# Patient Record
Sex: FEMALE | Race: WHITE | NOT HISPANIC OR LATINO
[De-identification: names, ages, dates, MRNs, and addresses within clinical notes are randomized per-mention and may not be internally consistent; named-entity substitution may affect disease eponyms.]

---

## 2017-01-27 ENCOUNTER — APPOINTMENT (OUTPATIENT)
Dept: INTERNAL MEDICINE | Facility: CLINIC | Age: 77
End: 2017-01-27

## 2017-01-27 VITALS
OXYGEN SATURATION: 96 % | DIASTOLIC BLOOD PRESSURE: 70 MMHG | WEIGHT: 135 LBS | HEIGHT: 60 IN | TEMPERATURE: 98.3 F | BODY MASS INDEX: 26.5 KG/M2 | HEART RATE: 77 BPM | SYSTOLIC BLOOD PRESSURE: 130 MMHG

## 2017-01-27 DIAGNOSIS — H00.019 HORDEOLUM EXTERNUM UNSPECIFIED EYE, UNSPECIFIED EYELID: ICD-10-CM

## 2017-01-27 DIAGNOSIS — H91.93 UNSPECIFIED HEARING LOSS, BILATERAL: ICD-10-CM

## 2017-03-09 PROBLEM — H91.93 HEARING DIFFICULTY OF BOTH EARS: Status: ACTIVE | Noted: 2017-03-09

## 2017-04-23 ENCOUNTER — LABORATORY RESULT (OUTPATIENT)
Age: 77
End: 2017-04-23

## 2017-04-24 ENCOUNTER — APPOINTMENT (OUTPATIENT)
Dept: INTERNAL MEDICINE | Facility: CLINIC | Age: 77
End: 2017-04-24

## 2017-04-24 VITALS — OXYGEN SATURATION: 97 % | TEMPERATURE: 98.3 F | HEART RATE: 79 BPM

## 2017-04-24 VITALS — RESPIRATION RATE: 12 BRPM | SYSTOLIC BLOOD PRESSURE: 124 MMHG | DIASTOLIC BLOOD PRESSURE: 78 MMHG

## 2017-05-01 LAB
25(OH)D3 SERPL-MCNC: 84.8 NG/ML
ALBUMIN SERPL ELPH-MCNC: 3.6 G/DL
ALP BLD-CCNC: 63 U/L
ALT SERPL-CCNC: 31 U/L
ANION GAP SERPL CALC-SCNC: 15 MMOL/L
AST SERPL-CCNC: 30 U/L
BASOPHILS # BLD AUTO: 0.01 K/UL
BASOPHILS NFR BLD AUTO: 0.1 %
BILIRUB SERPL-MCNC: 0.3 MG/DL
BUN SERPL-MCNC: 17 MG/DL
CALCIUM SERPL-MCNC: 8.8 MG/DL
CHLORIDE SERPL-SCNC: 99 MMOL/L
CHOLEST SERPL-MCNC: 240 MG/DL
CHOLEST/HDLC SERPL: 4.1 RATIO
CO2 SERPL-SCNC: 27 MMOL/L
CREAT SERPL-MCNC: 0.48 MG/DL
EOSINOPHIL # BLD AUTO: 0.04 K/UL
EOSINOPHIL NFR BLD AUTO: 0.5 %
GLUCOSE SERPL-MCNC: 104 MG/DL
HBA1C MFR BLD HPLC: 5.4 %
HCT VFR BLD CALC: 41.8 %
HDLC SERPL-MCNC: 59 MG/DL
HGB BLD-MCNC: 12.8 G/DL
IMM GRANULOCYTES NFR BLD AUTO: 0.3 %
LDLC SERPL CALC-MCNC: 130 MG/DL
LYMPHOCYTES # BLD AUTO: 0.96 K/UL
LYMPHOCYTES NFR BLD AUTO: 13.2 %
MAN DIFF?: NORMAL
MCHC RBC-ENTMCNC: 29.9 PG
MCHC RBC-ENTMCNC: 30.6 GM/DL
MCV RBC AUTO: 97.7 FL
MONOCYTES # BLD AUTO: 0.54 K/UL
MONOCYTES NFR BLD AUTO: 7.4 %
NEUTROPHILS # BLD AUTO: 5.73 K/UL
NEUTROPHILS NFR BLD AUTO: 78.5 %
PLATELET # BLD AUTO: 168 K/UL
POTASSIUM SERPL-SCNC: 3.2 MMOL/L
PROT SERPL-MCNC: 7.4 G/DL
RBC # BLD: 4.28 M/UL
RBC # FLD: 15.7 %
SODIUM SERPL-SCNC: 141 MMOL/L
T3RU NFR SERPL: 0.48 INDEX
T4 SERPL-MCNC: 9.1 UG/DL
TRIGL SERPL-MCNC: 256 MG/DL
TSH SERPL-ACNC: <0.01 UIU/ML
WBC # FLD AUTO: 7.3 K/UL

## 2017-05-18 ENCOUNTER — APPOINTMENT (OUTPATIENT)
Dept: INTERNAL MEDICINE | Facility: CLINIC | Age: 77
End: 2017-05-18

## 2017-05-18 VITALS
DIASTOLIC BLOOD PRESSURE: 60 MMHG | HEART RATE: 82 BPM | TEMPERATURE: 98.4 F | SYSTOLIC BLOOD PRESSURE: 130 MMHG | OXYGEN SATURATION: 94 %

## 2017-05-18 DIAGNOSIS — G70.9 OTHER DISORDERS OF LUNG: ICD-10-CM

## 2017-05-18 DIAGNOSIS — R06.00 DYSPNEA, UNSPECIFIED: ICD-10-CM

## 2017-05-18 DIAGNOSIS — J98.4 OTHER DISORDERS OF LUNG: ICD-10-CM

## 2017-05-22 LAB
T3 SERPL-MCNC: >650 NG/DL
T4 SERPL-MCNC: 18 UG/DL
TSH SERPL-ACNC: 0.01 UIU/ML

## 2017-05-23 ENCOUNTER — APPOINTMENT (OUTPATIENT)
Dept: INTERNAL MEDICINE | Facility: CLINIC | Age: 77
End: 2017-05-23

## 2017-05-23 VITALS
HEART RATE: 76 BPM | OXYGEN SATURATION: 94 % | WEIGHT: 141 LBS | TEMPERATURE: 98.2 F | SYSTOLIC BLOOD PRESSURE: 122 MMHG | BODY MASS INDEX: 27.54 KG/M2 | DIASTOLIC BLOOD PRESSURE: 66 MMHG

## 2017-05-23 DIAGNOSIS — R31.9 HEMATURIA, UNSPECIFIED: ICD-10-CM

## 2017-05-23 DIAGNOSIS — E03.8 OTHER SPECIFIED HYPOTHYROIDISM: ICD-10-CM

## 2017-05-25 ENCOUNTER — MEDICATION RENEWAL (OUTPATIENT)
Age: 77
End: 2017-05-25

## 2017-06-02 ENCOUNTER — RX RENEWAL (OUTPATIENT)
Age: 77
End: 2017-06-02

## 2017-06-05 LAB — BACTERIA UR CULT: ABNORMAL

## 2017-06-06 ENCOUNTER — APPOINTMENT (OUTPATIENT)
Dept: INTERNAL MEDICINE | Facility: CLINIC | Age: 77
End: 2017-06-06

## 2017-09-08 ENCOUNTER — RX RENEWAL (OUTPATIENT)
Age: 77
End: 2017-09-08

## 2017-09-08 ENCOUNTER — APPOINTMENT (OUTPATIENT)
Dept: INTERNAL MEDICINE | Facility: CLINIC | Age: 77
End: 2017-09-08
Payer: MEDICARE

## 2017-09-08 VITALS
TEMPERATURE: 98.7 F | BODY MASS INDEX: 27.48 KG/M2 | SYSTOLIC BLOOD PRESSURE: 127 MMHG | HEART RATE: 80 BPM | OXYGEN SATURATION: 92 % | DIASTOLIC BLOOD PRESSURE: 74 MMHG | WEIGHT: 140 LBS | HEIGHT: 60 IN

## 2017-09-08 DIAGNOSIS — R53.83 OTHER FATIGUE: ICD-10-CM

## 2017-09-08 DIAGNOSIS — N31.9 NEUROMUSCULAR DYSFUNCTION OF BLADDER, UNSPECIFIED: ICD-10-CM

## 2017-09-08 PROCEDURE — 99214 OFFICE O/P EST MOD 30 MIN: CPT | Mod: 25

## 2017-09-08 PROCEDURE — 36415 COLL VENOUS BLD VENIPUNCTURE: CPT

## 2017-09-29 ENCOUNTER — APPOINTMENT (OUTPATIENT)
Dept: INTERNAL MEDICINE | Facility: CLINIC | Age: 77
End: 2017-09-29

## 2017-10-17 ENCOUNTER — APPOINTMENT (OUTPATIENT)
Dept: INTERNAL MEDICINE | Facility: CLINIC | Age: 77
End: 2017-10-17
Payer: MEDICARE

## 2017-10-17 VITALS
TEMPERATURE: 97.8 F | HEIGHT: 60 IN | BODY MASS INDEX: 27.48 KG/M2 | WEIGHT: 140 LBS | HEART RATE: 71 BPM | OXYGEN SATURATION: 94 % | DIASTOLIC BLOOD PRESSURE: 72 MMHG | SYSTOLIC BLOOD PRESSURE: 129 MMHG

## 2017-10-17 PROCEDURE — 99214 OFFICE O/P EST MOD 30 MIN: CPT

## 2017-10-26 LAB
ALBUMIN SERPL ELPH-MCNC: 3.8 G/DL
ALP BLD-CCNC: 108 U/L
ALT SERPL-CCNC: 19 U/L
ANION GAP SERPL CALC-SCNC: 14 MMOL/L
AST SERPL-CCNC: 24 U/L
BACTERIA BLD CULT: NORMAL
BASOPHILS # BLD AUTO: 0.02 K/UL
BASOPHILS NFR BLD AUTO: 0.4 %
BILIRUB SERPL-MCNC: 0.2 MG/DL
BUN SERPL-MCNC: 11 MG/DL
CALCIUM SERPL-MCNC: 10.2 MG/DL
CHLORIDE SERPL-SCNC: 104 MMOL/L
CO2 SERPL-SCNC: 27 MMOL/L
CREAT SERPL-MCNC: 0.5 MG/DL
EOSINOPHIL # BLD AUTO: 0.16 K/UL
EOSINOPHIL NFR BLD AUTO: 3.2 %
GLUCOSE SERPL-MCNC: 92 MG/DL
HCT VFR BLD CALC: 44.7 %
HGB BLD-MCNC: 13.7 G/DL
IMM GRANULOCYTES NFR BLD AUTO: 0.2 %
LYMPHOCYTES # BLD AUTO: 1.3 K/UL
LYMPHOCYTES NFR BLD AUTO: 26.4 %
MAN DIFF?: NORMAL
MCHC RBC-ENTMCNC: 29.7 PG
MCHC RBC-ENTMCNC: 30.6 GM/DL
MCV RBC AUTO: 97 FL
MONOCYTES # BLD AUTO: 0.48 K/UL
MONOCYTES NFR BLD AUTO: 9.7 %
NEUTROPHILS # BLD AUTO: 2.96 K/UL
NEUTROPHILS NFR BLD AUTO: 60.1 %
PLATELET # BLD AUTO: 205 K/UL
POTASSIUM SERPL-SCNC: 4.2 MMOL/L
PROT SERPL-MCNC: 7.5 G/DL
RBC # BLD: 4.61 M/UL
RBC # FLD: 16 %
SODIUM SERPL-SCNC: 145 MMOL/L
T4 FREE SERPL-MCNC: 1.4 NG/DL
TSH SERPL-ACNC: 0.17 UIU/ML
WBC # FLD AUTO: 4.93 K/UL

## 2017-10-30 ENCOUNTER — APPOINTMENT (OUTPATIENT)
Dept: INTERNAL MEDICINE | Facility: CLINIC | Age: 77
End: 2017-10-30
Payer: MEDICARE

## 2017-10-30 VITALS
HEART RATE: 81 BPM | DIASTOLIC BLOOD PRESSURE: 80 MMHG | WEIGHT: 140 LBS | OXYGEN SATURATION: 95 % | TEMPERATURE: 97.3 F | HEIGHT: 60 IN | BODY MASS INDEX: 27.48 KG/M2 | SYSTOLIC BLOOD PRESSURE: 130 MMHG

## 2017-10-30 DIAGNOSIS — R11.10 VOMITING, UNSPECIFIED: ICD-10-CM

## 2017-10-30 PROCEDURE — 99214 OFFICE O/P EST MOD 30 MIN: CPT

## 2017-10-31 ENCOUNTER — APPOINTMENT (OUTPATIENT)
Dept: OPHTHALMOLOGY | Facility: CLINIC | Age: 77
End: 2017-10-31
Payer: MEDICARE

## 2017-10-31 PROCEDURE — 92083 EXTENDED VISUAL FIELD XM: CPT

## 2017-11-27 ENCOUNTER — RX RENEWAL (OUTPATIENT)
Age: 77
End: 2017-11-27

## 2017-11-30 ENCOUNTER — RX RENEWAL (OUTPATIENT)
Age: 77
End: 2017-11-30

## 2017-12-07 ENCOUNTER — APPOINTMENT (OUTPATIENT)
Dept: OPHTHALMOLOGY | Facility: CLINIC | Age: 77
End: 2017-12-07
Payer: MEDICARE

## 2017-12-07 DIAGNOSIS — H02.423 MYOGENIC PTOSIS OF BILATERAL EYELIDS: ICD-10-CM

## 2017-12-07 PROCEDURE — 92083 EXTENDED VISUAL FIELD XM: CPT | Mod: TC

## 2017-12-18 PROBLEM — H02.423: Status: ACTIVE | Noted: 2017-12-18

## 2018-02-12 ENCOUNTER — OUTPATIENT (OUTPATIENT)
Dept: OUTPATIENT SERVICES | Facility: HOSPITAL | Age: 78
LOS: 1 days | End: 2018-02-12
Payer: MEDICARE

## 2018-02-12 VITALS
HEIGHT: 63 IN | WEIGHT: 143.08 LBS | OXYGEN SATURATION: 95 % | TEMPERATURE: 99 F | DIASTOLIC BLOOD PRESSURE: 73 MMHG | HEART RATE: 90 BPM | RESPIRATION RATE: 15 BRPM | SYSTOLIC BLOOD PRESSURE: 130 MMHG

## 2018-02-12 DIAGNOSIS — Z01.818 ENCOUNTER FOR OTHER PREPROCEDURAL EXAMINATION: ICD-10-CM

## 2018-02-12 DIAGNOSIS — H02.403 UNSPECIFIED PTOSIS OF BILATERAL EYELIDS: ICD-10-CM

## 2018-02-12 DIAGNOSIS — I10 ESSENTIAL (PRIMARY) HYPERTENSION: ICD-10-CM

## 2018-02-12 DIAGNOSIS — Z98.890 OTHER SPECIFIED POSTPROCEDURAL STATES: Chronic | ICD-10-CM

## 2018-02-12 DIAGNOSIS — H02.423 MYOGENIC PTOSIS OF BILATERAL EYELIDS: ICD-10-CM

## 2018-02-12 DIAGNOSIS — Z96.7 PRESENCE OF OTHER BONE AND TENDON IMPLANTS: Chronic | ICD-10-CM

## 2018-02-12 DIAGNOSIS — G35 MULTIPLE SCLEROSIS: ICD-10-CM

## 2018-02-12 DIAGNOSIS — Z90.710 ACQUIRED ABSENCE OF BOTH CERVIX AND UTERUS: Chronic | ICD-10-CM

## 2018-02-12 LAB
ANION GAP SERPL CALC-SCNC: 14 MMOL/L — SIGNIFICANT CHANGE UP (ref 5–17)
BUN SERPL-MCNC: 17 MG/DL — SIGNIFICANT CHANGE UP (ref 7–23)
CALCIUM SERPL-MCNC: 9.8 MG/DL — SIGNIFICANT CHANGE UP (ref 8.4–10.5)
CHLORIDE SERPL-SCNC: 98 MMOL/L — SIGNIFICANT CHANGE UP (ref 96–108)
CO2 SERPL-SCNC: 29 MMOL/L — SIGNIFICANT CHANGE UP (ref 22–31)
CREAT SERPL-MCNC: 0.54 MG/DL — SIGNIFICANT CHANGE UP (ref 0.5–1.3)
GLUCOSE SERPL-MCNC: 105 MG/DL — HIGH (ref 70–99)
HCT VFR BLD CALC: 41.6 % — SIGNIFICANT CHANGE UP (ref 34.5–45)
HGB BLD-MCNC: 13.3 G/DL — SIGNIFICANT CHANGE UP (ref 11.5–15.5)
MCHC RBC-ENTMCNC: 29.9 PG — SIGNIFICANT CHANGE UP (ref 27–34)
MCHC RBC-ENTMCNC: 32 GM/DL — SIGNIFICANT CHANGE UP (ref 32–36)
MCV RBC AUTO: 93.5 FL — SIGNIFICANT CHANGE UP (ref 80–100)
PLATELET # BLD AUTO: 185 K/UL — SIGNIFICANT CHANGE UP (ref 150–400)
POTASSIUM SERPL-MCNC: 3.6 MMOL/L — SIGNIFICANT CHANGE UP (ref 3.5–5.3)
POTASSIUM SERPL-SCNC: 3.6 MMOL/L — SIGNIFICANT CHANGE UP (ref 3.5–5.3)
RBC # BLD: 4.45 M/UL — SIGNIFICANT CHANGE UP (ref 3.8–5.2)
RBC # FLD: 15.7 % — HIGH (ref 10.3–14.5)
SODIUM SERPL-SCNC: 141 MMOL/L — SIGNIFICANT CHANGE UP (ref 135–145)
WBC # BLD: 5.64 K/UL — SIGNIFICANT CHANGE UP (ref 3.8–10.5)
WBC # FLD AUTO: 5.64 K/UL — SIGNIFICANT CHANGE UP (ref 3.8–10.5)

## 2018-02-12 PROCEDURE — 93010 ELECTROCARDIOGRAM REPORT: CPT

## 2018-02-12 PROCEDURE — 93005 ELECTROCARDIOGRAM TRACING: CPT

## 2018-02-12 PROCEDURE — G0463: CPT

## 2018-02-12 PROCEDURE — 85027 COMPLETE CBC AUTOMATED: CPT

## 2018-02-12 PROCEDURE — 80048 BASIC METABOLIC PNL TOTAL CA: CPT

## 2018-02-12 NOTE — H&P PST ADULT - PSH
S/P breast biopsy    S/P hysterectomy  > 30 yrs ago  S/P ORIF (open reduction internal fixation) fracture  left femur, right hip

## 2018-02-12 NOTE — H&P PST ADULT - HISTORY OF PRESENT ILLNESS
79 y/o female with pmhx of MS (wheelchair bound), HTN, with c/o drooping upper eyelids affecting her visual field. Presents for bilateral ptosis repair. 77 y/o female with pmhx of MS (wheelchair bound), HTN, with c/o drooping upper eyelids affecting her visual field. Presents for bilateral ptosis repair.     * Unable to weigh or measure pt during PST visit; most recent weight patient and  could remember was 143lbs.

## 2018-02-12 NOTE — H&P PST ADULT - RS GEN PE MLT RESP DETAILS PC
no rales/no wheezes/diminished breath sounds, R/no rhonchi/respirations non-labored/diminished breath sounds, L

## 2018-02-12 NOTE — H&P PST ADULT - NSANTHOSAYNRD_GEN_A_CORE
No. DESTINY screening performed.  STOP BANG Legend: 0-2 = LOW Risk; 3-4 = INTERMEDIATE Risk; 5-8 = HIGH Risk

## 2018-02-12 NOTE — H&P PST ADULT - PMH
Dysphagia  no special diet; no h/o aspiration PNA  Neurogenic bladder    Osteoporosis    Personal History of Hypertension    Personal History of Multiple Sclerosis    Ptosis of both eyelids Dysphagia  no special diet; no h/o aspiration PNA  HTN (hypertension)    MS (multiple sclerosis)    Neurogenic bladder    Osteoporosis    Ptosis of both eyelids

## 2018-02-15 ENCOUNTER — APPOINTMENT (OUTPATIENT)
Dept: INTERNAL MEDICINE | Facility: CLINIC | Age: 78
End: 2018-02-15
Payer: MEDICARE

## 2018-02-15 VITALS
HEIGHT: 63 IN | TEMPERATURE: 97.9 F | OXYGEN SATURATION: 98 % | HEART RATE: 80 BPM | RESPIRATION RATE: 12 BRPM | BODY MASS INDEX: 24.8 KG/M2 | DIASTOLIC BLOOD PRESSURE: 80 MMHG | WEIGHT: 140 LBS | SYSTOLIC BLOOD PRESSURE: 150 MMHG

## 2018-02-15 VITALS — SYSTOLIC BLOOD PRESSURE: 148 MMHG | DIASTOLIC BLOOD PRESSURE: 80 MMHG

## 2018-02-15 DIAGNOSIS — Z01.818 ENCOUNTER FOR OTHER PREPROCEDURAL EXAMINATION: ICD-10-CM

## 2018-02-15 PROCEDURE — 99214 OFFICE O/P EST MOD 30 MIN: CPT

## 2018-02-15 RX ORDER — CEPHALEXIN 500 MG/1
500 CAPSULE ORAL
Qty: 30 | Refills: 0 | Status: DISCONTINUED | COMMUNITY
Start: 2017-06-01

## 2018-02-15 RX ORDER — LIDOCAINE AND PRILOCAINE 25; 25 MG/G; MG/G
2.5-2.5 CREAM TOPICAL
Qty: 60 | Refills: 0 | Status: DISCONTINUED | COMMUNITY
Start: 2017-02-14

## 2018-02-15 RX ORDER — CLONAZEPAM 0.5 MG/1
0.5 TABLET, ORALLY DISINTEGRATING ORAL
Qty: 2 | Refills: 0 | Status: DISCONTINUED | COMMUNITY
Start: 2017-05-08

## 2018-02-15 RX ORDER — MOXIFLOXACIN HYDROCHLORIDE 5 MG/ML
0.5 SOLUTION/ DROPS OPHTHALMIC 3 TIMES DAILY
Qty: 1 | Refills: 0 | Status: DISCONTINUED | COMMUNITY
Start: 2017-01-27 | End: 2018-02-15

## 2018-02-15 RX ORDER — SULFAMETHOXAZOLE AND TRIMETHOPRIM 800; 160 MG/1; MG/1
800-160 TABLET ORAL
Qty: 20 | Refills: 0 | Status: DISCONTINUED | COMMUNITY
Start: 2017-05-31

## 2018-02-28 ENCOUNTER — RX RENEWAL (OUTPATIENT)
Age: 78
End: 2018-02-28

## 2018-03-08 ENCOUNTER — LABORATORY RESULT (OUTPATIENT)
Age: 78
End: 2018-03-08

## 2018-03-08 ENCOUNTER — APPOINTMENT (OUTPATIENT)
Dept: INTERNAL MEDICINE | Facility: CLINIC | Age: 78
End: 2018-03-08
Payer: MEDICARE

## 2018-03-08 VITALS
DIASTOLIC BLOOD PRESSURE: 70 MMHG | SYSTOLIC BLOOD PRESSURE: 124 MMHG | HEART RATE: 96 BPM | HEIGHT: 63 IN | OXYGEN SATURATION: 93 % | BODY MASS INDEX: 23.92 KG/M2 | TEMPERATURE: 98.1 F | WEIGHT: 135 LBS

## 2018-03-08 DIAGNOSIS — R79.89 OTHER SPECIFIED ABNORMAL FINDINGS OF BLOOD CHEMISTRY: ICD-10-CM

## 2018-03-08 DIAGNOSIS — M54.9 DORSALGIA, UNSPECIFIED: ICD-10-CM

## 2018-03-08 PROCEDURE — 93000 ELECTROCARDIOGRAM COMPLETE: CPT

## 2018-03-08 PROCEDURE — 36415 COLL VENOUS BLD VENIPUNCTURE: CPT

## 2018-03-08 PROCEDURE — 99215 OFFICE O/P EST HI 40 MIN: CPT | Mod: 25

## 2018-03-10 ENCOUNTER — APPOINTMENT (OUTPATIENT)
Dept: ULTRASOUND IMAGING | Facility: IMAGING CENTER | Age: 78
End: 2018-03-10

## 2018-03-10 ENCOUNTER — APPOINTMENT (OUTPATIENT)
Dept: MAMMOGRAPHY | Facility: IMAGING CENTER | Age: 78
End: 2018-03-10

## 2018-03-19 LAB
25(OH)D3 SERPL-MCNC: 43.5 NG/ML
ALBUMIN SERPL ELPH-MCNC: 3.9 G/DL
ALP BLD-CCNC: 80 U/L
ALT SERPL-CCNC: 39 U/L
ANION GAP SERPL CALC-SCNC: 12 MMOL/L
AST SERPL-CCNC: 35 U/L
BASOPHILS # BLD AUTO: 0.03 K/UL
BASOPHILS NFR BLD AUTO: 0.5 %
BILIRUB SERPL-MCNC: 0.3 MG/DL
BUN SERPL-MCNC: 16 MG/DL
CALCIUM SERPL-MCNC: 9.8 MG/DL
CHLORIDE SERPL-SCNC: 97 MMOL/L
CHOLEST SERPL-MCNC: 208 MG/DL
CHOLEST/HDLC SERPL: 5.1 RATIO
CO2 SERPL-SCNC: 32 MMOL/L
CREAT SERPL-MCNC: 0.4 MG/DL
EOSINOPHIL # BLD AUTO: 0.15 K/UL
EOSINOPHIL NFR BLD AUTO: 2.4 %
GLUCOSE SERPL-MCNC: 89 MG/DL
HBA1C MFR BLD HPLC: 5.1 %
HCT VFR BLD CALC: 43.8 %
HDLC SERPL-MCNC: 41 MG/DL
HGB BLD-MCNC: 13.5 G/DL
IMM GRANULOCYTES NFR BLD AUTO: 0.3 %
LDLC SERPL CALC-MCNC: 106 MG/DL
LYMPHOCYTES # BLD AUTO: 1.88 K/UL
LYMPHOCYTES NFR BLD AUTO: 29.6 %
MAN DIFF?: NORMAL
MCHC RBC-ENTMCNC: 29.3 PG
MCHC RBC-ENTMCNC: 30.8 GM/DL
MCV RBC AUTO: 95.2 FL
MONOCYTES # BLD AUTO: 0.61 K/UL
MONOCYTES NFR BLD AUTO: 9.6 %
NEUTROPHILS # BLD AUTO: 3.66 K/UL
NEUTROPHILS NFR BLD AUTO: 57.6 %
PLATELET # BLD AUTO: 206 K/UL
POTASSIUM SERPL-SCNC: 3.8 MMOL/L
PROT SERPL-MCNC: 7.1 G/DL
RBC # BLD: 4.6 M/UL
RBC # FLD: 15.3 %
SODIUM SERPL-SCNC: 141 MMOL/L
T3RU NFR SERPL: <0.2 INDEX
T4 SERPL-MCNC: 17.5 UG/DL
TRIGL SERPL-MCNC: 305 MG/DL
TSH SERPL-ACNC: 0.01 UIU/ML
WBC # FLD AUTO: 6.35 K/UL

## 2018-07-03 ENCOUNTER — NON-APPOINTMENT (OUTPATIENT)
Age: 78
End: 2018-07-03

## 2018-07-03 ENCOUNTER — APPOINTMENT (OUTPATIENT)
Dept: CARDIOLOGY | Facility: CLINIC | Age: 78
End: 2018-07-03
Payer: MEDICARE

## 2018-07-03 VITALS
HEIGHT: 63 IN | WEIGHT: 134 LBS | HEART RATE: 92 BPM | SYSTOLIC BLOOD PRESSURE: 112 MMHG | BODY MASS INDEX: 23.74 KG/M2 | DIASTOLIC BLOOD PRESSURE: 60 MMHG | RESPIRATION RATE: 14 BRPM

## 2018-07-03 DIAGNOSIS — R01.1 CARDIAC MURMUR, UNSPECIFIED: ICD-10-CM

## 2018-07-03 PROCEDURE — 99205 OFFICE O/P NEW HI 60 MIN: CPT

## 2018-07-03 PROCEDURE — 93000 ELECTROCARDIOGRAM COMPLETE: CPT

## 2018-08-02 ENCOUNTER — MEDICATION RENEWAL (OUTPATIENT)
Age: 78
End: 2018-08-02

## 2018-08-07 ENCOUNTER — MEDICATION RENEWAL (OUTPATIENT)
Age: 78
End: 2018-08-07

## 2018-08-21 ENCOUNTER — APPOINTMENT (OUTPATIENT)
Dept: CARDIOLOGY | Facility: CLINIC | Age: 78
End: 2018-08-21

## 2018-08-21 PROBLEM — G35 MULTIPLE SCLEROSIS: Chronic | Status: ACTIVE | Noted: 2018-02-12

## 2018-08-21 PROBLEM — N31.9 NEUROMUSCULAR DYSFUNCTION OF BLADDER, UNSPECIFIED: Chronic | Status: ACTIVE | Noted: 2018-02-12

## 2018-08-21 PROBLEM — R13.10 DYSPHAGIA, UNSPECIFIED: Chronic | Status: ACTIVE | Noted: 2018-02-12

## 2018-08-21 PROBLEM — I10 ESSENTIAL (PRIMARY) HYPERTENSION: Chronic | Status: ACTIVE | Noted: 2018-02-12

## 2018-08-21 PROBLEM — H02.403 UNSPECIFIED PTOSIS OF BILATERAL EYELIDS: Chronic | Status: ACTIVE | Noted: 2018-02-12

## 2018-08-21 PROBLEM — M81.0 AGE-RELATED OSTEOPOROSIS WITHOUT CURRENT PATHOLOGICAL FRACTURE: Chronic | Status: ACTIVE | Noted: 2018-02-12

## 2018-08-22 ENCOUNTER — MEDICATION RENEWAL (OUTPATIENT)
Age: 78
End: 2018-08-22

## 2018-08-27 ENCOUNTER — APPOINTMENT (OUTPATIENT)
Dept: INTERNAL MEDICINE | Facility: CLINIC | Age: 78
End: 2018-08-27
Payer: MEDICARE

## 2018-08-27 VITALS
SYSTOLIC BLOOD PRESSURE: 130 MMHG | WEIGHT: 126 LBS | OXYGEN SATURATION: 90 % | DIASTOLIC BLOOD PRESSURE: 86 MMHG | HEIGHT: 63 IN | HEART RATE: 81 BPM | TEMPERATURE: 97.7 F | BODY MASS INDEX: 22.32 KG/M2

## 2018-08-27 DIAGNOSIS — R79.89 OTHER SPECIFIED ABNORMAL FINDINGS OF BLOOD CHEMISTRY: ICD-10-CM

## 2018-08-27 PROCEDURE — 99214 OFFICE O/P EST MOD 30 MIN: CPT | Mod: PD

## 2018-08-27 NOTE — ASSESSMENT
[FreeTextEntry1] : 1. HTN: the BP is controlled on medication regimen. F/u with Dr. Jon.\par \par 2. She has elevated T4 and suppressed TSH. Unclear if she has hyperthyroidism. She follows with endocrine, not actively on medication. Her TSH was suppressed from labs a few weeks ago.\par \par 3. Hx of MS - followed with Dr. Parry of neurology. She is on Baclofen. She uses motorized wheelchair due to gait difficulty. She has a home health aide for assistance with her ADL's. \par \par RTO in 1 month for annual exam and flu vaccine.\par \par

## 2018-08-27 NOTE — HISTORY OF PRESENT ILLNESS
[Formal Caregiver] : formal caregiver [FreeTextEntry1] : Follow-up [de-identified] : Patient comes for follow-up. She came 30 min late to visit. Former pt of Dr. Trevino who recently retired.\par \par She has hx of multiple sclerosis and is wheel-chair bound. She was scheduled for bilateral ptosis repair in 2/18 but was cancelled as the procedure was scheduled too early in morning. It was never rescheduled. \par \par Hx of hyperthyroidism - she sees endocrinologist. Not on medication. She had recent labs from a few weeks ago and brought in for review.\par \par She started seeing cardiology Dr. Jon for intermittent chest pain and left arm pain. Not felt to be cardiac in nature. EKG has been unchanged. She is scheduled for follow-up and echo next week. \par \par \par

## 2018-08-27 NOTE — PHYSICAL EXAM
[No Acute Distress] : no acute distress [Well Nourished] : well nourished [Well Developed] : well developed [Well-Appearing] : well-appearing [Supple] : supple [No Lymphadenopathy] : no lymphadenopathy [No Respiratory Distress] : no respiratory distress  [Clear to Auscultation] : lungs were clear to auscultation bilaterally [Normal Rate] : normal rate  [Regular Rhythm] : with a regular rhythm [Normal S1, S2] : normal S1 and S2 [No Murmur] : no murmur heard [No Edema] : there was no peripheral edema [Soft] : abdomen soft [Non Tender] : non-tender [Non-distended] : non-distended [Normal Bowel Sounds] : normal bowel sounds [No Spinal Tenderness] : no spinal tenderness [No Joint Swelling] : no joint swelling [No Rash] : no rash [No Focal Deficits] : no focal deficits [Normal Affect] : the affect was normal [Alert and Oriented x3] : oriented to person, place, and time [Normal Mood] : the mood was normal [de-identified] : friendly female, NAD [de-identified] : wheelchair-bound, pronounced weakness in lower extremities; upper extremity weakness - L>>R

## 2018-08-27 NOTE — REVIEW OF SYSTEMS
[Recent Change In Weight] : ~T recent weight change [Unsteady Walking] : ataxia [Anxiety] : anxiety [Fever] : no fever [Chills] : no chills [Earache] : no earache [Nasal Discharge] : no nasal discharge [Chest Pain] : no chest pain [Lower Ext Edema] : no lower extremity edema [Shortness Of Breath] : no shortness of breath [Cough] : no cough [Abdominal Pain] : no abdominal pain [Nausea] : no nausea [Diarrhea] : diarrhea [Dysuria] : no dysuria [Incontinence] : no incontinence [Frequency] : no frequency [Skin Rash] : no skin rash [Headache] : no headache [Dizziness] : no dizziness [Depression] : no depression [FreeTextEntry2] : intentional wt loss

## 2018-08-29 ENCOUNTER — INPATIENT (INPATIENT)
Facility: HOSPITAL | Age: 78
LOS: 7 days | Discharge: HOME HEALTH | DRG: 871 | End: 2018-09-06
Attending: HOSPITALIST | Admitting: STUDENT IN AN ORGANIZED HEALTH CARE EDUCATION/TRAINING PROGRAM
Payer: MEDICARE

## 2018-08-29 VITALS
OXYGEN SATURATION: 99 % | HEART RATE: 78 BPM | DIASTOLIC BLOOD PRESSURE: 72 MMHG | RESPIRATION RATE: 20 BRPM | TEMPERATURE: 98 F | SYSTOLIC BLOOD PRESSURE: 116 MMHG

## 2018-08-29 DIAGNOSIS — Z96.7 PRESENCE OF OTHER BONE AND TENDON IMPLANTS: Chronic | ICD-10-CM

## 2018-08-29 DIAGNOSIS — Z98.890 OTHER SPECIFIED POSTPROCEDURAL STATES: Chronic | ICD-10-CM

## 2018-08-29 DIAGNOSIS — Z90.710 ACQUIRED ABSENCE OF BOTH CERVIX AND UTERUS: Chronic | ICD-10-CM

## 2018-08-29 LAB
ALBUMIN SERPL ELPH-MCNC: 4.3 G/DL — SIGNIFICANT CHANGE UP (ref 3.3–5)
ALP SERPL-CCNC: 85 U/L — SIGNIFICANT CHANGE UP (ref 40–120)
ALT FLD-CCNC: 59 U/L — HIGH (ref 10–45)
ANION GAP SERPL CALC-SCNC: 13 MMOL/L — SIGNIFICANT CHANGE UP (ref 5–17)
ANION GAP SERPL CALC-SCNC: 14 MMOL/L — SIGNIFICANT CHANGE UP (ref 5–17)
ANISOCYTOSIS BLD QL: SLIGHT — SIGNIFICANT CHANGE UP
APTT BLD: 25.4 SEC — LOW (ref 27.5–37.4)
AST SERPL-CCNC: 104 U/L — HIGH (ref 10–40)
BASE EXCESS BLDV CALC-SCNC: 0.9 MMOL/L — SIGNIFICANT CHANGE UP (ref -2–2)
BASOPHILS # BLD AUTO: 0.1 K/UL — SIGNIFICANT CHANGE UP (ref 0–0.2)
BILIRUB SERPL-MCNC: 0.7 MG/DL — SIGNIFICANT CHANGE UP (ref 0.2–1.2)
BUN SERPL-MCNC: 31 MG/DL — HIGH (ref 7–23)
BUN SERPL-MCNC: 35 MG/DL — HIGH (ref 7–23)
CA-I SERPL-SCNC: 1.54 MMOL/L — HIGH (ref 1.12–1.3)
CALCIUM SERPL-MCNC: 12.5 MG/DL — HIGH (ref 8.4–10.5)
CALCIUM SERPL-MCNC: 12.7 MG/DL — HIGH (ref 8.4–10.5)
CHLORIDE BLDV-SCNC: 102 MMOL/L — SIGNIFICANT CHANGE UP (ref 96–108)
CHLORIDE SERPL-SCNC: 94 MMOL/L — LOW (ref 96–108)
CHLORIDE SERPL-SCNC: 98 MMOL/L — SIGNIFICANT CHANGE UP (ref 96–108)
CO2 BLDV-SCNC: 28 MMOL/L — SIGNIFICANT CHANGE UP (ref 22–30)
CO2 SERPL-SCNC: 23 MMOL/L — SIGNIFICANT CHANGE UP (ref 22–31)
CO2 SERPL-SCNC: 23 MMOL/L — SIGNIFICANT CHANGE UP (ref 22–31)
CREAT SERPL-MCNC: 0.58 MG/DL — SIGNIFICANT CHANGE UP (ref 0.5–1.3)
CREAT SERPL-MCNC: 0.62 MG/DL — SIGNIFICANT CHANGE UP (ref 0.5–1.3)
EOSINOPHIL # BLD AUTO: 0 K/UL — SIGNIFICANT CHANGE UP (ref 0–0.5)
GAS PNL BLDV: 130 MMOL/L — LOW (ref 136–145)
GAS PNL BLDV: SIGNIFICANT CHANGE UP
GAS PNL BLDV: SIGNIFICANT CHANGE UP
GLUCOSE BLDV-MCNC: 140 MG/DL — HIGH (ref 70–99)
GLUCOSE SERPL-MCNC: 130 MG/DL — HIGH (ref 70–99)
GLUCOSE SERPL-MCNC: 145 MG/DL — HIGH (ref 70–99)
HCO3 BLDV-SCNC: 27 MMOL/L — SIGNIFICANT CHANGE UP (ref 21–29)
HCT VFR BLD CALC: 54.5 % — HIGH (ref 34.5–45)
HCT VFR BLDA CALC: 54 % — HIGH (ref 39–50)
HGB BLD CALC-MCNC: 17.5 G/DL — HIGH (ref 11.5–15.5)
HGB BLD-MCNC: 17.2 G/DL — HIGH (ref 11.5–15.5)
INR BLD: 0.91 RATIO — SIGNIFICANT CHANGE UP (ref 0.88–1.16)
LACTATE BLDV-MCNC: 3.3 MMOL/L — HIGH (ref 0.7–2)
LIDOCAIN IGE QN: 83 U/L — HIGH (ref 7–60)
LYMPHOCYTES # BLD AUTO: 1.1 K/UL — SIGNIFICANT CHANGE UP (ref 1–3.3)
LYMPHOCYTES # BLD AUTO: 4 % — LOW (ref 13–44)
MCHC RBC-ENTMCNC: 28.6 PG — SIGNIFICANT CHANGE UP (ref 27–34)
MCHC RBC-ENTMCNC: 31.5 GM/DL — LOW (ref 32–36)
MCV RBC AUTO: 90.6 FL — SIGNIFICANT CHANGE UP (ref 80–100)
MONOCYTES # BLD AUTO: 1.3 K/UL — HIGH (ref 0–0.9)
MONOCYTES NFR BLD AUTO: 7 % — SIGNIFICANT CHANGE UP (ref 2–14)
NEUTROPHILS # BLD AUTO: 14.4 K/UL — HIGH (ref 1.8–7.4)
NEUTROPHILS NFR BLD AUTO: 80 % — HIGH (ref 43–77)
NEUTS BAND # BLD: 8 % — SIGNIFICANT CHANGE UP (ref 0–8)
PCO2 BLDV: 50 MMHG — SIGNIFICANT CHANGE UP (ref 35–50)
PH BLDV: 7.35 — SIGNIFICANT CHANGE UP (ref 7.35–7.45)
PLAT MORPH BLD: NORMAL — SIGNIFICANT CHANGE UP
PLATELET # BLD AUTO: 250 K/UL — SIGNIFICANT CHANGE UP (ref 150–400)
PO2 BLDV: 54 MMHG — HIGH (ref 25–45)
POTASSIUM BLDV-SCNC: 4.9 MMOL/L — SIGNIFICANT CHANGE UP (ref 3.5–5.3)
POTASSIUM SERPL-MCNC: 5 MMOL/L — SIGNIFICANT CHANGE UP (ref 3.5–5.3)
POTASSIUM SERPL-MCNC: 7.3 MMOL/L — CRITICAL HIGH (ref 3.5–5.3)
POTASSIUM SERPL-SCNC: 5 MMOL/L — SIGNIFICANT CHANGE UP (ref 3.5–5.3)
POTASSIUM SERPL-SCNC: 7.3 MMOL/L — CRITICAL HIGH (ref 3.5–5.3)
PROT SERPL-MCNC: 8.2 G/DL — SIGNIFICANT CHANGE UP (ref 6–8.3)
PROTHROM AB SERPL-ACNC: 9.8 SEC — SIGNIFICANT CHANGE UP (ref 9.8–12.7)
RBC # BLD: 6.01 M/UL — HIGH (ref 3.8–5.2)
RBC # FLD: 13.6 % — SIGNIFICANT CHANGE UP (ref 10.3–14.5)
RBC BLD AUTO: ABNORMAL
SAO2 % BLDV: 84 % — SIGNIFICANT CHANGE UP (ref 67–88)
SODIUM SERPL-SCNC: 130 MMOL/L — LOW (ref 135–145)
SODIUM SERPL-SCNC: 135 MMOL/L — SIGNIFICANT CHANGE UP (ref 135–145)
SPHEROCYTES BLD QL SMEAR: SIGNIFICANT CHANGE UP
VARIANT LYMPHS # BLD: 1 % — SIGNIFICANT CHANGE UP (ref 0–6)
WBC # BLD: 16.9 K/UL — HIGH (ref 3.8–10.5)
WBC # FLD AUTO: 16.9 K/UL — HIGH (ref 3.8–10.5)

## 2018-08-29 PROCEDURE — 74177 CT ABD & PELVIS W/CONTRAST: CPT | Mod: 26

## 2018-08-29 PROCEDURE — 76700 US EXAM ABDOM COMPLETE: CPT | Mod: 26

## 2018-08-29 PROCEDURE — 31500 INSERT EMERGENCY AIRWAY: CPT | Mod: GC

## 2018-08-29 PROCEDURE — 93010 ELECTROCARDIOGRAM REPORT: CPT | Mod: 59

## 2018-08-29 PROCEDURE — 99285 EMERGENCY DEPT VISIT HI MDM: CPT | Mod: 25,GC

## 2018-08-29 PROCEDURE — 71045 X-RAY EXAM CHEST 1 VIEW: CPT | Mod: 26

## 2018-08-29 RX ORDER — SODIUM CHLORIDE 9 MG/ML
1000 INJECTION INTRAMUSCULAR; INTRAVENOUS; SUBCUTANEOUS ONCE
Qty: 0 | Refills: 0 | Status: COMPLETED | OUTPATIENT
Start: 2018-08-29 | End: 2018-08-29

## 2018-08-29 RX ORDER — ONDANSETRON 8 MG/1
4 TABLET, FILM COATED ORAL ONCE
Qty: 0 | Refills: 0 | Status: COMPLETED | OUTPATIENT
Start: 2018-08-29 | End: 2018-08-29

## 2018-08-29 RX ADMIN — ONDANSETRON 4 MILLIGRAM(S): 8 TABLET, FILM COATED ORAL at 20:39

## 2018-08-29 RX ADMIN — SODIUM CHLORIDE 1000 MILLILITER(S): 9 INJECTION INTRAMUSCULAR; INTRAVENOUS; SUBCUTANEOUS at 20:42

## 2018-08-29 RX ADMIN — ONDANSETRON 4 MILLIGRAM(S): 8 TABLET, FILM COATED ORAL at 21:55

## 2018-08-29 RX ADMIN — SODIUM CHLORIDE 1000 MILLILITER(S): 9 INJECTION INTRAMUSCULAR; INTRAVENOUS; SUBCUTANEOUS at 21:55

## 2018-08-29 NOTE — ED PROVIDER NOTE - PHYSICAL EXAMINATION
Gen: tired appearing female, thin, nauseous, AAOx3, with normal speech   Head: NCAT  ENT: Airway patent, dry membranes  Cardiac: Normal rate, normal rhythm, no murmurs/rubs/gallops appreciated  Respiratory: Lungs CTA B/L  Gastrointestinal: +BS, Abdomen soft, TTP RUQ, no rebound,   MSK: No gross abnormalities, FROM of all four extremities, no edema  HEME: Extremities warm, pulses intact and symmetrical in all four extremities, extremities warm.   Skin: No rashes, no lesions  Neuro: No gross neurologic deficits Gen: tired appearing female, thin, nauseous, AAOx3, with normal speech,   Head: NCAT  ENT: Airway patent, dry membranes, b/l PERRLA  Cardiac: Normal rate, normal rhythm, no murmurs/rubs/gallops appreciated  Respiratory: Lungs CTA B/L  Gastrointestinal: +BS, Abdomen soft, TTP localized to RUQ, no rebound, no guarding, no TTP elsewhere  MSK: No gross abnormalities, nonambulatory at baseline 2/2 MS  HEME: Extremities warm, pulses intact and symmetrical in all four extremities, extremities warm.   Skin: No rashes, no lesions  Neuro: AAOx4, moves upper extremities, sensation decreased in b/l LE as baseline 2/2 MS

## 2018-08-29 NOTE — ED PROVIDER NOTE - PROGRESS NOTE DETAILS
Sign out follow-up: Pt unresponsive to sternal rub. Vomit around mouth. 98% RA. Normo to hypertensive. NSVT on tele about 20 beats. Pt moved to CCB and intubated for airway protection. CTa/p reports sterocolitis and stone in cystic duct without acute bernie. Pt covered in dark brown liquid stool. ANGELA Aakash PGY2: intubated for airway protection Aakash PGY2: intubated for airway protection due concern for vomitus and unarousability, patient was verbalizing normally though appeared fatigued prior to u/s / ct scan, answering questions appropriately, per RN after return from scans, was arousable for RVP, however on re-examination once scan readings demonstrating stercoproctocolitis and cholelithiasis returned and patient reassessed, was unarousable to sternal rub, noted large liquid nonbloody brown BM on bed, unknown when occurred per  in room, patient had been sleeping. No reported bowel concerns, last stool yesterday.

## 2018-08-29 NOTE — ED PROVIDER NOTE - INTERPRETATION
EKG reviewed for rate, rhythm, axis, intervals and segments, including QRS morphology, P wave appearance T wave appearance, ID interval, and QT interval.  I find the EKG to be unremarkable in all of these regards except as follows: L axis

## 2018-08-29 NOTE — ED PROVIDER NOTE - NS ED ROS FT
CONSTITUTIONAL: no fevers  HEENT: no dysphagia  CV: no chest pain  RESP: + SOB  GI: + nausea/vomiting  : no dysuria  DERM: no rash  MSK: no back pain  NEURO: + lethargy  ENDO: no diabetes

## 2018-08-29 NOTE — ED PROVIDER NOTE - OBJECTIVE STATEMENT
78F hx MS, HTN, nonambulatory with chronic fragoso, last changed 1 week ago (changed every 2 wks) c/o increased lethargy/ weakness, 'not feeling right' with decreased UO, feeling hot without fevers or chills, new nausea on the way from EMS, noted to be hypoxic to 93% and placed on 3L O2, resolved in ED, but still requiring 2L O2 given sensation of SOB without the O2. Last BM yesterday. No cough or other signs of infection, no chest pain, no palpitations, no dizziness, no new focal weakness. 78F hx MS, HTN, nonambulatory with chronic fragoso, last changed 1 week ago (changed every 2 wks) c/o increased lethargy/ weakness, 'not feeling right' with decreased UO, feeling hot without fevers or chills, new nausea on the way from EMS, noted to be hypoxic to 93% and placed on 3L O2, resolved in ED, but still requiring 2L O2 given sensation of SOB without the O2. Last BM yesterday. No cough or other signs of infection, no chest pain, no palpitations, no dizziness, no new focal weakness from baseline, normally requires assistance sitting up.

## 2018-08-29 NOTE — ED ADULT NURSE NOTE - NSIMPLEMENTINTERV_GEN_ALL_ED
Implemented All Fall Risk Interventions:  [place] to call system. Call bell, personal items and telephone within reach. Instruct patient to call for assistance. Room bathroom lighting operational. Non-slip footwear when patient is off stretcher. Physically safe environment: no spills, clutter or unnecessary equipment. Stretcher in lowest position, wheels locked, appropriate side rails in place. Provide visual cue, wrist band, yellow gown, etc. Monitor gait and stability. Monitor for mental status changes and reorient to person, place, and time. Review medications for side effects contributing to fall risk. Reinforce activity limits and safety measures with patient and family.

## 2018-08-29 NOTE — ED PROVIDER NOTE - CROS ED RESP ALL NEG
Lactation consult follow up completed. Mom states breastfeeding, pumping, and supplementing is going well. Was able to sleep a little more last night but woke to pump. No nipple soreness. Used nipple shield at times, but also able to latch baby last night without it. Plans to exclusively breastfeed, but will return to work and pump. Discussed proper fit of pump flanges and amount of suction to prevent nipple irritation. Observed breastfeeding in special care nursery - baby was very sleepy, but mom encouraged to hold skin-to-skin as much as possible today and pump a minimum of every 3 hours until breastfeeding is better established. Parents will supplement with pumped breastmilk, but so far ok to feed baby ad abdulkadir to encourage breastfeeding while baby is receiving IVF per Dr Mcconnell, pediatrician.  very supportive to mom, baby and breastfeeding. Parents agree to plan.   - - -

## 2018-08-29 NOTE — ED PROVIDER NOTE - MEDICAL DECISION MAKING DETAILS
see attending attestation authored by me, Bill Krueger MD see attending attestation authored by me, Bill Krueger MD    Finn PGY2: pt with MS, with increased weakness without infectious complaints, concern for RUQ pain possibly cholecystitis in setting of new n/v, ordered RUQ sono and CT a/p given persistent emesis, afebrile rectally, VS wnl. initial lactate 3.3 with leukocytosis but suspicion reactive to persistent emesis and dehydration, will admin abx once source  for symptoms more clearly elucidated, pt chief complaint was shortness of breath per EMS though denied on initial interview and re-endorsed later, ekg nonischemic, no chest pain.

## 2018-08-29 NOTE — ED PROVIDER NOTE - ATTENDING CONTRIBUTION TO CARE
79 yo female, hx of MS, here with generalized weakness, SOB.  Reportedly had "very high" T3 as outpatient repeated multiple times.  Rouses to voice on exam, grossly oriented and intact, c/o nausea and subjective SOB, RUQ TTP on exam, lungs clear, afebrile.  Will obtain x-ray, RUQ sono, check labs, repeat thyroid function tests, UA, likely admit for further management.

## 2018-08-29 NOTE — ED ADULT NURSE REASSESSMENT NOTE - NS ED NURSE REASSESS COMMENT FT1
pt returned from CT scan and ultrasound with  at bedside. pt is sleeping. VSS. approximately 900ml of dark urine in leg bag.

## 2018-08-29 NOTE — ED ADULT NURSE NOTE - OBJECTIVE STATEMENT
79 yo presents to the ED from home by EMS with  at bedside. A&Ox4, lethargic at times, c/o weakness. pt has history of MS, nonambulatory at baseline. pt reports "not feeling right" since 1600 today. pt has chronic Kendrick, changed every 2 weeks, last changed within the week as per family.  reports decreased urine output, bag last emptied at 1400, currently has approximately 100ml in bag, bladder scanner is 16-21ml. pt was hypoxic at home according to EMS to 93%, pt arrives with 3L NC, O2sat 96%. pt arrives actively vomiting, reports that she started to feel nauseas en route to ED. pt reports feeling hot, pt afebrile 97.9 via rectal temp. pt denies CP, EKG done at bedside. pt placed on CM. reports RUQ abd pain. last BM yesterday, denies diarrhea. denies sick contact at home.

## 2018-08-29 NOTE — ED PROVIDER NOTE - PMH
Dysphagia  no special diet; no h/o aspiration PNA  HTN (hypertension)    MS (multiple sclerosis)    Neurogenic bladder    Osteoporosis    Ptosis of both eyelids

## 2018-08-30 DIAGNOSIS — E05.90 THYROTOXICOSIS, UNSPECIFIED WITHOUT THYROTOXIC CRISIS OR STORM: ICD-10-CM

## 2018-08-30 DIAGNOSIS — R41.82 ALTERED MENTAL STATUS, UNSPECIFIED: ICD-10-CM

## 2018-08-30 DIAGNOSIS — E27.9 DISORDER OF ADRENAL GLAND, UNSPECIFIED: ICD-10-CM

## 2018-08-30 DIAGNOSIS — M81.0 AGE-RELATED OSTEOPOROSIS WITHOUT CURRENT PATHOLOGICAL FRACTURE: ICD-10-CM

## 2018-08-30 LAB
ALBUMIN SERPL ELPH-MCNC: 2.8 G/DL — LOW (ref 3.3–5)
ALP SERPL-CCNC: 68 U/L — SIGNIFICANT CHANGE UP (ref 40–120)
ALT FLD-CCNC: 76 U/L — HIGH (ref 10–45)
ANION GAP SERPL CALC-SCNC: 12 MMOL/L — SIGNIFICANT CHANGE UP (ref 5–17)
ANION GAP SERPL CALC-SCNC: 14 MMOL/L — SIGNIFICANT CHANGE UP (ref 5–17)
APPEARANCE UR: CLEAR — SIGNIFICANT CHANGE UP
APPEARANCE UR: CLEAR — SIGNIFICANT CHANGE UP
APTT BLD: 30 SEC — SIGNIFICANT CHANGE UP (ref 27.5–37.4)
APTT BLD: 36.3 SEC — SIGNIFICANT CHANGE UP (ref 27.5–37.4)
AST SERPL-CCNC: 71 U/L — HIGH (ref 10–40)
BASE EXCESS BLDV CALC-SCNC: -2.3 MMOL/L — LOW (ref -2–2)
BASOPHILS # BLD AUTO: 0.1 K/UL — SIGNIFICANT CHANGE UP (ref 0–0.2)
BILIRUB SERPL-MCNC: 0.8 MG/DL — SIGNIFICANT CHANGE UP (ref 0.2–1.2)
BILIRUB UR-MCNC: NEGATIVE — SIGNIFICANT CHANGE UP
BILIRUB UR-MCNC: NEGATIVE — SIGNIFICANT CHANGE UP
BUN SERPL-MCNC: 33 MG/DL — HIGH (ref 7–23)
BUN SERPL-MCNC: 36 MG/DL — HIGH (ref 7–23)
C DIFF GDH STL QL: NEGATIVE — SIGNIFICANT CHANGE UP
C DIFF GDH STL QL: SIGNIFICANT CHANGE UP
CA-I SERPL-SCNC: 1.33 MMOL/L — HIGH (ref 1.12–1.3)
CALCIUM SERPL-MCNC: 9.7 MG/DL — SIGNIFICANT CHANGE UP (ref 8.4–10.5)
CALCIUM SERPL-MCNC: 9.9 MG/DL — SIGNIFICANT CHANGE UP (ref 8.4–10.5)
CHLORIDE BLDV-SCNC: 109 MMOL/L — HIGH (ref 96–108)
CHLORIDE SERPL-SCNC: 104 MMOL/L — SIGNIFICANT CHANGE UP (ref 96–108)
CHLORIDE SERPL-SCNC: 104 MMOL/L — SIGNIFICANT CHANGE UP (ref 96–108)
CO2 BLDV-SCNC: 22 MMOL/L — SIGNIFICANT CHANGE UP (ref 22–30)
CO2 SERPL-SCNC: 19 MMOL/L — LOW (ref 22–31)
CO2 SERPL-SCNC: 23 MMOL/L — SIGNIFICANT CHANGE UP (ref 22–31)
COLOR SPEC: YELLOW — SIGNIFICANT CHANGE UP
COLOR SPEC: YELLOW — SIGNIFICANT CHANGE UP
COMMENT - URINE: SIGNIFICANT CHANGE UP
COMMENT - URINE: SIGNIFICANT CHANGE UP
CREAT SERPL-MCNC: 0.49 MG/DL — LOW (ref 0.5–1.3)
CREAT SERPL-MCNC: 0.6 MG/DL — SIGNIFICANT CHANGE UP (ref 0.5–1.3)
CULTURE RESULTS: SIGNIFICANT CHANGE UP
DIFF PNL FLD: ABNORMAL
DIFF PNL FLD: ABNORMAL
EOSINOPHIL # BLD AUTO: 0 K/UL — SIGNIFICANT CHANGE UP (ref 0–0.5)
EPI CELLS # UR: SIGNIFICANT CHANGE UP /HPF
GAS PNL BLDA: SIGNIFICANT CHANGE UP
GAS PNL BLDA: SIGNIFICANT CHANGE UP
GAS PNL BLDV: 134 MMOL/L — LOW (ref 136–145)
GAS PNL BLDV: SIGNIFICANT CHANGE UP
GLUCOSE BLDV-MCNC: 184 MG/DL — HIGH (ref 70–99)
GLUCOSE SERPL-MCNC: 155 MG/DL — HIGH (ref 70–99)
GLUCOSE SERPL-MCNC: 189 MG/DL — HIGH (ref 70–99)
GLUCOSE UR QL: NEGATIVE — SIGNIFICANT CHANGE UP
GLUCOSE UR QL: NEGATIVE — SIGNIFICANT CHANGE UP
HCO3 BLDV-SCNC: 20 MMOL/L — LOW (ref 21–29)
HCT VFR BLD CALC: 43.5 % — SIGNIFICANT CHANGE UP (ref 34.5–45)
HCT VFR BLD CALC: 46.4 % — HIGH (ref 34.5–45)
HCT VFR BLD CALC: 47.3 % — HIGH (ref 34.5–45)
HCT VFR BLDA CALC: 46 % — SIGNIFICANT CHANGE UP (ref 39–50)
HGB BLD CALC-MCNC: 15 G/DL — SIGNIFICANT CHANGE UP (ref 11.5–15.5)
HGB BLD-MCNC: 14.3 G/DL — SIGNIFICANT CHANGE UP (ref 11.5–15.5)
HGB BLD-MCNC: 14.7 G/DL — SIGNIFICANT CHANGE UP (ref 11.5–15.5)
HGB BLD-MCNC: 14.9 G/DL — SIGNIFICANT CHANGE UP (ref 11.5–15.5)
HYALINE CASTS # UR AUTO: ABNORMAL
INR BLD: 0.95 RATIO — SIGNIFICANT CHANGE UP (ref 0.88–1.16)
INR BLD: 1.07 RATIO — SIGNIFICANT CHANGE UP (ref 0.88–1.16)
KETONES UR-MCNC: NEGATIVE — SIGNIFICANT CHANGE UP
KETONES UR-MCNC: NEGATIVE — SIGNIFICANT CHANGE UP
LACTATE BLDV-MCNC: 3.8 MMOL/L — HIGH (ref 0.7–2)
LEUKOCYTE ESTERASE UR-ACNC: ABNORMAL
LEUKOCYTE ESTERASE UR-ACNC: ABNORMAL
LYMPHOCYTES # BLD AUTO: 0.2 K/UL — LOW (ref 1–3.3)
LYMPHOCYTES # BLD AUTO: 1 % — LOW (ref 13–44)
MAGNESIUM SERPL-MCNC: 3.4 MG/DL — HIGH (ref 1.6–2.6)
MCHC RBC-ENTMCNC: 28.8 PG — SIGNIFICANT CHANGE UP (ref 27–34)
MCHC RBC-ENTMCNC: 28.9 PG — SIGNIFICANT CHANGE UP (ref 27–34)
MCHC RBC-ENTMCNC: 29.8 PG — SIGNIFICANT CHANGE UP (ref 27–34)
MCHC RBC-ENTMCNC: 31.5 GM/DL — LOW (ref 32–36)
MCHC RBC-ENTMCNC: 31.8 GM/DL — LOW (ref 32–36)
MCHC RBC-ENTMCNC: 32.8 GM/DL — SIGNIFICANT CHANGE UP (ref 32–36)
MCV RBC AUTO: 90.7 FL — SIGNIFICANT CHANGE UP (ref 80–100)
MCV RBC AUTO: 91 FL — SIGNIFICANT CHANGE UP (ref 80–100)
MCV RBC AUTO: 91.2 FL — SIGNIFICANT CHANGE UP (ref 80–100)
MONOCYTES # BLD AUTO: 0.8 K/UL — SIGNIFICANT CHANGE UP (ref 0–0.9)
MONOCYTES NFR BLD AUTO: 5 % — SIGNIFICANT CHANGE UP (ref 2–14)
NEUTROPHILS # BLD AUTO: 12 K/UL — HIGH (ref 1.8–7.4)
NEUTROPHILS NFR BLD AUTO: 85 % — HIGH (ref 43–77)
NEUTS BAND # BLD: 9 % — HIGH (ref 0–8)
NITRITE UR-MCNC: NEGATIVE — SIGNIFICANT CHANGE UP
NITRITE UR-MCNC: NEGATIVE — SIGNIFICANT CHANGE UP
NT-PROBNP SERPL-SCNC: 18 PG/ML — SIGNIFICANT CHANGE UP (ref 0–300)
PCO2 BLDV: 32 MMHG — LOW (ref 35–50)
PH BLDV: 7.43 — SIGNIFICANT CHANGE UP (ref 7.35–7.45)
PH UR: 6.5 — SIGNIFICANT CHANGE UP (ref 5–8)
PH UR: 6.5 — SIGNIFICANT CHANGE UP (ref 5–8)
PHOSPHATE SERPL-MCNC: 4.6 MG/DL — HIGH (ref 2.5–4.5)
PLAT MORPH BLD: NORMAL — SIGNIFICANT CHANGE UP
PLATELET # BLD AUTO: 177 K/UL — SIGNIFICANT CHANGE UP (ref 150–400)
PLATELET # BLD AUTO: 195 K/UL — SIGNIFICANT CHANGE UP (ref 150–400)
PLATELET # BLD AUTO: 211 K/UL — SIGNIFICANT CHANGE UP (ref 150–400)
PO2 BLDV: 380 MMHG — HIGH (ref 25–45)
POTASSIUM BLDV-SCNC: 3.3 MMOL/L — LOW (ref 3.5–5.3)
POTASSIUM SERPL-MCNC: 3.5 MMOL/L — SIGNIFICANT CHANGE UP (ref 3.5–5.3)
POTASSIUM SERPL-MCNC: 3.9 MMOL/L — SIGNIFICANT CHANGE UP (ref 3.5–5.3)
POTASSIUM SERPL-SCNC: 3.5 MMOL/L — SIGNIFICANT CHANGE UP (ref 3.5–5.3)
POTASSIUM SERPL-SCNC: 3.9 MMOL/L — SIGNIFICANT CHANGE UP (ref 3.5–5.3)
PROT SERPL-MCNC: 5.7 G/DL — LOW (ref 6–8.3)
PROT UR-MCNC: 30 MG/DL
PROT UR-MCNC: SIGNIFICANT CHANGE UP
PROTHROM AB SERPL-ACNC: 10.3 SEC — SIGNIFICANT CHANGE UP (ref 9.8–12.7)
PROTHROM AB SERPL-ACNC: 11.7 SEC — SIGNIFICANT CHANGE UP (ref 9.8–12.7)
RAPID RVP RESULT: SIGNIFICANT CHANGE UP
RBC # BLD: 4.8 M/UL — SIGNIFICANT CHANGE UP (ref 3.8–5.2)
RBC # BLD: 5.09 M/UL — SIGNIFICANT CHANGE UP (ref 3.8–5.2)
RBC # BLD: 5.19 M/UL — SIGNIFICANT CHANGE UP (ref 3.8–5.2)
RBC # FLD: 13.4 % — SIGNIFICANT CHANGE UP (ref 10.3–14.5)
RBC # FLD: 13.5 % — SIGNIFICANT CHANGE UP (ref 10.3–14.5)
RBC # FLD: 13.8 % — SIGNIFICANT CHANGE UP (ref 10.3–14.5)
RBC BLD AUTO: SIGNIFICANT CHANGE UP
RBC CASTS # UR COMP ASSIST: SIGNIFICANT CHANGE UP /HPF (ref 0–2)
RBC CASTS # UR COMP ASSIST: SIGNIFICANT CHANGE UP /HPF (ref 0–2)
SAO2 % BLDV: 100 % — HIGH (ref 67–88)
SODIUM SERPL-SCNC: 135 MMOL/L — SIGNIFICANT CHANGE UP (ref 135–145)
SODIUM SERPL-SCNC: 141 MMOL/L — SIGNIFICANT CHANGE UP (ref 135–145)
SP GR SPEC: 1.01 — SIGNIFICANT CHANGE UP (ref 1.01–1.02)
SP GR SPEC: 1.02 — SIGNIFICANT CHANGE UP (ref 1.01–1.02)
SPECIMEN SOURCE: SIGNIFICANT CHANGE UP
T3 SERPL-MCNC: >650 NG/DL — HIGH (ref 80–200)
T3FREE SERPL-MCNC: 8.05 PG/ML — HIGH (ref 1.8–4.6)
T4 AB SER-ACNC: 24.9 UG/DL — HIGH (ref 4.6–12)
T4 FREE SERPL-MCNC: 5.1 NG/DL — HIGH (ref 0.9–1.8)
THYROGLOB AB FLD IA-ACNC: <20 IU/ML — SIGNIFICANT CHANGE UP (ref 0–40)
THYROGLOB AB SERPL-ACNC: <20 IU/ML — SIGNIFICANT CHANGE UP (ref 0–40)
THYROPEROXIDASE AB SERPL-ACNC: 11.3 IU/ML — SIGNIFICANT CHANGE UP (ref 0–34.9)
TROPONIN T, HIGH SENSITIVITY RESULT: <6 NG/L — SIGNIFICANT CHANGE UP (ref 0–51)
TSH SERPL-MCNC: <0.01 UIU/ML — LOW (ref 0.27–4.2)
UROBILINOGEN FLD QL: NEGATIVE — SIGNIFICANT CHANGE UP
UROBILINOGEN FLD QL: NEGATIVE — SIGNIFICANT CHANGE UP
WBC # BLD: 12.5 K/UL — HIGH (ref 3.8–10.5)
WBC # BLD: 13.2 K/UL — HIGH (ref 3.8–10.5)
WBC # BLD: 13.6 K/UL — HIGH (ref 3.8–10.5)
WBC # FLD AUTO: 12.5 K/UL — HIGH (ref 3.8–10.5)
WBC # FLD AUTO: 13.2 K/UL — HIGH (ref 3.8–10.5)
WBC # FLD AUTO: 13.6 K/UL — HIGH (ref 3.8–10.5)
WBC UR QL: SIGNIFICANT CHANGE UP /HPF (ref 0–5)
WBC UR QL: SIGNIFICANT CHANGE UP /HPF (ref 0–5)

## 2018-08-30 PROCEDURE — 72125 CT NECK SPINE W/O DYE: CPT | Mod: 26

## 2018-08-30 PROCEDURE — 99291 CRITICAL CARE FIRST HOUR: CPT

## 2018-08-30 PROCEDURE — 71045 X-RAY EXAM CHEST 1 VIEW: CPT | Mod: 26

## 2018-08-30 PROCEDURE — 76536 US EXAM OF HEAD AND NECK: CPT | Mod: 26

## 2018-08-30 PROCEDURE — 70450 CT HEAD/BRAIN W/O DYE: CPT | Mod: 26

## 2018-08-30 PROCEDURE — 99222 1ST HOSP IP/OBS MODERATE 55: CPT | Mod: GC

## 2018-08-30 RX ORDER — VANCOMYCIN HCL 1 G
1000 VIAL (EA) INTRAVENOUS ONCE
Qty: 0 | Refills: 0 | Status: DISCONTINUED | OUTPATIENT
Start: 2018-08-30 | End: 2018-08-30

## 2018-08-30 RX ORDER — SODIUM CHLORIDE 9 MG/ML
1000 INJECTION, SOLUTION INTRAVENOUS
Qty: 0 | Refills: 0 | Status: DISCONTINUED | OUTPATIENT
Start: 2018-08-30 | End: 2018-08-30

## 2018-08-30 RX ORDER — SODIUM CHLORIDE 9 MG/ML
1000 INJECTION, SOLUTION INTRAVENOUS ONCE
Qty: 0 | Refills: 0 | Status: COMPLETED | OUTPATIENT
Start: 2018-08-30 | End: 2018-08-30

## 2018-08-30 RX ORDER — POTASSIUM CHLORIDE 20 MEQ
10 PACKET (EA) ORAL ONCE
Qty: 0 | Refills: 0 | Status: COMPLETED | OUTPATIENT
Start: 2018-08-30 | End: 2018-08-30

## 2018-08-30 RX ORDER — PANTOPRAZOLE SODIUM 20 MG/1
40 TABLET, DELAYED RELEASE ORAL DAILY
Qty: 0 | Refills: 0 | Status: DISCONTINUED | OUTPATIENT
Start: 2018-08-30 | End: 2018-09-06

## 2018-08-30 RX ORDER — HYDROCORTISONE 20 MG
50 TABLET ORAL EVERY 6 HOURS
Qty: 0 | Refills: 0 | Status: DISCONTINUED | OUTPATIENT
Start: 2018-08-30 | End: 2018-08-31

## 2018-08-30 RX ORDER — PROPOFOL 10 MG/ML
20 INJECTION, EMULSION INTRAVENOUS
Qty: 1000 | Refills: 0 | Status: DISCONTINUED | OUTPATIENT
Start: 2018-08-30 | End: 2018-08-31

## 2018-08-30 RX ORDER — SODIUM CHLORIDE 9 MG/ML
1000 INJECTION, SOLUTION INTRAVENOUS
Qty: 0 | Refills: 0 | Status: DISCONTINUED | OUTPATIENT
Start: 2018-08-30 | End: 2018-08-31

## 2018-08-30 RX ORDER — PIPERACILLIN AND TAZOBACTAM 4; .5 G/20ML; G/20ML
3.38 INJECTION, POWDER, LYOPHILIZED, FOR SOLUTION INTRAVENOUS ONCE
Qty: 0 | Refills: 0 | Status: COMPLETED | OUTPATIENT
Start: 2018-08-30 | End: 2018-08-30

## 2018-08-30 RX ORDER — PROPRANOLOL HCL 160 MG
0.5 CAPSULE, EXTENDED RELEASE 24HR ORAL EVERY 6 HOURS
Qty: 0 | Refills: 0 | Status: DISCONTINUED | OUTPATIENT
Start: 2018-08-30 | End: 2018-09-01

## 2018-08-30 RX ORDER — POTASSIUM CHLORIDE 20 MEQ
40 PACKET (EA) ORAL
Qty: 0 | Refills: 0 | Status: DISCONTINUED | OUTPATIENT
Start: 2018-08-30 | End: 2018-08-30

## 2018-08-30 RX ORDER — PROPRANOLOL HCL 160 MG
40 CAPSULE, EXTENDED RELEASE 24HR ORAL EVERY 6 HOURS
Qty: 0 | Refills: 0 | Status: DISCONTINUED | OUTPATIENT
Start: 2018-08-30 | End: 2018-08-30

## 2018-08-30 RX ORDER — PROPRANOLOL HCL 160 MG
10 CAPSULE, EXTENDED RELEASE 24HR ORAL DAILY
Qty: 0 | Refills: 0 | Status: DISCONTINUED | OUTPATIENT
Start: 2018-08-30 | End: 2018-08-30

## 2018-08-30 RX ORDER — PIPERACILLIN AND TAZOBACTAM 4; .5 G/20ML; G/20ML
3.38 INJECTION, POWDER, LYOPHILIZED, FOR SOLUTION INTRAVENOUS EVERY 8 HOURS
Qty: 0 | Refills: 0 | Status: DISCONTINUED | OUTPATIENT
Start: 2018-08-30 | End: 2018-09-04

## 2018-08-30 RX ORDER — SODIUM CHLORIDE 9 MG/ML
1000 INJECTION INTRAMUSCULAR; INTRAVENOUS; SUBCUTANEOUS ONCE
Qty: 0 | Refills: 0 | Status: COMPLETED | OUTPATIENT
Start: 2018-08-30 | End: 2018-08-30

## 2018-08-30 RX ORDER — SODIUM CHLORIDE 9 MG/ML
1000 INJECTION INTRAMUSCULAR; INTRAVENOUS; SUBCUTANEOUS ONCE
Qty: 0 | Refills: 0 | Status: DISCONTINUED | OUTPATIENT
Start: 2018-08-30 | End: 2018-08-30

## 2018-08-30 RX ORDER — HYDROCORTISONE 20 MG
100 TABLET ORAL ONCE
Qty: 0 | Refills: 0 | Status: COMPLETED | OUTPATIENT
Start: 2018-08-30 | End: 2018-08-30

## 2018-08-30 RX ORDER — ENOXAPARIN SODIUM 100 MG/ML
40 INJECTION SUBCUTANEOUS DAILY
Qty: 0 | Refills: 0 | Status: DISCONTINUED | OUTPATIENT
Start: 2018-08-30 | End: 2018-09-06

## 2018-08-30 RX ORDER — ROCURONIUM BROMIDE 10 MG/ML
70 VIAL (ML) INTRAVENOUS ONCE
Qty: 0 | Refills: 0 | Status: COMPLETED | OUTPATIENT
Start: 2018-08-30 | End: 2018-08-30

## 2018-08-30 RX ORDER — ETOMIDATE 2 MG/ML
20 INJECTION INTRAVENOUS ONCE
Qty: 0 | Refills: 0 | Status: COMPLETED | OUTPATIENT
Start: 2018-08-30 | End: 2018-08-30

## 2018-08-30 RX ADMIN — PANTOPRAZOLE SODIUM 40 MILLIGRAM(S): 20 TABLET, DELAYED RELEASE ORAL at 14:52

## 2018-08-30 RX ADMIN — SODIUM CHLORIDE 1000 MILLILITER(S): 9 INJECTION, SOLUTION INTRAVENOUS at 16:27

## 2018-08-30 RX ADMIN — Medication 100 MILLIEQUIVALENT(S): at 14:52

## 2018-08-30 RX ADMIN — PIPERACILLIN AND TAZOBACTAM 25 GRAM(S): 4; .5 INJECTION, POWDER, LYOPHILIZED, FOR SOLUTION INTRAVENOUS at 09:20

## 2018-08-30 RX ADMIN — PIPERACILLIN AND TAZOBACTAM 200 GRAM(S): 4; .5 INJECTION, POWDER, LYOPHILIZED, FOR SOLUTION INTRAVENOUS at 02:29

## 2018-08-30 RX ADMIN — SODIUM CHLORIDE 3000 MILLILITER(S): 9 INJECTION, SOLUTION INTRAVENOUS at 13:00

## 2018-08-30 RX ADMIN — SODIUM CHLORIDE 100 MILLILITER(S): 9 INJECTION, SOLUTION INTRAVENOUS at 13:45

## 2018-08-30 RX ADMIN — Medication 100 MILLIGRAM(S): at 17:00

## 2018-08-30 RX ADMIN — SODIUM CHLORIDE 1000 MILLILITER(S): 9 INJECTION INTRAMUSCULAR; INTRAVENOUS; SUBCUTANEOUS at 02:29

## 2018-08-30 RX ADMIN — PROPOFOL 7.08 MICROGRAM(S)/KG/MIN: 10 INJECTION, EMULSION INTRAVENOUS at 04:09

## 2018-08-30 RX ADMIN — PIPERACILLIN AND TAZOBACTAM 25 GRAM(S): 4; .5 INJECTION, POWDER, LYOPHILIZED, FOR SOLUTION INTRAVENOUS at 17:00

## 2018-08-30 RX ADMIN — ENOXAPARIN SODIUM 40 MILLIGRAM(S): 100 INJECTION SUBCUTANEOUS at 11:16

## 2018-08-30 RX ADMIN — Medication 0.5 MILLIGRAM(S): at 18:31

## 2018-08-30 NOTE — ED ADULT NURSE REASSESSMENT NOTE - NS ED NURSE REASSESS COMMENT FT1
Patient received bed assignment. Patient's  aware of assignment. Report given to receiving RN Boo. Patient stable for transport. To be transported on CM, accompanied by RN, ED MD, ED Tech, respiratory and . Safety and comfort maintained while in ED. Patient received bed assignment. Patient's  aware of assignment. Report given to receiving RN Boo. Patient stable for transport. To be transported on CM, accompanied by RN, ED MD, ED Tech, respiratory and . Patient to receive head CT and then up to MICU. Safety and comfort maintained while in ED.

## 2018-08-30 NOTE — H&P ADULT - NSHPPHYSICALEXAM_GEN_ALL_CORE
PHYSICAL EXAM:      Constitutional:   HEENT: dry mucous membranes  Respiratory: intubated, CTAB  Cardiovascular:   Gastrointestinal: soft, ND, BS x4  Genitourinary: fragoso  Extremities:  Neurological: sedated  Skin: dry, no rashes    Lymph Nodes:    Musculoskeletal:    Psychiatric: PHYSICAL EXAM:      Constitutional: dry-appearing, comfortable  HEENT: dry mucous membranes  Respiratory: intubated, CTAB  Cardiovascular: RRR, normal S1, S2  Gastrointestinal: soft, ND, BS x4  Genitourinary: fragoso  Extremities: wwp, no LE edema  Neurological: sedated  Skin: no rashes, L healed decubitus ulcer PHYSICAL EXAM:    Constitutional: dry-appearing, comfortable  HEENT: dry mucous membranes  Respiratory: intubated, CTAB  Cardiovascular: RRR, normal S1, S2  Gastrointestinal: soft, ND, BS x4  Genitourinary: fragoso  Extremities: wwp, no LE edema  Neurological: sedated  Skin: no rashes, L healed decubitus ulcer PHYSICAL EXAM:    Constitutional: dry-appearing, comfortable  HEENT: dry mucous membranes  Respiratory: intubated, CTAB  Cardiovascular: RRR, normal S1, S2  Gastrointestinal: soft, ND, BS x4, asymmetric   Genitourinary: fragoso  Extremities: wwp, no LE edema  Neurological: sedated  Skin: no rashes, L healed decubitus ulcer on L heel PHYSICAL EXAM:    Constitutional: dry-appearing, comfortable  HEENT: dry mucous membranes  Respiratory: intubated, CTAB  Cardiovascular: RRR, normal S1, S2  Gastrointestinal: soft, ND, BS x4, asymmetric   Genitourinary: fragoso  Extremities: wwp, no LE edema  Neurological: sedated, roving eyes, PERRL, flaccid limbs unresponsive to painful stimulus (s/p paralytic ~4 hours ago)  Skin: no rashes, L healed decubitus ulcer on L heel

## 2018-08-30 NOTE — CONSULT NOTE ADULT - ATTENDING COMMENTS
Agree w/ fellows A&P as above. Pt. w/ hyperthyroidism in the setting of using excessive iodine supplements in past few mths, probably exacerbated by recent contrast load. Although currently not very suggestive of thyroid storm (david. given absence of fever, tachycardia), given clinical picture and grossly abnormal TFT's, will tx. MMI 20mg PO q6hl, Hydrocortisone 100mg IV x 1 (then taper down to 50mg IV), and propranolol 40mg po q6h (as BP tolerates). f/o ft4. Agree w/ fellows A&P as above. Pt. w/ hyperthyroidism in the setting of using excessive iodine supplements in past few mths, probably exacerbated by recent contrast load. Although currently not very suggestive of thyroid storm (david. given absence of fever, tachycardia), given clinical picture and grossly abnormal TFT's, will tx. MMI 20mg PO q6hl, Hydrocortisone 100mg IV x 1 (then taper down to 50mg IV), and propranolol 40mg  q6h (as BP and HR tolerates). f/u ft4.

## 2018-08-30 NOTE — H&P ADULT - NSHPOUTPATIENTPROVIDERS_GEN_ALL_CORE
Neurologist Dr. Parry  [phone] Neurologist Dr. Parry  [phone]  PCP: Antonio Calixto  Endocrinologist: ?   Other: [place] Neurologist Dr. Parry  [phone]  PCP: Antonio Calixto  Endocrinologist: ?   IM: Dr. Trevino Neurologist Dr. Parry  [phone]  PCP: Antonio Calixto  Endocrinologist: Dr. Aram Horan  IM: Dr. Trevino

## 2018-08-30 NOTE — ED PROCEDURE NOTE - ATTENDING CONTRIBUTION TO CARE
I was physically present for the E/M service provided. I was physically present for the key portions of the service provided. GARY.

## 2018-08-30 NOTE — ED ADULT NURSE REASSESSMENT NOTE - NS ED NURSE REASSESS COMMENT FT1
Kendrick catheter placed using sterile technique. Second RN present to confirm sterility. Draining to gravity. Secured w/ stat lock. Initial output off approx. 100cc's urine. Sterile specimens collected and sent to lab. Patient tolerated procedure well. Will cont to monitor.

## 2018-08-30 NOTE — CONSULT NOTE ADULT - PROBLEM SELECTOR RECOMMENDATION 9
-Patient found with abnormal TFTs, c/w hyperthyroidism. TSH suppressed, with profoundly elevated total T3 and total T4.  -would also obtain a Free T4 level.  -hyperthyoidism may likely be 2/2 patient's supplementation with iodine supplements, but would also f/u on ab levels- TSH receptor ab, Thyroid stimulating immunoglobulin, and anti-TPO to r/o autoimmune etiology which can still be underlying abnormality exacerbated by exogenous iodine intake.  -Patient not currently in thyroid storm. Compared with outpatient blood work, TFTs improving, with downtrending total T3.   -Patient with normal BP but tachycardic. Would start low dose metoprolol 12.5mg daily and uptitrate as tolerate to goal HR 60-80. Would hold on methimazole for now.  -outpatient f/u on right thyroid nodule. -Patient found with abnormal TFTs, c/w hyperthyroidism. TSH suppressed, with profoundly elevated total T3 and total T4.  -would also obtain a Free T4 level.  -hyperthyroidism may likely be 2/2 patient's supplementation with iodine supplements, but would also f/u on ab levels- TSH receptor ab, Thyroid stimulating immunoglobulin, and anti-TPO to r/o autoimmune etiology which can still be underlying abnormality exacerbated by exogenous iodine intake.  -Patient not currently in thyroid storm but unclear clinical picture and concern about inpending thyroid storm with profoundly abnormal TFTs and with possible IV contrast from CT scan worsening hyperthyroidism. Compared with outpatient blood work, TFTs worsening.  -Would start methimazole 20mg q8hrs.   -Start stress dose steroids with hydrocortisone 100mg X1 followed by hydrocortisone 50 q6hrs.  -Would also start propranolol 40 q 6 to goal HR 80s with blood pressure permitting.  -outpatient f/u on right thyroid nodule. -Patient found with abnormal TFTs, c/w hyperthyroidism. TSH suppressed, with profoundly elevated total T3 and total T4.  -would also obtain a Free T4 level.  -hyperthyroidism may likely be 2/2 patient's supplementation with iodine supplements, but would also f/u on ab levels- TSH receptor ab, Thyroid stimulating immunoglobulin, and anti-TPO to r/o autoimmune etiology which can still be underlying abnormality exacerbated by exogenous iodine intake.  -Patient not currently in thyroid storm but unclear clinical picture and concern about inpending thyroid storm with profoundly abnormal TFTs and with possible IV contrast from CT scan worsening hyperthyroidism. Compared with outpatient blood work, TFTs worsening.  -Would start methimazole 20mg q6hrs at a higher dose with question about patient absorption with stercolitis. No IV form available.  -Start stress dose steroids with hydrocortisone 100mg X1 followed by hydrocortisone 50 q6hrs.  -Would also start propranolol 40 q 6 to goal HR 80s with blood pressure permitting.  -outpatient f/u on right thyroid nodule.

## 2018-08-30 NOTE — CONSULT NOTE ADULT - SUBJECTIVE AND OBJECTIVE BOX
GENERAL SURGERY CONSULT NOTE  --------------------------------------------------------------------------------------------    Patient is a 78y old  Female who presents with a chief complaint of sepsis, metabolic encephalopathy (30 Aug 2018 04:09)    HPI: 79 yo female with multiple sclerosis and chronic Kendrick who reportedly presented to ED reporting a day of abdominal pain; per , who is bedside now, patient was also intermittently confused. In the ED, she had had some non-bloody/non-bilious emesis and subsequently was found to be unresponsive in her room by ED resident and intubated. By that point she had had a CT scan showing stercoral colitis and ultrasound showing cholelithiasis without signs of cholecystitis; of note, patient had a large loose bowel movement at time she was found unresponsive, non bloody.     Unable to acquire ROS at this time.    PAST MEDICAL & SURGICAL HISTORY:  MS (multiple sclerosis)  HTN (hypertension)  Osteoporosis  Dysphagia: no special diet; no h/o aspiration PNA  Neurogenic bladder  Ptosis of both eyelids  S/P ORIF (open reduction internal fixation) fracture: left femur, right hip  S/P breast biopsy  S/P hysterectomy: &gt; 30 yrs ago    FAMILY HISTORY:  No pertinent family history    ALLERGIES: No Known Allergies    HOME MEDICATIONS:     CURRENT MEDICATIONS  MEDICATIONS (STANDING): etomidate Injectable 20 milliGRAM(s) IV Push once  piperacillin/tazobactam IVPB. 3.375 Gram(s) IV Intermittent every 8 hours  propofol Infusion 20 MICROgram(s)/kG/Min IV Continuous <Continuous>  rocuronium Injectable 70 milliGRAM(s) IV Push once    MEDICATIONS (PRN):  --------------------------------------------------------------------------------------------    Vitals:   T(C): 35.6 (18 @ 04:20), Max: 36.6 (18 @ 20:39)  HR: 80 (18 @ 04:51) (67 - 103)  BP: 129/85 (18 @ 04:51) (116/72 - 159/90)  RR: 14 (18 @ 04:51) (14 - 20)  SpO2: 100% (18 @ 04:51) (89% - 100%)  CAPILLARY BLOOD GLUCOSE      POCT Blood Glucose.: 135 mg/dL (30 Aug 2018 02:27)    CAPILLARY BLOOD GLUCOSE      POCT Blood Glucose.: 135 mg/dL (30 Aug 2018 02:27)    Weight (kg): 58.967 ( @ 02:45)    PHYSICAL EXAM:   Intubated, sedated on propofol  A/C 400/14/5/30%  Abdomen soft, non distended  Extremities well perfused  --------------------------------------------------------------------------------------------    LABS  CBC ( @ 03:08)                              14.7                           13.2<H>  )----------------(  177        85.0<H>% Neutrophils, 1.0<L>% Lymphocytes, ANC: 12.0<H>                              46.4<H>  CBC ( @ 20:26)                              17.2<H>                         16.9<H>  )----------------(  250        80.0<H>% Neutrophils, 4.0<L>% Lymphocytes, ANC: 14.4<H>                              54.5<H>    BMP ( @ 03:08)             135     |  104     |  33<H> 		Ca++ --      Ca 9.7                ---------------------------------( 189<H>		Mg 3.9<H>             3.5     |  19<L>   |  0.49<L>			Ph --      BMP ( @ 21:23)             135     |  98      |  35<H> 		Ca++ --      Ca 12.5<H>             ---------------------------------( 145<H>		Mg --                 5.0     |  23      |  0.62  			Ph --        LFTs ( 20:26)      TPro 8.2 / Alb 4.3 / TBili 0.7 / DBili -- / <H> / ALT 59<H> / AlkPhos 85    Coags ( @ 03:08)  aPTT 30.0 / INR 0.95 / PT 10.3  Coags ( 20:26)  aPTT 25.4<L> / INR 0.91 / PT 9.8        VBG ( @ 03:08)     7.43 / 32<L> / 380<H> / 20<L> / -2.3<L> / 100<H>%     Lactate: 3.8<H>  VBG ( 20:26)     7.35 / 50 / 54<H> / 27 / 0.9 / 84%     Lactate: 3.3<H>    --------------------------------------------------------------------------------------------    MICROBIOLOGY  Urinalysis ( @ 03:53):     Color: Yellow / Appearance: Clear / S.024 / pH: 6.5 / Gluc: Negative / Ketones: Negative / Bili: Negative / Urobili: Negative / Protein :Trace / Nitrites: Negative / Leuk.Est: Small<!> / RBC: 3-5 / WBC: 6-10 / Sq Epi:  / Non Sq Epi: OCC / Bacteria    Few Mucus Strands      --------------------------------------------------------------------------------------------    IMAGING  < from: CT Abdomen and Pelvis w/ IV Cont (18 @ 23:22) >  Dilated and fluid-filled large bowel with a large amount of   stool dilating the rectum to 7.4 cm and inflammation of the sigmoid colon   suspicious for stercoral proctocolitis.  Indeterminate bilateral adrenal lesions which can be further   characterized with nonemergent contrast-enhanced abdominal MRI, as   clinically indicated.  < end of copied text >    < from: US Abdomen Complete (18 @ 23:30) >  Numerous gallstones in the gallbladder with a gallstone visualized in the   cystic duct measuring 7 mm. No biliary ductal dilation. Dilated main   pancreatic duct measuring up to 5 mm.  No sonographic signs of cholecystitis, however further evaluation may be   obtained with HIDA scan if clinically warranted.  < end of copied text >    ASSESSMENT: Patient is a 78y old f with MS who presented with abdominal pain and altered mental status, on imaging found to have stercoral colitis and cholelithiasis without signs of cholecystitis, now intubated and admitted to MICU. Recommend trending labs, currently LFT's not suggestive of an ongoing biliary process but, given abdominal pain and altered mental status, could be severe early cholecystitis. If patient becomes stable enough for transport, would consider HIDA scan; if found to be positive, recommend evaluation for IR placed cholecystostomy tube as patient is very high risk for cholecystectomy at this time.  Discussed with Fellow.    Christelle, PGY4

## 2018-08-30 NOTE — H&P ADULT - NSHPLABSRESULTS_GEN_ALL_CORE
Complete Blood Count + Automated Diff (08.30.18 @ 03:08)    WBC Count: 13.2 K/uL    RBC Count: 5.09 M/uL    Hemoglobin: 14.7 g/dL    Hematocrit: 46.4 %    Mean Cell Volume: 91.0 fl    Mean Cell Hemoglobin: 28.9 pg    Mean Cell Hemoglobin Conc: 31.8 gm/dL    Red Cell Distrib Width: 13.5 %    Platelet Count - Automated: 177 K/uL    08-30    135  |  104  |  33<H>  ----------------------------<  189<H>  3.5   |  19<L>  |  0.49<L>    Ca    9.7      30 Aug 2018 03:08  Mg     3.9     08-30    TPro  8.2  /  Alb  4.3  /  TBili  0.7  /  DBili  x   /  AST  104<H>  /  ALT  59<H>  /  AlkPhos  85  08-29 Complete Blood Count + Automated Diff (08.30.18 @ 03:08)    WBC Count: 13.2 K/uL    RBC Count: 5.09 M/uL    Hemoglobin: 14.7 g/dL    Hematocrit: 46.4 %    Mean Cell Volume: 91.0 fl    Mean Cell Hemoglobin: 28.9 pg    Mean Cell Hemoglobin Conc: 31.8 gm/dL    Red Cell Distrib Width: 13.5 %    Platelet Count - Automated: 177 K/uL    08-30    135  |  104  |  33<H>  ----------------------------<  189<H>  3.5   |  19<L>  |  0.49<L>    Ca    9.7      30 Aug 2018 03:08  Mg     3.9     08-30    TPro  8.2  /  Alb  4.3  /  TBili  0.7  /  DBili  x   /  AST  104<H>  /  ALT  59<H>  /  AlkPhos  85  08-29    Thyroid Stimulating Hormone, Serum (08.29.18 @ 22:48)    Thyroid Stimulating Hormone, Serum: <0.01: Test Repeated. uIU/mL  Triiodothyronine, Total (T3 Total) (08.29.18 @ 22:48)    Triiodothyronine, Total (T3 Total): >650: Test Repeated. ng/dL  Blood Gas Venous - Lactate (08.30.18 @ 03:08)    Blood Gas Venous - Lactate: 3.8 mmoL/L  Urinalysis + Microscopic Examination (08.30.18 @ 03:53)    Glucose Qualitative, Urine: Negative    Blood, Urine: Small    Urine Appearance: Clear    Bilirubin: Negative    Color: Yellow    Urobilinogen: Negative    Specific Gravity: 1.024    Protein, Urine: Trace    pH Urine: 6.5    Leukocyte Esterase Concentration: Small    Nitrite: Negative    Ketone - Urine: Negative    Red Blood Cell - Urine: 3-5 /HPF    White Blood Cell - Urine: 6-10 /HPF    Epithelial Cells: OCC /HPF    Comment - Urine: Few Mucus Strands  Rapid Respiratory Viral Panel (08.29.18 @ 23:53)    Rapid RVP Result: NotDetec: The [employer] RVP Rapid uses polymerase chain reaction (PCR) and melt  curve analysis to screen for adenovirus; coronavirus HKU1, NL63, 229E,  OC43; human metapneumovirus (hMPV); human enterovirus/rhinovirus  (Entero/RV); influenza A; influenza A/H1;influenza A/H3; influenza  A/H1-2009; influenza B; parainfluenza viruses 1, 2, 3, 4; respiratory  syncytial virus; Bordetella pertussis; Mycoplasma pneumoniae; and  Chlamydophila pneumoniae.    labs significant for leukocytosis 16k with PMN predominance 14%, Hgb 17 (concentrated? prev 13), Hct 54 (41)  TSH (dec) <0.01, T3 >650. lactate 3.8  U/A neg  RVP neg    < from: CT Abdomen and Pelvis w/ IV Cont (08.29.18 @ 23:22) >    IMPRESSION: Dilated and fluid-filled large bowel with a large amount of   stool dilating the rectum to 7.4 cm and inflammation of the sigmoid colon   suspicious for stercoral proctocolitis.  Indeterminate bilateral adrenal lesions which can be further   characterized with nonemergent contrast-enhanced abdominal MRI, as   clinically indicated.    < end of copied text >    < from: US Abdomen Complete (08.29.18 @ 23:30) >    IMPRESSION:   Numerous gallstones in the gallbladder with a gallstone visualized in the   cystic duct measuring 7 mm. No biliary ductal dilation. Dilated main   pancreatic duct measuring up to 5 mm.  No sonographic signs of cholecystitis, however further evaluation may be   obtained with HIDA scan if clinically warranted.    < end of copied text >    CXR: R basilar subsegmental atelectesis   EKG: wnl

## 2018-08-30 NOTE — H&P ADULT - ATTENDING COMMENTS
critical care time 40 mins  Patient seen and examined.  Agree with resident note as above.  Patient with history as noted including progressive MS who presented to the hospital with one day of malaise, and later developing abdominal pain and vomiting.  Found to have a gallstone in the cystic duct, though no other signs of cholcystitis, and a CT showing large liquid stool burden with an obstructing stool ball and associated stercoral colitis.  Patient subsequently became unresponsive, and was paralyzed and intubated for airway protection.  Now admitted to the MICU.  On arrival to the MICU patient can move head and blink eyes to noxious stimuli, but limbs are flaccid.  Unclear if this is residual paralysis from earlier meds (4 hours ago).  Will observe this AM, follow up CT head, check EEG, speak with outpatient neurologist.  Will treat her sepsis with Zosyn, follow cultures.  Suspect stercoral colitis is the culprit, though may require a HIDA if she appears to have evolving galbladder pathology.  Full plan as above.   is NOK.  FULL CODE.

## 2018-08-30 NOTE — CONSULT NOTE ADULT - ASSESSMENT
78 y F with PMH of secondary progressive MS, HTN, recurrent UTIs, dysphagia, osteoporosis, ptosis of b/l eyelids, hyperthyroidism (not on any meds) l neurogenic bladder with chronic fragoso (nonambulatory) (last changed 1 week ago, changed every 2 wks) p/w nausea and vomiting (x3 in ED), increased lethargy/ weakness, decreased urine output, feeling hot without fevers or chills. Patient admitted with stercolitis and possible cholecystitis. Consult for hyperthyroidism.

## 2018-08-30 NOTE — ED ADULT NURSE REASSESSMENT NOTE - NS ED NURSE REASSESS COMMENT FT1
received patient from JOHANA Ramos on gold side. As per RN Rachel, patient returned from US and had a large BM. RN reports patient was unresponsive to verbal and painful stimuli. Patient with stable VS at this time. Patient brought into critical room B. MD Ann at bedside and patient being prepared for intubation.    0239: 20mg Etomidate administered by MD Ann  0240: 70mg Rocuronium administered by MD Ann  0243: 1st attempt my MD Aakash failed, patient intubated by MD Porter. size 7ETT, 22 at lip.  0251: patient placed on Propofol infusion, running at 10mcg/kg/min    Pending MICU consult. received patient from JOHANA Ramos on gold side. As per RN Rachel, patient returned from US and had a large BM. RN reports patient was unresponsive to verbal and painful stimuli. Patient with stable VS at this time. Patient brought into critical room B. MD Ann at bedside and patient being prepared for intubation.    0239: 20mg Etomidate administered by MD Ann  0240: 70mg Rocuronium administered by MD Ann  0243: 1st attempt my MD Finn failed, patient intubated by MD Porter. size 7ETT, 22 at lip.  0251: patient placed on Propofol infusion, running at 10mcg/kg/min  0255: OG tube placed by MD Finn.    Pending MICU consult.

## 2018-08-30 NOTE — CONSULT NOTE ADULT - ASSESSMENT
a/p - 74 yo F with PMhx of secondary progressive MS, seen by my associate dr. quevedo, had been on disease modifying therapy in past but has refused to be on currently, has become debiliated requring wheelchair,   with chronic pain and neurocogntive  issues as well  She presents with lethargy, elevated wbc, hypothermia, required intubation  concern for either gall bladder pathology or related to stool in her colon, on abx, being evaluated by surgery  Her exam is limited, as she has MS, she may take longer to recover from the effects of sedation  CT head is negative  No need for further neuroimaging  Will follow  d/w RN    Pt critical and unstable, total time 35 minutes

## 2018-08-30 NOTE — CONSULT NOTE ADULT - PROBLEM SELECTOR RECOMMENDATION 2
-On CT scan, patient found with incidental adrenal nodules, measuring 2.6cm X 2Cm on left and 1.3cm X 1cm on right. Can be followed up outpatient for hormonal work-up and MRI.

## 2018-08-30 NOTE — CHART NOTE - NSCHARTNOTEFT_GEN_A_CORE
: Darcie UNDERWOOD, Mallory UNDERWOOD    INDICATION: respiratory failure    PROCEDURE:  [x ] LIMITED ECHO  [x ] LIMITED CHEST  [ ] LIMITED RETROPERITONEAL  [x ] LIMITED ABDOMINAL  [ ] LIMITED DVT  [ ] NEEDLE GUIDANCE VASCULAR  [ ] NEEDLE GUIDANCE THORACENTESIS  [ ] NEEDLE GUIDANCE PARACENTESIS  [ ] NEEDLE GUIDANCE PERICARDIOCENTESIS  [ ] OTHER    FINDINGS: A-line predominant, no alveolar consolidation, no pleural effusion. Grossly normal appearing LV/RV ratio, normal LV systolic function. Trace pericardial effusion. Multiple stones in the gallbladder and a visualized stone in the cystic duct. No pericholecystic fluid. No gallbladder wall thickening. Patient in paralysis so difficult to elicit sonographic connell's sign. Dilated loops of bowel with fluid and stool visualized.      INTERPRETATION:  No evidence of obstructive shock. No evidence for PNA. Gall bladder stones including a stone in GB neck without evidence for acute cholecystitis. Will re-assess if having abdominal RUQ pain when extubated. Ileus with distended loops of bowel consistent with known stercoral colitis. : Darcie UNDERWOOD, Mallory UNDERWOOD    INDICATION: respiratory failure    PROCEDURE:  [x ] LIMITED ECHO  [x ] LIMITED CHEST  [ ] LIMITED RETROPERITONEAL  [x ] LIMITED ABDOMINAL  [ ] LIMITED DVT  [ ] NEEDLE GUIDANCE VASCULAR  [ ] NEEDLE GUIDANCE THORACENTESIS  [ ] NEEDLE GUIDANCE PARACENTESIS  [ ] NEEDLE GUIDANCE PERICARDIOCENTESIS  [ ] OTHER    FINDINGS: A-line predominant, no alveolar consolidation, no pleural effusion. Grossly normal appearing LV/RV ratio, normal LV systolic function. Trace pericardial effusion. Multiple stones in the gallbladder and a visualized stone in the cystic duct. No pericholecystic fluid. No gallbladder wall thickening. Patient in paralysis so difficult to elicit sonographic connell's sign. Dilated loops of bowel with fluid and stool visualized.      INTERPRETATION:  No evidence of obstructive shock. No evidence for PNA. Gall bladder stones including a stone in GB neck without evidence for acute cholecystitis. Will re-assess if having abdominal RUQ pain when extubated. Ileus with distended loops of bowel consistent with known stercoral colitis.    Mallory: I have seen and examined the patient face to face, have reviewed and addended the above US note.

## 2018-08-30 NOTE — ED ADULT NURSE REASSESSMENT NOTE - NS ED NURSE REASSESS COMMENT FT1
upon reassessment, pt lethargic, barely responsive to verbal or painful stimuli, VSS. pt had large BM.  remains at bedside. pt transferred to CC room B, report given to Gabriela FRIAS MD at bedside.

## 2018-08-30 NOTE — H&P ADULT - HISTORY OF PRESENT ILLNESS
78 y F with PMH of MS, HTN, dysphagia, osteoporosis, ptosis of b/l eyelids, distant hx of hyperthyroidism, neurogenic bladder with chronic fragoso (last changed 1 week ago, changed every 2 wks) p/w SOB/difficulty breathing, increased lethargy/ weakness, 'not feeling   right' with decreased urine output, feeling hot without fevers or chills. Nausea and vomiting x3 in ED and RUQ pain. BIBEMS, noted to be hypoxic en route to 93% and     placed on 3L O2, resolved in ED, but still requiring 2L O2 given sensation of SOB without the O2. Pt is nonambulatory.     Last BM yesterday and again in ED. No cough or other signs of infection, no chest pain, no palpitations, no dizziness,     no new focal weakness. Reportedly had "very high" T3 as outpatient repeated multiple times.  In the ED she was     grossly oriented and intact but became unresponsive to sternal rub. Vomit around mouth. 98% RA. Normo to     hypertensive. NSVT on tele about 20 beats. Pt moved to CCB and intubated for airway protection. CTa/p reports     sterocolitis and stone in cystic duct without acute bernie. Pt had fecal incontinence after CT was done. 78 y F with PMH of secondary progressive MS, HTN, recurrent UTIs, dysphagia, osteoporosis, ptosis of b/l eyelids, hyperthyroidism (not on any meds, last TSH 0.01 in March 2018), neurogenic bladder with chronic fragoso (nonambulatory) (last changed 1 week ago, changed every 2 wks) p/w nausea and vomiting (x3 in ED), increased lethargy/ weakness, decreased urine output, feeling hot without fevers or chills. BIBEMS, noted to be hypoxic en route to 93%. Placed on 3L O2. Also c/o RUQ pain in the ED. Placed on 2L O2 given sensation of SOB without the O2. Last BM reported yesterday. Pt denies dizziness, CP, palpitations, SOB, cough or other signs of infection no dizziness. No new focal deficits. She was grossly oriented and intact upon arrival. Zosyn x 1, Zofran 4mg x2, NS bolus 1 L x3. CTa/p reports stercoral proctocolitis and stone in cystic duct without acute bernie. Pt had fecal incontinence after CT was done, became unresponsive to sternal rub. Vomit around mouth. 98% RA. Normo to hypertensive, afebrile. NSVT on tele about 20 beats. Pt intubated for airway protection.

## 2018-08-30 NOTE — CONSULT NOTE ADULT - SUBJECTIVE AND OBJECTIVE BOX
Admitting Diagnosis:  Altered mental status (R41.82): ALTERED MENTAL STATUS, UNSPECIFIED      HPI:  This is a 78y year old Female with the below past medical history who presents with the chief complaint of AMS.  She is known to my associate dr quevedo for  secondary progressive MS.  She also has hx of HTN, recurrent UTIs, dysphagia, osteoporosis, ptosis of b/l eyelids, hyperthyroidism (not on any meds, last TSH 0.01 in 2018), neurogenic bladder with chronic fragoso (nonambulatory, hip fx  She p/w nausea and vomiting, increased lethargy/ weakness, decreased urine output, feeling hot without fevers or chills. BIBEMS, noted to be hypoxic en route to 93%. Placed on 3L O2. Also c/o RUQ pain in the ED. She was placed on abx, seen by ID and surgery.  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)  Personal History of Multiple Sclerosis (V12.49)  Personal History of Hypertension (V12.59)      Past Surgical History:  S/P ORIF (open reduction internal fixation) fracture (Z96.7): left femur, right hip  S/P breast biopsy (Z98.890)  S/P hysterectomy (Z90.710): &gt; 30 yrs ago      Social History:  No toxic habits    Family History:  FAMILY HISTORY:  No pertinent family history      Allergies:  No Known Allergies      ROS:  Constitutional: Patient offers no complaints of fevers or significant weight loss  Ears, Nose, Mouth and Throat: The patient presents with no abnormalities of the head, ears, eyes, nose or throat  Skin: Patient offers no concerns of new rashes or lesions  Respiratory: The patient presents with no abnormalities of the respiratory tract  Cardiovascular: The patient presents with no cardiac abnormalities  Gastrointestinal: The patient presents with no abnormalities of the GI system  Genitourinary: The patient presents with no dysuria, hematuria or frequent urination  Neurological: See HPI  Endocrine: Patient offers no complaints of excessive thirst, urination, or heat/cold intolerance    Advanced care planning reviewed and noted in the chart.    Medications:  enoxaparin Injectable 40 milliGRAM(s) SubCutaneous daily  piperacillin/tazobactam IVPB. 3.375 Gram(s) IV Intermittent every 8 hours  propofol Infusion 20 MICROgram(s)/kG/Min IV Continuous <Continuous>      Labs:  CBC Full  -  ( 30 Aug 2018 03:08 )  WBC Count : 13.2 K/uL  Hemoglobin : 14.7 g/dL  Hematocrit : 46.4 %  Platelet Count - Automated : 177 K/uL  Mean Cell Volume : 91.0 fl  Mean Cell Hemoglobin : 28.9 pg  Mean Cell Hemoglobin Concentration : 31.8 gm/dL  Auto Neutrophil # : 12.0 K/uL  Auto Lymphocyte # : 0.2 K/uL  Auto Monocyte # : 0.8 K/uL  Auto Eosinophil # : 0.0 K/uL  Auto Basophil # : 0.1 K/uL  Auto Neutrophil % : 85.0 %  Auto Lymphocyte % : 1.0 %  Auto Monocyte % : 5.0 %  Auto Eosinophil % : x  Auto Basophil % : x    08-30    135  |  104  |  33<H>  ----------------------------<  189<H>  3.5   |  19<L>  |  0.49<L>    Ca    9.7      30 Aug 2018 03:08  Mg     3.9     08-30    TPro  8.2  /  Alb  4.3  /  TBili  0.7  /  DBili  x   /  AST  104<H>  /  ALT  59<H>  /  AlkPhos  85  -    CAPILLARY BLOOD GLUCOSE      POCT Blood Glucose.: 135 mg/dL (30 Aug 2018 02:27)    LIVER FUNCTIONS - ( 29 Aug 2018 20:26 )  Alb: 4.3 g/dL / Pro: 8.2 g/dL / ALK PHOS: 85 U/L / ALT: 59 U/L / AST: 104 U/L / GGT: x           PT/INR - ( 30 Aug 2018 03:08 )   PT: 10.3 sec;   INR: 0.95 ratio         PTT - ( 30 Aug 2018 03:08 )  PTT:30.0 sec  Urinalysis Basic - ( 30 Aug 2018 03:53 )    Color: Yellow / Appearance: Clear / S.024 / pH: x  Gluc: x / Ketone: Negative  / Bili: Negative / Urobili: Negative   Blood: x / Protein: Trace / Nitrite: Negative   Leuk Esterase: Small / RBC: 3-5 /HPF / WBC 6-10 /HPF   Sq Epi: x / Non Sq Epi: OCC /HPF / Bacteria: x      Female    Vitals:  Vital Signs Last 24 Hrs  T(C): 36.8 (30 Aug 2018 08:00), Max: 36.8 (30 Aug 2018 08:00)  T(F): 98.3 (30 Aug 2018 08:00), Max: 98.3 (30 Aug 2018 08:00)  HR: 84 (30 Aug 2018 09:) (67 - 103)  BP: 118/71 (30 Aug 2018 09:) (107/65 - 199/99)  BP(mean): 90 (30 Aug 2018 09:) (80 - 146)  RR: 22 (30 Aug 2018 09:) (14 - 26)  SpO2: 96% (30 Aug 2018 09:) (89% - 100%)    NEUROLOGICAL EXAM:  Neurologic Exam:Intubated, sedation held a few hours prior to exam    Mental Status: Eyes closed, follows no commands, no attempt at communication. Does not attend to the examiner. Does not arouse to verbal, tactile or noxious stimuli. Intubated, mechanically ventilated  will move head to sternal rub is the most movement can see on exam    Cranial Nerves: Pupils reactive bilaterally, positive corneal reflexes, absent VOR. No blink to threat bilaterally. No gross facial asymmetry. pos gag reflex.     Motor: Bulk was normal. Tone was low. No spontaneous or purposeful movements x 4 extremities.     Reflexes: Absent upper extremities. Absent lower extremities. Toes were mute bilaterally.     Sensory: No response to tactile or noxious stimuli.     Coord: Unable to assess    Gait: Unable to assess  < from: CT Head No Cont (18 @ 05:07) >    EXAM:  CT BRAIN                            PROCEDURE DATE:  2018            INTERPRETATION:  HISTORY: Altered mental status.    TECHNIQUE: CT of the head was performed.    Multiple contiguous axial images were acquired from the skullbase to the   vertex without the administration of intravenous contrast.  Coronal and   sagittal reformations were made.    COMPARISON: CT head 2012.    FINDINGS:  Evaluation limited due to retained intravenous contrast in the   intracranial circulation.    Mild cerebral atrophy. No acute hemorrhage, mass effect, midline shift,   hydrocephalus, or extra-axial fluid collections. Moderate patchy   hypoattenuation within the white matter, likely secondary to chronic   microvascular changes.    The calvarium is intact. Partially visualized endotracheal tube. Moderate   mucosal thickening of the ethmoid air cells and fluid and debris within   the nasopharynx likely from intubation. The mastoid air cells are clear.    IMPRESSION:    No acute hemorrhage, mass effect, or midline shift.    If symptoms persist, correlate with neurologic evaluation to determine if   further evaluation with MRI is warranted, if there are no   contraindications.                BELINDA HEWITT M.D., RADIOLOGY RESIDENT  This document has been electronically signed.  LANG KILLIAN M.D., ATTENDING RADIOLOGIST  This document has been electronically signed. Aug 30 2018  5:41AM                < end of copied text >

## 2018-08-30 NOTE — ED ADULT NURSE REASSESSMENT NOTE - NS ED NURSE REASSESS COMMENT FT1
All repeat lab work and urine drawn and sent to lab. As per , there is a problem with sunrise and results are not crossing over in system. Requested to have lab results printed but was denied. Told to contact help desk. Contacted help desk who states this has been a hospital wide problem and help [employer] tech has put in a top priority ticket to have issue resolved. MD and charge RN aware.

## 2018-08-30 NOTE — CONSULT NOTE ADULT - SUBJECTIVE AND OBJECTIVE BOX
HPI:  78 y F with PMH of secondary progressive MS, HTN, recurrent UTIs, dysphagia, osteoporosis, ptosis of b/l eyelids, hyperthyroidism (not on any meds) l neurogenic bladder with chronic fragoso (nonambulatory) (last changed 1 week ago, changed every 2 wks) p/w nausea and vomiting (x3 in ED), increased lethargy/ weakness, decreased urine output, feeling hot without fevers or chills. BIBEMS, noted to be hypoxic en route to 93%. Placed on 3L O2. Also c/o RUQ pain in the ED. Placed on 2L O2 given sensation of SOB without the O2. Last BM reported yesterday. Pt denies dizziness, CP, palpitations, SOB, cough or other signs of infection no dizziness. No new focal deficits. She was grossly oriented and intact upon arrival. Zosyn x 1, Zofran 4mg x2, NS bolus 1 L x3. CTa/p reports stercoral proctocolitis and stone in cystic duct without acute bernie. Pt had fecal incontinence after CT was done, became unresponsive to sternal rub. Vomit around mouth. 98% RA. Normo to hypertensive, afebrile. NSVT on tele about 20 beats. Pt intubated for airway protection. (30 Aug 2018 04:09)    Endocrine History:  Patient intubated and sedated. Hx as per  and family at bedside. Patient follows with endocrinologist Dr. Dionisio Howard. Was told had thyroid abnormalities a few months ago. No previous thyroid issues. Was never started on any medications for the thyroid. Patient has been on OTC iodine supplements for past few months. Patient has been using iodine drops for the past 6 months 1-2X/month for 4-5 days each when she gets diagnosed with her recurrent UTI. In addition 2-3 months ago was started in iodine supplement ioderal (can contain up to 50mg of iodine in each pill) and was taking daily to help with her MS. Reports had blood work done by [facility] 3 weeks ago and had elevated thyroid function and the ioderal was discontinued 3 weeks ago. For MS has also been on interfeon beta, but that was more than 10 years ago. No hx of amiodarone use. No recent CT scan's with IV contrast, other than the one on this hospital admission. As per outpatient records in chart, blood work Aug 7, TSH <0.05, total T3 651, and total T4 12.8. As per , patient was otherwise acting her usual health past few days prior to admission. Did not complaint of CP, palpitations, anxiety. Was constipated. Reports was actually feeling more cold recently. No neck symptoms. Pt is bedbound/wheelchair for past 8 years.      PAST MEDICAL & SURGICAL HISTORY:  MS (multiple sclerosis)  HTN (hypertension)  Osteoporosis  Dysphagia: no special diet; no h/o aspiration PNA  Neurogenic bladder  Ptosis of both eyelids  S/P ORIF (open reduction internal fixation) fracture: left femur, right hip  S/P breast biopsy  S/P hysterectomy: &gt; 30 yrs ago      FAMILY HISTORY:  hashimotis in daughter      Social History: No smoking, alcohol use    Outpatient Medications:  · 	baclofen 10 mg oral tablet: 1 tab(s) orally 3 times a day, Last Dose Taken:    · 	clonazePAM 0.5 mg oral tablet: 1 tab(s) orally once a day (at bedtime), Last Dose Taken:    · 	enalapril 10 mg oral tablet: 1 tab(s) orally 2 times a day, Last Dose Taken:    · 	potassium chloride 10 mEq oral tablet, extended release: 3 tab(s) orally 2 times a day, Last Dose Taken:    · 	omeprazole 20 mg oral delayed release tablet: 1 tab(s) orally 2 times a day, Last Dose Taken:    · 	oxybutynin 5 mg/24 hours oral tablet, extended release: 1 tab(s) orally once a day at 6pm, Last Dose Taken:    · 	hydroCHLOROthiazide 25 mg oral tablet: 1 tab(s) orally once a day, Last Dose Taken:    · 	VESIcare 5 mg oral tablet: 1 tab(s) orally once a day, Last Dose Taken:    · 	Aleve 220 mg oral tablet: Last Dose Taken:    · 	Tylenol 500 mg oral tablet: Last Dose Taken:    · 	urelle oral tablet:       MEDICATIONS  (STANDING):  enoxaparin Injectable 40 milliGRAM(s) SubCutaneous daily  lactated ringers. 1000 milliLiter(s) (100 mL/Hr) IV Continuous <Continuous>  pantoprazole  Injectable 40 milliGRAM(s) IV Push daily  piperacillin/tazobactam IVPB. 3.375 Gram(s) IV Intermittent every 8 hours  propofol Infusion 20 MICROgram(s)/kG/Min (7.076 mL/Hr) IV Continuous <Continuous>    MEDICATIONS  (PRN):      Allergies    No Known Allergies    Intolerances      Review of Systems:    UNABLE TO OBTAIN with pt intubated and sedated    PHYSICAL EXAM:  VITALS: T(C): 36.8 (08-30-18 @ 12:00)  T(F): 98.3 (08-30-18 @ 12:00), Max: 98.3 (08-30-18 @ 08:00)  HR: 86 (08-30-18 @ 14:00) (67 - 111)  BP: 121/59 (08-30-18 @ 14:00) (68/46 - 199/99)  RR:  (14 - 26)  SpO2:  (89% - 100%)  Wt(kg): --  GENERAL: NAD, intubated, sedated. chronically ill appearing  EYES: No proptosis, no lid lag, anicteric  HEENT:  Atraumatic, Normocephalic, moist mucous membranes  THYROID: Normal size, no palpable nodules  RESPIRATORY: Clear to auscultation bilaterally anteriorly; No rales, rhonchi, wheezing  CARDIOVASCULAR: Regular rate and rhythm; No murmurs; no peripheral edema  GI: Soft, nontender, non distended, normal bowel sounds  SKIN: Dry, intact, No rashes or lesions  PSYCH: Alert and oriented x 0, sedated. Non responsive to verbal or tactile stimuli    POCT Blood Glucose.: 135 mg/dL (08-30-18 @ 02:27)                            14.9   13.6  )-----------( 211      ( 30 Aug 2018 13:51 )             47.3       08-30    141  |  104  |  36<H>  ----------------------------<  155<H>  3.9   |  23  |  0.60    EGFR if : 102  EGFR if non : 88    Ca    9.9      08-30  Mg     3.4     08-30  Phos  4.6     08-30    TPro  5.7<L>  /  Alb  2.8<L>  /  TBili  0.8  /  DBili  x   /  AST  71<H>  /  ALT  76<H>  /  AlkPhos  68  08-30      Thyroid Function Tests:  08-30 @ 04:23 TSH -- FreeT4 -- T3 -- Anti TPO 15.7 Anti Thyroglobulin Ab <20.0 TSI --  08-29 @ 22:48 TSH <0.01 FreeT4 -- T3 >650 Anti TPO -- Anti Thyroglobulin Ab -- TSI --       Thyroid US: 1.6cm right complex nodule may represent colloid cyst.

## 2018-08-30 NOTE — H&P ADULT - ASSESSMENT
78 yF with PMH of MS, hyperthyroidism, neurogenic bladder, dysphagia, osteoporosis, BIBEMS p/w N& V, AMS and intubated for airway protection. Found to have abnormal thyroid function tests, leukocytosis and elevated lactate with CT abd/pelvis showing stercoral colitis and b/l adrenal lesions. Admitted to MICU for further workup and management.     #neuro  -AMS in ED likeley 2/2 syncope vs metabolic encephalopathy  -sedated on propofol. Daily sedation vacation. Consider EEG if pt does not become alert off prop  -Hx of MS: treated with Avonex and Copaxone in the past (high incidence of grave's in pt's who have received interferon tx)  8/7/18- pt declined DMARDs at last neuro outpt visit  -slow/gradual decline in memory as per outpt records  -fall, aspiration, and seizure precautions    #CV: NICOLAS's    #pulmonary  -Intubated for airway protection  -CPAP trial in AM    #GI  -protonix IVP 40 daily for ulcer ppx  -NPO    #renal  Hx of neurogenic bladder, chronic fragoso. Monitor I's and O's   -monitor and replete lytes prn    #ID  -WBC 16k, lactate 3.8  -Ct abd/pelvis concerning for stercoral proctocolitis  -s/p Zosyn x1, continue with tx  -obtain blood cultures x2, stool cx, urine cx    #Endo  b/l adrenal lesions on CT abd/pelvis  Overt hyperthyroidism, TSH and T3 elevated  -f/u antibodies, free T3  -thyroid US to r/o thyroid nodule  Hx of osteoporosis    #DVT ppx    #GOC    Brittany Frausto MD-PGY1  Department of Internal Medicine  Pager [phone] 79 y/o F with PMH of MS, hyperthyroidism, neurogenic bladder s/p chronic fragoso (>10 yrs), wheelchair bound (>10 yrs), dysphagia, osteoporosis, BIBEMS p/w 1 day history of N&V, AMS and intubated for airway protection. Found to have abnormal thyroid function tests, leukocytosis and elevated lactate with CT abd/pelvis showing stercoral colitis and b/l adrenal lesions. Admitted to MICU for further workup and management.     #neuro  -AMS in ED likely 2/2  metabolic encephalopathy vs vasovagal syncope vs less likely thyroid storm  -sedated on propofol. Daily sedation vacation. Consider EEG if pt does not become alert off prop  -Hx of MS: treated with Avonex and Copaxone in the past (high incidence of grave's in pt's who have received interferon tx)  8/7/18- pt declined DMARDs at last neuro outpt visit  -slow/gradual decline in memory as per outpt records and wheelchair bound and neurogenic fragoso   -fall, aspiration, and seizure precautions    #CV: NICOLAS's, was never hypotensive in the ED  -PMH of HTN on home enalapril 10mg BID    #Pulmonary  -Intubated for airway protection due to encephalopathy and AMS  -CPAP trial in AM    #GI  -Protonix IVP 40 daily for ulcer ppx  -NPO    #renal  Hx of neurogenic bladder, chronic fragoso. Monitor I's and O's   -monitor and replete lytes prn    #ID  -WBC 16k, lactate 3.8  -Ct abd/pelvis concerning for stercoral proctocolitis  -s/p Zosyn x1, continue with tx  -obtain blood cultures x2, stool cx, urine cx    #Endo  b/l adrenal lesions on CT abd/pelvis  Overt hyperthyroidism, TSH and T3 elevated  -f/u antibodies, free T3  -thyroid US to r/o thyroid nodule  Hx of osteoporosis    #DVT ppx    #GOC    Brittany Frausto MD-PGY1  Department of Internal Medicine  Pager [phone] 79 y/o F with PMH of MS, hyperthyroidism, neurogenic bladder s/p chronic fragoso (>10 yrs), wheelchair bound (>10 yrs), dysphagia, osteoporosis, BIBEMS p/w 1 day history of N&V, AMS and intubated for airway protection. Found to have abnormal thyroid function tests, leukocytosis and elevated lactate with CT abd/pelvis showing stercoral colitis and b/l adrenal lesions. Admitted to MICU for further workup and management.     #neuro  -AMS in ED likely 2/2  metabolic encephalopathy vs vasovagal syncope vs less likely thyroid storm  -sedated on propofol. Daily sedation vacation. Consider EEG if pt does not become alert off prop  -Hx of MS: treated with Avonex and Copaxone in the past (high incidence of grave's in pt's who have received interferon tx)  8/7/18- pt declined DMARDs at last neuro outpt visit and on home baclofen, clonazepam for muscle spasms   -slow/gradual decline in memory as per outpt records and wheelchair bound and neurogenic fragoso   -fall, aspiration, and seizure precautions    #CV: NICOLAS's, was never hypotensive in the ED  -PMH of HTN on home enalapril 10mg BID    #Pulmonary  -Intubated for airway protection due to encephalopathy and AMS  -CPAP trial in AM    #GI  -Protonix IVP 40 daily for ulcer ppx and on home omeprazole 20mg   -NPO for now and can change when her intubation status changes    #Renal  Hx of neurogenic bladder, chronic fragoso. Monitor I's and O's   -monitor and replete lytes prn and Cr    #ID  -WBC 16k, lactate 3.8, UA and RVP neg  -Ct abd/pelvis concerning for stercoral proctocolitis and RUQ showing cholelithiasis CD 7mm, no ductal dilation, dilated main pancreatic duct measuring up to 5 mm.No sonographic signs of cholecystitis  -s/p Zosyn x1, continue with Zosyn   -obtain blood cultures x2, stool cx, O&P, urine cx  -if spiking fevers or unclear where the source is would consider HIDA, MRCP/ERCP    #Endo  b/l adrenal lesions on CT abd/pelvis  Overt hyperthyroidism, TSH and T3 elevated, unclear if undx Graves vs thyroid storm? clarify w/ outpt providers in the AM  -f/u antibodies, free T3  -thyroid US to r/o thyroid nodule and outpt NM uptake scan and possible endo consult in the AM  Hx of osteoporosis    #DVT ppx  -Lovenox    #GOC  Full code for now, never has been intubated before    Brittany Frausto MD-PGY1  Department of Internal Medicine  Pager [phone] 79 y/o F with PMH of MS, hyperthyroidism, neurogenic bladder s/p chronic fragoso (>10 yrs), wheelchair bound (>10 yrs), dysphagia, osteoporosis, BIBEMS p/w 1 day history of N&V, AMS and intubated for airway protection. Found to have abnormal thyroid function tests, leukocytosis and elevated lactate with CT abd/pelvis showing stercoral colitis and b/l adrenal lesions. Admitted to MICU for further workup and management.     #neuro  -AMS in ED likely 2/2  metabolic encephalopathy vs vasovagal syncope vs less likely thyroid storm  -sedated on propofol. Daily sedation vacation. CT head was negative. Consider EEG if pt does not become alert off prop  -Hx of MS: treated with Avonex and Copaxone in the past (high incidence of grave's in pt's who have received interferon tx)  8/7/18- pt declined DMARDs at last neuro outpt visit and on home baclofen, clonazepam for muscle spasms   -slow/gradual decline in memory as per outpt records and wheelchair bound and neurogenic fragoso   -fall, aspiration, and seizure precautions    #CV: NICOLAS's, was never hypotensive in the ED  -PMH of HTN on home enalapril 10mg BID    #Pulmonary  -Intubated for airway protection due to encephalopathy and AMS  -CPAP trial in AM    #GI  -Protonix IVP 40 daily for ulcer ppx and on home omeprazole 20mg   -NPO for now and can change when her intubation status changes    #Renal  Hx of neurogenic bladder, chronic fragoso. Monitor I's and O's   -monitor and replete lytes prn and Cr    #ID  -WBC 16k, lactate 3.8, UA and RVP neg  -Ct abd/pelvis concerning for stercoral proctocolitis and RUQ showing cholelithiasis CD 7mm, no ductal dilation, dilated main pancreatic duct measuring up to 5 mm.No sonographic signs of cholecystitis  -s/p Zosyn x1, continue with Zosyn   -obtain blood cultures x2, stool cx, O&P, urine cx, C diff  -if spiking fevers or unclear where the source is would consider HIDA, MRCP/ERCP    #Endo  b/l adrenal lesions on CT abd/pelvis  Overt hyperthyroidism, TSH and T3 elevated, unclear if undx Graves vs thyroid storm? clarify w/ outpt providers in the AM  -f/u antibodies, free T3  -thyroid US to r/o thyroid nodule and outpt NM uptake scan and possible endo consult in the AM  Hx of osteoporosis    #DVT ppx  -Lovenox    #GOC  Full code for now, never has been intubated before    Brittany Frausto MD-PGY1  Department of Internal Medicine  Pager [phone] 77 y/o F with PMH of MS, hyperthyroidism, neurogenic bladder s/p chronic fragoso (>10 yrs), wheelchair bound (>10 yrs), dysphagia, osteoporosis, BIBEMS p/w 1 day history of N&V, AMS and intubated for airway protection. Found to have abnormal thyroid function tests, leukocytosis and elevated lactate with CT abd/pelvis showing stercoral colitis and b/l adrenal lesions. Admitted to MICU for further workup and management.     #Neuro  -AMS in ED likely 2/2  metabolic encephalopathy vs vasovagal syncope vs less likely thyroid storm vs progressive MS  -sedated on propofol. Daily sedation vacation. CT head was negative. Consider EEG if pt does not become alert off prop  -Hx of secondary progressive MS: treated with Avonex and Copaxone in the past (high incidence of grave's in pt's who have received interferon tx)  8/7/18- pt declined DMARDs at last neuro outpt visit and on home baclofen, clonazepam for muscle spasms   -consult neurology in the AM   -slow/gradual decline in memory as per outpt records and wheelchair bound and neurogenic fragoso   -fall, aspiration, and seizure precautions    #CV: NICOLAS's, was never hypotensive in the ED  -PMH of HTN on home enalapril 10mg BID    #Pulmonary  -Intubated for airway protection due to encephalopathy and AMS  -CPAP trial in AM    #GI  -Protonix IVP 40 daily for ulcer ppx and on home omeprazole 20mg   -NPO for now and can change when her intubation status changes    #Renal  Hx of neurogenic bladder, chronic fragoso. Monitor I's and O's   -monitor and replete lytes prn and Cr    #ID  -WBC 16k, lactate 3.8, UA and RVP neg  -Ct abd/pelvis concerning for stercoral proctocolitis and RUQ showing cholelithiasis CD 7mm, no ductal dilation, dilated main pancreatic duct measuring up to 5 mm.No sonographic signs of cholecystitis  -s/p Zosyn x1, continue with Zosyn   -obtain blood cultures x2, stool cx, O&P, urine cx, C diff  -if spiking fevers or unclear where the source is would consider HIDA, MRCP/ERCP as per surgery recs, possibly IR cholecystostomy drain     #Endo  b/l adrenal lesions on CT abd/pelvis  Overt hyperthyroidism, TSH and T3 elevated, unclear if undx Graves vs thyroid storm? clarify w/ outpt providers in the AM  -f/u antibodies, free T3  -thyroid US to r/o thyroid nodule and outpt NM uptake scan and possible endo consult in the AM  Hx of osteoporosis    #DVT ppx  -Lovenox    #GOC  Full code for now, never has been intubated before    Brittany Frausto MD-PGY1  Department of Internal Medicine  Pager [phone] 77 y/o F with PMH of MS, hyperthyroidism, neurogenic bladder s/p chronic fragoso (>10 yrs), wheelchair bound (>10 yrs), dysphagia, osteoporosis, BIBEMS p/w 1 day history of N&V, AMS and intubated for airway protection. Found to have abnormal thyroid function tests, leukocytosis and elevated lactate with CT abd/pelvis showing stercoral colitis and b/l adrenal lesions. Admitted to MICU for further workup and management.     #Neuro  -AMS in ED likely 2/2  metabolic encephalopathy vs vasovagal syncope vs less likely thyroid storm vs progressive MS  -sedated on propofol. Daily sedation vacation. CT head was negative. Consider EEG if pt does not become alert off prop  -Hx of secondary progressive MS: treated with Avonex and Copaxone in the past (high incidence of grave's in pt's who have received interferon tx)  8/7/18- pt declined DMARDs at last neuro outpt visit and on home baclofen, clonazepam for muscle spasms   -consult neurology in the AM, her outpt neurologist was planning a repeat MRI, MRI in 2017 showed stable moderate cerebral atrophy, periventricular and subcortical white matter hyperintensity, central pontine hyperintensity   -slow/gradual decline in memory as per outpt records and wheelchair bound and neurogenic fragoso   -fall, aspiration, and seizure precautions    #CV: NICOLAS's, was never hypotensive in the ED  -PMH of HTN on home enalapril 10mg BID    #Pulmonary  -Intubated for airway protection due to encephalopathy and AMS  -CPAP trial in AM    #GI  -Protonix IVP 40 daily for ulcer ppx and on home omeprazole 20mg   -NPO for now and can change when her intubation status changes    #Renal  Hx of neurogenic bladder, chronic fragoso. Monitor I's and O's   -monitor and replete lytes prn and Cr    #ID  -WBC 16k, lactate 3.8, UA and RVP neg  -Ct abd/pelvis concerning for stercoral proctocolitis and RUQ showing cholelithiasis CD 7mm, no ductal dilation, dilated main pancreatic duct measuring up to 5 mm.No sonographic signs of cholecystitis  -s/p Zosyn x1, continue with Zosyn   -obtain blood cultures x2, stool cx, O&P, urine cx, C diff  -if spiking fevers or unclear where the source is would consider HIDA, MRCP/ERCP as per surgery recs, possibly IR cholecystostomy drain     #Endo  b/l adrenal lesions on CT abd/pelvis  Overt hyperthyroidism, TSH and T3 elevated, unclear if undx Graves vs thyroid storm? clarify w/ outpt providers in the AM  -f/u antibodies, free T3  -thyroid US to r/o thyroid nodule and outpt NM uptake scan and possible Endo consult in the AM  Hx of osteoporosis    #DVT ppx  -Lovenox    #GOC  Full code for now, never has been intubated before    Brittany Frausto MD-PGY1  Department of Internal Medicine  Pager [phone]

## 2018-08-31 LAB
ALBUMIN SERPL ELPH-MCNC: 2.6 G/DL — LOW (ref 3.3–5)
ALBUMIN SERPL ELPH-MCNC: 2.8 G/DL — LOW (ref 3.3–5)
ALP SERPL-CCNC: 61 U/L — SIGNIFICANT CHANGE UP (ref 40–120)
ALP SERPL-CCNC: 64 U/L — SIGNIFICANT CHANGE UP (ref 40–120)
ALT FLD-CCNC: 39 U/L — SIGNIFICANT CHANGE UP (ref 10–45)
ALT FLD-CCNC: 60 U/L — HIGH (ref 10–45)
ANION GAP SERPL CALC-SCNC: 11 MMOL/L — SIGNIFICANT CHANGE UP (ref 5–17)
ANION GAP SERPL CALC-SCNC: 12 MMOL/L — SIGNIFICANT CHANGE UP (ref 5–17)
AST SERPL-CCNC: 37 U/L — SIGNIFICANT CHANGE UP (ref 10–40)
AST SERPL-CCNC: 53 U/L — HIGH (ref 10–40)
BASOPHILS # BLD AUTO: 0 K/UL — SIGNIFICANT CHANGE UP (ref 0–0.2)
BILIRUB SERPL-MCNC: 0.8 MG/DL — SIGNIFICANT CHANGE UP (ref 0.2–1.2)
BILIRUB SERPL-MCNC: 0.8 MG/DL — SIGNIFICANT CHANGE UP (ref 0.2–1.2)
BUN SERPL-MCNC: 24 MG/DL — HIGH (ref 7–23)
BUN SERPL-MCNC: 30 MG/DL — HIGH (ref 7–23)
CALCIUM SERPL-MCNC: 9 MG/DL — SIGNIFICANT CHANGE UP (ref 8.4–10.5)
CALCIUM SERPL-MCNC: 9.7 MG/DL — SIGNIFICANT CHANGE UP (ref 8.4–10.5)
CHLORIDE SERPL-SCNC: 103 MMOL/L — SIGNIFICANT CHANGE UP (ref 96–108)
CHLORIDE SERPL-SCNC: 106 MMOL/L — SIGNIFICANT CHANGE UP (ref 96–108)
CO2 SERPL-SCNC: 25 MMOL/L — SIGNIFICANT CHANGE UP (ref 22–31)
CO2 SERPL-SCNC: 25 MMOL/L — SIGNIFICANT CHANGE UP (ref 22–31)
CREAT SERPL-MCNC: 0.36 MG/DL — LOW (ref 0.5–1.3)
CREAT SERPL-MCNC: 0.55 MG/DL — SIGNIFICANT CHANGE UP (ref 0.5–1.3)
CULTURE RESULTS: SIGNIFICANT CHANGE UP
CULTURE RESULTS: SIGNIFICANT CHANGE UP
EOSINOPHIL # BLD AUTO: 0 K/UL — SIGNIFICANT CHANGE UP (ref 0–0.5)
GAS PNL BLDA: SIGNIFICANT CHANGE UP
GAS PNL BLDA: SIGNIFICANT CHANGE UP
GLUCOSE BLDC GLUCOMTR-MCNC: 114 MG/DL — HIGH (ref 70–99)
GLUCOSE BLDC GLUCOMTR-MCNC: 119 MG/DL — HIGH (ref 70–99)
GLUCOSE BLDC GLUCOMTR-MCNC: 141 MG/DL — HIGH (ref 70–99)
GLUCOSE SERPL-MCNC: 121 MG/DL — HIGH (ref 70–99)
GLUCOSE SERPL-MCNC: 150 MG/DL — HIGH (ref 70–99)
HCT VFR BLD CALC: 36.3 % — SIGNIFICANT CHANGE UP (ref 34.5–45)
HGB BLD-MCNC: 11.9 G/DL — SIGNIFICANT CHANGE UP (ref 11.5–15.5)
LYMPHOCYTES # BLD AUTO: 0.6 K/UL — LOW (ref 1–3.3)
LYMPHOCYTES # BLD AUTO: 6 % — LOW (ref 13–44)
MAGNESIUM SERPL-MCNC: 2.3 MG/DL — SIGNIFICANT CHANGE UP (ref 1.6–2.6)
MAGNESIUM SERPL-MCNC: 2.8 MG/DL — HIGH (ref 1.6–2.6)
MCHC RBC-ENTMCNC: 29.9 PG — SIGNIFICANT CHANGE UP (ref 27–34)
MCHC RBC-ENTMCNC: 32.8 GM/DL — SIGNIFICANT CHANGE UP (ref 32–36)
MCV RBC AUTO: 91 FL — SIGNIFICANT CHANGE UP (ref 80–100)
MONOCYTES # BLD AUTO: 0.8 K/UL — SIGNIFICANT CHANGE UP (ref 0–0.9)
MONOCYTES NFR BLD AUTO: 5 % — SIGNIFICANT CHANGE UP (ref 2–14)
NEUTROPHILS # BLD AUTO: 11 K/UL — HIGH (ref 1.8–7.4)
NEUTROPHILS NFR BLD AUTO: 83 % — HIGH (ref 43–77)
PHOSPHATE SERPL-MCNC: 1.5 MG/DL — LOW (ref 2.5–4.5)
PHOSPHATE SERPL-MCNC: 3.7 MG/DL — SIGNIFICANT CHANGE UP (ref 2.5–4.5)
PLATELET # BLD AUTO: 165 K/UL — SIGNIFICANT CHANGE UP (ref 150–400)
POTASSIUM SERPL-MCNC: 3 MMOL/L — LOW (ref 3.5–5.3)
POTASSIUM SERPL-MCNC: 3.9 MMOL/L — SIGNIFICANT CHANGE UP (ref 3.5–5.3)
POTASSIUM SERPL-SCNC: 3 MMOL/L — LOW (ref 3.5–5.3)
POTASSIUM SERPL-SCNC: 3.9 MMOL/L — SIGNIFICANT CHANGE UP (ref 3.5–5.3)
PROT SERPL-MCNC: 5.5 G/DL — LOW (ref 6–8.3)
PROT SERPL-MCNC: 5.6 G/DL — LOW (ref 6–8.3)
RBC # BLD: 3.99 M/UL — SIGNIFICANT CHANGE UP (ref 3.8–5.2)
RBC # FLD: 13 % — SIGNIFICANT CHANGE UP (ref 10.3–14.5)
SODIUM SERPL-SCNC: 139 MMOL/L — SIGNIFICANT CHANGE UP (ref 135–145)
SODIUM SERPL-SCNC: 143 MMOL/L — SIGNIFICANT CHANGE UP (ref 135–145)
SPECIMEN SOURCE: SIGNIFICANT CHANGE UP
SPECIMEN SOURCE: SIGNIFICANT CHANGE UP
WBC # BLD: 9.6 K/UL — SIGNIFICANT CHANGE UP (ref 3.8–10.5)
WBC # FLD AUTO: 9.6 K/UL — SIGNIFICANT CHANGE UP (ref 3.8–10.5)

## 2018-08-31 PROCEDURE — 74018 RADEX ABDOMEN 1 VIEW: CPT | Mod: 26

## 2018-08-31 PROCEDURE — 99232 SBSQ HOSP IP/OBS MODERATE 35: CPT | Mod: GC

## 2018-08-31 RX ORDER — INSULIN LISPRO 100/ML
VIAL (ML) SUBCUTANEOUS EVERY 6 HOURS
Qty: 0 | Refills: 0 | Status: DISCONTINUED | OUTPATIENT
Start: 2018-08-31 | End: 2018-09-02

## 2018-08-31 RX ORDER — SODIUM CHLORIDE 9 MG/ML
1000 INJECTION, SOLUTION INTRAVENOUS
Qty: 0 | Refills: 0 | Status: DISCONTINUED | OUTPATIENT
Start: 2018-08-31 | End: 2018-09-06

## 2018-08-31 RX ORDER — POTASSIUM PHOSPHATE, MONOBASIC POTASSIUM PHOSPHATE, DIBASIC 236; 224 MG/ML; MG/ML
15 INJECTION, SOLUTION INTRAVENOUS ONCE
Qty: 0 | Refills: 0 | Status: DISCONTINUED | OUTPATIENT
Start: 2018-08-31 | End: 2018-08-31

## 2018-08-31 RX ORDER — METOPROLOL TARTRATE 50 MG
5 TABLET ORAL ONCE
Qty: 0 | Refills: 0 | Status: COMPLETED | OUTPATIENT
Start: 2018-08-31 | End: 2018-08-31

## 2018-08-31 RX ORDER — HYDROCORTISONE 20 MG
50 TABLET ORAL DAILY
Qty: 0 | Refills: 0 | Status: COMPLETED | OUTPATIENT
Start: 2018-09-02 | End: 2018-09-03

## 2018-08-31 RX ORDER — HYDROCORTISONE 20 MG
25 TABLET ORAL DAILY
Qty: 0 | Refills: 0 | Status: DISCONTINUED | OUTPATIENT
Start: 2018-09-04 | End: 2018-09-04

## 2018-08-31 RX ORDER — DEXTROSE 50 % IN WATER 50 %
12.5 SYRINGE (ML) INTRAVENOUS ONCE
Qty: 0 | Refills: 0 | Status: DISCONTINUED | OUTPATIENT
Start: 2018-08-31 | End: 2018-09-06

## 2018-08-31 RX ORDER — DEXTROSE 50 % IN WATER 50 %
25 SYRINGE (ML) INTRAVENOUS ONCE
Qty: 0 | Refills: 0 | Status: DISCONTINUED | OUTPATIENT
Start: 2018-08-31 | End: 2018-09-06

## 2018-08-31 RX ORDER — GLUCAGON INJECTION, SOLUTION 0.5 MG/.1ML
1 INJECTION, SOLUTION SUBCUTANEOUS ONCE
Qty: 0 | Refills: 0 | Status: DISCONTINUED | OUTPATIENT
Start: 2018-08-31 | End: 2018-09-06

## 2018-08-31 RX ORDER — DEXTROSE 50 % IN WATER 50 %
15 SYRINGE (ML) INTRAVENOUS ONCE
Qty: 0 | Refills: 0 | Status: DISCONTINUED | OUTPATIENT
Start: 2018-08-31 | End: 2018-09-06

## 2018-08-31 RX ORDER — HYDROCORTISONE 20 MG
50 TABLET ORAL EVERY 8 HOURS
Qty: 0 | Refills: 0 | Status: COMPLETED | OUTPATIENT
Start: 2018-08-31 | End: 2018-09-01

## 2018-08-31 RX ORDER — HYDROCORTISONE 20 MG
50 TABLET ORAL EVERY 12 HOURS
Qty: 0 | Refills: 0 | Status: COMPLETED | OUTPATIENT
Start: 2018-09-01 | End: 2018-09-02

## 2018-08-31 RX ORDER — HYDROCORTISONE 20 MG
TABLET ORAL
Qty: 0 | Refills: 0 | Status: DISCONTINUED | OUTPATIENT
Start: 2018-08-31 | End: 2018-09-04

## 2018-08-31 RX ADMIN — Medication 0.5 MILLIGRAM(S): at 11:14

## 2018-08-31 RX ADMIN — Medication 5 MILLIGRAM(S): at 20:24

## 2018-08-31 RX ADMIN — Medication 50 MILLIGRAM(S): at 05:29

## 2018-08-31 RX ADMIN — Medication 50 MILLIGRAM(S): at 11:07

## 2018-08-31 RX ADMIN — ENOXAPARIN SODIUM 40 MILLIGRAM(S): 100 INJECTION SUBCUTANEOUS at 11:07

## 2018-08-31 RX ADMIN — Medication 0.5 MILLIGRAM(S): at 05:29

## 2018-08-31 RX ADMIN — Medication 50 MILLIGRAM(S): at 18:01

## 2018-08-31 RX ADMIN — Medication 0.5 MILLIGRAM(S): at 00:28

## 2018-08-31 RX ADMIN — Medication 0.5 MILLIGRAM(S): at 23:07

## 2018-08-31 RX ADMIN — PIPERACILLIN AND TAZOBACTAM 25 GRAM(S): 4; .5 INJECTION, POWDER, LYOPHILIZED, FOR SOLUTION INTRAVENOUS at 01:04

## 2018-08-31 RX ADMIN — Medication 0.5 MILLIGRAM(S): at 17:25

## 2018-08-31 RX ADMIN — PIPERACILLIN AND TAZOBACTAM 25 GRAM(S): 4; .5 INJECTION, POWDER, LYOPHILIZED, FOR SOLUTION INTRAVENOUS at 17:25

## 2018-08-31 RX ADMIN — PANTOPRAZOLE SODIUM 40 MILLIGRAM(S): 20 TABLET, DELAYED RELEASE ORAL at 11:07

## 2018-08-31 RX ADMIN — Medication 50 MILLIGRAM(S): at 00:28

## 2018-08-31 RX ADMIN — PIPERACILLIN AND TAZOBACTAM 25 GRAM(S): 4; .5 INJECTION, POWDER, LYOPHILIZED, FOR SOLUTION INTRAVENOUS at 09:43

## 2018-08-31 NOTE — DIETITIAN INITIAL EVALUATION ADULT. - NS AS NUTRI INTERV ENTERAL NUTRITION
If pt remains intubated and EN initiated, recommend Vital 1.2 at 60 cc/hr x 18 hrs provides 1296 kcals, 81 gm protein, 876cc free water  meets 22 Kcal/Kg, 1.4Gm/kg dosing wt 59kg

## 2018-08-31 NOTE — AIRWAY REMOVAL NOTE  ADULT & PEDS - ARTIFICAL AIRWAY REMOVAL COMMENTS
Written order for extubation verified. Patient identified  by full name and date of birth as per identification band. Present at the procedure were ...RN  Arti Pérez  No complications  SPO2 100% RR 26

## 2018-08-31 NOTE — PROGRESS NOTE ADULT - SUBJECTIVE AND OBJECTIVE BOX
Admitting Diagnosis:  Altered mental status (R41.82): ALTERED MENTAL STATUS, UNSPECIFIED      HPI:  This is a 78y year old Female with the below past medical history who presents with the chief complaint of AMS.  She is known to my associate dr quevedo for  secondary progressive MS.  She also has hx of HTN, recurrent UTIs, dysphagia, osteoporosis, ptosis of b/l eyelids, hyperthyroidism (not on any meds, last TSH 0.01 in 2018), neurogenic bladder with chronic fragoso (nonambulatory, hip fx  She p/w nausea and vomiting, increased lethargy/ weakness, decreased urine output, feeling hot without fevers or chills. BIBEMS, noted to be hypoxic en route to 93%. Placed on 3L O2. Also c/o RUQ pain in the ED. She was placed on abx, seen by ID and surgery.  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)  Personal History of Multiple Sclerosis (V12.49)  Personal History of Hypertension (V12.59)      Past Surgical History:  S/P ORIF (open reduction internal fixation) fracture (Z96.7): left femur, right hip  S/P breast biopsy (Z98.890)  S/P hysterectomy (Z90.710): &gt; 30 yrs ago      Social History:  No toxic habits    Family History:  FAMILY HISTORY:  No pertinent family history      Allergies:  No Known Allergies      ROS:  Constitutional: Patient offers no complaints of fevers or significant weight loss  Ears, Nose, Mouth and Throat: The patient presents with no abnormalities of the head, ears, eyes, nose or throat  Skin: Patient offers no concerns of new rashes or lesions  Respiratory: The patient presents with no abnormalities of the respiratory tract  Cardiovascular: The patient presents with no cardiac abnormalities  Gastrointestinal: The patient presents with no abnormalities of the GI system  Genitourinary: The patient presents with no dysuria, hematuria or frequent urination  Neurological: See HPI  Endocrine: Patient offers no complaints of excessive thirst, urination, or heat/cold intolerance    Advanced care planning reviewed and noted in the chart.      Medications:  dextrose 40% Gel 15 Gram(s) Oral once PRN  dextrose 5%. 1000 milliLiter(s) IV Continuous <Continuous>  dextrose 50% Injectable 12.5 Gram(s) IV Push once  dextrose 50% Injectable 25 Gram(s) IV Push once  dextrose 50% Injectable 25 Gram(s) IV Push once  enoxaparin Injectable 40 milliGRAM(s) SubCutaneous daily  glucagon  Injectable 1 milliGRAM(s) IntraMuscular once PRN  hydrocortisone sodium succinate Injectable 50 milliGRAM(s) IV Push every 6 hours  insulin lispro (HumaLOG) corrective regimen sliding scale   SubCutaneous every 6 hours  pantoprazole  Injectable 40 milliGRAM(s) IV Push daily  piperacillin/tazobactam IVPB. 3.375 Gram(s) IV Intermittent every 8 hours  propranolol Injectable 0.5 milliGRAM(s) IV Push every 6 hours      Labs:  CBC Full  -  ( 30 Aug 2018 23:40 )  WBC Count : 12.5 K/uL  Hemoglobin : 14.3 g/dL  Hematocrit : 43.5 %  Platelet Count - Automated : 195 K/uL  Mean Cell Volume : 90.7 fl  Mean Cell Hemoglobin : 29.8 pg  Mean Cell Hemoglobin Concentration : 32.8 gm/dL  Auto Neutrophil # : 11.0 K/uL  Auto Lymphocyte # : 0.6 K/uL  Auto Monocyte # : 0.8 K/uL  Auto Eosinophil # : 0.0 K/uL  Auto Basophil # : 0.0 K/uL  Auto Neutrophil % : 83.0 %  Auto Lymphocyte % : 6.0 %  Auto Monocyte % : 5.0 %  Auto Eosinophil % : x  Auto Basophil % : x    08-30    139  |  103  |  30<H>  ----------------------------<  150<H>  3.9   |  25  |  0.55    Ca    9.7      30 Aug 2018 23:40  Phos  3.7     08-30  Mg     2.8     08-30    TPro  5.6<L>  /  Alb  2.8<L>  /  TBili  0.8  /  DBili  x   /  AST  53<H>  /  ALT  60<H>  /  AlkPhos  64  08-30    CAPILLARY BLOOD GLUCOSE      POCT Blood Glucose.: 141 mg/dL (31 Aug 2018 05:16)    LIVER FUNCTIONS - ( 30 Aug 2018 23:40 )  Alb: 2.8 g/dL / Pro: 5.6 g/dL / ALK PHOS: 64 U/L / ALT: 60 U/L / AST: 53 U/L / GGT: x           PT/INR - ( 30 Aug 2018 13:51 )   PT: 11.7 sec;   INR: 1.07 ratio         PTT - ( 30 Aug 2018 13:51 )  PTT:36.3 sec  Urinalysis Basic - ( 30 Aug 2018 03:53 )    Color: Yellow / Appearance: Clear / S.024 / pH: x  Gluc: x / Ketone: Negative  / Bili: Negative / Urobili: Negative   Blood: x / Protein: Trace / Nitrite: Negative   Leuk Esterase: Small / RBC: 3-5 /HPF / WBC 6-10 /HPF   Sq Epi: x / Non Sq Epi: OCC /HPF / Bacteria: x          Vitals:  Vital Signs Last 24 Hrs  T(C): 37.6 (31 Aug 2018 08:00), Max: 37.8 (31 Aug 2018 00:00)  T(F): 99.6 (31 Aug 2018 08:00), Max: 100.1 (31 Aug 2018 00:00)  HR: 89 (31 Aug 2018 09:00) (84 - 114)  BP: 139/65 (31 Aug 2018 09:00) (68/46 - 177/74)  BP(mean): 93 (31 Aug 2018 09:00) (53 - 114)  RR: 16 (31 Aug 2018 09:00) (14 - 22)  SpO2: 100% (31 Aug 2018 09:00) (91% - 100%)    NEUROLOGICAL EXAM:  Neurologic Exam:Intubated, sedation held a few hours prior to exam    Mental Status: Eyes closed, does open to stimuli, appears to look when called but not consistent. follows no commands, no attempt at communication.       Cranial Nerves: Pupils reactive bilaterally, positive corneal reflexes, absent VOR. No blink to threat bilaterally. No gross facial asymmetry. pos gag reflex.     Motor: Bulk was normal. Tone was low. No spontaneous or purposeful movements x 4 extremities.     Reflexes: Absent upper extremities. Absent lower extremities. Toes were mute bilaterally.     Sensory: No response to tactile or noxious stimuli.     Coord: Unable to assess    Gait: Unable to assess    wearing collar    < from: CT Head No Cont (18 @ 05:07) >    EXAM:  CT BRAIN                            PROCEDURE DATE:  2018            INTERPRETATION:  HISTORY: Altered mental status.    TECHNIQUE: CT of the head was performed.    Multiple contiguous axial images were acquired from the skullbase to the   vertex without the administration of intravenous contrast.  Coronal and   sagittal reformations were made.    COMPARISON: CT head 2012.    FINDINGS:  Evaluation limited due to retained intravenous contrast in the   intracranial circulation.    Mild cerebral atrophy. No acute hemorrhage, mass effect, midline shift,   hydrocephalus, or extra-axial fluid collections. Moderate patchy   hypoattenuation within the white matter, likely secondary to chronic   microvascular changes.    The calvarium is intact. Partially visualized endotracheal tube. Moderate   mucosal thickening of the ethmoid air cells and fluid and debris within   the nasopharynx likely from intubation. The mastoid air cells are clear.    IMPRESSION:    No acute hemorrhage, mass effect, or midline shift.    If symptoms persist, correlate with neurologic evaluation to determine if   further evaluation with MRI is warranted, if there are no   contraindications.                BELINDA HEWITT M.D., RADIOLOGY RESIDENT  This document has been electronically signed.  LANG KILLIAN M.D., ATTENDING RADIOLOGIST  This document has been electronically signed. Aug 30 2018  5:41AM                < end of copied text >

## 2018-08-31 NOTE — PROGRESS NOTE ADULT - SUBJECTIVE AND OBJECTIVE BOX
INTERVAL HPI/OVERNIGHT EVENTS: Overnight: no events, had three episodes of bm, required propanolol as pateint was bucking the vent temporarily. CPAPed well on 10/5  Patient is currently intubated, opens eyes to commands    SUBJECTIVE: Patient seen and examined at bedside.     ROS: Unable to assess as she is intubated     OBJECTIVE:    VITAL SIGNS:  ICU Vital Signs Last 24 Hrs  T(C): 37.6 (31 Aug 2018 08:00), Max: 37.8 (31 Aug 2018 00:00)  T(F): 99.6 (31 Aug 2018 08:00), Max: 100.1 (31 Aug 2018 00:00)  HR: 100 (31 Aug 2018 07:) (84 - 114)  BP: 157/74 (31 Aug 2018 07:) (68/46 - 160/81)  BP(mean): 107 (31 Aug 2018 07:) (53 - 114)  ABP: --  ABP(mean): --  RR: 22 (31 Aug 2018 07:) (14 - 22)  SpO2: 100% (31 Aug 2018 07:) (91% - 100%)    Mode: AC/ CMV (Assist Control/ Continuous Mandatory Ventilation), RR (machine): 14, TV (machine): 400, FiO2: 40, PEEP: 5, ITime: 1, MAP: 8, PIP: 20    0830 @ 07:  -   @ 07:00  --------------------------------------------------------  IN: 3253.5 mL / OUT: 1590 mL / NET: 1663.5 mL    08 @ 07:  -  31 @ 08:29  --------------------------------------------------------  IN: 0 mL / OUT: 40 mL / NET: -40 mL      CAPILLARY BLOOD GLUCOSE      POCT Blood Glucose.: 141 mg/dL (31 Aug 2018 05:16)      PHYSICAL EXAM:  GENERAL: NAD  HEAD: cervical collar in place  EYES: PERRLA, conjunctiva and sclera clear  CHEST/LUNG: Clear to auscultation bilaterally; No wheeze  HEART: Regular rate and rhythm; No murmurs, rubs, or gallops  ABDOMEN: Soft, Nontender, Nondistended; few Bowel sounds present  EXTREMITIES:  2+ Peripheral Pulses, No clubbing, cyanosis, or edema  PSYCH: AAOx0  NEUROLOGY: does not follow commands but opens eyes to verbal stimuli  SKIN: No rashes or lesions    MEDICATIONS:  MEDICATIONS  (STANDING):  dextrose 5%. 1000 milliLiter(s) (50 mL/Hr) IV Continuous <Continuous>  dextrose 50% Injectable 12.5 Gram(s) IV Push once  dextrose 50% Injectable 25 Gram(s) IV Push once  dextrose 50% Injectable 25 Gram(s) IV Push once  enoxaparin Injectable 40 milliGRAM(s) SubCutaneous daily  hydrocortisone sodium succinate Injectable 50 milliGRAM(s) IV Push every 6 hours  insulin lispro (HumaLOG) corrective regimen sliding scale   SubCutaneous every 6 hours  pantoprazole  Injectable 40 milliGRAM(s) IV Push daily  piperacillin/tazobactam IVPB. 3.375 Gram(s) IV Intermittent every 8 hours  propranolol Injectable 0.5 milliGRAM(s) IV Push every 6 hours    MEDICATIONS  (PRN):  dextrose 40% Gel 15 Gram(s) Oral once PRN Blood Glucose LESS THAN 70 milliGRAM(s)/deciliter  glucagon  Injectable 1 milliGRAM(s) IntraMuscular once PRN Glucose LESS THAN 70 milligrams/deciliter      ALLERGIES:  Allergies    No Known Allergies    Intolerances        LABS:                        14.3   12.5  )-----------( 195      ( 30 Aug 2018 23:40 )             43.5         139  |  103  |  30<H>  ----------------------------<  150<H>  3.9   |  25  |  0.55    Ca    9.7      30 Aug 2018 23:40  Phos  3.7     -30  Mg     2.8         TPro  5.6<L>  /  Alb  2.8<L>  /  TBili  0.8  /  DBili  x   /  AST  53<H>  /  ALT  60<H>  /  AlkPhos  64  -30    PT/INR - ( 30 Aug 2018 13:51 )   PT: 11.7 sec;   INR: 1.07 ratio         PTT - ( 30 Aug 2018 13:51 )  PTT:36.3 sec  Urinalysis Basic - ( 30 Aug 2018 03:53 )    Color: Yellow / Appearance: Clear / S.024 / pH: x  Gluc: x / Ketone: Negative  / Bili: Negative / Urobili: Negative   Blood: x / Protein: Trace / Nitrite: Negative   Leuk Esterase: Small / RBC: 3-5 /HPF / WBC 6-10 /HPF   Sq Epi: x / Non Sq Epi: OCC /HPF / Bacteria: x        RADIOLOGY & ADDITIONAL TESTS: Reviewed.

## 2018-08-31 NOTE — PROGRESS NOTE ADULT - PROBLEM SELECTOR PLAN 1
-Patient with abnormal TFTs c/w hyperthyroidism. Free T4 5.1 Likely 2/2 to patient's iodine supplementation and possible underlying Grave's dz. Antibody levels still pending.  -Pt currently on hydrocortisone 50 q6hrs and propranol IV 0.5q6hr. HR improved at goal 80s, BP WNL and clinical status improved. Can attempt to taper hydrocortisone to 50mg q8hrs. Can c/w propranolol  -Patient currently not receiving methimazole 2/2 concern of ileus and stercolitis and absorption issues. When patient able to take PO would start of methimazole 20mg BID.

## 2018-08-31 NOTE — DIETITIAN INITIAL EVALUATION ADULT. - NS FNS REASON FOR WEIGHT CHANG
sofiya reports pt has been trying to lose wt, stated wt 2 days ago was 121 lb, unable to provide wt hx

## 2018-08-31 NOTE — DIETITIAN INITIAL EVALUATION ADULT. - OTHER INFO
Pt seen for: MICU Length Of Stay   Adm dx: encephalopathy,  intubated for airway protection in ED. Also found to have abnormal TFTss, leukocytosis, and elevated lactate with CT abd/pelvis showing stercoral colitis and b/l adrenal lesions. MICU adm for further workup and management of thyroid, sepsis, proctocolitis.    GI issues: no Vomiting, had SMOG enema yesterday, had BM today      Food allergies: NKFA    Vit/supplement PTA:  "many vitamins" aide/son  unable to provide specifics

## 2018-08-31 NOTE — PROGRESS NOTE ADULT - ASSESSMENT
a/p - 72 yo F with PMhx of secondary progressive MS, seen by my associate dr. quevedo, had been on disease modifying therapy in past but has refused to be on currently, has become debiliated requring wheelchair,   with chronic pain and neurocogntive  issues as well  She presents with lethargy, elevated wbc, hypothermia, required intubation  concern for either gall bladder pathology or related to stool in her colon, on abx, being evaluated by surgery  Her exam is limited, as she has MS, she may take longer to recover from the effects of sedation, though somewhat improved from initial consult  CT head is negative  MICU placed collar, cspine ct results pending, if concern, need to call neurosurgery  Will follow  d/w RN and aides at bedside    Pt critical and unstable, total time 35 minutes

## 2018-08-31 NOTE — DIETITIAN INITIAL EVALUATION ADULT. - ENERGY NEEDS
Ht: 63"  Wt: 130  BMI: 23 kg/m2   IBW: 115 (+/-10%)     113% IBW  Edema:  2+ generalized   Skin: left heel stage 1

## 2018-08-31 NOTE — PROGRESS NOTE ADULT - SUBJECTIVE AND OBJECTIVE BOX
INTERVAL HPI/OVERNIGHT EVENTS:    Multiple BMs s/p smog enema, containing intact undigested medication tablets. 3BMs overnight. Hypotension x2 yesterday afternoon s/p 2x 1L LR bolus and 100mL/hr infusion for 10hr with improvement. CT cervical spine performed overnight revealing possible compression vs. degenerative changes. Pt placed in C-collar pending read. Episode of hypotension s/p propranolol, which has since been held. BP remains stable SBP >150 since. Endocrine recommended methimazole, hydrocortisone, and propranolol, but pt could not tolerate PO methimazole.  SUBJECTIVE: Patient seen and examined at bedside.     CONSTITUTIONAL: intubated, obtunded, unable to answer questions    OBJECTIVE:    VITAL SIGNS:  ICU Vital Signs Last 24 Hrs  T(C): 37.8 (31 Aug 2018 00:00), Max: 37.8 (31 Aug 2018 00:00)  T(F): 100.1 (31 Aug 2018 00:00), Max: 100.1 (31 Aug 2018 00:00)  HR: 100 (31 Aug 2018 07:00) (84 - 114)  BP: 157/74 (31 Aug 2018 07:00) (68/46 - 160/81)  BP(mean): 107 (31 Aug 2018 07:00) (53 - 114)  ABP: --  ABP(mean): --  RR: 22 (31 Aug 2018 07:00) (14 - 22)  SpO2: 100% (31 Aug 2018 07:00) (91% - 100%)    Mode: AC/ CMV (Assist Control/ Continuous Mandatory Ventilation), RR (machine): 14, TV (machine): 400, FiO2: 40, PEEP: 5, ITime: 1, MAP: 8, PIP: 20    08-30 @ 07:01  -  - @ 07:00  --------------------------------------------------------  IN: 3253.5 mL / OUT: 1530 mL / NET: 1723.5 mL      CAPILLARY BLOOD GLUCOSE      POCT Blood Glucose.: 141 mg/dL (31 Aug 2018 05:16)      PHYSICAL EXAM:    General: NAD, follows some verbal commands  HEENT: NC/AT; PERRL, clear conjunctiva  Neck: C-collar  Respiratory: intubated, CTA b/l  Cardiovascular: +S1/S2; tachy  Abdomen: soft, NT/ND; NBS, no masses, OG  Extremities: WWP, 2+ peripheral pulses b/l; no LE edema  Skin: normal color and turgor; no rash  Neurological: obtunded, off sedation, pt can follow simple verbal commands, can track with eyes, unable to squeeze, flaccid tone    MEDICATIONS:  MEDICATIONS  (STANDING):  enoxaparin Injectable 40 milliGRAM(s) SubCutaneous daily  hydrocortisone sodium succinate Injectable 50 milliGRAM(s) IV Push every 6 hours  pantoprazole  Injectable 40 milliGRAM(s) IV Push daily  piperacillin/tazobactam IVPB. 3.375 Gram(s) IV Intermittent every 8 hours  propranolol Injectable 0.5 milliGRAM(s) IV Push every 6 hours    MEDICATIONS  (PRN):      ALLERGIES:  Allergies    No Known Allergies    Intolerances        LABS:                        14.3   12.5  )-----------( 195      ( 30 Aug 2018 23:40 )             43.5     08-30    139  |  103  |  30<H>  ----------------------------<  150<H>  3.9   |  25  |  0.55    Ca    9.7      30 Aug 2018 23:40  Phos  3.7     08-30  Mg     2.8     08-30    TPro  5.6<L>  /  Alb  2.8<L>  /  TBili  0.8  /  DBili  x   /  AST  53<H>  /  ALT  60<H>  /  AlkPhos  64  08-30    PT/INR - ( 30 Aug 2018 13:51 )   PT: 11.7 sec;   INR: 1.07 ratio         PTT - ( 30 Aug 2018 13:51 )  PTT:36.3 sec  Urinalysis Basic - ( 30 Aug 2018 03:53 )    Color: Yellow / Appearance: Clear / S.024 / pH: x  Gluc: x / Ketone: Negative  / Bili: Negative / Urobili: Negative   Blood: x / Protein: Trace / Nitrite: Negative   Leuk Esterase: Small / RBC: 3-5 /HPF / WBC 6-10 /HPF   Sq Epi: x / Non Sq Epi: OCC /HPF / Bacteria: x        RADIOLOGY & ADDITIONAL TESTS: Reviewed. INTERVAL HPI/OVERNIGHT EVENTS:    Multiple BMs s/p smog enema, containing intact undigested medication tablets. 3BMs overnight. Hypotension x2 yesterday afternoon s/p 2x 1L LR bolus and 100mL/hr infusion for 10hr with improvement. CT cervical spine performed overnight revealing possible compression vs. degenerative changes. Pt placed in C-collar pending read. Episode of hypotension s/p propranolol, which has since been held. BP remains stable SBP >150 since. Endocrine recommended methimazole, hydrocortisone, and propranolol, but pt could not tolerate PO methimazole.  SUBJECTIVE: Patient seen and examined at bedside.     CONSTITUTIONAL: intubated, obtunded, unable to answer questions    OBJECTIVE:    VITAL SIGNS:  ICU Vital Signs Last 24 Hrs  T(C): 37.8 (31 Aug 2018 00:00), Max: 37.8 (31 Aug 2018 00:00)  T(F): 100.1 (31 Aug 2018 00:00), Max: 100.1 (31 Aug 2018 00:00)  HR: 100 (31 Aug 2018 07:00) (84 - 114)  BP: 157/74 (31 Aug 2018 07:00) (68/46 - 160/81)  BP(mean): 107 (31 Aug 2018 07:00) (53 - 114)  ABP: --  ABP(mean): --  RR: 22 (31 Aug 2018 07:00) (14 - 22)  SpO2: 100% (31 Aug 2018 07:00) (91% - 100%)    Mode: AC/ CMV (Assist Control/ Continuous Mandatory Ventilation), RR (machine): 14, TV (machine): 400, FiO2: 40, PEEP: 5, ITime: 1, MAP: 8, PIP: 20    08-30 @ 07:01  -  - @ 07:00  --------------------------------------------------------  IN: 3253.5 mL / OUT: 1530 mL / NET: 1723.5 mL      CAPILLARY BLOOD GLUCOSE      POCT Blood Glucose.: 141 mg/dL (31 Aug 2018 05:16)      PHYSICAL EXAM:    General: NAD, follows some verbal commands  HEENT: NC/AT; PERRL, clear conjunctiva  Neck: C-collar  Respiratory: intubated, CTA b/l  Cardiovascular: +S1/S2; tachy  Abdomen: soft, NT/ND; NBS, no masses, OG  Extremities: WWP, 2+ peripheral pulses b/l; no LE edema  Skin: normal color and turgor; no rash  Neurological: obtunded, off sedation, pt can follow simple verbal commands, can track with eyes, unable to squeeze, flaccid tone    MEDICATIONS:  MEDICATIONS  (STANDING):  enoxaparin Injectable 40 milliGRAM(s) SubCutaneous daily  hydrocortisone sodium succinate Injectable 50 milliGRAM(s) IV Push every 6 hours  pantoprazole  Injectable 40 milliGRAM(s) IV Push daily  piperacillin/tazobactam IVPB. 3.375 Gram(s) IV Intermittent every 8 hours  propranolol Injectable 0.5 milliGRAM(s) IV Push every 6 hours    MEDICATIONS  (PRN):      ALLERGIES:  Allergies    No Known Allergies    Intolerances        LABS:    Anti-thyroglobulin, anti-TPO: wnl  T3: 8.05  T4 (thyroxine): 5.1                        14.3   12.5  )-----------( 195      ( 30 Aug 2018 23:40 )             43.5     08-30    139  |  103  |  30<H>  ----------------------------<  150<H>  3.9   |  25  |  0.55    Ca    9.7      30 Aug 2018 23:40  Phos  3.7     08-30  Mg     2.8     08-30    TPro  5.6<L>  /  Alb  2.8<L>  /  TBili  0.8  /  DBili  x   /  AST  53<H>  /  ALT  60<H>  /  AlkPhos  64  08-30    PT/INR - ( 30 Aug 2018 13:51 )   PT: 11.7 sec;   INR: 1.07 ratio         PTT - ( 30 Aug 2018 13:51 )  PTT:36.3 sec  Urinalysis Basic - ( 30 Aug 2018 03:53 )    Color: Yellow / Appearance: Clear / S.024 / pH: x  Gluc: x / Ketone: Negative  / Bili: Negative / Urobili: Negative   Blood: x / Protein: Trace / Nitrite: Negative   Leuk Esterase: Small / RBC: 3-5 /HPF / WBC 6-10 /HPF   Sq Epi: x / Non Sq Epi: OCC /HPF / Bacteria: x        RADIOLOGY & ADDITIONAL TESTS: Reviewed.

## 2018-08-31 NOTE — PROGRESS NOTE ADULT - ASSESSMENT
77 y/o F with PMH of MS, hyperthyroidism, neurogenic bladder s/p chronic fragoso (>10 yrs), wheelchair bound (>10 yrs), dysphagia, osteoporosis, BIBEMS p/w 1 day history of N&V, AMS. Pt was intubated for airway protection in ED after being found unresponsive with vomitus in mouth. Also found to have abnormal TFTss, leukocytosis, and elevated lactate with CT abd/pelvis showing stercoral colitis and b/l adrenal lesions. Admitted to MICU for further workup and management.    #Neuro: intubated, AMS  - AMS in ED likely 2/2  metabolic encephalopathy vs vasovagal syncope vs less likely thyroid storm vs progressive MS  - off propofol  - CT head negative  - Hx of secondary progressive MS: treated with Avonex and Copaxone in the past (high incidence of grave's in pt's who have received interferon tx)  - consult neurology in the AM, her outpt neurologist was planning a repeat MRI, MRI in 2017 showed stable moderate cerebral atrophy, periventricular and subcortical white matter hyperintensity, central pontine hyperintensity   - slow/gradual decline in memory as per outpt records and wheelchair bound and neurogenic fragoso   - fall, aspiration, and seizure precautions  - CT cervical spine shows degenerative changes vs. compression--pt placed in cervical collar pending radiology read--continues to have flaccid tone in extremities 2/2 MS vs cord compression  - unlikely to need VEEG because pt is regaining mental status    #CV: hypotensive 2/2 to severe sepsis--tachy, hypotensive, leukocytosis, proctocolitis  - PMH of HTN on home enalapril 10mg BID  - Fluid responsive to LR bolus 2x and 1L infusion  - hold propranolol if BP does not tolerated    #Pulmonary  - Intubated for airway protection due to encephalopathy and AMS  - CPAP trial in AM    #GI: proctocolitis  - Protonix IVP 40 daily for ulcer ppx and on home omeprazole 20mg  - OG tube  - +BMs after smog enema (x3 O/N)--possibly malabsorptive or decreased gastric acid as there were undigested tablets of medication found in stool    #Nutrition  - NPO for now and can change when her intubation status changes    #Renal  - Hx of neurogenic bladder, chronic fragoso  - Monitor I's and O's, BUN/Cr  - monitor and replete lytes prn    #ID: severe sepsis 2/2 proctocolitis vs UTI  - leukocytosis, elevated lactate  - Ct abd/pelvis concerning for stercoral proctocolitis and RUQ showing cholelithiasis. No sonographic signs of cholecystitis or ductal obstruction  - c/w Zosyn   - f/u stool cx, O&P, urine cx, C diff  - if spiking fevers or unclear where the source is would consider HIDA, MRCP/ERCP as per surgery recs, possibly IR cholecystostomy drain    #Endo  - b/l adrenal lesions on CT abd/pelvis  - Overt hyperthyroidism, TSH and T3 elevated, unclear if undx Graves vs thyroid storm? clarify w/ outpt providers in the AM  - anti-TPO wnl, T3, T4 elevated  - thyroid US to shows 1.6 cm nodule in R-lobe concerning for possible colloid cyst--recommend outpt NM uptake scan  - Endo following pt--recommended methimazole, hydrocortisone, propranolol--pt cannot take methimazole PO and BP was not able to tolerate propranolol overnight. will continue propranolol if BP remains stable (now 150s)  - Hx of osteoporosis    #DVT ppx  - Lovenox    #GOC  - Full code 77 y/o F with PMH of MS, hyperthyroidism, neurogenic bladder s/p chronic fragoso (>10 yrs), wheelchair bound (>10 yrs), dysphagia, osteoporosis, BIBEMS p/w 1 day history of N&V, AMS. Pt was intubated for airway protection in ED after being found unresponsive with vomitus in mouth. Also found to have abnormal TFTss, leukocytosis, and elevated lactate with CT abd/pelvis showing stercoral colitis and b/l adrenal lesions. Admitted to MICU for further workup and management of thyroid, sepsis, proctocolitis, and ventilator management.    #Neuro: intubated, AMS  - AMS in ED likely 2/2  metabolic encephalopathy vs vasovagal syncope vs less likely thyroid storm vs progressive MS  - off propofol  - CT head negative  - Hx of secondary progressive MS: treated with Avonex and Copaxone in the past (high incidence of grave's in pt's who have received interferon tx)  - consult neurology in the AM, her outpt neurologist was planning a repeat MRI, MRI in 2017 showed stable moderate cerebral atrophy, periventricular and subcortical white matter hyperintensity, central pontine hyperintensity   - slow/gradual decline in memory as per outpt records and wheelchair bound and neurogenic fragoso   - fall, aspiration, and seizure precautions  - CT cervical spine shows degenerative changes vs. compression--pt placed in cervical collar pending radiology read--continues to have flaccid tone in extremities 2/2 MS vs cord compression  - unlikely to need VEEG because pt is regaining mental status    #CV: hypotensive 2/2 to severe sepsis--tachy, hypotensive, leukocytosis, proctocolitis  - PMH of HTN on home enalapril 10mg BID  - Fluid responsive to LR bolus 2x and 1L infusion  - hold propranolol if BP does not tolerated    #Pulmonary  - Intubated for airway protection due to encephalopathy and AMS  - CPAP trial in AM    #GI: proctocolitis  - Protonix IVP 40 daily for ulcer ppx and on home omeprazole 20mg  - OG tube  - +BMs after smog enema (x3 O/N)--possibly malabsorptive or decreased gastric acid as there were undigested tablets of medication found in stool    #Nutrition  - NPO for now and can change when her intubation status changes    #Renal  - Hx of neurogenic bladder, chronic fragoso  - Monitor I's and O's, BUN/Cr  - monitor and replete lytes prn    #ID: severe sepsis 2/2 proctocolitis vs UTI  - leukocytosis, elevated lactate  - Ct abd/pelvis concerning for stercoral proctocolitis and RUQ showing cholelithiasis. No sonographic signs of cholecystitis or ductal obstruction  - c/w Zosyn   - f/u stool cx, O&P, urine cx, C diff  - if spiking fevers or unclear where the source is would consider HIDA, MRCP/ERCP as per surgery recs, possibly IR cholecystostomy drain    #Endo  - b/l adrenal lesions on CT abd/pelvis  - Overt hyperthyroidism, TSH and T3 elevated, unclear if undx Graves vs thyroid storm? clarify w/ outpt providers in the AM  - anti-TPO wnl, T3, T4 elevated  - thyroid US to shows 1.6 cm nodule in R-lobe concerning for possible colloid cyst--recommend outpt NM uptake scan  - Endo following pt--recommended methimazole, hydrocortisone, propranolol--pt cannot take methimazole PO and BP was not able to tolerate propranolol overnight. will continue propranolol if BP remains stable (now 150s)  - Hx of osteoporosis    #DVT ppx  - Lovenox    #GOC  - Full code

## 2018-08-31 NOTE — CHART NOTE - NSCHARTNOTEFT_GEN_A_CORE
MICU Transfer Note    Transfer from: MICU  Transfer to:  ( x ) Medicine    (  ) Telemetry    (  ) RCU    (  ) Palliative    (  ) Stroke Unit    (  ) _______________  Accepting physican:      Hospital Course:  78 y F with PMH of secondary progressive MS, HTN, recurrent UTIs, dysphagia, osteoporosis, ptosis of b/l eyelids, hyperthyroidism (not on any meds, last TSH 0.01 in March 2018), neurogenic bladder with chronic fragoso (nonambulatory) (last changed 1 week ago, changed every 2 wks) p/w nausea and vomiting (x3 in ED), increased lethargy/ weakness, decreased urine output, feeling hot without fevers or chills. BIBEMS, noted to be hypoxic en route to 93%. Placed on 3L O2. Also c/o RUQ pain in the ED. Placed on 2L O2 given sensation of SOB without the O2. Last BM reported yesterday. Pt denies dizziness, CP, palpitations, SOB, cough or other signs of infection no dizziness. No new focal deficits. She was grossly oriented and intact upon arrival. Zosyn x 1, Zofran 4mg x2, NS bolus 1 L x3. CTa/p reports stercoral proctocolitis and stone in cystic duct without acute bernie. Pt had fecal incontinence after CT was done, became unresponsive to sternal rub. Vomit around mouth. 98% RA. Normo to hypertensive, afebrile. NSVT on tele about 20 beats. Pt intubated for airway protection and transferred to the MICU.     MICU Course:  8/30/18:   Patient admitted to MICU after intubation for AMS and lethargy after fecal incontinence, pending stool studies and currently on Zosyn for treatment of proctocolitis and possible cholecystitis. RUQ bedside US showed multiple gallstones but no signs of acute cholecystitis. Surgery recommends HIDA but not stable at this time. Patient became hypotensive with SBP in the 60s which was treated with 1L LR bolus and 100mL/hr LR gtt x 10hrs. Patient's pressures dropped to SBP in the 70s again and patient responded to 1L LR again. Subsequently, the patient was started on hydrocortisone and propranolol for treatment of possible thyroid storm. Chronic Fragoso exchanged. Home BP medications held.     8/31/18:  CT revealed stercoral proctocolitis due to large stool burden. SMOG enema was given with successful BMs. Repeat abdominal x-ray continued to show significant stool burden. Patient was given another SMOG enema without successful BM. Stool PCR positive for EPEC (negative C. diff). Cervical CT showed no fractures or dislocations. Thyroid antibodies came back WNL. T3/T4 were elevated. Endocrine recommended hydrocortisone which was started and methimazole once patient tolerating PO diet. Patient was then successfully extubated and is not hemodynamically stable and ready for transfer to the medicine floors.       ASSESSMENT & PLAN:   79 yo F with PMH of MS, hyperthyroidism, neurogenic bladder s/p chronic fragoso (>10 yrs), wheelchair bound (>10 yrs), dysphagia, osteoporosis presents with AMS s/p intubation 8/30 and extubation 8/31 found to have sepsis 2/2 stercoral colitis in setting of high stool burden, also found to have labs concerning for hyperthyroidism 2/2 home iodine supplementation.     #Neurology:   Metabolic encephalopathy likely due to sepsis and hyperthyroidism in setting of progressive MS. Improving as patient is responding to verbal stimuli.   As per family, at baseline, patient is wheelchair bound and has limited extremity movement.   - CT Cervical pending   - Will monitor off sedation to assess mental status throughout the day   MS c/b dysphagia, wheelchair bound, neurogenic bladder s/p chronic fragoso. CT Head unremarkable   - As per Neurology team, patient has been currently refusing treatment (Previously treated with Avonex and Copaxone)  - Fall, aspiration, and seizure precautions    #Cardiovascular  Sepsis 2/2 proctocolitis. Responded to IVF. Improved  HTN   - Currently on propanolol 0.5mg IV q6hrs as per Endocrinology recommendations due to concern of hyperthyroidism   - Will resume home enalapril 10mg BID as tolerated     #Pulmonary  Intubated for airway protection due to metabolic encephalopathy   - CPAP trial to be continued     #Gastroenterology  Stercoral colitis in setting of high stool burden   - Zosyn D2  Constipation s/p SMOG enema   - Patient is currently having adequate bowel movements   - Will obtain Abdominal XRay this morning to assess for stool burden   - Protonix IVP 40 daily for ulcer ppx and on home omeprazole 20mg  - NPO with intermittent wall suction via OG tube    #Renal  Hx of neurogenic bladder, chronic fragoso s//p exhange yesterday   - Monitor I's and O's, BUN/Cr  - Monitor and replete lytes prn    #ID:  Sepsis 2/2 proctocolitis. Stool cultures positive for E coli   - Zosyn D2  Cholelithiasis. RUQ bedside US showed multiple gallstones but no signs of acute cholecystitis  - Surgery team evaluated patient on admission, if patient's status changes may consider bilary drain      #Endocrine   Hyperthyroidism, low suspicion for thyroid storm  - Pending TSH receptor ab, Thyroid stimulating immunoglobulin, and anti-TPO to r/o autoimmune etiology which can still be underlying abnormality exacerbated by exogenous iodine intake.  - Endocrine team recommends methimazole 20mg q6hrs at a higher dose with question about patient absorption however if Abdominal Xray shows decreased stool burden, will consider starting   - Will continue propanolol 0.5 mg IV q6hrs  - On stress dose steroids with hydrocortisone 100mg X1 followed by hydrocortisone 50 q6hrs  - Would also start propranolol 40 q 6 to goal HR 80s with blood pressure permitting.  - Outpatient f/u on right thyroid nodule  Bilateral adrenal lesions on CT abd/pelvis   - Plan for outpatient follow up     # Hematology   DVT ppx: Lovenox      For Follow-Up:          Vital Signs Last 24 Hrs  T(C): 36.7 (31 Aug 2018 19:00), Max: 37.8 (31 Aug 2018 00:00)  T(F): 98 (31 Aug 2018 19:00), Max: 100.1 (31 Aug 2018 00:00)  HR: 81 (31 Aug 2018 22:00) (75 - 108)  BP: 163/60 (31 Aug 2018 22:00) (108/55 - 177/74)  BP(mean): 103 (31 Aug 2018 22:00) (79 - 122)  RR: 25 (31 Aug 2018 22:00) (9 - 25)  SpO2: 96% (31 Aug 2018 22:00) (96% - 100%)  I&O's Summary    30 Aug 2018 07:01  -  31 Aug 2018 07:00  --------------------------------------------------------  IN: 3253.5 mL / OUT: 1590 mL / NET: 1663.5 mL    31 Aug 2018 07:01  -  31 Aug 2018 22:27  --------------------------------------------------------  IN: 200 mL / OUT: 535 mL / NET: -335 mL          MEDICATIONS  (STANDING):  dextrose 5%. 1000 milliLiter(s) (50 mL/Hr) IV Continuous <Continuous>  dextrose 50% Injectable 12.5 Gram(s) IV Push once  dextrose 50% Injectable 25 Gram(s) IV Push once  dextrose 50% Injectable 25 Gram(s) IV Push once  enoxaparin Injectable 40 milliGRAM(s) SubCutaneous daily  hydrocortisone sodium succinate Injectable   IV Push   hydrocortisone sodium succinate Injectable 50 milliGRAM(s) IV Push every 8 hours  insulin lispro (HumaLOG) corrective regimen sliding scale   SubCutaneous every 6 hours  pantoprazole  Injectable 40 milliGRAM(s) IV Push daily  piperacillin/tazobactam IVPB. 3.375 Gram(s) IV Intermittent every 8 hours  propranolol Injectable 0.5 milliGRAM(s) IV Push every 6 hours    MEDICATIONS  (PRN):  dextrose 40% Gel 15 Gram(s) Oral once PRN Blood Glucose LESS THAN 70 milliGRAM(s)/deciliter  glucagon  Injectable 1 milliGRAM(s) IntraMuscular once PRN Glucose LESS THAN 70 milligrams/deciliter        LABS                                            14.3                  Neurophils% (auto):   83.0   (08-30 @ 23:40):    12.5 )-----------(195          Lymphocytes% (auto):  6.0                                           43.5                   Eosinphils% (auto):   x        Manual%: Neutrophils x    ; Lymphocytes x    ; Eosinophils x    ; Bands%: 6    ; Blasts x                                    139    |  103    |  30                  Calcium: 9.7   / iCa: x      (08-30 @ 23:40)    ----------------------------<  150       Magnesium: 2.8                              3.9     |  25     |  0.55             Phosphorous: 3.7      TPro  5.6    /  Alb  2.8    /  TBili  0.8    /  DBili  x      /  AST  53     /  ALT  60     /  AlkPhos  64     30 Aug 2018 23:40 MICU Transfer Note    Transfer from: MICU  Transfer to:  ( x ) Medicine    (  ) Telemetry    (  ) RCU    (  ) Palliative    (  ) Stroke Unit    (  ) _______________  Accepting physican: Dr. Ann      [facility]:  78 y F with PMH of secondary progressive MS, HTN, recurrent UTIs, dysphagia, osteoporosis, ptosis of b/l eyelids, hyperthyroidism (not on any meds, last TSH 0.01 in March 2018), neurogenic bladder with chronic fragoso (nonambulatory) (last changed 1 week ago, changed every 2 wks) p/w nausea and vomiting (x3 in ED), increased lethargy/ weakness, decreased urine output, feeling hot without fevers or chills. BIBEMS, noted to be hypoxic en route to 93%. Placed on 3L O2. Also c/o RUQ pain in the ED. Placed on 2L O2 given sensation of SOB without the O2. Last BM reported yesterday. Pt denies dizziness, CP, palpitations, SOB, cough or other signs of infection no dizziness. No new focal deficits. She was grossly oriented and intact upon arrival. Zosyn x 1, Zofran 4mg x2, NS bolus 1 L x3. CTa/p reports stercoral proctocolitis and stone in cystic duct without acute bernie. Pt had fecal incontinence after CT was done, became unresponsive to sternal rub. Vomit around mouth. 98% RA. Normo to hypertensive, afebrile. NSVT on tele about 20 beats. Pt intubated for airway protection and transferred to the MICU.     MICU Course:  8/30/18:   Patient admitted to MICU after intubation for AMS and lethargy after fecal incontinence, pending stool studies and currently on Zosyn for treatment of proctocolitis and possible cholecystitis. RUQ bedside US showed multiple gallstones but no signs of acute cholecystitis. Surgery recommends HIDA but not stable at this time. Patient became hypotensive with SBP in the 60s which was treated with 1L LR bolus and 100mL/hr LR gtt x 10hrs. Patient's pressures dropped to SBP in the 70s again and patient responded to 1L LR again. Subsequently, the patient was started on hydrocortisone and propranolol for treatment of possible thyroid storm. Chronic Fragoso exchanged. Home BP medications held.     8/31/18:  CT revealed stercoral proctocolitis due to large stool burden. SMOG enema was given with successful BMs. Repeat abdominal x-ray continued to show significant stool burden. Patient was given another SMOG enema without successful BM. Stool PCR positive for EPEC (negative C. diff). Cervical CT showed no fractures or dislocations. Thyroid antibodies came back WNL. T3/T4 were elevated. Endocrine recommended hydrocortisone which was started and methimazole once patient tolerating PO diet. Patient was then successfully extubated and is not hemodynamically stable and ready for transfer to the medicine floors.       ASSESSMENT & PLAN:   79 yo F with PMH of MS, hyperthyroidism, neurogenic bladder s/p chronic fragoso (>10 yrs), wheelchair bound (>10 yrs), dysphagia, osteoporosis presents with AMS s/p intubation 8/30 and extubation 8/31 found to have sepsis 2/2 stercoral colitis in setting of high stool burden, also found to have labs concerning for hyperthyroidism 2/2 home iodine supplementation.     #Neurology:   Metabolic encephalopathy likely 2/2 sepsis and hyperthyroidism in setting of progressive MS. -Improving  As per family, at baseline, patient is wheelchair bound and has limited extremity movement. Cervical CT neg for fracture or dislocation. CT head neg for hemorrhage, mass effect, or midline shift.  - Will monitor off sedation to assess mental status throughout the day   MS c/b dysphagia, wheelchair bound, neurogenic bladder s/p chronic fragoso.  - Per Neurology, patient has been refusing treatment (Previously treated with Avonex and Copaxone)  - Fall, aspiration, and seizure precautions    #Cardiovascular  Septic shock 2/2 proctocolitis. Responded to IVF. - Stable  HTN   - Currently on propanolol 0.5mg IV q6hrs as per Endocrinology recommendations due to concern of hyperthyroidism   - Will resume home enalapril 10mg BID as tolerated     #Pulmonary  Intubated 8/30 for airway protection due to metabolic encephalopathy and successfully extubated 8/31.  -Patient is saturating well on RA     #Gastroenterology  Stercoral colitis in setting of high stool burden   - c/w pip/tazo  Constipation s/p SMOG enema x2. AXR still showing stool burden.   - Patient is currently having adequate bowel movements   -c/w Protonix IVP 40 daily for ulcer ppx  - OG tube removed    #Renal  Hx of neurogenic bladder, chronic fragoso s/p exhange yesterday   - Monitor I's and O's, BUN/Cr  - Monitor and replete lytes prn    #ID:  Sepsis 2/2 stercoral proctocolitis. Stool cultures positive for EPEC.  - c/w pip/tazo    Cholelithiasis. RUQ bedside US showed multiple gallstones but no signs of acute cholecystitis  - Surgery team evaluated patient on admission, if patient's status changes may consider bilary drain and HIDA scan.      #Endocrine   Hyperthyroidism 2/2 iodine supplementation, low suspicion for thyroid storm  - TSH receptor ab, Thyroid stimulating ig, and anti-TPO negative.  - Endocrine team recommends methimazole 20mg q6hrs at a higher dose with question about patient absorption however if Abdominal Xray shows decreased stool burden, will consider starting   - on propanolol 0.5 mg IV q6hrs  - c/w hydrocortisone 50 q6hrs  - transition to propranolol 40 q6h to goal HR 80s as bp tolerates  - Outpatient f/u on right thyroid nodule  Bilateral adrenal lesions on CT abd/pelvis w/t   -plan for outpatient follow up     # Hematology   DVT ppx: Lovenox      For Follow-Up:  Stercoral proctocolitis 2/2 high stool burden on pip/tazo. Stool Cx + EPEC.  Hyperthyroidism           Vital Signs Last 24 Hrs  T(C): 36.7 (31 Aug 2018 19:00), Max: 37.8 (31 Aug 2018 00:00)  T(F): 98 (31 Aug 2018 19:00), Max: 100.1 (31 Aug 2018 00:00)  HR: 81 (31 Aug 2018 22:00) (75 - 108)  BP: 163/60 (31 Aug 2018 22:00) (108/55 - 177/74)  BP(mean): 103 (31 Aug 2018 22:00) (79 - 122)  RR: 25 (31 Aug 2018 22:00) (9 - 25)  SpO2: 96% (31 Aug 2018 22:00) (96% - 100%)  I&O's Summary    30 Aug 2018 07:01  -  31 Aug 2018 07:00  --------------------------------------------------------  IN: 3253.5 mL / OUT: 1590 mL / NET: 1663.5 mL    31 Aug 2018 07:01  -  31 Aug 2018 22:27  --------------------------------------------------------  IN: 200 mL / OUT: 535 mL / NET: -335 mL          MEDICATIONS  (STANDING):  dextrose 5%. 1000 milliLiter(s) (50 mL/Hr) IV Continuous <Continuous>  dextrose 50% Injectable 12.5 Gram(s) IV Push once  dextrose 50% Injectable 25 Gram(s) IV Push once  dextrose 50% Injectable 25 Gram(s) IV Push once  enoxaparin Injectable 40 milliGRAM(s) SubCutaneous daily  hydrocortisone sodium succinate Injectable   IV Push   hydrocortisone sodium succinate Injectable 50 milliGRAM(s) IV Push every 8 hours  insulin lispro (HumaLOG) corrective regimen sliding scale   SubCutaneous every 6 hours  pantoprazole  Injectable 40 milliGRAM(s) IV Push daily  piperacillin/tazobactam IVPB. 3.375 Gram(s) IV Intermittent every 8 hours  propranolol Injectable 0.5 milliGRAM(s) IV Push every 6 hours    MEDICATIONS  (PRN):  dextrose 40% Gel 15 Gram(s) Oral once PRN Blood Glucose LESS THAN 70 milliGRAM(s)/deciliter  glucagon  Injectable 1 milliGRAM(s) IntraMuscular once PRN Glucose LESS THAN 70 milligrams/deciliter        LABS                                            14.3                  Neurophils% (auto):   83.0   (08-30 @ 23:40):    12.5 )-----------(195          Lymphocytes% (auto):  6.0                                           43.5                   Eosinphils% (auto):   x        Manual%: Neutrophils x    ; Lymphocytes x    ; Eosinophils x    ; Bands%: 6    ; Blasts x                                    139    |  103    |  30                  Calcium: 9.7   / iCa: x      (08-30 @ 23:40)    ----------------------------<  150       Magnesium: 2.8                              3.9     |  25     |  0.55             Phosphorous: 3.7      TPro  5.6    /  Alb  2.8    /  TBili  0.8    /  DBili  x      /  AST  53     /  ALT  60     /  AlkPhos  64     30 Aug 2018 23:40

## 2018-08-31 NOTE — PROGRESS NOTE ADULT - SUBJECTIVE AND OBJECTIVE BOX
Chief Complaint: Hyperthyroidism    History: patient still intubated. Now more alert and responsive. Had loose bowel movements yesterday, but still constipated as per primary team. Has NGT to suction.    MEDICATIONS  (STANDING):  dextrose 5%. 1000 milliLiter(s) (50 mL/Hr) IV Continuous <Continuous>  dextrose 50% Injectable 12.5 Gram(s) IV Push once  dextrose 50% Injectable 25 Gram(s) IV Push once  dextrose 50% Injectable 25 Gram(s) IV Push once  enoxaparin Injectable 40 milliGRAM(s) SubCutaneous daily  hydrocortisone sodium succinate Injectable 50 milliGRAM(s) IV Push every 6 hours  insulin lispro (HumaLOG) corrective regimen sliding scale   SubCutaneous every 6 hours  pantoprazole  Injectable 40 milliGRAM(s) IV Push daily  piperacillin/tazobactam IVPB. 3.375 Gram(s) IV Intermittent every 8 hours  propranolol Injectable 0.5 milliGRAM(s) IV Push every 6 hours    MEDICATIONS  (PRN):  dextrose 40% Gel 15 Gram(s) Oral once PRN Blood Glucose LESS THAN 70 milliGRAM(s)/deciliter  glucagon  Injectable 1 milliGRAM(s) IntraMuscular once PRN Glucose LESS THAN 70 milligrams/deciliter      Allergies    No Known Allergies    Intolerances      Review of Systems:    UNABLE TO OBTAIN    PHYSICAL EXAM:  VITALS: T(C): 37.3 (08-31-18 @ 12:00)  T(F): 99.1 (08-31-18 @ 12:00), Max: 100.1 (08-31-18 @ 00:00)  HR: 88 (08-31-18 @ 13:00) (85 - 114)  BP: 145/66 (08-31-18 @ 13:00) (108/55 - 177/74)  RR:  (13 - 22)  SpO2:  (96% - 100%)  Wt(kg): --  GENERAL: NAD, intubated, sedated. chronically ill appearing  EYES: No proptosis, no lid lag, anicteric  HEENT:  Atraumatic, Normocephalic, moist mucous membranes  SKIN: Dry, intact, No rashes or lesions  Neuro: Generalized weakness  PSYCH: Alert, responsive to verbal stimuli.     POCT Blood Glucose.: 119 mg/dL (08-31-18 @ 11:05)  POCT Blood Glucose.: 141 mg/dL (08-31-18 @ 05:16)  POCT Blood Glucose.: 135 mg/dL (08-30-18 @ 02:27)      08-30    139  |  103  |  30<H>  ----------------------------<  150<H>  3.9   |  25  |  0.55    EGFR if : 105  EGFR if non : 90    Ca    9.7      08-30  Mg     2.8     08-30  Phos  3.7     08-30    TPro  5.6<L>  /  Alb  2.8<L>  /  TBili  0.8  /  DBili  x   /  AST  53<H>  /  ALT  60<H>  /  AlkPhos  64  08-30          Thyroid Function Tests:  08-30 @ 17:43 TSH -- FreeT4 5.1 T3 -- Anti TPO -- Anti Thyroglobulin Ab -- TSI --  08-30 @ 08:00 TSH -- FreeT4 -- T3 -- Anti TPO 11.3 Anti Thyroglobulin Ab <20.0 TSI --

## 2018-08-31 NOTE — PROGRESS NOTE ADULT - ASSESSMENT
77 yo F with PMH of MS, hyperthyroidism, neurogenic bladder s/p chronic fragoso (>10 yrs), wheelchair bound (>10 yrs), dysphagia, osteoporosis presents with altered mental status, intubated for airway protection found to have sepsis 2/2 stercoral colitis in setting of stool burden, also found to have labs concerning for hyperthyroidism 2/2 iodine supplementation      #Neurology:   Metabolic encephalopathy likely due to sepsis and hyperthyroidism in setting of progressive MS. Improving as patient is responding to verbal stimuli.   As per family, at baseline, patient is wheelchair bound and has limited extremity movement.   - EEG pending   - CT Cervical pending   - Will monitor off sedation to assess mental status throughout the day   MS c/b dysphagia, wheelchair bound, neurogenic bladder s/p chronic fragoso. CT Head unremarkable   - As per Neurology team, patient has been currently refusing treatment (Previously treated with Avonex and Copaxone)  - Fall, aspiration, and seizure precautions    #Cardiovascular  Sepsis 2/2 proctocolitis. Responded to IVF. Improved  HTN   - Currently on propanolol 0.5mg IV q6hrs as per Endocrinology recommendations due to concern of hyperthyroidism   - Will resume home enalapril 10mg BID as tolerated     #Pulmonary  Intubated for airway protection due to metabolic encephalopathy   - CPAP trial to be continued     #Gastroenterology  Stercoral colitis in setting of high stool burden   - Zosyn D2  Constipation s/p SMOG enema   - Patient is currently having adequate bowel movements   - Will obtain Abdominal XRay this morning to assess for stool burden   - Protonix IVP 40 daily for ulcer ppx and on home omeprazole 20mg  - NPO with intermittent wall suction via OG tube    #Renal  Hx of neurogenic bladder, chronic fragoso s//p exhange yesterday   - Monitor I's and O's, BUN/Cr  - Monitor and replete lytes prn    #ID:  Sepsis 2/2 proctocolitis. Stool cultures positive for E coli   - Zosyn D2  Cholelithiasis. RUQ bedside US showed multiple gallstones but no signs of acute cholecystitis  - Surgery team evaluated patient on admission, if patient's status changes may consider bilary drain      #Endocrine   Hyperthyroidism, low suspicion for thyroid storm  - Pending TSH receptor ab, Thyroid stimulating immunoglobulin, and anti-TPO to r/o autoimmune etiology which can still be underlying abnormality exacerbated by exogenous iodine intake.  - Endocrine team recommends methimazole 20mg q6hrs at a higher dose with question about patient absorption however if Abdominal Xray shows decreased stool burden, will consider starting   - Will continue propanolol 0.5 mg IV q6hrs  - On stress dose steroids with hydrocortisone 100mg X1 followed by hydrocortisone 50 q6hrs  - Would also start propranolol 40 q 6 to goal HR 80s with blood pressure permitting.  - Outpatient f/u on right thyroid nodule  Bilateral adrenal lesions on CT abd/pelvis   - Plan for outpatient follow up     # Hematology   DVT ppx: Lovenox 77 yo F with PMH of MS, hyperthyroidism, neurogenic bladder s/p chronic fragoso (>10 yrs), wheelchair bound (>10 yrs), dysphagia, osteoporosis presents with altered mental status, intubated for airway protection found to have sepsis 2/2 stercoral colitis in setting of stool burden, also found to have labs concerning for hyperthyroidism 2/2 iodine supplementation      #Neurology:   Metabolic encephalopathy likely due to sepsis and hyperthyroidism in setting of progressive MS. Improving as patient is responding to verbal stimuli.   As per family, at baseline, patient is wheelchair bound and has limited extremity movement.   - CT Cervical pending   - Will monitor off sedation to assess mental status throughout the day   MS c/b dysphagia, wheelchair bound, neurogenic bladder s/p chronic fragoso. CT Head unremarkable   - As per Neurology team, patient has been currently refusing treatment (Previously treated with Avonex and Copaxone)  - Fall, aspiration, and seizure precautions    #Cardiovascular  Sepsis 2/2 proctocolitis. Responded to IVF. Improved  HTN   - Currently on propanolol 0.5mg IV q6hrs as per Endocrinology recommendations due to concern of hyperthyroidism   - Will resume home enalapril 10mg BID as tolerated     #Pulmonary  Intubated for airway protection due to metabolic encephalopathy   - CPAP trial to be continued     #Gastroenterology  Stercoral colitis in setting of high stool burden   - Zosyn D2  Constipation s/p SMOG enema   - Patient is currently having adequate bowel movements   - Will obtain Abdominal XRay this morning to assess for stool burden   - Protonix IVP 40 daily for ulcer ppx and on home omeprazole 20mg  - NPO with intermittent wall suction via OG tube    #Renal  Hx of neurogenic bladder, chronic fragoso s//p exhange yesterday   - Monitor I's and O's, BUN/Cr  - Monitor and replete lytes prn    #ID:  Sepsis 2/2 proctocolitis. Stool cultures positive for E coli   - Zosyn D2  Cholelithiasis. RUQ bedside US showed multiple gallstones but no signs of acute cholecystitis  - Surgery team evaluated patient on admission, if patient's status changes may consider bilary drain      #Endocrine   Hyperthyroidism, low suspicion for thyroid storm  - Pending TSH receptor ab, Thyroid stimulating immunoglobulin, and anti-TPO to r/o autoimmune etiology which can still be underlying abnormality exacerbated by exogenous iodine intake.  - Endocrine team recommends methimazole 20mg q6hrs at a higher dose with question about patient absorption however if Abdominal Xray shows decreased stool burden, will consider starting   - Will continue propanolol 0.5 mg IV q6hrs  - On stress dose steroids with hydrocortisone 100mg X1 followed by hydrocortisone 50 q6hrs  - Would also start propranolol 40 q 6 to goal HR 80s with blood pressure permitting.  - Outpatient f/u on right thyroid nodule  Bilateral adrenal lesions on CT abd/pelvis   - Plan for outpatient follow up     # Hematology   DVT ppx: Lovenox

## 2018-08-31 NOTE — PROGRESS NOTE ADULT - ATTENDING COMMENTS
Mallory: I have seen and examined the patient face to face, have reviewed and addended the HPI, PE and a/p as necessary.    79 yo F with PMH of MS, hyperthyroidism, neurogenic bladder s/p chronic fragoso (>10 yrs), wheelchair bound (>10 yrs), dysphagia, osteoporosis presents with altered mental status, intubated for airway protection found to have sepsis 2/2 stercoral colitis in setting of stool burden, also found to have labs concerning for hyperthyroidism 2/2 iodine supplementation.  On minimal ventilatory settings at this time, with BP responsive to fluid boluses.  Off propofol overnight for SBT this AM.  More awake this AM, following simple commands with flaccid tone in extremities.  Clear lungs, Abd exam notable for distention, non-tender.  At baseline patient noted to have flaccid tone in extremities; CT neck shows degenerative changes with no dislocations or fractures.  Will wean to extubate as tolerated, noted to have persistent sedation possibly related to MS in the setting of paralytics and sedation.  Hyperthyroid, no signs of thyroid storm, hemodynamically stable at this time on no pressors, will start methimazole, and continue steroids.  If BP remains wnl, will start low dose propanolol as tolerated.  Discussed with family at bedside.  Continues to have bowel movements, xray still notes a large amount of stool burden, will continue bowel regimen, SMOG enema again today.  Continue with antibiotics at this time.      Critical Care Time 45 minutes.

## 2018-09-01 DIAGNOSIS — K52.9 NONINFECTIVE GASTROENTERITIS AND COLITIS, UNSPECIFIED: ICD-10-CM

## 2018-09-01 DIAGNOSIS — Z29.9 ENCOUNTER FOR PROPHYLACTIC MEASURES, UNSPECIFIED: ICD-10-CM

## 2018-09-01 DIAGNOSIS — G35 MULTIPLE SCLEROSIS: ICD-10-CM

## 2018-09-01 DIAGNOSIS — R13.10 DYSPHAGIA, UNSPECIFIED: ICD-10-CM

## 2018-09-01 DIAGNOSIS — I10 ESSENTIAL (PRIMARY) HYPERTENSION: ICD-10-CM

## 2018-09-01 DIAGNOSIS — G93.41 METABOLIC ENCEPHALOPATHY: ICD-10-CM

## 2018-09-01 DIAGNOSIS — E87.6 HYPOKALEMIA: ICD-10-CM

## 2018-09-01 LAB
ANION GAP SERPL CALC-SCNC: 9 MMOL/L — SIGNIFICANT CHANGE UP (ref 5–17)
BASOPHILS # BLD AUTO: 0 K/UL — SIGNIFICANT CHANGE UP (ref 0–0.2)
BASOPHILS NFR BLD AUTO: 0.1 % — SIGNIFICANT CHANGE UP (ref 0–2)
BUN SERPL-MCNC: 10 MG/DL — SIGNIFICANT CHANGE UP (ref 7–23)
CALCIUM SERPL-MCNC: 9 MG/DL — SIGNIFICANT CHANGE UP (ref 8.4–10.5)
CHLORIDE SERPL-SCNC: 101 MMOL/L — SIGNIFICANT CHANGE UP (ref 96–108)
CO2 SERPL-SCNC: 28 MMOL/L — SIGNIFICANT CHANGE UP (ref 22–31)
CREAT SERPL-MCNC: 0.41 MG/DL — LOW (ref 0.5–1.3)
EOSINOPHIL # BLD AUTO: 0 K/UL — SIGNIFICANT CHANGE UP (ref 0–0.5)
EOSINOPHIL NFR BLD AUTO: 0.1 % — SIGNIFICANT CHANGE UP (ref 0–6)
GLUCOSE BLDC GLUCOMTR-MCNC: 106 MG/DL — HIGH (ref 70–99)
GLUCOSE BLDC GLUCOMTR-MCNC: 173 MG/DL — HIGH (ref 70–99)
GLUCOSE BLDC GLUCOMTR-MCNC: 88 MG/DL — SIGNIFICANT CHANGE UP (ref 70–99)
GLUCOSE BLDC GLUCOMTR-MCNC: 91 MG/DL — SIGNIFICANT CHANGE UP (ref 70–99)
GLUCOSE SERPL-MCNC: 131 MG/DL — HIGH (ref 70–99)
LYMPHOCYTES # BLD AUTO: 0.6 K/UL — LOW (ref 1–3.3)
LYMPHOCYTES # BLD AUTO: 6.3 % — LOW (ref 13–44)
MAGNESIUM SERPL-MCNC: 2 MG/DL — SIGNIFICANT CHANGE UP (ref 1.6–2.6)
MONOCYTES # BLD AUTO: 0.7 K/UL — SIGNIFICANT CHANGE UP (ref 0–0.9)
MONOCYTES NFR BLD AUTO: 7 % — SIGNIFICANT CHANGE UP (ref 2–14)
NEUTROPHILS # BLD AUTO: 8.3 K/UL — HIGH (ref 1.8–7.4)
NEUTROPHILS NFR BLD AUTO: 86.5 % — HIGH (ref 43–77)
PHOSPHATE SERPL-MCNC: 1.3 MG/DL — LOW (ref 2.5–4.5)
POTASSIUM SERPL-MCNC: 3 MMOL/L — LOW (ref 3.5–5.3)
POTASSIUM SERPL-SCNC: 3 MMOL/L — LOW (ref 3.5–5.3)
SODIUM SERPL-SCNC: 138 MMOL/L — SIGNIFICANT CHANGE UP (ref 135–145)

## 2018-09-01 PROCEDURE — 99233 SBSQ HOSP IP/OBS HIGH 50: CPT | Mod: GC

## 2018-09-01 PROCEDURE — 74018 RADEX ABDOMEN 1 VIEW: CPT | Mod: 26

## 2018-09-01 RX ORDER — POTASSIUM CHLORIDE 20 MEQ
30 PACKET (EA) ORAL
Qty: 0 | Refills: 0 | Status: DISCONTINUED | OUTPATIENT
Start: 2018-09-01 | End: 2018-09-01

## 2018-09-01 RX ORDER — BACLOFEN 100 %
20 POWDER (GRAM) MISCELLANEOUS
Qty: 0 | Refills: 0 | Status: DISCONTINUED | OUTPATIENT
Start: 2018-09-01 | End: 2018-09-06

## 2018-09-01 RX ORDER — PROPRANOLOL HCL 160 MG
40 CAPSULE, EXTENDED RELEASE 24HR ORAL EVERY 6 HOURS
Qty: 0 | Refills: 0 | Status: DISCONTINUED | OUTPATIENT
Start: 2018-09-01 | End: 2018-09-04

## 2018-09-01 RX ORDER — IBUPROFEN 200 MG
200 TABLET ORAL EVERY 8 HOURS
Qty: 0 | Refills: 0 | Status: DISCONTINUED | OUTPATIENT
Start: 2018-09-01 | End: 2018-09-01

## 2018-09-01 RX ORDER — POTASSIUM CHLORIDE 20 MEQ
10 PACKET (EA) ORAL
Qty: 0 | Refills: 0 | Status: COMPLETED | OUTPATIENT
Start: 2018-09-01 | End: 2018-09-01

## 2018-09-01 RX ORDER — POTASSIUM CHLORIDE 20 MEQ
20 PACKET (EA) ORAL
Qty: 0 | Refills: 0 | Status: COMPLETED | OUTPATIENT
Start: 2018-09-01 | End: 2018-09-02

## 2018-09-01 RX ORDER — CLONAZEPAM 1 MG
0.5 TABLET ORAL AT BEDTIME
Qty: 0 | Refills: 0 | Status: DISCONTINUED | OUTPATIENT
Start: 2018-09-01 | End: 2018-09-06

## 2018-09-01 RX ORDER — BACLOFEN 100 %
1 POWDER (GRAM) MISCELLANEOUS
Qty: 0 | Refills: 0 | COMMUNITY

## 2018-09-01 RX ORDER — LABETALOL HCL 100 MG
5 TABLET ORAL ONCE
Qty: 0 | Refills: 0 | Status: COMPLETED | OUTPATIENT
Start: 2018-09-01 | End: 2018-09-01

## 2018-09-01 RX ORDER — HYDRALAZINE HCL 50 MG
10 TABLET ORAL ONCE
Qty: 0 | Refills: 0 | Status: COMPLETED | OUTPATIENT
Start: 2018-09-01 | End: 2018-09-01

## 2018-09-01 RX ORDER — POTASSIUM PHOSPHATE, MONOBASIC POTASSIUM PHOSPHATE, DIBASIC 236; 224 MG/ML; MG/ML
15 INJECTION, SOLUTION INTRAVENOUS ONCE
Qty: 0 | Refills: 0 | Status: COMPLETED | OUTPATIENT
Start: 2018-09-01 | End: 2018-09-01

## 2018-09-01 RX ORDER — SODIUM CHLORIDE 9 MG/ML
1000 INJECTION INTRAMUSCULAR; INTRAVENOUS; SUBCUTANEOUS
Qty: 0 | Refills: 0 | Status: DISCONTINUED | OUTPATIENT
Start: 2018-09-01 | End: 2018-09-06

## 2018-09-01 RX ORDER — BACLOFEN 100 %
30 POWDER (GRAM) MISCELLANEOUS
Qty: 0 | Refills: 0 | Status: DISCONTINUED | OUTPATIENT
Start: 2018-09-01 | End: 2018-09-06

## 2018-09-01 RX ADMIN — Medication 20 MILLIEQUIVALENT(S): at 22:10

## 2018-09-01 RX ADMIN — POTASSIUM PHOSPHATE, MONOBASIC POTASSIUM PHOSPHATE, DIBASIC 62.5 MILLIMOLE(S): 236; 224 INJECTION, SOLUTION INTRAVENOUS at 00:29

## 2018-09-01 RX ADMIN — Medication 100 MILLIEQUIVALENT(S): at 03:05

## 2018-09-01 RX ADMIN — Medication 100 MILLIEQUIVALENT(S): at 01:40

## 2018-09-01 RX ADMIN — Medication 50 MILLIGRAM(S): at 13:30

## 2018-09-01 RX ADMIN — Medication 100 MILLIEQUIVALENT(S): at 18:08

## 2018-09-01 RX ADMIN — PIPERACILLIN AND TAZOBACTAM 25 GRAM(S): 4; .5 INJECTION, POWDER, LYOPHILIZED, FOR SOLUTION INTRAVENOUS at 09:37

## 2018-09-01 RX ADMIN — Medication 10 MILLIGRAM(S): at 13:28

## 2018-09-01 RX ADMIN — Medication 1: at 18:27

## 2018-09-01 RX ADMIN — POTASSIUM PHOSPHATE, MONOBASIC POTASSIUM PHOSPHATE, DIBASIC 62.5 MILLIMOLE(S): 236; 224 INJECTION, SOLUTION INTRAVENOUS at 22:17

## 2018-09-01 RX ADMIN — POTASSIUM PHOSPHATE, MONOBASIC POTASSIUM PHOSPHATE, DIBASIC 62.5 MILLIMOLE(S): 236; 224 INJECTION, SOLUTION INTRAVENOUS at 09:39

## 2018-09-01 RX ADMIN — Medication 0.5 MILLIGRAM(S): at 22:10

## 2018-09-01 RX ADMIN — Medication 0.5 MILLIGRAM(S): at 18:26

## 2018-09-01 RX ADMIN — Medication 100 MILLIEQUIVALENT(S): at 00:29

## 2018-09-01 RX ADMIN — Medication 100 MILLIEQUIVALENT(S): at 13:25

## 2018-09-01 RX ADMIN — Medication 30 MILLIEQUIVALENT(S): at 18:12

## 2018-09-01 RX ADMIN — Medication 0.5 MILLIGRAM(S): at 05:05

## 2018-09-01 RX ADMIN — Medication 5 MILLIGRAM(S): at 01:06

## 2018-09-01 RX ADMIN — PANTOPRAZOLE SODIUM 40 MILLIGRAM(S): 20 TABLET, DELAYED RELEASE ORAL at 13:27

## 2018-09-01 RX ADMIN — Medication 30 MILLIGRAM(S): at 18:06

## 2018-09-01 RX ADMIN — Medication 20 MILLIGRAM(S): at 13:26

## 2018-09-01 RX ADMIN — ENOXAPARIN SODIUM 40 MILLIGRAM(S): 100 INJECTION SUBCUTANEOUS at 13:28

## 2018-09-01 RX ADMIN — Medication 0.5 MILLIGRAM(S): at 13:29

## 2018-09-01 RX ADMIN — Medication 50 MILLIGRAM(S): at 02:34

## 2018-09-01 RX ADMIN — Medication 1 DROP(S): at 01:29

## 2018-09-01 RX ADMIN — PIPERACILLIN AND TAZOBACTAM 25 GRAM(S): 4; .5 INJECTION, POWDER, LYOPHILIZED, FOR SOLUTION INTRAVENOUS at 18:00

## 2018-09-01 RX ADMIN — PIPERACILLIN AND TAZOBACTAM 25 GRAM(S): 4; .5 INJECTION, POWDER, LYOPHILIZED, FOR SOLUTION INTRAVENOUS at 01:29

## 2018-09-01 RX ADMIN — Medication 10 MILLIGRAM(S): at 00:41

## 2018-09-01 NOTE — PROGRESS NOTE ADULT - PROBLEM SELECTOR PLAN 4
MS c/b dysphagia, wheelchair bound, neurogenic bladder s/p chronic fragoso.  - Per Neurology, patient has been refusing treatment (Previously treated with Avonex and Copaxone)  - Fall, aspiration, and seizure precautions

## 2018-09-01 NOTE — PROGRESS NOTE ADULT - ATTENDING COMMENTS
Pt feels well. Seen with  at bedside.     Abdomen soft on exam.     Start clear liquids, advance diet as tolerated.

## 2018-09-01 NOTE — PROGRESS NOTE ADULT - PROBLEM SELECTOR PLAN 2
Metabolic encephalopathy likely 2/2 sepsis and hyperthyroidism in setting of progressive MS.   -Improving  As per family, at baseline, patient is wheelchair bound and has limited extremity movement. Cervical CT neg for fracture or dislocation. CT head neg for hemorrhage, mass effect, or midline shift.

## 2018-09-01 NOTE — PROGRESS NOTE ADULT - ASSESSMENT
79 yo F with PMH of MS, hyperthyroidism, neurogenic bladder s/p chronic fragoso (>10 yrs), wheelchair bound (>10 yrs), dysphagia, osteoporosis presents with AMS s/p intubation 8/30 and extubation 8/31 found to have sepsis 2/2 stercoral colitis in setting of high stool burden, also found to have labs concerning for hyperthyroidism 2/2 home iodine supplementation.

## 2018-09-01 NOTE — PROGRESS NOTE ADULT - PROBLEM SELECTOR PLAN 5
-OG tube during MICU stay  -Requires formal Speech and swallow evaluation to eval for PO intake  -Had FEEST done in June 2018, according to family pt can eat regular food just in smaller portions

## 2018-09-01 NOTE — PROGRESS NOTE ADULT - PROBLEM SELECTOR PLAN 7
- Currently on propanolol 0.5mg IV q6hrs as per Endocrinology recommendations due to concern of hyperthyroidism, with plan to transition PO propanolol   - Will resume home enalapril 10mg BID as tolerated   -Blood pressure stable this AM

## 2018-09-01 NOTE — PROGRESS NOTE ADULT - PROBLEM SELECTOR PLAN 3
Hyperthyroidism 2/2 iodine supplementation, low suspicion for thyroid storm  - TSH receptor ab, Thyroid stimulating ig, and anti-TPO negative.  - Endocrine team recommends methimazole 20mg q6hrs at a higher dose with question about patient absorption however if Abdominal Xray shows decreased stool burden and pt able to tolerate PO, will consider starting   - on propanolol 0.5 mg IV q6hrs, transition to propranolol 40 q6h to goal HR 80s as bp tolerates  - c/w hydrocortisone 50 q6hrs  - Outpatient f/u on right thyroid nodule

## 2018-09-01 NOTE — PROGRESS NOTE ADULT - PROBLEM SELECTOR PLAN 8
DVT prophylaxis: Lovenox  Diet: NPO for now pending speech and swallow, can start D5 if hypoglycemic

## 2018-09-01 NOTE — CHART NOTE - NSCHARTNOTEFT_GEN_A_CORE
77 yo F with PMH of MS, hyperthyroidism, neurogenic bladder s/p chronic fragoso (>10 yrs), wheelchair bound (>10 yrs), dysphagia, osteoporosis presents with AMS s/p intubation 8/30 and extubation 8/31 found to have sepsis 2/2 stercoral colitis in setting of high stool burden, also found to have labs concerning for hyperthyroidism 2/2 home iodine supplementation    #Neurology:   Metabolic encephalopathy likely 2/2 sepsis and hyperthyroidism in setting of progressive MS. -Improving  As per family, at baseline, patient is wheelchair bound and has limited extremity movement. Cervical CT neg for fracture or dislocation. CT head neg for hemorrhage, mass effect, or midline shift.  MS c/b dysphagia, wheelchair bound, neurogenic bladder s/p chronic fragoso.  - Per Neurology, patient has been refusing treatment (Previously treated with Avonex and Copaxone)  - Fall, aspiration, and seizure precautions    #Cardiovascular  Septic shock 2/2 proctocolitis  HTN   - Currently on propanolol 0.5mg IV q6hrs as per Endocrinology recommendations due to concern of hyperthyroidism   - Will resume home enalapril 10mg BID as tolerated     #Pulmonary  Intubated 8/30 for airway protection due to metabolic encephalopathy and successfully extubated 8/31.  -Patient is saturating well on RA     #Gastroenterology  Stercoral colitis in setting of high stool burden   - c/w pip/tazo  Constipation s/p SMOG enema x2. AXR still showing stool burden.   - Patient is currently having adequate bowel movements   -c/w Protonix IVP 40 daily for ulcer ppx  - OG tube removed  Dysphagia   - Requires formal Speech and swallow evaluation   - Had FEEST done in June 2018    #Renal  Hx of neurogenic bladder, chronic fragoso s/p exhange yesterday   - Monitor I's and O's, BUN/Cr  - Monitor and replete lytes prn    #ID:  Sepsis 2/2 stercoral proctocolitis. Stool cultures positive for EPEC.  - c/w pip/tazo    Cholelithiasis. RUQ bedside US showed multiple gallstones but no signs of acute cholecystitis  - Surgery team evaluated patient on admission, if patient's status changes may consider bilary drain and HIDA scan.      #Endocrine   Hyperthyroidism 2/2 iodine supplementation, low suspicion for thyroid storm  - TSH receptor ab, Thyroid stimulating ig, and anti-TPO negative.  - Endocrine team recommends methimazole 20mg q6hrs at a higher dose with question about patient absorption however if Abdominal Xray shows decreased stool burden, will consider starting   - on propanolol 0.5 mg IV q6hrs  - c/w hydrocortisone 50 q6hrs  - transition to propranolol 40 q6h to goal HR 80s as bp tolerates  - Outpatient f/u on right thyroid nodule  Bilateral adrenal lesions on CT abd/pelvis w/t   -plan for outpatient follow up     # Hematology   DVT ppx: Lovenox

## 2018-09-01 NOTE — PROGRESS NOTE ADULT - PROBLEM SELECTOR PLAN 1
Initially came in septic requiring intubation and MICU stay  Stercoral colitis in setting of high stool burden, confirmed on CT  -Currently on Zosyn   -Constipation s/p SMOG enema x2. Abdominal xray will be repeated to check stool burden  - Patient is currently having adequate bowel movements, according to pt constipation waxes and wanes  -c/w Protonix IVP 40 daily for ulcer ppx  -started today on biscodyl suppository daily, monitor stool output

## 2018-09-01 NOTE — PROGRESS NOTE ADULT - SUBJECTIVE AND OBJECTIVE BOX
Wesley Rivera, PGY1  Pager: [phone]      Patient is a 78y old  Female who presents with a chief complaint of SOB R/o MI (30 Aug 2018 06:10)    MEDICINE ACCEPT NOTE    SUBJECTIVE / OVERNIGHT EVENTS: Pt transferred from the MICU overnight. Pt this morning with no complaints, denies headache, CP, SOB, abdominal pain, N/V, fevers or chills. States that she feels well. Pt's family at bedside this AM, updated on the plan.     MEDICATIONS  (STANDING):  bisacodyl Suppository 10 milliGRAM(s) Rectal daily  dextrose 5%. 1000 milliLiter(s) (50 mL/Hr) IV Continuous <Continuous>  dextrose 50% Injectable 12.5 Gram(s) IV Push once  dextrose 50% Injectable 25 Gram(s) IV Push once  dextrose 50% Injectable 25 Gram(s) IV Push once  enoxaparin Injectable 40 milliGRAM(s) SubCutaneous daily  hydrocortisone sodium succinate Injectable   IV Push   hydrocortisone sodium succinate Injectable 50 milliGRAM(s) IV Push every 8 hours  hydrocortisone sodium succinate Injectable 50 milliGRAM(s) IV Push every 12 hours  insulin lispro (HumaLOG) corrective regimen sliding scale   SubCutaneous every 6 hours  pantoprazole  Injectable 40 milliGRAM(s) IV Push daily  piperacillin/tazobactam IVPB. 3.375 Gram(s) IV Intermittent every 8 hours  potassium chloride  10 mEq/100 mL IVPB 10 milliEquivalent(s) IV Intermittent every 1 hour  propranolol Injectable 0.5 milliGRAM(s) IV Push every 6 hours  sodium chloride 0.9%. 1000 milliLiter(s) (50 mL/Hr) IV Continuous <Continuous>    MEDICATIONS  (PRN):  artificial tears (preservative free) Ophthalmic Solution 1 Drop(s) Both EYES every 4 hours PRN Dry Eyes  dextrose 40% Gel 15 Gram(s) Oral once PRN Blood Glucose LESS THAN 70 milliGRAM(s)/deciliter  glucagon  Injectable 1 milliGRAM(s) IntraMuscular once PRN Glucose LESS THAN 70 milligrams/deciliter      Vital Signs Last 24 Hrs  T(C): 37.1 (01 Sep 2018 07:10), Max: 37.3 (31 Aug 2018 12:00)  T(F): 98.8 (01 Sep 2018 07:10), Max: 99.1 (31 Aug 2018 12:00)  HR: 79 (01 Sep 2018 07:10) (75 - 102)  BP: 140/81 (01 Sep 2018 07:10) (140/81 - 182/84)  BP(mean): 103 (01 Sep 2018 05:00) (92 - 126)  RR: 18 (01 Sep 2018 07:10) (9 - 28)  SpO2: 94% (01 Sep 2018 07:10) (93% - 100%)  CAPILLARY BLOOD GLUCOSE      POCT Blood Glucose.: 91 mg/dL (01 Sep 2018 08:13)  POCT Blood Glucose.: 106 mg/dL (01 Sep 2018 05:06)  POCT Blood Glucose.: 114 mg/dL (31 Aug 2018 17:32)  POCT Blood Glucose.: 119 mg/dL (31 Aug 2018 11:05)    I&O's Summary    31 Aug 2018 07:01  -  01 Sep 2018 07:00  --------------------------------------------------------  IN: 675 mL / OUT: 805 mL / NET: -130 mL        PHYSICAL EXAM:  GENERAL: NAD, well-developed  EYES: EOMI, PERRLA, conjunctiva and sclera clear  NECK:  No JVD  CHEST/LUNG: CTABL ; No wheeze  HEART: RRR; No murmurs  ABDOMEN: Soft, Nontender, Nondistended; Bowel sounds present  : No Kendrick  EXTREMITIES:  2+ Peripheral Pulses, No edema  PSYCH: AAOx3  NEUROLOGY: non-focal  SKIN: No rashes or lesions. No sacral ulcer    LABS:                        11.9   9.6   )-----------( 165      ( 31 Aug 2018 23:23 )             36.3     08-31    143  |  106  |  24<H>  ----------------------------<  121<H>  3.0<L>   |  25  |  0.36<L>    Ca    9.0      31 Aug 2018 23:23  Phos  1.5     08-31  Mg     2.3     08-31    TPro  5.5<L>  /  Alb  2.6<L>  /  TBili  0.8  /  DBili  x   /  AST  37  /  ALT  39  /  AlkPhos  61  08-31    PT/INR - ( 30 Aug 2018 13:51 )   PT: 11.7 sec;   INR: 1.07 ratio         PTT - ( 30 Aug 2018 13:51 )  PTT:36.3 sec          Microbiology;  Stool culture EPEC  BC 8/30 negative  UC 8/30 negative        RADIOLOGY & ADDITIONAL TESTS:    Imaging Personally Reviewed:          Consultant(s) Notes Reviewed:  Yes     Care Discussed with Consultants/Other Providers: Yes Wesley Rivera, PGY1  Pager: [phone]      Patient is a 78y old  Female who presents with a chief complaint of SOB R/o MI (30 Aug 2018 06:10)    MEDICINE ACCEPT NOTE    SUBJECTIVE / OVERNIGHT EVENTS: Pt transferred from the MICU overnight. Pt this morning with no complaints, denies headache, CP, SOB, abdominal pain, N/V, fevers or chills. States that she feels well. Pt's family at bedside this AM, updated on the plan.     MEDICATIONS  (STANDING):  bisacodyl Suppository 10 milliGRAM(s) Rectal daily  dextrose 5%. 1000 milliLiter(s) (50 mL/Hr) IV Continuous <Continuous>  dextrose 50% Injectable 12.5 Gram(s) IV Push once  dextrose 50% Injectable 25 Gram(s) IV Push once  dextrose 50% Injectable 25 Gram(s) IV Push once  enoxaparin Injectable 40 milliGRAM(s) SubCutaneous daily  hydrocortisone sodium succinate Injectable   IV Push   hydrocortisone sodium succinate Injectable 50 milliGRAM(s) IV Push every 8 hours  hydrocortisone sodium succinate Injectable 50 milliGRAM(s) IV Push every 12 hours  insulin lispro (HumaLOG) corrective regimen sliding scale   SubCutaneous every 6 hours  pantoprazole  Injectable 40 milliGRAM(s) IV Push daily  piperacillin/tazobactam IVPB. 3.375 Gram(s) IV Intermittent every 8 hours  potassium chloride  10 mEq/100 mL IVPB 10 milliEquivalent(s) IV Intermittent every 1 hour  propranolol Injectable 0.5 milliGRAM(s) IV Push every 6 hours  sodium chloride 0.9%. 1000 milliLiter(s) (50 mL/Hr) IV Continuous <Continuous>    MEDICATIONS  (PRN):  artificial tears (preservative free) Ophthalmic Solution 1 Drop(s) Both EYES every 4 hours PRN Dry Eyes  dextrose 40% Gel 15 Gram(s) Oral once PRN Blood Glucose LESS THAN 70 milliGRAM(s)/deciliter  glucagon  Injectable 1 milliGRAM(s) IntraMuscular once PRN Glucose LESS THAN 70 milligrams/deciliter      Vital Signs Last 24 Hrs  T(C): 37.1 (01 Sep 2018 07:10), Max: 37.3 (31 Aug 2018 12:00)  T(F): 98.8 (01 Sep 2018 07:10), Max: 99.1 (31 Aug 2018 12:00)  HR: 79 (01 Sep 2018 07:10) (75 - 102)  BP: 140/81 (01 Sep 2018 07:10) (140/81 - 182/84)  BP(mean): 103 (01 Sep 2018 05:00) (92 - 126)  RR: 18 (01 Sep 2018 07:10) (9 - 28)  SpO2: 94% (01 Sep 2018 07:10) (93% - 100%)  CAPILLARY BLOOD GLUCOSE      POCT Blood Glucose.: 91 mg/dL (01 Sep 2018 08:13)  POCT Blood Glucose.: 106 mg/dL (01 Sep 2018 05:06)  POCT Blood Glucose.: 114 mg/dL (31 Aug 2018 17:32)  POCT Blood Glucose.: 119 mg/dL (31 Aug 2018 11:05)    I&O's Summary    31 Aug 2018 07:01  -  01 Sep 2018 07:00  --------------------------------------------------------  IN: 675 mL / OUT: 805 mL / NET: -130 mL        PHYSICAL EXAM:  GENERAL: NAD, well-developed  EYES: EOMI, PERRLA, conjunctiva and sclera clear  NECK:  No JVD  CHEST/LUNG: CTABL ; No wheeze  HEART: RRR; No murmurs  ABDOMEN: Soft, Nontender, Nondistended; Bowel sounds present  : No Kendrick  EXTREMITIES:  Lower extremities with no strength per baseline, upper extremities 4/5 strength bilaterally. 2+ Peripheral Pulses, No edema  PSYCH: AAOx3  NEUROLOGY: non-focal  SKIN: No rashes or lesions. No sacral ulcer    LABS:                        11.9   9.6   )-----------( 165      ( 31 Aug 2018 23:23 )             36.3     08-31    143  |  106  |  24<H>  ----------------------------<  121<H>  3.0<L>   |  25  |  0.36<L>    Ca    9.0      31 Aug 2018 23:23  Phos  1.5     08-31  Mg     2.3     08-31    TPro  5.5<L>  /  Alb  2.6<L>  /  TBili  0.8  /  DBili  x   /  AST  37  /  ALT  39  /  AlkPhos  61  08-31    PT/INR - ( 30 Aug 2018 13:51 )   PT: 11.7 sec;   INR: 1.07 ratio         PTT - ( 30 Aug 2018 13:51 )  PTT:36.3 sec          Microbiology;  Stool culture EPEC  BC 8/30 negative  UC 8/30 negative        RADIOLOGY & ADDITIONAL TESTS:    Imaging Personally Reviewed:          Consultant(s) Notes Reviewed:  Yes     Care Discussed with Consultants/Other Providers: Yes

## 2018-09-02 LAB
ALBUMIN SERPL ELPH-MCNC: 3.1 G/DL — LOW (ref 3.3–5)
ALP SERPL-CCNC: 57 U/L — SIGNIFICANT CHANGE UP (ref 40–120)
ALT FLD-CCNC: 30 U/L — SIGNIFICANT CHANGE UP (ref 10–45)
ANION GAP SERPL CALC-SCNC: 9 MMOL/L — SIGNIFICANT CHANGE UP (ref 5–17)
AST SERPL-CCNC: 26 U/L — SIGNIFICANT CHANGE UP (ref 10–40)
BASOPHILS # BLD AUTO: 0.01 K/UL — SIGNIFICANT CHANGE UP (ref 0–0.2)
BASOPHILS NFR BLD AUTO: 0.1 % — SIGNIFICANT CHANGE UP (ref 0–2)
BILIRUB SERPL-MCNC: 0.8 MG/DL — SIGNIFICANT CHANGE UP (ref 0.2–1.2)
BUN SERPL-MCNC: 9 MG/DL — SIGNIFICANT CHANGE UP (ref 7–23)
CALCIUM SERPL-MCNC: 8.8 MG/DL — SIGNIFICANT CHANGE UP (ref 8.4–10.5)
CHLORIDE SERPL-SCNC: 105 MMOL/L — SIGNIFICANT CHANGE UP (ref 96–108)
CO2 SERPL-SCNC: 27 MMOL/L — SIGNIFICANT CHANGE UP (ref 22–31)
CREAT SERPL-MCNC: 0.36 MG/DL — LOW (ref 0.5–1.3)
CULTURE RESULTS: SIGNIFICANT CHANGE UP
EOSINOPHIL # BLD AUTO: 0 K/UL — SIGNIFICANT CHANGE UP (ref 0–0.5)
EOSINOPHIL NFR BLD AUTO: 0 % — SIGNIFICANT CHANGE UP (ref 0–6)
GLUCOSE BLDC GLUCOMTR-MCNC: 103 MG/DL — HIGH (ref 70–99)
GLUCOSE BLDC GLUCOMTR-MCNC: 112 MG/DL — HIGH (ref 70–99)
GLUCOSE BLDC GLUCOMTR-MCNC: 114 MG/DL — HIGH (ref 70–99)
GLUCOSE BLDC GLUCOMTR-MCNC: 95 MG/DL — SIGNIFICANT CHANGE UP (ref 70–99)
GLUCOSE BLDC GLUCOMTR-MCNC: 98 MG/DL — SIGNIFICANT CHANGE UP (ref 70–99)
GLUCOSE BLDC GLUCOMTR-MCNC: 99 MG/DL — SIGNIFICANT CHANGE UP (ref 70–99)
GLUCOSE SERPL-MCNC: 124 MG/DL — HIGH (ref 70–99)
HCT VFR BLD CALC: 35.4 % — SIGNIFICANT CHANGE UP (ref 34.5–45)
HGB BLD-MCNC: 11.8 G/DL — SIGNIFICANT CHANGE UP (ref 11.5–15.5)
IMM GRANULOCYTES NFR BLD AUTO: 0.3 % — SIGNIFICANT CHANGE UP (ref 0–1.5)
LYMPHOCYTES # BLD AUTO: 0.51 K/UL — LOW (ref 1–3.3)
LYMPHOCYTES # BLD AUTO: 6.9 % — LOW (ref 13–44)
MAGNESIUM SERPL-MCNC: 1.9 MG/DL — SIGNIFICANT CHANGE UP (ref 1.6–2.6)
MCHC RBC-ENTMCNC: 29.8 PG — SIGNIFICANT CHANGE UP (ref 27–34)
MCHC RBC-ENTMCNC: 33.3 GM/DL — SIGNIFICANT CHANGE UP (ref 32–36)
MCV RBC AUTO: 89.4 FL — SIGNIFICANT CHANGE UP (ref 80–100)
MONOCYTES # BLD AUTO: 0.5 K/UL — SIGNIFICANT CHANGE UP (ref 0–0.9)
MONOCYTES NFR BLD AUTO: 6.8 % — SIGNIFICANT CHANGE UP (ref 2–14)
NEUTROPHILS # BLD AUTO: 6.35 K/UL — SIGNIFICANT CHANGE UP (ref 1.8–7.4)
NEUTROPHILS NFR BLD AUTO: 85.9 % — HIGH (ref 43–77)
PHOSPHATE SERPL-MCNC: 1.8 MG/DL — LOW (ref 2.5–4.5)
PLATELET # BLD AUTO: 151 K/UL — SIGNIFICANT CHANGE UP (ref 150–400)
POTASSIUM SERPL-MCNC: 3.6 MMOL/L — SIGNIFICANT CHANGE UP (ref 3.5–5.3)
POTASSIUM SERPL-SCNC: 3.6 MMOL/L — SIGNIFICANT CHANGE UP (ref 3.5–5.3)
PROT SERPL-MCNC: 5.9 G/DL — LOW (ref 6–8.3)
RBC # BLD: 3.96 M/UL — SIGNIFICANT CHANGE UP (ref 3.8–5.2)
RBC # FLD: 15.3 % — HIGH (ref 10.3–14.5)
SODIUM SERPL-SCNC: 141 MMOL/L — SIGNIFICANT CHANGE UP (ref 135–145)
SPECIMEN SOURCE: SIGNIFICANT CHANGE UP
TSH RECEP AB FLD-ACNC: 39.61 IU/L — HIGH (ref 0–1.75)
WBC # BLD: 7.39 K/UL — SIGNIFICANT CHANGE UP (ref 3.8–10.5)
WBC # FLD AUTO: 7.39 K/UL — SIGNIFICANT CHANGE UP (ref 3.8–10.5)

## 2018-09-02 PROCEDURE — 99232 SBSQ HOSP IP/OBS MODERATE 35: CPT

## 2018-09-02 PROCEDURE — 99233 SBSQ HOSP IP/OBS HIGH 50: CPT | Mod: GC

## 2018-09-02 RX ORDER — SODIUM,POTASSIUM PHOSPHATES 278-250MG
1 POWDER IN PACKET (EA) ORAL THREE TIMES A DAY
Qty: 0 | Refills: 0 | Status: DISCONTINUED | OUTPATIENT
Start: 2018-09-02 | End: 2018-09-06

## 2018-09-02 RX ORDER — POTASSIUM PHOSPHATE, MONOBASIC POTASSIUM PHOSPHATE, DIBASIC 236; 224 MG/ML; MG/ML
30 INJECTION, SOLUTION INTRAVENOUS ONCE
Qty: 0 | Refills: 0 | Status: DISCONTINUED | OUTPATIENT
Start: 2018-09-02 | End: 2018-09-02

## 2018-09-02 RX ORDER — INSULIN LISPRO 100/ML
VIAL (ML) SUBCUTANEOUS
Qty: 0 | Refills: 0 | Status: DISCONTINUED | OUTPATIENT
Start: 2018-09-02 | End: 2018-09-06

## 2018-09-02 RX ORDER — INSULIN LISPRO 100/ML
VIAL (ML) SUBCUTANEOUS AT BEDTIME
Qty: 0 | Refills: 0 | Status: DISCONTINUED | OUTPATIENT
Start: 2018-09-02 | End: 2018-09-06

## 2018-09-02 RX ADMIN — Medication 10 MILLIGRAM(S): at 12:58

## 2018-09-02 RX ADMIN — PIPERACILLIN AND TAZOBACTAM 25 GRAM(S): 4; .5 INJECTION, POWDER, LYOPHILIZED, FOR SOLUTION INTRAVENOUS at 02:01

## 2018-09-02 RX ADMIN — Medication 20 MILLIGRAM(S): at 09:36

## 2018-09-02 RX ADMIN — Medication 20 MILLIEQUIVALENT(S): at 02:02

## 2018-09-02 RX ADMIN — Medication 1 PACKET(S): at 22:19

## 2018-09-02 RX ADMIN — Medication 30 MILLIGRAM(S): at 22:19

## 2018-09-02 RX ADMIN — Medication 50 MILLIGRAM(S): at 01:58

## 2018-09-02 RX ADMIN — Medication 20 MILLIEQUIVALENT(S): at 03:57

## 2018-09-02 RX ADMIN — Medication 40 MILLIGRAM(S): at 15:49

## 2018-09-02 RX ADMIN — Medication 40 MILLIGRAM(S): at 00:46

## 2018-09-02 RX ADMIN — Medication 20 MILLIEQUIVALENT(S): at 00:20

## 2018-09-02 RX ADMIN — Medication 40 MILLIGRAM(S): at 23:02

## 2018-09-02 RX ADMIN — Medication 1 PACKET(S): at 06:38

## 2018-09-02 RX ADMIN — Medication 0.5 MILLIGRAM(S): at 22:19

## 2018-09-02 RX ADMIN — Medication 40 MILLIGRAM(S): at 06:38

## 2018-09-02 RX ADMIN — Medication 40 MILLIGRAM(S): at 17:36

## 2018-09-02 RX ADMIN — Medication 1 PACKET(S): at 15:50

## 2018-09-02 RX ADMIN — PANTOPRAZOLE SODIUM 40 MILLIGRAM(S): 20 TABLET, DELAYED RELEASE ORAL at 12:55

## 2018-09-02 RX ADMIN — PIPERACILLIN AND TAZOBACTAM 25 GRAM(S): 4; .5 INJECTION, POWDER, LYOPHILIZED, FOR SOLUTION INTRAVENOUS at 17:38

## 2018-09-02 RX ADMIN — PIPERACILLIN AND TAZOBACTAM 25 GRAM(S): 4; .5 INJECTION, POWDER, LYOPHILIZED, FOR SOLUTION INTRAVENOUS at 09:24

## 2018-09-02 RX ADMIN — Medication 30 MILLIGRAM(S): at 17:34

## 2018-09-02 RX ADMIN — Medication 20 MILLIGRAM(S): at 12:56

## 2018-09-02 RX ADMIN — ENOXAPARIN SODIUM 40 MILLIGRAM(S): 100 INJECTION SUBCUTANEOUS at 12:58

## 2018-09-02 NOTE — PROGRESS NOTE ADULT - SUBJECTIVE AND OBJECTIVE BOX
SURGERY DAILY PROGRESS NOTE:       Overnight Events: no acute events overnight, pain is improved, tolerating diet. no complaints.       Physical Exam  Vital Signs Last 24 Hrs  T(C): 37 (02 Sep 2018 07:16), Max: 37.1 (02 Sep 2018 04:12)  T(F): 98.6 (02 Sep 2018 07:16), Max: 98.8 (02 Sep 2018 04:12)  HR: 68 (02 Sep 2018 07:16) (66 - 84)  BP: 152/83 (02 Sep 2018 07:16) (152/75 - 170/79)  BP(mean): --  RR: 18 (02 Sep 2018 07:16) (18 - 20)  SpO2: 95% (02 Sep 2018 07:16) (94% - 96%)    Gen: NAD  HEENT: normocephalic, atraumatic, no scleral icterus  CV: S1, S2, RRR  Pulm: CTA B/L  Abd: Soft, ND, NTP, no rebound, no guarding, no palpable organomegaly/masses  Ext: warm, no edema, palp dp/pt    I&O's Detail    01 Sep 2018 07:01  -  02 Sep 2018 07:00  --------------------------------------------------------  IN:    IV PiggyBack: 100 mL    Oral Fluid: 600 mL  Total IN: 700 mL    OUT:    Indwelling Catheter - Urethral: 2750 mL  Total OUT: 2750 mL    Total NET: -2050 mL      02 Sep 2018 07:01  -  02 Sep 2018 14:11  --------------------------------------------------------  IN:    Oral Fluid: 460 mL  Total IN: 460 mL    OUT:  Total OUT: 0 mL    Total NET: 460 mL          Labs:                        11.8   7.39  )-----------( 151      ( 02 Sep 2018 06:54 )             35.4     09-02    141  |  105  |  9   ----------------------------<  124<H>  3.6   |  27  |  0.36<L>    Ca    8.8      02 Sep 2018 05:52  Phos  1.8     09-02  Mg     1.9     09-02    TPro  5.9<L>  /  Alb  3.1<L>  /  TBili  0.8  /  DBili  x   /  AST  26  /  ALT  30  /  AlkPhos  57  09-02        ABG - ( 31 Aug 2018 23:29 )  pH, Arterial: 7.50  pH, Blood: x     /  pCO2: 40    /  pO2: 123   / HCO3: 31    / Base Excess: 7.3   /  SaO2: 99              Assesment/Plan  78y year old Female with abdominal pain, cholelithiasis, no sign of cholecystitis on US  - medical care per primary team  - recommended HIDA scan once patient can tolerate it.   - please call with questions.

## 2018-09-02 NOTE — PROGRESS NOTE ADULT - PROBLEM SELECTOR PLAN 7
- DASH/TLC clear liquid diet.  - Pending S&S.  - DVT PPX: enoxaparin.    Dispo: pending S&S, improvement in mental status. - DASH/TLC soft diet.  - DVT PPX: enoxaparin.    Dispo: pending S&S, improvement in mental status.

## 2018-09-02 NOTE — PROGRESS NOTE ADULT - PROBLEM SELECTOR PLAN 4
- Secondary progressive MS c/b dysphagia, wheelchair bound, neurogenic bladder s/p chronic fragoso.  - Per Neurology, patient has been refusing treatment (Previously treated with Avonex and Copaxone).  - Fall, aspiration, and seizure precautions

## 2018-09-02 NOTE — PROGRESS NOTE ADULT - ATTENDING COMMENTS
seen and examined 09-02-18 @ 1655    tolerating regular diet  +flatus / BM    afeb  AVSS  abd soft / NT / ND  no Álvarez's sign    stercoral colitis has resolved  no evidence of acute cholecystitis  -reconsult surgery PRN

## 2018-09-02 NOTE — PROGRESS NOTE ADULT - PROBLEM SELECTOR PLAN 5
-Requires formal Speech and swallow evaluation to eval for PO intake  -Had FEEST done in June 2018, according to family pt can eat regular food just in smaller portions - Pt able to swallow pills and thin liquids on beside S&S.  - Advanced diet.  -Had FEEST done in June 2018, according to family pt can eat regular food just in smaller portions

## 2018-09-02 NOTE — PROGRESS NOTE ADULT - PROBLEM SELECTOR PLAN 1
- Stercoral colitis in setting of high stool burden, confirmed on CT  - On zosyn.  - Continue aggressive bowel regimen.  - Stool count.

## 2018-09-02 NOTE — PROGRESS NOTE ADULT - PROBLEM SELECTOR PLAN 2
Metabolic encephalopathy likely 2/2 sepsis and hyperthyroidism in setting of stercoral colitis.  - Improving mental status.  As per family, at baseline, patient is wheelchair bound and has limited extremity movement. Cervical CT neg for fracture or dislocation. CT head neg for hemorrhage, mass effect, or midline shift. - Metabolic encephalopathy likely 2/2 sepsis and hyperthyroidism in setting of stercoral colitis.  - Improving mental status.  As per family, at baseline, patient is wheelchair bound and has limited extremity movement. Cervical CT neg for fracture or dislocation. CT head neg for hemorrhage, mass effect, or midline shift.

## 2018-09-02 NOTE — PROGRESS NOTE ADULT - PROBLEM SELECTOR PLAN 3
- Hyperthyroidism 2/2 iodine supplementation, low suspicion for thyroid storm  - TSH receptor ab, Thyroid stimulating ig, and anti-TPO negative.  - Endocrine recommends methimazole 20mg q6hrs at a higher dose with question about patient absorption however if Abdominal Xray shows decreased stool burden and pt able to tolerate PO, will consider starting.  - On propanolol 0.5 mg IV q6hrs, transition to propranolol PO.  - C/w hydrocortisone.  - Outpatient f/u on right thyroid nodule. - Hyperthyroidism 2/2 iodine supplementation, low suspicion for thyroid storm  - TSH receptor ab, Thyroid stimulating ig, and anti-TPO negative.  - Endocrine recommends methimazole 20mg q6hrs at a higher dose with question about patient absorption however if Abdominal Xray shows decreased stool burden and pt able to tolerate PO, will consider starting.  - On propanolol 0.5 mg IV q6hrs, transition to propranolol PO.  - C/w hydrocortisone.  - Outpatient f/u on right thyroid nodule.  - Will start methimazole. - Hyperthyroidism 2/2 iodine supplementation, low suspicion for thyroid storm  - TSH receptor ab, Thyroid stimulating ig, and anti-TPO negative.  - On propranolol PO.  - C/w hydrocortisone.  - Outpatient f/u on right thyroid nodule.  - Will start methimazole today given able to swallow pills.

## 2018-09-02 NOTE — PROGRESS NOTE ADULT - SUBJECTIVE AND OBJECTIVE BOX
Gini Mcintosh MD  PGY-3 | Internal Medicine  [phone] / [phone]    Patient is a 78y old  Female who presents with a chief complaint of SOB R/o MI (30 Aug 2018 06:10)      SUBJECTIVE / OVERNIGHT EVENTS: Pt refused baclofen.    MEDICATIONS  (STANDING):  baclofen 20 milliGRAM(s) Oral <User Schedule>  baclofen 30 milliGRAM(s) Oral <User Schedule>  bisacodyl Suppository 10 milliGRAM(s) Rectal daily  clonazePAM Tablet 0.5 milliGRAM(s) Oral at bedtime  dextrose 5%. 1000 milliLiter(s) (50 mL/Hr) IV Continuous <Continuous>  dextrose 50% Injectable 12.5 Gram(s) IV Push once  dextrose 50% Injectable 25 Gram(s) IV Push once  dextrose 50% Injectable 25 Gram(s) IV Push once  enoxaparin Injectable 40 milliGRAM(s) SubCutaneous daily  hydrocortisone sodium succinate Injectable   IV Push   hydrocortisone sodium succinate Injectable 50 milliGRAM(s) IV Push every 12 hours  hydrocortisone sodium succinate Injectable 50 milliGRAM(s) IV Push daily  insulin lispro (HumaLOG) corrective regimen sliding scale   SubCutaneous every 6 hours  pantoprazole  Injectable 40 milliGRAM(s) IV Push daily  piperacillin/tazobactam IVPB. 3.375 Gram(s) IV Intermittent every 8 hours  potassium phosphate / sodium phosphate powder 1 Packet(s) Oral three times a day  propranolol 40 milliGRAM(s) Oral every 6 hours  sodium chloride 0.9%. 1000 milliLiter(s) (50 mL/Hr) IV Continuous <Continuous>    MEDICATIONS  (PRN):  artificial tears (preservative free) Ophthalmic Solution 1 Drop(s) Both EYES every 4 hours PRN Dry Eyes  dextrose 40% Gel 15 Gram(s) Oral once PRN Blood Glucose LESS THAN 70 milliGRAM(s)/deciliter  glucagon  Injectable 1 milliGRAM(s) IntraMuscular once PRN Glucose LESS THAN 70 milligrams/deciliter      T(C): 37 (09-02-18 @ 07:16), Max: 37.1 (09-02-18 @ 04:12)  HR: 68 (09-02-18 @ 07:16) (66 - 84)  BP: 152/83 (09-02-18 @ 07:16) (152/75 - 170/79)  RR: 18 (09-02-18 @ 07:16) (18 - 20)  SpO2: 95% (09-02-18 @ 07:16) (94% - 96%)    CAPILLARY BLOOD GLUCOSE      POCT Blood Glucose.: 103 mg/dL (02 Sep 2018 08:23)  POCT Blood Glucose.: 114 mg/dL (02 Sep 2018 05:58)  POCT Blood Glucose.: 112 mg/dL (02 Sep 2018 00:08)  POCT Blood Glucose.: 173 mg/dL (01 Sep 2018 18:23)  POCT Blood Glucose.: 88 mg/dL (01 Sep 2018 12:26)    I&O's Summary    01 Sep 2018 07:01  -  02 Sep 2018 07:00  --------------------------------------------------------  IN: 700 mL / OUT: 2750 mL / NET: -2050 mL        PHYSICAL EXAM:  GENERAL:  HEAD:  EYES:  NECK:  CHEST/LUNG:  HEART:  ABDOMEN:  EXTREMITIES:  PSYCH:  NEUROLOGY:  SKIN:    LABS:                        11.8   7.39  )-----------( 151      ( 02 Sep 2018 06:54 )             35.4     09-02    141  |  105  |  9   ----------------------------<  124<H>  3.6   |  27  |  0.36<L>    Ca    8.8      02 Sep 2018 05:52  Phos  1.8     09-02  Mg     1.9     09-02    TPro  5.9<L>  /  Alb  3.1<L>  /  TBili  0.8  /  DBili  x   /  AST  26  /  ALT  30  /  AlkPhos  57  09-02              RADIOLOGY & ADDITIONAL TESTS:    Imaging Personally Reviewed: no new imaging    Consultant(s) Notes Reviewed: MICU transfer note    Care Discussed with Consultants/Other Providers: Gini Mcintosh MD  PGY-3 | Internal Medicine  [phone] / [phone]    Patient is a 78y old  Female who presents with a chief complaint of SOB R/o MI (30 Aug 2018 06:10)      SUBJECTIVE / OVERNIGHT EVENTS: Pt refused baclofen due to dosing issues from home.  Pt reports feeling improved but still sleeping a lot.  She denies chest pain, SOB, N/V/D/C.  Pt able to swallow pills with thin liquids given to her by RN.    MEDICATIONS  (STANDING):  baclofen 20 milliGRAM(s) Oral <User Schedule>  baclofen 30 milliGRAM(s) Oral <User Schedule>  bisacodyl Suppository 10 milliGRAM(s) Rectal daily  clonazePAM Tablet 0.5 milliGRAM(s) Oral at bedtime  dextrose 5%. 1000 milliLiter(s) (50 mL/Hr) IV Continuous <Continuous>  dextrose 50% Injectable 12.5 Gram(s) IV Push once  dextrose 50% Injectable 25 Gram(s) IV Push once  dextrose 50% Injectable 25 Gram(s) IV Push once  enoxaparin Injectable 40 milliGRAM(s) SubCutaneous daily  hydrocortisone sodium succinate Injectable   IV Push   hydrocortisone sodium succinate Injectable 50 milliGRAM(s) IV Push every 12 hours  hydrocortisone sodium succinate Injectable 50 milliGRAM(s) IV Push daily  insulin lispro (HumaLOG) corrective regimen sliding scale   SubCutaneous every 6 hours  pantoprazole  Injectable 40 milliGRAM(s) IV Push daily  piperacillin/tazobactam IVPB. 3.375 Gram(s) IV Intermittent every 8 hours  potassium phosphate / sodium phosphate powder 1 Packet(s) Oral three times a day  propranolol 40 milliGRAM(s) Oral every 6 hours  sodium chloride 0.9%. 1000 milliLiter(s) (50 mL/Hr) IV Continuous <Continuous>    MEDICATIONS  (PRN):  artificial tears (preservative free) Ophthalmic Solution 1 Drop(s) Both EYES every 4 hours PRN Dry Eyes  dextrose 40% Gel 15 Gram(s) Oral once PRN Blood Glucose LESS THAN 70 milliGRAM(s)/deciliter  glucagon  Injectable 1 milliGRAM(s) IntraMuscular once PRN Glucose LESS THAN 70 milligrams/deciliter      T(C): 37 (09-02-18 @ 07:16), Max: 37.1 (09-02-18 @ 04:12)  HR: 68 (09-02-18 @ 07:16) (66 - 84)  BP: 152/83 (09-02-18 @ 07:16) (152/75 - 170/79)  RR: 18 (09-02-18 @ 07:16) (18 - 20)  SpO2: 95% (09-02-18 @ 07:16) (94% - 96%)    CAPILLARY BLOOD GLUCOSE      POCT Blood Glucose.: 103 mg/dL (02 Sep 2018 08:23)  POCT Blood Glucose.: 114 mg/dL (02 Sep 2018 05:58)  POCT Blood Glucose.: 112 mg/dL (02 Sep 2018 00:08)  POCT Blood Glucose.: 173 mg/dL (01 Sep 2018 18:23)  POCT Blood Glucose.: 88 mg/dL (01 Sep 2018 12:26)    I&O's Summary    01 Sep 2018 07:01  -  02 Sep 2018 07:00  --------------------------------------------------------  IN: 700 mL / OUT: 2750 mL / NET: -2050 mL        PHYSICAL EXAM:  T(C): 37 (09-02-18 @ 07:16), Max: 37.1 (09-02-18 @ 04:12)  HR: 68 (09-02-18 @ 07:16) (66 - 84)  BP: 152/83 (09-02-18 @ 07:16) (152/75 - 170/79)  RR: 18 (09-02-18 @ 07:16) (18 - 20)  SpO2: 95% (09-02-18 @ 07:16) (94% - 96%)    Gen: awake, being fed by RN  HENT: neck soft / supple; MMM  Lymph: no LAD noted in neck  Eye: sclerae anicteric  CV: normal rate, regular rhythm  Pulm: CTAB, no w/r/r auscultated  Abd: +BS, soft, NT, ND  Skin: warm, dry  Ext: no LE edema  Neuro: answering questions appropriately, following commands appropriately, recent and remote memory intact  Psych: normal mood / affect    LABS:                        11.8   7.39  )-----------( 151      ( 02 Sep 2018 06:54 )             35.4     09-02    141  |  105  |  9   ----------------------------<  124<H>  3.6   |  27  |  0.36<L>    Ca    8.8      02 Sep 2018 05:52  Phos  1.8     09-02  Mg     1.9     09-02    TPro  5.9<L>  /  Alb  3.1<L>  /  TBili  0.8  /  DBili  x   /  AST  26  /  ALT  30  /  AlkPhos  57  09-02              RADIOLOGY & ADDITIONAL TESTS:    Imaging Personally Reviewed: no new imaging    Consultant(s) Notes Reviewed: MICU transfer note    Care Discussed with Consultants/Other Providers:

## 2018-09-02 NOTE — PROGRESS NOTE ADULT - ASSESSMENT
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  Pt required MICU admission for unresponsiveness req intubation now extubated.  Also with hyperthyroidism 2/2 home iodine supplementation.

## 2018-09-03 DIAGNOSIS — R19.7 DIARRHEA, UNSPECIFIED: ICD-10-CM

## 2018-09-03 LAB
ALBUMIN SERPL ELPH-MCNC: 2.8 G/DL — LOW (ref 3.3–5)
ALP SERPL-CCNC: 60 U/L — SIGNIFICANT CHANGE UP (ref 40–120)
ALT FLD-CCNC: 26 U/L — SIGNIFICANT CHANGE UP (ref 10–45)
ANION GAP SERPL CALC-SCNC: 11 MMOL/L — SIGNIFICANT CHANGE UP (ref 5–17)
AST SERPL-CCNC: 22 U/L — SIGNIFICANT CHANGE UP (ref 10–40)
BASOPHILS # BLD AUTO: 0.02 K/UL — SIGNIFICANT CHANGE UP (ref 0–0.2)
BASOPHILS NFR BLD AUTO: 0.3 % — SIGNIFICANT CHANGE UP (ref 0–2)
BILIRUB SERPL-MCNC: 0.7 MG/DL — SIGNIFICANT CHANGE UP (ref 0.2–1.2)
BUN SERPL-MCNC: 9 MG/DL — SIGNIFICANT CHANGE UP (ref 7–23)
CALCIUM SERPL-MCNC: 8.5 MG/DL — SIGNIFICANT CHANGE UP (ref 8.4–10.5)
CHLORIDE SERPL-SCNC: 103 MMOL/L — SIGNIFICANT CHANGE UP (ref 96–108)
CO2 SERPL-SCNC: 26 MMOL/L — SIGNIFICANT CHANGE UP (ref 22–31)
CREAT SERPL-MCNC: 0.39 MG/DL — LOW (ref 0.5–1.3)
CULTURE RESULTS: SIGNIFICANT CHANGE UP
EOSINOPHIL # BLD AUTO: 0.03 K/UL — SIGNIFICANT CHANGE UP (ref 0–0.5)
EOSINOPHIL NFR BLD AUTO: 0.4 % — SIGNIFICANT CHANGE UP (ref 0–6)
GLUCOSE BLDC GLUCOMTR-MCNC: 101 MG/DL — HIGH (ref 70–99)
GLUCOSE BLDC GLUCOMTR-MCNC: 111 MG/DL — HIGH (ref 70–99)
GLUCOSE BLDC GLUCOMTR-MCNC: 137 MG/DL — HIGH (ref 70–99)
GLUCOSE BLDC GLUCOMTR-MCNC: 139 MG/DL — HIGH (ref 70–99)
GLUCOSE SERPL-MCNC: 93 MG/DL — SIGNIFICANT CHANGE UP (ref 70–99)
HCT VFR BLD CALC: 34.7 % — SIGNIFICANT CHANGE UP (ref 34.5–45)
HGB BLD-MCNC: 11.5 G/DL — SIGNIFICANT CHANGE UP (ref 11.5–15.5)
IMM GRANULOCYTES NFR BLD AUTO: 0.4 % — SIGNIFICANT CHANGE UP (ref 0–1.5)
LYMPHOCYTES # BLD AUTO: 1.54 K/UL — SIGNIFICANT CHANGE UP (ref 1–3.3)
LYMPHOCYTES # BLD AUTO: 21.7 % — SIGNIFICANT CHANGE UP (ref 13–44)
MAGNESIUM SERPL-MCNC: 1.9 MG/DL — SIGNIFICANT CHANGE UP (ref 1.6–2.6)
MCHC RBC-ENTMCNC: 29.9 PG — SIGNIFICANT CHANGE UP (ref 27–34)
MCHC RBC-ENTMCNC: 33.1 GM/DL — SIGNIFICANT CHANGE UP (ref 32–36)
MCV RBC AUTO: 90.1 FL — SIGNIFICANT CHANGE UP (ref 80–100)
MONOCYTES # BLD AUTO: 0.71 K/UL — SIGNIFICANT CHANGE UP (ref 0–0.9)
MONOCYTES NFR BLD AUTO: 10 % — SIGNIFICANT CHANGE UP (ref 2–14)
NEUTROPHILS # BLD AUTO: 4.77 K/UL — SIGNIFICANT CHANGE UP (ref 1.8–7.4)
NEUTROPHILS NFR BLD AUTO: 67.2 % — SIGNIFICANT CHANGE UP (ref 43–77)
PHOSPHATE SERPL-MCNC: 2.2 MG/DL — LOW (ref 2.5–4.5)
PLATELET # BLD AUTO: 151 K/UL — SIGNIFICANT CHANGE UP (ref 150–400)
POTASSIUM SERPL-MCNC: 3 MMOL/L — LOW (ref 3.5–5.3)
POTASSIUM SERPL-SCNC: 3 MMOL/L — LOW (ref 3.5–5.3)
PROT SERPL-MCNC: 5.6 G/DL — LOW (ref 6–8.3)
RBC # BLD: 3.85 M/UL — SIGNIFICANT CHANGE UP (ref 3.8–5.2)
RBC # FLD: 15.3 % — HIGH (ref 10.3–14.5)
SODIUM SERPL-SCNC: 140 MMOL/L — SIGNIFICANT CHANGE UP (ref 135–145)
SPECIMEN SOURCE: SIGNIFICANT CHANGE UP
WBC # BLD: 7.1 K/UL — SIGNIFICANT CHANGE UP (ref 3.8–10.5)
WBC # FLD AUTO: 7.1 K/UL — SIGNIFICANT CHANGE UP (ref 3.8–10.5)

## 2018-09-03 PROCEDURE — 99232 SBSQ HOSP IP/OBS MODERATE 35: CPT | Mod: GC

## 2018-09-03 RX ORDER — POTASSIUM PHOSPHATE, MONOBASIC POTASSIUM PHOSPHATE, DIBASIC 236; 224 MG/ML; MG/ML
15 INJECTION, SOLUTION INTRAVENOUS ONCE
Qty: 0 | Refills: 0 | Status: COMPLETED | OUTPATIENT
Start: 2018-09-03 | End: 2018-09-03

## 2018-09-03 RX ORDER — POTASSIUM CHLORIDE 20 MEQ
40 PACKET (EA) ORAL EVERY 4 HOURS
Qty: 0 | Refills: 0 | Status: COMPLETED | OUTPATIENT
Start: 2018-09-03 | End: 2018-09-03

## 2018-09-03 RX ADMIN — PIPERACILLIN AND TAZOBACTAM 25 GRAM(S): 4; .5 INJECTION, POWDER, LYOPHILIZED, FOR SOLUTION INTRAVENOUS at 17:57

## 2018-09-03 RX ADMIN — Medication 20 MILLIGRAM(S): at 12:59

## 2018-09-03 RX ADMIN — POTASSIUM PHOSPHATE, MONOBASIC POTASSIUM PHOSPHATE, DIBASIC 62.5 MILLIMOLE(S): 236; 224 INJECTION, SOLUTION INTRAVENOUS at 22:28

## 2018-09-03 RX ADMIN — Medication 1 PACKET(S): at 13:00

## 2018-09-03 RX ADMIN — Medication 40 MILLIGRAM(S): at 17:56

## 2018-09-03 RX ADMIN — Medication 20 MILLIGRAM(S): at 09:02

## 2018-09-03 RX ADMIN — PIPERACILLIN AND TAZOBACTAM 25 GRAM(S): 4; .5 INJECTION, POWDER, LYOPHILIZED, FOR SOLUTION INTRAVENOUS at 02:17

## 2018-09-03 RX ADMIN — PANTOPRAZOLE SODIUM 40 MILLIGRAM(S): 20 TABLET, DELAYED RELEASE ORAL at 12:57

## 2018-09-03 RX ADMIN — Medication 40 MILLIGRAM(S): at 06:25

## 2018-09-03 RX ADMIN — ENOXAPARIN SODIUM 40 MILLIGRAM(S): 100 INJECTION SUBCUTANEOUS at 12:57

## 2018-09-03 RX ADMIN — Medication 1 PACKET(S): at 21:47

## 2018-09-03 RX ADMIN — PIPERACILLIN AND TAZOBACTAM 25 GRAM(S): 4; .5 INJECTION, POWDER, LYOPHILIZED, FOR SOLUTION INTRAVENOUS at 12:56

## 2018-09-03 RX ADMIN — Medication 40 MILLIEQUIVALENT(S): at 18:02

## 2018-09-03 RX ADMIN — POTASSIUM PHOSPHATE, MONOBASIC POTASSIUM PHOSPHATE, DIBASIC 62.5 MILLIMOLE(S): 236; 224 INJECTION, SOLUTION INTRAVENOUS at 12:58

## 2018-09-03 RX ADMIN — Medication 40 MILLIEQUIVALENT(S): at 13:00

## 2018-09-03 RX ADMIN — Medication 10 MILLIGRAM(S): at 12:57

## 2018-09-03 RX ADMIN — Medication 30 MILLIGRAM(S): at 22:28

## 2018-09-03 RX ADMIN — Medication 0.5 MILLIGRAM(S): at 21:47

## 2018-09-03 RX ADMIN — Medication 1 PACKET(S): at 06:25

## 2018-09-03 RX ADMIN — Medication 50 MILLIGRAM(S): at 06:23

## 2018-09-03 RX ADMIN — Medication 30 MILLIGRAM(S): at 17:56

## 2018-09-03 RX ADMIN — Medication 40 MILLIGRAM(S): at 23:50

## 2018-09-03 RX ADMIN — Medication 40 MILLIGRAM(S): at 12:57

## 2018-09-03 NOTE — PROGRESS NOTE ADULT - PROBLEM SELECTOR PLAN 1
Stercoral colitis in setting of high stool burden, confirmed on CT  - On zosyn, planning to switch to oral antibiotics   - Continue aggressive bowel regimen  - Stool count

## 2018-09-03 NOTE — PROGRESS NOTE ADULT - PROBLEM SELECTOR PLAN 5
Secondary progressive MS c/b dysphagia, wheelchair bound, neurogenic bladder s/p chronic fragoso.  - Per Neurology, patient has been refusing treatment (Previously treated with Avonex and Copaxone).  - Fall, aspiration, and seizure precautions

## 2018-09-03 NOTE — PROGRESS NOTE ADULT - ATTENDING COMMENTS
Symptomatically improving, tolerating diet. Change abx to po Cipro and Flagyl tomorrow to complete 7 day course.     D/c planning.

## 2018-09-03 NOTE — PROGRESS NOTE ADULT - ASSESSMENT
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  Pt required MICU admission for unresponsiveness req intubation now extubated.  Also with hyperthyroidism 2/2 home iodine supplementation.

## 2018-09-03 NOTE — PROGRESS NOTE ADULT - SUBJECTIVE AND OBJECTIVE BOX
Patient is a 78y old  Female who presents with a chief complaint of SOB R/o MI (30 Aug 2018 06:10)    SUBJECTIVE / OVERNIGHT EVENTS: Pt with loose stool yesterday and mild lower abdominal pain. No melena, hematochezia, n/v. No fever or chills.      MEDICATIONS  (STANDING):  baclofen 20 milliGRAM(s) Oral <User Schedule>  baclofen 30 milliGRAM(s) Oral <User Schedule>  bisacodyl Suppository 10 milliGRAM(s) Rectal daily  clonazePAM Tablet 0.5 milliGRAM(s) Oral at bedtime  dextrose 5%. 1000 milliLiter(s) (50 mL/Hr) IV Continuous <Continuous>  dextrose 50% Injectable 12.5 Gram(s) IV Push once  dextrose 50% Injectable 25 Gram(s) IV Push once  dextrose 50% Injectable 25 Gram(s) IV Push once  enoxaparin Injectable 40 milliGRAM(s) SubCutaneous daily  hydrocortisone sodium succinate Injectable   IV Push   insulin lispro (HumaLOG) corrective regimen sliding scale   SubCutaneous three times a day before meals  insulin lispro (HumaLOG) corrective regimen sliding scale   SubCutaneous at bedtime  methimazole 20 milliGRAM(s) Oral <User Schedule>  pantoprazole  Injectable 40 milliGRAM(s) IV Push daily  piperacillin/tazobactam IVPB. 3.375 Gram(s) IV Intermittent every 8 hours  potassium chloride    Tablet ER 40 milliEquivalent(s) Oral every 4 hours  potassium phosphate / sodium phosphate powder 1 Packet(s) Oral three times a day  potassium phosphate IVPB 15 milliMole(s) IV Intermittent once  propranolol 40 milliGRAM(s) Oral every 6 hours  sodium chloride 0.9%. 1000 milliLiter(s) (50 mL/Hr) IV Continuous <Continuous>    MEDICATIONS  (PRN):  artificial tears (preservative free) Ophthalmic Solution 1 Drop(s) Both EYES every 4 hours PRN Dry Eyes  dextrose 40% Gel 15 Gram(s) Oral once PRN Blood Glucose LESS THAN 70 milliGRAM(s)/deciliter  glucagon  Injectable 1 milliGRAM(s) IntraMuscular once PRN Glucose LESS THAN 70 milligrams/deciliter      Vital Signs Last 24 Hrs  T(C): 37.3 (03 Sep 2018 10:41), Max: 37.3 (03 Sep 2018 10:41)  T(F): 99.2 (03 Sep 2018 10:41), Max: 99.2 (03 Sep 2018 10:41)  HR: 74 (03 Sep 2018 10:41) (69 - 74)  BP: 165/73 (03 Sep 2018 10:41) (135/78 - 165/73)  BP(mean): --  RR: 16 (03 Sep 2018 10:41) (16 - 18)  SpO2: 92% (03 Sep 2018 10:41) (92% - 95%)  CAPILLARY BLOOD GLUCOSE      POCT Blood Glucose.: 101 mg/dL (03 Sep 2018 08:19)  POCT Blood Glucose.: 99 mg/dL (02 Sep 2018 21:20)  POCT Blood Glucose.: 95 mg/dL (02 Sep 2018 16:52)  POCT Blood Glucose.: 98 mg/dL (02 Sep 2018 11:57)    I&O's Summary    02 Sep 2018 07:01  -  03 Sep 2018 07:00  --------------------------------------------------------  IN: 1020 mL / OUT: 2250 mL / NET: -1230 mL        PHYSICAL EXAM:  General: NAD, appears comfortable  Skin: Warm and dry  Neuro: AAOx3, CN III-XII intact, strenth 3/5 b/l UE and 0/5 b/l LE as per baseline  HEENT: PERRL, EOMI, no oral lesions  Neck: Full range of motion, no JVD  Lungs: Clear to ascultation bilaterally no wheezes, rales, or rhonchi   Heart: Regular rate and rhythm, no murmurs  Abdomen: Mild lower abdominal tenderness, normoactive bowl sounds, soft, nondistended  Extremities: No lower extremity tenderness, erythema, or edema   Vascular: 2+ radial and pedal pulses  Lymph: no cervical lymphadenopathy  Psych: normal mood and affect    LABS:                        11.5   7.10  )-----------( 151      ( 03 Sep 2018 09:38 )             34.7     09-03    140  |  103  |  9   ----------------------------<  93  3.0<L>   |  26  |  0.39<L>    Ca    8.5      03 Sep 2018 07:06  Phos  2.2     09-03  Mg     1.9     09-03    TPro  5.6<L>  /  Alb  2.8<L>  /  TBili  0.7  /  DBili  x   /  AST  22  /  ALT  26  /  AlkPhos  60  09-03              RADIOLOGY & ADDITIONAL TESTS:    Imaging Personally Reviewed:    Consultant(s) Notes Reviewed:      Care Discussed with Consultants/Other Providers: Patient is a 78y old  Female who presents with a chief complaint of SOB R/o MI (30 Aug 2018 06:10)    SUBJECTIVE / OVERNIGHT EVENTS: Pt with loose stool yesterday and mild lower abdominal pain. No melena, hematochezia, n/v. No fever or chills.      MEDICATIONS  (STANDING):  baclofen 20 milliGRAM(s) Oral <User Schedule>  baclofen 30 milliGRAM(s) Oral <User Schedule>  bisacodyl Suppository 10 milliGRAM(s) Rectal daily  clonazePAM Tablet 0.5 milliGRAM(s) Oral at bedtime  dextrose 5%. 1000 milliLiter(s) (50 mL/Hr) IV Continuous <Continuous>  dextrose 50% Injectable 12.5 Gram(s) IV Push once  dextrose 50% Injectable 25 Gram(s) IV Push once  dextrose 50% Injectable 25 Gram(s) IV Push once  enoxaparin Injectable 40 milliGRAM(s) SubCutaneous daily  hydrocortisone sodium succinate Injectable   IV Push   insulin lispro (HumaLOG) corrective regimen sliding scale   SubCutaneous three times a day before meals  insulin lispro (HumaLOG) corrective regimen sliding scale   SubCutaneous at bedtime  methimazole 20 milliGRAM(s) Oral <User Schedule>  pantoprazole  Injectable 40 milliGRAM(s) IV Push daily  piperacillin/tazobactam IVPB. 3.375 Gram(s) IV Intermittent every 8 hours  potassium chloride    Tablet ER 40 milliEquivalent(s) Oral every 4 hours  potassium phosphate / sodium phosphate powder 1 Packet(s) Oral three times a day  potassium phosphate IVPB 15 milliMole(s) IV Intermittent once  propranolol 40 milliGRAM(s) Oral every 6 hours  sodium chloride 0.9%. 1000 milliLiter(s) (50 mL/Hr) IV Continuous <Continuous>    MEDICATIONS  (PRN):  artificial tears (preservative free) Ophthalmic Solution 1 Drop(s) Both EYES every 4 hours PRN Dry Eyes  dextrose 40% Gel 15 Gram(s) Oral once PRN Blood Glucose LESS THAN 70 milliGRAM(s)/deciliter  glucagon  Injectable 1 milliGRAM(s) IntraMuscular once PRN Glucose LESS THAN 70 milligrams/deciliter      Vital Signs Last 24 Hrs  T(C): 37.3 (03 Sep 2018 10:41), Max: 37.3 (03 Sep 2018 10:41)  T(F): 99.2 (03 Sep 2018 10:41), Max: 99.2 (03 Sep 2018 10:41)  HR: 74 (03 Sep 2018 10:41) (69 - 74)  BP: 165/73 (03 Sep 2018 10:41) (135/78 - 165/73)  BP(mean): --  RR: 16 (03 Sep 2018 10:41) (16 - 18)  SpO2: 92% (03 Sep 2018 10:41) (92% - 95%)  CAPILLARY BLOOD GLUCOSE      POCT Blood Glucose.: 101 mg/dL (03 Sep 2018 08:19)  POCT Blood Glucose.: 99 mg/dL (02 Sep 2018 21:20)  POCT Blood Glucose.: 95 mg/dL (02 Sep 2018 16:52)  POCT Blood Glucose.: 98 mg/dL (02 Sep 2018 11:57)    I&O's Summary    02 Sep 2018 07:01  -  03 Sep 2018 07:00  --------------------------------------------------------  IN: 1020 mL / OUT: 2250 mL / NET: -1230 mL        PHYSICAL EXAM:  General: NAD, appears comfortable  Skin: Warm and dry  Neuro: AAOx3, CN III-XII intact, strenth 3/5 b/l UE and 0/5 b/l LE as per baseline  HEENT: PERRL, EOMI, no oral lesions  Neck: Full range of motion, no JVD  Lungs: Clear to ascultation bilaterally no wheezes, rales, or rhonchi   Heart: Regular rate and rhythm, no murmurs  Abdomen: Mild lower abdominal tenderness, normoactive bowl sounds, soft, nondistended  Extremities: No lower extremity tenderness, erythema, or edema   Vascular: 2+ radial and pedal pulses  Lymph: no cervical lymphadenopathy  Psych: normal mood and affect    LABS:                        11.5   7.10  )-----------( 151      ( 03 Sep 2018 09:38 )             34.7     09-03    140  |  103  |  9   ----------------------------<  93  3.0<L>   |  26  |  0.39<L>    Ca    8.5      03 Sep 2018 07:06  Phos  2.2     09-03  Mg     1.9     09-03    TPro  5.6<L>  /  Alb  2.8<L>  /  TBili  0.7  /  DBili  x   /  AST  22  /  ALT  26  /  AlkPhos  60  09-03

## 2018-09-03 NOTE — PROGRESS NOTE ADULT - PROBLEM SELECTOR PLAN 4
Hyperthyroidism 2/2 iodine supplementation, low suspicion for thyroid storm  - TSH receptor ab, Thyroid stimulating ig, and anti-TPO negative.  - On propranolol PO.  - C/w hydrocortisone.  - Outpatient f/u on right thyroid nodule.  - Continue methimazole

## 2018-09-03 NOTE — PROGRESS NOTE ADULT - PROBLEM SELECTOR PLAN 3
Resolved. Metabolic encephalopathy was likely 2/2 sepsis and hyperthyroidism in setting of stercoral colitis.  - Improving mental status.  - CT head neg for hemorrhage, mass effect, or midline shift.

## 2018-09-04 ENCOUNTER — TRANSCRIPTION ENCOUNTER (OUTPATIENT)
Age: 78
End: 2018-09-04

## 2018-09-04 LAB
ANION GAP SERPL CALC-SCNC: 10 MMOL/L — SIGNIFICANT CHANGE UP (ref 5–17)
BUN SERPL-MCNC: 7 MG/DL — SIGNIFICANT CHANGE UP (ref 7–23)
CALCIUM SERPL-MCNC: 8.5 MG/DL — SIGNIFICANT CHANGE UP (ref 8.4–10.5)
CHLORIDE SERPL-SCNC: 103 MMOL/L — SIGNIFICANT CHANGE UP (ref 96–108)
CO2 SERPL-SCNC: 30 MMOL/L — SIGNIFICANT CHANGE UP (ref 22–31)
CREAT SERPL-MCNC: 0.47 MG/DL — LOW (ref 0.5–1.3)
CULTURE RESULTS: SIGNIFICANT CHANGE UP
CULTURE RESULTS: SIGNIFICANT CHANGE UP
GLUCOSE BLDC GLUCOMTR-MCNC: 144 MG/DL — HIGH (ref 70–99)
GLUCOSE BLDC GLUCOMTR-MCNC: 77 MG/DL — SIGNIFICANT CHANGE UP (ref 70–99)
GLUCOSE BLDC GLUCOMTR-MCNC: 81 MG/DL — SIGNIFICANT CHANGE UP (ref 70–99)
GLUCOSE BLDC GLUCOMTR-MCNC: 97 MG/DL — SIGNIFICANT CHANGE UP (ref 70–99)
GLUCOSE SERPL-MCNC: 86 MG/DL — SIGNIFICANT CHANGE UP (ref 70–99)
MAGNESIUM SERPL-MCNC: 1.9 MG/DL — SIGNIFICANT CHANGE UP (ref 1.6–2.6)
PHOSPHATE SERPL-MCNC: 2.2 MG/DL — LOW (ref 2.5–4.5)
POTASSIUM SERPL-MCNC: 3 MMOL/L — LOW (ref 3.5–5.3)
POTASSIUM SERPL-SCNC: 3 MMOL/L — LOW (ref 3.5–5.3)
SODIUM SERPL-SCNC: 143 MMOL/L — SIGNIFICANT CHANGE UP (ref 135–145)
SPECIMEN SOURCE: SIGNIFICANT CHANGE UP
SPECIMEN SOURCE: SIGNIFICANT CHANGE UP

## 2018-09-04 PROCEDURE — 99233 SBSQ HOSP IP/OBS HIGH 50: CPT

## 2018-09-04 PROCEDURE — 99232 SBSQ HOSP IP/OBS MODERATE 35: CPT | Mod: GC

## 2018-09-04 RX ORDER — POTASSIUM CHLORIDE 20 MEQ
40 PACKET (EA) ORAL EVERY 4 HOURS
Qty: 0 | Refills: 0 | Status: COMPLETED | OUTPATIENT
Start: 2018-09-04 | End: 2018-09-04

## 2018-09-04 RX ORDER — POLYETHYLENE GLYCOL 3350 17 G/17G
17 POWDER, FOR SOLUTION ORAL DAILY
Qty: 0 | Refills: 0 | Status: DISCONTINUED | OUTPATIENT
Start: 2018-09-04 | End: 2018-09-06

## 2018-09-04 RX ORDER — METRONIDAZOLE 500 MG
500 TABLET ORAL EVERY 8 HOURS
Qty: 0 | Refills: 0 | Status: DISCONTINUED | OUTPATIENT
Start: 2018-09-04 | End: 2018-09-06

## 2018-09-04 RX ORDER — CIPROFLOXACIN LACTATE 400MG/40ML
500 VIAL (ML) INTRAVENOUS EVERY 12 HOURS
Qty: 0 | Refills: 0 | Status: DISCONTINUED | OUTPATIENT
Start: 2018-09-04 | End: 2018-09-06

## 2018-09-04 RX ORDER — POTASSIUM CHLORIDE 20 MEQ
20 PACKET (EA) ORAL
Qty: 0 | Refills: 0 | Status: DISCONTINUED | OUTPATIENT
Start: 2018-09-04 | End: 2018-09-04

## 2018-09-04 RX ORDER — PROPRANOLOL HCL 160 MG
40 CAPSULE, EXTENDED RELEASE 24HR ORAL EVERY 8 HOURS
Qty: 0 | Refills: 0 | Status: DISCONTINUED | OUTPATIENT
Start: 2018-09-04 | End: 2018-09-06

## 2018-09-04 RX ORDER — SENNA PLUS 8.6 MG/1
2 TABLET ORAL AT BEDTIME
Qty: 0 | Refills: 0 | Status: DISCONTINUED | OUTPATIENT
Start: 2018-09-04 | End: 2018-09-06

## 2018-09-04 RX ORDER — POTASSIUM CHLORIDE 20 MEQ
10 PACKET (EA) ORAL
Qty: 0 | Refills: 0 | Status: DISCONTINUED | OUTPATIENT
Start: 2018-09-04 | End: 2018-09-04

## 2018-09-04 RX ADMIN — Medication 500 MILLIGRAM(S): at 21:06

## 2018-09-04 RX ADMIN — Medication 0.5 MILLIGRAM(S): at 21:06

## 2018-09-04 RX ADMIN — Medication 20 MILLIEQUIVALENT(S): at 09:24

## 2018-09-04 RX ADMIN — Medication 40 MILLIGRAM(S): at 13:17

## 2018-09-04 RX ADMIN — ENOXAPARIN SODIUM 40 MILLIGRAM(S): 100 INJECTION SUBCUTANEOUS at 13:06

## 2018-09-04 RX ADMIN — PANTOPRAZOLE SODIUM 40 MILLIGRAM(S): 20 TABLET, DELAYED RELEASE ORAL at 13:10

## 2018-09-04 RX ADMIN — Medication 500 MILLIGRAM(S): at 20:58

## 2018-09-04 RX ADMIN — Medication 40 MILLIEQUIVALENT(S): at 12:31

## 2018-09-04 RX ADMIN — Medication 40 MILLIEQUIVALENT(S): at 21:06

## 2018-09-04 RX ADMIN — Medication 40 MILLIGRAM(S): at 21:06

## 2018-09-04 RX ADMIN — Medication 40 MILLIGRAM(S): at 06:26

## 2018-09-04 RX ADMIN — Medication 40 MILLIGRAM(S): at 18:25

## 2018-09-04 RX ADMIN — Medication 100 MILLIEQUIVALENT(S): at 09:25

## 2018-09-04 RX ADMIN — Medication 30 MILLIGRAM(S): at 21:06

## 2018-09-04 RX ADMIN — Medication 1 PACKET(S): at 13:35

## 2018-09-04 RX ADMIN — Medication 20 MILLIGRAM(S): at 09:53

## 2018-09-04 RX ADMIN — Medication 20 MILLIGRAM(S): at 12:29

## 2018-09-04 RX ADMIN — PIPERACILLIN AND TAZOBACTAM 25 GRAM(S): 4; .5 INJECTION, POWDER, LYOPHILIZED, FOR SOLUTION INTRAVENOUS at 12:23

## 2018-09-04 RX ADMIN — Medication 40 MILLIEQUIVALENT(S): at 18:24

## 2018-09-04 NOTE — DISCHARGE NOTE ADULT - HOSPITAL COURSE
78 y F with PMH of secondary progressive MS, HTN, recurrent UTIs, dysphagia, osteoporosis, ptosis of b/l eyelids, hyperthyroidism (not on any meds, last TSH 0.01 in March 2018), neurogenic bladder with chronic fragoso (nonambulatory) (last changed 1 week ago, changed every 2 wks) p/w nausea and vomiting (x3 in ED), increased lethargy/ weakness, decreased urine output, feeling hot without fevers or chills. BIBEMS, noted to be hypoxic en route to 93%. Placed on 3L O2. Also c/o RUQ pain in the ED. Placed on 2L O2 given sensation of SOB without the O2. Last BM reported yesterday. Pt denies dizziness, CP, palpitations, SOB, cough or other signs of infection no dizziness. No new focal deficits. She was grossly oriented and intact upon arrival. Zosyn x 1, Zofran 4mg x2, NS bolus 1 L x3. CTa/p reports stercoral proctocolitis and stone in cystic duct without acute bernie. Pt had fecal incontinence after CT was done, became unresponsive to sternal rub. Vomit around mouth. 98% RA. Normo to hypertensive, afebrile. NSVT on tele about 20 beats. Pt intubated for airway protection.     Patient admitted to MICU after intubation for AMS and lethargy after fecal incontinence, pending stool studies and currently on Zosyn for treatment of proctocolitis and possible cholecystitis. RUQ bedside US showed multiple gallstones but no signs of acute cholecystitis. Surgery recommends HIDA but not stable at this time. Patient became hypotensive with SBP in the 60s which was treated with 1L LR bolus and 100mL/hr LR gtt x 10hrs. Patient's pressures dropped to SBP in the 70s again and patient responded to 1L LR again. Subsequently, the patient was started on hydrocortisone and propranolol for treatment of possible thyroid storm. Chronic Fragoso exchanged. Home BP medications held.     CT revealed stercoral proctocolitis due to large stool burden. SMOG enema was given with successful BMs. Repeat abdominal x-ray continued to show significant stool burden. Patient was given another SMOG enema without successful BM. Stool PCR positive for EPEC (negative C. diff). Cervical CT showed no fractures or dislocations. Thyroid antibodies came back WNL. T3/T4 were elevated. Endocrine recommended hydrocortisone which was started and methimazole once patient tolerating PO diet. Patient was then successfully extubated and is not hemodynamically stable and ready for transfer to the medicine floors.     On medicine floors pt was transitioned from IV zosyn to oral Cipro/Flagyl for transition to discharge. Pt remained hemodynamically stable, mentation improved. Pt had several episodes of diarrhea that resolved over time. Pt was given bedside swallow test, determined that pt could tolerate PO medications, restarted on home medications and methimazole for hyperthyroidism. 78 y F with PMH of secondary progressive MS, HTN, recurrent UTIs, dysphagia, osteoporosis, ptosis of b/l eyelids, hyperthyroidism (not on any meds, last TSH 0.01 in March 2018), neurogenic bladder with chronic fragoso (nonambulatory) (last changed 1 week ago, changed every 2 wks) p/w nausea and vomiting (x3 in ED), increased lethargy/ weakness, decreased urine output, feeling hot without fevers or chills. BIBEMS, noted to be hypoxic en route to 93%. Placed on 3L O2. Also c/o RUQ pain in the ED. Placed on 2L O2 given sensation of SOB without the O2. Last BM reported yesterday. Pt denies dizziness, CP, palpitations, SOB, cough or other signs of infection no dizziness. No new focal deficits. She was grossly oriented and intact upon arrival. Zosyn x 1, Zofran 4mg x2, NS bolus 1 L x3. CTa/p reports stercoral proctocolitis and stone in cystic duct without acute bernie. Pt had fecal incontinence after CT was done, became unresponsive to sternal rub. Vomit around mouth. 98% RA. Normo to hypertensive, afebrile. NSVT on tele about 20 beats. Pt intubated for airway protection.     Patient admitted to MICU after intubation for AMS and lethargy after fecal incontinence, pending stool studies and currently on Zosyn for treatment of proctocolitis and possible cholecystitis. RUQ bedside US showed multiple gallstones but no signs of acute cholecystitis. Surgery recommends HIDA but not stable at this time. Patient became hypotensive with SBP in the 60s which was treated with 1L LR bolus and 100mL/hr LR gtt x 10hrs. Patient's pressures dropped to SBP in the 70s again and patient responded to 1L LR again. Subsequently, the patient was started on hydrocortisone and propranolol for treatment of possible thyroid storm. Chronic Fragoso exchanged. Home BP medications held.     CT revealed stercoral proctocolitis due to large stool burden. SMOG enema was given with successful BMs. Repeat abdominal x-ray continued to show significant stool burden. Patient was given another SMOG enema without successful BM. Stool PCR positive for EPEC (negative C. diff). Cervical CT showed no fractures or dislocations. Thyroid antibodies came back WNL. T3/T4 were elevated. Endocrine recommended hydrocortisone which was started and methimazole once patient tolerating PO diet. Patient was then successfully extubated and is not hemodynamically stable and ready for transfer to the medicine floors.     On medicine floors pt was transitioned from IV zosyn to oral Cipro/Flagyl for transition to discharge. Pt remained hemodynamically stable, mentation improved. Pt had several episodes of diarrhea that resolved over time. Pt was given bedside swallow test, determined that pt could tolerate PO medications, restarted on home medications and propanolol and methimazole for hyperthyroidism. Several days prior to discharge pt developed watery diarrhea, stool cultures revealed EPEC but negative for C.Diff. Pt completed 7 day course of antibiotics with resolution of the diarrhea. Pt is now stable for discharge to home with home health services.

## 2018-09-04 NOTE — DISCHARGE NOTE ADULT - OTHER SIGNIFICANT FINDINGS
< from: CT Abdomen and Pelvis w/ IV Cont (08.29.18 @ 23:22) >  IMPRESSION: Dilated and fluid-filled large bowel with a large amount of   stool dilating the rectum to 7.4 cm and inflammation of the sigmoid colon   suspicious for stercoral proctocolitis.    Indeterminate bilateral adrenal lesions which can be further   characterized with nonemergent contrast-enhanced abdominal MRI, as   clinically indicated.    < end of copied text >

## 2018-09-04 NOTE — PROGRESS NOTE ADULT - ASSESSMENT
a/p - 74 yo F with pmhx of secondary progressive MS, seen by my associate dr. quevedo, had been on disease modifying therapy in past but has refused to be on currently, has become debiliated requring wheelchair,   with chronic pain and neurocogntive  issues as well  Presenetd with lethargy, elevated wbc, hypothermia, required intubation  Found to have stercoral colitis, sepsis, hyperthyroidism  CT head is negative    Improved on antibiotics  Extubated  Exam ~ baseline deficits from MS    Plan  1. Continue Abx per Medicine  2. Endocrine for thyroid disorder  3. Supportive care  4. Continue baclofen at home dose 20/20/30/30  5. PT/DVT prophylaxis  Will follow  Plan reviewed with pt and  at bedside.

## 2018-09-04 NOTE — DISCHARGE NOTE ADULT - CARE PLAN
Principal Discharge DX:	Proctocolitis  Goal:	Resolved  Assessment and plan of treatment:	You presented to the hospital with altered mental status, CT scan showed that you had inflammation in your bowel, especially sigmoid colon, likely secondary to your high stool burden. We started you on broad spectrum IV antibiotics, but eventually transitioned you over to oral antibiotics that you took for a total of 7 days. You did not have fevers and your blood work was within normal range. Please follow up with your primary care doctor regarding this issue and your gastroenterologist (Dr. Batista)  Secondary Diagnosis:	Metabolic encephalopathy  Goal:	Resolved  Assessment and plan of treatment:	You presented with altered mental status likely secondary to the infection in your large intestine. After starting you on antibiotics your mental status improved and remained at your baseline for the rest of your admission.  Secondary Diagnosis:	Hyperthyroidism  Goal:	Managing  Assessment and plan of treatment:	You were found to have hyperthyroidism while in the hospital, with a high thyroid level. We consulted the endocrinologists who started you on Propanolol, a steroid taper, and methimazole.  Secondary Diagnosis:	MS (multiple sclerosis)  Goal:	Managing  Assessment and plan of treatment:	You have a history of chronic multiple sclerosis needing baseline wheelchair use and nursing care.  Secondary Diagnosis:	Dysphagia  Goal:	Managing  Assessment and plan of treatment:	You had some difficulty swallowing solid food and pills after your stay in the ICU requiring a breathing tube. You were found to be able to take oral pills and a soft diet. Please continue with a soft diet and move to regular food in very small bites as tolerated. Be sure to eat upright and eat slowly. Principal Discharge DX:	Proctocolitis  Goal:	Resolved  Assessment and plan of treatment:	You presented to the hospital with altered mental status, CT scan showed that you had inflammation in your bowel, especially sigmoid colon, likely secondary to your high stool burden. We started you on broad spectrum IV antibiotics, but eventually transitioned you over to oral antibiotics that you took for a total of 7 days. You did not have fevers and your blood work was within normal range. Please follow up with your primary care doctor regarding this issue and your gastroenterologist (Dr. Batista). If you have constipation you can also take senna, or polyethylene glycol as needed, but do not take it on a regular basis.  Secondary Diagnosis:	Altered mental status  Goal:	Resolved  Assessment and plan of treatment:	You presented with altered mental status likely secondary to the infection in your large intestine. After starting you on antibiotics your mental status improved and remained at your baseline for the rest of your admission.  Secondary Diagnosis:	Hyperthyroidism  Goal:	Managing  Assessment and plan of treatment:	You were found to have hyperthyroidism while in the hospital, with a high thyroid level. We consulted the endocrinologists who started you on Propanolol, a steroid taper, and methimazole. You are being sent home on Propanolol and methimazole, please continue taking these medications as prescribed until you can follow up with an endocrinologist.  Secondary Diagnosis:	MS (multiple sclerosis)  Goal:	Managing  Assessment and plan of treatment:	You have a history of chronic multiple sclerosis needing baseline wheelchair use and nursing care.  Secondary Diagnosis:	Dysphagia  Goal:	Managing  Assessment and plan of treatment:	You had some difficulty swallowing solid food and pills after your stay in the ICU requiring a breathing tube. You were found to be able to take oral pills and a soft diet. Please continue with a soft diet and move to regular food in very small bites as tolerated. Be sure to eat upright and eat slowly.  Secondary Diagnosis:	Adrenal nodule  Goal:	Chronic  Assessment and plan of treatment:	When we did the CT of your abdomen that found inflammation in the colon, we also found bilateral adrenal masses. It is unclear what these masses are, but they will need outpatient follow-up. Please follow up with the endocrinologist for this issue. Principal Discharge DX:	Proctocolitis  Goal:	Resolved  Assessment and plan of treatment:	You presented to the hospital with altered mental status, CT scan showed that you had inflammation in your bowel, especially sigmoid colon, likely secondary to your high stool burden. We started you on broad spectrum IV antibiotics, but eventually transitioned you over to oral antibiotics that you took for a total of 7 days. You did not have fevers and your blood work was within normal range. Please follow up with your primary care doctor regarding this issue and your gastroenterologist (Dr. Batista). If you have constipation you can also take senna, or polyethylene glycol as needed, but do not take it on a regular basis.  Secondary Diagnosis:	Altered mental status  Goal:	Resolved  Assessment and plan of treatment:	You presented with altered mental status likely secondary to the infection in your large intestine. After starting you on antibiotics your mental status improved and remained at your baseline for the rest of your admission.  Secondary Diagnosis:	Hyperthyroidism  Goal:	Managing  Assessment and plan of treatment:	You were found to have hyperthyroidism while in the hospital, with a high thyroid level. We consulted the endocrinologists who started you on Propanolol, a steroid taper, and methimazole. You are being sent home on Atenolol and methimazole, please continue taking these medications as prescribed until you can follow up with an endocrinologist. Please stop taking your normal BP medications until you can follow up with your primary care doctor.  Secondary Diagnosis:	MS (multiple sclerosis)  Goal:	Managing  Assessment and plan of treatment:	You have a history of chronic multiple sclerosis needing baseline wheelchair use and nursing care.  Secondary Diagnosis:	Dysphagia  Goal:	Managing  Assessment and plan of treatment:	You had some difficulty swallowing solid food and pills after your stay in the ICU requiring a breathing tube. You were found to be able to take oral pills and a soft diet. Please continue with a soft diet and move to regular food in very small bites as tolerated. Be sure to eat upright and eat slowly.  Secondary Diagnosis:	Adrenal nodule  Goal:	Chronic  Assessment and plan of treatment:	When we did the CT of your abdomen that found inflammation in the colon, we also found bilateral adrenal masses. It is unclear what these masses are, but they will need outpatient follow-up. Please follow up with the endocrinologist for this issue.

## 2018-09-04 NOTE — DISCHARGE NOTE ADULT - HOME CARE AGENCY
[facility]. [phone], RN, PT, OT and Speech therapist will arrange visit after discharge from the hospital

## 2018-09-04 NOTE — DISCHARGE NOTE ADULT - PROVIDER TOKENS
TOKEN:'[id number]' TOKEN:'[id number]:'[id number]' TOKEN:'[id number]:'[id number]:'[id number]'

## 2018-09-04 NOTE — DISCHARGE NOTE ADULT - MEDICATION SUMMARY - MEDICATIONS TO TAKE
I will START or STAY ON the medications listed below when I get home from the hospital:    Aleve 220 mg oral tablet  -- Indication: For Prophylactic measure    Tylenol 500 mg oral tablet  -- Indication: For Prophylactic measure    clonazePAM 0.5 mg oral tablet  -- 1 tab(s) by mouth once a day (at bedtime)  -- Indication: For Prophylactic measure    methIMAzole 10 mg oral tablet  -- 1 tab(s) by mouth once a day   -- Do not take this drug if you are pregnant.  It is very important that you take or use this exactly as directed.  Do not skip doses or discontinue unless directed by your doctor.    -- Indication: For Hyperthyroidism    atenolol 50 mg oral tablet  -- 1 tab(s) by mouth once a day   -- Do not take this drug if you are pregnant.  It is very important that you take or use this exactly as directed.  Do not skip doses or discontinue unless directed by your doctor.  May cause drowsiness.  Alcohol may intensify this effect.  Use care when operating dangerous machinery.  Some non-prescription drugs may aggravate your condition.  Read all labels carefully.  If a warning appears, check with your doctor before taking.    -- Indication: For Hyperthyroidism    senna oral tablet  -- 2 tab(s) by mouth once a day (at bedtime), As Needed   -- Indication: For Prophylactic measure    polyethylene glycol 3350 oral powder for reconstitution  -- 17 gram(s) by mouth once a day, As Needed FOR CONSTIPATION   -- Dilute this medication with liquid before administration.  It is very important that you take or use this exactly as directed.  Do not skip doses or discontinue unless directed by your doctor.    -- Indication: For Prophylactic measure    potassium chloride 10 mEq oral tablet, extended release  -- 3 tab(s) by mouth 2 times a day  -- Indication: For Prophylactic measure    baclofen 10 mg oral tablet  -- orally 10 times a day, 2 tabs 8AM, 12PM, 3 tabs 6PM, 10PM  -- Indication: For MS (multiple sclerosis)    omeprazole 20 mg oral delayed release tablet  -- 1 tab(s) by mouth 2 times a day  -- Indication: For Prophylactic measure    oxybutynin 5 mg/24 hours oral tablet, extended release  -- 1 tab(s) by mouth once a day at 6pm  -- Indication: For MS (multiple sclerosis)    VESIcare 5 mg oral tablet  -- 1 tab(s) by mouth once a day  -- Indication: For Prophylactic measure    urelle oral tablet  -- Indication: For Prophylactic measure

## 2018-09-04 NOTE — DISCHARGE NOTE ADULT - ADDITIONAL INSTRUCTIONS
Please follow up with your primary care doctor in 1-2 weeks  Please follow up with your GI doctor (Dr. Fran Batista) in 1-2 weeks  Please follow up with our endocrinologists in 2-4 weeks.  Endocrinology at [address], [address]  [place], [place], [address] [phone] Please follow up with your primary care doctor in 1-2 weeks  Please follow up with your GI doctor (Dr. Fran Batista) in 1-2 weeks  Please follow up with our endocrinologists (Dr. Motta or Dr. Cerda)  in 2-4 weeks.  Endocrinology at [address], [address]  [place], [address] [phone]

## 2018-09-04 NOTE — PROGRESS NOTE ADULT - ATTENDING COMMENTS
Seen, examined with house staff  - resting in bed, attendant is at bedside. patient feels comfortable and had BM several times    Afebrile, no abdominal pian. Had colonoscopy years ago  - Reviewed labs- stool c/s grew EPEC, and CT abdomen showed stercoral colitis., has been on IV zosyn    normal WBC, not septic now. will complete PO cipro/Flagyl 2 more days (7 days) treatment), outpatient GI f/u with Dr. Fran Batista  - PT evaluated. d/c tomorrow with home care. She agrees,  aware

## 2018-09-04 NOTE — PROGRESS NOTE ADULT - PROBLEM SELECTOR PLAN 1
Stercoral colitis in setting of high stool burden, confirmed on CT  - On zosyn, planning to switch to oral antibiotics, cipro/flagyl for 7 day course  - Continue aggressive bowel regimen  - Stool count

## 2018-09-04 NOTE — PROGRESS NOTE ADULT - PROBLEM SELECTOR PLAN 2
Possibly in setting of antibiotics. Discussed with RN, will watch closely. No episodes of diarrhea overnight.

## 2018-09-04 NOTE — DISCHARGE NOTE ADULT - MEDICATION SUMMARY - MEDICATIONS TO STOP TAKING
I will STOP taking the medications listed below when I get home from the hospital:    enalapril 10 mg oral tablet  -- 1 tab(s) by mouth 2 times a day    hydroCHLOROthiazide 25 mg oral tablet  -- 1 tab(s) by mouth once a day

## 2018-09-04 NOTE — PROGRESS NOTE ADULT - PROBLEM SELECTOR PLAN 1
while inpatient -   c/w steroid taper currently on 25 IV hydrocortisone daily  c/w methimazole 20 mg BID, c/w propranolol PO 40 mg IV q 6 h   HR improved at goal 80s, BP WNL and clinical status improved.   o/p plan - pt will c/w PO hydrocortisone, methimazole and propranolol ,dose TBD  TENTATIVE PLAN FINAL PLAN TO BE DISCUSSED WITH ATTENDING while inpatient -   c/w steroid taper currently on 25 IV hydrocortisone daily  c/w methimazole 20 mg OD, c/w propranolol PO 40 mg PO q 8 h   HR improved at goal 80s, BP WNL and clinical status improved.   o/p plan - pt will c/w PO hydrocortisone, methimazole and propranolol ,dose TBD  TENTATIVE PLAN FINAL PLAN TO BE DISCUSSED WITH ATTENDING while inpatient -   steroids can be stopped at this point, pt did not receive any dose yesterday and stable  decrease methimazole dose to 20 mg OD, reduce propranolol dose to PO 40 mg PO q 6 h   HR improved at goal 80s, BP WNL and clinical status improved.   o/p plan - pt will c/w same dose methimazole and propranolol  primary team paged for communication while inpatient -   steroids can be stopped at this point, pt did not receive any dose yesterday and stable  decrease methimazole dose to 20 mg OD, reduce propranolol dose to PO 40 mg PO q 6 h   HR improved at goal 80s, BP WNL and clinical status improved.   o/p plan - pt will c/w same dose methimazole and propranolol  primary team was made aware while inpatient -   steroids can be stopped at this point, pt did not receive any dose yesterday and stable  decrease methimazole dose to 20 mg OD, reduce propranolol dose to PO 40 mg PO TID  HR improved at goal 80s, BP WNL and clinical status improved.   o/p plan - pt will c/w same dose methimazole and propranolol  primary team was made aware

## 2018-09-04 NOTE — DISCHARGE NOTE ADULT - PLAN OF CARE
Colonoscopy Preparation Instructions    YOU ARE SCHEDULED FOR A COLONOSCOPY WITH: Estefanía Martin MD       PREP MEDICATIONS WERE SENT TO: [facility]    Date: 10/30/17 Monday  Location:   [address] [place] [address]    Phone Number: [phone] (after 5 pm for Emergency Calls Only)    *[facility] Ad[facility] will be contacting you the day before your procedure to confirm your health history and arrival time.     It is your responsibility to check with your insuranace company regarding coverage.    BOWEL PREPARATION:  To complete a successful colonoscopy, the bowel must be clean so that the physician can clearly view the colon.  It is very important that you read all the instructions well in advance of the procedure.  Without proper preparation, the procedure will not be successful and the test may need to be repeated.    ITEMS NEEDED FOR YOUR BOWEL PREP:   · 2 Dulcolax(bisacodyl) laxative tablets-5 mg each.   · 1 Gallon Jug Colyte or Nulytely that was prescribed.    · 1 Gallon (128 oz) of Gatorade (not red, pink, or purple).  · May also use Crystal Light, lemonade, iced tea or flavored water.  Must be sugar free if your are a Diabetic.    5 DAYS BEFORE YOUR PROCEDURE: 10/25/17 Wednesday   · Stop the following medications 5 days before your procedure as they may cause blood thinning.  · Meloxicam/Mobic  · Aspirin, Bufferin, Anacin, Ecotrin, Excedrin, Advil, Motrin/ibuprofen, Nuprin, Indocin, Naproxen, Aleve, Pepto-Bismol, Gaye Hamilton, Glucosamine, Diclofenac(Voltaren), Iron Supplements and herbal supplements.   · You may safely use Tylenol or Acetaminophen as directed on the bottle.   · Do not eat popcorn, seeds, nuts, whole kernel corn or products with [place](a fat substitute found in [employer] and [employer] Fat Free products) for 5 days prior.        3 DAYS BEFORE YOUR PROCEDURE:  10/27/17 Friday  · Do not consume  alcoholic beverages. This can also put you at risk for bleeding.                                                 DAY BEFORE YOUR COLONOSCOPY: 10/29/17 Louis   · CLEAR LIQUID DIET (Drink Clear liquids only all day today).   · You may have any of the following :  · Water, Coffee or Tea without Creamer, Gatorade or other Sports Drinks, popsicles, carbonated water or soft drinks, Tremayne-Aid, plain Jello without fruit or toppings, hard candy, Vegetable, Beef or Chicken Broth.  You may also have fruit juices that are pulp free, such as:  Apple, orange or white Grape.  .    · You may not have any solid foods, dairy products, or anything that is Red, Pink or Purple in color  · Do not take Lomotil, Imodium, or fiber supplement today.     At 1  pm on prep day  · Take 2 Dulcolax tablets by mouth with 8 ounces of Clear liquid.  · Mix Colyte/Nulytely and Gatorade (or other clear liquid) in the gallon jug. Shake or stir until the powder is dissolved. You may refrigerate to chill if you wish. Remember to mix with Sugar free Gatorade or Crystal Light Ice tea mix or Sugar free Lemonade if you are Diabetic.    At 3 pm on prep day    Start drinking the liquid prep. Drink an 8-10 ounce glass every 15-20 minutes until the half the gallon of prep is completed.   · If you develop nausea, cramping or fullness take a break from the prep. Resume prep as soon as possible. You may resume drinking the second half of the solution early tomorrow morning.     ·  Please wake up 6 hours before you arrive to resume remaining prep in the morning. (The Pre-admission nurses will call you the day before with your arrival time, so you will know when to drink the second half of the solution again, 6 HOURS PRIOR TO ARRIVAL TIME.  · If you do not have any bowel movement , or if you have severe pain or vomiting, call [phone] to page the GI doctor on call.         DAY OF YOUR COLONOSCOPY: 10/30/17 Monday  · YOU MAY CONTINUE CLEAR LIQUIDS UP UNTIL 4 HOURS  BEFORE YOU ARRIVE. Any remaining prep will need to be completed by this time.   · Take blood pressure, seizure and Parkinson's medications with a few sips of water 2 hours before you arrive.    · Please bring panty liners and do not use tampons during your procedure if you have your period.    AFTER YOUR COLONOSCOPY:  You will receive after-procedure information and instructions in recovery.  You will need to rest for the remainder of the day.   Do not drive, work or operate machinery for 24 hours.   · Do not drink alcohol for 24 hours.   ·   IMPORTANT INFORMATION TO KNOW FOR YOUR PROCEDURE:   · Check with your insurance company to verify benefit coverage for this procedure. It is the patient's responsibility to verify insurance benefits. If you have had previous polyps removed or are having symptoms, this procedure will be considered \"Diagnostic\" not a \"Screening.\"  Contact our office if you take blood thinners (Coumadin, Warfarin, Plavix, etc), if you have artificial joints, or if you are diabetic. Special arrangements will need to be made for your procedure.   · IF YOU USE A C-PAP OR BI-PAP MACHINE, PLEASE BRING IT WITH YOU ON THE DAY OF PROCEDURE.  · Bring a Photo ID with you the day of your procedure.     A responsible adult over the age of 18 must accompany you from the hospital after your exam or YOUR APPOINTMENT WILL BE CANCELLED. Your  will need to remain in the building during your procedure.      CANCELLATION OR RESCHEDULING:  If you must reschedule or cancel your appointment, please notify your physician's office at least 48 hours in advance      What is colonoscopy?   Colonoscopy is a procedure that allows your doctor to clearly see the lining of your colon (large bowel).    What happens during the colonoscopy?   You will be given IV medications to help you relax and stay comfortable during the exam. You will lie on your side or on your back while the flexible tube is moved slowly through the  Resolved You presented to the hospital with altered mental status, CT scan showed that you had inflammation in your bowel, especially sigmoid colon, likely secondary to your high stool burden. We started you on broad spectrum IV antibiotics, but eventually transitioned you over to oral antibiotics that you took for a total of 7 days. You did not have fevers and your blood work was within normal range. Please follow up with your primary care doctor regarding this issue and your gastroenterologist (Dr. Batista) large bowel.. You may feel some cramping or gas but the medicine should keep you comfortable.     Are there complications of colonoscopy?   Complications after a colonoscopy are rare, but can occur.   Risks and possible complications include:   Perforation   Bleeding   Infection   Missed polyps/diagnostic error   Adverse reaction to medication     IF YOU HAVE ANY QUESTIONS OR CONCERNS REGARDING THESE INSTRUCTIONS OR YOUR PROCEDURE PLEASE CONTACT OUR OFFICE -[phone].    Instructions sent via US mail 10/16/17.   You presented with altered mental status likely secondary to the infection in your large intestine. After starting you on antibiotics your mental status improved and remained at your baseline for the rest of your admission. Managing You were found to have hyperthyroidism while in the hospital, with a high thyroid level. We consulted the endocrinologists who started you on Propanolol, a steroid taper, and methimazole. You have a history of chronic multiple sclerosis needing baseline wheelchair use and nursing care. You had some difficulty swallowing solid food and pills after your stay in the ICU requiring a breathing tube. You were found to be able to take oral pills and a soft diet. Please continue with a soft diet and move to regular food in very small bites as tolerated. Be sure to eat upright and eat slowly. You presented to the hospital with altered mental status, CT scan showed that you had inflammation in your bowel, especially sigmoid colon, likely secondary to your high stool burden. We started you on broad spectrum IV antibiotics, but eventually transitioned you over to oral antibiotics that you took for a total of 7 days. You did not have fevers and your blood work was within normal range. Please follow up with your primary care doctor regarding this issue and your gastroenterologist (Dr. Batista). If you have constipation you can also take senna, or polyethylene glycol as needed, but do not take it on a regular basis. You were found to have hyperthyroidism while in the hospital, with a high thyroid level. We consulted the endocrinologists who started you on Propanolol, a steroid taper, and methimazole. You are being sent home on Propanolol and methimazole, please continue taking these medications as prescribed until you can follow up with an endocrinologist. Chronic When we did the CT of your abdomen that found inflammation in the colon, we also found bilateral adrenal masses. It is unclear what these masses are, but they will need outpatient follow-up. Please follow up with the endocrinologist for this issue. You were found to have hyperthyroidism while in the hospital, with a high thyroid level. We consulted the endocrinologists who started you on Propanolol, a steroid taper, and methimazole. You are being sent home on Atenolol and methimazole, please continue taking these medications as prescribed until you can follow up with an endocrinologist. Please stop taking your normal BP medications until you can follow up with your primary care doctor.

## 2018-09-04 NOTE — DISCHARGE NOTE ADULT - PATIENT PORTAL LINK FT
You can access the [facility] Patient Portal, offered by [facility], by registering with the following website: [email or web address]

## 2018-09-04 NOTE — PHYSICAL THERAPY INITIAL EVALUATION ADULT - PERTINENT HX OF CURRENT PROBLEM, REHAB EVAL
78F h/o secondary progressive MS (not on meds, pt refuses), hyperthyroidism, neurogenic bladder s/p chronic fragoso, wheelchair bound (>10 yrs), dysphagia, osteoporosis adm 8/30 with AMS found to be in sepsis 2/2 stercoral colitis. Pt required MICU admission for unresponsiveness req intubation now extubated. Also with hyperthyroidism 2/2 home iodine supplementation.

## 2018-09-04 NOTE — PROGRESS NOTE ADULT - SUBJECTIVE AND OBJECTIVE BOX
Wesley Rivera, PGY1  Pager: [phone]      Patient is a 78y old  Female who presents with a chief complaint of SOB R/o MI (30 Aug 2018 06:10)      SUBJECTIVE / OVERNIGHT EVENTS: No acute events overnight. Pt and RN endorse no further episodes of diarrhea overnight. Pt this morning sleeping comfortably with no complaints.     MEDICATIONS  (STANDING):  baclofen 20 milliGRAM(s) Oral <User Schedule>  baclofen 30 milliGRAM(s) Oral <User Schedule>  bisacodyl Suppository 10 milliGRAM(s) Rectal daily  clonazePAM Tablet 0.5 milliGRAM(s) Oral at bedtime  dextrose 5%. 1000 milliLiter(s) (50 mL/Hr) IV Continuous <Continuous>  dextrose 50% Injectable 12.5 Gram(s) IV Push once  dextrose 50% Injectable 25 Gram(s) IV Push once  dextrose 50% Injectable 25 Gram(s) IV Push once  enoxaparin Injectable 40 milliGRAM(s) SubCutaneous daily  hydrocortisone sodium succinate Injectable   IV Push   hydrocortisone sodium succinate Injectable 25 milliGRAM(s) IV Push daily  insulin lispro (HumaLOG) corrective regimen sliding scale   SubCutaneous three times a day before meals  insulin lispro (HumaLOG) corrective regimen sliding scale   SubCutaneous at bedtime  methimazole 20 milliGRAM(s) Oral <User Schedule>  pantoprazole  Injectable 40 milliGRAM(s) IV Push daily  piperacillin/tazobactam IVPB. 3.375 Gram(s) IV Intermittent every 8 hours  potassium chloride    Tablet ER 40 milliEquivalent(s) Oral every 4 hours  potassium phosphate / sodium phosphate powder 1 Packet(s) Oral three times a day  propranolol 40 milliGRAM(s) Oral every 6 hours  sodium chloride 0.9%. 1000 milliLiter(s) (50 mL/Hr) IV Continuous <Continuous>    MEDICATIONS  (PRN):  artificial tears (preservative free) Ophthalmic Solution 1 Drop(s) Both EYES every 4 hours PRN Dry Eyes  dextrose 40% Gel 15 Gram(s) Oral once PRN Blood Glucose LESS THAN 70 milliGRAM(s)/deciliter  glucagon  Injectable 1 milliGRAM(s) IntraMuscular once PRN Glucose LESS THAN 70 milligrams/deciliter      Vital Signs Last 24 Hrs  T(C): 37.4 (04 Sep 2018 08:30), Max: 37.4 (04 Sep 2018 08:30)  T(F): 99.4 (04 Sep 2018 08:30), Max: 99.4 (04 Sep 2018 08:30)  HR: 65 (04 Sep 2018 08:30) (65 - 78)  BP: 149/82 (04 Sep 2018 08:30) (134/79 - 155/90)  BP(mean): --  RR: 18 (04 Sep 2018 08:30) (18 - 18)  SpO2: 95% (04 Sep 2018 08:30) (94% - 96%)  CAPILLARY BLOOD GLUCOSE      POCT Blood Glucose.: 81 mg/dL (04 Sep 2018 08:08)  POCT Blood Glucose.: 139 mg/dL (03 Sep 2018 21:21)  POCT Blood Glucose.: 111 mg/dL (03 Sep 2018 17:23)  POCT Blood Glucose.: 137 mg/dL (03 Sep 2018 12:07)    I&O's Summary    03 Sep 2018 07:01  -  04 Sep 2018 07:00  --------------------------------------------------------  IN: 490 mL / OUT: 2300 mL / NET: -1810 mL        PHYSICAL EXAM:  GENERAL: NAD, well-developed  EYES: EOMI, PERRLA, conjunctiva and sclera clear  NECK:  No JVD  CHEST/LUNG: CTABL ; No wheeze  HEART: RRR; No murmurs  ABDOMEN: Soft, Nontender, Nondistended; Bowel sounds present  : Chronic fragoso  EXTREMITIES:  2+ Peripheral Pulses, No edema  PSYCH: AAOx3  NEUROLOGY: non-focal, strength LE 0/5 B/L per baseline, 3/5 upper extremity bilaterally   SKIN: No rashes or lesions. No sacral ulcer    LABS:                        11.5   7.10  )-----------( 151      ( 03 Sep 2018 09:38 )             34.7     09-04    143  |  103  |  7   ----------------------------<  86  3.0<L>   |  30  |  0.47<L>    Ca    8.5      04 Sep 2018 06:58  Phos  2.2     09-04  Mg     1.9     09-04    TPro  5.6<L>  /  Alb  2.8<L>  /  TBili  0.7  /  DBili  x   /  AST  22  /  ALT  26  /  AlkPhos  60  09-03              Microbiology;  BC 8/30 negative      RADIOLOGY & ADDITIONAL TESTS:    Imaging Personally Reviewed:          Consultant(s) Notes Reviewed:  Yes     Care Discussed with Consultants/Other Providers: Yes

## 2018-09-04 NOTE — DISCHARGE NOTE ADULT - CARE PROVIDERS DIRECT ADDRESSES
[email or web address],[email or web address],[email or web address],[email or web address]

## 2018-09-04 NOTE — PROGRESS NOTE ADULT - SUBJECTIVE AND OBJECTIVE BOX
Chief Complaint/Follow-up on:     Subjective:    MEDICATIONS  (STANDING):  baclofen 20 milliGRAM(s) Oral <User Schedule>  baclofen 30 milliGRAM(s) Oral <User Schedule>  bisacodyl Suppository 10 milliGRAM(s) Rectal daily  ciprofloxacin     Tablet 500 milliGRAM(s) Oral every 12 hours  clonazePAM Tablet 0.5 milliGRAM(s) Oral at bedtime  dextrose 5%. 1000 milliLiter(s) (50 mL/Hr) IV Continuous <Continuous>  dextrose 50% Injectable 12.5 Gram(s) IV Push once  dextrose 50% Injectable 25 Gram(s) IV Push once  dextrose 50% Injectable 25 Gram(s) IV Push once  enoxaparin Injectable 40 milliGRAM(s) SubCutaneous daily  hydrocortisone sodium succinate Injectable   IV Push   hydrocortisone sodium succinate Injectable 25 milliGRAM(s) IV Push daily  insulin lispro (HumaLOG) corrective regimen sliding scale   SubCutaneous three times a day before meals  insulin lispro (HumaLOG) corrective regimen sliding scale   SubCutaneous at bedtime  methimazole 20 milliGRAM(s) Oral <User Schedule>  metroNIDAZOLE    Tablet 500 milliGRAM(s) Oral every 8 hours  pantoprazole  Injectable 40 milliGRAM(s) IV Push daily  potassium chloride    Tablet ER 40 milliEquivalent(s) Oral every 4 hours  potassium phosphate / sodium phosphate powder 1 Packet(s) Oral three times a day  propranolol 40 milliGRAM(s) Oral every 6 hours  sodium chloride 0.9%. 1000 milliLiter(s) (50 mL/Hr) IV Continuous <Continuous>    MEDICATIONS  (PRN):  artificial tears (preservative free) Ophthalmic Solution 1 Drop(s) Both EYES every 4 hours PRN Dry Eyes  dextrose 40% Gel 15 Gram(s) Oral once PRN Blood Glucose LESS THAN 70 milliGRAM(s)/deciliter  glucagon  Injectable 1 milliGRAM(s) IntraMuscular once PRN Glucose LESS THAN 70 milligrams/deciliter  polyethylene glycol 3350 17 Gram(s) Oral daily PRN Constipation  senna 2 Tablet(s) Oral at bedtime PRN Constipation      PHYSICAL EXAM:  VITALS: T(C): 37.4 (09-04-18 @ 08:30)  T(F): 99.4 (09-04-18 @ 08:30), Max: 99.4 (09-04-18 @ 08:30)  HR: 79 (09-04-18 @ 13:18) (65 - 79)  BP: 149/73 (09-04-18 @ 13:18) (134/79 - 155/90)  RR:  (18 - 18)  SpO2:  (94% - 96%)  Wt(kg): --  GENERAL: NAD, well-groomed, well-developed  EYES: No proptosis, no injection  HEENT:  Atraumatic, Normocephalic, moist mucous membranes  THYROID: Normal size, no palpable nodules  RESPIRATORY: Clear to auscultation bilaterally; No rales, rhonchi, wheezing, or rubs  CARDIOVASCULAR: Regular rate and rhythm; No murmurs; no peripheral edema  GI: Soft, nontender, non distended, normal bowel sounds  CUSHING'S SIGNS: no striae    POCT Blood Glucose.: 144 mg/dL (09-04-18 @ 12:11)  POCT Blood Glucose.: 81 mg/dL (09-04-18 @ 08:08)  POCT Blood Glucose.: 139 mg/dL (09-03-18 @ 21:21)  POCT Blood Glucose.: 111 mg/dL (09-03-18 @ 17:23)  POCT Blood Glucose.: 137 mg/dL (09-03-18 @ 12:07)  POCT Blood Glucose.: 101 mg/dL (09-03-18 @ 08:19)  POCT Blood Glucose.: 99 mg/dL (09-02-18 @ 21:20)  POCT Blood Glucose.: 95 mg/dL (09-02-18 @ 16:52)  POCT Blood Glucose.: 98 mg/dL (09-02-18 @ 11:57)  POCT Blood Glucose.: 103 mg/dL (09-02-18 @ 08:23)  POCT Blood Glucose.: 114 mg/dL (09-02-18 @ 05:58)  POCT Blood Glucose.: 112 mg/dL (09-02-18 @ 00:08)  POCT Blood Glucose.: 173 mg/dL (09-01-18 @ 18:23)    09-04    143  |  103  |  7   ----------------------------<  86  3.0<L>   |  30  |  0.47<L>    EGFR if : 110  EGFR if non : 95    Ca    8.5      09-04  Mg     1.9     09-04  Phos  2.2     09-04    TPro  5.6<L>  /  Alb  2.8<L>  /  TBili  0.7  /  DBili  x   /  AST  22  /  ALT  26  /  AlkPhos  60  09-03          Thyroid Function Tests:  08-30 @ 17:43 TSH -- FreeT4 5.1 T3 -- Anti TPO -- Anti Thyroglobulin Ab -- TSI --  08-30 @ 08:00 TSH -- FreeT4 -- T3 -- Anti TPO 11.3 Anti Thyroglobulin Ab <20.0 TSI -- Chief Complaint: Hyperthyroidism    Interval Hx-  78F h/o secondary progressive MS (not on meds, pt refuses), hyperthyroidism, neurogenic bladder s/p chronic fragoso (>10 yrs), wheelchair bound (>10 yrs), dysphagia, osteoporosis adm 8/30 with AMS found to be in sepsis 2/2 stercoral colitis.  Pt required MICU admission for unresponsiveness req intubation now extubated.  Also with hyperthyroidism 2/2 home iodine supplementation.    History: Pt was seen and examined at bedside, pt denies any acute sx today. Pt denies any hyperthyroid sx like tremors, palpitations.       MEDICATIONS  (STANDING):  baclofen 20 milliGRAM(s) Oral <User Schedule>  baclofen 30 milliGRAM(s) Oral <User Schedule>  bisacodyl Suppository 10 milliGRAM(s) Rectal daily  ciprofloxacin     Tablet 500 milliGRAM(s) Oral every 12 hours  clonazePAM Tablet 0.5 milliGRAM(s) Oral at bedtime  dextrose 5%. 1000 milliLiter(s) (50 mL/Hr) IV Continuous <Continuous>  dextrose 50% Injectable 12.5 Gram(s) IV Push once  dextrose 50% Injectable 25 Gram(s) IV Push once  dextrose 50% Injectable 25 Gram(s) IV Push once  enoxaparin Injectable 40 milliGRAM(s) SubCutaneous daily  hydrocortisone sodium succinate Injectable   IV Push   hydrocortisone sodium succinate Injectable 25 milliGRAM(s) IV Push daily  insulin lispro (HumaLOG) corrective regimen sliding scale   SubCutaneous three times a day before meals  insulin lispro (HumaLOG) corrective regimen sliding scale   SubCutaneous at bedtime  methimazole 20 milliGRAM(s) Oral <User Schedule>  metroNIDAZOLE    Tablet 500 milliGRAM(s) Oral every 8 hours  pantoprazole  Injectable 40 milliGRAM(s) IV Push daily  potassium chloride    Tablet ER 40 milliEquivalent(s) Oral every 4 hours  potassium phosphate / sodium phosphate powder 1 Packet(s) Oral three times a day  propranolol 40 milliGRAM(s) Oral every 6 hours  sodium chloride 0.9%. 1000 milliLiter(s) (50 mL/Hr) IV Continuous <Continuous>    MEDICATIONS  (PRN):  artificial tears (preservative free) Ophthalmic Solution 1 Drop(s) Both EYES every 4 hours PRN Dry Eyes  dextrose 40% Gel 15 Gram(s) Oral once PRN Blood Glucose LESS THAN 70 milliGRAM(s)/deciliter  glucagon  Injectable 1 milliGRAM(s) IntraMuscular once PRN Glucose LESS THAN 70 milligrams/deciliter  polyethylene glycol 3350 17 Gram(s) Oral daily PRN Constipation  senna 2 Tablet(s) Oral at bedtime PRN Constipation      PHYSICAL EXAM:  VITALS: T(C): 37.4 (09-04-18 @ 08:30)  T(F): 99.4 (09-04-18 @ 08:30), Max: 99.4 (09-04-18 @ 08:30)  HR: 79 (09-04-18 @ 13:18) (65 - 79)  BP: 149/73 (09-04-18 @ 13:18) (134/79 - 155/90)  RR:  (18 - 18)  SpO2:  (94% - 96%)  Wt(kg): --  GENERAL: NAD, well-groomed, well-developed  EYES: No proptosis, no injection  HEENT:  Atraumatic, Normocephalic, moist mucous membranes  THYROID: Normal size, no palpable nodules  RESPIRATORY: Clear to auscultation bilaterally; No rales, rhonchi, wheezing, or rubs  CARDIOVASCULAR: Regular rate and rhythm; No murmurs; no peripheral edema  GI: Soft, nontender, non distended, normal bowel sounds  CUSHING'S SIGNS: no striae    POCT Blood Glucose.: 144 mg/dL (09-04-18 @ 12:11)  POCT Blood Glucose.: 81 mg/dL (09-04-18 @ 08:08)  POCT Blood Glucose.: 139 mg/dL (09-03-18 @ 21:21)  POCT Blood Glucose.: 111 mg/dL (09-03-18 @ 17:23)  POCT Blood Glucose.: 137 mg/dL (09-03-18 @ 12:07)  POCT Blood Glucose.: 101 mg/dL (09-03-18 @ 08:19)  POCT Blood Glucose.: 99 mg/dL (09-02-18 @ 21:20)  POCT Blood Glucose.: 95 mg/dL (09-02-18 @ 16:52)  POCT Blood Glucose.: 98 mg/dL (09-02-18 @ 11:57)  POCT Blood Glucose.: 103 mg/dL (09-02-18 @ 08:23)  POCT Blood Glucose.: 114 mg/dL (09-02-18 @ 05:58)  POCT Blood Glucose.: 112 mg/dL (09-02-18 @ 00:08)  POCT Blood Glucose.: 173 mg/dL (09-01-18 @ 18:23)    09-04    143  |  103  |  7   ----------------------------<  86  3.0<L>   |  30  |  0.47<L>    EGFR if : 110  EGFR if non : 95    Ca    8.5      09-04  Mg     1.9     09-04  Phos  2.2     09-04    TPro  5.6<L>  /  Alb  2.8<L>  /  TBili  0.7  /  DBili  x   /  AST  22  /  ALT  26  /  AlkPhos  60  09-03          Thyroid Function Tests:  08-30 @ 17:43 TSH -- FreeT4 5.1 T3 -- Anti TPO -- Anti Thyroglobulin Ab -- TSI --  08-30 @ 08:00 TSH -- FreeT4 -- T3 -- Anti TPO 11.3 Anti Thyroglobulin Ab <20.0 TSI --

## 2018-09-04 NOTE — DISCHARGE NOTE ADULT - SECONDARY DIAGNOSIS.
Metabolic encephalopathy Hyperthyroidism MS (multiple sclerosis) Dysphagia Altered mental status Adrenal nodule

## 2018-09-04 NOTE — PROGRESS NOTE ADULT - PROBLEM SELECTOR PLAN 4
Hyperthyroidism 2/2 iodine supplementation, low suspicion for thyroid storm  - TSH receptor ab, Thyroid stimulating ig, and anti-TPO negative.  - On propranolol PO.  - C/w hydrocortisone, tapering  - Outpatient f/u on right thyroid nodule.  - Continue methimazole

## 2018-09-04 NOTE — PROGRESS NOTE ADULT - ASSESSMENT
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  Pt required MICU admission for unresponsiveness req intubation now extubated.  Also with hyperthyroidism 2/2 home iodine supplementation.  upon admission - Patient with abnormal TFTs c/w hyperthyroidism. Free T4 5.1 Likely 2/2 to patient's iodine supplementation and possible underlying Grave's dz. Antibody levels still pending.

## 2018-09-04 NOTE — PROGRESS NOTE ADULT - ATTENDING COMMENTS
79 y/o female with hyperthyroidism due to Graves disease and iatrogenic thyrotoxicosis with multiple herbal supplement and vitamin intake. Patient seen with spouse at bedside. Patient is not in impending storm at this time and appears clinically stable. Can discontinue Hydrocortisone and change Methimazole to 20 mg QD and propranolol to 40 mg TID. Monitor for signs of adrenal insufficiency, although unlikely as patient has been doing well in the last 1-2 days while off steroids as she has been refusing them. Strongly urged patient to avoid herbal supplements and vitamins in the outpatient setting with goal to follow a well balanced meal and perhaps take MVI daily. Medication risk and benefit of Methimazole discussed with the patient. F/u TFTs in 6 weeks and strong urged patient to take Methimazole as prescribed. Discharge home on same regimen. Our service will sign off, please call us with any questions. 79 y/o female with hyperthyroidism due to Graves disease and iatrogenic thyrotoxicosis with multiple herbal supplement and vitamin intake. Patient seen with spouse at bedside. Patient is not in impending storm at this time and appears clinically stable. Can discontinue Hydrocortisone and change Methimazole to 20 mg QD and propranolol to 40 mg TID. Monitor for signs of adrenal insufficiency, although unlikely as patient has been doing well in the last 1-2 days while off steroids as she has been refusing them. Strongly urged patient to avoid herbal supplements and vitamins in the outpatient setting with goal to follow a well balanced meal and perhaps take MVI daily. Medication risk and benefit of Methimazole discussed with the patient. F/u TFTs in 6 weeks and strong urged patient to take Methimazole as prescribed. Discharge home on same regimen. 77 y/o female with hyperthyroidism due to Graves disease and iatrogenic thyrotoxicosis with multiple herbal supplement and vitamin intake. Patient seen with spouse at bedside. Patient is not in impending storm at this time and appears clinically stable. Can discontinue Hydrocortisone and change Methimazole to 20 mg QD and propranolol to 40 mg TID. Monitor for signs of adrenal insufficiency, although unlikely as patient has been doing well in the last 1-2 days while off steroids as she has been refusing them. Strongly urged patient to avoid herbal supplements and vitamins in the outpatient setting with goal to follow a well balanced meal and perhaps take MVI daily. Medication risk and benefit of Methimazole discussed with the patient. F/u TFTs in 6 weeks and strong urged patient to take Methimazole as prescribed. Discharge home on same regimen. Adrenal nodules noted on imaging, f/u outpatient for additional w/u. No w/u recommended at this time given acute illness.

## 2018-09-04 NOTE — PROGRESS NOTE ADULT - SUBJECTIVE AND OBJECTIVE BOX
Admitting Diagnosis:  Altered mental status (R41.82): ALTERED MENTAL STATUS, UNSPECIFIED    Neurology Follow up Note  Subjective  Extubated, downgraded from MICU, feeling better but still overall quite fatigued.   at bedside    Medications:  artificial tears (preservative free) Ophthalmic Solution 1 Drop(s) Both EYES every 4 hours PRN  baclofen 20 milliGRAM(s) Oral <User Schedule>  baclofen 30 milliGRAM(s) Oral <User Schedule>  bisacodyl Suppository 10 milliGRAM(s) Rectal daily  clonazePAM Tablet 0.5 milliGRAM(s) Oral at bedtime  dextrose 40% Gel 15 Gram(s) Oral once PRN  dextrose 5%. 1000 milliLiter(s) IV Continuous <Continuous>  dextrose 50% Injectable 12.5 Gram(s) IV Push once  dextrose 50% Injectable 25 Gram(s) IV Push once  dextrose 50% Injectable 25 Gram(s) IV Push once  enoxaparin Injectable 40 milliGRAM(s) SubCutaneous daily  glucagon  Injectable 1 milliGRAM(s) IntraMuscular once PRN  hydrocortisone sodium succinate Injectable   IV Push   hydrocortisone sodium succinate Injectable 25 milliGRAM(s) IV Push daily  insulin lispro (HumaLOG) corrective regimen sliding scale   SubCutaneous three times a day before meals  insulin lispro (HumaLOG) corrective regimen sliding scale   SubCutaneous at bedtime  methimazole 20 milliGRAM(s) Oral <User Schedule>  pantoprazole  Injectable 40 milliGRAM(s) IV Push daily  piperacillin/tazobactam IVPB. 3.375 Gram(s) IV Intermittent every 8 hours  polyethylene glycol 3350 17 Gram(s) Oral daily PRN  potassium chloride    Tablet ER 40 milliEquivalent(s) Oral every 4 hours  potassium phosphate / sodium phosphate powder 1 Packet(s) Oral three times a day  propranolol 40 milliGRAM(s) Oral every 6 hours  senna 2 Tablet(s) Oral at bedtime PRN  sodium chloride 0.9%. 1000 milliLiter(s) IV Continuous <Continuous>    Labs:  CBC Full  -  ( 03 Sep 2018 09:38 )  WBC Count : 7.10 K/uL  Hemoglobin : 11.5 g/dL  Hematocrit : 34.7 %  Platelet Count - Automated : 151 K/uL  Mean Cell Volume : 90.1 fl  Mean Cell Hemoglobin : 29.9 pg  Mean Cell Hemoglobin Concentration : 33.1 gm/dL  Auto Neutrophil # : 4.77 K/uL  Auto Lymphocyte # : 1.54 K/uL  Auto Monocyte # : 0.71 K/uL  Auto Eosinophil # : 0.03 K/uL  Auto Basophil # : 0.02 K/uL  Auto Neutrophil % : 67.2 %  Auto Lymphocyte % : 21.7 %  Auto Monocyte % : 10.0 %  Auto Eosinophil % : 0.4 %  Auto Basophil % : 0.3 %    09-04    143  |  103  |  7   ----------------------------<  86  3.0<L>   |  30  |  0.47<L>    Ca    8.5      04 Sep 2018 06:58  Phos  2.2     09-04  Mg     1.9     09-04    TPro  5.6<L>  /  Alb  2.8<L>  /  TBili  0.7  /  DBili  x   /  AST  22  /  ALT  26  /  AlkPhos  60  09-03    POCT Blood Glucose.: 144 mg/dL (04 Sep 2018 12:11)  POCT Blood Glucose.: 81 mg/dL (04 Sep 2018 08:08)  POCT Blood Glucose.: 139 mg/dL (03 Sep 2018 21:21)  POCT Blood Glucose.: 111 mg/dL (03 Sep 2018 17:23)    LIVER FUNCTIONS - ( 03 Sep 2018 07:06 )  Alb: 2.8 g/dL / Pro: 5.6 g/dL / ALK PHOS: 60 U/L / ALT: 26 U/L / AST: 22 U/L / GGT: x           Vitals:  Vital Signs Last 24 Hrs  T(C): 37.4 (04 Sep 2018 08:30), Max: 37.4 (04 Sep 2018 08:30)  T(F): 99.4 (04 Sep 2018 08:30), Max: 99.4 (04 Sep 2018 08:30)  HR: 79 (04 Sep 2018 13:18) (65 - 79)  BP: 149/73 (04 Sep 2018 13:18) (134/79 - 155/90)  BP(mean): --  RR: 18 (04 Sep 2018 08:30) (18 - 18)  SpO2: 95% (04 Sep 2018 08:30) (94% - 96%)    NEUROLOGICAL EXAM:  Neurological    Mental Status: Awake, alert, fully oriented, speech soft, hypophonic but clear and fluent, follows commands well.     Cranial Nerves: Pupils reactive bilaterally, EOMI, face symmetric, palate elev, tongue midline.    Motor: Bulk was normal. UE 4-/5, LE plegic bilaterally.     Reflexes: Absent upper extremities. Absent lower extremities. Toes were mute bilaterally.     Sensory: No response to tactile or noxious stimuli.     Coord: Unable to assess    Gait: Unable to assess    < from: CT Head No Cont (08.30.18 @ 05:07) >    EXAM:  CT BRAIN                          PROCEDURE DATE:  08/30/2018      INTERPRETATION:  HISTORY: Altered mental status.    TECHNIQUE: CT of the head was performed.    Multiple contiguous axial images were acquired from the skullbase to the   vertex without the administration of intravenous contrast.  Coronal and   sagittal reformations were made.    COMPARISON: CT head 9/7/2012.    FINDINGS:  Evaluation limited due to retained intravenous contrast in the   intracranial circulation.    Mild cerebral atrophy. No acute hemorrhage, mass effect, midline shift,   hydrocephalus, or extra-axial fluid collections. Moderate patchy   hypoattenuation within the white matter, likely secondary to chronic   microvascular changes.    The calvarium is intact. Partially visualized endotracheal tube. Moderate   mucosal thickening of the ethmoid air cells and fluid and debris within   the nasopharynx likely from intubation. The mastoid air cells are clear.    IMPRESSION:    No acute hemorrhage, mass effect, or midline shift.    If symptoms persist, correlate with neurologic evaluation to determine if   further evaluation with MRI is warranted, if there are no   contraindications.    BELINDA HEWITT M.D., RADIOLOGY RESIDENT  This document has been electronically signed.  LANG KILLIAN M.D., ATTENDING RADIOLOGIST  This document has been electronically signed. Aug 30 2018  5:41AM      < from: CT Cervical Spine No Cont (08.30.18 @ 21:40) >  EXAM:  CT CERVICAL SPINE                          PROCEDURE DATE:  08/30/2018      INTERPRETATION:    CLINICAL INDICATION: Colitis and hyperthyroidism evaluate for C-spine   fracture    Serial thin sections on a multi slice scanner were obtained through the   cervical spine from the C1 to the T2-3 level in a stacked axial fashion   reformatted at 1.25 mm sections with sagittal and coronal computer   generated reconstructed views.    The examination is limited by motion.    There is discspace narrowing at C4-5. There is fusion at C5-6 and C6-7.   Degenerative anterolisthesis is identified at C3-4. No acute fractures or   dislocations are identified. The facet joints are normally aligned.    Uncovertebral joint and facet degenerativechanges are noted.    There is no prevertebral soft tissue swelling.    IMPRESSION: Degenerative changes. No acute fractures or dislocations.    ZAHIRA LEVI M.D., ATTENDING RADIOLOGIST  This document has been electronically signed. Aug 31 2018 10:41AM

## 2018-09-04 NOTE — PROGRESS NOTE ADULT - PROBLEM SELECTOR PLAN 2
On CT scan, patient found with incidental adrenal nodules, measuring 2.6cm X 2Cm on left and 1.3cm X 1cm on right. Can be followed up outpatient for hormonal work-up and MRI.   O/p plan - pt can follow up at our o/p location [address] location [phone]

## 2018-09-04 NOTE — DISCHARGE NOTE ADULT - CARE PROVIDER_API CALL
Fran Batista), Gastroenterology; Internal Medicine  [address]  [address]  [place] [address]  Phone: [phone]  Fax: [phone] Fran Batista (MD), Gastroenterology; Internal Medicine  [address]  [address]  [place] [address]  Phone: [phone]  Fax: [phone]    Yael Motta (DO), Internal Medicine  [address] [place] [address]  [address]  [place] [address]  Phone: [phone]  Fax: [phone] Fran Batista (MD), Gastroenterology; Internal Medicine  [address] [facility]  [address]  [place] [address]  Phone: [phone]  Fax: [phone]    Yael Motta (DO), Internal Medicine  [address] [facility]  [address]  [place] [address]  Phone: [phone]  Fax: [phone]    Gini Cerda (DO), Internal Medicine  [address] [facility]  [address]  [place] [address]  Phone: [phone]  Fax: [phone]

## 2018-09-05 LAB
ANION GAP SERPL CALC-SCNC: 11 MMOL/L — SIGNIFICANT CHANGE UP (ref 5–17)
BUN SERPL-MCNC: 8 MG/DL — SIGNIFICANT CHANGE UP (ref 7–23)
CALCIUM SERPL-MCNC: 9 MG/DL — SIGNIFICANT CHANGE UP (ref 8.4–10.5)
CHLORIDE SERPL-SCNC: 105 MMOL/L — SIGNIFICANT CHANGE UP (ref 96–108)
CO2 SERPL-SCNC: 23 MMOL/L — SIGNIFICANT CHANGE UP (ref 22–31)
CREAT SERPL-MCNC: 0.39 MG/DL — LOW (ref 0.5–1.3)
CULTURE RESULTS: SIGNIFICANT CHANGE UP
GLUCOSE BLDC GLUCOMTR-MCNC: 108 MG/DL — HIGH (ref 70–99)
GLUCOSE BLDC GLUCOMTR-MCNC: 128 MG/DL — HIGH (ref 70–99)
GLUCOSE BLDC GLUCOMTR-MCNC: 137 MG/DL — HIGH (ref 70–99)
GLUCOSE BLDC GLUCOMTR-MCNC: 88 MG/DL — SIGNIFICANT CHANGE UP (ref 70–99)
GLUCOSE SERPL-MCNC: 90 MG/DL — SIGNIFICANT CHANGE UP (ref 70–99)
POTASSIUM SERPL-MCNC: 4.3 MMOL/L — SIGNIFICANT CHANGE UP (ref 3.5–5.3)
POTASSIUM SERPL-SCNC: 4.3 MMOL/L — SIGNIFICANT CHANGE UP (ref 3.5–5.3)
SODIUM SERPL-SCNC: 139 MMOL/L — SIGNIFICANT CHANGE UP (ref 135–145)
SPECIMEN SOURCE: SIGNIFICANT CHANGE UP
T3 SERPL-MCNC: 108 NG/DL — SIGNIFICANT CHANGE UP (ref 80–200)
T4 FREE SERPL-MCNC: 1.4 NG/DL — SIGNIFICANT CHANGE UP (ref 0.9–1.8)
TSH SERPL-MCNC: 0.89 UIU/ML — SIGNIFICANT CHANGE UP (ref 0.27–4.2)

## 2018-09-05 PROCEDURE — 99233 SBSQ HOSP IP/OBS HIGH 50: CPT

## 2018-09-05 RX ORDER — ACETAMINOPHEN 500 MG
650 TABLET ORAL ONCE
Qty: 0 | Refills: 0 | Status: COMPLETED | OUTPATIENT
Start: 2018-09-05 | End: 2018-09-05

## 2018-09-05 RX ADMIN — Medication 650 MILLIGRAM(S): at 17:17

## 2018-09-05 RX ADMIN — Medication 0.5 MILLIGRAM(S): at 21:09

## 2018-09-05 RX ADMIN — Medication 1 PACKET(S): at 21:10

## 2018-09-05 RX ADMIN — Medication 20 MILLIGRAM(S): at 11:49

## 2018-09-05 RX ADMIN — Medication 650 MILLIGRAM(S): at 16:17

## 2018-09-05 RX ADMIN — Medication 40 MILLIGRAM(S): at 05:52

## 2018-09-05 RX ADMIN — Medication 500 MILLIGRAM(S): at 14:44

## 2018-09-05 RX ADMIN — Medication 500 MILLIGRAM(S): at 17:41

## 2018-09-05 RX ADMIN — Medication 40 MILLIGRAM(S): at 21:09

## 2018-09-05 RX ADMIN — ENOXAPARIN SODIUM 40 MILLIGRAM(S): 100 INJECTION SUBCUTANEOUS at 12:01

## 2018-09-05 RX ADMIN — Medication 500 MILLIGRAM(S): at 21:10

## 2018-09-05 RX ADMIN — PANTOPRAZOLE SODIUM 40 MILLIGRAM(S): 20 TABLET, DELAYED RELEASE ORAL at 11:49

## 2018-09-05 RX ADMIN — Medication 40 MILLIGRAM(S): at 14:44

## 2018-09-05 RX ADMIN — Medication 30 MILLIGRAM(S): at 17:40

## 2018-09-05 RX ADMIN — Medication 30 MILLIGRAM(S): at 21:10

## 2018-09-05 NOTE — PROGRESS NOTE ADULT - ATTENDING COMMENTS
Seen, examined with house staff  - Resting in bed, feels ok but has been having diarrhea since yesterday (loose stool 3 times this am)    Denies, fever, chest pain, abdominal pian  - Reviewed labs, imaging    Stool c. diff and c/s sent  - Neurology and Endocrine f/u plans noted  - d/c planning with home care in progress if diarrhea improves    ** spoke to  and care giver at bedside

## 2018-09-05 NOTE — PROGRESS NOTE ADULT - PROBLEM SELECTOR PLAN 5
Secondary progressive MS c/b dysphagia, wheelchair bound, neurogenic bladder s/p chronic fragoso.  - Per Neurology, patient has been refusing treatment (Previously treated with Avonex and Copaxone).  - Fall, aspiration, and seizure precautions Secondary progressive MS c/b dysphagia, wheelchair bound, neurogenic bladder with chronic fragoso.  - Per Neurology, patient has been refusing treatment (Previously treated with Avonex and Copaxone).  - Fall, aspiration, and seizure precautions

## 2018-09-05 NOTE — PROGRESS NOTE ADULT - ASSESSMENT
a/p - 72 yo F with pmhx of secondary progressive MS, seen by my associate dr. quevedo, had been on disease modifying therapy in past but has refused to be on currently, has become debiliated requring wheelchair,   with chronic pain and neurocogntive  issues as well  Presenetd with lethargy, elevated wbc, hypothermia, required intubation  Found to have stercoral colitis, sepsis, hyperthyroidism  CT head is negative    Improved on antibiotics  Extubated  Exam ~ baseline deficits from MS    Plan  1. Continue Abx per Medicine  2. Endocrine for thyroid disorder  3. Supportive care  4. Continue baclofen at home dose 20/20/30/30  5. PT/DVT prophylaxis  no inpt neurology workup needed  Plan reviewed with pt and  at bedside.

## 2018-09-05 NOTE — PROGRESS NOTE ADULT - SUBJECTIVE AND OBJECTIVE BOX
Wesley Rivera, PGY1  Pager: [phone]      Patient is a 78y old  Female who presents with a chief complaint of sepsis, metabolic encephalopathy (05 Sep 2018 07:53)      SUBJECTIVE / OVERNIGHT EVENTS: Pt with 4-5 episodes of watery, non-bloody diarrhea overnight.     MEDICATIONS  (STANDING):  baclofen 20 milliGRAM(s) Oral <User Schedule>  baclofen 30 milliGRAM(s) Oral <User Schedule>  ciprofloxacin     Tablet 500 milliGRAM(s) Oral every 12 hours  clonazePAM Tablet 0.5 milliGRAM(s) Oral at bedtime  dextrose 5%. 1000 milliLiter(s) (50 mL/Hr) IV Continuous <Continuous>  dextrose 50% Injectable 12.5 Gram(s) IV Push once  dextrose 50% Injectable 25 Gram(s) IV Push once  dextrose 50% Injectable 25 Gram(s) IV Push once  enoxaparin Injectable 40 milliGRAM(s) SubCutaneous daily  insulin lispro (HumaLOG) corrective regimen sliding scale   SubCutaneous three times a day before meals  insulin lispro (HumaLOG) corrective regimen sliding scale   SubCutaneous at bedtime  methimazole 20 milliGRAM(s) Oral daily  metroNIDAZOLE    Tablet 500 milliGRAM(s) Oral every 8 hours  pantoprazole  Injectable 40 milliGRAM(s) IV Push daily  potassium phosphate / sodium phosphate powder 1 Packet(s) Oral three times a day  propranolol 40 milliGRAM(s) Oral every 8 hours  sodium chloride 0.9%. 1000 milliLiter(s) (50 mL/Hr) IV Continuous <Continuous>    MEDICATIONS  (PRN):  artificial tears (preservative free) Ophthalmic Solution 1 Drop(s) Both EYES every 4 hours PRN Dry Eyes  dextrose 40% Gel 15 Gram(s) Oral once PRN Blood Glucose LESS THAN 70 milliGRAM(s)/deciliter  glucagon  Injectable 1 milliGRAM(s) IntraMuscular once PRN Glucose LESS THAN 70 milligrams/deciliter  polyethylene glycol 3350 17 Gram(s) Oral daily PRN Constipation  senna 2 Tablet(s) Oral at bedtime PRN Constipation      Vital Signs Last 24 Hrs  T(C): 37.2 (05 Sep 2018 09:30), Max: 37.6 (05 Sep 2018 02:40)  T(F): 98.9 (05 Sep 2018 09:30), Max: 99.6 (05 Sep 2018 02:40)  HR: 73 (05 Sep 2018 09:30) (69 - 79)  BP: 146/83 (05 Sep 2018 09:30) (146/80 - 164/93)  BP(mean): --  RR: 18 (05 Sep 2018 09:30) (16 - 18)  SpO2: 98% (05 Sep 2018 09:30) (95% - 98%)  CAPILLARY BLOOD GLUCOSE      POCT Blood Glucose.: 88 mg/dL (05 Sep 2018 08:24)  POCT Blood Glucose.: 97 mg/dL (04 Sep 2018 21:43)  POCT Blood Glucose.: 77 mg/dL (04 Sep 2018 17:22)  POCT Blood Glucose.: 144 mg/dL (04 Sep 2018 12:11)    I&O's Summary    04 Sep 2018 07:01  -  05 Sep 2018 07:00  --------------------------------------------------------  IN: 470 mL / OUT: 2450 mL / NET: -1980 mL    05 Sep 2018 07:01  -  05 Sep 2018 12:10  --------------------------------------------------------  IN: 220 mL / OUT: 0 mL / NET: 220 mL        PHYSICAL EXAM:  GENERAL: NAD, well-developed  EYES: EOMI, PERRLA, conjunctiva and sclera clear  NECK:  No JVD  CHEST/LUNG: CTABL ; No wheeze  HEART: RRR; No murmurs  ABDOMEN: Soft, Nontender, Nondistended; Bowel sounds present  : No Kendrick  EXTREMITIES:  2+ Peripheral Pulses, No edema  PSYCH: AAOx3  NEUROLOGY: non-focal  SKIN: No rashes or lesions. No sacral ulcer    LABS:    09-05    139  |  105  |  8   ----------------------------<  90  4.3   |  23  |  0.39<L>    Ca    9.0      05 Sep 2018 07:08  Phos  2.2     09-04  Mg     1.9     09-04                Microbiology;        RADIOLOGY & ADDITIONAL TESTS:    Imaging Personally Reviewed:          Consultant(s) Notes Reviewed:      Care Discussed with Consultants/Other Providers: Wesley Rivera, PGY1  Pager: [phone]      Patient is a 78y old  Female who presents with a chief complaint of sepsis, metabolic encephalopathy (05 Sep 2018 07:53)      SUBJECTIVE / OVERNIGHT EVENTS: Pt with 4-5 episodes of watery, non-bloody diarrhea overnight that is more watery than her baseline diarrhea. According to pt's aid pt has not never had diarrhea like this in the past. Pt endorses no abdominal pain, but does complain of some throat hoarseness. Pt rejected morning dose of Cipro/flagyl due to her throat pain. Pt would like to go home today, but pt understood that she might not be able to in the setting of constant diarrhea.     MEDICATIONS  (STANDING):  baclofen 20 milliGRAM(s) Oral <User Schedule>  baclofen 30 milliGRAM(s) Oral <User Schedule>  ciprofloxacin     Tablet 500 milliGRAM(s) Oral every 12 hours  clonazePAM Tablet 0.5 milliGRAM(s) Oral at bedtime  dextrose 5%. 1000 milliLiter(s) (50 mL/Hr) IV Continuous <Continuous>  dextrose 50% Injectable 12.5 Gram(s) IV Push once  dextrose 50% Injectable 25 Gram(s) IV Push once  dextrose 50% Injectable 25 Gram(s) IV Push once  enoxaparin Injectable 40 milliGRAM(s) SubCutaneous daily  insulin lispro (HumaLOG) corrective regimen sliding scale   SubCutaneous three times a day before meals  insulin lispro (HumaLOG) corrective regimen sliding scale   SubCutaneous at bedtime  methimazole 20 milliGRAM(s) Oral daily  metroNIDAZOLE    Tablet 500 milliGRAM(s) Oral every 8 hours  pantoprazole  Injectable 40 milliGRAM(s) IV Push daily  potassium phosphate / sodium phosphate powder 1 Packet(s) Oral three times a day  propranolol 40 milliGRAM(s) Oral every 8 hours  sodium chloride 0.9%. 1000 milliLiter(s) (50 mL/Hr) IV Continuous <Continuous>    MEDICATIONS  (PRN):  artificial tears (preservative free) Ophthalmic Solution 1 Drop(s) Both EYES every 4 hours PRN Dry Eyes  dextrose 40% Gel 15 Gram(s) Oral once PRN Blood Glucose LESS THAN 70 milliGRAM(s)/deciliter  glucagon  Injectable 1 milliGRAM(s) IntraMuscular once PRN Glucose LESS THAN 70 milligrams/deciliter  polyethylene glycol 3350 17 Gram(s) Oral daily PRN Constipation  senna 2 Tablet(s) Oral at bedtime PRN Constipation      Vital Signs Last 24 Hrs  T(C): 37.2 (05 Sep 2018 09:30), Max: 37.6 (05 Sep 2018 02:40)  T(F): 98.9 (05 Sep 2018 09:30), Max: 99.6 (05 Sep 2018 02:40)  HR: 73 (05 Sep 2018 09:30) (69 - 79)  BP: 146/83 (05 Sep 2018 09:30) (146/80 - 164/93)  BP(mean): --  RR: 18 (05 Sep 2018 09:30) (16 - 18)  SpO2: 98% (05 Sep 2018 09:30) (95% - 98%)  CAPILLARY BLOOD GLUCOSE      POCT Blood Glucose.: 88 mg/dL (05 Sep 2018 08:24)  POCT Blood Glucose.: 97 mg/dL (04 Sep 2018 21:43)  POCT Blood Glucose.: 77 mg/dL (04 Sep 2018 17:22)  POCT Blood Glucose.: 144 mg/dL (04 Sep 2018 12:11)    I&O's Summary    04 Sep 2018 07:01  -  05 Sep 2018 07:00  --------------------------------------------------------  IN: 470 mL / OUT: 2450 mL / NET: -1980 mL    05 Sep 2018 07:01  -  05 Sep 2018 12:10  --------------------------------------------------------  IN: 220 mL / OUT: 0 mL / NET: 220 mL        PHYSICAL EXAM:  GENERAL: NAD, well-developed, speaking in a low whisper   EYES: EOMI, PERRLA, conjunctiva and sclera clear  NECK:  No JVD  CHEST/LUNG: CTABL ; No wheeze  HEART: RRR; No murmurs  ABDOMEN: Soft, Nontender, Nondistended; Bowel sounds present  : Chronic fragoso  EXTREMITIES:  Strenght 0/5 LE bilaterally per baseline, 3/5 upper extremity bilaterally. 2+ Peripheral Pulses  PSYCH: AAOx3  NEUROLOGY: non-focal  SKIN: No rashes or lesions. No sacral ulcer    LABS:    09-05    139  |  105  |  8   ----------------------------<  90  4.3   |  23  |  0.39<L>    Ca    9.0      05 Sep 2018 07:08  Phos  2.2     09-04  Mg     1.9     09-04                Microbiology;        RADIOLOGY & ADDITIONAL TESTS:    Imaging Personally Reviewed:          Consultant(s) Notes Reviewed: Yes    Care Discussed with Consultants/Other Providers: Yes

## 2018-09-05 NOTE — PROGRESS NOTE ADULT - PROBLEM SELECTOR PLAN 4
Hyperthyroidism 2/2 iodine supplementation, low suspicion for thyroid storm  - TSH receptor ab, Thyroid stimulating ig, and anti-TPO negative.  - On propranolol PO.  - C/w hydrocortisone, tapering  - Outpatient f/u on right thyroid nodule.  - Continue methimazole Hyperthyroidism 2/2 iodine supplementation, low suspicion for thyroid storm  - TSH receptor ab, Thyroid stimulating ig, and anti-TPO negative.  - On propranolol PO.  - s/p hydrocortisone  - Outpatient f/u on right thyroid nodule.  - Continue methimazole

## 2018-09-05 NOTE — PROGRESS NOTE ADULT - PROBLEM SELECTOR PLAN 1
Stercoral colitis in setting of high stool burden, confirmed on CT  - On zosyn, planning to switch to oral antibiotics, cipro/flagyl for 7 day course  - Continue aggressive bowel regimen  - Stool count Stercoral colitis in setting of high stool burden, confirmed on CT  - On zosyn, planning to switch to oral antibiotics, cipro/flagyl for 7 day course, Last dose 9/5/18  -stop all bowel regiments in the setting of worsening diarrhea  - Stool count

## 2018-09-05 NOTE — PROGRESS NOTE ADULT - SUBJECTIVE AND OBJECTIVE BOX
Admitting Diagnosis:  Altered mental status (R41.82): ALTERED MENTAL STATUS, UNSPECIFIED    Neurology Follow up Note  Subjective  Extubated, downgraded from MICU, feeling better but still overall quite fatigued.  c/o sore throat    Medications:  artificial tears (preservative free) Ophthalmic Solution 1 Drop(s) Both EYES every 4 hours PRN  baclofen 20 milliGRAM(s) Oral <User Schedule>  baclofen 30 milliGRAM(s) Oral <User Schedule>  bisacodyl Suppository 10 milliGRAM(s) Rectal daily  ciprofloxacin     Tablet 500 milliGRAM(s) Oral every 12 hours  clonazePAM Tablet 0.5 milliGRAM(s) Oral at bedtime  dextrose 40% Gel 15 Gram(s) Oral once PRN  dextrose 5%. 1000 milliLiter(s) IV Continuous <Continuous>  dextrose 50% Injectable 12.5 Gram(s) IV Push once  dextrose 50% Injectable 25 Gram(s) IV Push once  dextrose 50% Injectable 25 Gram(s) IV Push once  enoxaparin Injectable 40 milliGRAM(s) SubCutaneous daily  glucagon  Injectable 1 milliGRAM(s) IntraMuscular once PRN  insulin lispro (HumaLOG) corrective regimen sliding scale   SubCutaneous three times a day before meals  insulin lispro (HumaLOG) corrective regimen sliding scale   SubCutaneous at bedtime  methimazole 20 milliGRAM(s) Oral daily  metroNIDAZOLE    Tablet 500 milliGRAM(s) Oral every 8 hours  pantoprazole  Injectable 40 milliGRAM(s) IV Push daily  polyethylene glycol 3350 17 Gram(s) Oral daily PRN  potassium phosphate / sodium phosphate powder 1 Packet(s) Oral three times a day  propranolol 40 milliGRAM(s) Oral every 8 hours  senna 2 Tablet(s) Oral at bedtime PRN  sodium chloride 0.9%. 1000 milliLiter(s) IV Continuous <Continuous>      Labs:  CBC Full  -  ( 03 Sep 2018 09:38 )  WBC Count : 7.10 K/uL  Hemoglobin : 11.5 g/dL  Hematocrit : 34.7 %  Platelet Count - Automated : 151 K/uL  Mean Cell Volume : 90.1 fl  Mean Cell Hemoglobin : 29.9 pg  Mean Cell Hemoglobin Concentration : 33.1 gm/dL  Auto Neutrophil # : 4.77 K/uL  Auto Lymphocyte # : 1.54 K/uL  Auto Monocyte # : 0.71 K/uL  Auto Eosinophil # : 0.03 K/uL  Auto Basophil # : 0.02 K/uL  Auto Neutrophil % : 67.2 %  Auto Lymphocyte % : 21.7 %  Auto Monocyte % : 10.0 %  Auto Eosinophil % : 0.4 %  Auto Basophil % : 0.3 %    09-05    139  |  105  |  8   ----------------------------<  90  4.3   |  23  |  0.39<L>    Ca    9.0      05 Sep 2018 07:08  Phos  2.2     09-04  Mg     1.9     09-04      CAPILLARY BLOOD GLUCOSE      POCT Blood Glucose.: 97 mg/dL (04 Sep 2018 21:43)  POCT Blood Glucose.: 77 mg/dL (04 Sep 2018 17:22)  POCT Blood Glucose.: 144 mg/dL (04 Sep 2018 12:11)  POCT Blood Glucose.: 81 mg/dL (04 Sep 2018 08:08)              Vitals:  Vital Signs Last 24 Hrs  T(C): 37.2 (05 Sep 2018 05:26), Max: 37.6 (05 Sep 2018 02:40)  T(F): 99 (05 Sep 2018 05:26), Max: 99.6 (05 Sep 2018 02:40)  HR: 70 (05 Sep 2018 05:26) (65 - 79)  BP: 146/80 (05 Sep 2018 05:26) (146/80 - 164/93)  BP(mean): --  RR: 18 (05 Sep 2018 05:26) (16 - 18)  SpO2: 96% (05 Sep 2018 05:26) (95% - 96%)  NEUROLOGICAL EXAM:  Neurological    Mental Status: Awake, alert, fully oriented, speech soft, hypophonic but clear and fluent, follows commands well.     Cranial Nerves: Pupils reactive bilaterally, EOMI, face symmetric, palate elev, tongue midline.    Motor: Bulk was normal. UE 4-/5 left stronger, LE plegic bilaterally.     Reflexes: Absent upper extremities. Absent lower extremities. Toes were mute bilaterally.     Sensory: No response to tactile or noxious stimuli.     Coord: Unable to assess    Gait: Unable to assess    < from: CT Head No Cont (08.30.18 @ 05:07) >    EXAM:  CT BRAIN                          PROCEDURE DATE:  08/30/2018      INTERPRETATION:  HISTORY: Altered mental status.    TECHNIQUE: CT of the head was performed.    Multiple contiguous axial images were acquired from the skullbase to the   vertex without the administration of intravenous contrast.  Coronal and   sagittal reformations were made.    COMPARISON: CT head 9/7/2012.    FINDINGS:  Evaluation limited due to retained intravenous contrast in the   intracranial circulation.    Mild cerebral atrophy. No acute hemorrhage, mass effect, midline shift,   hydrocephalus, or extra-axial fluid collections. Moderate patchy   hypoattenuation within the white matter, likely secondary to chronic   microvascular changes.    The calvarium is intact. Partially visualized endotracheal tube. Moderate   mucosal thickening of the ethmoid air cells and fluid and debris within   the nasopharynx likely from intubation. The mastoid air cells are clear.    IMPRESSION:    No acute hemorrhage, mass effect, or midline shift.    If symptoms persist, correlate with neurologic evaluation to determine if   further evaluation with MRI is warranted, if there are no   contraindications.    BELINDA HEWITT M.D., RADIOLOGY RESIDENT  This document has been electronically signed.  LANG KILLIAN M.D., ATTENDING RADIOLOGIST  This document has been electronically signed. Aug 30 2018  5:41AM      < from: CT Cervical Spine No Cont (08.30.18 @ 21:40) >  EXAM:  CT CERVICAL SPINE                          PROCEDURE DATE:  08/30/2018      INTERPRETATION:    CLINICAL INDICATION: Colitis and hyperthyroidism evaluate for C-spine   fracture    Serial thin sections on a multi slice scanner were obtained through the   cervical spine from the C1 to the T2-3 level in a stacked axial fashion   reformatted at 1.25 mm sections with sagittal and coronal computer   generated reconstructed views.    The examination is limited by motion.    There is discspace narrowing at C4-5. There is fusion at C5-6 and C6-7.   Degenerative anterolisthesis is identified at C3-4. No acute fractures or   dislocations are identified. The facet joints are normally aligned.    Uncovertebral joint and facet degenerativechanges are noted.    There is no prevertebral soft tissue swelling.    IMPRESSION: Degenerative changes. No acute fractures or dislocations.    ZAHIRA LEVI M.D., ATTENDING RADIOLOGIST  This document has been electronically signed. Aug 31 2018 10:41AM

## 2018-09-05 NOTE — PROGRESS NOTE ADULT - PROBLEM SELECTOR PLAN 2
Possibly in setting of antibiotics. Discussed with RN, will watch closely. No episodes of diarrhea overnight. Possibly in setting of antibiotics. Discussed with RN, will watch closely. 4-5 episodes overnight, watery.  -will send for C.diff, ova + parasites, and GI PCR

## 2018-09-05 NOTE — PROGRESS NOTE ADULT - PROBLEM SELECTOR PLAN 8
DVT PPX: enoxaparin.    Dispo: pending S&S, improvement in mental status, endocrine clearance DVT PPX: enoxaparin.    Dispo: home w/ home care. resolution of diarrhea

## 2018-09-06 VITALS
HEART RATE: 81 BPM | RESPIRATION RATE: 18 BRPM | SYSTOLIC BLOOD PRESSURE: 109 MMHG | TEMPERATURE: 99 F | DIASTOLIC BLOOD PRESSURE: 63 MMHG | OXYGEN SATURATION: 94 %

## 2018-09-06 LAB
ALBUMIN SERPL ELPH-MCNC: 2.8 G/DL — LOW (ref 3.3–5)
ALP SERPL-CCNC: 71 U/L — SIGNIFICANT CHANGE UP (ref 40–120)
ALT FLD-CCNC: 27 U/L — SIGNIFICANT CHANGE UP (ref 10–45)
ANION GAP SERPL CALC-SCNC: 10 MMOL/L — SIGNIFICANT CHANGE UP (ref 5–17)
AST SERPL-CCNC: 21 U/L — SIGNIFICANT CHANGE UP (ref 10–40)
BASOPHILS # BLD AUTO: 0.01 K/UL — SIGNIFICANT CHANGE UP (ref 0–0.2)
BASOPHILS NFR BLD AUTO: 0.2 % — SIGNIFICANT CHANGE UP (ref 0–2)
BILIRUB SERPL-MCNC: 0.4 MG/DL — SIGNIFICANT CHANGE UP (ref 0.2–1.2)
BUN SERPL-MCNC: 12 MG/DL — SIGNIFICANT CHANGE UP (ref 7–23)
C DIFF GDH STL QL: NEGATIVE — SIGNIFICANT CHANGE UP
C DIFF GDH STL QL: SIGNIFICANT CHANGE UP
CALCIUM SERPL-MCNC: 9.1 MG/DL — SIGNIFICANT CHANGE UP (ref 8.4–10.5)
CHLORIDE SERPL-SCNC: 102 MMOL/L — SIGNIFICANT CHANGE UP (ref 96–108)
CO2 SERPL-SCNC: 25 MMOL/L — SIGNIFICANT CHANGE UP (ref 22–31)
CREAT SERPL-MCNC: 0.36 MG/DL — LOW (ref 0.5–1.3)
EOSINOPHIL # BLD AUTO: 0.09 K/UL — SIGNIFICANT CHANGE UP (ref 0–0.5)
EOSINOPHIL NFR BLD AUTO: 1.4 % — SIGNIFICANT CHANGE UP (ref 0–6)
GLUCOSE BLDC GLUCOMTR-MCNC: 123 MG/DL — HIGH (ref 70–99)
GLUCOSE BLDC GLUCOMTR-MCNC: 85 MG/DL — SIGNIFICANT CHANGE UP (ref 70–99)
GLUCOSE SERPL-MCNC: 95 MG/DL — SIGNIFICANT CHANGE UP (ref 70–99)
HCT VFR BLD CALC: 37.2 % — SIGNIFICANT CHANGE UP (ref 34.5–45)
HGB BLD-MCNC: 12.6 G/DL — SIGNIFICANT CHANGE UP (ref 11.5–15.5)
IMM GRANULOCYTES NFR BLD AUTO: 0.6 % — SIGNIFICANT CHANGE UP (ref 0–1.5)
LYMPHOCYTES # BLD AUTO: 1.23 K/UL — SIGNIFICANT CHANGE UP (ref 1–3.3)
LYMPHOCYTES # BLD AUTO: 18.9 % — SIGNIFICANT CHANGE UP (ref 13–44)
MAGNESIUM SERPL-MCNC: 1.7 MG/DL — SIGNIFICANT CHANGE UP (ref 1.6–2.6)
MCHC RBC-ENTMCNC: 30.3 PG — SIGNIFICANT CHANGE UP (ref 27–34)
MCHC RBC-ENTMCNC: 33.9 GM/DL — SIGNIFICANT CHANGE UP (ref 32–36)
MCV RBC AUTO: 89.4 FL — SIGNIFICANT CHANGE UP (ref 80–100)
MONOCYTES # BLD AUTO: 0.8 K/UL — SIGNIFICANT CHANGE UP (ref 0–0.9)
MONOCYTES NFR BLD AUTO: 12.3 % — SIGNIFICANT CHANGE UP (ref 2–14)
NEUTROPHILS # BLD AUTO: 4.35 K/UL — SIGNIFICANT CHANGE UP (ref 1.8–7.4)
NEUTROPHILS NFR BLD AUTO: 66.6 % — SIGNIFICANT CHANGE UP (ref 43–77)
PHOSPHATE SERPL-MCNC: 2.7 MG/DL — SIGNIFICANT CHANGE UP (ref 2.5–4.5)
PLATELET # BLD AUTO: 239 K/UL — SIGNIFICANT CHANGE UP (ref 150–400)
POTASSIUM SERPL-MCNC: 3.2 MMOL/L — LOW (ref 3.5–5.3)
POTASSIUM SERPL-SCNC: 3.2 MMOL/L — LOW (ref 3.5–5.3)
PROT SERPL-MCNC: 6.1 G/DL — SIGNIFICANT CHANGE UP (ref 6–8.3)
RBC # BLD: 4.16 M/UL — SIGNIFICANT CHANGE UP (ref 3.8–5.2)
RBC # FLD: 15.1 % — HIGH (ref 10.3–14.5)
SODIUM SERPL-SCNC: 137 MMOL/L — SIGNIFICANT CHANGE UP (ref 135–145)
WBC # BLD: 6.52 K/UL — SIGNIFICANT CHANGE UP (ref 3.8–10.5)
WBC # FLD AUTO: 6.52 K/UL — SIGNIFICANT CHANGE UP (ref 3.8–10.5)

## 2018-09-06 PROCEDURE — 99239 HOSP IP/OBS DSCHRG MGMT >30: CPT

## 2018-09-06 PROCEDURE — 87581 M.PNEUMON DNA AMP PROBE: CPT

## 2018-09-06 PROCEDURE — 94002 VENT MGMT INPAT INIT DAY: CPT

## 2018-09-06 PROCEDURE — 99285 EMERGENCY DEPT VISIT HI MDM: CPT | Mod: 25

## 2018-09-06 PROCEDURE — 83520 IMMUNOASSAY QUANT NOS NONAB: CPT

## 2018-09-06 PROCEDURE — 84295 ASSAY OF SERUM SODIUM: CPT

## 2018-09-06 PROCEDURE — 87177 OVA AND PARASITES SMEARS: CPT

## 2018-09-06 PROCEDURE — 83880 ASSAY OF NATRIURETIC PEPTIDE: CPT

## 2018-09-06 PROCEDURE — 82962 GLUCOSE BLOOD TEST: CPT

## 2018-09-06 PROCEDURE — 96374 THER/PROPH/DIAG INJ IV PUSH: CPT | Mod: XU

## 2018-09-06 PROCEDURE — 84439 ASSAY OF FREE THYROXINE: CPT

## 2018-09-06 PROCEDURE — 84132 ASSAY OF SERUM POTASSIUM: CPT

## 2018-09-06 PROCEDURE — 96376 TX/PRO/DX INJ SAME DRUG ADON: CPT | Mod: XU

## 2018-09-06 PROCEDURE — 83605 ASSAY OF LACTIC ACID: CPT

## 2018-09-06 PROCEDURE — 87045 FECES CULTURE AEROBIC BACT: CPT

## 2018-09-06 PROCEDURE — 96375 TX/PRO/DX INJ NEW DRUG ADDON: CPT | Mod: XU

## 2018-09-06 PROCEDURE — 72125 CT NECK SPINE W/O DYE: CPT

## 2018-09-06 PROCEDURE — 51702 INSERT TEMP BLADDER CATH: CPT | Mod: XU

## 2018-09-06 PROCEDURE — 86376 MICROSOMAL ANTIBODY EACH: CPT

## 2018-09-06 PROCEDURE — 80053 COMPREHEN METABOLIC PANEL: CPT

## 2018-09-06 PROCEDURE — 80048 BASIC METABOLIC PNL TOTAL CA: CPT

## 2018-09-06 PROCEDURE — 84480 ASSAY TRIIODOTHYRONINE (T3): CPT

## 2018-09-06 PROCEDURE — 81001 URINALYSIS AUTO W/SCOPE: CPT

## 2018-09-06 PROCEDURE — 84436 ASSAY OF TOTAL THYROXINE: CPT

## 2018-09-06 PROCEDURE — 85014 HEMATOCRIT: CPT

## 2018-09-06 PROCEDURE — 85730 THROMBOPLASTIN TIME PARTIAL: CPT

## 2018-09-06 PROCEDURE — 87086 URINE CULTURE/COLONY COUNT: CPT

## 2018-09-06 PROCEDURE — 87486 CHLMYD PNEUM DNA AMP PROBE: CPT

## 2018-09-06 PROCEDURE — 87798 DETECT AGENT NOS DNA AMP: CPT

## 2018-09-06 PROCEDURE — 82803 BLOOD GASES ANY COMBINATION: CPT

## 2018-09-06 PROCEDURE — 70450 CT HEAD/BRAIN W/O DYE: CPT

## 2018-09-06 PROCEDURE — 71045 X-RAY EXAM CHEST 1 VIEW: CPT

## 2018-09-06 PROCEDURE — 94003 VENT MGMT INPAT SUBQ DAY: CPT

## 2018-09-06 PROCEDURE — 82947 ASSAY GLUCOSE BLOOD QUANT: CPT

## 2018-09-06 PROCEDURE — 76536 US EXAM OF HEAD AND NECK: CPT

## 2018-09-06 PROCEDURE — 87040 BLOOD CULTURE FOR BACTERIA: CPT

## 2018-09-06 PROCEDURE — 31500 INSERT EMERGENCY AIRWAY: CPT

## 2018-09-06 PROCEDURE — 74018 RADEX ABDOMEN 1 VIEW: CPT

## 2018-09-06 PROCEDURE — 87633 RESP VIRUS 12-25 TARGETS: CPT

## 2018-09-06 PROCEDURE — 83690 ASSAY OF LIPASE: CPT

## 2018-09-06 PROCEDURE — 82435 ASSAY OF BLOOD CHLORIDE: CPT

## 2018-09-06 PROCEDURE — 87507 IADNA-DNA/RNA PROBE TQ 12-25: CPT

## 2018-09-06 PROCEDURE — 87449 NOS EACH ORGANISM AG IA: CPT

## 2018-09-06 PROCEDURE — 85610 PROTHROMBIN TIME: CPT

## 2018-09-06 PROCEDURE — 85027 COMPLETE CBC AUTOMATED: CPT

## 2018-09-06 PROCEDURE — 83735 ASSAY OF MAGNESIUM: CPT

## 2018-09-06 PROCEDURE — 82330 ASSAY OF CALCIUM: CPT

## 2018-09-06 PROCEDURE — 76700 US EXAM ABDOM COMPLETE: CPT

## 2018-09-06 PROCEDURE — 86800 THYROGLOBULIN ANTIBODY: CPT

## 2018-09-06 PROCEDURE — 84100 ASSAY OF PHOSPHORUS: CPT

## 2018-09-06 PROCEDURE — 84445 ASSAY OF TSI GLOBULIN: CPT

## 2018-09-06 PROCEDURE — 84443 ASSAY THYROID STIM HORMONE: CPT

## 2018-09-06 PROCEDURE — 84481 FREE ASSAY (FT-3): CPT

## 2018-09-06 PROCEDURE — 87324 CLOSTRIDIUM AG IA: CPT

## 2018-09-06 PROCEDURE — 99232 SBSQ HOSP IP/OBS MODERATE 35: CPT

## 2018-09-06 PROCEDURE — 93005 ELECTROCARDIOGRAM TRACING: CPT | Mod: XU

## 2018-09-06 PROCEDURE — 84484 ASSAY OF TROPONIN QUANT: CPT

## 2018-09-06 PROCEDURE — 74177 CT ABD & PELVIS W/CONTRAST: CPT

## 2018-09-06 PROCEDURE — 87046 STOOL CULTR AEROBIC BACT EA: CPT

## 2018-09-06 RX ORDER — METHIMAZOLE 10 MG/1
1 TABLET ORAL
Qty: 30 | Refills: 0
Start: 2018-09-06 | End: 2018-10-05

## 2018-09-06 RX ORDER — POTASSIUM CHLORIDE 20 MEQ
40 PACKET (EA) ORAL EVERY 4 HOURS
Qty: 0 | Refills: 0 | Status: DISCONTINUED | OUTPATIENT
Start: 2018-09-06 | End: 2018-09-06

## 2018-09-06 RX ORDER — POLYETHYLENE GLYCOL 3350 17 G/17G
17 POWDER, FOR SOLUTION ORAL
Qty: 510 | Refills: 0
Start: 2018-09-06 | End: 2018-10-05

## 2018-09-06 RX ORDER — ATENOLOL 25 MG/1
1 TABLET ORAL
Qty: 30 | Refills: 0
Start: 2018-09-06 | End: 2018-10-05

## 2018-09-06 RX ORDER — SENNA PLUS 8.6 MG/1
2 TABLET ORAL
Qty: 60 | Refills: 0
Start: 2018-09-06 | End: 2018-10-05

## 2018-09-06 RX ADMIN — Medication 1 PACKET(S): at 13:23

## 2018-09-06 RX ADMIN — Medication 40 MILLIEQUIVALENT(S): at 13:22

## 2018-09-06 RX ADMIN — Medication 40 MILLIGRAM(S): at 05:49

## 2018-09-06 RX ADMIN — PANTOPRAZOLE SODIUM 40 MILLIGRAM(S): 20 TABLET, DELAYED RELEASE ORAL at 11:57

## 2018-09-06 RX ADMIN — Medication 1 PACKET(S): at 05:50

## 2018-09-06 RX ADMIN — Medication 40 MILLIGRAM(S): at 13:22

## 2018-09-06 RX ADMIN — Medication 20 MILLIGRAM(S): at 09:04

## 2018-09-06 RX ADMIN — Medication 500 MILLIGRAM(S): at 05:49

## 2018-09-06 RX ADMIN — Medication 500 MILLIGRAM(S): at 13:22

## 2018-09-06 RX ADMIN — Medication 20 MILLIGRAM(S): at 11:57

## 2018-09-06 RX ADMIN — ENOXAPARIN SODIUM 40 MILLIGRAM(S): 100 INJECTION SUBCUTANEOUS at 11:56

## 2018-09-06 RX ADMIN — Medication 40 MILLIEQUIVALENT(S): at 09:53

## 2018-09-06 RX ADMIN — Medication 500 MILLIGRAM(S): at 05:51

## 2018-09-06 NOTE — PROGRESS NOTE ADULT - SUBJECTIVE AND OBJECTIVE BOX
Admitting Diagnosis:  Altered mental status (R41.82): ALTERED MENTAL STATUS, UNSPECIFIED    Neurology Follow up Note  Subjective  c/o sore throat  says diarrhea is less    Medications:  artificial tears (preservative free) Ophthalmic Solution 1 Drop(s) Both EYES every 4 hours PRN  baclofen 20 milliGRAM(s) Oral <User Schedule>  baclofen 30 milliGRAM(s) Oral <User Schedule>  ciprofloxacin     Tablet 500 milliGRAM(s) Oral every 12 hours  clonazePAM Tablet 0.5 milliGRAM(s) Oral at bedtime  dextrose 40% Gel 15 Gram(s) Oral once PRN  dextrose 5%. 1000 milliLiter(s) IV Continuous <Continuous>  dextrose 50% Injectable 12.5 Gram(s) IV Push once  dextrose 50% Injectable 25 Gram(s) IV Push once  dextrose 50% Injectable 25 Gram(s) IV Push once  enoxaparin Injectable 40 milliGRAM(s) SubCutaneous daily  glucagon  Injectable 1 milliGRAM(s) IntraMuscular once PRN  insulin lispro (HumaLOG) corrective regimen sliding scale   SubCutaneous three times a day before meals  insulin lispro (HumaLOG) corrective regimen sliding scale   SubCutaneous at bedtime  methimazole 20 milliGRAM(s) Oral daily  metroNIDAZOLE    Tablet 500 milliGRAM(s) Oral every 8 hours  pantoprazole  Injectable 40 milliGRAM(s) IV Push daily  polyethylene glycol 3350 17 Gram(s) Oral daily PRN  potassium phosphate / sodium phosphate powder 1 Packet(s) Oral three times a day  propranolol 40 milliGRAM(s) Oral every 8 hours  senna 2 Tablet(s) Oral at bedtime PRN  sodium chloride 0.9%. 1000 milliLiter(s) IV Continuous <Continuous>      Labs:    09-05    139  |  105  |  8   ----------------------------<  90  4.3   |  23  |  0.39<L>    Ca    9.0      05 Sep 2018 07:08      CAPILLARY BLOOD GLUCOSE      POCT Blood Glucose.: 85 mg/dL (06 Sep 2018 08:09)  POCT Blood Glucose.: 128 mg/dL (05 Sep 2018 21:37)  POCT Blood Glucose.: 108 mg/dL (05 Sep 2018 17:32)  POCT Blood Glucose.: 137 mg/dL (05 Sep 2018 12:24)              Vitals:  Vital Signs Last 24 Hrs  T(C): 37.2 (06 Sep 2018 08:25), Max: 37.2 (05 Sep 2018 09:30)  T(F): 98.9 (06 Sep 2018 08:25), Max: 98.9 (05 Sep 2018 09:30)  HR: 84 (06 Sep 2018 08:25) (73 - 84)  BP: 129/75 (06 Sep 2018 08:25) (115/73 - 146/83)  BP(mean): --  RR: 18 (06 Sep 2018 08:25) (18 - 20)  SpO2: 94% (06 Sep 2018 08:25) (94% - 98%)      NEUROLOGICAL EXAM:  Neurological    Mental Status: Awake, alert, fully oriented, speech soft, hypophonic but clear and fluent, follows commands well.     Cranial Nerves: Pupils reactive bilaterally, EOMI, face symmetric, palate elev, tongue midline.    Motor: Bulk was normal. UE 4-/5 left stronger, LE plegic bilaterally.     Reflexes: Absent upper extremities. Absent lower extremities. Toes were mute bilaterally.     Sensory: No response to tactile or noxious stimuli.     Coord: no gross dysmetria in the arms    Gait: Unable to assess    < from: CT Head No Cont (08.30.18 @ 05:07) >    EXAM:  CT BRAIN                          PROCEDURE DATE:  08/30/2018      INTERPRETATION:  HISTORY: Altered mental status.    TECHNIQUE: CT of the head was performed.    Multiple contiguous axial images were acquired from the skullbase to the   vertex without the administration of intravenous contrast.  Coronal and   sagittal reformations were made.    COMPARISON: CT head 9/7/2012.    FINDINGS:  Evaluation limited due to retained intravenous contrast in the   intracranial circulation.    Mild cerebral atrophy. No acute hemorrhage, mass effect, midline shift,   hydrocephalus, or extra-axial fluid collections. Moderate patchy   hypoattenuation within the white matter, likely secondary to chronic   microvascular changes.    The calvarium is intact. Partially visualized endotracheal tube. Moderate   mucosal thickening of the ethmoid air cells and fluid and debris within   the nasopharynx likely from intubation. The mastoid air cells are clear.    IMPRESSION:    No acute hemorrhage, mass effect, or midline shift.    If symptoms persist, correlate with neurologic evaluation to determine if   further evaluation with MRI is warranted, if there are no   contraindications.    BELINDA HEWITT M.D., RADIOLOGY RESIDENT  This document has been electronically signed.  LANG KILLIAN M.D., ATTENDING RADIOLOGIST  This document has been electronically signed. Aug 30 2018  5:41AM      < from: CT Cervical Spine No Cont (08.30.18 @ 21:40) >  EXAM:  CT CERVICAL SPINE                          PROCEDURE DATE:  08/30/2018      INTERPRETATION:    CLINICAL INDICATION: Colitis and hyperthyroidism evaluate for C-spine   fracture    Serial thin sections on a multi slice scanner were obtained through the   cervical spine from the C1 to the T2-3 level in a stacked axial fashion   reformatted at 1.25 mm sections with sagittal and coronal computer   generated reconstructed views.    The examination is limited by motion.    There is discspace narrowing at C4-5. There is fusion at C5-6 and C6-7.   Degenerative anterolisthesis is identified at C3-4. No acute fractures or   dislocations are identified. The facet joints are normally aligned.    Uncovertebral joint and facet degenerativechanges are noted.    There is no prevertebral soft tissue swelling.    IMPRESSION: Degenerative changes. No acute fractures or dislocations.    ZAHIRA LEVI M.D., ATTENDING RADIOLOGIST  This document has been electronically signed. Aug 31 2018 10:41AM

## 2018-09-06 NOTE — PROGRESS NOTE ADULT - PROBLEM SELECTOR PROBLEM 3
Hyperthyroidism
Metabolic encephalopathy
Hyperthyroidism

## 2018-09-06 NOTE — PROGRESS NOTE ADULT - PROBLEM SELECTOR PLAN 1
Stercoral colitis in setting of high stool burden, confirmed on CT  - Last day of 7 day course of total abx today   -stop all bowel regiments in the setting of worsening diarrhea  - Stool count  -repeat stool culture still shows EPEC, although negative for C.diff

## 2018-09-06 NOTE — PROGRESS NOTE ADULT - PROBLEM SELECTOR PLAN 1
Given rapid drop of free T4 level over the last 5 days, I would recommend to decrease the does of methimazole upon discharge to prevent medication induced hypothyroidism  Still pending Graves' disease antibody (TSHRab and TSI, please sent if not already done)  For beta-blocker, patient is currently on TID dosing of propanolol, can consider switching to atenolol 50mg daily for easier medication regimen.    Should have outpatient thyroid function tests done in one month.

## 2018-09-06 NOTE — PROGRESS NOTE ADULT - SUBJECTIVE AND OBJECTIVE BOX
Chief Complaint: Hyperthyroidism     History: Patient is feeling tired, she's pending discharge today.      MEDICATIONS  (STANDING):  baclofen 20 milliGRAM(s) Oral <User Schedule>  baclofen 30 milliGRAM(s) Oral <User Schedule>  ciprofloxacin     Tablet 500 milliGRAM(s) Oral every 12 hours  clonazePAM Tablet 0.5 milliGRAM(s) Oral at bedtime  enoxaparin Injectable 40 milliGRAM(s) SubCutaneous daily  insulin lispro (HumaLOG) corrective regimen sliding scale   SubCutaneous three times a day before meals  insulin lispro (HumaLOG) corrective regimen sliding scale   SubCutaneous at bedtime  methimazole 20 milliGRAM(s) Oral daily  metroNIDAZOLE    Tablet 500 milliGRAM(s) Oral every 8 hours  pantoprazole  Injectable 40 milliGRAM(s) IV Push daily  potassium chloride    Tablet ER 40 milliEquivalent(s) Oral every 4 hours  potassium phosphate / sodium phosphate powder 1 Packet(s) Oral three times a day  propranolol 40 milliGRAM(s) Oral every 8 hours  sodium chloride 0.9%. 1000 milliLiter(s) (50 mL/Hr) IV Continuous <Continuous>    MEDICATIONS  (PRN):  artificial tears (preservative free) Ophthalmic Solution 1 Drop(s) Both EYES every 4 hours PRN Dry Eyes  dextrose 40% Gel 15 Gram(s) Oral once PRN Blood Glucose LESS THAN 70 milliGRAM(s)/deciliter  glucagon  Injectable 1 milliGRAM(s) IntraMuscular once PRN Glucose LESS THAN 70 milligrams/deciliter  polyethylene glycol 3350 17 Gram(s) Oral daily PRN Constipation  senna 2 Tablet(s) Oral at bedtime PRN Constipation      Allergies    No Known Allergies    Review of Systems:  Constitutional: No fever  Eyes: No blurry vision  Neuro: No tremors  HEENT: No pain  Cardiovascular: No chest pain, palpitations  Respiratory: No SOB, no cough  GI: No nausea, vomiting, abdominal pain  : No dysuria  Skin: no rash  Psych: no depression  Endocrine: no polyuria, polydipsia  Hem/lymph: no swelling  Osteoporosis: no fractures    ALL OTHER SYSTEMS REVIEWED AND NEGATIVE    UNABLE TO OBTAIN    PHYSICAL EXAM:  VITALS: T(C): 37.2 (09-06-18 @ 13:22)  T(F): 98.9 (09-06-18 @ 13:22), Max: 98.9 (09-06-18 @ 08:25)  HR: 81 (09-06-18 @ 13:22) (73 - 84)  BP: 109/63 (09-06-18 @ 13:22) (109/63 - 130/78)  RR:  (18 - 20)  SpO2:  (94% - 96%)  Wt(kg): --  GENERAL: NAD, well-groomed, well-developed  EYES: No proptosis, no injection  HEENT:  Atraumatic, Normocephalic, moist mucous membranes  THYROID: Normal size, no palpable nodules  RESPIRATORY: Clear to auscultation bilaterally; No rales, rhonchi, wheezing, or rubs  CARDIOVASCULAR: Regular rate and rhythm; No murmurs; no peripheral edema  GI: Soft, nontender, non distended, normal bowel sounds  CUSHING'S SIGNS: no striae    POCT Blood Glucose.: 123 mg/dL (09-06-18 @ 12:10)  POCT Blood Glucose.: 85 mg/dL (09-06-18 @ 08:09)  POCT Blood Glucose.: 128 mg/dL (09-05-18 @ 21:37)  POCT Blood Glucose.: 108 mg/dL (09-05-18 @ 17:32)  POCT Blood Glucose.: 137 mg/dL (09-05-18 @ 12:24)  POCT Blood Glucose.: 88 mg/dL (09-05-18 @ 08:24)  POCT Blood Glucose.: 97 mg/dL (09-04-18 @ 21:43)  POCT Blood Glucose.: 77 mg/dL (09-04-18 @ 17:22)  POCT Blood Glucose.: 144 mg/dL (09-04-18 @ 12:11)  POCT Blood Glucose.: 81 mg/dL (09-04-18 @ 08:08)  POCT Blood Glucose.: 139 mg/dL (09-03-18 @ 21:21)  POCT Blood Glucose.: 111 mg/dL (09-03-18 @ 17:23)      09-06    137  |  102  |  12  ----------------------------<  95  3.2<L>   |  25  |  0.36<L>    EGFR if : 121  EGFR if non : 104    Ca    9.1      09-06  Mg     1.7     09-06  Phos  2.7     09-06    TPro  6.1  /  Alb  2.8<L>  /  TBili  0.4  /  DBili  x   /  AST  21  /  ALT  27  /  AlkPhos  71  09-06          Thyroid Function Tests:  09-05 @ 16:14 TSH 0.89 FreeT4 1.4 T3 108 Anti TPO -- Anti Thyroglobulin Ab -- TSI --  08-30 @ 17:43 TSH -- FreeT4 5.1 T3 -- Anti TPO -- Anti Thyroglobulin Ab -- TSI --

## 2018-09-06 NOTE — PROGRESS NOTE ADULT - PROBLEM SELECTOR PROBLEM 1
Hyperthyroidism
Hyperthyroidism
Proctocolitis
Hyperthyroidism
Proctocolitis
Proctocolitis

## 2018-09-06 NOTE — PROGRESS NOTE ADULT - PROBLEM SELECTOR PLAN 2
Possibly in setting of antibiotics. Discussed with RN, will watch closely. 4-5 episodes overnight, watery.  -C.diff negative, stool culture showing EPEC, ova + parasites pending

## 2018-09-06 NOTE — PROGRESS NOTE ADULT - PROBLEM SELECTOR PROBLEM 4
MS (multiple sclerosis)
Hyperthyroidism
Hyperthyroidism
MS (multiple sclerosis)
Hyperthyroidism
Hyperthyroidism

## 2018-09-06 NOTE — PROGRESS NOTE ADULT - PROBLEM SELECTOR PLAN 6
Pt able to swallow pills and thin liquids on beside S&S.  - Advanced diet.  - Had FEEST done in June 2018, according to family pt can eat regular food just in smaller portions
-Potassium of 3 this AM, will replete with potassium chloride and potassium phosphate IV  -recheck BMP after infusions  -monitor daily BMP, replete as necessary
- PO propranolol.
Pt able to swallow pills and thin liquids on beside S&S.  - Advanced diet.  - Had FEEST done in June 2018, according to family pt can eat regular food just in smaller portions

## 2018-09-06 NOTE — PROGRESS NOTE ADULT - PROBLEM SELECTOR PROBLEM 2
Adrenal nodule
Adrenal nodule
Diarrhea
Diarrhea
Metabolic encephalopathy
Diarrhea
Adrenal nodule
Diarrhea
Metabolic encephalopathy

## 2018-09-06 NOTE — PROGRESS NOTE ADULT - PROBLEM SELECTOR PLAN 5
Secondary progressive MS c/b dysphagia, wheelchair bound, neurogenic bladder with chronic fragoso.  - Per Neurology, patient has been refusing treatment (Previously treated with Avonex and Copaxone).  - Fall, aspiration, and seizure precautions

## 2018-09-06 NOTE — PROGRESS NOTE ADULT - PROBLEM SELECTOR PLAN 2
On CT scan, patient found with incidental adrenal nodules, measuring 2.6cm X 2Cm on left and 1.3cm X 1cm on right. Can be followed up outpatient for hormonal work-up and MRI.   O/p plan - pt can follow up at our o/p location [id number] location [phone]

## 2018-09-06 NOTE — PROGRESS NOTE ADULT - PROBLEM SELECTOR PLAN 4
Hyperthyroidism 2/2 iodine supplementation, low suspicion for thyroid storm  - TSH receptor ab, Thyroid stimulating ig, and anti-TPO negative.  - On propranolol PO.  - s/p hydrocortisone  - Outpatient f/u on right thyroid nodule.  - Continue methimazole  -This morning thyroid studies all within normal limits

## 2018-09-06 NOTE — PROGRESS NOTE ADULT - PROBLEM SELECTOR PROBLEM 7
HTN (hypertension)
Prophylactic measure
HTN (hypertension)

## 2018-09-06 NOTE — PROGRESS NOTE ADULT - PROBLEM SELECTOR PROBLEM 5
MS (multiple sclerosis)
Dysphagia
Dysphagia
MS (multiple sclerosis)

## 2018-09-06 NOTE — PROGRESS NOTE ADULT - SUBJECTIVE AND OBJECTIVE BOX
Wesley Rivera, PGY1  Pager: [phone]      Patient is a 78y old  Female who presents with a chief complaint of sepsis, metabolic encephalopathy (06 Sep 2018 08:29)      SUBJECTIVE / OVERNIGHT EVENTS: One bowel movement overnight, but no episodes of diarrhea per wife or pt. C diff in stool negative, Stool culture showing EPEC. Pt this morning complaining of a sore throat causing her to lose her voice, but denies any headache, CP, SOB, or abdominal pain.     MEDICATIONS  (STANDING):  baclofen 20 milliGRAM(s) Oral <User Schedule>  baclofen 30 milliGRAM(s) Oral <User Schedule>  ciprofloxacin     Tablet 500 milliGRAM(s) Oral every 12 hours  clonazePAM Tablet 0.5 milliGRAM(s) Oral at bedtime  dextrose 5%. 1000 milliLiter(s) (50 mL/Hr) IV Continuous <Continuous>  dextrose 50% Injectable 12.5 Gram(s) IV Push once  dextrose 50% Injectable 25 Gram(s) IV Push once  dextrose 50% Injectable 25 Gram(s) IV Push once  enoxaparin Injectable 40 milliGRAM(s) SubCutaneous daily  insulin lispro (HumaLOG) corrective regimen sliding scale   SubCutaneous three times a day before meals  insulin lispro (HumaLOG) corrective regimen sliding scale   SubCutaneous at bedtime  methimazole 20 milliGRAM(s) Oral daily  metroNIDAZOLE    Tablet 500 milliGRAM(s) Oral every 8 hours  pantoprazole  Injectable 40 milliGRAM(s) IV Push daily  potassium phosphate / sodium phosphate powder 1 Packet(s) Oral three times a day  propranolol 40 milliGRAM(s) Oral every 8 hours  sodium chloride 0.9%. 1000 milliLiter(s) (50 mL/Hr) IV Continuous <Continuous>    MEDICATIONS  (PRN):  artificial tears (preservative free) Ophthalmic Solution 1 Drop(s) Both EYES every 4 hours PRN Dry Eyes  dextrose 40% Gel 15 Gram(s) Oral once PRN Blood Glucose LESS THAN 70 milliGRAM(s)/deciliter  glucagon  Injectable 1 milliGRAM(s) IntraMuscular once PRN Glucose LESS THAN 70 milligrams/deciliter  polyethylene glycol 3350 17 Gram(s) Oral daily PRN Constipation  senna 2 Tablet(s) Oral at bedtime PRN Constipation      Vital Signs Last 24 Hrs  T(C): 37.2 (06 Sep 2018 08:25), Max: 37.2 (05 Sep 2018 09:30)  T(F): 98.9 (06 Sep 2018 08:25), Max: 98.9 (05 Sep 2018 09:30)  HR: 84 (06 Sep 2018 08:25) (73 - 84)  BP: 129/75 (06 Sep 2018 08:25) (115/73 - 146/83)  BP(mean): --  RR: 18 (06 Sep 2018 08:25) (18 - 20)  SpO2: 94% (06 Sep 2018 08:25) (94% - 98%)  CAPILLARY BLOOD GLUCOSE      POCT Blood Glucose.: 85 mg/dL (06 Sep 2018 08:09)  POCT Blood Glucose.: 128 mg/dL (05 Sep 2018 21:37)  POCT Blood Glucose.: 108 mg/dL (05 Sep 2018 17:32)  POCT Blood Glucose.: 137 mg/dL (05 Sep 2018 12:24)    I&O's Summary    05 Sep 2018 07:01  -  06 Sep 2018 07:00  --------------------------------------------------------  IN: 680 mL / OUT: 900 mL / NET: -220 mL        PHYSICAL EXAM:  GENERAL: NAD, well-developed, lethargic appearing  EYES: EOMI, PERRLA, conjunctiva and sclera clear  NECK:  No JVD  CHEST/LUNG: CTABL ; No wheeze  HEART: RRR; No murmurs  ABDOMEN: Diffusely tender to light palpation. Soft, Nondistended; Bowel sounds present  : Chronic fragoso   EXTREMITIES:  0/5 LE strength 3/5 upper. 2+ Peripheral Pulses, No edema  PSYCH: AAOx3  NEUROLOGY: non-focal  SKIN: No rashes or lesions. No sacral ulcer    LABS:    09-05    139  |  105  |  8   ----------------------------<  90  4.3   |  23  |  0.39<L>    Ca    9.0      05 Sep 2018 07:08                Microbiology;    GI PCR Panel, Stool (collected 09-05-18 @ 16:36)  Source: .Stool Feces  Final Report (09-05-18 @ 18:33):    Enteropathogenic E. coli (EPEC)    DETECTED by PCR    *******Please Note:*******    GI panel PCR evaluates for:    Campylobacter, Plesiomonas shigelloides, Salmonella,    Vibrio, Yersinia enterocolitica, Enteroaggregative    Escherichia coli (EAEC), Enteropathogenic E.coli (EPEC),    Enterotoxigenic E. coli (ETEC) lt/st, Shiga-like    toxin-producing E. coli (STEC) stx1/stx2,    Shigella/ Enteroinvasive E. coli (EIEC), Cryptosporidium,    Cyclospora cayetanensis, Entamoeba histolytica,    Giardia lamblia, AdenovirusF 40/41, Astrovirus,    Norovirus GI/GII, Rotavirus A, Sapovirus    Cdiff: negative       RADIOLOGY & ADDITIONAL TESTS:    Imaging Personally Reviewed:          Consultant(s) Notes Reviewed: Yes     Care Discussed with Consultants/Other Providers: Yes

## 2018-09-06 NOTE — PROGRESS NOTE ADULT - ATTENDING COMMENTS
seen, examined with house staff  - feels better, no more diarrhea, wants to go home, attendant at bedside  - reviewed labs, stool grew- E. coli  - Endocrine f/u plan noted  - d/c on current meds. Outpatient f/u with endocrinology, PCP, neurology    ** discharge time- 43 min

## 2018-09-06 NOTE — PROGRESS NOTE ADULT - ASSESSMENT
a/p - 74 yo F with pmhx of secondary progressive MS, seen by my associate dr. quevedo, had been on disease modifying therapy in past but has refused to be on currently, has become debiliated requring wheelchair,   with chronic pain and neurocogntive  issues as well  Presenetd with lethargy, elevated wbc, hypothermia, required intubation  Found to have stercoral colitis, sepsis, hyperthyroidism  CT head is negative    Improved on antibiotics  Extubated  Exam ~ baseline deficits from MS    Plan  1. Continue Abx per Medicine  2. Endocrine for thyroid disorder  3. Supportive care  4. Continue baclofen at home dose 20/20/30/30  5. PT/DVT prophylaxis  no inpt neurology workup needed  no neuro c/i to discharge  Plan reviewed with pt and  at bedside.

## 2018-09-07 ENCOUNTER — TRANSCRIPTION ENCOUNTER (OUTPATIENT)
Age: 78
End: 2018-09-07

## 2018-09-07 ENCOUNTER — APPOINTMENT (OUTPATIENT)
Dept: CARDIOLOGY | Facility: CLINIC | Age: 78
End: 2018-09-07

## 2018-09-08 LAB — TSI ACT/NOR SER: <0.1 IU/L — SIGNIFICANT CHANGE UP (ref 0–0.55)

## 2018-09-14 ENCOUNTER — RX RENEWAL (OUTPATIENT)
Age: 78
End: 2018-09-14

## 2018-09-14 ENCOUNTER — APPOINTMENT (OUTPATIENT)
Dept: INTERNAL MEDICINE | Facility: CLINIC | Age: 78
End: 2018-09-14
Payer: MEDICARE

## 2018-09-14 VITALS
DIASTOLIC BLOOD PRESSURE: 72 MMHG | HEIGHT: 63 IN | TEMPERATURE: 97.8 F | OXYGEN SATURATION: 96 % | WEIGHT: 123 LBS | SYSTOLIC BLOOD PRESSURE: 110 MMHG | HEART RATE: 72 BPM | BODY MASS INDEX: 21.79 KG/M2

## 2018-09-14 VITALS — DIASTOLIC BLOOD PRESSURE: 70 MMHG | SYSTOLIC BLOOD PRESSURE: 126 MMHG

## 2018-09-14 DIAGNOSIS — K52.9 NONINFECTIVE GASTROENTERITIS AND COLITIS, UNSPECIFIED: ICD-10-CM

## 2018-09-14 PROCEDURE — 99496 TRANSJ CARE MGMT HIGH F2F 7D: CPT | Mod: 25

## 2018-09-14 PROCEDURE — 36415 COLL VENOUS BLD VENIPUNCTURE: CPT

## 2018-09-14 RX ORDER — LIDOCAINE HYDROCHLORIDE 20 MG/ML
2 JELLY TOPICAL
Qty: 30 | Refills: 0 | Status: DISCONTINUED | COMMUNITY
Start: 2017-01-17 | End: 2018-09-14

## 2018-09-14 NOTE — HISTORY OF PRESENT ILLNESS
[Post-hospitalization from ___ Hospital] : Post-hospitalization from [unfilled] Hospital [Admitted on: ___] : The patient was admitted on [unfilled] [Discharged on ___] : discharged on [unfilled] [Discharge Summary] : discharge summary [Pertinent Labs] : pertinent labs [Radiology Findings] : radiology findings [Discharge Med List] : discharge medication list [Med Reconciliation] : medication reconciliation has been completed [Patient Contacted By: ____] : and contacted by [unfilled] [FreeTextEntry2] : Patient admitted to [facility] 8/31-9/6 after presenting with N/V and lethargy. She became unresponsive after having CT scan, was intubated and admitted to the MICU. She had hypotensive episodes which responded to IV fluids. CT showed proctocolitis and was treated with 7 days of antibiotics. Stool culture revealed E.coli. She stayed in MICU for 3 days and became stable for transfer to floor. She also was seen by endocrine due to suppressed TSH. Was started on hydrocortisone, methimazole, and atenolol for hyperthyroidism suspected to be secondary to iodine supplementation at home. It was unlikely she had thyroid storm. The enalapril and HCTZ was discontinued. She was stable for discharge home. \par \par Patient reports today feeling better but remains fatigued. She mentions having intermittent hematochezia since discharge. She saw Dr. Batista (GI) on 9/11 and was not concerned of the blood loss. Her diarrhea has resolved. Her hematochezia stopped today. She had labs done with Dr. Batista that were reportedly normal including her thyroid function tests.

## 2018-09-14 NOTE — PHYSICAL EXAM
[No Acute Distress] : no acute distress [Well Nourished] : well nourished [Well-Appearing] : well-appearing [Supple] : supple [No Lymphadenopathy] : no lymphadenopathy [No Respiratory Distress] : no respiratory distress  [Clear to Auscultation] : lungs were clear to auscultation bilaterally [Normal Rate] : normal rate  [Normal S1, S2] : normal S1 and S2 [No Murmur] : no murmur heard [No Edema] : there was no peripheral edema [Soft] : abdomen soft [Non Tender] : non-tender [Non-distended] : non-distended [Normal Bowel Sounds] : normal bowel sounds [No Joint Swelling] : no joint swelling [No Rash] : no rash [Normal Gait] : normal gait [Normal Affect] : the affect was normal [Alert and Oriented x3] : oriented to person, place, and time [Normal Mood] : the mood was normal [de-identified] : very friendly, awake and alert, appears fatigued  [de-identified] : indwelling fragoso catheter present  [de-identified] : wheelchair-bound; profound lower extremity weakness; UE weakness L>>R [High Complexity requires an extensive number of possible diagnoses and/or the management options, extensive complexity of the medical data (tests, etc.) to be reviewed, and a high risk of significant complications, morbidity, and/or mortality as well as c] : High Complexity

## 2018-09-14 NOTE — ASSESSMENT
[FreeTextEntry1] : 1. Patient hospitalized with sepsis secondary to proctocolitis. Completed 1 week of antibiotics in hospital. Her diarrhea has resolved. She saw Dr. Batista (GI) on 9/11 - no changes made. She had post-discharge hematochezia but now resolved. Check CBC and CMP.\par \par 2. Patient started on methimazole and atenolol in hospital for hyperthyroidism, doubtful had thyroid storm during admission. Recheck TSH and FT4. She plans to switch to [facility] endocrinologist - recommend Dr. Gini Cerda.\par \par 3. She is followed by Dr. Parry for her MS. She uses motorized wheelchair due to lower extremity weakness. She has neurogenic bladder and has indwelling fragoso cathether - sees Dr. Hope. \par \par 4. Her BP meds - enalapril and HCTZ were discontinued in hospital. Her BP today is controlled. She is now on atenolol which should provide some BP support. Advised to f/u with Dr. Jon. \par \par RTO in 1 month for annual physical.

## 2018-09-18 ENCOUNTER — INBOUND DOCUMENT (OUTPATIENT)
Age: 78
End: 2018-09-18

## 2018-09-20 LAB
ALBUMIN SERPL ELPH-MCNC: 3.3 G/DL
ALP BLD-CCNC: 96 U/L
ALT SERPL-CCNC: 16 U/L
ANION GAP SERPL CALC-SCNC: 13 MMOL/L
AST SERPL-CCNC: 22 U/L
BASOPHILS # BLD AUTO: 0.02 K/UL
BASOPHILS NFR BLD AUTO: 0.4 %
BILIRUB SERPL-MCNC: 0.2 MG/DL
BUN SERPL-MCNC: 13 MG/DL
CALCIUM SERPL-MCNC: 9.4 MG/DL
CHLORIDE SERPL-SCNC: 102 MMOL/L
CO2 SERPL-SCNC: 26 MMOL/L
CREAT SERPL-MCNC: 0.4 MG/DL
EOSINOPHIL # BLD AUTO: 0.12 K/UL
EOSINOPHIL NFR BLD AUTO: 2.2 %
GLUCOSE SERPL-MCNC: 87 MG/DL
HCT VFR BLD CALC: 37.2 %
HGB BLD-MCNC: 11.6 G/DL
IMM GRANULOCYTES NFR BLD AUTO: 0.4 %
LYMPHOCYTES # BLD AUTO: 1.24 K/UL
LYMPHOCYTES NFR BLD AUTO: 22.4 %
MAN DIFF?: NORMAL
MCHC RBC-ENTMCNC: 29.6 PG
MCHC RBC-ENTMCNC: 31.2 GM/DL
MCV RBC AUTO: 94.9 FL
MONOCYTES # BLD AUTO: 0.7 K/UL
MONOCYTES NFR BLD AUTO: 12.7 %
NEUTROPHILS # BLD AUTO: 3.43 K/UL
NEUTROPHILS NFR BLD AUTO: 61.9 %
PLATELET # BLD AUTO: 265 K/UL
POTASSIUM SERPL-SCNC: 4.4 MMOL/L
PROT SERPL-MCNC: 6.5 G/DL
RBC # BLD: 3.92 M/UL
RBC # FLD: 16.1 %
SODIUM SERPL-SCNC: 141 MMOL/L
T4 FREE SERPL-MCNC: 1.2 NG/DL
TSH SERPL-ACNC: 0.8 UIU/ML
WBC # FLD AUTO: 5.53 K/UL

## 2018-09-25 NOTE — ED ADULT NURSE NOTE - EXTENSIONS OF SELF_ADULT
Acute Pain Service Progress Note    Jesu Reynolds is a 53 y.o., male, [id number].    Surgery:  Right nephrectomy     Post Op Day #: 1    Catheter type: perineural  CHARMAINE  x2     Infusion type: Ropivacaine 0.2%  10 basal x 2    Problem List:    Active Hospital Problems    Diagnosis  POA    *Right renal mass [N28.89]  Yes    Fatty liver [K76.0]  Yes    Tattoos [L81.8]  Yes    Snoring [R06.83]  Yes    Serum total bilirubin elevated [R17]  Yes    Renal impairment [N28.9]  Yes    Hypercalcemia [E83.52]  Yes    Gout [M10.9]  Yes    Class 2 obesity with body mass index (BMI) of 35.0 to 35.9 in adult [E66.9, Z68.35]  Not Applicable    Arthritis [M19.90]  Yes    Essential hypertension [I10]  Yes      Resolved Hospital Problems   No resolved problems to display.       Subjective:     General appearance of alert, oriented, no complaints   Pain with rest: 1    Numbers   Pain with movement: 1    Numbers   Side Effects    1. Pruritis No    2. Nausea No    3. Motor Blockade No, 0=Ability to raise lower extremities off bed    4. Sedation No, 1=awake and alert    Objective:        Catheter site clean, dry, intact         Vitals   Vitals:    09/25/18 0511   BP:    Pulse: 71   Resp: 20   Temp:         Labs    Admission on 09/24/2018   Component Date Value Ref Range Status    UNIT NUMBER 09/24/2018 [id number]   Preliminary    PRODUCT CODE 09/24/2018 [id number]   Preliminary    DISPENSE STATUS 09/24/2018 CROSSMATCHED   Preliminary    CODING SYSTEM 09/24/2018 [id number]   Preliminary    Unit Blood Type Code 09/24/2018 5100   Preliminary    Unit Blood Type 09/24/2018 O POS   Preliminary    Unit Expiration 09/24/2018 [id number]   Preliminary    UNIT NUMBER 09/24/2018 [id number]   Preliminary    PRODUCT CODE 09/24/2018 [id number]   Preliminary    DISPENSE STATUS 09/24/2018 CROSSMATCHED   Preliminary    CODING SYSTEM 09/24/2018 [id number]   Preliminary    Unit Blood Type Code 09/24/2018 5100   Preliminary    Unit Blood  Type 09/24/2018 O POS   Preliminary    Unit Expiration 09/24/2018 [id number]   Preliminary    POC PH 09/24/2018 7.381  7.35 - 7.45 Final    POC PCO2 09/24/2018 37.4  35 - 45 mmHg Final    POC PO2 09/24/2018 113* 80 - 100 mmHg Final    POC HCO3 09/24/2018 22.2* 24 - 28 mmol/L Final    POC BE 09/24/2018 -3  -2 to 2 mmol/L Final    POC SATURATED O2 09/24/2018 98  95 - 100 % Final    POC Glucose 09/24/2018 131* 70 - 110 mg/dL Final    POC Sodium 09/24/2018 142  136 - 145 mmol/L Final    POC Potassium 09/24/2018 3.0* 3.5 - 5.1 mmol/L Final    POC TCO2 09/24/2018 23  23 - 27 mmol/L Final    POC Ionized Calcium 09/24/2018 1.28  1.06 - 1.42 mmol/L Final    POC Hematocrit 09/24/2018 35* 36 - 54 %PCV Final    Sample 09/24/2018 ARTERIAL   Final    Sodium 09/25/2018 140  136 - 145 mmol/L Final    Potassium 09/25/2018 3.4* 3.5 - 5.1 mmol/L Final    Chloride 09/25/2018 106  95 - 110 mmol/L Final    CO2 09/25/2018 26  23 - 29 mmol/L Final    Glucose 09/25/2018 104  70 - 110 mg/dL Final    BUN, Bld 09/25/2018 16  6 - 20 mg/dL Final    Creatinine 09/25/2018 2.0* 0.5 - 1.4 mg/dL Final    Calcium 09/25/2018 10.9* 8.7 - 10.5 mg/dL Final    Anion Gap 09/25/2018 8  8 - 16 mmol/L Final    eGFR if  09/25/2018 43.1* >60 mL/min/1.73 m^2 Final    eGFR if non African American 09/25/2018 37.3* >60 mL/min/1.73 m^2 Final    WBC 09/25/2018 13.08* 3.90 - 12.70 K/uL Final    RBC 09/25/2018 4.70  4.60 - 6.20 M/uL Final    Hemoglobin 09/25/2018 13.9* 14.0 - 18.0 g/dL Final    Hematocrit 09/25/2018 40.1  40.0 - 54.0 % Final    MCV 09/25/2018 85  82 - 98 fL Final    MCH 09/25/2018 29.6  27.0 - 31.0 pg Final    MCHC 09/25/2018 34.7  32.0 - 36.0 g/dL Final    RDW 09/25/2018 13.6  11.5 - 14.5 % Final    Platelets 09/25/2018 209  150 - 350 K/uL Final    MPV 09/25/2018 9.9  9.2 - 12.9 fL Final    Immature Granulocytes 09/25/2018 0.6* 0.0 - 0.5 % Final    Gran # (ANC) 09/25/2018 10.2* 1.8 - 7.7 K/uL Final     Immature Grans (Abs) 09/25/2018 0.08* 0.00 - 0.04 K/uL Final    Lymph # 09/25/2018 1.3  1.0 - 4.8 K/uL Final    Mono # 09/25/2018 1.5* 0.3 - 1.0 K/uL Final    Eos # 09/25/2018 0.0  0.0 - 0.5 K/uL Final    Baso # 09/25/2018 0.01  0.00 - 0.20 K/uL Final    nRBC 09/25/2018 0  0 /100 WBC Final    Gran% 09/25/2018 78.1* 38.0 - 73.0 % Final    Lymph% 09/25/2018 9.7* 18.0 - 48.0 % Final    Mono% 09/25/2018 11.5  4.0 - 15.0 % Final    Eosinophil% 09/25/2018 0.0  0.0 - 8.0 % Final    Basophil% 09/25/2018 0.1  0.0 - 1.9 % Final    Differential Method 09/25/2018 Automated   Final        Meds   Current Facility-Administered Medications   Medication Dose Route Frequency Provider Last Rate Last Dose    0.9%  NaCl infusion (for blood administration)   Intravenous Q24H PRN Keven Vivar MD        acetaminophen (10 mg/mL) injection 1,000 mg  1,000 mg Intravenous Q8H Megan Carrion  mL/hr at 09/25/18 0636 1,000 mg at 09/25/18 0636    acetaminophen tablet 1,000 mg  1,000 mg Oral Q6H Shaunna Swan MD   1,000 mg at 09/24/18 2335    allopurinol tablet 100 mg  100 mg Oral QAM Megan Carrion MD   100 mg at 09/25/18 0635    heparin (porcine) injection 5,000 Units  5,000 Units Subcutaneous Q8H Megan Carrion MD   5,000 Units at 09/25/18 0635    methocarbamol tablet 500 mg  500 mg Oral QID Shaunna Swan MD   500 mg at 09/24/18 2125    ondansetron injection 4 mg  4 mg Intravenous Q6H PRN Juan Jarvis MD        oxyCODONE immediate release tablet 10 mg  10 mg Oral Q4H PRN Megan Carrion MD   10 mg at 09/25/18 0643    oxyCODONE immediate release tablet 5 mg  5 mg Oral Q4H PRN Megan Carrion MD        pantoprazole EC tablet 40 mg  40 mg Oral Daily Megan Carrion MD   40 mg at 09/24/18 1509    polyethylene glycol packet 17 g  17 g Oral Daily Megan Carrion MD        pregabalin capsule 75 mg  75 mg Oral BID Shaunna Swan MD   75 mg at 09/24/18 9227     promethazine (PHENERGAN) 6.25 mg in dextrose 5 % 50 mL IVPB  6.25 mg Intravenous Q6H PRN Juan Neel Jarvis MD        ropivacaine (PF) 2 mg/ml (0.2%) infusion  10 mL/hr Perineural Continuous Tomeka Aguilar MD 10 mL/hr at 09/24/18 2334 10 mL/hr at 09/24/18 2334    ropivacaine (PF) 2 mg/ml (0.2%) infusion  10 mL/hr Perineural Continuous Tomeka Aguilar MD 10 mL/hr at 09/24/18 2334 10 mL/hr at 09/24/18 2334    sodium chloride 0.9% flush 3 mL  3 mL Intravenous PRN Megan Carrion MD            Anticoagulant dose heparin 5,000U q 8h.     Assessment:     Pain control adequate    Plan:     Spoke with primary team. Planning to d/c patient today. Paused CHARMAINE catheters to assess pain control on PO medications. Multimodals ordered to minimize opioid use.     Evaluator Shaunna Swan      I have seen the patient, reviewed the Resident's assessment and plan. I have personally interviewed and examined the patient at bedside and: agree with the findings.     Exceptional pain control with minimal opioids.  Will d/c nerve catheters prior to dischandrew Gallegos MD  Staff Anesthesiologist  [facility] and Acute Pain Service         None

## 2018-10-01 ENCOUNTER — RX RENEWAL (OUTPATIENT)
Age: 78
End: 2018-10-01

## 2018-10-04 ENCOUNTER — APPOINTMENT (OUTPATIENT)
Dept: ENDOCRINOLOGY | Facility: CLINIC | Age: 78
End: 2018-10-04
Payer: MEDICARE

## 2018-10-04 VITALS — WEIGHT: 124 LBS | BODY MASS INDEX: 21.97 KG/M2 | HEART RATE: 76 BPM | OXYGEN SATURATION: 92 % | HEIGHT: 63 IN

## 2018-10-04 PROCEDURE — 99215 OFFICE O/P EST HI 40 MIN: CPT

## 2018-10-11 LAB
DHEA-S SERPL-MCNC: 17.2 UG/DL
METANEPHRINE, PL: 36 PG/ML
NORMETANEPHRINE, PL: 102 PG/ML

## 2018-10-16 ENCOUNTER — APPOINTMENT (OUTPATIENT)
Dept: INTERNAL MEDICINE | Facility: CLINIC | Age: 78
End: 2018-10-16
Payer: MEDICARE

## 2018-10-16 ENCOUNTER — LABORATORY RESULT (OUTPATIENT)
Age: 78
End: 2018-10-16

## 2018-10-16 VITALS
SYSTOLIC BLOOD PRESSURE: 130 MMHG | WEIGHT: 127 LBS | HEART RATE: 66 BPM | TEMPERATURE: 97.6 F | BODY MASS INDEX: 22.5 KG/M2 | OXYGEN SATURATION: 96 % | DIASTOLIC BLOOD PRESSURE: 90 MMHG | HEIGHT: 63 IN

## 2018-10-16 VITALS — SYSTOLIC BLOOD PRESSURE: 120 MMHG | DIASTOLIC BLOOD PRESSURE: 80 MMHG

## 2018-10-16 DIAGNOSIS — Z12.31 ENCOUNTER FOR SCREENING MAMMOGRAM FOR MALIGNANT NEOPLASM OF BREAST: ICD-10-CM

## 2018-10-16 DIAGNOSIS — F32.9 MAJOR DEPRESSIVE DISORDER, SINGLE EPISODE, UNSPECIFIED: ICD-10-CM

## 2018-10-16 DIAGNOSIS — Z00.00 ENCOUNTER FOR GENERAL ADULT MEDICAL EXAMINATION W/OUT ABNORMAL FINDINGS: ICD-10-CM

## 2018-10-16 PROCEDURE — 36415 COLL VENOUS BLD VENIPUNCTURE: CPT

## 2018-10-16 PROCEDURE — 99213 OFFICE O/P EST LOW 20 MIN: CPT | Mod: 25

## 2018-10-16 PROCEDURE — G0439: CPT

## 2018-10-16 RX ORDER — OMEPRAZOLE 20 MG/1
20 CAPSULE, DELAYED RELEASE ORAL TWICE DAILY
Qty: 60 | Refills: 1 | Status: ACTIVE | COMMUNITY

## 2018-10-16 NOTE — PHYSICAL EXAM
[No Acute Distress] : no acute distress [Well Nourished] : well nourished [Well-Appearing] : well-appearing [Supple] : supple [No Lymphadenopathy] : no lymphadenopathy [No Respiratory Distress] : no respiratory distress  [Clear to Auscultation] : lungs were clear to auscultation bilaterally [Normal Rate] : normal rate  [Normal S1, S2] : normal S1 and S2 [No Murmur] : no murmur heard [No Edema] : there was no peripheral edema [Soft] : abdomen soft [Non Tender] : non-tender [Non-distended] : non-distended [Normal Bowel Sounds] : normal bowel sounds [No Joint Swelling] : no joint swelling [No Rash] : no rash [Normal Gait] : normal gait [Normal Affect] : the affect was normal [Alert and Oriented x3] : oriented to person, place, and time [Normal Mood] : the mood was normal [Normal Voice/Communication] : normal voice/communication [Normal Sclera/Conjunctiva] : normal sclera/conjunctiva [PERRL] : pupils equal round and reactive to light [EOMI] : extraocular movements intact [Normal Outer Ear/Nose] : the outer ears and nose were normal in appearance [Normal Oropharynx] : the oropharynx was normal [Normal TMs] : both tympanic membranes were normal [No JVD] : no jugular venous distention [Pedal Pulses Present] : the pedal pulses are present [Normal Posterior Cervical Nodes] : no posterior cervical lymphadenopathy [Normal Anterior Cervical Nodes] : no anterior cervical lymphadenopathy [No Spinal Tenderness] : no spinal tenderness [de-identified] : very friendly, awake and alert, in electric wheelchair  [de-identified] : deferred to GYN  [de-identified] : indwelling fragoso catheter present  [de-identified] : wheelchair-bound; profound lower extremity weakness; UE weakness L>>R

## 2018-10-16 NOTE — ASSESSMENT
[FreeTextEntry1] : 1. HCM: check routine labs as listed below. Immunizations discussed - received flu shot 2 weeks ago, last TDaP unknown, pt to think about it. Discussed Shingrix - patient to discuss with her neurologist. Colonoscopy 2012. Mammogram 2016 - rx given as she is overdue. Rx given for bone density. Unlikely will need repeat PAP smear.\par \par 2. Hypertension: BP controlled, on Atenolol. Enalapril and HCTZ stopped last month.\par \par 3. She is followed by Dr. Parry for MS. She uses motorized wheelchair due to lower extremity weakness. She has neurogenic bladder and has indwelling fragoso cathether - has followed with Dr. Hope. On Baclofen and Vesicare.\par \par 4. Hyperthyroidism: saw Dr. Landaverde earlier this month. On Methimazole and Atenolol. Repeat TFT's.\par \par 5. Hypertriglyceridemia: repeat fasting lipid panel. Not on medications.\par \par 6. Hx of depression w/ anxiety: follows with psychiatrist, on Klonopin at bedtime.\par \par 7. GERD: on Omeprazole 20 mg BID; follows with Dr. Batista. Atypical chest pain suspected from GERD. Last colonoscopy in 2012. \par \par 8. Osteoporosis: on Reclast. She is due for repeat DEXA - Rx given. Follows with rheumatologist. \par \par RTO in 3 months for routine follow-up visit.

## 2018-10-16 NOTE — HEALTH RISK ASSESSMENT
[Good] : ~his/her~  mood as  good [No falls in past year] : Patient reported no falls in the past year [0] : 2) Feeling down, depressed, or hopeless: Not at all (0) [Patient reported mammogram was normal] : Patient reported mammogram was normal [Patient reported bone density results were abnormal] : Patient reported bone density results were abnormal [Patient reported colonoscopy was normal] : Patient reported colonoscopy was normal [] : No [With Family] : lives with family [Retired] : retired [] :  [Reports changes in hearing] : Reports no changes in hearing [Reports changes in vision] : Reports no changes in vision [Reports changes in dental health] : Reports no changes in dental health [Smoke Detector] : smoke detector [Carbon Monoxide Detector] : carbon monoxide detector [Seat Belt] :  uses seat belt [Sunscreen] : uses sunscreen [MammogramDate] : 03/16 [BoneDensityDate] : 05/15 [BoneDensityComments] : osteoporosis  [ColonoscopyDate] : 08/12 [Discussed at today's visit] : Advance Directives Discussed at today's visit [Designated Healthcare Proxy] : Designated healthcare proxy [Relationship: ___] : Relationship: [unfilled]

## 2018-10-16 NOTE — REVIEW OF SYSTEMS
[Negative] : Heme/Lymph [Unsteady Walking] : ataxia [Depression] : depression [Chest Pain] : no chest pain [Palpitations] : no palpitations [Lower Ext Edema] : no lower extremity edema [Orthopnea] : no orthopnea [Headache] : no headache [Anxiety] : no anxiety [FreeTextEntry5] : intermittent chest pain usually after eating beef

## 2018-10-16 NOTE — HISTORY OF PRESENT ILLNESS
[Spouse] : spouse [FreeTextEntry1] : CPE [de-identified] : Patient comes for an annual exam. \par \par She is doing well, no new complaints.

## 2018-10-18 DIAGNOSIS — R26.81 UNSTEADINESS ON FEET: ICD-10-CM

## 2018-10-29 LAB
25(OH)D3 SERPL-MCNC: 31.6 NG/ML
ALBUMIN SERPL ELPH-MCNC: 3.5 G/DL
ALP BLD-CCNC: 178 U/L
ALT SERPL-CCNC: 46 U/L
ANION GAP SERPL CALC-SCNC: 12 MMOL/L
APPEARANCE: ABNORMAL
AST SERPL-CCNC: 43 U/L
BACTERIA: ABNORMAL
BASOPHILS # BLD AUTO: 0.02 K/UL
BASOPHILS NFR BLD AUTO: 0.3 %
BILIRUB SERPL-MCNC: 0.3 MG/DL
BILIRUBIN URINE: NEGATIVE
BLOOD URINE: NEGATIVE
BUN SERPL-MCNC: 11 MG/DL
CALCIUM SERPL-MCNC: 9.5 MG/DL
CHLORIDE SERPL-SCNC: 106 MMOL/L
CHOLEST SERPL-MCNC: 218 MG/DL
CHOLEST/HDLC SERPL: 5.9 RATIO
CO2 SERPL-SCNC: 25 MMOL/L
COLOR: YELLOW
CREAT SERPL-MCNC: 0.37 MG/DL
EOSINOPHIL # BLD AUTO: 0.27 K/UL
EOSINOPHIL NFR BLD AUTO: 4.2 %
GLUCOSE QUALITATIVE U: NEGATIVE MG/DL
GLUCOSE SERPL-MCNC: 81 MG/DL
HBA1C MFR BLD HPLC: 5 %
HCT VFR BLD CALC: 41 %
HDLC SERPL-MCNC: 37 MG/DL
HGB BLD-MCNC: 11.9 G/DL
HYALINE CASTS: 0 /LPF
IMM GRANULOCYTES NFR BLD AUTO: 0.8 %
KETONES URINE: NEGATIVE
LDLC SERPL CALC-MCNC: 112 MG/DL
LEUKOCYTE ESTERASE URINE: ABNORMAL
LYMPHOCYTES # BLD AUTO: 3.01 K/UL
LYMPHOCYTES NFR BLD AUTO: 46.5 %
MAN DIFF?: NORMAL
MCHC RBC-ENTMCNC: 27.7 PG
MCHC RBC-ENTMCNC: 29 GM/DL
MCV RBC AUTO: 95.3 FL
MICROSCOPIC-UA: NORMAL
MONOCYTES # BLD AUTO: 0.58 K/UL
MONOCYTES NFR BLD AUTO: 9 %
NEUTROPHILS # BLD AUTO: 2.54 K/UL
NEUTROPHILS NFR BLD AUTO: 39.2 %
NITRITE URINE: POSITIVE
PH URINE: 6.5
PLATELET # BLD AUTO: 209 K/UL
POTASSIUM SERPL-SCNC: 4.2 MMOL/L
PROT SERPL-MCNC: 6.8 G/DL
PROTEIN URINE: ABNORMAL MG/DL
RBC # BLD: 4.3 M/UL
RBC # FLD: 18 %
RED BLOOD CELLS URINE: 0 /HPF
SODIUM SERPL-SCNC: 143 MMOL/L
SPECIFIC GRAVITY URINE: 1.02
SQUAMOUS EPITHELIAL CELLS: 1 /HPF
T4 FREE SERPL-MCNC: 1 NG/DL
TRIGL SERPL-MCNC: 347 MG/DL
TSH SERPL-ACNC: 2.11 UIU/ML
UROBILINOGEN URINE: NEGATIVE MG/DL
WBC # FLD AUTO: 6.47 K/UL
WHITE BLOOD CELLS URINE: 15 /HPF

## 2018-11-01 ENCOUNTER — APPOINTMENT (OUTPATIENT)
Dept: CARDIOLOGY | Facility: CLINIC | Age: 78
End: 2018-11-01
Payer: MEDICARE

## 2018-11-01 VITALS
WEIGHT: 127 LBS | DIASTOLIC BLOOD PRESSURE: 80 MMHG | BODY MASS INDEX: 22.5 KG/M2 | SYSTOLIC BLOOD PRESSURE: 122 MMHG | RESPIRATION RATE: 14 BRPM | HEART RATE: 72 BPM | HEIGHT: 63 IN

## 2018-11-01 DIAGNOSIS — K80.20 CALCULUS OF GALLBLADDER W/OUT CHOLECYSTITIS W/OUT OBSTRUCTION: ICD-10-CM

## 2018-11-01 DIAGNOSIS — K21.9 GASTRO-ESOPHAGEAL REFLUX DISEASE W/OUT ESOPHAGITIS: ICD-10-CM

## 2018-11-01 PROCEDURE — 99214 OFFICE O/P EST MOD 30 MIN: CPT

## 2018-11-01 PROCEDURE — 93306 TTE W/DOPPLER COMPLETE: CPT

## 2018-11-12 ENCOUNTER — APPOINTMENT (OUTPATIENT)
Dept: ENDOCRINOLOGY | Facility: CLINIC | Age: 78
End: 2018-11-12

## 2018-12-04 ENCOUNTER — RX RENEWAL (OUTPATIENT)
Age: 78
End: 2018-12-04

## 2018-12-04 RX ORDER — POTASSIUM CHLORIDE 750 MG/1
10 TABLET, FILM COATED, EXTENDED RELEASE ORAL
Qty: 540 | Refills: 3 | Status: ACTIVE | COMMUNITY
Start: 2016-05-09 | End: 1900-01-01

## 2018-12-31 ENCOUNTER — APPOINTMENT (OUTPATIENT)
Dept: INTERNAL MEDICINE | Facility: CLINIC | Age: 78
End: 2018-12-31
Payer: MEDICARE

## 2018-12-31 VITALS
TEMPERATURE: 97.4 F | WEIGHT: 140 LBS | SYSTOLIC BLOOD PRESSURE: 120 MMHG | HEIGHT: 63 IN | OXYGEN SATURATION: 94 % | BODY MASS INDEX: 24.8 KG/M2 | DIASTOLIC BLOOD PRESSURE: 70 MMHG | HEART RATE: 71 BPM

## 2018-12-31 DIAGNOSIS — S90.32XA CONTUSION OF LEFT FOOT, INITIAL ENCOUNTER: ICD-10-CM

## 2018-12-31 PROCEDURE — 99214 OFFICE O/P EST MOD 30 MIN: CPT

## 2018-12-31 RX ORDER — POLYETHYLENE GLYCOL 1450
POWDER (GRAM) MISCELLANEOUS
Refills: 0 | Status: ACTIVE | COMMUNITY

## 2019-01-07 ENCOUNTER — APPOINTMENT (OUTPATIENT)
Dept: WOUND CARE | Facility: CLINIC | Age: 79
End: 2019-01-07
Payer: MEDICARE

## 2019-01-07 VITALS
TEMPERATURE: 97.5 F | SYSTOLIC BLOOD PRESSURE: 128 MMHG | HEIGHT: 63 IN | DIASTOLIC BLOOD PRESSURE: 78 MMHG | HEART RATE: 60 BPM | BODY MASS INDEX: 24.8 KG/M2 | WEIGHT: 140 LBS

## 2019-01-07 DIAGNOSIS — Z87.39 PERSONAL HISTORY OF OTHER DISEASES OF THE MUSCULOSKELETAL SYSTEM AND CONNECTIVE TISSUE: ICD-10-CM

## 2019-01-07 DIAGNOSIS — Z86.39 PERSONAL HISTORY OF OTHER ENDOCRINE, NUTRITIONAL AND METABOLIC DISEASE: ICD-10-CM

## 2019-01-07 DIAGNOSIS — Z86.79 PERSONAL HISTORY OF OTHER DISEASES OF THE CIRCULATORY SYSTEM: ICD-10-CM

## 2019-01-07 PROCEDURE — 99204 OFFICE O/P NEW MOD 45 MIN: CPT | Mod: 25

## 2019-01-07 PROCEDURE — 93922 UPR/L XTREMITY ART 2 LEVELS: CPT

## 2019-01-11 ENCOUNTER — APPOINTMENT (OUTPATIENT)
Dept: RADIOLOGY | Facility: CLINIC | Age: 79
End: 2019-01-11

## 2019-01-15 ENCOUNTER — APPOINTMENT (OUTPATIENT)
Dept: INTERNAL MEDICINE | Facility: CLINIC | Age: 79
End: 2019-01-15
Payer: MEDICARE

## 2019-01-15 VITALS
HEIGHT: 63 IN | DIASTOLIC BLOOD PRESSURE: 60 MMHG | HEART RATE: 56 BPM | WEIGHT: 140 LBS | OXYGEN SATURATION: 92 % | TEMPERATURE: 97.5 F | SYSTOLIC BLOOD PRESSURE: 110 MMHG | BODY MASS INDEX: 24.8 KG/M2

## 2019-01-15 PROCEDURE — 99214 OFFICE O/P EST MOD 30 MIN: CPT

## 2019-01-15 NOTE — PHYSICAL EXAM
[No Acute Distress] : no acute distress [Well Nourished] : well nourished [Well-Appearing] : well-appearing [Supple] : supple [No Lymphadenopathy] : no lymphadenopathy [No Respiratory Distress] : no respiratory distress  [Clear to Auscultation] : lungs were clear to auscultation bilaterally [Normal Rate] : normal rate  [Normal S1, S2] : normal S1 and S2 [No Murmur] : no murmur heard [No Edema] : there was no peripheral edema [Soft] : abdomen soft [Non Tender] : non-tender [Non-distended] : non-distended [Normal Bowel Sounds] : normal bowel sounds [No Joint Swelling] : no joint swelling [No Rash] : no rash [Normal Gait] : normal gait [Normal Affect] : the affect was normal [Alert and Oriented x3] : oriented to person, place, and time [Normal Mood] : the mood was normal [de-identified] : very friendly [de-identified] : indwelling fragoso catheter present  [de-identified] : wearing left heel boot - heel wrapped in dressing  [de-identified] : wheelchair-bound; profound lower extremity weakness; UE weakness L>>R

## 2019-01-15 NOTE — REVIEW OF SYSTEMS
[Fever] : no fever [Chills] : no chills [Earache] : no earache [Sore Throat] : no sore throat [Chest Pain] : no chest pain [Lower Ext Edema] : no lower extremity edema [Shortness Of Breath] : no shortness of breath [Abdominal Pain] : no abdominal pain [Nausea] : no nausea [Diarrhea] : diarrhea [Dysuria] : no dysuria [Frequency] : no frequency [Joint Stiffness] : no joint stiffness [Back Pain] : no back pain [Skin Rash] : no skin rash [Headache] : no headache [Anxiety] : no anxiety [Depression] : no depression

## 2019-01-15 NOTE — ASSESSMENT
[FreeTextEntry1] : 1. The BP is controlled on Atenolol. Low salt diet.\par \par 2. Follow-up with Dr. Landaverde next month for hyperthyroidism. Continue Methimazole. \par \par 3. She has elevated TG level - try to cut down on sweets. Recheck fasting labs in 3 months.\par \par 4. Continue follow-up with Dr. Parry for MS. Continue Baclofen and Vesicare. \par \par 5. Wound care follow-up for left heel ulcer. She is seeing vascular surgery next week for further testing.\par \par RTO in 3 months.

## 2019-01-15 NOTE — HISTORY OF PRESENT ILLNESS
[Spouse] : spouse [FreeTextEntry1] : Follow-up [de-identified] : Patient comes for 3 month follow-up visit.\par \par She is being followed by wound care for a left heel ulcer, plans to see vascular surgery on 1/25 for further testing. Visiting nurse comes to home every other day to change the dressing with Medihoney. \par \par No other complaints today.

## 2019-01-21 NOTE — PATIENT PROFILE ADULT. - SPIRITUAL CULTURAL, CURRENT SITUATION, PROFILE
[employer] called asking for a prior auth for this patient  DOS:  1/28/19  Fax: [phone]  NPI :  [id number]  Thank you 
Current Status: Approved   Validity Period: 1/21/2019-2/20/2019   Authorization: [id number] ([facility])
none

## 2019-01-25 ENCOUNTER — APPOINTMENT (OUTPATIENT)
Dept: VASCULAR SURGERY | Facility: CLINIC | Age: 79
End: 2019-01-25
Payer: MEDICARE

## 2019-01-25 ENCOUNTER — APPOINTMENT (OUTPATIENT)
Dept: WOUND CARE | Facility: CLINIC | Age: 79
End: 2019-01-25
Payer: MEDICARE

## 2019-01-25 PROCEDURE — 93923 UPR/LXTR ART STDY 3+ LVLS: CPT

## 2019-01-25 PROCEDURE — 99213 OFFICE O/P EST LOW 20 MIN: CPT

## 2019-01-25 PROCEDURE — 93971 EXTREMITY STUDY: CPT

## 2019-01-26 NOTE — REVIEW OF SYSTEMS
[As Noted in HPI] : as noted in HPI [Skin Wound] : skin wound [Negative] : Heme/Lymph [FreeTextEntry3] : wears glasses [FreeTextEntry9] : spasms due to MS

## 2019-01-26 NOTE — PHYSICAL EXAM
[Normal Breath Sounds] : Normal breath sounds [Normal Heart Sounds] : normal heart sounds [0] : left 0 [Ankle Swelling On The Left] : of the left ankle [Alert] : alert [Oriented to Person] : oriented to person [Oriented to Place] : oriented to place [Oriented to Time] : oriented to time [Calm] : calm [JVD] : no jugular venous distention  [Varicose Veins Of Lower Extremities] : not present [] : not present [de-identified] : well nourished, well groomed [de-identified] : soft, positive bowel sounds [de-identified] : indwelling catheter changed 2x/month [de-identified] : not ambulatory, wheelchair bound due to multiple sclerosis [de-identified] : wound on left heel [FreeTextEntry1] : heel  [FreeTextEntry2] : 2.3 [FreeTextEntry3] : 1.6 cm  [FreeTextEntry4] : .2 cm [de-identified] : iodosorb

## 2019-01-26 NOTE — HISTORY OF PRESENT ILLNESS
[FreeTextEntry1] : Ms. HOLLY FRY   presents to the office with a wound for 3.5 months duration.  The wound is located on  the left heel .  The patient has been dressing the wound with Medihoney daily, as prescribed by primary.  The patient denies fevers or chills.  The patient has localized pain to the wound upon dressing changes.  The patient has no other complaints or associated symptoms. \par \par August 2018, hospitalized at [facility] for sepsis, ICU, hospitalized for 7 days, shortly after when home, noticed redness and pain on left heel, then skin broke down. Prior wound on left heel several years ago, followed by podiatry, eventually healed.

## 2019-01-26 NOTE — ASSESSMENT
[FreeTextEntry1] : 78 yr/ old with multiple sclerosis, wheelchair bound, presents with left heel wound, since october 2018, was treating with medihoney daily and offloading.  Wound demonstrates no improvement.  \par \par Previously, the patient underwent evaluation for peripheral arterial disease using a [employer] Click[employer] diagnostic machine.  Ankle brachial index was obtained using blood pressure cuffs of the ankle and brachial segments of both legs.  A distal pulse volume recording was noted digitally on the device.  The procedure was supervised and interpreted by the physician.  MARILY of the right lower extremity  < 0.5  MARILY of the left lower extremity < 0.5.  Waveform noted to be monophasic  Patient tolerated procedure well in the office.  Results discussed with the patient.  A multi-level MARILY/PVR was subsequently obtained demonstrating significant severe superficial insufficiency of GSV and SSV of the LLE.   No evidence of DVT/SVT.  \par MARILY/PVR 0.7 with monophasic waveforms\par Vascular consult recommended\par  offloading area and continue with iodosorb dressing, cavilon right heel and keep offloaded also.\par Visiting nurse ordered, supplies ordered from [employer]\par \par \par \par

## 2019-02-04 ENCOUNTER — APPOINTMENT (OUTPATIENT)
Dept: INTERNAL MEDICINE | Facility: CLINIC | Age: 79
End: 2019-02-04

## 2019-02-04 NOTE — ASSESSMENT
[FreeTextEntry1] : Left heel ulcer, without signs of acute infection.  Patient to avoid pressure of the heel.  Bacitracin, telfa, and abdominal pad applied to the foot.  Script for Medi-honey given.  Referral to Wound Care office.\par \par Script given for x-ray of the left foot.  Patient with some bruising over the metatarsals, but has good range of motion and minimal to no tenderness on palpation.

## 2019-02-04 NOTE — HISTORY OF PRESENT ILLNESS
[FreeTextEntry8] : Patient has a pressure sore in the left heel.\par \par Patient was being followed by a home care nurse, and the patient is getting Silvadene and dry gauze.  It was initially changed once daily, and now twice daily.  No fevers, chillls, or wound drainage.

## 2019-02-04 NOTE — PHYSICAL EXAM
[de-identified] : 2cm x 2cm left heel ulcer, with no drainage or odor.  Mild bruising over the left metatarsals.

## 2019-02-05 ENCOUNTER — RX RENEWAL (OUTPATIENT)
Age: 79
End: 2019-02-05

## 2019-02-06 ENCOUNTER — RX RENEWAL (OUTPATIENT)
Age: 79
End: 2019-02-06

## 2019-02-08 ENCOUNTER — APPOINTMENT (OUTPATIENT)
Dept: INTERNAL MEDICINE | Facility: CLINIC | Age: 79
End: 2019-02-08
Payer: MEDICARE

## 2019-02-08 ENCOUNTER — NON-APPOINTMENT (OUTPATIENT)
Age: 79
End: 2019-02-08

## 2019-02-08 VITALS
HEART RATE: 69 BPM | TEMPERATURE: 97.5 F | OXYGEN SATURATION: 93 % | DIASTOLIC BLOOD PRESSURE: 60 MMHG | SYSTOLIC BLOOD PRESSURE: 110 MMHG

## 2019-02-08 DIAGNOSIS — R07.89 OTHER CHEST PAIN: ICD-10-CM

## 2019-02-08 PROCEDURE — 99214 OFFICE O/P EST MOD 30 MIN: CPT | Mod: 25

## 2019-02-08 PROCEDURE — 93000 ELECTROCARDIOGRAM COMPLETE: CPT

## 2019-02-08 NOTE — PHYSICAL EXAM
[No Acute Distress] : no acute distress [Well Nourished] : well nourished [Well-Appearing] : well-appearing [Supple] : supple [No Lymphadenopathy] : no lymphadenopathy [No Respiratory Distress] : no respiratory distress  [Clear to Auscultation] : lungs were clear to auscultation bilaterally [Normal Rate] : normal rate  [Normal S1, S2] : normal S1 and S2 [No Murmur] : no murmur heard [No Edema] : there was no peripheral edema [Soft] : abdomen soft [Non Tender] : non-tender [Non-distended] : non-distended [Normal Bowel Sounds] : normal bowel sounds [No Joint Swelling] : no joint swelling [Normal Gait] : normal gait [Normal Affect] : the affect was normal [Alert and Oriented x3] : oriented to person, place, and time [Normal Mood] : the mood was normal [de-identified] : indwelling fragoso catheter present  [de-identified] : left axilla tender to palpation  [de-identified] : large vesiculopapular rash under left breast and left side of back involving T4/T5 dermatome, does not cross midline  [de-identified] : wheelchair-bound; profound lower extremity weakness; UE weakness L>>R

## 2019-02-08 NOTE — ASSESSMENT
[FreeTextEntry1] : Patient has zoster. Start Valtrex 1g TID x 1 week. Triaminolone cream BID to affected areas. Ibuprofen 800 mg PRN for pain control. Continue Gabapentin as prescribed by neurology. Wash hands often. RTO in 1 week for checkup of rash. She will need Shingrix vaccine once becomes available in office.\par \par BP is controlled on Atenolol. \par \par RTO in 1 week.

## 2019-02-08 NOTE — REVIEW OF SYSTEMS
[Chest Pain] : chest pain [Fever] : no fever [Chills] : no chills [Palpitations] : no palpitations [Lower Ext Edema] : no lower extremity edema [Shortness Of Breath] : no shortness of breath [Cough] : no cough [Abdominal Pain] : no abdominal pain [Nausea] : no nausea [Diarrhea] : diarrhea [Itching] : Itching [Skin Rash] : skin rash

## 2019-02-08 NOTE — HISTORY OF PRESENT ILLNESS
[Spouse] : spouse [FreeTextEntry8] : Patient complains of pain on left chest near axilla x 1 week.  thinks pain radiates into back. The pain lasts typically for a few minutes then resolves on own. Pain described as stabbing sensation. She thinks this pain is different from prior episodes of chest pain as seen by Dr. Jon. The pain is alleviated with ibuprofen and gabapentin. Denies dyspnea. The pain is reproducible with palpation. No recent fall or injury. Of note, she developed a rash under left breast 2 days ago, initially started as a small dot but has since spread to left side of back and does not cross the midline. No hx of shingles. Never received Shingrix due to lack of supply.

## 2019-02-14 ENCOUNTER — APPOINTMENT (OUTPATIENT)
Dept: INTERNAL MEDICINE | Facility: CLINIC | Age: 79
End: 2019-02-14
Payer: MEDICARE

## 2019-02-14 VITALS
OXYGEN SATURATION: 96 % | DIASTOLIC BLOOD PRESSURE: 60 MMHG | HEART RATE: 77 BPM | WEIGHT: 140 LBS | BODY MASS INDEX: 24.8 KG/M2 | SYSTOLIC BLOOD PRESSURE: 124 MMHG | HEIGHT: 63 IN

## 2019-02-14 PROCEDURE — 99213 OFFICE O/P EST LOW 20 MIN: CPT

## 2019-02-14 RX ORDER — VALACYCLOVIR 1 G/1
1 TABLET, FILM COATED ORAL 3 TIMES DAILY
Qty: 21 | Refills: 0 | Status: DISCONTINUED | COMMUNITY
Start: 2019-02-08 | End: 2019-02-14

## 2019-02-14 RX ORDER — GABAPENTIN 300 MG/1
300 CAPSULE ORAL
Qty: 90 | Refills: 5 | Status: ACTIVE | COMMUNITY
Start: 2019-02-14

## 2019-02-14 NOTE — PHYSICAL EXAM
[No Acute Distress] : no acute distress [Well Nourished] : well nourished [Well-Appearing] : well-appearing [Supple] : supple [No Lymphadenopathy] : no lymphadenopathy [No Respiratory Distress] : no respiratory distress  [Clear to Auscultation] : lungs were clear to auscultation bilaterally [Normal Rate] : normal rate  [Normal S1, S2] : normal S1 and S2 [No Murmur] : no murmur heard [No Edema] : there was no peripheral edema [Soft] : abdomen soft [Non Tender] : non-tender [Non-distended] : non-distended [Normal Bowel Sounds] : normal bowel sounds [Normal Gait] : normal gait [Normal Affect] : the affect was normal [Alert and Oriented x3] : oriented to person, place, and time [Normal Mood] : the mood was normal [de-identified] : indwelling fragoso catheter present  [de-identified] : vesiculopapular rash involving left breast and left side of back involving T4/T5 dermatome has mostly crusted  [de-identified] : wheelchair-bound; profound lower extremity weakness; UE weakness L>>R

## 2019-02-14 NOTE — ASSESSMENT
[FreeTextEntry1] : Patient with shingles. Completed Valtrex last night. She unfortunately remains in pain along rash distribution. Continue Ibuprofen 800 mg TID PRN. Can increase Gabapentin to 300-400 mg PRN. Her pain is worse at night - start Tramadol 50 mg QHS PRN; [facility] website consulted. \par \par RTO in 2 months and PRN.

## 2019-02-14 NOTE — HISTORY OF PRESENT ILLNESS
[Spouse] : spouse [FreeTextEntry1] : F/u zoster  [de-identified] : Patient comes for 1 week follow-up of shingles. \par \par The rash has mostly crusted. She took 1 week of Valtrex - completed last night. She still however remains in pain along rash distribution involving left breast into left side of back. Pain is worse at night typically. She is taking Ibuprofen 800 mg 3-4x a day along with Gabapentin 200 mg at bedtime. She has been applying Triamcinolone cream to rash.

## 2019-02-14 NOTE — REVIEW OF SYSTEMS
[Chest Pain] : chest pain [Itching] : Itching [Skin Rash] : skin rash [Fever] : no fever [Chills] : no chills [Palpitations] : no palpitations [Lower Ext Edema] : no lower extremity edema [Shortness Of Breath] : no shortness of breath [Cough] : no cough [Abdominal Pain] : no abdominal pain [Nausea] : no nausea [Diarrhea] : diarrhea

## 2019-02-19 ENCOUNTER — APPOINTMENT (OUTPATIENT)
Dept: VASCULAR SURGERY | Facility: CLINIC | Age: 79
End: 2019-02-19
Payer: MEDICARE

## 2019-02-19 PROCEDURE — 99204 OFFICE O/P NEW MOD 45 MIN: CPT

## 2019-02-19 NOTE — ASSESSMENT
[FreeTextEntry1] : Given the severity of the patient's peripheral vascular disease and ulceration would recommend angiogram with possible angioplasty and stenting. I will discuss this with

## 2019-02-19 NOTE — PHYSICAL EXAM
[JVD] : no jugular venous distention  [Normal Breath Sounds] : Normal breath sounds [2+] : left 2+ [0] : left 0 [Ankle Swelling (On Exam)] : not present [Varicose Veins Of Lower Extremities] : not present [] : not present [Abdomen Tenderness] : ~T ~M No abdominal tenderness [No Rash or Lesion] : No rash or lesion [Skin Ulcer] : ulcer [Alert] : alert [Calm] : calm [de-identified] : wheelchair-bound [FreeTextEntry1] : Left heel with a clean eschar [de-identified] : Poor motor function

## 2019-02-25 ENCOUNTER — APPOINTMENT (OUTPATIENT)
Dept: ENDOCRINOLOGY | Facility: CLINIC | Age: 79
End: 2019-02-25
Payer: MEDICARE

## 2019-02-25 VITALS
BODY MASS INDEX: 24.8 KG/M2 | HEIGHT: 63 IN | WEIGHT: 140 LBS | HEART RATE: 61 BPM | SYSTOLIC BLOOD PRESSURE: 140 MMHG | DIASTOLIC BLOOD PRESSURE: 80 MMHG | OXYGEN SATURATION: 96 %

## 2019-02-25 PROCEDURE — 99214 OFFICE O/P EST MOD 30 MIN: CPT

## 2019-02-25 NOTE — PHYSICAL EXAM
[No Proptosis] : no proptosis [Thyroid Not Enlarged] : the thyroid was not enlarged [No Respiratory Distress] : no respiratory distress [Normal Rate and Effort] : normal respiratory rhythm and effort [No Accessory Muscle Use] : no accessory muscle use [Clear to Auscultation] : lungs were clear to auscultation bilaterally [Normal Rate] : heart rate was normal  [Normal S1, S2] : normal S1 and S2 [Normal Bowel Sounds] : normal bowel sounds [Not Tender] : non-tender [Soft] : abdomen soft [Kyphosis] : kyphosis present [Scoliosis] : scoliosis present [Normal Affect] : the affect was normal [Normal Mood] : the mood was normal [de-identified] : frail elderly woman, wheelchair bound.  present [de-identified] : transfers via electronic chair [de-identified] : Transfers via wheelchair

## 2019-02-25 NOTE — REVIEW OF SYSTEMS
[Fatigue] : no fatigue [Decreased Appetite] : appetite not decreased [Chest Pain] : no chest pain [Palpitations] : no palpitations [Heart Rate Is Slow] : the heart rate was not slow [Heart Rate Is Fast] : the heart rate was not fast [Nausea] : no nausea [Vomiting] : no vomiting was observed [Constipation] : no constipation [Difficulty Walking] : difficulty walking [Depression] : no depression [Anxiety] : no anxiety [Cold Intolerance] : cold tolerant [Heat Intolerance] : heat tolerant [Negative] : Cardiovascular [All other systems negative] : All other systems negative [de-identified] : Wheelchair bound

## 2019-02-25 NOTE — HISTORY OF PRESENT ILLNESS
[FreeTextEntry1] : Patient is a 78 yo woman with hx of multiple sclerosis wheel chair dependent, osteoporosis, hyperthyroidism and bilateral adrenal bridget\par \par Previous endocrinologist Dr. Howard for thyroid abnormalities. She was never previously on thyroid medications prior to the hospitalization in 2018. The patient was using iodine drops for about 6 months when she was getting recurrent UTI. Then 3 months prior to hospitalization, she was taking an iodine supplement containing 50 mg of iodine in each pill to help with her MS. She thyroid function tests done on admission and she had +TSH receptor antibody 39.61, free t4 of 5.1, TSH of < 0.01. \par On Methimazole 10 mg daily and atenolol. Definitive therapy with GRIFFIN was offered but she had deferred it in favor of methimazole\par She no longer takes iodine supplementation\par \par \par The patient had a CT scan done while in the hospital which showed adrenal incidentalomas measuring 2.6 x 2 cm on the left and 1.3 x 1 cm on the right. Metanephrine, DHEAs were negative. Patient refused dexamethasone suppresion testing and opted for conservative surveillance at the last visit\par \par Osteoporosis: \par Had about 3 doses of Reclast. Her osteoporosis is managed Dr. Hyde/Rheumatologist\par Wheelchair bound, DXA done in 2015\par T score L1-L4 -2.4\par Femoral neck T score -4.7\par Total hip T score -5.0\par

## 2019-02-25 NOTE — ASSESSMENT
[FreeTextEntry1] : Patient is a 78 yo woman with hyperthyroidism, osteoporosis and adrenal adenomas\par \par 1. Hyperthyroid\par -clinically euthyroid on stable dose of methimazole 10 mg. Again, offered option of GRIFFIN but patient is unsure how to proceed.  Discussed risk of agranulocytosis and elevation in liver enzymes\par -heart rate is at goal\par -repeat TFT's and medications adjustments will be made based on results, if needed\par \par 2. Osteoporosis\par -patient and  are limited historians. They do not know whether repeat bone density was done. This is supposedly being managed by Dr. Eduardo but there is no communication as to what the steps moving forward are\par \par 3. Adrenal adenoma\par -metanephrine screens were negative.  Patient deferred dexamethasone suppression testing\par -can repeat CT adrenals for surveillance\par \par More importantly, the patient seems to have some fragmentation of care.  Her osteoporosis is supposedly managed outpatient but no recent bone density since 2015.  No fracture history.  I think it is vital for the patient to have discussion regarding goals of care with PCP.  Has adrenal nodules also. If we are going to be aggressive with management that requires dexamethasone suppression testing, repeat CT of the adrenals.  If the patient prefers for conservative management, we would back down on frequency of testing. She has advanced MS and today's visit basically was focused on her pain from shingles and aches.  With chronic disease, wheel chair bound GOC should be determined.  PCP can consider referral to gerontology as will.

## 2019-03-01 LAB
T3 SERPL-MCNC: 171 NG/DL
T4 FREE SERPL-MCNC: 0.8 NG/DL
TSH SERPL-ACNC: 17.8 UIU/ML

## 2019-03-01 RX ORDER — METHIMAZOLE 10 MG/1
10 TABLET ORAL DAILY
Qty: 90 | Refills: 2 | Status: DISCONTINUED | COMMUNITY
Start: 2018-09-07 | End: 2019-03-01

## 2019-03-03 ENCOUNTER — RX RENEWAL (OUTPATIENT)
Age: 79
End: 2019-03-03

## 2019-03-07 ENCOUNTER — RESULT CHARGE (OUTPATIENT)
Age: 79
End: 2019-03-07

## 2019-03-11 ENCOUNTER — TRANSCRIPTION ENCOUNTER (OUTPATIENT)
Age: 79
End: 2019-03-11

## 2019-03-11 ENCOUNTER — APPOINTMENT (OUTPATIENT)
Dept: WOUND CARE | Facility: CLINIC | Age: 79
End: 2019-03-11
Payer: MEDICARE

## 2019-03-11 PROCEDURE — 99213 OFFICE O/P EST LOW 20 MIN: CPT

## 2019-03-12 NOTE — HISTORY OF PRESENT ILLNESS
[FreeTextEntry1] : Ms. HOLLY FRY   presents to the office with a wound for 3.5 months duration.  The wound is located on  the left heel .  The patient has been dressing the wound with Medihoney daily, as prescribed by primary.  The patient denies fevers or chills.  The patient has localized pain to the wound upon dressing changes.  The patient has no other complaints or associated symptoms. \par \par August 2018, hospitalized at [place] for sepsis, ICU, hospitalized for 7 days, shortly after when home, noticed redness and pain on left heel, then skin broke down. Prior wound on left heel several years ago, followed by podiatry, eventually healed.

## 2019-03-12 NOTE — PHYSICAL EXAM
[Normal Breath Sounds] : Normal breath sounds [Normal Heart Sounds] : normal heart sounds [0] : left 0 [Ankle Swelling On The Left] : of the left ankle [Alert] : alert [Oriented to Person] : oriented to person [Oriented to Place] : oriented to place [Oriented to Time] : oriented to time [Calm] : calm [JVD] : no jugular venous distention  [Varicose Veins Of Lower Extremities] : not present [] : not present [de-identified] : well nourished, well groomed [de-identified] : soft, positive bowel sounds [de-identified] : indwelling catheter changed 2x/month [de-identified] : not ambulatory, wheelchair bound due to multiple sclerosis [de-identified] : wound on left heel [FreeTextEntry1] : heel  [FreeTextEntry2] : 2 [FreeTextEntry3] : 1.5 [FreeTextEntry4] : 0 [de-identified] : Iodosorb

## 2019-03-13 ENCOUNTER — RX RENEWAL (OUTPATIENT)
Age: 79
End: 2019-03-13

## 2019-03-14 ENCOUNTER — APPOINTMENT (OUTPATIENT)
Dept: INTERNAL MEDICINE | Facility: CLINIC | Age: 79
End: 2019-03-14
Payer: MEDICARE

## 2019-03-14 ENCOUNTER — RX RENEWAL (OUTPATIENT)
Age: 79
End: 2019-03-14

## 2019-03-14 VITALS
TEMPERATURE: 97.4 F | OXYGEN SATURATION: 96 % | SYSTOLIC BLOOD PRESSURE: 120 MMHG | DIASTOLIC BLOOD PRESSURE: 70 MMHG | HEART RATE: 69 BPM

## 2019-03-14 DIAGNOSIS — Z86.19 PERSONAL HISTORY OF OTHER INFECTIOUS AND PARASITIC DISEASES: ICD-10-CM

## 2019-03-14 PROCEDURE — 99214 OFFICE O/P EST MOD 30 MIN: CPT

## 2019-03-14 RX ORDER — TRIAMCINOLONE ACETONIDE 1 MG/G
0.1 CREAM TOPICAL TWICE DAILY
Qty: 1 | Refills: 1 | Status: DISCONTINUED | COMMUNITY
Start: 2019-02-08 | End: 2019-03-14

## 2019-03-14 NOTE — PHYSICAL EXAM
[No Acute Distress] : no acute distress [Well Nourished] : well nourished [Well-Appearing] : well-appearing [Supple] : supple [No Lymphadenopathy] : no lymphadenopathy [No Respiratory Distress] : no respiratory distress  [Clear to Auscultation] : lungs were clear to auscultation bilaterally [Normal Rate] : normal rate  [Normal S1, S2] : normal S1 and S2 [No Murmur] : no murmur heard [No Edema] : there was no peripheral edema [Soft] : abdomen soft [Non Tender] : non-tender [Non-distended] : non-distended [Normal Bowel Sounds] : normal bowel sounds [Normal Gait] : normal gait [Normal Affect] : the affect was normal [Alert and Oriented x3] : oriented to person, place, and time [Normal Mood] : the mood was normal [de-identified] : indwelling fragoso catheter present  [de-identified] : vesiculopapular rash involving left breast and left side of back involving T4/T5 dermatome has faded [de-identified] : wheelchair-bound; profound lower extremity weakness; UE weakness L>>R

## 2019-03-14 NOTE — REVIEW OF SYSTEMS
[Chest Pain] : chest pain [Fever] : no fever [Chills] : no chills [Palpitations] : no palpitations [Lower Ext Edema] : no lower extremity edema [Shortness Of Breath] : no shortness of breath [Cough] : no cough [Abdominal Pain] : no abdominal pain [Nausea] : no nausea [Diarrhea] : diarrhea [Itching] : no itching [Skin Rash] : no skin rash [de-identified] : rash resolved

## 2019-03-14 NOTE — HISTORY OF PRESENT ILLNESS
[Spouse] : spouse [FreeTextEntry1] : Follow-up shingles [de-identified] : Patient comes for follow-up. Last seen 1 month ago after being diagnosed with zoster involving left side of chest into back. \par \par She remains in pain along prior rash distribution. She thinks the rash is still present especially after touching her skin. She stopped taking Tramadol and currently takes ibuprofen and gabapentin. She saw Dr. Parry a few weeks ago - was reminded to take the gabapentin. Her pain is worse typically at night.\par \par She saw Dr. Landaverde a few weeks ago. The TSH was very elevated so the Methimazole was discontinued. DEXA again showed osteoporosis. Discussion was made to start either oral bisphosphonate or Prolia. Patient thinks was told by Dr. Parry that she should not receive Prolia because of her MS.

## 2019-03-14 NOTE — ASSESSMENT
[FreeTextEntry1] : Shingles diagnosed on 2/8. She remains in pain so likely has postherpetic neuralgia. Continue pain management regimen - Gabapentin and Ibuprofen PRN. Can take Tramadol as needed for severe pain - refill given today. Add Lidoderm patch - since her pain is worse at night, she will apply patch during bedtime and advised to keep on skin for max of 12 hrs. \par \par Regarding osteoporosis, patient thinks had Reclast injection within past yr but is not completely sure. She no longer will see rheumatologist and continue management with Dr. Landaverde. Apparently was told cannot receive Prolia as per her neurologist. \par \par Regarding hyperthyroidism, the TSH was very elevated last month and the Methimazole was stopped by Dr. Landaverde. \par \par RTO in 1 month as scheduled.

## 2019-03-28 ENCOUNTER — APPOINTMENT (OUTPATIENT)
Dept: ULTRASOUND IMAGING | Facility: HOSPITAL | Age: 79
End: 2019-03-28

## 2019-03-28 ENCOUNTER — FORM ENCOUNTER (OUTPATIENT)
Age: 79
End: 2019-03-28

## 2019-03-29 ENCOUNTER — APPOINTMENT (OUTPATIENT)
Dept: ENDOVASCULAR SURGERY | Facility: CLINIC | Age: 79
End: 2019-03-29
Payer: MEDICARE

## 2019-03-29 PROCEDURE — 75625 CONTRAST EXAM ABDOMINL AORTA: CPT

## 2019-03-29 PROCEDURE — 36245Z: CUSTOM

## 2019-04-16 ENCOUNTER — APPOINTMENT (OUTPATIENT)
Dept: INTERNAL MEDICINE | Facility: CLINIC | Age: 79
End: 2019-04-16
Payer: MEDICARE

## 2019-04-16 VITALS
BODY MASS INDEX: 24.8 KG/M2 | WEIGHT: 140 LBS | DIASTOLIC BLOOD PRESSURE: 68 MMHG | HEART RATE: 61 BPM | HEIGHT: 63 IN | OXYGEN SATURATION: 99 % | SYSTOLIC BLOOD PRESSURE: 126 MMHG | TEMPERATURE: 97.1 F

## 2019-04-16 DIAGNOSIS — B02.29 OTHER POSTHERPETIC NERVOUS SYSTEM INVOLVEMENT: ICD-10-CM

## 2019-04-16 DIAGNOSIS — E78.1 PURE HYPERGLYCERIDEMIA: ICD-10-CM

## 2019-04-16 PROCEDURE — 99214 OFFICE O/P EST MOD 30 MIN: CPT | Mod: 25

## 2019-04-16 PROCEDURE — 36415 COLL VENOUS BLD VENIPUNCTURE: CPT

## 2019-04-16 RX ORDER — ACETAMINOPHEN 325 MG/1
TABLET, FILM COATED ORAL
Refills: 0 | Status: DISCONTINUED | COMMUNITY
End: 2019-04-16

## 2019-04-16 RX ORDER — NAPROXEN SODIUM 220 MG
TABLET ORAL
Refills: 0 | Status: DISCONTINUED | COMMUNITY
End: 2019-04-16

## 2019-04-16 RX ORDER — SENNOSIDES 8.6 MG TABLETS 8.6 MG/1
TABLET ORAL
Refills: 0 | Status: DISCONTINUED | COMMUNITY
End: 2019-04-16

## 2019-04-16 RX ORDER — TRAMADOL HYDROCHLORIDE 50 MG/1
50 TABLET, COATED ORAL
Qty: 30 | Refills: 0 | Status: DISCONTINUED | COMMUNITY
Start: 2019-02-14 | End: 2019-04-16

## 2019-04-16 RX ORDER — HYOSCYAMINE SULFATE, METHENAMINE, METHYLENE BLUE, PHENYL SALICYLATE, AND SODIUM PHOSPHATE, MONOBASIC, MONOHYDRATE .12; 81; 10.8; 32.4; 40.8 MG/1; MG/1; MG/1; MG/1; MG/1
TABLET ORAL
Refills: 0 | Status: DISCONTINUED | COMMUNITY
End: 2019-04-16

## 2019-04-16 RX ORDER — LIDOCAINE 5% 700 MG/1
5 PATCH TOPICAL
Qty: 1 | Refills: 3 | Status: ACTIVE | COMMUNITY
Start: 2019-03-14 | End: 1900-01-01

## 2019-04-16 RX ORDER — IBUPROFEN 600 MG/1
600 TABLET, FILM COATED ORAL
Qty: 90 | Refills: 1 | Status: ACTIVE | COMMUNITY
Start: 2019-02-08 | End: 1900-01-01

## 2019-04-16 NOTE — HISTORY OF PRESENT ILLNESS
[Other: _____] : [unfilled] [FreeTextEntry1] : Follow-up [de-identified] : Patient comes for routine follow-up. Seen 1 month ago. She still reports pain along left side of chest under breast into back due to the postherpetic neuralgia. She is taking Ibuprofen and gabapentin. Lidoderm patch ordered last month but was not approved by insurance. She recently saw GYN who plans to check mammogram and left breast ultrasound. She mentions having chronic oozing of left breast.

## 2019-04-16 NOTE — ASSESSMENT
[FreeTextEntry1] : 1. The BP is controlled on Atenolol. Low salt diet.\par \par 2. The TSH in 2/19 was very elevated so the Methimazole was discontinued. Repeat TFT's. F/u with Dr. Landaverde in 6/19.\par \par 3. She has elevated TG level - try to cut down on sweets. Check lipid panel.\par \par 4. She will follow-up with Dr. Parry for her MS. Continue Baclofen and Vesicare. \par \par 5. Shingles diagnosed on 2/8. Remains in pain particularly under left breast into back from postherpetic neuralagia. Lower Ibuprofen to 600 mg PRN. Continue Gabapentin. Reordered Lidoderm patch (not covered by insurance) and provided coupon for $72 for 1 month supply. Stop tramadol as it was not helping. Consider pain management eval if pain remains persistent. \par \par RTO in 2 months.

## 2019-04-16 NOTE — PHYSICAL EXAM
[Well Nourished] : well nourished [No Acute Distress] : no acute distress [Well-Appearing] : well-appearing [Supple] : supple [No Lymphadenopathy] : no lymphadenopathy [No Respiratory Distress] : no respiratory distress  [Clear to Auscultation] : lungs were clear to auscultation bilaterally [Normal Rate] : normal rate  [Normal S1, S2] : normal S1 and S2 [No Murmur] : no murmur heard [No Edema] : there was no peripheral edema [Soft] : abdomen soft [Non Tender] : non-tender [Non-distended] : non-distended [Normal Bowel Sounds] : normal bowel sounds [No Joint Swelling] : no joint swelling [No Rash] : no rash [Normal Gait] : normal gait [Normal Affect] : the affect was normal [Alert and Oriented x3] : oriented to person, place, and time [Normal Mood] : the mood was normal [de-identified] : very friendly [de-identified] : indwelling fragoso catheter present  [de-identified] : wearing left heel boot; herpetic rash along T4-T5 dermatome resolved  [de-identified] : wheelchair-bound; profound lower extremity weakness; UE weakness L>>R

## 2019-04-23 ENCOUNTER — RX RENEWAL (OUTPATIENT)
Age: 79
End: 2019-04-23

## 2019-05-01 ENCOUNTER — NON-APPOINTMENT (OUTPATIENT)
Age: 79
End: 2019-05-01

## 2019-05-01 ENCOUNTER — APPOINTMENT (OUTPATIENT)
Dept: CARDIOLOGY | Facility: CLINIC | Age: 79
End: 2019-05-01
Payer: MEDICARE

## 2019-05-01 VITALS
WEIGHT: 135 LBS | DIASTOLIC BLOOD PRESSURE: 70 MMHG | SYSTOLIC BLOOD PRESSURE: 120 MMHG | BODY MASS INDEX: 23.91 KG/M2 | HEART RATE: 57 BPM | OXYGEN SATURATION: 96 %

## 2019-05-01 DIAGNOSIS — I34.1 NONRHEUMATIC MITRAL (VALVE) PROLAPSE: ICD-10-CM

## 2019-05-01 PROCEDURE — 93000 ELECTROCARDIOGRAM COMPLETE: CPT

## 2019-05-01 PROCEDURE — 99214 OFFICE O/P EST MOD 30 MIN: CPT

## 2019-05-01 RX ORDER — AMIODARONE HYDROCHLORIDE 200 MG/1
200 TABLET ORAL
Qty: 90 | Refills: 1 | Status: DISCONTINUED | COMMUNITY
End: 2019-05-01

## 2019-05-03 NOTE — ASSESSMENT
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 of atypical chest/L arm pain\par \par GERD\par \par Multiple sclerosis.

## 2019-05-03 NOTE — PHYSICAL EXAM
[General Appearance - Well Developed] : well developed [Well Groomed] : well groomed [General Appearance - Well Nourished] : well nourished [General Appearance - In No Acute Distress] : no acute distress [Normal Conjunctiva] : the conjunctiva exhibited no abnormalities [Eyelids - No Xanthelasma] : the eyelids demonstrated no xanthelasmas [Normal Jugular Venous V Waves Present] : normal jugular venous V waves present [Normal Jugular Venous A Waves Present] : normal jugular venous A waves present [No Jugular Venous Mandel A Waves] : no jugular venous mandel A waves [Respiration, Rhythm And Depth] : normal respiratory rhythm and effort [Auscultation Breath Sounds / Voice Sounds] : lungs were clear to auscultation bilaterally [Heart Rate And Rhythm] : heart rate and rhythm were normal [Bowel Sounds] : normal bowel sounds [Nail Clubbing] : no clubbing of the fingernails [Cyanosis, Localized] : no localized cyanosis [Oriented To Time, Place, And Person] : oriented to person, place, and time [] : no rash [Skin Color & Pigmentation] : normal skin color and pigmentation [Impaired Insight] : insight and judgment were intact [Affect] : the affect was normal [Mood] : the mood was normal [FreeTextEntry1] : 2+ pulses in the upper and lower extremities.

## 2019-05-03 NOTE — HISTORY OF PRESENT ILLNESS
[FreeTextEntry1] : I saw her last in November.\par \par She has a hx. of multiple sclerosis, and travels by wheelchair and has an aide to help her at home.  She has a history of hypertension. She has been told of cholesterol elevation in the past, but has not been treated with medication.  Her father suffered an MI when he was in his 50s.\par \par As she returns today, she has had several problems.  She is being seen by Dr. Monreal and Dr. Israel for a left heel ulcer.  Angiography did demonstrate vascular disease; she is being treated conservatively at present, with slow improvement.\par \par On Feb. 11, a diagnosis of H. Zoster affecting the left chest was made, due to a typical rash extending from the back to the area of the L armpit and breast.  She tells me that she was treated with an anti-viral med, but has developed post herpetic neuralgia with sharp CP which occurs on a daily basis in a waxing and waning pattern; it tends to be most troublesome when at rest and at night.  She is taking additional gabapentin because of this as well as ibuprofen with partial relief; she also uses lidocaine patches.\par \par She does not describe HANDY or palpitations.  There have been no episodes of lightheadedness or LOC.  She reports no orthopnea or PND.

## 2019-05-03 NOTE — DISCUSSION/SUMMARY
[FreeTextEntry1] : HTN, mild hypertensive heart disease; BP remains  controlled on current meds; will continue same.  EKG changes likely due to LVH; difficult to position precordial leads today due to post herpetic pain.\par \par Hx. of atypical CP; currently suffering from post-herpetic neuralgia.  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  No treatment needed.\par \par GERD - continue omeprazole.\par \par Multiple sclerosis\par \par Explained that if vascular procedure becomes necessary for LE wound, will require pre-op cardiac testing.\par \par Will return for a visit in 2 months.

## 2019-05-04 LAB
ALBUMIN SERPL ELPH-MCNC: 3.9 G/DL
ALP BLD-CCNC: 95 U/L
ALT SERPL-CCNC: 34 U/L
ANION GAP SERPL CALC-SCNC: 12 MMOL/L
AST SERPL-CCNC: 27 U/L
BILIRUB SERPL-MCNC: 0.3 MG/DL
BUN SERPL-MCNC: 11 MG/DL
CALCIUM SERPL-MCNC: 9.5 MG/DL
CHLORIDE SERPL-SCNC: 102 MMOL/L
CHOLEST SERPL-MCNC: 222 MG/DL
CHOLEST/HDLC SERPL: 4.8 RATIO
CO2 SERPL-SCNC: 30 MMOL/L
CREAT SERPL-MCNC: 0.38 MG/DL
GLUCOSE SERPL-MCNC: 90 MG/DL
HDLC SERPL-MCNC: 46 MG/DL
LDLC SERPL CALC-MCNC: 118 MG/DL
POTASSIUM SERPL-SCNC: 3.7 MMOL/L
PROT SERPL-MCNC: 7 G/DL
SODIUM SERPL-SCNC: 143 MMOL/L
T4 FREE SERPL-MCNC: 1.2 NG/DL
TRIGL SERPL-MCNC: 288 MG/DL
TSH SERPL-ACNC: 0.72 UIU/ML

## 2019-05-07 ENCOUNTER — APPOINTMENT (OUTPATIENT)
Dept: INTERNAL MEDICINE | Facility: CLINIC | Age: 79
End: 2019-05-07
Payer: MEDICARE

## 2019-05-07 VITALS
OXYGEN SATURATION: 96 % | DIASTOLIC BLOOD PRESSURE: 70 MMHG | RESPIRATION RATE: 12 BRPM | TEMPERATURE: 97.9 F | SYSTOLIC BLOOD PRESSURE: 120 MMHG | HEART RATE: 64 BPM

## 2019-05-07 DIAGNOSIS — H10.029 OTHER MUCOPURULENT CONJUNCTIVITIS, UNSPECIFIED EYE: ICD-10-CM

## 2019-05-07 PROCEDURE — 99213 OFFICE O/P EST LOW 20 MIN: CPT

## 2019-05-07 RX ORDER — ATENOLOL 50 MG/1
50 TABLET ORAL
Refills: 0 | Status: ACTIVE | COMMUNITY
Start: 2019-04-23

## 2019-05-07 NOTE — HISTORY OF PRESENT ILLNESS
[Formal Caregiver] : formal caregiver [FreeTextEntry8] : Patient comes for an acute visit. \par \par She is here for evaluation of bilateral eye erythema with yellowish discharge since last Saturday. Right eye worse than left. The eyes feel sore. No fever or chills. No runny nose, sore throat, or cough.

## 2019-05-07 NOTE — PHYSICAL EXAM
[No Acute Distress] : no acute distress [Well Nourished] : well nourished [Supple] : supple [Well-Appearing] : well-appearing [No Lymphadenopathy] : no lymphadenopathy [No Respiratory Distress] : no respiratory distress  [Clear to Auscultation] : lungs were clear to auscultation bilaterally [Normal Rate] : normal rate  [Normal S1, S2] : normal S1 and S2 [No Murmur] : no murmur heard [No Edema] : there was no peripheral edema [Non Tender] : non-tender [Soft] : abdomen soft [Non-distended] : non-distended [Normal Bowel Sounds] : normal bowel sounds [No Joint Swelling] : no joint swelling [No Rash] : no rash [Normal Affect] : the affect was normal [Normal Gait] : normal gait [Alert and Oriented x3] : oriented to person, place, and time [Normal Mood] : the mood was normal [de-identified] : very friendly [de-identified] : scant b/l scleral erythema; noted right upper eyelid erythema  [de-identified] : indwelling fragoso catheter present  [de-identified] : wearing left heel boot; herpetic rash along T4-T5 dermatome resolved  [de-identified] : wheelchair-bound; profound lower extremity weakness; UE weakness L>>R

## 2019-05-07 NOTE — ASSESSMENT
[FreeTextEntry1] : Mild scleral erythema. May also have right upper blepharitis. Rx sent for Cipro eye drops. Apply warm compresses with lid massage. Clean eye debris daily, can use baby shampoo or warm water. \par \par RTO in 6/19 for follow-up.

## 2019-05-07 NOTE — REVIEW OF SYSTEMS
[Fever] : no fever [Chills] : no chills [Discharge] : discharge [Pain] : pain [Vision Problems] : no vision problems [Itching] : itching [Earache] : no earache [Sore Throat] : no sore throat [Chest Pain] : no chest pain [Lower Ext Edema] : no lower extremity edema [Shortness Of Breath] : no shortness of breath [Abdominal Pain] : no abdominal pain [Nausea] : no nausea [Diarrhea] : diarrhea [Dysuria] : no dysuria [Frequency] : no frequency [Joint Stiffness] : no joint stiffness [Back Pain] : no back pain [Skin Rash] : no skin rash [Headache] : no headache [Anxiety] : no anxiety [Depression] : no depression

## 2019-05-21 ENCOUNTER — CLINICAL ADVICE (OUTPATIENT)
Age: 79
End: 2019-05-21

## 2019-05-21 RX ORDER — CIPROFLOXACIN 3 MG/ML
0.3 SOLUTION OPHTHALMIC 3 TIMES DAILY
Qty: 1 | Refills: 0 | Status: ACTIVE | COMMUNITY
Start: 2019-05-06 | End: 1900-01-01

## 2019-05-22 ENCOUNTER — APPOINTMENT (OUTPATIENT)
Dept: WOUND CARE | Facility: CLINIC | Age: 79
End: 2019-05-22
Payer: MEDICARE

## 2019-05-22 PROCEDURE — 99213 OFFICE O/P EST LOW 20 MIN: CPT

## 2019-05-22 RX ORDER — SILVER 2" X 2"
0.9 BANDAGE TOPICAL
Qty: 1 | Refills: 0 | Status: ACTIVE | COMMUNITY
Start: 2019-05-22 | End: 1900-01-01

## 2019-05-23 NOTE — HISTORY OF PRESENT ILLNESS
[FreeTextEntry1] : Ms. HOLLY FRY   presents to the office with a wound for 3.5 months duration.  The wound is located on  the left heel .  The patient has been dressing the wound with Medihoney daily, as prescribed by primary.  The patient denies fevers or chills.  The patient has localized pain to the wound upon dressing changes.  The patient has no other complaints or associated symptoms. \par \par August 2018, hospitalized at [facility] for sepsis, ICU, hospitalized for 7 days, shortly after when home, noticed redness and pain on left heel, then skin broke down. Prior wound on left heel several years ago, followed by podiatry, eventually healed.\par \par Patient could not undergo revascularization as per patient.

## 2019-05-23 NOTE — ASSESSMENT
[FreeTextEntry1] : 78 yr/ old with multiple sclerosis, wheelchair bound, presents with left heel wound, since October 2018, was treating with iodosorb daily and offloading.  Patient compliant to offloading measures.  Wound demonstrates no improvement.  \par \par Previously, the patient underwent evaluation for peripheral arterial disease using a [employer] diagnostic machine.  Ankle brachial index was obtained using blood pressure cuffs of the ankle and brachial segments of both legs.  A distal pulse volume recording was noted digitally on the device.  The procedure was supervised and interpreted by the physician.  MARILY of the right lower extremity  < 0.5  MARILY of the left lower extremity < 0.5.  Waveform noted to be monophasic  Patient tolerated procedure well in the office.  Results discussed with the patient.  Venous duplex was subsequently obtained demonstrating significant severe superficial insufficiency of GSV and SSV of the LLE.   No evidence of DVT/SVT.  \par MARILY/PVR 0.7 with monophasic waveforms distally of bilateral lower extremities\par Vascular consult recommended\par  offloading area and continue with iodosorb dressing, cavilon right heel and keep offloaded also.\par \par Pt. had a angiogram 3/2019, per op note by Dr Israel due to severity of disease a bypass is recommended  Patient not a candidate for bypass.  d/w patient alternative options.\par Will continue with iodosorb 3x/week, orders for nurse given\par Orthotist number given for offloading shoes\par Ordered arterial insufficiency pump\par \par \par \par

## 2019-05-23 NOTE — PHYSICAL EXAM
[Normal Breath Sounds] : Normal breath sounds [Normal Heart Sounds] : normal heart sounds [0] : left 0 [Ankle Swelling On The Left] : of the left ankle [Alert] : alert [Oriented to Person] : oriented to person [Oriented to Place] : oriented to place [Oriented to Time] : oriented to time [Calm] : calm [JVD] : no jugular venous distention  [Varicose Veins Of Lower Extremities] : not present [] : not present [de-identified] : well nourished, well groomed [de-identified] : soft, positive bowel sounds [de-identified] : indwelling catheter changed 2x/month [de-identified] : not ambulatory, wheelchair bound due to multiple sclerosis [de-identified] : wound on left heel [FreeTextEntry1] : heel  [FreeTextEntry2] : 1.5 [FreeTextEntry3] : 1 [FreeTextEntry4] : 0 [de-identified] : eschar [de-identified] : Iodosorb

## 2019-05-29 ENCOUNTER — MEDICATION RENEWAL (OUTPATIENT)
Age: 79
End: 2019-05-29

## 2019-06-10 ENCOUNTER — RX RENEWAL (OUTPATIENT)
Age: 79
End: 2019-06-10

## 2019-06-18 ENCOUNTER — APPOINTMENT (OUTPATIENT)
Dept: INTERNAL MEDICINE | Facility: CLINIC | Age: 79
End: 2019-06-18

## 2019-06-25 ENCOUNTER — APPOINTMENT (OUTPATIENT)
Dept: ENDOCRINOLOGY | Facility: CLINIC | Age: 79
End: 2019-06-25
Payer: MEDICARE

## 2019-06-25 VITALS — HEART RATE: 75 BPM | OXYGEN SATURATION: 97 % | SYSTOLIC BLOOD PRESSURE: 124 MMHG | DIASTOLIC BLOOD PRESSURE: 70 MMHG

## 2019-06-25 PROCEDURE — 99214 OFFICE O/P EST MOD 30 MIN: CPT

## 2019-06-25 NOTE — PHYSICAL EXAM
[No Acute Distress] : no acute distress [Alert] : alert [Well Developed] : well developed [Well Nourished] : well nourished [No Proptosis] : no proptosis [No Accessory Muscle Use] : no accessory muscle use [Normal Rate and Effort] : normal respiratory rhythm and effort [No Respiratory Distress] : no respiratory distress [Normal Rate] : heart rate was normal  [Clear to Auscultation] : lungs were clear to auscultation bilaterally [Normal S1, S2] : normal S1 and S2 [Normal Bowel Sounds] : normal bowel sounds [Not Tender] : non-tender [Soft] : abdomen soft [Kyphosis] : kyphosis present [Scoliosis] : scoliosis present [Normal Mood] : the mood was normal [Normal Affect] : the affect was normal [de-identified] : frail elderly woman, wheelchair bound.  present [de-identified] : Transfers via wheelchair [de-identified] : transfers via electronic chair

## 2019-06-25 NOTE — HISTORY OF PRESENT ILLNESS
[FreeTextEntry1] : Patient is a 80 yo woman with hx of multiple sclerosis wheel chair dependent, osteoporosis, hyperthyroidism and bilateral adrenal masses\par \par Previous endocrinologist Dr. Howard for thyroid abnormalities. She was never previously on thyroid medications prior to the hospitalization in 2018. The patient was using iodine drops for about 6 months when she was getting recurrent UTI. Then 3 months prior to hospitalization, she was taking an iodine supplement containing 50 mg of iodine in each pill to help with her MS. She thyroid function tests done on admission and she had +TSH receptor antibody 39.61, free t4 of 5.1, TSH of < 0.01. \par On Methimazole 10 mg daily and atenolol. Definitive therapy with GRIFFIN was offered but she had deferred it in favor of methimazole\par She no longer takes iodine supplementation\par In February, TSH was 17.80, so methimazole was stopped\par Surveillance TFT's April 16 showed TSH of 0.72, free T4 of 0.8\par \par The patient had a CT scan done while in the hospital which showed adrenal incidentalomas measuring 2.6 x 2 cm on the left and 1.3 x 1 cm on the right. Metanephrine, DHEAs were negative. Patient refused dexamethasone suppression testing and opted for conservative surveillance at the last visit. Surveillance MRI in March 2019 confirmed lipid rich adenomas\par \par Osteoporosis: \par Had about 3 doses of Reclast. Her osteoporosis is managed Dr. Hyde/Rheumatologist\par Wheelchair bound, DXA done in 2015\par T score L1-L4 -2.4\par Femoral neck T score -4.7\par Total hip T score -5.0\par At the last visit, I recommend prolia as she has progressive decline in bone density. She wanted to defer discussion with her PCP instead.  Per her neurology Dr. Parry, patient was told prolia would not be a good choice based on her MS. She received Reclast already\par

## 2019-06-25 NOTE — ASSESSMENT
[FreeTextEntry1] : Patient is a 78 yo woman with MS, adrenal adenoma, hyperthyroidism vs. thyroiditis, osteoporosis\par \par 1. Hyperthyroid\par -HR is at goal\par -TSH improved in May\par -repeat TFT's today\par -no clinical symptoms of hyperthyroidism\par \par 2. Osteoporosis\par -patient states neurologist does not want her to go on prolia because it may make the MS worse.  No evidence that I am aware of other than prolia is monoclonal antibody with possible increased risk of infection though small\par -rheumatologist provided IV reclast for osteoporosis instead\par -can continue rheumatology care\par -she has received 4 doses to date\par \par 3. Adrenal adenoma\par -metanephrines were negative\par -patient did not have dexamethasone suppression testing and opts not to do so. Even if she is found to have functional adenoma, she was told that she is not a surgical candidate due to the progressive nature of her MS\par -conservative management for now\par \par Based on today's visit, her main symptoms and concerns are about pain. I think patient would benefit from discussion on her overall goals of care, consider geriatrics, and pain management with palliative care to optimize her quality of life.

## 2019-06-25 NOTE — REVIEW OF SYSTEMS
[Fatigue] : fatigue [Negative] : ENT [All other systems negative] : All other systems negative [Chest Pain] : no chest pain [Palpitations] : no palpitations [Heart Rate Is Slow] : the heart rate was not slow [Shortness Of Breath] : no shortness of breath [Heart Rate Is Fast] : the heart rate was not fast [Wheezing] : no wheezing was heard [Cough] : no cough [SOB on Exertion] : no shortness of breath during exertion [Nausea] : no nausea [Constipation] : no constipation [Vomiting] : no vomiting was observed [Diarrhea] : no diarrhea [Anxiety] : no anxiety [Depression] : no depression [Heat Intolerance] : heat tolerant [Cold Intolerance] : cold tolerant [FreeTextEntry1] : Whole body pain

## 2019-06-28 LAB
25(OH)D3 SERPL-MCNC: 31.4 NG/ML
ALBUMIN SERPL ELPH-MCNC: 4.1 G/DL
ALP BLD-CCNC: 88 U/L
ALT SERPL-CCNC: 33 U/L
ANION GAP SERPL CALC-SCNC: 12 MMOL/L
AST SERPL-CCNC: 30 U/L
BILIRUB SERPL-MCNC: 0.4 MG/DL
BUN SERPL-MCNC: 14 MG/DL
CALCIUM SERPL-MCNC: 9.4 MG/DL
CHLORIDE SERPL-SCNC: 104 MMOL/L
CO2 SERPL-SCNC: 26 MMOL/L
CREAT SERPL-MCNC: 0.4 MG/DL
GLUCOSE SERPL-MCNC: 140 MG/DL
POTASSIUM SERPL-SCNC: 3.8 MMOL/L
PROT SERPL-MCNC: 6.8 G/DL
SODIUM SERPL-SCNC: 142 MMOL/L
T3 SERPL-MCNC: 102 NG/DL
T4 FREE SERPL-MCNC: 1.1 NG/DL
THYROGLOB AB SERPL-ACNC: <20 IU/ML
THYROPEROXIDASE AB SERPL IA-ACNC: 52.3 IU/ML
TSH RECEPTOR AB: <0.5 IU/L
TSH SERPL-ACNC: 0.77 UIU/ML

## 2019-07-08 ENCOUNTER — APPOINTMENT (OUTPATIENT)
Dept: CARDIOLOGY | Facility: CLINIC | Age: 79
End: 2019-07-08
Payer: MEDICARE

## 2019-07-08 ENCOUNTER — NON-APPOINTMENT (OUTPATIENT)
Age: 79
End: 2019-07-08

## 2019-07-08 VITALS
DIASTOLIC BLOOD PRESSURE: 70 MMHG | HEIGHT: 63 IN | HEART RATE: 56 BPM | OXYGEN SATURATION: 97 % | SYSTOLIC BLOOD PRESSURE: 118 MMHG | RESPIRATION RATE: 17 BRPM | WEIGHT: 142 LBS | BODY MASS INDEX: 25.16 KG/M2

## 2019-07-08 DIAGNOSIS — I34.0 NONRHEUMATIC MITRAL (VALVE) INSUFFICIENCY: ICD-10-CM

## 2019-07-08 DIAGNOSIS — I10 ESSENTIAL (PRIMARY) HYPERTENSION: ICD-10-CM

## 2019-07-08 PROCEDURE — 99214 OFFICE O/P EST MOD 30 MIN: CPT

## 2019-07-08 PROCEDURE — 93000 ELECTROCARDIOGRAM COMPLETE: CPT

## 2019-07-08 NOTE — PHYSICAL EXAM
[General Appearance - Well Developed] : well developed [Well Groomed] : well groomed [General Appearance - Well Nourished] : well nourished [General Appearance - In No Acute Distress] : no acute distress [Eyelids - No Xanthelasma] : the eyelids demonstrated no xanthelasmas [Normal Conjunctiva] : the conjunctiva exhibited no abnormalities [Normal Jugular Venous A Waves Present] : normal jugular venous A waves present [Normal Jugular Venous V Waves Present] : normal jugular venous V waves present [No Jugular Venous Mandel A Waves] : no jugular venous mandel A waves [Auscultation Breath Sounds / Voice Sounds] : lungs were clear to auscultation bilaterally [Respiration, Rhythm And Depth] : normal respiratory rhythm and effort [Bowel Sounds] : normal bowel sounds [Heart Rate And Rhythm] : heart rate and rhythm were normal [Cyanosis, Localized] : no localized cyanosis [Nail Clubbing] : no clubbing of the fingernails [Skin Color & Pigmentation] : normal skin color and pigmentation [Impaired Insight] : insight and judgment were intact [Oriented To Time, Place, And Person] : oriented to person, place, and time [] : no rash [Mood] : the mood was normal [Affect] : the affect was normal [FreeTextEntry1] : 2+ pulses in the upper and lower extremities.

## 2019-07-08 NOTE — DISCUSSION/SUMMARY
[FreeTextEntry1] : \par HTN, mild hypertensive heart disease; BP remains controlled on current meds; will continue same.  EKG changes likely due to LVH as no wall motion abnormality seen on echo last November.  \par \par Atypical CP; likely related to post-herpetic neuralgia.  Given her overall medical condition, I am not inclined to pursue further cardiac testing, as no procedures are planned for her vascular ulcer.  She is in agreement with this; she prefers conservative management as well..  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  Not causing sxs; no treatment needed.\par \par GERD - continue omeprazole.\par \par Multiple sclerosis\par \par Will return here prn.  Anticipated she will be seeing Dr. Fran Batista for medical care; will forward notes to him.

## 2019-07-08 NOTE — HISTORY OF PRESENT ILLNESS
[FreeTextEntry1] : I saw her last in May.  \par \par She has a hx. of multiple sclerosis, and travels by wheelchair and has an aide to help her at home.  She has a history of hypertension. She has been told of cholesterol elevation in the past, but has not been treated with medication.  Her father suffered an MI when he was in his 50s.\par \par As she returns today,  she has been stable overall.  She continues to f/u with Dr. Monreal and Dr. Israel for a left heel ulcer.  She tells me that this continues to slowly improve with conservative treatment and that no procedure is planned.\par \par She also continues to recover from H. Zoster with associated neuralgia.  Overall, this discomfort persists but has improved as well.  \par \par From a cardiac standpoint, she does not describe HANDY or palpitations.  There have been no episodes of lightheadedness or LOC.  She reports no orthopnea or PND.  The chest pain she experiences is described as a tightness; it is not provoked by exertion although can sometimes occur with anxiety.  Episodes last for more than an hour.

## 2019-07-18 ENCOUNTER — EMERGENCY (EMERGENCY)
Facility: HOSPITAL | Age: 79
LOS: 1 days | Discharge: HOME | End: 2019-07-18
Attending: EMERGENCY MEDICINE
Payer: MEDICARE

## 2019-07-18 VITALS
RESPIRATION RATE: 18 BRPM | OXYGEN SATURATION: 98 % | SYSTOLIC BLOOD PRESSURE: 178 MMHG | DIASTOLIC BLOOD PRESSURE: 98 MMHG | HEIGHT: 63 IN | HEART RATE: 66 BPM | WEIGHT: 134.92 LBS | TEMPERATURE: 98 F

## 2019-07-18 DIAGNOSIS — Z96.7 PRESENCE OF OTHER BONE AND TENDON IMPLANTS: Chronic | ICD-10-CM

## 2019-07-18 DIAGNOSIS — Z98.890 OTHER SPECIFIED POSTPROCEDURAL STATES: Chronic | ICD-10-CM

## 2019-07-18 DIAGNOSIS — Z90.710 ACQUIRED ABSENCE OF BOTH CERVIX AND UTERUS: Chronic | ICD-10-CM

## 2019-07-18 LAB
ALBUMIN SERPL ELPH-MCNC: 3.6 G/DL — SIGNIFICANT CHANGE UP (ref 3.3–5)
ALP SERPL-CCNC: 84 U/L — SIGNIFICANT CHANGE UP (ref 40–120)
ALT FLD-CCNC: 22 U/L — SIGNIFICANT CHANGE UP (ref 10–45)
ANION GAP SERPL CALC-SCNC: 11 MMOL/L — SIGNIFICANT CHANGE UP (ref 5–17)
APTT BLD: 27.4 SEC — LOW (ref 27.5–36.3)
AST SERPL-CCNC: 33 U/L — SIGNIFICANT CHANGE UP (ref 10–40)
BASOPHILS # BLD AUTO: 0 K/UL — SIGNIFICANT CHANGE UP (ref 0–0.2)
BASOPHILS NFR BLD AUTO: 0.3 % — SIGNIFICANT CHANGE UP (ref 0–2)
BILIRUB SERPL-MCNC: 0.3 MG/DL — SIGNIFICANT CHANGE UP (ref 0.2–1.2)
BLD GP AB SCN SERPL QL: NEGATIVE — SIGNIFICANT CHANGE UP
BUN SERPL-MCNC: 15 MG/DL — SIGNIFICANT CHANGE UP (ref 7–23)
CALCIUM SERPL-MCNC: 9.1 MG/DL — SIGNIFICANT CHANGE UP (ref 8.4–10.5)
CHLORIDE SERPL-SCNC: 103 MMOL/L — SIGNIFICANT CHANGE UP (ref 96–108)
CO2 SERPL-SCNC: 28 MMOL/L — SIGNIFICANT CHANGE UP (ref 22–31)
CREAT SERPL-MCNC: 0.42 MG/DL — LOW (ref 0.5–1.3)
EOSINOPHIL # BLD AUTO: 0.2 K/UL — SIGNIFICANT CHANGE UP (ref 0–0.5)
EOSINOPHIL NFR BLD AUTO: 3.1 % — SIGNIFICANT CHANGE UP (ref 0–6)
GLUCOSE SERPL-MCNC: 94 MG/DL — SIGNIFICANT CHANGE UP (ref 70–99)
HCT VFR BLD CALC: 42.7 % — SIGNIFICANT CHANGE UP (ref 34.5–45)
HGB BLD-MCNC: 14 G/DL — SIGNIFICANT CHANGE UP (ref 11.5–15.5)
INR BLD: 0.96 RATIO — SIGNIFICANT CHANGE UP (ref 0.88–1.16)
LYMPHOCYTES # BLD AUTO: 2 K/UL — SIGNIFICANT CHANGE UP (ref 1–3.3)
LYMPHOCYTES # BLD AUTO: 36.3 % — SIGNIFICANT CHANGE UP (ref 13–44)
MCHC RBC-ENTMCNC: 30 PG — SIGNIFICANT CHANGE UP (ref 27–34)
MCHC RBC-ENTMCNC: 32.8 GM/DL — SIGNIFICANT CHANGE UP (ref 32–36)
MCV RBC AUTO: 91.4 FL — SIGNIFICANT CHANGE UP (ref 80–100)
MONOCYTES # BLD AUTO: 0.5 K/UL — SIGNIFICANT CHANGE UP (ref 0–0.9)
MONOCYTES NFR BLD AUTO: 9.7 % — SIGNIFICANT CHANGE UP (ref 2–14)
NEUTROPHILS # BLD AUTO: 2.9 K/UL — SIGNIFICANT CHANGE UP (ref 1.8–7.4)
NEUTROPHILS NFR BLD AUTO: 50.6 % — SIGNIFICANT CHANGE UP (ref 43–77)
PLATELET # BLD AUTO: 156 K/UL — SIGNIFICANT CHANGE UP (ref 150–400)
POTASSIUM SERPL-MCNC: 3.9 MMOL/L — SIGNIFICANT CHANGE UP (ref 3.5–5.3)
POTASSIUM SERPL-SCNC: 3.9 MMOL/L — SIGNIFICANT CHANGE UP (ref 3.5–5.3)
PROT SERPL-MCNC: 7.1 G/DL — SIGNIFICANT CHANGE UP (ref 6–8.3)
PROTHROM AB SERPL-ACNC: 11 SEC — SIGNIFICANT CHANGE UP (ref 10–12.9)
RBC # BLD: 4.68 M/UL — SIGNIFICANT CHANGE UP (ref 3.8–5.2)
RBC # FLD: 13.3 % — SIGNIFICANT CHANGE UP (ref 10.3–14.5)
RH IG SCN BLD-IMP: POSITIVE — SIGNIFICANT CHANGE UP
SODIUM SERPL-SCNC: 142 MMOL/L — SIGNIFICANT CHANGE UP (ref 135–145)
WBC # BLD: 5.6 K/UL — SIGNIFICANT CHANGE UP (ref 3.8–10.5)
WBC # FLD AUTO: 5.6 K/UL — SIGNIFICANT CHANGE UP (ref 3.8–10.5)

## 2019-07-18 PROCEDURE — 23500 CLTX CLAVICULAR FX W/O MNPJ: CPT | Mod: 54

## 2019-07-18 PROCEDURE — 73000 X-RAY EXAM OF COLLAR BONE: CPT | Mod: 26,LT

## 2019-07-18 PROCEDURE — 93010 ELECTROCARDIOGRAM REPORT: CPT | Mod: 59

## 2019-07-18 PROCEDURE — 73030 X-RAY EXAM OF SHOULDER: CPT | Mod: 26,LT

## 2019-07-18 PROCEDURE — 71045 X-RAY EXAM CHEST 1 VIEW: CPT | Mod: 26

## 2019-07-18 PROCEDURE — 99284 EMERGENCY DEPT VISIT MOD MDM: CPT | Mod: 57,GC,25

## 2019-07-18 PROCEDURE — 73552 X-RAY EXAM OF FEMUR 2/>: CPT | Mod: 26,LT

## 2019-07-18 PROCEDURE — 73502 X-RAY EXAM HIP UNI 2-3 VIEWS: CPT | Mod: 26,LT

## 2019-07-18 PROCEDURE — 76377 3D RENDER W/INTRP POSTPROCES: CPT | Mod: 26

## 2019-07-18 PROCEDURE — 73700 CT LOWER EXTREMITY W/O DYE: CPT | Mod: 26,LT

## 2019-07-18 PROCEDURE — 72170 X-RAY EXAM OF PELVIS: CPT | Mod: 26,59

## 2019-07-18 NOTE — ED ADULT NURSE NOTE - NSIMPLEMENTINTERV_GEN_ALL_ED
Implemented All Fall with Harm Risk Interventions:  [place] to call system. Call bell, personal items and telephone within reach. Instruct patient to call for assistance. Room bathroom lighting operational. Non-slip footwear when patient is off stretcher. Physically safe environment: no spills, clutter or unnecessary equipment. Stretcher in lowest position, wheels locked, appropriate side rails in place. Provide visual cue, wrist band, yellow gown, etc. Monitor gait and stability. Monitor for mental status changes and reorient to person, place, and time. Review medications for side effects contributing to fall risk. Reinforce activity limits and safety measures with patient and family. Provide visual clues: red socks.

## 2019-07-18 NOTE — ED PROVIDER NOTE - PROGRESS NOTE DETAILS
ortho PA aware of pt, will come to eval. Moses Lowe, PGY-2: no sign of fracture on imaging, ortho reviewed the imaging, no ortho intervention at this time. Will d/c with ortho follow up. Sling applied.

## 2019-07-18 NOTE — ED ADULT NURSE REASSESSMENT NOTE - NS ED NURSE REASSESS COMMENT FT1
Received report from ED JOHANA Lam; patient A&Ox3, afebrile- 20 g IV in L Ac patent and site WNL- pending xray results. Spouse at the bedside.

## 2019-07-18 NOTE — CONSULT NOTE ADULT - ASSESSMENT
80 yo woman with a hx of OA and MS presents with a left hip fx and a left clavical fx. Patient scheduled rfor Open reduction and internal fixation of hip. 78 yo woman with a hx of OA and MS presents with a left hip pain and a left clavicle fx. awaiting further imaging to see if Patient actually has a left hip fx. Patient is usually wheel chair bound. continue pain management as needed. NPO for now. No surgical intervention for the left clavicle fracture at this time. If Patient does have a hip fx will review EKG prior to OR. I Patient does not have a hip fx the will probably DC Patient home with pain meds and follow up with ortho for further evaluation of clavicle fx. Discussed with Pts .  c

## 2019-07-18 NOTE — ED ADULT NURSE NOTE - OBJECTIVE STATEMENT
80 y/o female A&Ox4, PMH MS, dysphagia, neurogenic bladder, HTN, osteoporosis,  presents to ED sent by orthopedic MS for surgical consult s/p fall and subsequent left hip and left clavicle fracture. Pt states she did not lose consciousness but did hit head upon falling out of bed. Pt endorses 8/10 pain in left clavicle only with movement, does not endorse pain in left hip. As per pt's family bedside, pt was sent in for "same day surgery consultation." Upon further assessment, airway patent and intact, denies chest pain/SOB. ABD soft nontender, denies n/v/d. Skin intact. Peripheral pulses strong and normal baseline sensation present x4. Safety and comfort measures maintained.

## 2019-07-18 NOTE — ED PROVIDER NOTE - NSFOLLOWUPINSTRUCTIONS_ED_ALL_ED_FT
Your were evaluated to today in the ER for a left clavicle fracture and left hip pain, no hip fracture was seen on imaging. No weight bearing on your left leg.    Please follow up with your orthopedic surgeon in one week.    Keep the sling applied to your left shoulder    For Pain:  Take Ibuprofen 400-600mg every 4-6 hours as needed for pain, do not not exceed 3,200mg per day   Take Tylenol 650mg every 4-6 hours as needed for pain, do not exceed 3,250mg per day    Apply ice to the area.    Return to the emergency room for any new or worsening symptoms
all other ROS negative except as per HPI

## 2019-07-18 NOTE — CONSULT NOTE ADULT - SUBJECTIVE AND OBJECTIVE BOX
Orthopaedic Surgery Consult Note    HPI:    79F with PMH of OA and MS (wheelchair bound), L FN (s/p CRPP), L Dist fem fx (ORIF) p/w left hip and left shoulder pain s/p mechanical fall out of bed last night. No head trauma, LOC, blood thinners. Went to her orthopedist's office today (Dennis Hansen) where she was referred to the ER for L hip fx and L clavicle fx seen on outpatient x-ray. There was concern that there might be a fracture between the hardware. Denies any other symptoms including new numbness, weakness, visual changes, N/V/D, abdominal pain, chest pain, SOB.      PAST MEDICAL & SURGICAL HISTORY:  MS (multiple sclerosis)  HTN (hypertension)  Osteoporosis  Dysphagia: no special diet; no h/o aspiration PNA  Neurogenic bladder  Ptosis of both eyelids  S/P ORIF (open reduction internal fixation) fracture: left femur, right hip  S/P breast biopsy  S/P hysterectomy: &gt; 30 yrs ago    [] No significant past history as reviewed with the patient and family    FAMILY HISTORY:  No pertinent family history    [] Family history not pertinent as reviewed with the patient and family    SOCIAL HISTORY:    MEDICATIONS  (STANDING):    MEDICATIONS  (PRN):    Allergies    No Known Allergies    Intolerances        REVIEW OF SYSTEMS  [x] All review of systems negative except for those marked.  Systemic:	[] Fever		[] Chills		[] Night sweats		[] Fatigue	[] Other  [] Cardiovascular:  [] Pulmonary:  [] Renal/Urologic:  [] Gastrointestinal:  [] Metabolic:  [] Neurologic:  [] Hematologic:  [] ENT:  [] Ophthalmologic:  [] Musculoskeletal: see HPI    Vital Signs Last 24 Hrs  T(C): 36.7 (18 Jul 2019 19:18), Max: 36.7 (18 Jul 2019 15:26)  T(F): 98 (18 Jul 2019 19:18), Max: 98.1 (18 Jul 2019 15:26)  HR: 60 (18 Jul 2019 19:18) (60 - 66)  BP: 181/88 (18 Jul 2019 19:18) (178/98 - 181/88)  BP(mean): --  RR: 16 (18 Jul 2019 19:18) (16 - 18)  SpO2: 96% (18 Jul 2019 19:18) (96% - 98%)      PHYSICAL EXAM:  Exam:  Gen: NAD  RLE:  Painless ROM of hip  neg SLR/HS  No TTP about hip  Motor: 5/5 EHL/FHL/TA/Gastrocnemius  Sensory: SILT DP/SP/S/S/T nerve distributions  Vascular: 2+ Dorsalis Pedis pulse    LLE:  Painless ROM of hip  neg SLR/HS  No TTP about hip  well healed surgical scars  Motor: 5/5 EHL/FHL/TA/Gastrocnemius  Sensory: SILT DP/SP/S/S/T nerve distributions  Vascular: 2+ Dorsalis Pedis pulse    LLE  TTP about clavicle  Skin intact no tenting  skin intact  motor AIN/PIN/U intact  SILT M/U/R  wwp                              14.0   5.6   )-----------( 156      ( 18 Jul 2019 16:43 )             42.7     07-18    142  |  103  |  15  ----------------------------<  94  3.9   |  28  |  0.42<L>    Ca    9.1      18 Jul 2019 16:43    TPro  7.1  /  Alb  3.6  /  TBili  0.3  /  DBili  x   /  AST  33  /  ALT  22  /  AlkPhos  84  07-18    PT/INR - ( 18 Jul 2019 16:43 )   PT: 11.0 sec;   INR: 0.96 ratio         PTT - ( 18 Jul 2019 16:43 )  PTT:27.4 sec      IMAGING STUDIES:  XR L hip/fem  -No acute fractures seen     CT L hip/fem:  -no acute fractures seen    79y Female with prior L hip CRPP and distal femur ORIF presents s/p fall from bed. Low concern for acute fracture of L hip or femur at this time.     -  -pain control  -PT  -NWB LLE in sling   -WBAT LLE  -OOB  -DVT ppx  -dispo plan  -No acute orthopedic intervention at this lizet Orthopaedic Surgery Consult Note    HPI:    79F with PMH of OA and MS (wheelchair bound), L FN (s/p CRPP), L Dist fem fx (ORIF) p/w left hip and left shoulder pain s/p mechanical fall out of bed last night. No head trauma, LOC, blood thinners. Went to her orthopedist's office today (Dennis Hansen) where she was referred to the ER for L hip fx and L clavicle fx seen on outpatient x-ray. There was concern that there might be a fracture between the hardware. Denies any other symptoms including new numbness, weakness, visual changes, N/V/D, abdominal pain, chest pain, SOB.      PAST MEDICAL & SURGICAL HISTORY:  MS (multiple sclerosis)  HTN (hypertension)  Osteoporosis  Dysphagia: no special diet; no h/o aspiration PNA  Neurogenic bladder  Ptosis of both eyelids  S/P ORIF (open reduction internal fixation) fracture: left femur, right hip  S/P breast biopsy  S/P hysterectomy: &gt; 30 yrs ago    [] No significant past history as reviewed with the patient and family    FAMILY HISTORY:  No pertinent family history    [] Family history not pertinent as reviewed with the patient and family    SOCIAL HISTORY:    MEDICATIONS  (STANDING):    MEDICATIONS  (PRN):    Allergies    No Known Allergies    Intolerances        REVIEW OF SYSTEMS  [x] All review of systems negative except for those marked.  Systemic:	[] Fever		[] Chills		[] Night sweats		[] Fatigue	[] Other  [] Cardiovascular:  [] Pulmonary:  [] Renal/Urologic:  [] Gastrointestinal:  [] Metabolic:  [] Neurologic:  [] Hematologic:  [] ENT:  [] Ophthalmologic:  [] Musculoskeletal: see HPI    Vital Signs Last 24 Hrs  T(C): 36.7 (18 Jul 2019 19:18), Max: 36.7 (18 Jul 2019 15:26)  T(F): 98 (18 Jul 2019 19:18), Max: 98.1 (18 Jul 2019 15:26)  HR: 60 (18 Jul 2019 19:18) (60 - 66)  BP: 181/88 (18 Jul 2019 19:18) (178/98 - 181/88)  BP(mean): --  RR: 16 (18 Jul 2019 19:18) (16 - 18)  SpO2: 96% (18 Jul 2019 19:18) (96% - 98%)      PHYSICAL EXAM:  Exam:  Gen: NAD  RLE:  Painless ROM of hip  neg SLR/HS  No TTP about hip  Motor: 5/5 EHL/FHL/TA/Gastrocnemius  Sensory: SILT DP/SP/S/S/T nerve distributions  Vascular: 2+ Dorsalis Pedis pulse    LLE:  Painless ROM of hip  neg SLR/HS  No TTP about hip  well healed surgical scars  Motor: 5/5 EHL/FHL/TA/Gastrocnemius  Sensory: SILT DP/SP/S/S/T nerve distributions  Vascular: 2+ Dorsalis Pedis pulse    LLE  TTP about clavicle  Skin intact no tenting  skin intact  motor AIN/PIN/U intact  SILT M/U/R  wwp                              14.0   5.6   )-----------( 156      ( 18 Jul 2019 16:43 )             42.7     07-18    142  |  103  |  15  ----------------------------<  94  3.9   |  28  |  0.42<L>    Ca    9.1      18 Jul 2019 16:43    TPro  7.1  /  Alb  3.6  /  TBili  0.3  /  DBili  x   /  AST  33  /  ALT  22  /  AlkPhos  84  07-18    PT/INR - ( 18 Jul 2019 16:43 )   PT: 11.0 sec;   INR: 0.96 ratio         PTT - ( 18 Jul 2019 16:43 )  PTT:27.4 sec      IMAGING STUDIES:  XR L hip/fem  -No acute fractures seen     CT L hip/fem:  -no acute fractures seen    79y Female with prior L hip CRPP and distal femur ORIF presents s/p fall from bed. Low concern for acute fracture of L hip or femur at this time.     -pain control  -PT  -NWB LLE in sling   -WBAT LLE  -OOB  -DVT ppx  -dispo plan  -No acute orthopedic intervention at this time, d/w attending

## 2019-07-18 NOTE — CONSULT NOTE ADULT - SUBJECTIVE AND OBJECTIVE BOX
79F with PMH of OA and MS (in wheelchair) p/w left hip and left shoulder pain s/p mechanical fall out of bed last night. No head trauma, LOC, blood thinners. Went to her orthopedist's office today (Dennis Hansen) where she was referred to the ER for L hip fx and L clavicle fx seen on outpatient x-ray. Denies any other symptoms including new numbness, weakness, visual changes, N/V/D, abdominal pain, chest pain, SOB. Patient scheduled for Open reduction and internal fixation of hip.     PAST MEDICAL & SURGICAL HISTORY:  MS (multiple sclerosis)  HTN (hypertension)  Osteoporosis  Dysphagia: no special diet; no h/o aspiration PNA  Neurogenic bladder  Ptosis of both eyelids  S/P ORIF (open reduction internal fixation) fracture: left femur, right hip  S/P breast biopsy  S/P hysterectomy: &gt; 30 yrs ago        MEDICATIONS  (STANDING):    MEDICATIONS  (PRN):        CONSTITUTIONAL: No weakness, fevers or chills  EYES/ENT: No visual changes;  No vertigo or throat pain   NECK: No pain or stiffness  RESPIRATORY: No cough, wheezing, hemoptysis; No shortness of breath  CARDIOVASCULAR: No chest pain or palpitations  GASTROINTESTINAL: No abdominal or epigastric pain. No nausea, vomiting, or hematemesis; No diarrhea or constipation. No melena or hematochezia.  GENITOURINARY: No dysuria, frequency or hematuria  NEUROLOGICAL: No numbness or weakness  SKIN: No itching, burning, rashes, or lesions   MUSCULOSKELETAL: left hip and shoulder pain    INTERVAL HPI/OVERNIGHT EVENTS:  T(C): 36.7 (07-18-19 @ 19:18), Max: 36.7 (07-18-19 @ 15:26)  HR: 60 (07-18-19 @ 19:18) (60 - 66)  BP: 181/88 (07-18-19 @ 19:18) (178/98 - 181/88)  RR: 16 (07-18-19 @ 19:18) (16 - 18)  SpO2: 96% (07-18-19 @ 19:18) (96% - 98%)  Wt(kg): --  I&O's Summary      PHYSICAL EXAM:  GENERAL: NAD, well-groomed, well-developed  HEAD:  Atraumatic, Normocephalic  EYES: EOMI, PERRLA, conjunctiva and sclera clear  ENMT: No tonsillar erythema, exudates, or enlargement; Moist mucous membranes, Good dentition, No lesions  NECK: Supple, No JVD, Normal thyroid  NERVOUS SYSTEM:  Alert & Oriented X3, Good concentration; Motor Strength 5/5 B/L upper and lower extremities; DTRs 2+ intact and symmetric  CHEST/LUNG: Clear to percussion bilaterally; No rales, rhonchi, wheezing, or rubs  HEART: Regular rate and rhythm; No murmurs, rubs, or gallops  ABDOMEN: Soft, Nontender, Nondistended; Bowel sounds present  EXTREMITIES:  2+ Peripheral Pulses, No clubbing, cyanosis, or edema  LYMPH: No lymphadenopathy noted  SKIN: No rashes or lesions        LABS:                        14.0   5.6   )-----------( 156      ( 18 Jul 2019 16:43 )             42.7     07-18    142  |  103  |  15  ----------------------------<  94  3.9   |  28  |  0.42<L>    Ca    9.1      18 Jul 2019 16:43    TPro  7.1  /  Alb  3.6  /  TBili  0.3  /  DBili  x   /  AST  33  /  ALT  22  /  AlkPhos  84  07-18    PT/INR - ( 18 Jul 2019 16:43 )   PT: 11.0 sec;   INR: 0.96 ratio         PTT - ( 18 Jul 2019 16:43 )  PTT:27.4 sec                    Radiology reports:

## 2019-07-18 NOTE — ED ADULT NURSE NOTE - NS_ED_NURSE_TEACHING_TOPIC_ED_A_ED
orthopedic instructions, laboratory and imaging results , follow up instructions, and return precautions

## 2019-07-18 NOTE — ED PROVIDER NOTE - PHYSICAL EXAMINATION
Gen: AAOx3, non-toxic  Head: NCAT  HEENT: EOMI, oral mucosa moist, normal conjunctiva  Lung: CTAB, no respiratory distress, no wheezes/rhonchi/rales B/L, speaking in full sentences  CV: RRR, no murmurs, rubs or gallops  Abd: soft, NTND, no guarding, no CVA tenderness  MSK: TTP over L anterior hip and L mid clavicle. no significant bony TTP any where else.   Neuro: No focal sensory or motor deficits  Skin: Warm, well perfused, no rash  Psych: normal affect.     ~Patrick Quigley PGY1

## 2019-07-18 NOTE — ED PROVIDER NOTE - NS ED ROS FT
GENERAL: No fever or chills  EYES: no change in vision  HEENT: no trouble swallowing or speaking  CARDIAC: no chest pain or palpitations   PULMONARY: no cough or SOB  GI: no abdominal pain, nausea, vomiting, diarrhea, or constipation   : No changes in urination  SKIN: no rashes  NEURO: no headache, numbness, or weakness  MSK: +L hip and L shoulder pain    ~Patrick Quigley PGY1

## 2019-07-18 NOTE — ED PROVIDER NOTE - OBJECTIVE STATEMENT
79F with PMH of OA and MS (in wheelchair) p/w left hip and left shoulder pain s/p mechanical fall out of bed last night. No head trauma, LOC, blood thinners. Went to her orthopedist's office today (Dennis Hansen) where she was referred to the ER for L hip fx and L clavicle fx seen on outpatient x-ray. Denies any other symptoms including new numbness, weakness, visual changes, N/V/D, abdominal pain, chest pain, SOB.

## 2019-07-18 NOTE — ED PROVIDER NOTE - CLINICAL SUMMARY MEDICAL DECISION MAKING FREE TEXT BOX
Hip and clavicle fx s/p mechanical fall. Low concern for other serious traumatic injury. Pt well appearing, HD stable, neurovasc intact. Will obtain pre-ops and consult ortho. 79 y old f status post fall yesterday with fr l hip and l shoulder xray done out patient ortho office  ,No LOC no chest pain or SOB ,Will review  outpatients xrays ,blood work ,ortho eval and admission ZR

## 2019-07-19 VITALS
RESPIRATION RATE: 16 BRPM | HEART RATE: 62 BPM | TEMPERATURE: 98 F | SYSTOLIC BLOOD PRESSURE: 197 MMHG | OXYGEN SATURATION: 97 % | DIASTOLIC BLOOD PRESSURE: 101 MMHG

## 2019-07-19 RX ORDER — BACLOFEN 100 %
20 POWDER (GRAM) MISCELLANEOUS ONCE
Refills: 0 | Status: COMPLETED | OUTPATIENT
Start: 2019-07-19 | End: 2019-07-19

## 2019-07-19 RX ORDER — ATENOLOL 25 MG/1
50 TABLET ORAL ONCE
Refills: 0 | Status: COMPLETED | OUTPATIENT
Start: 2019-07-19 | End: 2019-07-19

## 2019-07-19 RX ORDER — OXYBUTYNIN CHLORIDE 5 MG
5 TABLET ORAL ONCE
Refills: 0 | Status: COMPLETED | OUTPATIENT
Start: 2019-07-19 | End: 2019-07-19

## 2019-07-19 RX ORDER — CLONAZEPAM 1 MG
1 TABLET ORAL ONCE
Refills: 0 | Status: DISCONTINUED | OUTPATIENT
Start: 2019-07-19 | End: 2019-07-19

## 2019-07-19 RX ADMIN — Medication 1 MILLIGRAM(S): at 01:17

## 2019-07-19 RX ADMIN — ATENOLOL 50 MILLIGRAM(S): 25 TABLET ORAL at 00:50

## 2019-07-19 RX ADMIN — Medication 5 MILLIGRAM(S): at 02:23

## 2019-07-19 RX ADMIN — Medication 20 MILLIGRAM(S): at 02:23

## 2019-07-19 RX ADMIN — Medication 20 MILLIGRAM(S): at 00:51

## 2019-07-19 NOTE — ED ADULT NURSE REASSESSMENT NOTE - NS ED NURSE REASSESS COMMENT FT1
Pt provided night time medications, verified with home records. Pt placed in position of comfort. Pt educated on call bell system and provided call bell. Bed in lowest position, wheels locked, appropriate side rails raised. Pt denies needs at this time. fall risk precautions in place.

## 2019-07-19 NOTE — ED ADULT NURSE REASSESSMENT NOTE - NS ED NURSE REASSESS COMMENT FT1
IV access removed from left AC in anticipation of discharge by MD Lowe. Sling applied to patient for left shoulder stability. Pt placed in position of comfort. Pt educated on call bell system and provided call bell. Bed in lowest position, wheels locked, appropriate side rails raised. Pt denies needs at this time.

## 2019-08-06 ENCOUNTER — MEDICATION RENEWAL (OUTPATIENT)
Age: 79
End: 2019-08-06

## 2019-08-30 ENCOUNTER — EMERGENCY (EMERGENCY)
Facility: HOSPITAL | Age: 79
LOS: 1 days | Discharge: HOME | End: 2019-08-30
Attending: EMERGENCY MEDICINE
Payer: MEDICARE

## 2019-08-30 VITALS
TEMPERATURE: 98 F | OXYGEN SATURATION: 99 % | SYSTOLIC BLOOD PRESSURE: 163 MMHG | HEART RATE: 79 BPM | WEIGHT: 145.06 LBS | RESPIRATION RATE: 20 BRPM | DIASTOLIC BLOOD PRESSURE: 96 MMHG | HEIGHT: 61 IN

## 2019-08-30 VITALS
TEMPERATURE: 98 F | DIASTOLIC BLOOD PRESSURE: 92 MMHG | RESPIRATION RATE: 17 BRPM | OXYGEN SATURATION: 94 % | HEART RATE: 69 BPM | SYSTOLIC BLOOD PRESSURE: 182 MMHG

## 2019-08-30 DIAGNOSIS — Z90.710 ACQUIRED ABSENCE OF BOTH CERVIX AND UTERUS: Chronic | ICD-10-CM

## 2019-08-30 DIAGNOSIS — Z96.7 PRESENCE OF OTHER BONE AND TENDON IMPLANTS: Chronic | ICD-10-CM

## 2019-08-30 DIAGNOSIS — Z98.890 OTHER SPECIFIED POSTPROCEDURAL STATES: Chronic | ICD-10-CM

## 2019-08-30 LAB
ALBUMIN SERPL ELPH-MCNC: 3.6 G/DL — SIGNIFICANT CHANGE UP (ref 3.3–5)
ALP SERPL-CCNC: 130 U/L — HIGH (ref 40–120)
ALT FLD-CCNC: 28 U/L — SIGNIFICANT CHANGE UP (ref 10–45)
ANION GAP SERPL CALC-SCNC: 13 MMOL/L — SIGNIFICANT CHANGE UP (ref 5–17)
AST SERPL-CCNC: 31 U/L — SIGNIFICANT CHANGE UP (ref 10–40)
BASOPHILS # BLD AUTO: 0 K/UL — SIGNIFICANT CHANGE UP (ref 0–0.2)
BASOPHILS NFR BLD AUTO: 0.1 % — SIGNIFICANT CHANGE UP (ref 0–2)
BILIRUB SERPL-MCNC: 0.5 MG/DL — SIGNIFICANT CHANGE UP (ref 0.2–1.2)
BUN SERPL-MCNC: 12 MG/DL — SIGNIFICANT CHANGE UP (ref 7–23)
CALCIUM SERPL-MCNC: 9.4 MG/DL — SIGNIFICANT CHANGE UP (ref 8.4–10.5)
CHLORIDE SERPL-SCNC: 100 MMOL/L — SIGNIFICANT CHANGE UP (ref 96–108)
CO2 SERPL-SCNC: 26 MMOL/L — SIGNIFICANT CHANGE UP (ref 22–31)
CREAT SERPL-MCNC: 0.33 MG/DL — LOW (ref 0.5–1.3)
EOSINOPHIL # BLD AUTO: 0.1 K/UL — SIGNIFICANT CHANGE UP (ref 0–0.5)
EOSINOPHIL NFR BLD AUTO: 2.2 % — SIGNIFICANT CHANGE UP (ref 0–6)
GLUCOSE SERPL-MCNC: 91 MG/DL — SIGNIFICANT CHANGE UP (ref 70–99)
HCT VFR BLD CALC: 40.2 % — SIGNIFICANT CHANGE UP (ref 34.5–45)
HGB BLD-MCNC: 12.7 G/DL — SIGNIFICANT CHANGE UP (ref 11.5–15.5)
LYMPHOCYTES # BLD AUTO: 1.4 K/UL — SIGNIFICANT CHANGE UP (ref 1–3.3)
LYMPHOCYTES # BLD AUTO: 23 % — SIGNIFICANT CHANGE UP (ref 13–44)
MCHC RBC-ENTMCNC: 29.6 PG — SIGNIFICANT CHANGE UP (ref 27–34)
MCHC RBC-ENTMCNC: 31.6 GM/DL — LOW (ref 32–36)
MCV RBC AUTO: 93.5 FL — SIGNIFICANT CHANGE UP (ref 80–100)
MONOCYTES # BLD AUTO: 0.5 K/UL — SIGNIFICANT CHANGE UP (ref 0–0.9)
MONOCYTES NFR BLD AUTO: 7.9 % — SIGNIFICANT CHANGE UP (ref 2–14)
NEUTROPHILS # BLD AUTO: 4.1 K/UL — SIGNIFICANT CHANGE UP (ref 1.8–7.4)
NEUTROPHILS NFR BLD AUTO: 66.9 % — SIGNIFICANT CHANGE UP (ref 43–77)
PLATELET # BLD AUTO: 198 K/UL — SIGNIFICANT CHANGE UP (ref 150–400)
POTASSIUM SERPL-MCNC: 3.6 MMOL/L — SIGNIFICANT CHANGE UP (ref 3.5–5.3)
POTASSIUM SERPL-SCNC: 3.6 MMOL/L — SIGNIFICANT CHANGE UP (ref 3.5–5.3)
PROT SERPL-MCNC: 7.5 G/DL — SIGNIFICANT CHANGE UP (ref 6–8.3)
RBC # BLD: 4.3 M/UL — SIGNIFICANT CHANGE UP (ref 3.8–5.2)
RBC # FLD: 13.5 % — SIGNIFICANT CHANGE UP (ref 10.3–14.5)
SODIUM SERPL-SCNC: 139 MMOL/L — SIGNIFICANT CHANGE UP (ref 135–145)
WBC # BLD: 6.2 K/UL — SIGNIFICANT CHANGE UP (ref 3.8–10.5)
WBC # FLD AUTO: 6.2 K/UL — SIGNIFICANT CHANGE UP (ref 3.8–10.5)

## 2019-08-30 PROCEDURE — 99283 EMERGENCY DEPT VISIT LOW MDM: CPT | Mod: GC

## 2019-08-30 PROCEDURE — 85027 COMPLETE CBC AUTOMATED: CPT

## 2019-08-30 PROCEDURE — 80053 COMPREHEN METABOLIC PANEL: CPT

## 2019-08-30 PROCEDURE — 73630 X-RAY EXAM OF FOOT: CPT

## 2019-08-30 PROCEDURE — 99283 EMERGENCY DEPT VISIT LOW MDM: CPT

## 2019-08-30 PROCEDURE — 73630 X-RAY EXAM OF FOOT: CPT | Mod: 26,50

## 2019-08-30 RX ORDER — CEPHALEXIN 500 MG
1 CAPSULE ORAL
Qty: 30 | Refills: 0
Start: 2019-08-30 | End: 2019-09-08

## 2019-08-30 NOTE — ED PROVIDER NOTE - NSFOLLOWUPINSTRUCTIONS_ED_ALL_ED_FT
1. TAKE ALL MEDICATIONS AS DIRECTED.  FOR PAIN YOU CAN TAKE IBUPROFEN (MOTRIN, ADVIL) OR ACETAMINOPHEN (TYLENOL) AS NEEDED, AS DIRECTED ON PACKAGING.  2. FOLLOW UP WITH YOUR PRIMARY DOCTOR AS DIRECTED.  YOU WERE GIVEN COPIES OF ALL LABS AND IMAGING RESULTS FROM YOUR ER VISIT--PLEASE TAKE THEM WITH YOU TO YOUR APPOINTMENT.  3. IF NEEDED, CALL [phone] TO FIND A PRIMARY CARE PHYSICIAN.  OR CALL [phone] TO MAKE AN APPOINTMENT WITH THE MEDICAL CLINIC.  4. RETURN TO THE ER FOR ANY WORSENING SYMPTOMS.

## 2019-08-30 NOTE — ED PROVIDER NOTE - PATIENT PORTAL LINK FT
You can access the FollowMyHealth Patient Portal offered by [facility] by registering at the following website: [email or web address]. By joining [employer]’s FollowMyHealth portal, you will also be able to view your health information using other applications (apps) compatible with our system.
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
## 2019-08-30 NOTE — ED PROVIDER NOTE - PHYSICAL EXAMINATION
GEN: NAD  MSK: swelling, edema and erythema to anterior right foot, cellulitis-like, tenderness to palpation L>R  decubitus ulcers to b/l heel, lateral left leg, posterior right leg

## 2019-08-30 NOTE — ED PROVIDER NOTE - PROGRESS NOTE DETAILS
Spoke to patient about xray. Stated that no obvious fractures were noted but images were suboptimal. Offered repeat xray, patient declined. - Willem Villegas, DO
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
## 2019-09-11 ENCOUNTER — APPOINTMENT (OUTPATIENT)
Dept: WOUND CARE | Facility: CLINIC | Age: 79
End: 2019-09-11
Payer: MEDICARE

## 2019-09-11 VITALS
WEIGHT: 142 LBS | DIASTOLIC BLOOD PRESSURE: 66 MMHG | SYSTOLIC BLOOD PRESSURE: 118 MMHG | TEMPERATURE: 98.4 F | BODY MASS INDEX: 25.16 KG/M2 | HEART RATE: 67 BPM | HEIGHT: 63 IN

## 2019-09-11 PROCEDURE — 99213 OFFICE O/P EST LOW 20 MIN: CPT

## 2019-09-15 NOTE — PHYSICAL EXAM
[Normal Breath Sounds] : Normal breath sounds [Normal Heart Sounds] : normal heart sounds [0] : left 0 [Ankle Swelling On The Left] : of the left ankle [Alert] : alert [Oriented to Person] : oriented to person [Oriented to Time] : oriented to time [Oriented to Place] : oriented to place [Calm] : calm [4 x 4] : 4 x 4  [JVD] : no jugular venous distention  [Varicose Veins Of Lower Extremities] : not present [de-identified] : well nourished, well groomed [] : not present [de-identified] : indwelling catheter changed 2x/month [de-identified] : not ambulatory, wheelchair bound due to multiple sclerosis [de-identified] : wound on left heel [FreeTextEntry1] : lateral lower calf cluster  [FreeTextEntry3] : 1.6cm [FreeTextEntry2] : 6.5cm [FreeTextEntry4] : 0.0 [de-identified] : medihoney [de-identified] : eschar [de-identified] : 1.5/1\par not open [FreeTextEntry7] : dorsal foot [FreeTextEntry9] : 9cm [FreeTextEntry8] : 7cm [de-identified] : 0.1 [de-identified] : DEEP TISSUE INJURY [de-identified] : open blister [de-identified] : medihoney [de-identified] : 14cm [de-identified] : rt posterior deep tissue injury [de-identified] :  3x2 cm [de-identified] : 8cm [de-identified] : 0.1 [TWNoteComboBox1] : Left [de-identified] : eric kirk [TWNoteComboBox3] : FT [TWNoteComboBox5] : No [TWNoteComboBox4] : None [de-identified] : No [TWNoteComboBox6] : Pressure [de-identified] : Normal [de-identified] : 100% [de-identified] : None [de-identified] : Yes [TWNoteComboBox9] : Right [de-identified] : Right [de-identified] : None [de-identified] : No [de-identified] : 100%

## 2019-09-15 NOTE — ASSESSMENT
[FreeTextEntry1] : 78 yr/ old with multiple sclerosis, wheelchair bound, presents with left heel wound, since October 2018, was treating with iodosorb daily and offloading.  Patient compliant to offloading measures.  Wound demonstrates no improvement.  \par \par Previously, the patient underwent evaluation for peripheral arterial disease using a [employer] diagnostic machine.  Ankle brachial index was obtained using blood pressure cuffs of the ankle and brachial segments of both legs.  A distal pulse volume recording was noted digitally on the device.  The procedure was supervised and interpreted by the physician.  MARILY of the right lower extremity  < 0.5  MARILY of the left lower extremity < 0.5.  Waveform noted to be monophasic  Patient tolerated procedure well in the office.  Results discussed with the patient.  Venous duplex was subsequently obtained demonstrating significant severe superficial insufficiency of GSV and SSV of the LLE.   No evidence of DVT/SVT.  \par MARILY/PVR 0.7 with monophasic waveforms distally of bilateral lower extremities\par \par Pt. had a angiogram 3/2019, per op note by Dr Israel due to severity of disease a bypass is recommended  Patient not a candidate for bypass.  d/w patient alternative options.\par 9/11/19\par Pt. returns today, per HHA wounds have gotten necrotic/purplish/red over past week, HHA have been applying medihoney to all sites, not all skin is broken, legs are kept offloaded in boots  Yesterday had a temp. of 102 degrees, no tylenol given, \par Plan - medihoney to necrotic areas, yelena, offload better to legs, number given for medical logistics-  to call to buy better offloading boots, instructed to follow up with vascular surgery, fluids today, go to ER if fever happens again,  aware, face to face done, supplies ordered\par \par \par

## 2019-09-15 NOTE — HISTORY OF PRESENT ILLNESS
[FreeTextEntry1] : Ms. HOLLY FRY   presents to the office with a wound for 3.5 months duration.  The wound is located on  the left heel .  The patient has been dressing the wound with Medihoney daily, as prescribed by primary.  The patient denies fevers or chills.  The patient has localized pain to the wound upon dressing changes.  The patient has no other complaints or associated symptoms. \par \par August 2018, hospitalized at [facility] for sepsis, ICU, hospitalized for 7 days, shortly after when home, noticed redness and pain on left heel, then skin broke down. Prior wound on left heel several years ago, followed by podiatry, eventually healed.\par \par Patient could not undergo revascularization as per patient.

## 2019-09-17 ENCOUNTER — APPOINTMENT (OUTPATIENT)
Dept: VASCULAR SURGERY | Facility: CLINIC | Age: 79
End: 2019-09-17
Payer: MEDICARE

## 2019-09-17 PROCEDURE — 99213 OFFICE O/P EST LOW 20 MIN: CPT

## 2019-09-17 PROCEDURE — 93923 UPR/LXTR ART STDY 3+ LVLS: CPT

## 2019-09-23 ENCOUNTER — APPOINTMENT (OUTPATIENT)
Dept: WOUND CARE | Facility: CLINIC | Age: 79
End: 2019-09-23
Payer: MEDICARE

## 2019-09-23 DIAGNOSIS — T14.8XXA OTHER INJURY OF UNSPECIFIED BODY REGION, INITIAL ENCOUNTER: ICD-10-CM

## 2019-09-23 PROCEDURE — 99213 OFFICE O/P EST LOW 20 MIN: CPT

## 2019-09-23 RX ORDER — SILVER 2" X 2"
0.9 BANDAGE TOPICAL
Qty: 1 | Refills: 0 | Status: ACTIVE | COMMUNITY
Start: 2019-09-23 | End: 1900-01-01

## 2019-09-23 NOTE — HISTORY OF PRESENT ILLNESS
[FreeTextEntry1] : Ms. HOLLY FRY   presents to the office with a wound for 3.5 months duration.  The wound is located on  the left heel .  The patient has been dressing the wound with Medihoney daily, as prescribed by primary.  The patient denies fevers or chills.  The patient has localized pain to the wound upon dressing changes.  The patient has no other complaints or associated symptoms.  Family and patient refusing amputation.  \par \par August 2018, hospitalized at [facility] for sepsis, ICU, hospitalized for 7 days, shortly after when home, noticed redness and pain on left heel, then skin broke down. Prior wound on left heel several years ago, followed by podiatry, eventually healed.\par \par Patient could not undergo revascularization as per patient.

## 2019-09-23 NOTE — REASON FOR VISIT
[Follow-Up: _____] : a [unfilled] follow-up visit [Formal Caregiver] : formal caregiver [Family Member] : family member

## 2019-09-23 NOTE — PHYSICAL EXAM
[Normal Breath Sounds] : Normal breath sounds [Normal Heart Sounds] : normal heart sounds [0] : left 0 [Ankle Swelling On The Left] : of the left ankle [Alert] : alert [Oriented to Person] : oriented to person [Oriented to Place] : oriented to place [Oriented to Time] : oriented to time [Calm] : calm [4 x 4] : 4 x 4  [JVD] : no jugular venous distention  [Varicose Veins Of Lower Extremities] : not present [] : not present [de-identified] : well nourished, well groomed [de-identified] : indwelling catheter changed 2x/month [de-identified] : not ambulatory, wheelchair bound due to multiple sclerosis [de-identified] : wound on left heel [de-identified] : eschar [FreeTextEntry4] : 0.0 [de-identified] : medihoney [de-identified] : DEEP TISSUE INJURY [de-identified] : 0.1 [de-identified] : open blister [de-identified] : medihoney [de-identified] :  3x2 cm [de-identified] : 0.1 [de-identified] : eric kirk [de-identified] : 14/8 [FreeTextEntry1] : right medial  [FreeTextEntry2] : 3 [de-identified] : deep tissue injury [FreeTextEntry3] : 2.5 [FreeTextEntry7] : right heel  [FreeTextEntry8] : 3 [de-identified] : us [FreeTextEntry9] : 3 [de-identified] : left heel  [de-identified] : 1.5 cm [de-identified] : 2 [de-identified] : left medial heel  [de-identified] : 3 [de-identified] : 3 [de-identified] : deep tissue injury  [TWNoteComboBox3] : FT [TWNoteComboBox4] : None [TWNoteComboBox5] : No [TWNoteComboBox6] : Pressure [de-identified] : No [de-identified] : Normal [de-identified] : Yes [de-identified] : 100% [de-identified] : None [de-identified] : No [de-identified] : None [de-identified] : 100% [TWNoteComboBox1] : Right [TWNoteComboBox9] : Right [de-identified] : Left [de-identified] : Left

## 2019-09-23 NOTE — ASSESSMENT
[FreeTextEntry1] : 78 yr/ old with multiple sclerosis, wheelchair bound, presents with left heel wound, since October 2018, was treating with iodosorb daily and offloading.  Patient compliant to offloading measures.  Wound demonstrates no improvement.   Deep tissue injuries progressed since past visit to [facility] with additional deep tissue injuries.  Family received offloading boots today.  Patient has followed up with her vascular surgeon, Dr. Layne.   and aide present..\par \par Previously, the patient underwent evaluation for peripheral arterial disease using a [employer] diagnostic machine.  Ankle brachial index was obtained using blood pressure cuffs of the ankle and brachial segments of both legs.  A distal pulse volume recording was noted digitally on the device.  The procedure was supervised and interpreted by the physician.  MARILY of the right lower extremity  < 0.5  MARILY of the left lower extremity < 0.5.  Waveform noted to be monophasic  Patient tolerated procedure well in the office.  Results discussed with the patient.  Venous duplex was subsequently obtained demonstrating significant severe superficial insufficiency of GSV and SSV of the LLE.   No evidence of DVT/SVT.  \par MARILY/PVR 0.7 with monophasic waveforms distally of bilateral lower extremities\par \par Pt. had a angiogram 3/2019, per op note by Dr Israel due to severity of disease a bypass is recommended  Patient not a candidate for bypass.  d/w patient alternative options.\par 9/11/19\par Pt. returns today, per HHA wounds have gotten necrotic/purplish/red over past week, HHA have been applying medihoney to all sites, not all skin is broken, legs are kept offloaded in boots  Yesterday had a temp. of 102 degrees, no tylenol given, \par Plan - medihoney to necrotic areas, yelena, offload better to legs, number given for medical logistics-  to call to buy better offloading boots, instructed to follow up with vascular surgery, fluids today, go to ER if fever happens again,  aware, face to face done, supplies ordered\par \par \par

## 2019-09-28 ENCOUNTER — INPATIENT (INPATIENT)
Facility: HOSPITAL | Age: 79
LOS: 12 days | Discharge: HOME | DRG: 853 | End: 2019-10-11
Attending: SURGERY | Admitting: HOSPITALIST
Payer: MEDICARE

## 2019-09-28 VITALS
DIASTOLIC BLOOD PRESSURE: 76 MMHG | RESPIRATION RATE: 18 BRPM | HEART RATE: 96 BPM | WEIGHT: 139.99 LBS | OXYGEN SATURATION: 96 % | TEMPERATURE: 102 F | SYSTOLIC BLOOD PRESSURE: 146 MMHG

## 2019-09-28 DIAGNOSIS — Z96.7 PRESENCE OF OTHER BONE AND TENDON IMPLANTS: Chronic | ICD-10-CM

## 2019-09-28 DIAGNOSIS — Z90.710 ACQUIRED ABSENCE OF BOTH CERVIX AND UTERUS: Chronic | ICD-10-CM

## 2019-09-28 DIAGNOSIS — Z98.890 OTHER SPECIFIED POSTPROCEDURAL STATES: Chronic | ICD-10-CM

## 2019-09-28 DIAGNOSIS — I96 GANGRENE, NOT ELSEWHERE CLASSIFIED: ICD-10-CM

## 2019-09-28 LAB
ALBUMIN SERPL ELPH-MCNC: 2.8 G/DL — LOW (ref 3.3–5)
ALP SERPL-CCNC: 219 U/L — HIGH (ref 40–120)
ALT FLD-CCNC: 36 U/L — SIGNIFICANT CHANGE UP (ref 10–45)
ANION GAP SERPL CALC-SCNC: 12 MMOL/L — SIGNIFICANT CHANGE UP (ref 5–17)
APPEARANCE UR: CLEAR — SIGNIFICANT CHANGE UP
APTT BLD: 31.8 SEC — SIGNIFICANT CHANGE UP (ref 27.5–36.3)
AST SERPL-CCNC: 26 U/L — SIGNIFICANT CHANGE UP (ref 10–40)
BASOPHILS # BLD AUTO: 0 K/UL — SIGNIFICANT CHANGE UP (ref 0–0.2)
BASOPHILS NFR BLD AUTO: 0.2 % — SIGNIFICANT CHANGE UP (ref 0–2)
BILIRUB SERPL-MCNC: 0.4 MG/DL — SIGNIFICANT CHANGE UP (ref 0.2–1.2)
BILIRUB UR-MCNC: NEGATIVE — SIGNIFICANT CHANGE UP
BLD GP AB SCN SERPL QL: NEGATIVE — SIGNIFICANT CHANGE UP
BUN SERPL-MCNC: 12 MG/DL — SIGNIFICANT CHANGE UP (ref 7–23)
CALCIUM SERPL-MCNC: 8.6 MG/DL — SIGNIFICANT CHANGE UP (ref 8.4–10.5)
CHLORIDE SERPL-SCNC: 98 MMOL/L — SIGNIFICANT CHANGE UP (ref 96–108)
CO2 SERPL-SCNC: 26 MMOL/L — SIGNIFICANT CHANGE UP (ref 22–31)
COLOR SPEC: YELLOW — SIGNIFICANT CHANGE UP
CREAT SERPL-MCNC: 0.33 MG/DL — LOW (ref 0.5–1.3)
DIFF PNL FLD: ABNORMAL
EOSINOPHIL # BLD AUTO: 0 K/UL — SIGNIFICANT CHANGE UP (ref 0–0.5)
EOSINOPHIL NFR BLD AUTO: 0.3 % — SIGNIFICANT CHANGE UP (ref 0–6)
GLUCOSE SERPL-MCNC: 102 MG/DL — HIGH (ref 70–99)
GLUCOSE UR QL: NEGATIVE — SIGNIFICANT CHANGE UP
HCT VFR BLD CALC: 30.1 % — LOW (ref 34.5–45)
HGB BLD-MCNC: 9.3 G/DL — LOW (ref 11.5–15.5)
INR BLD: 1.3 RATIO — HIGH (ref 0.88–1.16)
KETONES UR-MCNC: ABNORMAL
LACTATE BLDV-MCNC: 1.4 MMOL/L — SIGNIFICANT CHANGE UP (ref 0.7–2)
LEUKOCYTE ESTERASE UR-ACNC: NEGATIVE — SIGNIFICANT CHANGE UP
LYMPHOCYTES # BLD AUTO: 0.9 K/UL — LOW (ref 1–3.3)
LYMPHOCYTES # BLD AUTO: 9.4 % — LOW (ref 13–44)
MCHC RBC-ENTMCNC: 27.5 PG — SIGNIFICANT CHANGE UP (ref 27–34)
MCHC RBC-ENTMCNC: 30.8 GM/DL — LOW (ref 32–36)
MCV RBC AUTO: 89.3 FL — SIGNIFICANT CHANGE UP (ref 80–100)
MONOCYTES # BLD AUTO: 0.6 K/UL — SIGNIFICANT CHANGE UP (ref 0–0.9)
MONOCYTES NFR BLD AUTO: 6.9 % — SIGNIFICANT CHANGE UP (ref 2–14)
NEUTROPHILS # BLD AUTO: 7.6 K/UL — HIGH (ref 1.8–7.4)
NEUTROPHILS NFR BLD AUTO: 83.2 % — HIGH (ref 43–77)
NITRITE UR-MCNC: NEGATIVE — SIGNIFICANT CHANGE UP
PH UR: 6.5 — SIGNIFICANT CHANGE UP (ref 5–8)
PLATELET # BLD AUTO: 265 K/UL — SIGNIFICANT CHANGE UP (ref 150–400)
POTASSIUM SERPL-MCNC: 3.6 MMOL/L — SIGNIFICANT CHANGE UP (ref 3.5–5.3)
POTASSIUM SERPL-SCNC: 3.6 MMOL/L — SIGNIFICANT CHANGE UP (ref 3.5–5.3)
PROT SERPL-MCNC: 6.6 G/DL — SIGNIFICANT CHANGE UP (ref 6–8.3)
PROT UR-MCNC: ABNORMAL
PROTHROM AB SERPL-ACNC: 14.9 SEC — HIGH (ref 10–12.9)
RBC # BLD: 3.37 M/UL — LOW (ref 3.8–5.2)
RBC # FLD: 13.9 % — SIGNIFICANT CHANGE UP (ref 10.3–14.5)
RH IG SCN BLD-IMP: POSITIVE — SIGNIFICANT CHANGE UP
SODIUM SERPL-SCNC: 136 MMOL/L — SIGNIFICANT CHANGE UP (ref 135–145)
SP GR SPEC: 1.02 — SIGNIFICANT CHANGE UP (ref 1.01–1.02)
UROBILINOGEN FLD QL: ABNORMAL
WBC # BLD: 9.2 K/UL — SIGNIFICANT CHANGE UP (ref 3.8–10.5)
WBC # FLD AUTO: 9.2 K/UL — SIGNIFICANT CHANGE UP (ref 3.8–10.5)

## 2019-09-28 PROCEDURE — 99291 CRITICAL CARE FIRST HOUR: CPT | Mod: GC,24

## 2019-09-28 PROCEDURE — 99221 1ST HOSP IP/OBS SF/LOW 40: CPT

## 2019-09-28 PROCEDURE — 73610 X-RAY EXAM OF ANKLE: CPT | Mod: 26,RT

## 2019-09-28 PROCEDURE — 71045 X-RAY EXAM CHEST 1 VIEW: CPT | Mod: 26

## 2019-09-28 PROCEDURE — 75635 CT ANGIO ABDOMINAL ARTERIES: CPT | Mod: 26

## 2019-09-28 PROCEDURE — 93010 ELECTROCARDIOGRAM REPORT: CPT

## 2019-09-28 PROCEDURE — 99291 CRITICAL CARE FIRST HOUR: CPT

## 2019-09-28 RX ORDER — OXYBUTYNIN CHLORIDE 5 MG
5 TABLET ORAL DAILY
Refills: 0 | Status: DISCONTINUED | OUTPATIENT
Start: 2019-09-28 | End: 2019-09-28

## 2019-09-28 RX ORDER — ACETAMINOPHEN 500 MG
650 TABLET ORAL EVERY 6 HOURS
Refills: 0 | Status: DISCONTINUED | OUTPATIENT
Start: 2019-09-28 | End: 2019-10-01

## 2019-09-28 RX ORDER — HEPARIN SODIUM 5000 [USP'U]/ML
5000 INJECTION INTRAVENOUS; SUBCUTANEOUS EVERY 8 HOURS
Refills: 0 | Status: DISCONTINUED | OUTPATIENT
Start: 2019-09-28 | End: 2019-10-01

## 2019-09-28 RX ORDER — PIPERACILLIN AND TAZOBACTAM 4; .5 G/20ML; G/20ML
3.38 INJECTION, POWDER, LYOPHILIZED, FOR SOLUTION INTRAVENOUS EVERY 8 HOURS
Refills: 0 | Status: DISCONTINUED | OUTPATIENT
Start: 2019-09-28 | End: 2019-10-01

## 2019-09-28 RX ORDER — ACETAMINOPHEN 500 MG
975 TABLET ORAL ONCE
Refills: 0 | Status: COMPLETED | OUTPATIENT
Start: 2019-09-28 | End: 2019-09-28

## 2019-09-28 RX ORDER — VANCOMYCIN HCL 1 G
1250 VIAL (EA) INTRAVENOUS EVERY 12 HOURS
Refills: 0 | Status: DISCONTINUED | OUTPATIENT
Start: 2019-09-28 | End: 2019-09-30

## 2019-09-28 RX ORDER — PIPERACILLIN AND TAZOBACTAM 4; .5 G/20ML; G/20ML
3.38 INJECTION, POWDER, LYOPHILIZED, FOR SOLUTION INTRAVENOUS ONCE
Refills: 0 | Status: COMPLETED | OUTPATIENT
Start: 2019-09-28 | End: 2019-09-28

## 2019-09-28 RX ORDER — ACETAMINOPHEN 500 MG
0 TABLET ORAL
Qty: 0 | Refills: 0 | DISCHARGE

## 2019-09-28 RX ORDER — OXYBUTYNIN CHLORIDE 5 MG
5 TABLET ORAL DAILY
Refills: 0 | Status: DISCONTINUED | OUTPATIENT
Start: 2019-09-28 | End: 2019-10-01

## 2019-09-28 RX ORDER — BACLOFEN 100 %
20 POWDER (GRAM) MISCELLANEOUS EVERY 6 HOURS
Refills: 0 | Status: DISCONTINUED | OUTPATIENT
Start: 2019-09-28 | End: 2019-10-01

## 2019-09-28 RX ORDER — SODIUM CHLORIDE 9 MG/ML
1000 INJECTION, SOLUTION INTRAVENOUS
Refills: 0 | Status: DISCONTINUED | OUTPATIENT
Start: 2019-09-28 | End: 2019-10-01

## 2019-09-28 RX ORDER — VANCOMYCIN HCL 1 G
1000 VIAL (EA) INTRAVENOUS ONCE
Refills: 0 | Status: COMPLETED | OUTPATIENT
Start: 2019-09-28 | End: 2019-09-28

## 2019-09-28 RX ORDER — POTASSIUM CHLORIDE 20 MEQ
3 PACKET (EA) ORAL
Qty: 0 | Refills: 0 | DISCHARGE

## 2019-09-28 RX ORDER — SODIUM CHLORIDE 9 MG/ML
1950 INJECTION, SOLUTION INTRAVENOUS ONCE
Refills: 0 | Status: COMPLETED | OUTPATIENT
Start: 2019-09-28 | End: 2019-09-28

## 2019-09-28 RX ADMIN — Medication 975 MILLIGRAM(S): at 20:40

## 2019-09-28 RX ADMIN — SODIUM CHLORIDE 1950 MILLILITER(S): 9 INJECTION, SOLUTION INTRAVENOUS at 19:30

## 2019-09-28 RX ADMIN — Medication 100 MILLIGRAM(S): at 20:10

## 2019-09-28 RX ADMIN — PIPERACILLIN AND TAZOBACTAM 200 GRAM(S): 4; .5 INJECTION, POWDER, LYOPHILIZED, FOR SOLUTION INTRAVENOUS at 19:31

## 2019-09-28 RX ADMIN — Medication 250 MILLIGRAM(S): at 20:40

## 2019-09-28 RX ADMIN — Medication 975 MILLIGRAM(S): at 21:10

## 2019-09-28 NOTE — ED PROVIDER NOTE - CRITICAL CARE PROVIDED
direct patient care (not related to procedure)/additional history taking/consultation with other physicians/consult w/ pt's family directly relating to pts condition/documentation/interpretation of diagnostic studies

## 2019-09-28 NOTE — H&P ADULT - NSHPLABSRESULTS_GEN_ALL_CORE
Right Foot/Ankle XR personally reviewed by me: subcutaneous gas noted    CXR personally reviewed by me: no consolidation, limited by malrotation
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
## 2019-09-28 NOTE — ED ADULT NURSE NOTE - NSIMPLEMENTINTERV_GEN_ALL_ED
Implemented All Fall with Harm Risk Interventions:  [place] to call system. Call bell, personal items and telephone within reach. Instruct patient to call for assistance. Room bathroom lighting operational. Non-slip footwear when patient is off stretcher. Physically safe environment: no spills, clutter or unnecessary equipment. Stretcher in lowest position, wheels locked, appropriate side rails in place. Provide visual cue, wrist band, yellow gown, etc. Monitor gait and stability. Monitor for mental status changes and reorient to person, place, and time. Review medications for side effects contributing to fall risk. Reinforce activity limits and safety measures with patient and family. Provide visual clues: red socks.

## 2019-09-28 NOTE — CONSULT NOTE ADULT - SUBJECTIVE AND OBJECTIVE BOX
CC: 79y old Female admitted with a chief complaint of , now RLE gangrene     HPI:  79 y.o female with PMhx of MS presenting to the ED due to worsening of LE wounds. Patient has been seeing wound care for b/l non-healing wounds that appear necrotic. Per  RLE worsening and now blue in color, occurred 4 days ago. Patient reports that she has no sensation in either LE, thus feels no pain. Pt. been having fevers at home with Tmax today of 104F. Patient has had decreased PO intake and was lethargic earlier today per , prior to administration of IV fluids. Patient is non-ambulatory at baseline. In ED patient given clinda/vanco. Patient has previously been seen by Dr. Israel in the office.     PMHx: MS (multiple sclerosis)  HTN (hypertension)  Osteoporosis  Dysphagia  Neurogenic bladder  Ptosis of both eyelids  Personal History of Multiple Sclerosis  Personal History of Hypertension    PSHx: S/P ORIF (open reduction internal fixation) fracture  S/P breast biopsy  S/P hysterectomy    Medications (inpatient):   Medications (PRN):  Allergies: No Known Allergies  (Intolerances: )  Social Hx:   Family Hx: No pertinent family history      Physical Exam  T(C): 38.7  HR: 84 (84 - 96)  BP: 118/61 (118/61 - 146/76)  RR: 20 (18 - 22)  SpO2: 96% (95% - 96%)  Tmax: T(C): , Max: 38.7 (19 @ 18:41)    General: well developed, well nourished, NAD  Neuro: alert and oriented, no focal deficits, moves all extremities spontaneously  HEENT: NCAT, EOMI, anicteric, mucosa moist  Respiratory: airway patent, respirations unlabored  CVS: regular rate and rhythm  Abdomen: soft, nontender, nondistended  Extremities: b/l LE with necrotic wounds at calf, and foot, RLE appears cyanotic with blue discoloration at distal foot, weak AT/PT signals present on the right, AT/DP signals present, popliteal and femoral palpable b/l    Skin: warm, dry, appropriate color    Labs:                        9.3    9.2   )-----------( 265      ( 28 Sep 2019 19:33 )             30.1     PT/INR - ( 28 Sep 2019 19:33 )   PT: 14.9 sec;   INR: 1.30 ratio         PTT - ( 28 Sep 2019 19:33 )  PTT:31.8 sec      136  |  98  |  12  ----------------------------<  102<H>  3.6   |  26  |  0.33<L>    Ca    8.6      28 Sep 2019 19:33    TPro  6.6  /  Alb  2.8<L>  /  TBili  0.4  /  DBili  x   /  AST  26  /  ALT  36  /  AlkPhos  219<H>      Urinalysis Basic - ( 28 Sep 2019 19:33 )    Color: Yellow / Appearance: Clear / S.020 / pH: x  Gluc: x / Ketone: Small  / Bili: Negative / Urobili: 2 mg/dL   Blood: x / Protein: 30 mg/dL / Nitrite: Negative   Leuk Esterase: Negative / RBC: 9 /hpf / WBC 5 /HPF   Sq Epi: x / Non Sq Epi: 2 /hpf / Bacteria: Negative            Imaging and other studies:  < from: Xray Ankle Complete 3 Views, Right (19 @ 20:28) >      INTERPRETATION:  Air is seen tracking along the posterior subcutaneous   tissues, best seen on the lateral view.  (PRELIM) CC: 79y old Female admitted with a chief complaint of , now RLE gangrene     HPI:  79 y.o female with PMhx of MS presenting to the ED due to worsening of LE wounds. Patient has been seeing wound care for b/l non-healing wounds that appear necrotic. Per  RLE worsening and now blue in color, occurred 4 days ago. Patient reports that she has no sensation in either LE, thus feels no pain. Pt. been having fevers at home with Tmax today of 104F. Patient has had decreased PO intake and was lethargic earlier today per , prior to administration of IV fluids. Patient is non-ambulatory at baseline. In ED patient given clinda/vanco. Patient has previously been seen by Dr. Israel in the office.     PMHx: MS (multiple sclerosis)  HTN (hypertension)  Osteoporosis  Dysphagia  Neurogenic bladder  Ptosis of both eyelids  Personal History of Multiple Sclerosis  Personal History of Hypertension    PSHx: S/P ORIF (open reduction internal fixation) fracture  S/P breast biopsy  S/P hysterectomy    Medications (inpatient):   Medications (PRN):  Allergies: No Known Allergies  (Intolerances: )  Social Hx:   Family Hx: No pertinent family history      Physical Exam  T(C): 38.7  HR: 84 (84 - 96)  BP: 118/61 (118/61 - 146/76)  RR: 20 (18 - 22)  SpO2: 96% (95% - 96%)  Tmax: T(C): , Max: 38.7 (19 @ 18:41)    General: well developed, well nourished, NAD  Neuro: alert and oriented, no focal deficits, moves all extremities spontaneously  HEENT: NCAT, EOMI, anicteric, mucosa moist  Respiratory: airway patent, respirations unlabored  CVS: regular rate and rhythm  Abdomen: soft, nontender, nondistended  Extremities: b/l LE with necrotic wounds at calf, and foot, RLE appears cyanotic with blue discoloration at distal foot, weak AT/PT signals present on the right, AT/DP signals present, popliteal and femoral palpable b/l    Skin: warm, dry, appropriate color    Labs:                        9.3    9.2   )-----------( 265      ( 28 Sep 2019 19:33 )             30.1     PT/INR - ( 28 Sep 2019 19:33 )   PT: 14.9 sec;   INR: 1.30 ratio         PTT - ( 28 Sep 2019 19:33 )  PTT:31.8 sec      136  |  98  |  12  ----------------------------<  102<H>  3.6   |  26  |  0.33<L>    Ca    8.6      28 Sep 2019 19:33    TPro  6.6  /  Alb  2.8<L>  /  TBili  0.4  /  DBili  x   /  AST  26  /  ALT  36  /  AlkPhos  219<H>      Urinalysis Basic - ( 28 Sep 2019 19:33 )    Color: Yellow / Appearance: Clear / S.020 / pH: x  Gluc: x / Ketone: Small  / Bili: Negative / Urobili: 2 mg/dL   Blood: x / Protein: 30 mg/dL / Nitrite: Negative   Leuk Esterase: Negative / RBC: 9 /hpf / WBC 5 /HPF   Sq Epi: x / Non Sq Epi: 2 /hpf / Bacteria: Negative        Imaging and other studies:  < from: Xray Ankle Complete 3 Views, Right (19 @ 20:28) >      INTERPRETATION:  Air is seen tracking along the posterior subcutaneous   tissues, best seen on the lateral view.  (PRELIM)

## 2019-09-28 NOTE — H&P ADULT - ASSESSMENT
79F with PMHx of multiple sclerosis (progressive and not currently on any medication), HTN, neurogenic bladder (has had a Kendrick catheter for many years)  and peripheral vascular disease presents with right foot pain for one month. Patient febrile and tachypneic, meeting SIRS criteria. On exam, right foot is cold to touch, unable to palpate pulses and necrotic tissue noted on dorsum of foot and lateral malleolus with malodor. Right foot/ankle XR showing subcutaneous air. Patient likely having severe cellulitis vs. possible necrotizing fasciitis. Podiatry and vascular surgery saw patient and are both recommending possible right below knee amputation.

## 2019-09-28 NOTE — ED PROVIDER NOTE - NS ED ROS FT
CONSTITUTIONAL: +fevers  Eyes: No vision changes  Cardiovascular: No Chest pain  Respiratory: No SOB  Gastrointestinal: No n/v/d, no abd pain  Genitourinary: no dysuria, no hematuria  SKIN: no rashes.  NEURO: no headache  PSYCHIATRIC: no known mental health issues.

## 2019-09-28 NOTE — ED ADULT NURSE NOTE - OBJECTIVE STATEMENT
Pt presents to ED awake and alert, brought in by EMS from home accompanied by her .  reports pt has been treated for a bladder infection (chronic indwelling fragoso) and today became lethargic, "unresponsive," and refusing to drink water. EMS gave pt 250cc of normal saline via 20G RAC and pt returned to baseline mental status (A&OX4). EMS found pt O2 sat to be 90% and placed her on 2L nasal cannula. Pt has h/o MS and is wheelchair bound, does not ambulate at baseline. Pt has also been treated for severe wounds. Right foot is cool and purple in color. Other wounds are as documented in ROS. Pt A&Ox4 on exam, not c/o pain to LE. Respirations are even and unlabored.

## 2019-09-28 NOTE — ED PROVIDER NOTE - PROGRESS NOTE DETAILS
BOSSMAN Davis MD: Pt arrived as code sepsis, empiric abx and sepsis bundle ordered. RLE with concern for arterial occlusion - on arrival, R foot is cyanotic, cold, without palpable or dopplerable pulses. Pt recently tx with ABX for RLE gangrene, getting wound care, per , RLE "doppler" showed "poor circulation." Vascular surgery consulted STAT. Preop labs ordered. BOSSMAN Davis MD: Pt arrived as code sepsis, empiric abx and sepsis bundle ordered. RLE with concern for arterial occlusion - on arrival, R foot is cyanotic, cold, without palpable or dopplerable pulses. Pt recently tx with ABX for RLE gangrene, getting wound care, per , RLE "doppler" showed "poor circulation." Vascular surgery consulted STAT. Preop labs ordered. No obvious crepitus, however, vanc/zosyn/clinda ordered empirically and XR LE ordered to r/o subcutaneous gas. BOSSMAN Davis MD: Vascular surgery is at bedside. BOSSMAN Davis MD: XR with subcutaneous air tracking up posterior LLE. Dr Osuna d/w Vascular surgery who was planning recommend emergent BKA, will d/w patient. BOSSMAN Davis MD: D/w vascular surgery resident who reviewed case with fellow and attending - they feel nec fasc less likely, however, strongly advised pt and  to have BKA for gangrene 2/2 arterial insufficiency. At this time,  is very hesitant to having pt undergo surgery, pt is also against it, however, vascular to have discussion with them once more. Myself and Dr. Osuna spoke with  and strongly advised for pt to have surgery as she may be at risk for worsening infection and death, serious disability, need for further hospitalizations/surgeries without it, however,  refusing at this time. Per Vascular surgery, if patient and  refuse surgery and understand the risks of not having surgery, they should be admitted to medicine for medical management. Jefe PGY-2:  After extensive discussion with  regarding critical nature of patients condition and need for surgery, continues to state does not want surgery at this time,  understand risk of death. will admit to medicine.

## 2019-09-28 NOTE — CONSULT NOTE ADULT - ASSESSMENT
80 yo female patient w/ multiple gangrenous wounds of right lower extremity in sepsis    - c/w IV abx  - pod recs appreciated   -patient will need amputation; however family would like to further discuss   - consult patient's neurologist in the AM Dr. Vladimir Parry (per patients request)   - vascular surgery will continue to monitor closely     Discussed with vascular fellow     Vascular Surgery [phone]

## 2019-09-28 NOTE — H&P ADULT - PROBLEM SELECTOR PLAN 2
-No acute intervention, not on any medications  -Unable to reach Dr. Vladimir Parry (neurologist) [phone]  -Continue with Baclofen 20 q6 hours (slight change in home medication) for spasticity

## 2019-09-28 NOTE — CONSULT NOTE ADULT - ASSESSMENT
78 yo female patient w/ multiple gangrenous wounds of right lower extremity in sepsis  - Pt seen and evaluated in ED  - Pt meets criteria for sepsis, WBC 9.2, , CRP pending  - RLE multiple leg and foot gangrene with cyanosis and pallor of the right lower extremity and cold to touch. No purulence, no drainage. Left foot with preulcerative erythematous lesions medial ankle and 1st MPJ.  - Non palpable pedal pulses, delayed CFT, TG decreased immensely  - Xray of foot showing no signs of subQ gas but Ankle xray shows gas laterally  - Right foot is non salvageable from podiatry standpoint  - Recommend management under Vascular/Medicine for work up for potential vascular occlusion  - Patient known to Dr. Israel (vascular)  - Recommend CT of LE to r/o tracking gas  - Please reconsult as needed  - Discussed w/ attending

## 2019-09-28 NOTE — H&P ADULT - NSICDXPASTSURGICALHX_GEN_ALL_CORE_FT
PAST SURGICAL HISTORY:  S/P breast biopsy     S/P hysterectomy > 30 yrs ago    S/P ORIF (open reduction internal fixation) fracture left femur, right hip

## 2019-09-28 NOTE — ED PROVIDER NOTE - OBJECTIVE STATEMENT
80yo F h/o MS, htn pw cc of R foot wound    Has had worsening R foot wound 1x month now coming in due to worsening mental status. Had been given keflex for cellulitis at beginning of month. Saw vascular surgeon who had been recommending amputation. Had refused.

## 2019-09-28 NOTE — H&P ADULT - NSICDXPASTMEDICALHX_GEN_ALL_CORE_FT
PAST MEDICAL HISTORY:  Dysphagia no special diet; no h/o aspiration PNA    HTN (hypertension)     MS (multiple sclerosis)     Neurogenic bladder     Osteoporosis     Ptosis of both eyelids

## 2019-09-28 NOTE — ED PROVIDER NOTE - PHYSICAL EXAMINATION
General: lethargic, a x o x self and place  HEENT: pupils equal and reactive, normal oropharynx, normal external ears bilaterally   Cardiac: RRR, no MRG appreciated  Resp: lungs clear to auscultation bilaterally, symmetric chest wall rise  Abd: soft, nontender, nondistended, normoactive bowel sounds  : no CVA tenderness  Neuro: Moving all extremities  Right lower extremity: Grossly gangrenous foot unable to appreciate PULSES, no crepitous appreciated along leg

## 2019-09-28 NOTE — CONSULT NOTE ADULT - SUBJECTIVE AND OBJECTIVE BOX
Podiatry pager #: [phone]/ [phone]    Patient is a 79y old  Female who presents with a chief complaint of right lower extremity necrotic wounds and sepsis    HPI:    Pt arrived as code sepsis, empiric abx and sepsis bundle ordered. RLE with concern for arterial occlusion - on arrival, R foot is cyanotic, cold, without palpable or dopplerable pulses. Pt recently tx with ABX for RLE gangrene, getting wound care, per , RLE "doppler" showed "poor circulation." Vascular surgery consulted STAT. Preop labs ordered. No obvious crepitus, however, vanc/zosyn/clinda ordered empirically and XR LE ordered to r/o subcutaneous gas. Pt is known to Dr. Israel (vascular)      PAST MEDICAL & SURGICAL HISTORY:  MS (multiple sclerosis)  HTN (hypertension)  Osteoporosis  Dysphagia: no special diet; no h/o aspiration PNA  Neurogenic bladder  Ptosis of both eyelids  S/P ORIF (open reduction internal fixation) fracture: left femur, right hip  S/P breast biopsy  S/P hysterectomy: &gt; 30 yrs ago      MEDICATIONS  (STANDING):  acetaminophen   Tablet .. 975 milliGRAM(s) Oral once  vancomycin  IVPB 1000 milliGRAM(s) IV Intermittent once    MEDICATIONS  (PRN):      Allergies    No Known Allergies    Intolerances        VITALS:    Vital Signs Last 24 Hrs  T(C): 38.7 (28 Sep 2019 18:41), Max: 38.7 (28 Sep 2019 18:41)  T(F): 101.6 (28 Sep 2019 18:41), Max: 101.6 (28 Sep 2019 18:41)  HR: 94 (28 Sep 2019 19:44) (94 - 96)  BP: 140/83 (28 Sep 2019 19:44) (140/83 - 146/76)  BP(mean): --  RR: 22 (28 Sep 2019 19:44) (18 - 22)  SpO2: 95% (28 Sep 2019 19:44) (95% - 96%)    LABS:                          9.3    9.2   )-----------( 265      ( 28 Sep 2019 19:33 )             30.1       09-28    136  |  98  |  12  ----------------------------<  102<H>  3.6   |  26  |  0.33<L>    Ca    8.6      28 Sep 2019 19:33    TPro  6.6  /  Alb  2.8<L>  /  TBili  0.4  /  DBili  x   /  AST  26  /  ALT  36  /  AlkPhos  219<H>  09-28      CAPILLARY BLOOD GLUCOSE          PT/INR - ( 28 Sep 2019 19:33 )   PT: 14.9 sec;   INR: 1.30 ratio         PTT - ( 28 Sep 2019 19:33 )  PTT:31.8 sec    LOWER EXTREMITY PHYSICAL EXAM:    Vasular: DP/PT non palpable, B/L, CFT delayed B/L, Temperature gradient decreased B/L, increased cyanosis and pallor to the right lower extremity and foot. Cold to touch.  Neuro: Epicritic sensation absent at level of foot b/l  Musculoskeletal/Ortho: bed bound  Skin: Right lower extremity with multiples severes gangrenous patches overlying cyanosis/erythematous and pale foot. Presence of wounds medial lower leg and ankle as well with clinical evidence of vascular occlusion. Gangrene from right ankle to base of MPJs of right foot with possible wet conversion distally. Digits are cold to touch and pale with cyanosis. No malodor, no purulence, no active drainage. Left foot with erythematous lesion likely 2/2 pressure at medial heel, talonavicular joint and medial 1st mpj. no active drainage.    RADIOLOGY & ADDITIONAL STUDIES:    < from: Xray Foot AP + Lateral + Oblique, Bilat (08.30.19 @ 19:59) >    EXAM:  FOOT COMPLETE BILAT-MIN 3 VIEWS                            PROCEDURE DATE:  08/30/2019            INTERPRETATION:  CLINICAL INFORMATION: Patient with a history of multiple   sclerosis, was in wheelchair and injured feet against wall.    TECHNIQUE: AP and lateral radiographs of the bilateral feet.    COMPARISON: X-rays of the left foot dated 4/3/2012.    FINDINGS:   Osseous demineralization and overlying material limits evaluation. No   acute displaced fractures. No dislocations. Joint spaces are preserved.    IMPRESSION:  Limited study. No acute displaced fractures.                ARTUR DONAHUE M.D., RADIOLOGY RESIDENT  This document has been electronically signed.  JACK COLE M.D., ATTENDING RADIOLOGIST  This document has been electronically signed. Aug 31 2019  9:07AM              < end of copied text >    < from: Xray Ankle Complete 3 Views, Right (09.28.19 @ 20:28) >    ******PRELIMINARY REPORT******    ******PRELIMINARY REPORT******          EXAM:  ANKLE RIGHT (MINIMUM 3 VIEWS)                            PROCEDURE DATE:  09/28/2019      ******PRELIMINARY REPORT******    ******PRELIMINARY REPORT******              INTERPRETATION:  Air is seen tracking along the posterior subcutaneous   tissues, best seen on the lateral view.              ******PRELIMINARY REPORT******    ******PRELIMINARY REPORT******          PRADIP SANDOVAL M.D., RADIOLOGY RESIDENT    < end of copied text >

## 2019-09-28 NOTE — ED PROVIDER NOTE - CLINICAL SUMMARY MEDICAL DECISION MAKING FREE TEXT BOX
BOSSMAN Davis MD: Pt is a 78 y/o female with PMH MS with neurogenic bladder and dysphagia, HTN, osteoporosis, ptosis b/l who is BIB  for fever. Per , pt has been recently tx for gangrene of the RLE, sees a wound care doctor. H/o taking keflex ~1 mo ago for possible RLE cellulitis, now with gangrene and fever as of this AM. Per , she recently had "duplex" which confirmed "poor circulation" to lower extremity, and there has been some discussion about possible stent or bypass surgery, however, he feels that pt is a poor surgical candidate. Pt denies pain to RLE currently, however, has had some recently. Last tylenol was this AM. Pt denies: chest pain, SOB, cough,  pleuritic chest pain, abdominal pain, n/v/d, back pain, neck pain, HA, neck stiffness, new focal numbness or weakness, visual changes, dizziness, lightheadedness, recent travel, recent trauma, recent immobilization, dysuria, hematuria, rash. PT's RLE with gangrenous skin changes to proximal L foot/ankle, +violaceous hue to toes of RLE, RLE +cold, no pulses palpable to DP/TP or heard on doppler. No crepitus palpable to RLE. RLE non-tender. Concern for arterial occlusion. Ddx also includes, however, is not limited to: necrotizing fasciitis, DVT, gangrene, osteomyelitis, other. Plan: STAT vascular surgery consult, RLE XR, preop labs, bcx, lactate, empiric abx including coverage for possible nec fasc, IVF, TBA

## 2019-09-28 NOTE — CHART NOTE - NSCHARTNOTEFT_GEN_A_CORE
Patient's family hesitant about performing right BKA prior to speaking with their neurologist due to concern for receiving anesthesia given her multiple sclerosis. I was unable to reach Dr. Vladimir Parry, but the covering neurologist stated there are no objections from a neurological standpoint from receiving anesthesia.     If patient is going for emergent right BKA, patient does not need medical clearance. If patient is going for urgent BKA, she is currently cleared. Although unable to determine her functional capacity given wheelchair bound, patient has no active cardiac disease and her RCRI is 0. Any vascular surgery likely moderate to high risk, but at this point patient optimized medically and is cleared from a medicine standpoint.

## 2019-09-28 NOTE — H&P ADULT - NSHPPHYSICALEXAM_GEN_ALL_CORE
T(C): 36.9 (09-28-19 @ 23:31), Max: 38.7 (09-28-19 @ 18:41)  HR: 71 (09-28-19 @ 23:31) (71 - 96)  BP: 112/50 (09-28-19 @ 23:31) (112/50 - 146/76)  RR: 19 (09-28-19 @ 23:31) (18 - 22)  SpO2: 100% (09-28-19 @ 23:31) (95% - 100%)    Gen: female in NAD, no diaphoresis, drowsy (but arousable)  HEENT: NCAT, MMM, neck soft and supple  CV: +S1/S2, no m/r/g  Resp: CTAB, no w/r/r  GI: normoactive BS, soft, NTND, no rebounding/guarding  Ext: left leg warm, pulses 1-2+, right leg cool to touch, pulses not palpable, necrotic tissue noted over lateral malleolus, necrotic tissue over dorsum of foot, no discharge, foul smelling at this time  Neuro: CNII-XII grossly intact, drowsy, poorly arousable  Psych: flat affect, unable to determine if displaying suicide/homicidal ideation  Skin: necrotic patches noted on dorsum of right foot and near lateral malleolus  : +Kendrick Cath

## 2019-09-28 NOTE — H&P ADULT - PROBLEM SELECTOR PLAN 1
-Follow up CT runoff  -Follow up with podiatry and vascular  -Given sepsis secondary to gangrene/necrotizing fasciitis of foot will treat broadly with Vancomycin, Zosyn, and Clindamycin  -Follow up blood cultures  -Hydrate with D5NS at 70 cc/hr  -Medically cleared for urgent right BKA, no medical clearance needed for emergent procedure

## 2019-09-28 NOTE — H&P ADULT - ATTENDING COMMENTS
I personally provided 45 minutes of critical care for the patient. Patient has severe sepsis secondary to gangrene/necrotizing fasciitis of right foot. Patient as high risk for clinical deterioration.    See clearance note for medical clearance. Plan of care signed out to ACP. Prognosis discussed with . He understands that prognosis is poor and patient may pass away without surgical intervention/source control. I tried reaching out to patient's neurologist Vladimir Parry overnight and unable to. We are unlikely to be able to reach from over the weekend.

## 2019-09-28 NOTE — CONSULT NOTE ADULT - EXTREMITIES COMMENTS
moderate  arterial insuff  w moderate trophic skin changes   right foot from ankle distally  w sig dec cap refill and perfusion

## 2019-09-29 DIAGNOSIS — G35 MULTIPLE SCLEROSIS: ICD-10-CM

## 2019-09-29 DIAGNOSIS — N31.9 NEUROMUSCULAR DYSFUNCTION OF BLADDER, UNSPECIFIED: ICD-10-CM

## 2019-09-29 DIAGNOSIS — I96 GANGRENE, NOT ELSEWHERE CLASSIFIED: ICD-10-CM

## 2019-09-29 DIAGNOSIS — I10 ESSENTIAL (PRIMARY) HYPERTENSION: ICD-10-CM

## 2019-09-29 DIAGNOSIS — I77.1 STRICTURE OF ARTERY: ICD-10-CM

## 2019-09-29 LAB
ALBUMIN SERPL ELPH-MCNC: 2.3 G/DL — LOW (ref 3.3–5)
ALP SERPL-CCNC: 179 U/L — HIGH (ref 40–120)
ALT FLD-CCNC: 26 U/L — SIGNIFICANT CHANGE UP (ref 10–45)
ANION GAP SERPL CALC-SCNC: 11 MMOL/L — SIGNIFICANT CHANGE UP (ref 5–17)
AST SERPL-CCNC: 23 U/L — SIGNIFICANT CHANGE UP (ref 10–40)
BASOPHILS # BLD AUTO: 0 K/UL — SIGNIFICANT CHANGE UP (ref 0–0.2)
BASOPHILS NFR BLD AUTO: 0.3 % — SIGNIFICANT CHANGE UP (ref 0–2)
BILIRUB SERPL-MCNC: 0.3 MG/DL — SIGNIFICANT CHANGE UP (ref 0.2–1.2)
BUN SERPL-MCNC: 8 MG/DL — SIGNIFICANT CHANGE UP (ref 7–23)
CALCIUM SERPL-MCNC: 8.2 MG/DL — LOW (ref 8.4–10.5)
CHLORIDE SERPL-SCNC: 99 MMOL/L — SIGNIFICANT CHANGE UP (ref 96–108)
CO2 SERPL-SCNC: 25 MMOL/L — SIGNIFICANT CHANGE UP (ref 22–31)
CREAT SERPL-MCNC: <0.3 MG/DL — LOW (ref 0.5–1.3)
CULTURE RESULTS: SIGNIFICANT CHANGE UP
EOSINOPHIL # BLD AUTO: 0 K/UL — SIGNIFICANT CHANGE UP (ref 0–0.5)
EOSINOPHIL NFR BLD AUTO: 0.6 % — SIGNIFICANT CHANGE UP (ref 0–6)
GLUCOSE SERPL-MCNC: 157 MG/DL — HIGH (ref 70–99)
HCT VFR BLD CALC: 24.5 % — LOW (ref 34.5–45)
HCT VFR BLD CALC: 24.7 % — LOW (ref 34.5–45)
HGB BLD-MCNC: 7.7 G/DL — LOW (ref 11.5–15.5)
HGB BLD-MCNC: 7.8 G/DL — LOW (ref 11.5–15.5)
LYMPHOCYTES # BLD AUTO: 0.8 K/UL — LOW (ref 1–3.3)
LYMPHOCYTES # BLD AUTO: 12.4 % — LOW (ref 13–44)
MCHC RBC-ENTMCNC: 28.1 PG — SIGNIFICANT CHANGE UP (ref 27–34)
MCHC RBC-ENTMCNC: 28.1 PG — SIGNIFICANT CHANGE UP (ref 27–34)
MCHC RBC-ENTMCNC: 31.4 GM/DL — LOW (ref 32–36)
MCHC RBC-ENTMCNC: 31.5 GM/DL — LOW (ref 32–36)
MCV RBC AUTO: 89.3 FL — SIGNIFICANT CHANGE UP (ref 80–100)
MCV RBC AUTO: 89.5 FL — SIGNIFICANT CHANGE UP (ref 80–100)
MONOCYTES # BLD AUTO: 0.5 K/UL — SIGNIFICANT CHANGE UP (ref 0–0.9)
MONOCYTES NFR BLD AUTO: 7.4 % — SIGNIFICANT CHANGE UP (ref 2–14)
NEUTROPHILS # BLD AUTO: 5.3 K/UL — SIGNIFICANT CHANGE UP (ref 1.8–7.4)
NEUTROPHILS NFR BLD AUTO: 79.4 % — HIGH (ref 43–77)
PLATELET # BLD AUTO: 204 K/UL — SIGNIFICANT CHANGE UP (ref 150–400)
PLATELET # BLD AUTO: 219 K/UL — SIGNIFICANT CHANGE UP (ref 150–400)
POTASSIUM SERPL-MCNC: 3.1 MMOL/L — LOW (ref 3.5–5.3)
POTASSIUM SERPL-SCNC: 3.1 MMOL/L — LOW (ref 3.5–5.3)
PROT SERPL-MCNC: 5.6 G/DL — LOW (ref 6–8.3)
RBC # BLD: 2.75 M/UL — LOW (ref 3.8–5.2)
RBC # BLD: 2.76 M/UL — LOW (ref 3.8–5.2)
RBC # FLD: 13.8 % — SIGNIFICANT CHANGE UP (ref 10.3–14.5)
RBC # FLD: 14 % — SIGNIFICANT CHANGE UP (ref 10.3–14.5)
SODIUM SERPL-SCNC: 135 MMOL/L — SIGNIFICANT CHANGE UP (ref 135–145)
SPECIMEN SOURCE: SIGNIFICANT CHANGE UP
WBC # BLD: 6.2 K/UL — SIGNIFICANT CHANGE UP (ref 3.8–10.5)
WBC # BLD: 6.7 K/UL — SIGNIFICANT CHANGE UP (ref 3.8–10.5)
WBC # FLD AUTO: 6.2 K/UL — SIGNIFICANT CHANGE UP (ref 3.8–10.5)
WBC # FLD AUTO: 6.7 K/UL — SIGNIFICANT CHANGE UP (ref 3.8–10.5)

## 2019-09-29 PROCEDURE — 99232 SBSQ HOSP IP/OBS MODERATE 35: CPT

## 2019-09-29 RX ORDER — POTASSIUM CHLORIDE 20 MEQ
40 PACKET (EA) ORAL EVERY 4 HOURS
Refills: 0 | Status: COMPLETED | OUTPATIENT
Start: 2019-09-29 | End: 2019-09-30

## 2019-09-29 RX ORDER — INFLUENZA VIRUS VACCINE 15; 15; 15; 15 UG/.5ML; UG/.5ML; UG/.5ML; UG/.5ML
0.5 SUSPENSION INTRAMUSCULAR ONCE
Refills: 0 | Status: COMPLETED | OUTPATIENT
Start: 2019-09-29 | End: 2019-09-29

## 2019-09-29 RX ADMIN — Medication 100 MILLIGRAM(S): at 14:26

## 2019-09-29 RX ADMIN — HEPARIN SODIUM 5000 UNIT(S): 5000 INJECTION INTRAVENOUS; SUBCUTANEOUS at 05:14

## 2019-09-29 RX ADMIN — Medication 5 MILLIGRAM(S): at 12:29

## 2019-09-29 RX ADMIN — Medication 100 MILLIGRAM(S): at 21:00

## 2019-09-29 RX ADMIN — Medication 20 MILLIGRAM(S): at 17:53

## 2019-09-29 RX ADMIN — HEPARIN SODIUM 5000 UNIT(S): 5000 INJECTION INTRAVENOUS; SUBCUTANEOUS at 14:38

## 2019-09-29 RX ADMIN — Medication 40 MILLIEQUIVALENT(S): at 18:39

## 2019-09-29 RX ADMIN — Medication 20 MILLIGRAM(S): at 12:29

## 2019-09-29 RX ADMIN — PIPERACILLIN AND TAZOBACTAM 25 GRAM(S): 4; .5 INJECTION, POWDER, LYOPHILIZED, FOR SOLUTION INTRAVENOUS at 14:29

## 2019-09-29 RX ADMIN — Medication 166.67 MILLIGRAM(S): at 12:29

## 2019-09-29 RX ADMIN — PIPERACILLIN AND TAZOBACTAM 25 GRAM(S): 4; .5 INJECTION, POWDER, LYOPHILIZED, FOR SOLUTION INTRAVENOUS at 05:15

## 2019-09-29 RX ADMIN — Medication 100 MILLIGRAM(S): at 05:14

## 2019-09-29 RX ADMIN — Medication 20 MILLIGRAM(S): at 05:14

## 2019-09-29 RX ADMIN — PIPERACILLIN AND TAZOBACTAM 25 GRAM(S): 4; .5 INJECTION, POWDER, LYOPHILIZED, FOR SOLUTION INTRAVENOUS at 21:41

## 2019-09-29 RX ADMIN — Medication 40 MILLIEQUIVALENT(S): at 21:00

## 2019-09-29 RX ADMIN — SODIUM CHLORIDE 70 MILLILITER(S): 9 INJECTION, SOLUTION INTRAVENOUS at 05:14

## 2019-09-29 RX ADMIN — SODIUM CHLORIDE 70 MILLILITER(S): 9 INJECTION, SOLUTION INTRAVENOUS at 17:53

## 2019-09-29 NOTE — PROGRESS NOTE ADULT - SUBJECTIVE AND OBJECTIVE BOX
Subjective:    Patient seen and evaluated by team this AM.  No acute surgical events overnight.  Patient still declines surgical intervention for amputation.  Says she will think about it and discuss w/ her family.  We discussed all of the risks and patient understood.      OBJECTIVE:     ** PHYSICAL EXAM **    General: well developed, well nourished, NAD  Neuro: alert and oriented, no focal deficits, moves all extremities spontaneously  HEENT: NCAT, EOMI, anicteric, mucosa moist  Respiratory: airway patent, respirations unlabored  Extremities: b/l LE with necrotic wounds at calf, and foot, RLE appears cyanotic with blue discoloration at distal foot, weak AT/PT signals present on the right, AT/DP signals present, popliteal and femoral palpable b/l        ** VITAL SIGNS / I&O's **    Vital Signs Last 24 Hrs  T(C): 37.6 (29 Sep 2019 08:27), Max: 38.7 (28 Sep 2019 18:41)  T(F): 99.6 (29 Sep 2019 08:27), Max: 101.6 (28 Sep 2019 18:41)  HR: 85 (29 Sep 2019 08:27) (71 - 96)  BP: 124/74 (29 Sep 2019 08:27) (100/50 - 146/76)  BP(mean): --  RR: 18 (29 Sep 2019 08:27) (18 - 22)  SpO2: 98% (29 Sep 2019 08:27) (95% - 100%)      28 Sep 2019 07:01  -  29 Sep 2019 07:00  --------------------------------------------------------  IN:    dextrose 5% + sodium chloride 0.9%.: 320 mL    IV PiggyBack: 150 mL  Total IN: 470 mL    OUT:    Indwelling Catheter - Urethral: 2000 mL  Total OUT: 2000 mL    Total NET: -1530 mL            ** LABS **                          9.3    9.2   )-----------( 265      ( 28 Sep 2019 19:33 )             30.1     28 Sep 2019 19:33    136    |  98     |  12     ----------------------------<  102    3.6     |  26     |  0.33     Ca    8.6        28 Sep 2019 19:33    TPro  6.6    /  Alb  2.8    /  TBili  0.4    /  DBili  x      /  AST  26     /  ALT  36     /  AlkPhos  219    28 Sep 2019 19:33    PT/INR - ( 28 Sep 2019 19:33 )   PT: 14.9 sec;   INR: 1.30 ratio         PTT - ( 28 Sep 2019 19:33 )  PTT:31.8 sec  CAPILLARY BLOOD GLUCOSE            LIVER FUNCTIONS - ( 28 Sep 2019 19:33 )  Alb: 2.8 g/dL / Pro: 6.6 g/dL / ALK PHOS: 219 U/L / ALT: 36 U/L / AST: 26 U/L / GGT: x             Urinalysis Basic - ( 28 Sep 2019 19:33 )    Color: Yellow / Appearance: Clear / S.020 / pH: x  Gluc: x / Ketone: Small  / Bili: Negative / Urobili: 2 mg/dL   Blood: x / Protein: 30 mg/dL / Nitrite: Negative   Leuk Esterase: Negative / RBC: 9 /hpf / WBC 5 /HPF   Sq Epi: x / Non Sq Epi: 2 /hpf / Bacteria: Negative        MEDICATIONS  (STANDING):  baclofen 20 milliGRAM(s) Oral every 6 hours  clindamycin IVPB 900 milliGRAM(s) IV Intermittent every 8 hours  dextrose 5% + sodium chloride 0.9%. 1000 milliLiter(s) (70 mL/Hr) IV Continuous <Continuous>  heparin  Injectable 5000 Unit(s) SubCutaneous every 8 hours  influenza   Vaccine 0.5 milliLiter(s) IntraMuscular once  oxybutynin XL 5 milliGRAM(s) Oral daily  piperacillin/tazobactam IVPB.. 3.375 Gram(s) IV Intermittent every 8 hours  vancomycin  IVPB 1250 milliGRAM(s) IV Intermittent every 12 hours    MEDICATIONS  (PRN):  acetaminophen   Tablet .. 650 milliGRAM(s) Oral every 6 hours PRN Mild Pain (1 - 3), Moderate Pain (4 - 6) Subjective:    Patient seen and evaluated by team this AM.  No acute surgical events overnight.  Patient still declines surgical intervention for amputation.  Says she will think about it and discuss w/ her family.  We discussed all of the risks and patient understood.      OBJECTIVE:     ** PHYSICAL EXAM **    General: well developed, well nourished, NAD  Neuro: alert and oriented, no focal deficits, moves all extremities spontaneously  HEENT: NCAT, EOMI, anicteric, mucosa moist  Respiratory: airway patent, respirations unlabored  Extremities: b/l LE with necrotic wounds at calf, and foot,   RLE foot from mid level distally w severe ischemia and gangrene changes , weak AT/PT signals present on the right, AT/DP signals present, popliteal and femoral palpable b/l        ** VITAL SIGNS / I&O's **    Vital Signs Last 24 Hrs  T(C): 37.6 (29 Sep 2019 08:27), Max: 38.7 (28 Sep 2019 18:41)  T(F): 99.6 (29 Sep 2019 08:27), Max: 101.6 (28 Sep 2019 18:41)  HR: 85 (29 Sep 2019 08:27) (71 - 96)  BP: 124/74 (29 Sep 2019 08:27) (100/50 - 146/76)  BP(mean): --  RR: 18 (29 Sep 2019 08:27) (18 - 22)  SpO2: 98% (29 Sep 2019 08:27) (95% - 100%)      28 Sep 2019 07:01  -  29 Sep 2019 07:00  --------------------------------------------------------  IN:    dextrose 5% + sodium chloride 0.9%.: 320 mL    IV PiggyBack: 150 mL  Total IN: 470 mL    OUT:    Indwelling Catheter - Urethral: 2000 mL  Total OUT: 2000 mL    Total NET: -1530 mL            ** LABS **                          9.3    9.2   )-----------( 265      ( 28 Sep 2019 19:33 )             30.1     28 Sep 2019 19:33    136    |  98     |  12     ----------------------------<  102    3.6     |  26     |  0.33     Ca    8.6        28 Sep 2019 19:33    TPro  6.6    /  Alb  2.8    /  TBili  0.4    /  DBili  x      /  AST  26     /  ALT  36     /  AlkPhos  219    28 Sep 2019 19:33    PT/INR - ( 28 Sep 2019 19:33 )   PT: 14.9 sec;   INR: 1.30 ratio         PTT - ( 28 Sep 2019 19:33 )  PTT:31.8 sec  CAPILLARY BLOOD GLUCOSE        LIVER FUNCTIONS - ( 28 Sep 2019 19:33 )  Alb: 2.8 g/dL / Pro: 6.6 g/dL / ALK PHOS: 219 U/L / ALT: 36 U/L / AST: 26 U/L / GGT: x             Urinalysis Basic - ( 28 Sep 2019 19:33 )    Color: Yellow / Appearance: Clear / S.020 / pH: x  Gluc: x / Ketone: Small  / Bili: Negative / Urobili: 2 mg/dL   Blood: x / Protein: 30 mg/dL / Nitrite: Negative   Leuk Esterase: Negative / RBC: 9 /hpf / WBC 5 /HPF   Sq Epi: x / Non Sq Epi: 2 /hpf / Bacteria: Negative        MEDICATIONS  (STANDING):  baclofen 20 milliGRAM(s) Oral every 6 hours  clindamycin IVPB 900 milliGRAM(s) IV Intermittent every 8 hours  dextrose 5% + sodium chloride 0.9%. 1000 milliLiter(s) (70 mL/Hr) IV Continuous <Continuous>  heparin  Injectable 5000 Unit(s) SubCutaneous every 8 hours  influenza   Vaccine 0.5 milliLiter(s) IntraMuscular once  oxybutynin XL 5 milliGRAM(s) Oral daily  piperacillin/tazobactam IVPB.. 3.375 Gram(s) IV Intermittent every 8 hours  vancomycin  IVPB 1250 milliGRAM(s) IV Intermittent every 12 hours    MEDICATIONS  (PRN):  acetaminophen   Tablet .. 650 milliGRAM(s) Oral every 6 hours PRN Mild Pain (1 - 3), Moderate Pain (4 - 6) Subjective:    Patient seen and evaluated by team this AM.  No acute surgical events overnight.    pt wants to  proceed w rle amp    OBJECTIVE:     ** PHYSICAL EXAM **    General: well developed, well nourished, NAD  Neuro: alert and oriented, no focal deficits, moves all extremities spontaneously  HEENT: NCAT, EOMI, anicteric, mucosa moist  Respiratory: airway patent, respirations unlabored  Extremities: b/l LE with necrotic wounds at calf, and foot,   RLE foot from mid level distally w severe ischemia and gangrene changes , weak AT/PT signals present on the right, AT/DP signals present, popliteal and femoral palpable b/l        ** VITAL SIGNS / I&O's **    Vital Signs Last 24 Hrs  T(C): 37.6 (29 Sep 2019 08:27), Max: 38.7 (28 Sep 2019 18:41)  T(F): 99.6 (29 Sep 2019 08:27), Max: 101.6 (28 Sep 2019 18:41)  HR: 85 (29 Sep 2019 08:27) (71 - 96)  BP: 124/74 (29 Sep 2019 08:27) (100/50 - 146/76)  BP(mean): --  RR: 18 (29 Sep 2019 08:27) (18 - 22)  SpO2: 98% (29 Sep 2019 08:27) (95% - 100%)      28 Sep 2019 07:01  -  29 Sep 2019 07:00  --------------------------------------------------------  IN:    dextrose 5% + sodium chloride 0.9%.: 320 mL    IV PiggyBack: 150 mL  Total IN: 470 mL    OUT:    Indwelling Catheter - Urethral: 2000 mL  Total OUT: 2000 mL    Total NET: -1530 mL          ** LABS **                          9.3    9.2   )-----------( 265      ( 28 Sep 2019 19:33 )             30.1     28 Sep 2019 19:33    136    |  98     |  12     ----------------------------<  102    3.6     |  26     |  0.33     Ca    8.6        28 Sep 2019 19:33    TPro  6.6    /  Alb  2.8    /  TBili  0.4    /  DBili  x      /  AST  26     /  ALT  36     /  AlkPhos  219    28 Sep 2019 19:33    PT/INR - ( 28 Sep 2019 19:33 )   PT: 14.9 sec;   INR: 1.30 ratio         PTT - ( 28 Sep 2019 19:33 )  PTT:31.8 sec  CAPILLARY BLOOD GLUCOSE        LIVER FUNCTIONS - ( 28 Sep 2019 19:33 )  Alb: 2.8 g/dL / Pro: 6.6 g/dL / ALK PHOS: 219 U/L / ALT: 36 U/L / AST: 26 U/L / GGT: x             Urinalysis Basic - ( 28 Sep 2019 19:33 )    Color: Yellow / Appearance: Clear / S.020 / pH: x  Gluc: x / Ketone: Small  / Bili: Negative / Urobili: 2 mg/dL   Blood: x / Protein: 30 mg/dL / Nitrite: Negative   Leuk Esterase: Negative / RBC: 9 /hpf / WBC 5 /HPF   Sq Epi: x / Non Sq Epi: 2 /hpf / Bacteria: Negative        MEDICATIONS  (STANDING):  baclofen 20 milliGRAM(s) Oral every 6 hours  clindamycin IVPB 900 milliGRAM(s) IV Intermittent every 8 hours  dextrose 5% + sodium chloride 0.9%. 1000 milliLiter(s) (70 mL/Hr) IV Continuous <Continuous>  heparin  Injectable 5000 Unit(s) SubCutaneous every 8 hours  influenza   Vaccine 0.5 milliLiter(s) IntraMuscular once  oxybutynin XL 5 milliGRAM(s) Oral daily  piperacillin/tazobactam IVPB.. 3.375 Gram(s) IV Intermittent every 8 hours  vancomycin  IVPB 1250 milliGRAM(s) IV Intermittent every 12 hours    MEDICATIONS  (PRN):  acetaminophen   Tablet .. 650 milliGRAM(s) Oral every 6 hours PRN Mild Pain (1 - 3), Moderate Pain (4 - 6)

## 2019-09-29 NOTE — CHART NOTE - NSCHARTNOTEFT_GEN_A_CORE
Contacted by attending. Patient's family hesitant about right BKA prior to speaking with their neurologist, Dr. Parry, due to concern for receiving anesthesia given her MS. Dr. Parry was not reachable. I spoke to covering neurologist, Dr. Negro, who states there are no objections from a neurological standpoint from receiving anesthesia.     Taisha Joseph PA-C [id number]  Department of Medicine

## 2019-09-29 NOTE — PROGRESS NOTE ADULT - SUBJECTIVE AND OBJECTIVE BOX
Patient is a 79y old  Female who presents with a chief complaint of Right Leg Pain (29 Sep 2019 12:16)      INTERVAL HPI/OVERNIGHT EVENTS: pt feels same,  at bedside      Vital Signs Last 24 Hrs  T(C): 37.7 (29 Sep 2019 16:38), Max: 38.7 (28 Sep 2019 18:41)  T(F): 99.8 (29 Sep 2019 16:38), Max: 101.6 (28 Sep 2019 18:41)  HR: 76 (29 Sep 2019 16:38) (71 - 96)  BP: 100/65 (29 Sep 2019 16:38) (100/50 - 146/76)  BP(mean): --  RR: 18 (29 Sep 2019 16:38) (18 - 22)  SpO2: 97% (29 Sep 2019 16:38) (95% - 100%)    acetaminophen   Tablet .. 650 milliGRAM(s) Oral every 6 hours PRN  baclofen 20 milliGRAM(s) Oral every 6 hours  clindamycin IVPB 900 milliGRAM(s) IV Intermittent every 8 hours  dextrose 5% + sodium chloride 0.9%. 1000 milliLiter(s) IV Continuous <Continuous>  heparin  Injectable 5000 Unit(s) SubCutaneous every 8 hours  influenza   Vaccine 0.5 milliLiter(s) IntraMuscular once  oxybutynin XL 5 milliGRAM(s) Oral daily  piperacillin/tazobactam IVPB.. 3.375 Gram(s) IV Intermittent every 8 hours  vancomycin  IVPB 1250 milliGRAM(s) IV Intermittent every 12 hours      PHYSICAL EXAM:  GENERAL: NAD,   EYES: conjunctiva and sclera clear  ENMT: Moist mucous membranes  NECK: Supple, No JVD, Normal thyroid  NERVOUS SYSTEM:  Alert & Oriented X,   CHEST/LUNG: Clear to auscultation bilaterally; No rales, rhonchi, wheezing, or rubs  HEART: Regular rate and rhythm; No murmurs, rubs, or gallops  ABDOMEN: Soft, Nontender, Nondistended; Bowel sounds present  EXTREMITIES:  rt foot edematous, erythematous, gangrene of partial toe, pustules  dressing LE   LYMPH: No lymphadenopathy noted  SKIN: No rashes or lesions    Consultant(s) Notes Reviewed:  [x ] YES  [ ] NO  Care Discussed with Consultants/Other Providers [ x] YES  [ ] NO    LABS:                        7.7    6.7   )-----------( 219      ( 29 Sep 2019 14:33 )             24.5         135  |  99  |  8   ----------------------------<  157<H>  3.1<L>   |  25  |  <0.30<L>    Ca    8.2<L>      29 Sep 2019 14:33    TPro  5.6<L>  /  Alb  2.3<L>  /  TBili  0.3  /  DBili  x   /  AST  23  /  ALT  26  /  AlkPhos  179<H>      PT/INR - ( 28 Sep 2019 19:33 )   PT: 14.9 sec;   INR: 1.30 ratio         PTT - ( 28 Sep 2019 19:33 )  PTT:31.8 sec  Urinalysis Basic - ( 28 Sep 2019 19:33 )    Color: Yellow / Appearance: Clear / S.020 / pH: x  Gluc: x / Ketone: Small  / Bili: Negative / Urobili: 2 mg/dL   Blood: x / Protein: 30 mg/dL / Nitrite: Negative   Leuk Esterase: Negative / RBC: 9 /hpf / WBC 5 /HPF   Sq Epi: x / Non Sq Epi: 2 /hpf / Bacteria: Negative      CAPILLARY BLOOD GLUCOSE            Urinalysis Basic - ( 28 Sep 2019 19:33 )    Color: Yellow / Appearance: Clear / S.020 / pH: x  Gluc: x / Ketone: Small  / Bili: Negative / Urobili: 2 mg/dL   Blood: x / Protein: 30 mg/dL / Nitrite: Negative   Leuk Esterase: Negative / RBC: 9 /hpf / WBC 5 /HPF   Sq Epi: x / Non Sq Epi: 2 /hpf / Bacteria: Negative          RADIOLOGY & ADDITIONAL TESTS:    Imaging Personally Reviewed:  [x ] YES  [ ] NO

## 2019-09-29 NOTE — CHART NOTE - NSCHARTNOTEFT_GEN_A_CORE
Preop Diagnosis: multiple RLE gangrene and sepsis   Planned Procedure: right below knee amputation, possible right above knee amputation     Pertinent Labs:                            7.8    6.2   )-----------( 204      ( 29 Sep 2019 17:55 )             24.7       09-29    135  |  99  |  8   ----------------------------<  157<H>  3.1<L>   |  25  |  <0.30<L>    Ca    8.2<L>      29 Sep 2019 14:33    TPro  5.6<L>  /  Alb  2.3<L>  /  TBili  0.3  /  DBili  x   /  AST  23  /  ALT  26  /  AlkPhos  179<H>  09-29      PT/INR - ( 28 Sep 2019 19:33 )   PT: 14.9 sec;   INR: 1.30 ratio         PTT - ( 28 Sep 2019 19:33 )  PTT:31.8 sec    Blood: On hold for OR    Plan:  NPO/IVF  Pre-op labs in AM  Consent: patient's  Mr. Michael Louis is the person signing consents. called Mr. Louis at [phone] multiple times w/o answer. Mr. Louis is coming to hospital tomorrow morning. will consent tomorrow.     Vascular Surgery   [id number]

## 2019-09-29 NOTE — CHART NOTE - NSCHARTNOTEFT_GEN_A_CORE
Consent for blood obtained for hgb 7.7 per hospital policy. Pt and family agreeable to blood product. Discussed above with Dr. Hernandez who is in agreement.     Department of Medicine  Taisha Joseph, PA-C   [id number]

## 2019-09-30 LAB
ALBUMIN SERPL ELPH-MCNC: 2.2 G/DL — LOW (ref 3.3–5)
ALBUMIN SERPL ELPH-MCNC: 2.4 G/DL — LOW (ref 3.3–5)
ALP SERPL-CCNC: 228 U/L — HIGH (ref 40–120)
ALP SERPL-CCNC: 232 U/L — HIGH (ref 40–120)
ALT FLD-CCNC: 44 U/L — SIGNIFICANT CHANGE UP (ref 10–45)
ALT FLD-CCNC: 45 U/L — SIGNIFICANT CHANGE UP (ref 10–45)
ANION GAP SERPL CALC-SCNC: 8 MMOL/L — SIGNIFICANT CHANGE UP (ref 5–17)
ANION GAP SERPL CALC-SCNC: 8 MMOL/L — SIGNIFICANT CHANGE UP (ref 5–17)
APPEARANCE UR: CLEAR — SIGNIFICANT CHANGE UP
APTT BLD: 31.2 SEC — SIGNIFICANT CHANGE UP (ref 27.5–36.3)
AST SERPL-CCNC: 54 U/L — HIGH (ref 10–40)
AST SERPL-CCNC: 66 U/L — HIGH (ref 10–40)
BASE EXCESS BLDA CALC-SCNC: -0.4 MMOL/L — SIGNIFICANT CHANGE UP (ref -2–2)
BASOPHILS # BLD AUTO: 0.01 K/UL — SIGNIFICANT CHANGE UP (ref 0–0.2)
BASOPHILS NFR BLD AUTO: 0.2 % — SIGNIFICANT CHANGE UP (ref 0–2)
BILIRUB SERPL-MCNC: 0.4 MG/DL — SIGNIFICANT CHANGE UP (ref 0.2–1.2)
BILIRUB SERPL-MCNC: 0.6 MG/DL — SIGNIFICANT CHANGE UP (ref 0.2–1.2)
BILIRUB UR-MCNC: NEGATIVE — SIGNIFICANT CHANGE UP
BUN SERPL-MCNC: 10 MG/DL — SIGNIFICANT CHANGE UP (ref 7–23)
BUN SERPL-MCNC: 12 MG/DL — SIGNIFICANT CHANGE UP (ref 7–23)
CALCIUM SERPL-MCNC: 8.4 MG/DL — SIGNIFICANT CHANGE UP (ref 8.4–10.5)
CALCIUM SERPL-MCNC: 8.4 MG/DL — SIGNIFICANT CHANGE UP (ref 8.4–10.5)
CHLORIDE SERPL-SCNC: 104 MMOL/L — SIGNIFICANT CHANGE UP (ref 96–108)
CHLORIDE SERPL-SCNC: 105 MMOL/L — SIGNIFICANT CHANGE UP (ref 96–108)
CO2 BLDA-SCNC: 25 MMOL/L — SIGNIFICANT CHANGE UP (ref 22–30)
CO2 SERPL-SCNC: 23 MMOL/L — SIGNIFICANT CHANGE UP (ref 22–31)
CO2 SERPL-SCNC: 23 MMOL/L — SIGNIFICANT CHANGE UP (ref 22–31)
COLOR SPEC: YELLOW — SIGNIFICANT CHANGE UP
CREAT SERPL-MCNC: 0.58 MG/DL — SIGNIFICANT CHANGE UP (ref 0.5–1.3)
CREAT SERPL-MCNC: 0.68 MG/DL — SIGNIFICANT CHANGE UP (ref 0.5–1.3)
D DIMER BLD IA.RAPID-MCNC: 918 NG/ML DDU — HIGH
DIFF PNL FLD: NEGATIVE — SIGNIFICANT CHANGE UP
EOSINOPHIL # BLD AUTO: 0.05 K/UL — SIGNIFICANT CHANGE UP (ref 0–0.5)
EOSINOPHIL NFR BLD AUTO: 0.8 % — SIGNIFICANT CHANGE UP (ref 0–6)
GLUCOSE SERPL-MCNC: 116 MG/DL — HIGH (ref 70–99)
GLUCOSE SERPL-MCNC: 121 MG/DL — HIGH (ref 70–99)
GLUCOSE UR QL: NEGATIVE — SIGNIFICANT CHANGE UP
HCO3 BLDA-SCNC: 23 MMOL/L — SIGNIFICANT CHANGE UP (ref 21–29)
HCT VFR BLD CALC: 27.8 % — LOW (ref 34.5–45)
HCT VFR BLD CALC: 29.3 % — LOW (ref 34.5–45)
HGB BLD-MCNC: 8.9 G/DL — LOW (ref 11.5–15.5)
HGB BLD-MCNC: 9.2 G/DL — LOW (ref 11.5–15.5)
HOROWITZ INDEX BLDA+IHG-RTO: 28 — SIGNIFICANT CHANGE UP
IMM GRANULOCYTES NFR BLD AUTO: 0.3 % — SIGNIFICANT CHANGE UP (ref 0–1.5)
INR BLD: 1.13 RATIO — SIGNIFICANT CHANGE UP (ref 0.88–1.16)
INR BLD: 1.18 RATIO — HIGH (ref 0.88–1.16)
KETONES UR-MCNC: NEGATIVE — SIGNIFICANT CHANGE UP
LACTATE SERPL-SCNC: 0.8 MMOL/L — SIGNIFICANT CHANGE UP (ref 0.7–2)
LEUKOCYTE ESTERASE UR-ACNC: NEGATIVE — SIGNIFICANT CHANGE UP
LYMPHOCYTES # BLD AUTO: 0.8 K/UL — LOW (ref 1–3.3)
LYMPHOCYTES # BLD AUTO: 12.9 % — LOW (ref 13–44)
MAGNESIUM SERPL-MCNC: 2 MG/DL — SIGNIFICANT CHANGE UP (ref 1.6–2.6)
MCHC RBC-ENTMCNC: 27.9 PG — SIGNIFICANT CHANGE UP (ref 27–34)
MCHC RBC-ENTMCNC: 28.3 PG — SIGNIFICANT CHANGE UP (ref 27–34)
MCHC RBC-ENTMCNC: 31.4 GM/DL — LOW (ref 32–36)
MCHC RBC-ENTMCNC: 32 GM/DL — SIGNIFICANT CHANGE UP (ref 32–36)
MCV RBC AUTO: 88.5 FL — SIGNIFICANT CHANGE UP (ref 80–100)
MCV RBC AUTO: 88.9 FL — SIGNIFICANT CHANGE UP (ref 80–100)
MONOCYTES # BLD AUTO: 0.58 K/UL — SIGNIFICANT CHANGE UP (ref 0–0.9)
MONOCYTES NFR BLD AUTO: 9.3 % — SIGNIFICANT CHANGE UP (ref 2–14)
NEUTROPHILS # BLD AUTO: 4.75 K/UL — SIGNIFICANT CHANGE UP (ref 1.8–7.4)
NEUTROPHILS NFR BLD AUTO: 76.5 % — SIGNIFICANT CHANGE UP (ref 43–77)
NITRITE UR-MCNC: NEGATIVE — SIGNIFICANT CHANGE UP
PCO2 BLDA: 37 MMHG — SIGNIFICANT CHANGE UP (ref 32–46)
PH BLDA: 7.42 — SIGNIFICANT CHANGE UP (ref 7.35–7.45)
PH UR: 5.5 — SIGNIFICANT CHANGE UP (ref 5–8)
PHOSPHATE SERPL-MCNC: 2.6 MG/DL — SIGNIFICANT CHANGE UP (ref 2.5–4.5)
PLATELET # BLD AUTO: 208 K/UL — SIGNIFICANT CHANGE UP (ref 150–400)
PLATELET # BLD AUTO: 227 K/UL — SIGNIFICANT CHANGE UP (ref 150–400)
PO2 BLDA: 55 MMHG — LOW (ref 74–108)
POTASSIUM SERPL-MCNC: 3.7 MMOL/L — SIGNIFICANT CHANGE UP (ref 3.5–5.3)
POTASSIUM SERPL-MCNC: 4.5 MMOL/L — SIGNIFICANT CHANGE UP (ref 3.5–5.3)
POTASSIUM SERPL-SCNC: 3.7 MMOL/L — SIGNIFICANT CHANGE UP (ref 3.5–5.3)
POTASSIUM SERPL-SCNC: 4.5 MMOL/L — SIGNIFICANT CHANGE UP (ref 3.5–5.3)
PROT SERPL-MCNC: 5.7 G/DL — LOW (ref 6–8.3)
PROT SERPL-MCNC: 5.9 G/DL — LOW (ref 6–8.3)
PROT UR-MCNC: SIGNIFICANT CHANGE UP
PROTHROM AB SERPL-ACNC: 12.8 SEC — SIGNIFICANT CHANGE UP (ref 10–13.1)
PROTHROM AB SERPL-ACNC: 13.6 SEC — HIGH (ref 10–12.9)
RBC # BLD: 3.14 M/UL — LOW (ref 3.8–5.2)
RBC # BLD: 3.3 M/UL — LOW (ref 3.8–5.2)
RBC # FLD: 13.8 % — SIGNIFICANT CHANGE UP (ref 10.3–14.5)
RBC # FLD: 15.3 % — HIGH (ref 10.3–14.5)
SAO2 % BLDA: 88 % — LOW (ref 92–96)
SODIUM SERPL-SCNC: 135 MMOL/L — SIGNIFICANT CHANGE UP (ref 135–145)
SODIUM SERPL-SCNC: 136 MMOL/L — SIGNIFICANT CHANGE UP (ref 135–145)
SP GR SPEC: 1.02 — SIGNIFICANT CHANGE UP (ref 1.01–1.02)
UROBILINOGEN FLD QL: NEGATIVE — SIGNIFICANT CHANGE UP
VANCOMYCIN TROUGH SERPL-MCNC: 30 UG/ML — CRITICAL HIGH (ref 10–20)
WBC # BLD: 6 K/UL — SIGNIFICANT CHANGE UP (ref 3.8–10.5)
WBC # BLD: 6.21 K/UL — SIGNIFICANT CHANGE UP (ref 3.8–10.5)
WBC # FLD AUTO: 6 K/UL — SIGNIFICANT CHANGE UP (ref 3.8–10.5)
WBC # FLD AUTO: 6.21 K/UL — SIGNIFICANT CHANGE UP (ref 3.8–10.5)

## 2019-09-30 PROCEDURE — 93010 ELECTROCARDIOGRAM REPORT: CPT

## 2019-09-30 PROCEDURE — 71045 X-RAY EXAM CHEST 1 VIEW: CPT | Mod: 26

## 2019-09-30 PROCEDURE — 99223 1ST HOSP IP/OBS HIGH 75: CPT | Mod: GC

## 2019-09-30 PROCEDURE — 99232 SBSQ HOSP IP/OBS MODERATE 35: CPT | Mod: 57

## 2019-09-30 PROCEDURE — 71275 CT ANGIOGRAPHY CHEST: CPT | Mod: 26

## 2019-09-30 RX ORDER — IPRATROPIUM/ALBUTEROL SULFATE 18-103MCG
3 AEROSOL WITH ADAPTER (GRAM) INHALATION ONCE
Refills: 0 | Status: COMPLETED | OUTPATIENT
Start: 2019-09-30 | End: 2019-09-30

## 2019-09-30 RX ORDER — VANCOMYCIN HCL 1 G
1000 VIAL (EA) INTRAVENOUS EVERY 12 HOURS
Refills: 0 | Status: DISCONTINUED | OUTPATIENT
Start: 2019-10-01 | End: 2019-10-01

## 2019-09-30 RX ORDER — ONDANSETRON 8 MG/1
4 TABLET, FILM COATED ORAL ONCE
Refills: 0 | Status: DISCONTINUED | OUTPATIENT
Start: 2019-09-30 | End: 2019-10-01

## 2019-09-30 RX ORDER — ONDANSETRON 8 MG/1
4 TABLET, FILM COATED ORAL ONCE
Refills: 0 | Status: COMPLETED | OUTPATIENT
Start: 2019-09-30 | End: 2019-09-30

## 2019-09-30 RX ADMIN — Medication 100 MILLIGRAM(S): at 05:26

## 2019-09-30 RX ADMIN — Medication 40 MILLIEQUIVALENT(S): at 02:08

## 2019-09-30 RX ADMIN — Medication 3 MILLILITER(S): at 15:46

## 2019-09-30 RX ADMIN — Medication 100 MILLIGRAM(S): at 13:32

## 2019-09-30 RX ADMIN — PIPERACILLIN AND TAZOBACTAM 25 GRAM(S): 4; .5 INJECTION, POWDER, LYOPHILIZED, FOR SOLUTION INTRAVENOUS at 05:26

## 2019-09-30 RX ADMIN — Medication 20 MILLIGRAM(S): at 00:08

## 2019-09-30 RX ADMIN — Medication 20 MILLIGRAM(S): at 05:26

## 2019-09-30 RX ADMIN — ONDANSETRON 4 MILLIGRAM(S): 8 TABLET, FILM COATED ORAL at 18:38

## 2019-09-30 RX ADMIN — PIPERACILLIN AND TAZOBACTAM 25 GRAM(S): 4; .5 INJECTION, POWDER, LYOPHILIZED, FOR SOLUTION INTRAVENOUS at 14:02

## 2019-09-30 RX ADMIN — SODIUM CHLORIDE 70 MILLILITER(S): 9 INJECTION, SOLUTION INTRAVENOUS at 08:32

## 2019-09-30 RX ADMIN — Medication 166.67 MILLIGRAM(S): at 00:08

## 2019-09-30 RX ADMIN — Medication 166.67 MILLIGRAM(S): at 13:32

## 2019-09-30 RX ADMIN — Medication 100 MILLIGRAM(S): at 22:27

## 2019-09-30 RX ADMIN — PIPERACILLIN AND TAZOBACTAM 25 GRAM(S): 4; .5 INJECTION, POWDER, LYOPHILIZED, FOR SOLUTION INTRAVENOUS at 22:27

## 2019-09-30 NOTE — CONSULT NOTE ADULT - ASSESSMENT
79F with PMHx of multiple sclerosis, HTN, neurogenic bladder (chronic in-dwelling Kendrick catheter), bilateral PVD admitted to the hospital for sepsis likely 2/2 gangrene vs necrotizing fasciitis of right lower extremity.     CV:  - HD stable; continue monitoring HR and BP  - If BP drops, would hydrate gently as EF is unknown and CXR 9/30 showing possible left-sided pleural effusion  - would have low threshold to start pressors if needed    PULM:  - on 2L NC since arrival to the hospital  - pre-op ABG showing PaO2 of 55 on 9/30; follow up CTA chest negative for PE  - IV zosyn should empirically cover a possible pneumonia    ID:  - sepsis likely 2/2 gangrene vs necrotiziing fasciitis of right lower extremity  - BKA by vascular surgery would accomplish source control; negative CTA chest should clear patient for surgery  - continue vanc, zosyn, clinda for possible necrotizing fasciitis  - BCx from 9/28 NGTD; UCx from 9/28 growing normal urogenital heath    Renal:  - rising creatinine; 0.42 on 9/28 --> 0.68 on 9/30  - likely prerenal JU; would continue gentle hydration  - continue monitoring creatinine, Is and Os    Endo:  - no acute concerns at this time    GI:  - keep NPO for surgery    Skin:  - PIV    Code status:  - Full code    Dispo:  - not a MICU candidate at this; please re-consult as needed 79F with PMHx of multiple sclerosis, HTN, neurogenic bladder (chronic in-dwelling Kendrick catheter), bilateral PVD admitted to the hospital for sepsis likely 2/2 gangrene vs necrotizing fasciitis of right lower extremity.     CV:  - HD stable; continue monitoring HR and BP  - If BP drops, would hydrate gently as EF is unknown and CXR 9/30 showing possible left-sided pleural effusion  - POCUS showing posterior B-lines bilaterally, IVC plethoric; would hold off on IVF or decrease rate of infusion  - would have low threshold to start pressors if needed    PULM:  - on 2L NC since arrival to the hospital  - pre-op ABG showing PaO2 of 55 on 9/30; follow up CTA chest negative for PE; hypoxemia likely 2/2 mild pulmonary edema (may have been precipitated by anxiety/HTN, supine position prior to surgery)  - IV zosyn should empirically cover a possible pneumonia    ID:  - sepsis likely 2/2 gangrene vs necrotiziing fasciitis of right lower extremity  - BKA by vascular surgery would accomplish source control; negative CTA chest should clear patient for surgery  - continue vanc, zosyn, clinda for possible necrotizing fasciitis  - BCx from 9/28 NGTD; UCx from 9/28 growing normal urogenital heath    Renal:  - rising creatinine; 0.42 on 9/28 --> 0.68 on 9/30  - likely prerenal JU; would continue gentle hydration  - continue monitoring creatinine, Is and Os    Endo:  - no acute concerns at this time    GI:  - keep NPO for surgery    Skin:  - PIV    Code status:  - Full code    Dispo:  - not a MICU candidate at this; please re-consult as needed 79F with PMHx of multiple sclerosis, HTN, neurogenic bladder (chronic in-dwelling Kendrick catheter), bilateral PVD admitted to the hospital for sepsis likely 2/2 gangrene vs necrotizing fasciitis of right lower extremity.     CV:  - HD stable; continue monitoring HR and BP  - POCUS showing posterior B-lines bilaterally, IVC plethoric; would hold off on IVF or decrease rate of infusion  - would have low threshold to start pressors if needed    PULM:  - on 2L NC since arrival to the hospital  - pre-op ABG showing PaO2 of 55 on 9/30; follow up CTA chest negative for PE; hypoxemia likely 2/2 mild pulmonary edema (may have been precipitated by anxiety/HTN, supine position prior to surgery)  - IV zosyn should empirically cover a possible pneumonia    ID:  - sepsis likely 2/2 gangrene vs necrotiziing fasciitis of right lower extremity  - BKA by vascular surgery would accomplish source control; negative CTA chest should clear patient for surgery  - continue vanc, zosyn, clinda for possible necrotizing fasciitis  - BCx from 9/28 NGTD; UCx from 9/28 growing normal urogenital heath    Renal:  - rising creatinine; 0.42 on 9/28 --> 0.68 on 9/30, may be ATN/cytokine related due to sepsis as patient appears volume overloaded  - continue monitoring creatinine, Is and Os    Endo:  - no acute concerns at this time    GI:  - keep NPO for surgery    Skin:  - PIV    Code status:  - Full code    Dispo:  - not a MICU candidate at this; please re-consult as needed

## 2019-09-30 NOTE — CONSULT NOTE ADULT - SUBJECTIVE AND OBJECTIVE BOX
79F with PMHx of multiple sclerosis (progressive and not currently on any medication), HTN, neurogenic bladder (has had a Kendrick catheter for many years)  and peripheral vascular disease presents with right foot pain for one month. The history was obtained from patient's  given her drowsiness. She is arousable and protecting her airway, but unable to give a history. Approximately one month prior patient was in her wheelchair and rammed her right foot into a wall. She initially presented to the emergency room and given Keflex to treat for cellulitis. The right foot progressively got worse. It has been becoming more painful, at times erythematous and malodorous. Patient developed necrotic tissue on both the dorsum of the right foot and near the right side lateral malleolus. Patient has been seeing private vascular surgeon on the outside who states that the foot is unlikely to be salvageable. Patient also having periodic fevers and decrease in appetite. While in the ED pulses were poor and foot cool to touch so podiatry and vascular surgery were consulted. They are both ultimately recommending right BKA. Patient's family currently refusing until they can speak with their private neurologist Dr. Vladimir Parry. Ankle of XR shows subcutaneous gas. patient given Vancomycin, Zosyn, and Clindamycin as well as lactate ringer 1.9 L and Tylenol. Patient admitted to medicine for further management. 79F with PMHx of multiple sclerosis (progressive and not currently on any medication), HTN, neurogenic bladder (has had a Kendrick catheter for many years)  and peripheral vascular disease presents with right foot pain for one month. The history was obtained from patient's  given her drowsiness. She is arousable and protecting her airway, but unable to give a history. Approximately one month prior patient was in her wheelchair and rammed her right foot into a wall. She initially presented to the emergency room and given Keflex to treat for cellulitis. The right foot progressively got worse. It has been becoming more painful, at times erythematous and malodorous. Patient developed necrotic tissue on both the dorsum of the right foot and near the right side lateral malleolus. Patient has been seeing private vascular surgeon on the outside who states that the foot is unlikely to be salvageable. Patient also having periodic fevers and decrease in appetite. While in the ED pulses were poor and foot cool to touch so podiatry and vascular surgery were consulted. They are both ultimately recommending right BKA. Patient's family currently refusing until they can speak with their private neurologist Dr. Vladimir Parry. CT of the right leg showing gas gangrene vs necrotizing fascitis. Patient given Vancomycin, Zosyn, and Clindamycin as well as lactate ringer 1.9 L and Tylenol. Patient admitted to medicine for further management. Vascular surgery consulted, recommending AKA vs BKA. Patient with Hg of 7.7 on 7/29, and was given 1 U pRBC with improvement to 8.9. However, pre-op ABG showed PaO2 of 55, which raised concern for PE. MICU consulted for possible PE. CTA chest performed on 9/30 showing ***. 79F with PMHx of multiple sclerosis (progressive and not currently on any medication), HTN, neurogenic bladder (has had a Kendrick catheter for many years)  and peripheral vascular disease presents with right foot pain for one month. The history was obtained from patient's  given her drowsiness. She is arousable and protecting her airway, but unable to give a history. Approximately one month prior patient was in her wheelchair and rammed her right foot into a wall. She initially presented to the emergency room and given Keflex to treat for cellulitis. The right foot progressively got worse. It has been becoming more painful, at times erythematous and malodorous. Patient developed necrotic tissue on both the dorsum of the right foot and near the right side lateral malleolus. Patient has been seeing private vascular surgeon on the outside who states that the foot is unlikely to be salvageable. Patient also having periodic fevers and decrease in appetite. While in the ED pulses were poor and foot cool to touch so podiatry and vascular surgery were consulted. They are both ultimately recommending right BKA. Patient's family currently refusing until they can speak with their private neurologist Dr. Vladimir Parry. CT of the right leg showing gas gangrene vs necrotizing fascitis. Patient given Vancomycin, Zosyn, and Clindamycin as well as lactate ringer 1.9 L and Tylenol. Patient admitted to medicine for further management. Vascular surgery consulted, recommending AKA vs BKA. Patient with Hg of 7.7 on 7/29, and was given 1 U pRBC with improvement to 8.9. However, pre-op ABG showed PaO2 of 55, which raised concern for PE. MICU consulted for possible PE. CTA chest performed on 9/30 negative for PE. CHIEF COMPLAINT: possible PE    HPI:    79F with PMHx of multiple sclerosis (progressive and not currently on any medication), HTN, neurogenic bladder (has had a Kendrick catheter for many years)  and peripheral vascular disease presents with right foot pain for one month. The history was obtained from patient's  given her drowsiness. She is arousable and protecting her airway, but unable to give a history. Approximately one month prior patient was in her wheelchair and rammed her right foot into a wall. She initially presented to the emergency room and given Keflex to treat for cellulitis. The right foot progressively got worse. It has been becoming more painful, at times erythematous and malodorous. Patient developed necrotic tissue on both the dorsum of the right foot and near the right side lateral malleolus. Patient has been seeing private vascular surgeon on the outside who states that the foot is unlikely to be salvageable. Patient also having periodic fevers and decrease in appetite. While in the ED pulses were poor and foot cool to touch so podiatry and vascular surgery were consulted. They are both ultimately recommending right BKA. Patient's family currently refusing until they can speak with their private neurologist Dr. Vladimir Parry. CT of the right leg showing gas gangrene vs necrotizing fascitis. Patient given Vancomycin, Zosyn, and Clindamycin as well as lactate ringer 1.9 L and Tylenol. Patient admitted to medicine for further management. Vascular surgery consulted, recommending AKA vs BKA. Patient with Hg of 7.7 on , and was given 1 U pRBC with improvement to 8.9. However, pre-op ABG showed PaO2 of 55, which raised concern for PE. MICU consulted for possible PE. CTA chest performed on  negative for PE.    PAST MEDICAL & SURGICAL HISTORY:  MS (multiple sclerosis)  HTN (hypertension)  Osteoporosis  Dysphagia: no special diet; no h/o aspiration PNA  Neurogenic bladder  Ptosis of both eyelids  S/P ORIF (open reduction internal fixation) fracture: left femur, right hip  S/P breast biopsy  S/P hysterectomy: &gt; 30 yrs ago    FAMILY HISTORY:  No pertinent family history    SOCIAL HISTORY:  Smoking: [ ] Never Smoked [ ] Former Smoker (__ packs x ___ years) [ ] Current Smoker  (__ packs x ___ years)  Substance Use: [ ] Never Used [ ] Used ____  EtOH Use:  Marital Status: [ ] Single [ ]  [ ]  [ ]   Sexual History:   Occupation:  Recent Travel:  Country of Birth:  Advance Directives:    Allergies    No Known Allergies    Intolerances    HOME MEDICATIONS:    REVIEW OF SYSTEMS:  Constitutional: [ ] negative [ ] fevers [ ] chills [ ] weight loss [ ] weight gain  HEENT: [ ] negative [ ] dry eyes [ ] eye irritation [ ] postnasal drip [ ] nasal congestion  CV: [ ] negative  [ ] chest pain [ ] orthopnea [ ] palpitations [ ] murmur  Resp: [ ] negative [ ] cough [ ] shortness of breath [ ] dyspnea [ ] wheezing [ ] sputum [ ] hemoptysis  GI: [ ] negative [ ] nausea [ ] vomiting [ ] diarrhea [ ] constipation [ ] abd pain [ ] dysphagia   : [ ] negative [ ] dysuria [ ] nocturia [ ] hematuria [ ] increased urinary frequency  Musculoskeletal: [ ] negative [ ] back pain [ ] myalgias [ ] arthralgias [ ] fracture  Skin: [ ] negative [ ] rash [ ] itch  Neurological: [ ] negative [ ] headache [ ] dizziness [ ] syncope [ ] weakness [ ] numbness  Psychiatric: [ ] negative [ ] anxiety [ ] depression  Endocrine: [ ] negative [ ] diabetes [ ] thyroid problem  Hematologic/Lymphatic: [ ] negative [ ] anemia [ ] bleeding problem  Allergic/Immunologic: [ ] negative [ ] itchy eyes [ ] nasal discharge [ ] hives [ ] angioedema  [x] All other systems negative  [ ] Unable to assess ROS because ________    OBJECTIVE:  ICU Vital Signs Last 24 Hrs  T(C): 36.9 (30 Sep 2019 21:35), Max: 37.8 (29 Sep 2019 23:47)  T(F): 98.5 (30 Sep 2019 21:35), Max: 100.1 (30 Sep 2019 04:45)  HR: 79 (30 Sep 2019 21:35) (74 - 84)  BP: 145/74 (30 Sep 2019 21:35) (89/70 - 145/74)  BP(mean): --  ABP: --  ABP(mean): --  RR: 18 (30 Sep 2019 21:35) (18 - 28)  SpO2: 95% (30 Sep 2019 21:35) (95% - 99%)        09-29 @ 07: @ 07:00  --------------------------------------------------------  IN: 2510 mL / OUT: 1200 mL / NET: 1310 mL     @ 07: @ 21:58  --------------------------------------------------------  IN: 0 mL / OUT: 900 mL / NET: -900 mL      CAPILLARY BLOOD GLUCOSE          PHYSICAL EXAM:                HOSPITAL MEDICATIONS:  heparin  Injectable 5000 Unit(s) SubCutaneous every 8 hours    clindamycin IVPB 900 milliGRAM(s) IV Intermittent every 8 hours  piperacillin/tazobactam IVPB.. 3.375 Gram(s) IV Intermittent every 8 hours  vancomycin  IVPB 1250 milliGRAM(s) IV Intermittent every 12 hours          acetaminophen   Tablet .. 650 milliGRAM(s) Oral every 6 hours PRN  baclofen 20 milliGRAM(s) Oral every 6 hours  ondansetron    Tablet 4 milliGRAM(s) Oral once        oxybutynin XL 5 milliGRAM(s) Oral daily    dextrose 5% + sodium chloride 0.9%. 1000 milliLiter(s) IV Continuous <Continuous>    influenza   Vaccine 0.5 milliLiter(s) IntraMuscular once          LABS:                        9.2    6.0   )-----------( 227      ( 30 Sep 2019 20:10 )             29.3     Hgb Trend: 9.2<--, 8.9<--, 7.8<--, 7.7<--, 9.3<--      136  |  105  |  10  ----------------------------<  121<H>  3.7   |  23  |  0.68    Ca    8.4      30 Sep 2019 20:10  Phos  2.6       Mg     2.0         TPro  5.7<L>  /  Alb  2.4<L>  /  TBili  0.6  /  DBili  x   /  AST  54<H>  /  ALT  44  /  AlkPhos  232<H>      Creatinine Trend: 0.68<--, 0.58<--, <0.30<--, 0.33<--  PT/INR - ( 30 Sep 2019 20:10 )   PT: 13.6 sec;   INR: 1.18 ratio         PTT - ( 30 Sep 2019 20:10 )  PTT:31.2 sec  Urinalysis Basic - ( 30 Sep 2019 01:14 )    Color: Yellow / Appearance: Clear / S.020 / pH: x  Gluc: x / Ketone: Negative  / Bili: Negative / Urobili: Negative   Blood: x / Protein: Trace / Nitrite: Negative   Leuk Esterase: Negative / RBC: x / WBC x   Sq Epi: x / Non Sq Epi: x / Bacteria: x      Arterial Blood Gas:   @ 17:08  7.42/37/55/23/88/-.4  ABG lactate: --        MICROBIOLOGY:     RADIOLOGY:  [ ] Reviewed and interpreted by me    EKG: CHIEF COMPLAINT: possible PE    HPI:    79F with PMHx of multiple sclerosis (progressive and not currently on any medication), HTN, neurogenic bladder (has had a Kendrick catheter for many years)  and peripheral vascular disease presents with right foot pain for one month. The history was obtained from patient's  given her drowsiness. She is arousable and protecting her airway, but unable to give a history. Approximately one month prior patient was in her wheelchair and rammed her right foot into a wall. She initially presented to the emergency room and given Keflex to treat for cellulitis. The right foot progressively got worse. It has been becoming more painful, at times erythematous and malodorous. Patient developed necrotic tissue on both the dorsum of the right foot and near the right side lateral malleolus. Patient has been seeing private vascular surgeon on the outside who states that the foot is unlikely to be salvageable. Patient also having periodic fevers and decrease in appetite. While in the ED pulses were poor and foot cool to touch so podiatry and vascular surgery were consulted. They are both ultimately recommending right BKA. Patient's family currently refusing until they can speak with their private neurologist Dr. Vladimir Parry. CT of the right leg showing gas gangrene vs necrotizing fascitis. Patient given Vancomycin, Zosyn, and Clindamycin as well as lactate ringer 1.9 L and Tylenol. Patient admitted to medicine for further management. Vascular surgery consulted, recommending AKA vs BKA. Patient with Hg of 7.7 on , and was given 1 U pRBC with improvement to 8.9. However, pre-op ABG showed PaO2 of 55, which raised concern for PE. MICU consulted for possible PE. CTA chest performed on  negative for PE.    PAST MEDICAL & SURGICAL HISTORY:  MS (multiple sclerosis)  HTN (hypertension)  Osteoporosis  Dysphagia: no special diet; no h/o aspiration PNA  Neurogenic bladder  Ptosis of both eyelids  S/P ORIF (open reduction internal fixation) fracture: left femur, right hip  S/P breast biopsy  S/P hysterectomy: &gt; 30 yrs ago    FAMILY HISTORY:  No pertinent family history    SOCIAL HISTORY:  Smoking: [ ] Never Smoked [ ] Former Smoker (__ packs x ___ years) [ ] Current Smoker  (__ packs x ___ years)  Substance Use: [ ] Never Used [ ] Used ____  EtOH Use:  Marital Status: [ ] Single [ ]  [ ]  [ ]   Sexual History:   Occupation:  Recent Travel:  Country of Birth:  Advance Directives:    Allergies    No Known Allergies    Intolerances    HOME MEDICATIONS:    REVIEW OF SYSTEMS:  Constitutional: [ ] negative [ ] fevers [ ] chills [ ] weight loss [ ] weight gain  HEENT: [ ] negative [ ] dry eyes [ ] eye irritation [ ] postnasal drip [ ] nasal congestion  CV: [ ] negative  [ ] chest pain [ ] orthopnea [ ] palpitations [ ] murmur  Resp: [ ] negative [ ] cough [ ] shortness of breath [ ] dyspnea [ ] wheezing [ ] sputum [ ] hemoptysis  GI: [ ] negative [ ] nausea [ ] vomiting [ ] diarrhea [ ] constipation [ ] abd pain [ ] dysphagia   : [ ] negative [ ] dysuria [ ] nocturia [ ] hematuria [ ] increased urinary frequency  Musculoskeletal: [ ] negative [ ] back pain [ ] myalgias [ ] arthralgias [ ] fracture  Skin: [ ] negative [ ] rash [ ] itch  Neurological: [ ] negative [ ] headache [ ] dizziness [ ] syncope [ ] weakness [ ] numbness  Psychiatric: [ ] negative [ ] anxiety [ ] depression  Endocrine: [ ] negative [ ] diabetes [ ] thyroid problem  Hematologic/Lymphatic: [ ] negative [ ] anemia [ ] bleeding problem  Allergic/Immunologic: [ ] negative [ ] itchy eyes [ ] nasal discharge [ ] hives [ ] angioedema  [x] All other systems negative  [ ] Unable to assess ROS because ________    OBJECTIVE:  ICU Vital Signs Last 24 Hrs  T(C): 36.9 (30 Sep 2019 21:35), Max: 37.8 (29 Sep 2019 23:47)  T(F): 98.5 (30 Sep 2019 21:35), Max: 100.1 (30 Sep 2019 04:45)  HR: 79 (30 Sep 2019 21:35) (74 - 84)  BP: 145/74 (30 Sep 2019 21:35) (89/70 - 145/74)  BP(mean): --  ABP: --  ABP(mean): --  RR: 18 (30 Sep 2019 21:35) (18 - 28)  SpO2: 95% (30 Sep 2019 21:35) (95% - 99%)        09-29 @ 07: @ 07:00  --------------------------------------------------------  IN: 2510 mL / OUT: 1200 mL / NET: 1310 mL     @ 07: @ 21:58  --------------------------------------------------------  IN: 0 mL / OUT: 900 mL / NET: -900 mL      CAPILLARY BLOOD GLUCOSE          PHYSICAL EXAM:  GENERAL: no acute distress, non-toxic appearing  HEAD: normocephalic, atraumatic  HEENT: normal conjunctiva, oral mucosa moist, neck supple  CARDIAC: regular rate and rhythm, normal S1 and S2,  no appreciable murmurs  PULM: clear to ascultation bilaterally, no crackles, rales, rhonchi, or wheezing; on 2L NC satting 95%  GI: abdomen nondistended, soft, nontender, no guarding or rebound tenderness  NEURO: no focal motor or sensory deficits  MSK: lower extremities bandaged; right toes gangrenous in appearance and malodorous  SKIN: no visible rashes, dry, well-perfused  PSYCH: appropriate mood and affect    HOSPITAL MEDICATIONS:  heparin  Injectable 5000 Unit(s) SubCutaneous every 8 hours    clindamycin IVPB 900 milliGRAM(s) IV Intermittent every 8 hours  piperacillin/tazobactam IVPB.. 3.375 Gram(s) IV Intermittent every 8 hours  vancomycin  IVPB 1250 milliGRAM(s) IV Intermittent every 12 hours          acetaminophen   Tablet .. 650 milliGRAM(s) Oral every 6 hours PRN  baclofen 20 milliGRAM(s) Oral every 6 hours  ondansetron    Tablet 4 milliGRAM(s) Oral once        oxybutynin XL 5 milliGRAM(s) Oral daily    dextrose 5% + sodium chloride 0.9%. 1000 milliLiter(s) IV Continuous <Continuous>    influenza   Vaccine 0.5 milliLiter(s) IntraMuscular once          LABS:                        9.2    6.0   )-----------(       ( 30 Sep 2019 20:10 )             29.3     Hgb Trend: 9.2<--, 8.9<--, 7.8<--, 7.7<--, 9.3<--      136  |  105  |  10  ----------------------------<  121<H>  3.7   |  23  |  0.68    Ca    8.4      30 Sep 2019 20:10  Phos  2.6       Mg     2.0         TPro  5.7<L>  /  Alb  2.4<L>  /  TBili  0.6  /  DBili  x   /  AST  54<H>  /  ALT  44  /  AlkPhos  232<H>      Creatinine Trend: 0.68<--, 0.58<--, <0.30<--, 0.33<--  PT/INR - ( 30 Sep 2019 20:10 )   PT: 13.6 sec;   INR: 1.18 ratio         PTT - ( 30 Sep 2019 20:10 )  PTT:31.2 sec  Urinalysis Basic - ( 30 Sep 2019 01:14 )    Color: Yellow / Appearance: Clear / S.020 / pH: x  Gluc: x / Ketone: Negative  / Bili: Negative / Urobili: Negative   Blood: x / Protein: Trace / Nitrite: Negative   Leuk Esterase: Negative / RBC: x / WBC x   Sq Epi: x / Non Sq Epi: x / Bacteria: x      Arterial Blood Gas:   @ 17:08  7.42/37/55/23/88/-.4  ABG lactate: --        MICROBIOLOGY:     RADIOLOGY:  [ ] Reviewed and interpreted by me    EKG: CHIEF COMPLAINT: possible PE    HPI:    79F with PMHx of multiple sclerosis (progressive and not currently on any medication), HTN, neurogenic bladder (has had a Kendrick catheter for many years)  and peripheral vascular disease presents with right foot pain for one month. The history was obtained from patient's  given her drowsiness. She is arousable and protecting her airway, but unable to give a history. Approximately one month prior patient was in her wheelchair and rammed her right foot into a wall. She initially presented to the emergency room and given Keflex to treat for cellulitis. The right foot progressively got worse. It has been becoming more painful, at times erythematous and malodorous. Patient developed necrotic tissue on both the dorsum of the right foot and near the right side lateral malleolus. Patient has been seeing private vascular surgeon on the outside who states that the foot is unlikely to be salvageable. Patient also having periodic fevers and decrease in appetite. While in the ED pulses were poor and foot cool to touch so podiatry and vascular surgery were consulted. They are both ultimately recommending right BKA. Patient's family currently refusing until they can speak with their private neurologist Dr. Vladimir Parry. CT of the right leg showing gas gangrene vs necrotizing fascitis. Patient given Vancomycin, Zosyn, and Clindamycin as well as lactate ringer 1.9 L and Tylenol. Patient admitted to medicine for further management. Vascular surgery consulted, recommending AKA vs BKA. Patient with Hg of 7.7 on , and was given 1 U pRBC with improvement to 8.9. However, pre-op ABG showed PaO2 of 55, which raised concern for PE. MICU consulted for possible PE. CTA chest performed on  negative for PE.    PAST MEDICAL & SURGICAL HISTORY:  MS (multiple sclerosis)  HTN (hypertension)  Osteoporosis  Dysphagia: no special diet; no h/o aspiration PNA  Neurogenic bladder  Ptosis of both eyelids  S/P ORIF (open reduction internal fixation) fracture: left femur, right hip  S/P breast biopsy  S/P hysterectomy: &gt; 30 yrs ago    FAMILY HISTORY:  No pertinent family history    SOCIAL HISTORY:  lives at home with    is surrogate  no toxic habits  full code    Allergies  No Known Allergies    REVIEW OF SYSTEMS:  [x ] Unable to assess ROS because ____minimally interactive____    OBJECTIVE:  ICU Vital Signs Last 24 Hrs  T(C): 36.9 (30 Sep 2019 21:35), Max: 37.8 (29 Sep 2019 23:47)  T(F): 98.5 (30 Sep 2019 21:35), Max: 100.1 (30 Sep 2019 04:45)  HR: 79 (30 Sep 2019 21:35) (74 - 84)  BP: 145/74 (30 Sep 2019 21:35) (89/70 - 145/74)  RR: 18 (30 Sep 2019 21:35) (18 - 28)  SpO2: 95% (30 Sep 2019 21:35) (95% - 99%)         @ 07:  -   @ 07:00  --------------------------------------------------------  IN: 2510 mL / OUT: 1200 mL / NET: 1310 mL     @ 07: @ 21:58  --------------------------------------------------------  IN: 0 mL / OUT: 900 mL / NET: -900 mL      PHYSICAL EXAM:  GENERAL: no acute distress, non-toxic appearing  HEAD: normocephalic, atraumatic  HEENT: normal conjunctiva, oral mucosa moist, neck supple  CARDIAC: regular rate and rhythm, normal S1 and S2,  no appreciable murmurs  PULM: clear to ascultation bilaterally, no crackles, rales, rhonchi, or wheezing; on 2L NC satting 95%  GI: abdomen nondistended, soft, nontender, no guarding or rebound tenderness  NEURO: no focal motor or sensory deficits  MSK: lower extremities bandaged; right toes gangrenous in appearance and malodorous  SKIN: no visible rashes, dry, well-perfused  PSYCH: appropriate mood and affect    HOSPITAL MEDICATIONS:  heparin  Injectable 5000 Unit(s) SubCutaneous every 8 hours    clindamycin IVPB 900 milliGRAM(s) IV Intermittent every 8 hours  piperacillin/tazobactam IVPB.. 3.375 Gram(s) IV Intermittent every 8 hours  vancomycin  IVPB 1250 milliGRAM(s) IV Intermittent every 12 hours  acetaminophen   Tablet .. 650 milliGRAM(s) Oral every 6 hours PRN  baclofen 20 milliGRAM(s) Oral every 6 hours  ondansetron    Tablet 4 milliGRAM(s) Oral once  oxybutynin XL 5 milliGRAM(s) Oral daily  dextrose 5% + sodium chloride 0.9%. 1000 milliLiter(s) IV Continuous <Continuous>  influenza   Vaccine 0.5 milliLiter(s) IntraMuscular once      LABS:                        9.2    6.0   )-----------( 227      ( 30 Sep 2019 20:10 )             29.3     Hgb Trend: 9.2<--, 8.9<--, 7.8<--, 7.7<--, 9.3<--      136  |  105  |  10  ----------------------------<  121<H>  3.7   |  23  |  0.68    Ca    8.4      30 Sep 2019 20:10  Phos  2.6       Mg     2.0         TPro  5.7<L>  /  Alb  2.4<L>  /  TBili  0.6  /  DBili  x   /  AST  54<H>  /  ALT  44  /  AlkPhos  232<H>      Creatinine Trend: 0.68<--, 0.58<--, <0.30<--, 0.33<--  PT/INR - ( 30 Sep 2019 20:10 )   PT: 13.6 sec;   INR: 1.18 ratio         PTT - ( 30 Sep 2019 20:10 )  PTT:31.2 sec  Urinalysis Basic - ( 30 Sep 2019 01:14 )    Color: Yellow / Appearance: Clear / S.020 / pH: x  Gluc: x / Ketone: Negative  / Bili: Negative / Urobili: Negative   Blood: x / Protein: Trace / Nitrite: Negative   Leuk Esterase: Negative / RBC: x / WBC x   Sq Epi: x / Non Sq Epi: x / Bacteria: x    Arterial Blood Gas:   @ 17:08  7.42/37/55/23/88/-.4  ABG lactate: --    MICROBIOLOGY: cultures pending    RADIOLOGY:  [ x] Reviewed and interpreted by me  CTA Chest: no PE, R>L pleural effusion, small basilar consolidations likely compressive atelectasis vs PNA, motion artifact    POCUS at bedside by me:  thick LV, enlarged LA, IVC plethoric, Blines up posterior chest bilaterally

## 2019-09-30 NOTE — PROGRESS NOTE ADULT - SUBJECTIVE AND OBJECTIVE BOX
Patient is a 79y old  Female who presents with a chief complaint of Right Leg Pain (29 Sep 2019 16:40)      Vascular Surgery Attending Progress Note    Interval HPI: pt states some sob but unchanged     Medications:  acetaminophen   Tablet .. 650 milliGRAM(s) Oral every 6 hours PRN  baclofen 20 milliGRAM(s) Oral every 6 hours  clindamycin IVPB 900 milliGRAM(s) IV Intermittent every 8 hours  dextrose 5% + sodium chloride 0.9%. 1000 milliLiter(s) IV Continuous <Continuous>  heparin  Injectable 5000 Unit(s) SubCutaneous every 8 hours  influenza   Vaccine 0.5 milliLiter(s) IntraMuscular once  ondansetron    Tablet 4 milliGRAM(s) Oral once  oxybutynin XL 5 milliGRAM(s) Oral daily  piperacillin/tazobactam IVPB.. 3.375 Gram(s) IV Intermittent every 8 hours  vancomycin  IVPB 1250 milliGRAM(s) IV Intermittent every 12 hours      Vital Signs Last 24 Hrs  T(C): 37 (30 Sep 2019 18:29), Max: 37.8 (29 Sep 2019 23:47)  T(F): 98.6 (30 Sep 2019 17:39), Max: 100.1 (30 Sep 2019 04:45)  HR: 82 (30 Sep 2019 18:29) (74 - 85)  BP: 134/83 (30 Sep 2019 18:29) (89/70 - 138/71)  BP(mean): --  RR: 18 (30 Sep 2019 18:29) (18 - 28)  SpO2: 97% (30 Sep 2019 18:29) (96% - 99%)  I&O's Summary    29 Sep 2019 07:01  -  30 Sep 2019 07:00  --------------------------------------------------------  IN: 2510 mL / OUT: 1200 mL / NET: 1310 mL    30 Sep 2019 07:01  -  30 Sep 2019 19:52  --------------------------------------------------------  IN: 0 mL / OUT: 900 mL / NET: -900 mL        Physical Exam:  Neuro  A&Ox3 VSS  Vascular:  rt foot gangrene remains     LABS:                        8.9    6.21  )-----------( 208      ( 30 Sep 2019 08:15 )             27.8     09-30    135  |  104  |  12  ----------------------------<  116<H>  4.5   |  23  |  0.58    Ca    8.4      30 Sep 2019 06:53  Phos  2.6     09-30  Mg     2.0     09-30    TPro  5.9<L>  /  Alb  2.2<L>  /  TBili  0.4  /  DBili  x   /  AST  66<H>  /  ALT  45  /  AlkPhos  228<H>  09-30    PT/INR - ( 30 Sep 2019 07:55 )   PT: 12.8 sec;   INR: 1.13 ratio             SLAVA DRAKE MD  [phone]

## 2019-09-30 NOTE — CONSULT NOTE ADULT - ATTENDING COMMENTS
care provided 9/30/19  Patient seen and examined.  Agree with resident note as above.  Patient with hx as noted including PAD/PVD with wet gangrene of R foot who awaits BKA for source control.  TOday was brought to OR and upon lying flat patient became acutely dyspneic/hypoxemic.  Review of records shows an old echo with thickened LV.  TOday on POCUS we again see thick LV, enlarged LA, plethoric IVC and posterior B lines up the entire chest bilaterally.  CTA negative for PE.  Has been volume resuscitated with 4-5L over the last few days and received blood this afternoon.  THis all is consistent with acute pulmonary edema due to acute on chronic diastolic heart failure due to volume overload.    Discussed this with anesthesia attending prior to OR case so that a more conservative fluid management strategy could be used.  Discussed with family at bedside.  I agree that this emergent surgery should not be delayed as a result of this.  Would favor approximate euvolemia for now.  Rest of recs as above and I have edited as appropriate.  Anticipate triage to SICU after OR and they will assume care.
I performed a history and physical exam of the patient and discussed  the findings and plan with the house officer. I reviewed the resident note and agree with the findings and plan

## 2019-09-30 NOTE — CHART NOTE - NSCHARTNOTEFT_GEN_A_CORE
VASCULAR SURGERY ATTENDING NOTE    Pt has rt foot gangrene and needs rt leg guillotine amputation for foot sepsis control  This is an emergency  Sanjiv Morris MD

## 2019-09-30 NOTE — PROGRESS NOTE ADULT - SUBJECTIVE AND OBJECTIVE BOX
Patient is a 79y old  Female who presents with a chief complaint of Right Leg Pain (30 Sep 2019       INTERVAL HPI/OVERNIGHT EVENTS: pt seen and examined this am  feels ok, vss stable, denies any new complaints      Vital Signs Last 24 Hrs  T(C): 37 (30 Sep 2019 18:29), Max: 37.8 (29 Sep 2019 23:47)  T(F): 98.6 (30 Sep 2019 17:39), Max: 100.1 (30 Sep 2019 04:45)  HR: 82 (30 Sep 2019 18:29) (74 - 85)  BP: 134/83 (30 Sep 2019 18:29) (89/70 - 134/83)  BP(mean): --  RR: 18 (30 Sep 2019 18:29) (18 - 28)  SpO2: 97% (30 Sep 2019 18:29) (96% - 99%)    acetaminophen   Tablet .. 650 milliGRAM(s) Oral every 6 hours PRN  baclofen 20 milliGRAM(s) Oral every 6 hours  clindamycin IVPB 900 milliGRAM(s) IV Intermittent every 8 hours  dextrose 5% + sodium chloride 0.9%. 1000 milliLiter(s) IV Continuous <Continuous>  heparin  Injectable 5000 Unit(s) SubCutaneous every 8 hours  influenza   Vaccine 0.5 milliLiter(s) IntraMuscular once  ondansetron    Tablet 4 milliGRAM(s) Oral once  oxybutynin XL 5 milliGRAM(s) Oral daily  piperacillin/tazobactam IVPB.. 3.375 Gram(s) IV Intermittent every 8 hours  vancomycin  IVPB 1250 milliGRAM(s) IV Intermittent every 12 hours      PHYSICAL EXAM:  GENERAL: NAD,   EYES: conjunctiva and sclera clear  ENMT: Moist mucous membranes  NECK: Supple, No JVD, Normal thyroid  NERVOUS SYSTEM:  Alert & Oriented X,   CHEST/LUNG: Clear to auscultation bilaterally; No rales, rhonchi, wheezing, or rubs  HEART: Regular rate and rhythm; No murmurs, rubs, or gallops  ABDOMEN: Soft, Nontender, Nondistended; Bowel sounds present  EXTREMITIES:  2+ Peripheral Pulses, No clubbing, cyanosis, or edema  LYMPH: No lymphadenopathy noted  SKIN: No rashes or lesions    Consultant(s) Notes Reviewed:  [x ] YES  [ ] NO  Care Discussed with Consultants/Other Providers [ x] YES  [ ] NO    LABS:                        9.2    6.0   )-----------( 227      ( 30 Sep 2019 20:10 )             29.3         135  |  104  |  12  ----------------------------<  116<H>  4.5   |  23  |  0.58    Ca    8.4      30 Sep 2019 06:53  Phos  2.6       Mg     2.0         TPro  5.9<L>  /  Alb  2.2<L>  /  TBili  0.4  /  DBili  x   /  AST  66<H>  /  ALT  45  /  AlkPhos  228<H>      PT/INR - ( 30 Sep 2019 07:55 )   PT: 12.8 sec;   INR: 1.13 ratio           Urinalysis Basic - ( 30 Sep 2019 01:14 )    Color: Yellow / Appearance: Clear / S.020 / pH: x  Gluc: x / Ketone: Negative  / Bili: Negative / Urobili: Negative   Blood: x / Protein: Trace / Nitrite: Negative   Leuk Esterase: Negative / RBC: x / WBC x   Sq Epi: x / Non Sq Epi: x / Bacteria: x      CAPILLARY BLOOD GLUCOSE          ABG - ( 30 Sep 2019 17:08 )  pH, Arterial: 7.42  pH, Blood: x     /  pCO2: 37    /  pO2: 55    / HCO3: 23    / Base Excess: -.4   /  SaO2: 88                Urinalysis Basic - ( 30 Sep 2019 01:14 )    Color: Yellow / Appearance: Clear / S.020 / pH: x  Gluc: x / Ketone: Negative  / Bili: Negative / Urobili: Negative   Blood: x / Protein: Trace / Nitrite: Negative   Leuk Esterase: Negative / RBC: x / WBC x   Sq Epi: x / Non Sq Epi: x / Bacteria: x        Culture - Blood (collected 28 Sep 2019 22:11)  Source: .Blood  Preliminary Report (29 Sep 2019 23:01):    No growth to date.    Culture - Blood (collected 28 Sep 2019 22:11)  Source: .Blood  Preliminary Report (29 Sep 2019 23:01):    No growth to date.    Culture - Urine (collected 28 Sep 2019 22:00)  Source: .Urine  Final Report (29 Sep 2019 16:59):    <10,000 CFU/mL Normal Urogenital Alicia        RADIOLOGY & ADDITIONAL TESTS:    Imaging Personally Reviewed:  [x ] YES  [ ] NO

## 2019-09-30 NOTE — CHART NOTE - NSCHARTNOTEFT_GEN_A_CORE
called by anesthesia while PT is down to OR with c/o sob   Discussed anaesthesia findings concerns with Dr Hernandez and w/u for PE prior to  surgery   arranged call for Dr Morris to Dr Hernandez   Surgery currently deferred for PE workup  CTA stat protocoled  Endorsed to night team for follow up   Department of Medicine   STEVE Hector [id number]-c [id number]

## 2019-09-30 NOTE — CHART NOTE - NSCHARTNOTEFT_GEN_A_CORE
Patient is a 79y old female who is admitted for gangrene of right foot. (29 Sep 2019 16:40)  Called by RN who reported patient was short of breath and disoriented.   Patient seen and examined at bedside with family present.  Patient found to be alert and oriented per her baseline. Increased work of breathing noted. O2 Sat 98% on 2L NC. Blood pressure stable.   Urgent CXR, STAT EKG, ABGs,       Vital Signs Last 24 Hrs  T(C): 37.2 (30 Sep 2019 15:18), Max: 37.8 (29 Sep 2019 23:47)  T(F): 98.9 (30 Sep 2019 15:18), Max: 100.1 (30 Sep 2019 04:45)  HR: 74 (30 Sep 2019 15:18) (74 - 85)  BP: 120/70 (30 Sep 2019 15:31) (89/70 - 138/71)  BP(mean): --  RR: 28 (30 Sep 2019 15:18) (18 - 28)  SpO2: 98% (30 Sep 2019 15:18) (96% - 99%)                        8.9    6.21  )-----------( 208      ( 30 Sep 2019 08:15 )             27.8     09-30    135  |  104  |  12  ----------------------------<  116<H>  4.5   |  23  |  0.58    Ca    8.4      30 Sep 2019 06:53  Phos  2.6     09-30  Mg     2.0     09-30    TPro  5.9<L>  /  Alb  2.2<L>  /  TBili  0.4  /  DBili  x   /  AST  66<H>  /  ALT  45  /  AlkPhos  228<H>  09-30    PT/INR - ( 30 Sep 2019 07:55 )   PT: 12.8 sec;   INR: 1.13 ratio         PTT - ( 28 Sep 2019 19:33 )  PTT:31.8 sec    General: WN/WD NAD  Neurology: A&Ox3, nonfocal, MCCORMACK x 4  Head:  Normocephalic, atraumatic  Respiratory: CTA B/L; labored   CV: RRR, S1S2, no murmur  Abdominal: Soft, NT, ND no palpable mass  MSK: No edema, + peripheral pulses, FROM all 4 extremity    A/P        Department of Medicine  Taisha Joseph PA-C  [id number] Patient is a 79y old female who is admitted for gangrene of right foot. (29 Sep 2019 16:40)  Called by RN who reported patient was short of breath and disoriented.   Patient seen and examined at bedside with family present.  Patient found to be alert and oriented per her baseline. Increased work of breathing noted. O2 Sat 98% on 2L NC. Blood pressure stable.   Administered duoneb treatment.   Urgent CXR, STAT EKG, ABGs ordered   EKG: NSR, 83 bpm, ST & T wave abnormality in anterolateral leads, unchanged from 9/28/19.  Dr. Hernandez notified     Vital Signs Last 24 Hrs  T(C): 37.2 (30 Sep 2019 15:18), Max: 37.8 (29 Sep 2019 23:47)  T(F): 98.9 (30 Sep 2019 15:18), Max: 100.1 (30 Sep 2019 04:45)  HR: 74 (30 Sep 2019 15:18) (74 - 85)  BP: 120/70 (30 Sep 2019 15:31) (89/70 - 138/71)  BP(mean): --  RR: 28 (30 Sep 2019 15:18) (18 - 28)  SpO2: 98% (30 Sep 2019 15:18) (96% - 99%)                        8.9    6.21  )-----------( 208      ( 30 Sep 2019 08:15 )             27.8     09-30    135  |  104  |  12  ----------------------------<  116<H>  4.5   |  23  |  0.58    Ca    8.4      30 Sep 2019 06:53  Phos  2.6     09-30  Mg     2.0     09-30    TPro  5.9<L>  /  Alb  2.2<L>  /  TBili  0.4  /  DBili  x   /  AST  66<H>  /  ALT  45  /  AlkPhos  228<H>  09-30    PT/INR - ( 30 Sep 2019 07:55 )   PT: 12.8 sec;   INR: 1.13 ratio         PTT - ( 28 Sep 2019 19:33 )  PTT:31.8 sec    General: WN/WD NAD  Neurology: A&Ox3, nonfocal, MCCORMACK x 4  Head:  Normocephalic, atraumatic  Respiratory: CTA B/L; labored   CV: RRR, S1S2, no murmur  Abdominal: Soft, NT, ND no palpable mass  MSK: No edema, + peripheral pulses, FROM all 4 extremity    A/P        Department of Medicine  Taisha Joseph PA-C  [id number] Patient is a 79y old female who is admitted for gangrene of right foot. (29 Sep 2019 16:40)  Called by RN who reported patient was short of breath and disoriented.   Patient seen and examined at bedside with family present.  Patient found to be alert and oriented per her baseline. Increased work of breathing noted. O2 Sat 98% on 2L NC. Blood pressure stable.   Administered duoneb treatment. Patient improved back to baseline.   Urgent CXR, STAT EKG, ABGs obtained     EKG: NSR, 83 bpm, ST & T wave abnormality in anterolateral leads, unchanged from 9/28/19.    ABG - ( 30 Sep 2019 17:08 )  pH, Arterial: 7.42  pH, Blood: x     /  pCO2: 37    /  pO2: 55    / HCO3: 23    / Base Excess: -.4   /  SaO2: 88       CXR results pending     Vital Signs Last 24 Hrs  T(C): 37.2 (30 Sep 2019 15:18), Max: 37.8 (29 Sep 2019 23:47)  T(F): 98.9 (30 Sep 2019 15:18), Max: 100.1 (30 Sep 2019 04:45)  HR: 74 (30 Sep 2019 15:18) (74 - 85)  BP: 120/70 (30 Sep 2019 15:31) (89/70 - 138/71)  BP(mean): --  RR: 28 (30 Sep 2019 15:18) (18 - 28)  SpO2: 98% (30 Sep 2019 15:18) (96% - 99%)                        8.9    6.21  )-----------( 208      ( 30 Sep 2019 08:15 )             27.8     09-30    135  |  104  |  12  ----------------------------<  116<H>  4.5   |  23  |  0.58    Ca    8.4      30 Sep 2019 06:53  Phos  2.6     09-30  Mg     2.0     09-30    TPro  5.9<L>  /  Alb  2.2<L>  /  TBili  0.4  /  DBili  x   /  AST  66<H>  /  ALT  45  /  AlkPhos  228<H>  09-30    PT/INR - ( 30 Sep 2019 07:55 )   PT: 12.8 sec;   INR: 1.13 ratio         PTT - ( 28 Sep 2019 19:33 )  PTT:31.8 sec    General: WN/WD NAD  Neurology: A&Ox3, nonfocal, MCCORMACK x 4  Head:  Normocephalic, atraumatic  Respiratory: CTA B/L; labored   CV: RRR, S1S2, no murmur  Abdominal: Soft, NT, ND no palpable mass  MSK: No edema, + peripheral pulses, FROM all 4 extremity      A/P:  Continue to monitor vitals, spO2.   Monitor signs and symptoms  Discussed with Dr. Hernandez      Department of Medicine  Taisha Joseph, PA-C  [id number] Patient is a 79y old female who is admitted for gangrene of right foot. (29 Sep 2019 16:40)  Called by RN who reported patient was short of breath and disoriented.   Patient seen and examined at bedside with family present. She reports feeling SOB. Denies chest pain, palpitations, cough, wheezing, nausea, vomiting.   Urgent CXR, STAT EKG, ABGs obtained     EKG: NSR, 83 bpm, ST & T wave abnormality in anterolateral leads, unchanged from 9/28/19.    ABG - ( 30 Sep 2019 17:08 )  pH, Arterial: 7.42  pH, Blood: x     /  pCO2: 37    /  pO2: 55    / HCO3: 23    / Base Excess: -.4   /  SaO2: 88       CXR Pending     Vital Signs Last 24 Hrs  T(C): 37.2 (30 Sep 2019 15:18), Max: 37.8 (29 Sep 2019 23:47)  T(F): 98.9 (30 Sep 2019 15:18), Max: 100.1 (30 Sep 2019 04:45)  HR: 74 (30 Sep 2019 15:18) (74 - 85)  BP: 120/70 (30 Sep 2019 15:31) (89/70 - 138/71)  BP(mean): --  RR: 28 (30 Sep 2019 15:18) (18 - 28)  SpO2: 98% (30 Sep 2019 15:18) (96% - 99%)                        8.9    6.21  )-----------( 208      ( 30 Sep 2019 08:15 )             27.8     09-30    135  |  104  |  12  ----------------------------<  116<H>  4.5   |  23  |  0.58    Ca    8.4      30 Sep 2019 06:53  Phos  2.6     09-30  Mg     2.0     09-30    TPro  5.9<L>  /  Alb  2.2<L>  /  TBili  0.4  /  DBili  x   /  AST  66<H>  /  ALT  45  /  AlkPhos  228<H>  09-30    PT/INR - ( 30 Sep 2019 07:55 )   PT: 12.8 sec;   INR: 1.13 ratio         PTT - ( 28 Sep 2019 19:33 )  PTT:31.8 sec    General: WN/WD NAD  Neurology: A&Ox3, nonfocal, MCCORMACK x 4  Head:  Normocephalic, atraumatic  Respiratory: CTA B/L; labored   CV: RRR, S1S2, no murmur  Abdominal: Soft, NT, ND no palpable mass  MSK: No edema, + peripheral pulses, FROM all 4 extremity    A/P:  80 y/o female who is admitted for gangrene of right foot c/o SOB. Patient found to be alert and oriented per her baseline. Increased work of breathing noted. O2 Sat 98% on 2L NC. Blood pressure stable.   Administered duoneb treatment. Patient breathing improved back to baseline with stable vitals. EKG with NSR, 83 bpm, ST & T wave abnormality in anterolateral leads, unchanged from 9/28/19., ABG as above. CXR pending.   Patient scheduled for right BKA this evening - Vascular surgery paged to notify of above event.     Recommendations:   Continue to monitor vitals, spO2.   Monitor signs and symptoms  Discussed with Dr. Hernandez      Department of Medicine  Taisha Joseph, EZIO  [id number] Called by RN for patient c/o sob   Patient seen and examined at bedside with family present   She reports feeling SOB Denies chest pain, palpitations, cough, wheezing, nausea, vomiting.   this is a 80 y/o female who is admitted for gangrene of right foot 9/28/19      General: ao times 3 some periods of confusion   Neurology: A&Ox3, nonfocal, MCCORMACK x 4  Respiratory: CTA B/L; labored using some accessory muscle    CV: RRR, S1S2, no murmur           ABG - ( 30 Sep 2019 17:08 )  pH, Arterial: 7.42  pH, Blood: x     /  pCO2: 37    /  pO2: 55    / HCO3: 23    / Base Excess: -.4   /  SaO2: 88       CXR Pending     Vital Signs Last 24 Hrs  T(C): 37.2 (30 Sep 2019 15:18), Max: 37.8 (29 Sep 2019 23:47)  T(F): 98.9 (30 Sep 2019 15:18), Max: 100.1 (30 Sep 2019 04:45)  HR: 74 (30 Sep 2019 15:18) (74 - 85)  BP: 120/70 (30 Sep 2019 15:31) (89/70 - 138/71)  BP(mean): --  RR: 28 (30 Sep 2019 15:18) (18 - 28)  SpO2: 98% (30 Sep 2019 15:18) (96% - 99%)                        8.9    6.21  )-----------( 208      ( 30 Sep 2019 08:15 )             27.8     09-30    135  |  104  |  12  ----------------------------<  116<H>  4.5   |  23  |  0.58    Ca    8.4      30 Sep 2019 06:53  Phos  2.6     09-30  Mg     2.0     09-30    TPro  5.9<L>  /  Alb  2.2<L>  /  TBili  0.4  /  DBili  x   /  AST  66<H>  /  ALT  45  /  AlkPhos  228<H>  09-30    PT/INR - ( 30 Sep 2019 07:55 )   PT: 12.8 sec;   INR: 1.13 ratio         PTT - ( 28 Sep 2019 19:33 )  PTT:31.8 sec      EKG: NSR, 83 bpm, ST & T wave abnormality in anterolateral leads, unchanged from 9/28/19.  A/P:  80 y/o female who is admitted for gangrene of right foot c/o SOB. Patient found to be alert and oriented per her baseline. Increased work of breathing noted. O2 Sat 98% on 2L NC. Blood pressure stable. Low suspicion for PE.  Administered duoneb treatment. Patient breathing improved back to baseline with stable vitals. EKG with NSR, 83 bpm, ST & T wave abnormality in anterolateral leads, unchanged from 9/28/19., ABG as above. CXR pending.   Patient scheduled for right BKA this evening - Vascular surgery paged to notify of above event.       Continue to monitor vitals, spO2.   Monitor signs and symptoms of hypoxia   Will endorse to night team for follow up   Discussed with Dr. Hernandez      Department of Medicine  Taisha Joseph, EZIO  [id number] Called by RN for patient c/o sob   Patient seen and examined at bedside with family present   She reports feeling SOB Denies chest pain, palpitations, cough, wheezing, nausea, vomiting.   this is a 78 y/o female who is admitted for gangrene of right foot 9/28/19    General: ao times 3 some periods of confusion   Neurology: A&Ox3, nonfocal, MCCORMACK x 4  Respiratory: CTA B/L; labored using some accessory muscle    CV: RRR, S1S2, no murmur     ABG - ( 30 Sep 2019 17:08 )  pH, Arterial: 7.42  pH, Blood: x     /  pCO2: 37    /  pO2: 55    / HCO3: 23    / Base Excess: -.4   /  SaO2: 88       CXR Pending     Vital Signs Last 24 Hrs  T(C): 37.2 (30 Sep 2019 15:18), Max: 37.8 (29 Sep 2019 23:47)  T(F): 98.9 (30 Sep 2019 15:18), Max: 100.1 (30 Sep 2019 04:45)  HR: 74 (30 Sep 2019 15:18) (74 - 85)  BP: 120/70 (30 Sep 2019 15:31) (89/70 - 138/71)  BP(mean): --  RR: 28 (30 Sep 2019 15:18) (18 - 28)  SpO2: 98% (30 Sep 2019 15:18) (96% - 99%)                        8.9    6.21  )-----------( 208      ( 30 Sep 2019 08:15 )             27.8     09-30    135  |  104  |  12  ----------------------------<  116<H>  4.5   |  23  |  0.58    Ca    8.4      30 Sep 2019 06:53  Phos  2.6     09-30  Mg     2.0     09-30    TPro  5.9<L>  /  Alb  2.2<L>  /  TBili  0.4  /  DBili  x   /  AST  66<H>  /  ALT  45  /  AlkPhos  228<H>  09-30    PT/INR - ( 30 Sep 2019 07:55 )   PT: 12.8 sec;   INR: 1.13 ratio         PTT - ( 28 Sep 2019 19:33 )  PTT:31.8 sec      EKG: NSR, 83 bpm, ST & T wave abnormality in anterolateral leads, unchanged from 9/28/19.  A/P:  78 y/o female who is admitted for gangrene of right foot c/o SOB. Patient found to be alert and oriented per her baseline. Increased work of breathing noted. O2 Sat 98% on 2L NC. Blood pressure stable.   Administered duoneb treatment. Patient breathing improved back to baseline with stable vitals. EKG with NSR, 83 bpm, ST & T wave abnormality in anterolateral leads, unchanged from 9/28/19., ABG as above. CXR pending.   Patient scheduled for right BKA this evening - Vascular surgery notified of above event. Dr. Hernandez also communicating with vascular surgeon.     Continue to monitor vitals, spO2.   Monitor signs and symptoms of hypoxia   Will endorse to night team for follow up   Discussed with Dr. Hernandez       Department of Medicine  Taisha Joseph PA-C  [id number]

## 2019-09-30 NOTE — PROGRESS NOTE ADULT - ASSESSMENT
80 yo female patient w/ multiple gangrenous wounds of right lower extremity in sepsis. and foot gangrene     Plan:  OR cancelled after d/w medicine attending and anesthesia attending  pt needs to be r/o for high clinical s/o PE  recommend CTPA ASAP

## 2019-10-01 ENCOUNTER — RESULT REVIEW (OUTPATIENT)
Age: 79
End: 2019-10-01

## 2019-10-01 LAB
ALBUMIN SERPL ELPH-MCNC: 2.5 G/DL — LOW (ref 3.3–5)
ALP SERPL-CCNC: 231 U/L — HIGH (ref 40–120)
ALT FLD-CCNC: 45 U/L — SIGNIFICANT CHANGE UP (ref 10–45)
ANION GAP SERPL CALC-SCNC: 10 MMOL/L — SIGNIFICANT CHANGE UP (ref 5–17)
APTT BLD: 30.3 SEC — SIGNIFICANT CHANGE UP (ref 27.5–36.3)
AST SERPL-CCNC: 49 U/L — HIGH (ref 10–40)
BASOPHILS # BLD AUTO: 0 K/UL — SIGNIFICANT CHANGE UP (ref 0–0.2)
BASOPHILS NFR BLD AUTO: 0 % — SIGNIFICANT CHANGE UP (ref 0–2)
BILIRUB SERPL-MCNC: 0.6 MG/DL — SIGNIFICANT CHANGE UP (ref 0.2–1.2)
BUN SERPL-MCNC: 11 MG/DL — SIGNIFICANT CHANGE UP (ref 7–23)
CALCIUM SERPL-MCNC: 8.4 MG/DL — SIGNIFICANT CHANGE UP (ref 8.4–10.5)
CHLORIDE SERPL-SCNC: 106 MMOL/L — SIGNIFICANT CHANGE UP (ref 96–108)
CO2 SERPL-SCNC: 22 MMOL/L — SIGNIFICANT CHANGE UP (ref 22–31)
CREAT SERPL-MCNC: 0.73 MG/DL — SIGNIFICANT CHANGE UP (ref 0.5–1.3)
EOSINOPHIL # BLD AUTO: 0 K/UL — SIGNIFICANT CHANGE UP (ref 0–0.5)
EOSINOPHIL NFR BLD AUTO: 0.3 % — SIGNIFICANT CHANGE UP (ref 0–6)
GAS PNL BLDA: SIGNIFICANT CHANGE UP
GAS PNL BLDA: SIGNIFICANT CHANGE UP
GLUCOSE SERPL-MCNC: 142 MG/DL — HIGH (ref 70–99)
HCT VFR BLD CALC: 30.9 % — LOW (ref 34.5–45)
HGB BLD-MCNC: 9.6 G/DL — LOW (ref 11.5–15.5)
LYMPHOCYTES # BLD AUTO: 0.6 K/UL — LOW (ref 1–3.3)
LYMPHOCYTES # BLD AUTO: 9.5 % — LOW (ref 13–44)
MCHC RBC-ENTMCNC: 27.6 PG — SIGNIFICANT CHANGE UP (ref 27–34)
MCHC RBC-ENTMCNC: 31 GM/DL — LOW (ref 32–36)
MCV RBC AUTO: 88.9 FL — SIGNIFICANT CHANGE UP (ref 80–100)
MONOCYTES # BLD AUTO: 0.5 K/UL — SIGNIFICANT CHANGE UP (ref 0–0.9)
MONOCYTES NFR BLD AUTO: 7.2 % — SIGNIFICANT CHANGE UP (ref 2–14)
NEUTROPHILS # BLD AUTO: 5.3 K/UL — SIGNIFICANT CHANGE UP (ref 1.8–7.4)
NEUTROPHILS NFR BLD AUTO: 83 % — HIGH (ref 43–77)
PLATELET # BLD AUTO: 259 K/UL — SIGNIFICANT CHANGE UP (ref 150–400)
POTASSIUM SERPL-MCNC: 4.1 MMOL/L — SIGNIFICANT CHANGE UP (ref 3.5–5.3)
POTASSIUM SERPL-SCNC: 4.1 MMOL/L — SIGNIFICANT CHANGE UP (ref 3.5–5.3)
PROT SERPL-MCNC: 6.1 G/DL — SIGNIFICANT CHANGE UP (ref 6–8.3)
RBC # BLD: 3.48 M/UL — LOW (ref 3.8–5.2)
RBC # FLD: 14.1 % — SIGNIFICANT CHANGE UP (ref 10.3–14.5)
SODIUM SERPL-SCNC: 138 MMOL/L — SIGNIFICANT CHANGE UP (ref 135–145)
WBC # BLD: 6.4 K/UL — SIGNIFICANT CHANGE UP (ref 3.8–10.5)
WBC # FLD AUTO: 6.4 K/UL — SIGNIFICANT CHANGE UP (ref 3.8–10.5)

## 2019-10-01 PROCEDURE — 88307 TISSUE EXAM BY PATHOLOGIST: CPT | Mod: 26

## 2019-10-01 PROCEDURE — 99221 1ST HOSP IP/OBS SF/LOW 40: CPT

## 2019-10-01 PROCEDURE — 71045 X-RAY EXAM CHEST 1 VIEW: CPT | Mod: 26

## 2019-10-01 PROCEDURE — 27882 AMPUTATION OF LOWER LEG: CPT

## 2019-10-01 PROCEDURE — 88311 DECALCIFY TISSUE: CPT | Mod: 26

## 2019-10-01 PROCEDURE — 93010 ELECTROCARDIOGRAM REPORT: CPT

## 2019-10-01 PROCEDURE — 93306 TTE W/DOPPLER COMPLETE: CPT | Mod: 26

## 2019-10-01 RX ORDER — CHLORHEXIDINE GLUCONATE 213 G/1000ML
1 SOLUTION TOPICAL DAILY
Refills: 0 | Status: DISCONTINUED | OUTPATIENT
Start: 2019-10-01 | End: 2019-10-01

## 2019-10-01 RX ORDER — CHLORHEXIDINE GLUCONATE 213 G/1000ML
1 SOLUTION TOPICAL
Refills: 0 | Status: DISCONTINUED | OUTPATIENT
Start: 2019-10-01 | End: 2019-10-05

## 2019-10-01 RX ORDER — SODIUM CHLORIDE 9 MG/ML
1000 INJECTION, SOLUTION INTRAVENOUS
Refills: 0 | Status: DISCONTINUED | OUTPATIENT
Start: 2019-10-01 | End: 2019-10-02

## 2019-10-01 RX ORDER — ENOXAPARIN SODIUM 100 MG/ML
40 INJECTION SUBCUTANEOUS DAILY
Refills: 0 | Status: DISCONTINUED | OUTPATIENT
Start: 2019-10-01 | End: 2019-10-11

## 2019-10-01 RX ORDER — SENNA PLUS 8.6 MG/1
5 TABLET ORAL AT BEDTIME
Refills: 0 | Status: DISCONTINUED | OUTPATIENT
Start: 2019-10-01 | End: 2019-10-02

## 2019-10-01 RX ORDER — CHLORHEXIDINE GLUCONATE 213 G/1000ML
1 SOLUTION TOPICAL
Refills: 0 | Status: DISCONTINUED | OUTPATIENT
Start: 2019-10-01 | End: 2019-10-01

## 2019-10-01 RX ORDER — PANTOPRAZOLE SODIUM 20 MG/1
40 TABLET, DELAYED RELEASE ORAL DAILY
Refills: 0 | Status: DISCONTINUED | OUTPATIENT
Start: 2019-10-01 | End: 2019-10-01

## 2019-10-01 RX ORDER — FENTANYL CITRATE 50 UG/ML
1 INJECTION INTRAVENOUS
Qty: 5000 | Refills: 0 | Status: DISCONTINUED | OUTPATIENT
Start: 2019-10-01 | End: 2019-10-01

## 2019-10-01 RX ORDER — PROPOFOL 10 MG/ML
10 INJECTION, EMULSION INTRAVENOUS
Qty: 1000 | Refills: 0 | Status: DISCONTINUED | OUTPATIENT
Start: 2019-10-01 | End: 2019-10-01

## 2019-10-01 RX ORDER — CHLORHEXIDINE GLUCONATE 213 G/1000ML
15 SOLUTION TOPICAL EVERY 12 HOURS
Refills: 0 | Status: DISCONTINUED | OUTPATIENT
Start: 2019-10-01 | End: 2019-10-02

## 2019-10-01 RX ORDER — PIPERACILLIN AND TAZOBACTAM 4; .5 G/20ML; G/20ML
3.38 INJECTION, POWDER, LYOPHILIZED, FOR SOLUTION INTRAVENOUS EVERY 8 HOURS
Refills: 0 | Status: COMPLETED | OUTPATIENT
Start: 2019-10-01 | End: 2019-10-07

## 2019-10-01 RX ORDER — DOCUSATE SODIUM 100 MG
100 CAPSULE ORAL EVERY 8 HOURS
Refills: 0 | Status: DISCONTINUED | OUTPATIENT
Start: 2019-10-01 | End: 2019-10-02

## 2019-10-01 RX ORDER — SODIUM CHLORIDE 9 MG/ML
1000 INJECTION, SOLUTION INTRAVENOUS
Refills: 0 | Status: DISCONTINUED | OUTPATIENT
Start: 2019-10-01 | End: 2019-10-01

## 2019-10-01 RX ORDER — HYDROMORPHONE HYDROCHLORIDE 2 MG/ML
0.5 INJECTION INTRAMUSCULAR; INTRAVENOUS; SUBCUTANEOUS ONCE
Refills: 0 | Status: DISCONTINUED | OUTPATIENT
Start: 2019-10-01 | End: 2019-10-01

## 2019-10-01 RX ORDER — PANTOPRAZOLE SODIUM 20 MG/1
40 TABLET, DELAYED RELEASE ORAL DAILY
Refills: 0 | Status: DISCONTINUED | OUTPATIENT
Start: 2019-10-01 | End: 2019-10-02

## 2019-10-01 RX ORDER — DEXMEDETOMIDINE HYDROCHLORIDE IN 0.9% SODIUM CHLORIDE 4 UG/ML
0.2 INJECTION INTRAVENOUS
Qty: 200 | Refills: 0 | Status: DISCONTINUED | OUTPATIENT
Start: 2019-10-01 | End: 2019-10-02

## 2019-10-01 RX ADMIN — CHLORHEXIDINE GLUCONATE 1 APPLICATION(S): 213 SOLUTION TOPICAL at 15:00

## 2019-10-01 RX ADMIN — CHLORHEXIDINE GLUCONATE 15 MILLILITER(S): 213 SOLUTION TOPICAL at 17:20

## 2019-10-01 RX ADMIN — Medication 1 MILLIGRAM(S): at 04:36

## 2019-10-01 RX ADMIN — HYDROMORPHONE HYDROCHLORIDE 0.5 MILLIGRAM(S): 2 INJECTION INTRAMUSCULAR; INTRAVENOUS; SUBCUTANEOUS at 02:40

## 2019-10-01 RX ADMIN — PIPERACILLIN AND TAZOBACTAM 25 GRAM(S): 4; .5 INJECTION, POWDER, LYOPHILIZED, FOR SOLUTION INTRAVENOUS at 13:00

## 2019-10-01 RX ADMIN — PIPERACILLIN AND TAZOBACTAM 25 GRAM(S): 4; .5 INJECTION, POWDER, LYOPHILIZED, FOR SOLUTION INTRAVENOUS at 06:12

## 2019-10-01 RX ADMIN — Medication 100 MILLIGRAM(S): at 22:46

## 2019-10-01 RX ADMIN — HYDROMORPHONE HYDROCHLORIDE 0.5 MILLIGRAM(S): 2 INJECTION INTRAMUSCULAR; INTRAVENOUS; SUBCUTANEOUS at 02:23

## 2019-10-01 RX ADMIN — PIPERACILLIN AND TAZOBACTAM 25 GRAM(S): 4; .5 INJECTION, POWDER, LYOPHILIZED, FOR SOLUTION INTRAVENOUS at 22:46

## 2019-10-01 RX ADMIN — CHLORHEXIDINE GLUCONATE 15 MILLILITER(S): 213 SOLUTION TOPICAL at 06:12

## 2019-10-01 RX ADMIN — PROPOFOL 3.45 MICROGRAM(S)/KG/MIN: 10 INJECTION, EMULSION INTRAVENOUS at 02:23

## 2019-10-01 RX ADMIN — PANTOPRAZOLE SODIUM 40 MILLIGRAM(S): 20 TABLET, DELAYED RELEASE ORAL at 12:56

## 2019-10-01 RX ADMIN — ENOXAPARIN SODIUM 40 MILLIGRAM(S): 100 INJECTION SUBCUTANEOUS at 12:56

## 2019-10-01 RX ADMIN — SODIUM CHLORIDE 50 MILLILITER(S): 9 INJECTION, SOLUTION INTRAVENOUS at 13:01

## 2019-10-01 NOTE — DIETITIAN INITIAL EVALUATION ADULT. - ENERGY NEEDS
The [employer] State Equation (PSU) 2003b was used to calculate resting energy expenditure: 1226 calories (20cal/Kg per dosing wt 57.5Kg)

## 2019-10-01 NOTE — DIETITIAN INITIAL EVALUATION ADULT. - PHYSICAL APPEARANCE
ht: 5 feet 3 inches, admit wt: 127 pounds, BMI: 22.5 Kg/m2, IBW: 115 pounds (+/- 10%), 110% IBW/other (specify)/well nourished Edema: none noted  Skin: unstageable left heel; stage I left great toe; stage I left mid foot; stage I left metatarsal  Nutrition Focused Physical Exam: Unable to perform Nutrition Focused Physical Assessment in current setting. Pt appears thin with possible muscle depletion in setting of MS. Edema: none noted  Skin: unstageable left heel; stage I left great toe; stage I left mid foot; stage I left metatarsal  Nutrition Focused Physical Exam: Unable to perform Nutrition Focused Physical Assessment in current setting. Pt appears slender with possible muscle depletion, likely in setting of MS rather than poor nutrition.

## 2019-10-01 NOTE — CHART NOTE - NSCHARTNOTEFT_GEN_A_CORE
Patient with wet gangrene of right lower extremity.   Underwent CTPA, which was preliminarily read as negative for PE.  Plan to proceed with right guillotine amputation for source control.   Plan of care discussed with patient, patient's , and family who are in agreement.

## 2019-10-01 NOTE — DIETITIAN INITIAL EVALUATION ADULT. - PERTINENT MEDS FT
MEDICATIONS  (STANDING):  chlorhexidine 0.12% Liquid 15 milliLiter(s) Oral Mucosa every 12 hours  chlorhexidine 2% Cloths 1 Application(s) Topical <User Schedule>  dexmedetomidine Infusion 0.2 MICROgram(s)/kG/Hr (2.875 mL/Hr) IV Continuous <Continuous>  enoxaparin Injectable 40 milliGRAM(s) SubCutaneous daily  influenza   Vaccine 0.5 milliLiter(s) IntraMuscular once  lactated ringers. 1000 milliLiter(s) (50 mL/Hr) IV Continuous <Continuous>  pantoprazole  Injectable 40 milliGRAM(s) IV Push daily  piperacillin/tazobactam IVPB.. 3.375 Gram(s) IV Intermittent every 8 hours

## 2019-10-01 NOTE — CONSULT NOTE ADULT - ASSESSMENT
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 Patient remains intubated post-operatively due to hypoxia; likely pulmonary edema; motion  Negative for PE. SICU consulted for ventilatory management. 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 Patient remains intubated post-operatively due to hypoxia; likely pulmonary edema; motion artifact on pre-op CT scan. Negative for PE. SICU consulted for ventilatory management.     PLAN    Neuro: Sedation  - Precedex gtt for sedation    Respiratory: Hypoxia, resolving.   - Continue with PRVC for mechanical ventilation  - SBT trial this am    CV: No active issue  - TTE to assess cardiac function in setrting of hypoxia    GI: No active issues  - NPO 79 year odl female w/p right guillotine BKA for sepsis due to wet gangrene of lower extremity. Patient remains intubated post-operatively due to hypoxia; likely pulmonary edema; motion artifact on pre-op CT scan. Negative for PE. SICU consulted for ventilatory management.     PLAN    Neuro: Sedation  - Precedex gtt for sedation    Respiratory: Hypoxia, resolving.   - Continue with PRVC for mechanical ventilation  - SBT trial this am    CV: No active issue  - TTE to assess cardiac function in setrting of hypoxia    GI: No active issues  - NPO     Renal: No active issues  - LR @ 50ml/hr     Heme: No active issues  - Lovenox for VTE prophylaxis     ID: Emperic coverage  - Zosyn for emperic coverage     Endo: No concerns     Dispo: SICU full code    - Noe Henriquez PA-C

## 2019-10-01 NOTE — DIETITIAN INITIAL EVALUATION ADULT. - ADD RECOMMEND
1) Pending extubation and tolerance to clear liquids, resume Soft diet. 2) When diet is advanced, add 1 Ensure Enlive (provides 350cal, 20Gm protein per 8oz serving). 3) Add daily multivitamin and vit C to promote wound healing

## 2019-10-01 NOTE — PROGRESS NOTE ADULT - SUBJECTIVE AND OBJECTIVE BOX
Patient is a 79y old  Female who presents with a chief complaint of Right Leg Pain (01 Oct 2019 09:20)      INTERVAL HPI/OVERNIGHT EVENTS: noted, pt intubated and sedated in SICU, sp LE amputation last night  ct angio no PE-mod effusion      Vital Signs Last 24 Hrs  T(C): 36.2 (01 Oct 2019 11:00), Max: 37.2 (30 Sep 2019 15:18)  T(F): 97.2 (01 Oct 2019 11:00), Max: 98.9 (30 Sep 2019 15:18)  HR: 62 (01 Oct 2019 11:10) (55 - 82)  BP: 152/70 (01 Oct 2019 11:00) (89/70 - 184/86)  BP(mean): 101 (01 Oct 2019 11:00) (65 - 120)  RR: 18 (01 Oct 2019 11:00) (12 - 29)  SpO2: 98% (01 Oct 2019 11:10) (95% - 100%)    chlorhexidine 0.12% Liquid 15 milliLiter(s) Oral Mucosa every 12 hours  chlorhexidine 2% Cloths 1 Application(s) Topical <User Schedule>  dexmedetomidine Infusion 0.2 MICROgram(s)/kG/Hr IV Continuous <Continuous>  dextrose 5% + sodium chloride 0.45%. 1000 milliLiter(s) IV Continuous <Continuous>  enoxaparin Injectable 40 milliGRAM(s) SubCutaneous daily  influenza   Vaccine 0.5 milliLiter(s) IntraMuscular once  pantoprazole  Injectable 40 milliGRAM(s) IV Push daily  piperacillin/tazobactam IVPB.. 3.375 Gram(s) IV Intermittent every 8 hours      PHYSICAL EXAM:  GENERAL: NAD, lethargic , intubated  EYES: conjunctiva and sclera clear  ENMT: Moist mucous membranes  NECK: Supple, No JVD, Normal thyroid  NERVOUS SYSTEM:  Alert & Oriented X,   CHEST/LUNG: coarse bs b/l  HEART: Regular rate and rhythm; No murmurs, rubs, or gallops  ABDOMEN: Soft, Nontender, Nondistended; Bowel sounds present  EXTREMITIES:  dressing  LYMPH: No lymphadenopathy noted  SKIN: No rashes or lesions    Consultant(s) Notes Reviewed:  [x ] YES  [ ] NO  Care Discussed with Consultants/Other Providers [ x] YES  [ ] NO    LABS:                        9.6    6.4   )-----------( 259      ( 01 Oct 2019 02:22 )             30.9     10    138  |  106  |  11  ----------------------------<  142<H>  4.1   |  22  |  0.73    Ca    8.4      01 Oct 2019 02:29  Phos  2.6     09-  Mg     2.0         TPro  6.1  /  Alb  2.5<L>  /  TBili  0.6  /  DBili  x   /  AST  49<H>  /  ALT  45  /  AlkPhos  231<H>  10    PT/INR - ( 30 Sep 2019 20:10 )   PT: 13.6 sec;   INR: 1.18 ratio         PTT - ( 01 Oct 2019 03:12 )  PTT:30.3 sec  Urinalysis Basic - ( 30 Sep 2019 01:14 )    Color: Yellow / Appearance: Clear / S.020 / pH: x  Gluc: x / Ketone: Negative  / Bili: Negative / Urobili: Negative   Blood: x / Protein: Trace / Nitrite: Negative   Leuk Esterase: Negative / RBC: x / WBC x   Sq Epi: x / Non Sq Epi: x / Bacteria: x      CAPILLARY BLOOD GLUCOSE          ABG - ( 01 Oct 2019 10:16 )  pH, Arterial: 7.40  pH, Blood: x     /  pCO2: 41    /  pO2: 108   / HCO3: 25    / Base Excess: .6    /  SaO2: 98                Urinalysis Basic - ( 30 Sep 2019 01:14 )    Color: Yellow / Appearance: Clear / S.020 / pH: x  Gluc: x / Ketone: Negative  / Bili: Negative / Urobili: Negative   Blood: x / Protein: Trace / Nitrite: Negative   Leuk Esterase: Negative / RBC: x / WBC x   Sq Epi: x / Non Sq Epi: x / Bacteria: x        Culture - Blood (collected 28 Sep 2019 22:11)  Source: .Blood  Preliminary Report (29 Sep 2019 23:01):    No growth to date.    Culture - Blood (collected 28 Sep 2019 22:11)  Source: .Blood  Preliminary Report (29 Sep 2019 23:01):    No growth to date.    Culture - Urine (collected 28 Sep 2019 22:00)  Source: .Urine  Final Report (29 Sep 2019 16:59):    <10,000 CFU/mL Normal Urogenital Alicia        RADIOLOGY & ADDITIONAL TESTS:    Imaging Personally Reviewed:  [x ] YES  [ ] NO

## 2019-10-01 NOTE — PROGRESS NOTE ADULT - ASSESSMENT
79F with PMHx of multiple sclerosis (progressive and not currently on any medication), HTN, neurogenic bladder (has had a Kendrick catheter for many years)  and peripheral vascular disease presents with right foot pain for one month. Patient febrile and tachypneic, meeting SIRS criteria. On exam, right foot is cold to touch, unable to palpate pulses and necrotic tissue noted on dorsum of foot and lateral malleolus with malodor. Right foot/ankle XR showing subcutaneous air. Patient likely having severe cellulitis vs. possible necrotizing fasciitis. Podiatry and vascular surgery saw patient and are both recommending possible right below knee amputation.    sp emergent AKA  pt intubated and sedated  Postop care- per vasc and SICu team appreciated

## 2019-10-01 NOTE — DIETITIAN INITIAL EVALUATION ADULT. - REASON INDICATOR FOR ASSESSMENT
Nutrition Assessment warranted for length of stay on 8ICU.  Information obtained from: Home Health Aide, medical record. Pt intubated, sedated.  Per chart: 79F with PMHx of multiple sclerosis (progressive and not currently on any medication), HTN; now S/P right BKA for gangrene of foot.

## 2019-10-01 NOTE — PROGRESS NOTE ADULT - SUBJECTIVE AND OBJECTIVE BOX
Subjective:    Patient seen and evaluated by team this AM. Underwent URVASHI claire late last night for source control for presumed wet gangrene. Pain mostly well controlled. No other concerns at present.    OBJECTIVE:     ** PHYSICAL EXAM **    General: Well developed, well nourished  Neuro: Sedated  Respiratory: Intubated  Extremities: RLE BKA dressing with some strikethrough, redressed and with no evidence of active oozing    Vital Signs Last 24 Hrs  T(C): 36.6 (01 Oct 2019 03:00), Max: 37.2 (30 Sep 2019 15:18)  T(F): 97.9 (01 Oct 2019 03:00), Max: 98.9 (30 Sep 2019 15:18)  HR: 77 (01 Oct 2019 09:00) (55 - 82)  BP: 151/69 (01 Oct 2019 09:00) (89/70 - 184/86)  BP(mean): 99 (01 Oct 2019 09:00) (65 - 120)  RR: 29 (01 Oct 2019 09:00) (12 - 29)  SpO2: 98% (01 Oct 2019 09:00) (95% - 100%)    I&O's Detail    30 Sep 2019 07:01  -  01 Oct 2019 07:00  --------------------------------------------------------  IN:    fentaNYL Infusion.: 2.9 mL    lactated ringers.: 100 mL    propofol Infusion: 6.9 mL    Solution: 25 mL  Total IN: 134.8 mL    OUT:    Indwelling Catheter - Urethral: 900 mL  Total OUT: 900 mL    Total NET: -765.2 mL      01 Oct 2019 07:01  -  01 Oct 2019 09:25  --------------------------------------------------------  IN:    lactated ringers.: 100 mL    Solution: 50 mL  Total IN: 150 mL    OUT:    Indwelling Catheter - Urethral: 45 mL  Total OUT: 45 mL    Total NET: 105 mL      MEDICATIONS  (STANDING):  chlorhexidine 0.12% Liquid 15 milliLiter(s) Oral Mucosa every 12 hours  chlorhexidine 2% Cloths 1 Application(s) Topical <User Schedule>  dexmedetomidine Infusion 0.2 MICROgram(s)/kG/Hr (2.875 mL/Hr) IV Continuous <Continuous>  enoxaparin Injectable 40 milliGRAM(s) SubCutaneous daily  influenza   Vaccine 0.5 milliLiter(s) IntraMuscular once  lactated ringers. 1000 milliLiter(s) (50 mL/Hr) IV Continuous <Continuous>  pantoprazole  Injectable 40 milliGRAM(s) IV Push daily  piperacillin/tazobactam IVPB.. 3.375 Gram(s) IV Intermittent every 8 hours    MEDICATIONS  (PRN):      LABS:                        9.6    6.4   )-----------( 259      ( 01 Oct 2019 02:22 )             30.9     10-    138  |  106  |  11  ----------------------------<  142<H>  4.1   |  22  |  0.73    Ca    8.4      01 Oct 2019 02:29  Phos  2.6     09-30  Mg     2.0     09-30    TPro  6.1  /  Alb  2.5<L>  /  TBili  0.6  /  DBili  x   /  AST  49<H>  /  ALT  45  /  AlkPhos  231<H>  10-    PT/INR - ( 30 Sep 2019 20:10 )   PT: 13.6 sec;   INR: 1.18 ratio         PTT - ( 01 Oct 2019 03:12 )  PTT:30.3 sec  LIVER FUNCTIONS - ( 01 Oct 2019 02:29 )  Alb: 2.5 g/dL / Pro: 6.1 g/dL / ALK PHOS: 231 U/L / ALT: 45 U/L / AST: 49 U/L / GGT: x           Urinalysis Basic - ( 30 Sep 2019 01:14 )    Color: Yellow / Appearance: Clear / S.020 / pH: x  Gluc: x / Ketone: Negative  / Bili: Negative / Urobili: Negative   Blood: x / Protein: Trace / Nitrite: Negative   Leuk Esterase: Negative / RBC: x / WBC x   Sq Epi: x / Non Sq Epi: x / Bacteria: x

## 2019-10-01 NOTE — PROGRESS NOTE ADULT - ASSESSMENT
78 yo female patient w/ multiple gangrenous wounds of right lower extremity causing sepsis, now s/p HA claire and recovering in SICU    Plan:  - Continue to monitor wound, dressing changes daily unless saturated  - Plan for formalization after wound stable with no evidence of residual infection  - Care per SICU  - IV Abx   - On Precedex for sedation, intubated; extubation per SICU    #[id number] Vascular Surgery 80 yo female patient w/ multiple gangrenous wounds of right lower extremity causing sepsis, now s/p RLE suniine and recovering in SICU    Plan:  - Continue to monitor wound, dressing changes daily unless saturated  - Plan for formalization after wound stable with no evidence of residual infection  - Care per SICU  - IV Abx   - On Precedex for sedation, intubated; extubation per SICU  d/w pt's   who wants to proceed w rle completion amp w me in the future  I d/w him that since she is not ambulatory the  optimal operation would be a  rt aka       #[id number] Vascular Surgery

## 2019-10-01 NOTE — DIETITIAN INITIAL EVALUATION ADULT. - PERTINENT LABORATORY DATA
10-01 @ 02:29: Sodium 138, Potassium 4.1, Chloride 106, Calcium 8.4,  BUN 11, Creatinine 0.73, <H>, Alk Phos 231<H>, ALT/SGPT 45, AST/SGOT 49<H>, Total Protein 6.1, Albumin 2.5<L>, Total Bilirubin 0.6,   10-01 @ 02:22: Hemoglobin 9.6<L>, Hematocrit 30.9<L>

## 2019-10-02 LAB
ANION GAP SERPL CALC-SCNC: 13 MMOL/L — SIGNIFICANT CHANGE UP (ref 5–17)
BUN SERPL-MCNC: 14 MG/DL — SIGNIFICANT CHANGE UP (ref 7–23)
CALCIUM SERPL-MCNC: 8.4 MG/DL — SIGNIFICANT CHANGE UP (ref 8.4–10.5)
CHLORIDE SERPL-SCNC: 105 MMOL/L — SIGNIFICANT CHANGE UP (ref 96–108)
CO2 SERPL-SCNC: 23 MMOL/L — SIGNIFICANT CHANGE UP (ref 22–31)
CREAT SERPL-MCNC: 0.82 MG/DL — SIGNIFICANT CHANGE UP (ref 0.5–1.3)
GAS PNL BLDA: SIGNIFICANT CHANGE UP
GAS PNL BLDA: SIGNIFICANT CHANGE UP
GLUCOSE SERPL-MCNC: 162 MG/DL — HIGH (ref 70–99)
HCT VFR BLD CALC: 24.4 % — LOW (ref 34.5–45)
HCT VFR BLD CALC: 27.4 % — LOW (ref 34.5–45)
HGB BLD-MCNC: 7.9 G/DL — LOW (ref 11.5–15.5)
HGB BLD-MCNC: 8.8 G/DL — LOW (ref 11.5–15.5)
MAGNESIUM SERPL-MCNC: 2 MG/DL — SIGNIFICANT CHANGE UP (ref 1.6–2.6)
MCHC RBC-ENTMCNC: 27.5 PG — SIGNIFICANT CHANGE UP (ref 27–34)
MCHC RBC-ENTMCNC: 27.9 PG — SIGNIFICANT CHANGE UP (ref 27–34)
MCHC RBC-ENTMCNC: 32.1 GM/DL — SIGNIFICANT CHANGE UP (ref 32–36)
MCHC RBC-ENTMCNC: 32.4 GM/DL — SIGNIFICANT CHANGE UP (ref 32–36)
MCV RBC AUTO: 85.6 FL — SIGNIFICANT CHANGE UP (ref 80–100)
MCV RBC AUTO: 86.2 FL — SIGNIFICANT CHANGE UP (ref 80–100)
NRBC # BLD: 0 /100 WBCS — SIGNIFICANT CHANGE UP (ref 0–0)
NRBC # BLD: 0 /100 WBCS — SIGNIFICANT CHANGE UP (ref 0–0)
PHOSPHATE SERPL-MCNC: 3.3 MG/DL — SIGNIFICANT CHANGE UP (ref 2.5–4.5)
PLATELET # BLD AUTO: 232 K/UL — SIGNIFICANT CHANGE UP (ref 150–400)
PLATELET # BLD AUTO: 272 K/UL — SIGNIFICANT CHANGE UP (ref 150–400)
POTASSIUM SERPL-MCNC: 3.5 MMOL/L — SIGNIFICANT CHANGE UP (ref 3.5–5.3)
POTASSIUM SERPL-SCNC: 3.5 MMOL/L — SIGNIFICANT CHANGE UP (ref 3.5–5.3)
RBC # BLD: 2.83 M/UL — LOW (ref 3.8–5.2)
RBC # BLD: 3.2 M/UL — LOW (ref 3.8–5.2)
RBC # FLD: 15.5 % — HIGH (ref 10.3–14.5)
RBC # FLD: 15.5 % — HIGH (ref 10.3–14.5)
SODIUM SERPL-SCNC: 141 MMOL/L — SIGNIFICANT CHANGE UP (ref 135–145)
WBC # BLD: 5.5 K/UL — SIGNIFICANT CHANGE UP (ref 3.8–10.5)
WBC # BLD: 6.46 K/UL — SIGNIFICANT CHANGE UP (ref 3.8–10.5)
WBC # FLD AUTO: 5.5 K/UL — SIGNIFICANT CHANGE UP (ref 3.8–10.5)
WBC # FLD AUTO: 6.46 K/UL — SIGNIFICANT CHANGE UP (ref 3.8–10.5)

## 2019-10-02 PROCEDURE — 71045 X-RAY EXAM CHEST 1 VIEW: CPT | Mod: 26

## 2019-10-02 PROCEDURE — 99291 CRITICAL CARE FIRST HOUR: CPT

## 2019-10-02 RX ORDER — PANTOPRAZOLE SODIUM 20 MG/1
40 TABLET, DELAYED RELEASE ORAL
Refills: 0 | Status: DISCONTINUED | OUTPATIENT
Start: 2019-10-02 | End: 2019-10-02

## 2019-10-02 RX ORDER — SODIUM CHLORIDE 9 MG/ML
1000 INJECTION, SOLUTION INTRAVENOUS
Refills: 0 | Status: DISCONTINUED | OUTPATIENT
Start: 2019-10-02 | End: 2019-10-02

## 2019-10-02 RX ORDER — PANTOPRAZOLE SODIUM 20 MG/1
40 TABLET, DELAYED RELEASE ORAL
Refills: 0 | Status: DISCONTINUED | OUTPATIENT
Start: 2019-10-02 | End: 2019-10-11

## 2019-10-02 RX ORDER — ACETAMINOPHEN 500 MG
650 TABLET ORAL EVERY 6 HOURS
Refills: 0 | Status: DISCONTINUED | OUTPATIENT
Start: 2019-10-02 | End: 2019-10-04

## 2019-10-02 RX ORDER — SODIUM CHLORIDE 9 MG/ML
1000 INJECTION INTRAMUSCULAR; INTRAVENOUS; SUBCUTANEOUS
Refills: 0 | Status: DISCONTINUED | OUTPATIENT
Start: 2019-10-02 | End: 2019-10-02

## 2019-10-02 RX ORDER — ONDANSETRON 8 MG/1
4 TABLET, FILM COATED ORAL ONCE
Refills: 0 | Status: COMPLETED | OUTPATIENT
Start: 2019-10-02 | End: 2019-10-02

## 2019-10-02 RX ORDER — OXYBUTYNIN CHLORIDE 5 MG
5 TABLET ORAL DAILY
Refills: 0 | Status: DISCONTINUED | OUTPATIENT
Start: 2019-10-02 | End: 2019-10-11

## 2019-10-02 RX ORDER — DOCUSATE SODIUM 100 MG
100 CAPSULE ORAL THREE TIMES A DAY
Refills: 0 | Status: DISCONTINUED | OUTPATIENT
Start: 2019-10-02 | End: 2019-10-11

## 2019-10-02 RX ORDER — BACLOFEN 100 %
20 POWDER (GRAM) MISCELLANEOUS
Refills: 0 | Status: DISCONTINUED | OUTPATIENT
Start: 2019-10-02 | End: 2019-10-11

## 2019-10-02 RX ORDER — DEXTROSE MONOHYDRATE, SODIUM CHLORIDE, AND POTASSIUM CHLORIDE 50; .745; 4.5 G/1000ML; G/1000ML; G/1000ML
1000 INJECTION, SOLUTION INTRAVENOUS
Refills: 0 | Status: DISCONTINUED | OUTPATIENT
Start: 2019-10-02 | End: 2019-10-03

## 2019-10-02 RX ORDER — FUROSEMIDE 40 MG
20 TABLET ORAL ONCE
Refills: 0 | Status: COMPLETED | OUTPATIENT
Start: 2019-10-02 | End: 2019-10-02

## 2019-10-02 RX ORDER — SENNA PLUS 8.6 MG/1
2 TABLET ORAL AT BEDTIME
Refills: 0 | Status: DISCONTINUED | OUTPATIENT
Start: 2019-10-02 | End: 2019-10-11

## 2019-10-02 RX ADMIN — Medication 20 MILLIGRAM(S): at 10:22

## 2019-10-02 RX ADMIN — CHLORHEXIDINE GLUCONATE 1 APPLICATION(S): 213 SOLUTION TOPICAL at 05:14

## 2019-10-02 RX ADMIN — SODIUM CHLORIDE 50 MILLILITER(S): 9 INJECTION, SOLUTION INTRAVENOUS at 05:15

## 2019-10-02 RX ADMIN — Medication 650 MILLIGRAM(S): at 12:36

## 2019-10-02 RX ADMIN — DEXTROSE MONOHYDRATE, SODIUM CHLORIDE, AND POTASSIUM CHLORIDE 75 MILLILITER(S): 50; .745; 4.5 INJECTION, SOLUTION INTRAVENOUS at 10:21

## 2019-10-02 RX ADMIN — SENNA PLUS 2 TABLET(S): 8.6 TABLET ORAL at 22:22

## 2019-10-02 RX ADMIN — ONDANSETRON 4 MILLIGRAM(S): 8 TABLET, FILM COATED ORAL at 20:40

## 2019-10-02 RX ADMIN — DEXTROSE MONOHYDRATE, SODIUM CHLORIDE, AND POTASSIUM CHLORIDE 50 MILLILITER(S): 50; .745; 4.5 INJECTION, SOLUTION INTRAVENOUS at 11:00

## 2019-10-02 RX ADMIN — SODIUM CHLORIDE 75 MILLILITER(S): 9 INJECTION INTRAMUSCULAR; INTRAVENOUS; SUBCUTANEOUS at 08:44

## 2019-10-02 RX ADMIN — Medication 100 MILLIGRAM(S): at 05:13

## 2019-10-02 RX ADMIN — PIPERACILLIN AND TAZOBACTAM 25 GRAM(S): 4; .5 INJECTION, POWDER, LYOPHILIZED, FOR SOLUTION INTRAVENOUS at 14:04

## 2019-10-02 RX ADMIN — ENOXAPARIN SODIUM 40 MILLIGRAM(S): 100 INJECTION SUBCUTANEOUS at 12:06

## 2019-10-02 RX ADMIN — SENNA PLUS 5 MILLILITER(S): 8.6 TABLET ORAL at 05:12

## 2019-10-02 RX ADMIN — CHLORHEXIDINE GLUCONATE 15 MILLILITER(S): 213 SOLUTION TOPICAL at 05:13

## 2019-10-02 RX ADMIN — Medication 100 MILLIGRAM(S): at 14:04

## 2019-10-02 RX ADMIN — PIPERACILLIN AND TAZOBACTAM 25 GRAM(S): 4; .5 INJECTION, POWDER, LYOPHILIZED, FOR SOLUTION INTRAVENOUS at 05:13

## 2019-10-02 RX ADMIN — PIPERACILLIN AND TAZOBACTAM 25 GRAM(S): 4; .5 INJECTION, POWDER, LYOPHILIZED, FOR SOLUTION INTRAVENOUS at 22:22

## 2019-10-02 RX ADMIN — DEXMEDETOMIDINE HYDROCHLORIDE IN 0.9% SODIUM CHLORIDE 2.88 MICROGRAM(S)/KG/HR: 4 INJECTION INTRAVENOUS at 08:45

## 2019-10-02 RX ADMIN — Medication 650 MILLIGRAM(S): at 12:06

## 2019-10-02 RX ADMIN — Medication 5 MILLIGRAM(S): at 18:24

## 2019-10-02 RX ADMIN — Medication 20 MILLIGRAM(S): at 22:22

## 2019-10-02 RX ADMIN — Medication 100 MILLIGRAM(S): at 22:22

## 2019-10-02 NOTE — PHYSICAL THERAPY INITIAL EVALUATION ADULT - ADDITIONAL COMMENTS
pt lives in private home, ramp to enter. Prior to admission, pt requires assist with all functional mobility, dependent with ADLs. Pt transfers to/ from motorized wheelchair with assist via sliding from one surface to the other.

## 2019-10-02 NOTE — PROGRESS NOTE ADULT - SUBJECTIVE AND OBJECTIVE BOX
Subjective:    Patient seen and evaluated by team this AM. Started tube feeds yesterday. Continues to be intubated after failing earlier trial to extubate, otherwise wound stable and with no issues this morning.     OBJECTIVE:     ** PHYSICAL EXAM **    General: Well developed, well nourished  Neuro: Arousable  Respiratory: Intubated  Extremities: RLE BKA dressing with some strikethrough, redressed and with minimal s/s oozing    Vital Signs Last 24 Hrs  T(C): 36.5 (02 Oct 2019 03:00), Max: 36.5 (02 Oct 2019 03:00)  T(F): 97.7 (02 Oct 2019 03:00), Max: 97.7 (02 Oct 2019 03:00)  HR: 66 (02 Oct 2019 08:00) (56 - 77)  BP: 117/58 (02 Oct 2019 08:00) (94/55 - 171/82)  BP(mean): 83 (02 Oct 2019 08:00) (70 - 127)  RR: 20 (02 Oct 2019 08:00) (12 - 30)  SpO2: 100% (02 Oct 2019 08:00) (94% - 100%)    I&O's Detail    01 Oct 2019 07:01  -  02 Oct 2019 07:00  --------------------------------------------------------  IN:    dexmedetomidine Infusion: 104.5 mL    dextrose 5% + sodium chloride 0.45%.: 1000 mL    lactated ringers.: 200 mL    ns in tub fed  maofea49: 360 mL    Solution: 325 mL  Total IN: 1989.5 mL    OUT:    Indwelling Catheter - Urethral: 570 mL  Total OUT: 570 mL    Total NET: 1419.5 mL      MEDICATIONS  (STANDING):  chlorhexidine 0.12% Liquid 15 milliLiter(s) Oral Mucosa every 12 hours  chlorhexidine 2% Cloths 1 Application(s) Topical <User Schedule>  dexmedetomidine Infusion 0.2 MICROgram(s)/kG/Hr (2.875 mL/Hr) IV Continuous <Continuous>  docusate sodium Liquid 100 milliGRAM(s) Enteral Tube every 8 hours  enoxaparin Injectable 40 milliGRAM(s) SubCutaneous daily  influenza   Vaccine 0.5 milliLiter(s) IntraMuscular once  pantoprazole  Injectable 40 milliGRAM(s) IV Push daily  piperacillin/tazobactam IVPB.. 3.375 Gram(s) IV Intermittent every 8 hours  senna Syrup 5 milliLiter(s) Oral at bedtime  sodium chloride 0.9%. 1000 milliLiter(s) (75 mL/Hr) IV Continuous <Continuous>    MEDICATIONS  (PRN):      LABS:                        7.9    5.50  )-----------( 232      ( 02 Oct 2019 04:31 )             24.4     10-02    141  |  105  |  14  ----------------------------<  162<H>  3.5   |  23  |  0.82    Ca    8.4      02 Oct 2019 04:31  Phos  3.3     10-02    TPro  6.1  /  Alb  2.5<L>  /  TBili  0.6  /  DBili  x   /  AST  49<H>  /  ALT  45  /  AlkPhos  231<H>  10-01    PT/INR - ( 30 Sep 2019 20:10 )   PT: 13.6 sec;   INR: 1.18 ratio         PTT - ( 01 Oct 2019 03:12 )  PTT:30.3 sec  LIVER FUNCTIONS - ( 01 Oct 2019 02:29 )  Alb: 2.5 g/dL / Pro: 6.1 g/dL / ALK PHOS: 231 U/L / ALT: 45 U/L / AST: 49 U/L / GGT: x

## 2019-10-02 NOTE — PHYSICAL THERAPY INITIAL EVALUATION ADULT - PERTINENT HX OF CURRENT PROBLEM, REHAB EVAL
80 yo F h/o MS, worsening R foot wound 1x month now coming in due to worsening mental status. Had been given keflex for cellulitis at beginning of month. Vascular surgeon recommended amputation, but pt refused.  Pt reports that she has no sensation in either LE, thus feels no pain. Pt. been having fevers at home with Tmax of 104F. S/p R guillotine BKA performed for septic foot and eschar removed on 10/1.

## 2019-10-02 NOTE — PROGRESS NOTE ADULT - ASSESSMENT
78 yo female patient w/ multiple gangrenous wounds of right lower extremity causing sepsis, now s/p HA claire and recovering in SICU    Plan:  - Continue to monitor wound, dressing changes daily unless saturated  - Plan for further surgery after wound stable with no evidence of residual infection  - Pt and family will need to weigh decision of BKA formalization or AKA  - Care per SICU  - IV Abx   - Extubation per SICU    #[id number] Vascular Surgery

## 2019-10-02 NOTE — PHYSICAL THERAPY INITIAL EVALUATION ADULT - BALANCE TRAINING, PT EVAL
GOAL: Pt will increase static/ dynamic sitting balance to Fair to improve safety with functional activities in 4 weeks.

## 2019-10-02 NOTE — PHYSICAL THERAPY INITIAL EVALUATION ADULT - PHYSICAL ASSIST/NONPHYSICAL ASSIST: SUPINE/SIT, REHAB EVAL
Patient out on unit, polite and cooperative. Medication compliant. Will continue to monitor. 2 person assist

## 2019-10-02 NOTE — PROGRESS NOTE ADULT - SUBJECTIVE AND OBJECTIVE BOX
HISTORY  79y Female PMHx of multiple sclerosis (progressive and not currently on any medication), HTN, neurogenic bladder (has had a Kendrick catheter for many years)  and peripheral vascular disease presents with right foot pain for one month. Patient with multiple gangrenous wounds of right lower extremity. Patient was set for right BKA on 09/30 when patient complained of SOB on the floor with a decreased PO2. CTPA performed to rule out PE which was negative for acute pulmonary embolism. Decision made to take patient to OR due to sepsis from wet gangrene of RLE. 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 Patient left intubated post-operatively due to hypoxia pre-operatively. SICU consulted for ventilator management post-operatively.         24 HOUR EVENTS:    SUBJECTIVE/ROS:  [ ] A ten-point review of systems was otherwise negative except as noted.  [ ] Due to altered mental status/intubation, subjective information were not able to be obtained from the patient. History was obtained, to the extent possible, from review of the chart and collateral sources of information.      NEURO  RASS:     GCS:     [facility] ICU:  Exam: awake, alert, oriented  Meds: dexmedetomidine Infusion 0.2 MICROgram(s)/kG/Hr IV Continuous <Continuous>    [x] Adequacy of sedation and pain control has been assessed and adjusted      RESPIRATORY  RR: 15 (10-02-19 @ 02:00) (12 - 30)  SpO2: 96% (10-02-19 @ 02:00) (94% - 100%)  Wt(kg): --  Exam: unlabored, clear to auscultation bilaterally  Mechanical Ventilation: Mode: AC/ CMV (Assist Control/ Continuous Mandatory Ventilation), RR (machine): 12, RR (patient): 12, TV (machine): 400, FiO2: 30, PEEP: 5, ITime: 0.9, MAP: 7, PIP: 12  ABG - ( 01 Oct 2019 10:16 )  pH: 7.40  /  pCO2: 41    /  pO2: 108   / HCO3: 25    / Base Excess: .6    /  SaO2: 98      Lactate: x                [N/A] Extubation Readiness Assessed  Meds:       CARDIOVASCULAR  HR: 62 (10-02-19 @ 02:00) (55 - 77)  BP: 124/59 (10-02-19 @ 02:00) (91/50 - 171/82)  BP(mean): 85 (10-02-19 @ 02:00) (65 - 127)  ABP: --  ABP(mean): --  Wt(kg): --  CVP(cm H2O): --      Exam: regular rate and rhythm  Cardiac Rhythm: sinus  Perfusion     [x]Adequate   [ ]Inadequate  Mentation   [x]Normal       [ ]Reduced  Extremities  [x]Warm         [ ]Cool  Volume Status [ ]Hypervolemic [x]Euvolemic [ ]Hypovolemic  Meds:       GI/NUTRITION  Exam: soft, nontender, nondistended, incision C/D/I  Diet:  Meds: docusate sodium Liquid 100 milliGRAM(s) Enteral Tube every 8 hours  pantoprazole  Injectable 40 milliGRAM(s) IV Push daily  senna Syrup 5 milliLiter(s) Oral at bedtime      GENITOURINARY  I&O's Detail    09-30 @ 07:01  -  10-01 @ 07:00  --------------------------------------------------------  IN:    fentaNYL Infusion.: 2.9 mL    lactated ringers.: 100 mL    propofol Infusion: 6.9 mL    Solution: 25 mL  Total IN: 134.8 mL    OUT:    Indwelling Catheter - Urethral: 900 mL  Total OUT: 900 mL    Total NET: -765.2 mL      10-01 @ 07:01  -  10-02 @ 03:10  --------------------------------------------------------  IN:    dexmedetomidine Infusion: 61.5 mL    dextrose 5% + sodium chloride 0.45%.: 720 mL    lactated ringers.: 200 mL    ns in tub fed  [id number]: 160 mL    Solution: 250 mL  Total IN: 1391.5 mL    OUT:    Indwelling Catheter - Urethral: 245 mL  Total OUT: 245 mL    Total NET: 1146.5 mL          10-01    138  |  106  |  11  ----------------------------<  142<H>  4.1   |  22  |  0.73    Ca    8.4      01 Oct 2019 02:29  Phos  2.6     09-30  Mg     2.0     09-30    TPro  6.1  /  Alb  2.5<L>  /  TBili  0.6  /  DBili  x   /  AST  49<H>  /  ALT  45  /  AlkPhos  231<H>  10-01    [ ] Kendrick catheter, indication: N/A  Meds: dextrose 5% + sodium chloride 0.45%. 1000 milliLiter(s) IV Continuous <Continuous>        HEMATOLOGIC  Meds: enoxaparin Injectable 40 milliGRAM(s) SubCutaneous daily    [x] VTE Prophylaxis                        9.6    6.4   )-----------( 259      ( 01 Oct 2019 02:22 )             30.9     PT/INR - ( 30 Sep 2019 20:10 )   PT: 13.6 sec;   INR: 1.18 ratio         PTT - ( 01 Oct 2019 03:12 )  PTT:30.3 sec  Transfusion     [ ] PRBC   [ ] Platelets   [ ] FFP   [ ] Cryoprecipitate      INFECTIOUS DISEASES    RECENT CULTURES:  Specimen Source: .Blood  Date/Time: 09-28 @ 22:11  Culture Results:   No growth to date.  Gram Stain: --  Organism: --  Specimen Source: .Urine  Date/Time: 09-28 @ 22:00  Culture Results:   <10,000 CFU/mL Normal Urogenital Alicia  Gram Stain: --  Organism: --    Meds: influenza   Vaccine 0.5 milliLiter(s) IntraMuscular once  piperacillin/tazobactam IVPB.. 3.375 Gram(s) IV Intermittent every 8 hours        ENDOCRINE  CAPILLARY BLOOD GLUCOSE        Meds:       ACCESS DEVICES:  [ ] Peripheral IV  [ ] Central Venous Line	[ ] R	[ ] L	[ ] IJ	[ ] Fem	[ ] SC	Placed:   [ ] Arterial Line		[ ] R	[ ] L	[ ] Fem	[ ] Rad	[ ] Ax	Placed:   [ ] PICC:					[ ] Mediport  [ ] Urinary Catheter, Date Placed:   [x] Necessity of urinary, arterial, and venous catheters discussed    OTHER MEDICATIONS:  chlorhexidine 0.12% Liquid 15 milliLiter(s) Oral Mucosa every 12 hours  chlorhexidine 2% Cloths 1 Application(s) Topical <User Schedule>      CODE STATUS:      IMAGING: HISTORY  79y Female PMHx of multiple sclerosis (progressive and not currently on any medication), HTN, neurogenic bladder (has had a Kendrick catheter for many years)  and peripheral vascular disease presents with right foot pain for one month. Patient with multiple gangrenous wounds of right lower extremity. Patient was set for right BKA on 09/30 when patient complained of SOB on the floor with a decreased PO2. CTPA performed to rule out PE which was negative for acute pulmonary embolism. Decision made to take patient to OR due to sepsis from wet gangrene of RLE. 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 Patient left intubated post-operatively due to hypoxia pre-operatively. SICU consulted for ventilator management post-operatively.         24 HOUR EVENTS:  - Agitated on SBT, so remained intubated  - No overnight events.     SUBJECTIVE/ROS:  [ ] A ten-point review of systems was otherwise negative except as noted.  [ ] Due to altered mental status/intubation, subjective information were not able to be obtained from the patient. History was obtained, to the extent possible, from review of the chart and collateral sources of information.      NEURO  Exam: awake, alert, oriented  Meds: dexmedetomidine Infusion 0.2 MICROgram(s)/kG/Hr IV Continuous <Continuous>    [x] Adequacy of sedation and pain control has been assessed and adjusted      RESPIRATORY  RR: 15 (10-02-19 @ 02:00) (12 - 30)  SpO2: 96% (10-02-19 @ 02:00) (94% - 100%)  Exam: unlabored, clear to auscultation bilaterally  Mechanical Ventilation: Mode: AC/ CMV (Assist Control/ Continuous Mandatory Ventilation), RR (machine): 12, RR (patient): 12, TV (machine): 400, FiO2: 30, PEEP: 5, ITime: 0.9, MAP: 7, PIP: 12  ABG - ( 01 Oct 2019 10:16 )  pH: 7.40  /  pCO2: 41    /  pO2: 108   / HCO3: 25    / Base Excess: .6    /  SaO2: 98      Lactate: x      [N/A] Extubation Readiness Assessed  Meds: none       CARDIOVASCULAR  HR: 62 (10-02-19 @ 02:00) (55 - 77)  BP: 124/59 (10-02-19 @ 02:00) (91/50 - 171/82)  BP(mean): 85 (10-02-19 @ 02:00) (65 - 127)  Exam: regular rate and rhythm  Cardiac Rhythm: sinus  Perfusion     [x]Adequate   [ ]Inadequate  Mentation   [x]Normal       [ ]Reduced  Extremities  [x]Warm         [ ]Cool  Volume Status [ ]Hypervolemic [x]Euvolemic [ ]Hypovolemic  Meds: none       GI/NUTRITION  Exam: soft, nontender, nondistended, incision C/D/I  Diet: NPO with jeivty @ 10ml/hr   Meds: docusate sodium Liquid 100 milliGRAM(s) Enteral Tube every 8 hours  pantoprazole  Injectable 40 milliGRAM(s) IV Push daily  senna Syrup 5 milliLiter(s) Oral at bedtime      GENITOURINARY  I&O's Detail    09-30 @ 07:01  -  10-01 @ 07:00  --------------------------------------------------------  IN:    fentaNYL Infusion.: 2.9 mL    lactated ringers.: 100 mL    propofol Infusion: 6.9 mL    Solution: 25 mL  Total IN: 134.8 mL    OUT:    Indwelling Catheter - Urethral: 900 mL  Total OUT: 900 mL    Total NET: -765.2 mL      10-01 @ 07:01  -  10-02 @ 03:10  --------------------------------------------------------  IN:    dexmedetomidine Infusion: 61.5 mL    dextrose 5% + sodium chloride 0.45%.: 720 mL    lactated ringers.: 200 mL    ns in tub fed  [id number]: 160 mL    Solution: 250 mL  Total IN: 1391.5 mL    OUT:    Indwelling Catheter - Urethral: 245 mL  Total OUT: 245 mL    Total NET: 1146.5 mL          10-01    138  |  106  |  11  ----------------------------<  142<H>  4.1   |  22  |  0.73    Ca    8.4      01 Oct 2019 02:29  Phos  2.6     09-30  Mg     2.0     09-30    TPro  6.1  /  Alb  2.5<L>  /  TBili  0.6  /  DBili  x   /  AST  49<H>  /  ALT  45  /  AlkPhos  231<H>  10-01    [x] Kendrick catheter, indication: urinary retention   Meds: dextrose 5% + sodium chloride 0.45%. 1000 milliLiter(s) IV Continuous <Continuous>        HEMATOLOGIC  Meds: enoxaparin Injectable 40 milliGRAM(s) SubCutaneous daily    [x] VTE Prophylaxis                        9.6    6.4   )-----------( 259      ( 01 Oct 2019 02:22 )             30.9     PT/INR - ( 30 Sep 2019 20:10 )   PT: 13.6 sec;   INR: 1.18 ratio         PTT - ( 01 Oct 2019 03:12 )  PTT:30.3 sec  Transfusion     [ ] PRBC   [ ] Platelets   [ ] FFP   [ ] Cryoprecipitate      INFECTIOUS DISEASES    RECENT CULTURES:  Specimen Source: .Blood  Date/Time: 09-28 @ 22:11  Culture Results:   No growth to date.  Gram Stain: --  Organism: --  Specimen Source: .Urine  Date/Time: 09-28 @ 22:00  Culture Results:   <10,000 CFU/mL Normal Urogenital Alicia  Gram Stain: --  Organism: --    Meds: influenza   Vaccine 0.5 milliLiter(s) IntraMuscular once  piperacillin/tazobactam IVPB.. 3.375 Gram(s) IV Intermittent every 8 hours        ENDOCRINE  CAPILLARY BLOOD GLUCOSE        Meds: none      ACCESS DEVICES:  [x] Peripheral IV  [ ] Central Venous Line	[ ] R	[ ] L	[ ] IJ	[ ] Fem	[ ] SC	Placed:   [ ] Arterial Line		[ ] R	[ ] L	[ ] Fem	[ ] Rad	[ ] Ax	Placed:   [ ] PICC:					[ ] Mediport  [ ] Urinary Catheter, Date Placed:   [x] Necessity of urinary, arterial, and venous catheters discussed    OTHER MEDICATIONS:  chlorhexidine 0.12% Liquid 15 milliLiter(s) Oral Mucosa every 12 hours  chlorhexidine 2% Cloths 1 Application(s) Topical <User Schedule>      CODE STATUS: full code       IMAGING: < from: CT Angio Chest w/ IV Cont (09.30.19 @ 20:58) >  FINDINGS:    No pulmonary embolism in the main, right pulmonary, left pulmonary, lobar   arteries.Limited evaluation of the segmental and subsegmental arteries   secondary to motion artifact. Mild sclerotic changes of the thoracic   aorta.    Small bilateral pleural effusions with associated passive atelectasis in   the bilateral lower lobes. Theairways are unremarkable.    The heart is enlarged. Coronary arterial calcifications. No pericardial   effusion.    Unchanged left adrenal nodule.    Mild degenerative changes of the spine.    IMPRESSION:     No pulmonary embolism in the main, right pulmonary, left pulmonary, or   lobar arteries. Limited evaluation of the segmental and subsegmental   arteries secondary to motion artifact.    Small bilateral pleural effusions with passive atelectasis in bilateral   lower lobes.      < end of copied text >

## 2019-10-02 NOTE — PROGRESS NOTE ADULT - ASSESSMENT
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 Patient remains intubated post-operatively due to hypoxia; likely pulmonary edema; motion artifact on pre-op CT scan. Negative for PE. SICU consulted for ventilatory management.     PLAN    Neuro: Sedation  - Precedex gtt for sedation    Respiratory: Hypoxia, resolving.   - Continue with PRVC for mechanical ventilation  - SBT trial this am    CV: No active issue  - No active issues     GI: No active issues  - NPO     Renal: No active issues  - LR @ 50ml/hr     Heme: No active issues  - Lovenox for VTE prophylaxis     ID: Emperic coverage  - Zosyn for emperic coverage     Endo: No concerns     Dispo: SICU full code    - Noe Henriquez PA-C

## 2019-10-03 LAB
ANION GAP SERPL CALC-SCNC: 13 MMOL/L — SIGNIFICANT CHANGE UP (ref 5–17)
ANION GAP SERPL CALC-SCNC: 9 MMOL/L — SIGNIFICANT CHANGE UP (ref 5–17)
BUN SERPL-MCNC: 10 MG/DL — SIGNIFICANT CHANGE UP (ref 7–23)
BUN SERPL-MCNC: 12 MG/DL — SIGNIFICANT CHANGE UP (ref 7–23)
CALCIUM SERPL-MCNC: 8.3 MG/DL — LOW (ref 8.4–10.5)
CALCIUM SERPL-MCNC: 8.4 MG/DL — SIGNIFICANT CHANGE UP (ref 8.4–10.5)
CHLORIDE SERPL-SCNC: 104 MMOL/L — SIGNIFICANT CHANGE UP (ref 96–108)
CHLORIDE SERPL-SCNC: 108 MMOL/L — SIGNIFICANT CHANGE UP (ref 96–108)
CO2 SERPL-SCNC: 24 MMOL/L — SIGNIFICANT CHANGE UP (ref 22–31)
CO2 SERPL-SCNC: 25 MMOL/L — SIGNIFICANT CHANGE UP (ref 22–31)
CREAT SERPL-MCNC: 0.92 MG/DL — SIGNIFICANT CHANGE UP (ref 0.5–1.3)
CREAT SERPL-MCNC: 0.94 MG/DL — SIGNIFICANT CHANGE UP (ref 0.5–1.3)
CULTURE RESULTS: SIGNIFICANT CHANGE UP
CULTURE RESULTS: SIGNIFICANT CHANGE UP
GLUCOSE SERPL-MCNC: 107 MG/DL — HIGH (ref 70–99)
GLUCOSE SERPL-MCNC: 112 MG/DL — HIGH (ref 70–99)
HCT VFR BLD CALC: 26.7 % — LOW (ref 34.5–45)
HGB BLD-MCNC: 8.6 G/DL — LOW (ref 11.5–15.5)
MAGNESIUM SERPL-MCNC: 1.8 MG/DL — SIGNIFICANT CHANGE UP (ref 1.6–2.6)
MCHC RBC-ENTMCNC: 27.7 PG — SIGNIFICANT CHANGE UP (ref 27–34)
MCHC RBC-ENTMCNC: 32.2 GM/DL — SIGNIFICANT CHANGE UP (ref 32–36)
MCV RBC AUTO: 85.9 FL — SIGNIFICANT CHANGE UP (ref 80–100)
NRBC # BLD: 0 /100 WBCS — SIGNIFICANT CHANGE UP (ref 0–0)
PHOSPHATE SERPL-MCNC: 2.5 MG/DL — SIGNIFICANT CHANGE UP (ref 2.5–4.5)
PLATELET # BLD AUTO: 282 K/UL — SIGNIFICANT CHANGE UP (ref 150–400)
POTASSIUM SERPL-MCNC: 2.9 MMOL/L — CRITICAL LOW (ref 3.5–5.3)
POTASSIUM SERPL-MCNC: 4.7 MMOL/L — SIGNIFICANT CHANGE UP (ref 3.5–5.3)
POTASSIUM SERPL-SCNC: 2.9 MMOL/L — CRITICAL LOW (ref 3.5–5.3)
POTASSIUM SERPL-SCNC: 4.7 MMOL/L — SIGNIFICANT CHANGE UP (ref 3.5–5.3)
RBC # BLD: 3.11 M/UL — LOW (ref 3.8–5.2)
RBC # FLD: 15.4 % — HIGH (ref 10.3–14.5)
SODIUM SERPL-SCNC: 141 MMOL/L — SIGNIFICANT CHANGE UP (ref 135–145)
SODIUM SERPL-SCNC: 142 MMOL/L — SIGNIFICANT CHANGE UP (ref 135–145)
SPECIMEN SOURCE: SIGNIFICANT CHANGE UP
SPECIMEN SOURCE: SIGNIFICANT CHANGE UP
WBC # BLD: 6.14 K/UL — SIGNIFICANT CHANGE UP (ref 3.8–10.5)
WBC # FLD AUTO: 6.14 K/UL — SIGNIFICANT CHANGE UP (ref 3.8–10.5)

## 2019-10-03 PROCEDURE — 71045 X-RAY EXAM CHEST 1 VIEW: CPT | Mod: 26

## 2019-10-03 PROCEDURE — 99232 SBSQ HOSP IP/OBS MODERATE 35: CPT

## 2019-10-03 RX ORDER — AMLODIPINE BESYLATE 2.5 MG/1
5 TABLET ORAL ONCE
Refills: 0 | Status: COMPLETED | OUTPATIENT
Start: 2019-10-03 | End: 2019-10-03

## 2019-10-03 RX ORDER — POTASSIUM PHOSPHATE, MONOBASIC POTASSIUM PHOSPHATE, DIBASIC 236; 224 MG/ML; MG/ML
15 INJECTION, SOLUTION INTRAVENOUS ONCE
Refills: 0 | Status: COMPLETED | OUTPATIENT
Start: 2019-10-03 | End: 2019-10-03

## 2019-10-03 RX ORDER — POTASSIUM CHLORIDE 20 MEQ
10 PACKET (EA) ORAL
Refills: 0 | Status: COMPLETED | OUTPATIENT
Start: 2019-10-03 | End: 2019-10-03

## 2019-10-03 RX ORDER — AMLODIPINE BESYLATE 2.5 MG/1
5 TABLET ORAL DAILY
Refills: 0 | Status: DISCONTINUED | OUTPATIENT
Start: 2019-10-04 | End: 2019-10-04

## 2019-10-03 RX ORDER — VANCOMYCIN HCL 1 G
VIAL (EA) INTRAVENOUS
Refills: 0 | Status: DISCONTINUED | OUTPATIENT
Start: 2019-10-03 | End: 2019-10-03

## 2019-10-03 RX ORDER — HYDRALAZINE HCL 50 MG
10 TABLET ORAL ONCE
Refills: 0 | Status: COMPLETED | OUTPATIENT
Start: 2019-10-03 | End: 2019-10-03

## 2019-10-03 RX ORDER — ATENOLOL 25 MG/1
50 TABLET ORAL DAILY
Refills: 0 | Status: DISCONTINUED | OUTPATIENT
Start: 2019-10-03 | End: 2019-10-05

## 2019-10-03 RX ORDER — CLONAZEPAM 1 MG
0.5 TABLET ORAL ONCE
Refills: 0 | Status: DISCONTINUED | OUTPATIENT
Start: 2019-10-03 | End: 2019-10-03

## 2019-10-03 RX ORDER — POTASSIUM CHLORIDE 20 MEQ
20 PACKET (EA) ORAL
Refills: 0 | Status: COMPLETED | OUTPATIENT
Start: 2019-10-03 | End: 2019-10-03

## 2019-10-03 RX ORDER — CLONAZEPAM 1 MG
0.5 TABLET ORAL AT BEDTIME
Refills: 0 | Status: DISCONTINUED | OUTPATIENT
Start: 2019-10-03 | End: 2019-10-04

## 2019-10-03 RX ORDER — MAGNESIUM SULFATE 500 MG/ML
2 VIAL (ML) INJECTION ONCE
Refills: 0 | Status: COMPLETED | OUTPATIENT
Start: 2019-10-03 | End: 2019-10-03

## 2019-10-03 RX ORDER — VANCOMYCIN HCL 1 G
750 VIAL (EA) INTRAVENOUS EVERY 12 HOURS
Refills: 0 | Status: DISCONTINUED | OUTPATIENT
Start: 2019-10-03 | End: 2019-10-08

## 2019-10-03 RX ADMIN — Medication 5 MILLIGRAM(S): at 12:20

## 2019-10-03 RX ADMIN — Medication 100 MILLIEQUIVALENT(S): at 11:06

## 2019-10-03 RX ADMIN — Medication 650 MILLIGRAM(S): at 11:00

## 2019-10-03 RX ADMIN — Medication 20 MILLIGRAM(S): at 13:38

## 2019-10-03 RX ADMIN — Medication 20 MILLIEQUIVALENT(S): at 11:06

## 2019-10-03 RX ADMIN — Medication 20 MILLIGRAM(S): at 21:52

## 2019-10-03 RX ADMIN — Medication 100 MILLIEQUIVALENT(S): at 07:39

## 2019-10-03 RX ADMIN — Medication 20 MILLIGRAM(S): at 18:15

## 2019-10-03 RX ADMIN — Medication 100 MILLIGRAM(S): at 21:52

## 2019-10-03 RX ADMIN — Medication 10 MILLIGRAM(S): at 08:22

## 2019-10-03 RX ADMIN — Medication 50 GRAM(S): at 10:00

## 2019-10-03 RX ADMIN — SENNA PLUS 2 TABLET(S): 8.6 TABLET ORAL at 21:52

## 2019-10-03 RX ADMIN — Medication 20 MILLIEQUIVALENT(S): at 09:57

## 2019-10-03 RX ADMIN — Medication 650 MILLIGRAM(S): at 10:00

## 2019-10-03 RX ADMIN — CHLORHEXIDINE GLUCONATE 1 APPLICATION(S): 213 SOLUTION TOPICAL at 05:43

## 2019-10-03 RX ADMIN — ATENOLOL 50 MILLIGRAM(S): 25 TABLET ORAL at 05:43

## 2019-10-03 RX ADMIN — PIPERACILLIN AND TAZOBACTAM 25 GRAM(S): 4; .5 INJECTION, POWDER, LYOPHILIZED, FOR SOLUTION INTRAVENOUS at 13:38

## 2019-10-03 RX ADMIN — Medication 0.5 MILLIGRAM(S): at 21:45

## 2019-10-03 RX ADMIN — Medication 0.5 MILLIGRAM(S): at 02:56

## 2019-10-03 RX ADMIN — ENOXAPARIN SODIUM 40 MILLIGRAM(S): 100 INJECTION SUBCUTANEOUS at 12:20

## 2019-10-03 RX ADMIN — Medication 100 MILLIGRAM(S): at 13:38

## 2019-10-03 RX ADMIN — PIPERACILLIN AND TAZOBACTAM 25 GRAM(S): 4; .5 INJECTION, POWDER, LYOPHILIZED, FOR SOLUTION INTRAVENOUS at 21:51

## 2019-10-03 RX ADMIN — Medication 250 MILLIGRAM(S): at 18:15

## 2019-10-03 RX ADMIN — Medication 100 MILLIGRAM(S): at 05:43

## 2019-10-03 RX ADMIN — PANTOPRAZOLE SODIUM 40 MILLIGRAM(S): 20 TABLET, DELAYED RELEASE ORAL at 07:39

## 2019-10-03 RX ADMIN — Medication 100 MILLIEQUIVALENT(S): at 09:57

## 2019-10-03 RX ADMIN — POTASSIUM PHOSPHATE, MONOBASIC POTASSIUM PHOSPHATE, DIBASIC 62.5 MILLIMOLE(S): 236; 224 INJECTION, SOLUTION INTRAVENOUS at 12:20

## 2019-10-03 RX ADMIN — Medication 20 MILLIGRAM(S): at 08:17

## 2019-10-03 RX ADMIN — Medication 20 MILLIEQUIVALENT(S): at 08:18

## 2019-10-03 RX ADMIN — PIPERACILLIN AND TAZOBACTAM 25 GRAM(S): 4; .5 INJECTION, POWDER, LYOPHILIZED, FOR SOLUTION INTRAVENOUS at 05:43

## 2019-10-03 RX ADMIN — AMLODIPINE BESYLATE 5 MILLIGRAM(S): 2.5 TABLET ORAL at 21:51

## 2019-10-03 NOTE — PROGRESS NOTE ADULT - SUBJECTIVE AND OBJECTIVE BOX
SICU DAILY PROGRESS NOTE    79y Female PMHx of multiple sclerosis (progressive and not currently on any medication), HTN, neurogenic bladder (has had a Kendrick catheter for many years)  and peripheral vascular disease presents with right foot pain for one month. Patient with multiple gangrenous wounds of right lower extremity. Patient was set for right BKA on 09/30 when patient complained of SOB on the floor with a decreased PO2. CTPA performed to rule out PE which was negative for acute pulmonary embolism. Decision made to take patient to OR due to sepsis from wet gangrene of RLE. 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 Patient left intubated post-operatively due to hypoxia pre-operatively. SICU consulted for ventilator management post-operatively.     24 HOUR EVENTS:  - Extubated  - NGT removed  - Started on Regular diet  - Given lasix 20 mg IV  - Home wheelchair at bedside  - PT ordered  - Restarted on home meds    SUBJECTIVE/ROS:  [X] A ten-point review of systems was otherwise negative except as noted.  [ ] Due to altered mental status/intubation, subjective information were not able to be obtained from the patient. History was obtained, to the extent possible, from review of the chart and collateral sources of information.    NEURO  Exam: awake, alert, oriented   Meds: acetaminophen   Tablet .. 650 milliGRAM(s) Oral every 6 hours PRN Mild Pain (1 - 3), Moderate Pain (4 - 6)  baclofen 20 milliGRAM(s) Oral <User Schedule>  clonazePAM  Tablet 0.5 milliGRAM(s) Oral at bedtime PRN Anxiety    [x] Adequacy of sedation and pain control has been assessed and adjusted    RESPIRATORY  RR: 32 (10-03-19 @ 03:00) (14 - 34)  SpO2: 96% (10-03-19 @ 03:00) (95% - 100%)  Exam: unlabored, clear to auscultation bilaterally  pH: 7.40  /  pCO2: 42    /  pO2: 49    / HCO3: 25    / Base Excess: .7    /  SaO2: 82      Lactate: x        CARDIOVASCULAR  HR: 101 (10-03-19 @ 03:00) (65 - 122)  BP: 177/86 (10-03-19 @ 03:00) (99/53 - 177/86)  BP(mean): 124 (10-03-19 @ 03:00) (74 - 125)    Exam: regular rate and rhythm  Cardiac Rhythm: sinus  Perfusion     [x]Adequate   [ ]Inadequate  Mentation   [x]Normal       [ ]Reduced  Extremities  [x]Warm         [ ]Cool  Volume Status [ ]Hypervolemic [x]Euvolemic [ ]Hypovolemic  Meds: ATENolol  Tablet 50 milliGRAM(s) Oral daily    GI/NUTRITION  Exam: soft, nontender, nondistended   Diet: Regular diet  Meds: docusate sodium 100 milliGRAM(s) Oral three times a day  pantoprazole    Tablet 40 milliGRAM(s) Oral before breakfast  senna 2 Tablet(s) Oral at bedtime    GENITOURINARY  I&O's Detail    10-01 @ 07:01  -  10-02 @ 07:00  --------------------------------------------------------  IN:    dexmedetomidine Infusion: 104.5 mL    dextrose 5% + sodium chloride 0.45%.: 1000 mL    lactated ringers.: 200 mL    ns in tub fed  [id number]: 360 mL    Solution: 325 mL  Total IN: 1989.5 mL    OUT:    Indwelling Catheter - Urethral: 570 mL  Total OUT: 570 mL    Total NET: 1419.5 mL      10-02 @ 07:01  -  10-03 @ 04:55  --------------------------------------------------------  IN:    dexmedetomidine Infusion: 25.8 mL    dextrose 5% + sodium chloride 0.45% with potassium chloride 20 mEq/L: 900 mL    ns in tub fed  [id number]: 80 mL    Oral Fluid: 200 mL    sodium chloride 0.9%: 225 mL    Solution: 200 mL  Total IN: 1630.8 mL    OUT:    Indwelling Catheter - Urethral: 2975 mL  Total OUT: 2975 mL    Total NET: -1344.2 mL      10-02    141  |  105  |  14  ----------------------------<  162<H>  3.5   |  23  |  0.82    Ca    8.4      02 Oct 2019 04:31  Phos  3.3     10-02  Mg     2.0     10-02    Meds: dextrose 5% + sodium chloride 0.45% with potassium chloride 20 mEq/L 1000 milliLiter(s) IV Continuous <Continuous>  oxybutynin 5 milliGRAM(s) Oral daily    HEMATOLOGIC  Meds: enoxaparin Injectable 40 milliGRAM(s) SubCutaneous daily    [x] VTE Prophylaxis                        8.8    6.46  )-----------( 272      ( 02 Oct 2019 16:22 )             27.4     INFECTIOUS DISEASES  WBC Count: 6.46 K/uL (10-02 @ 16:22)    RECENT CULTURES:  Specimen Source: .Blood  Date/Time: 09-28 @ 22:11  Culture Results:   No growth to date.  Specimen Source: .Urine  Date/Time: 09-28 @ 22:00  Culture Results:   <10,000 CFU/mL Normal Urogenital Alicia    Meds: influenza   Vaccine 0.5 milliLiter(s) IntraMuscular once  piperacillin/tazobactam IVPB.. 3.375 Gram(s) IV Intermittent every 8 hours    ENDOCRINE  CAPILLARY BLOOD GLUCOSE  Meds:       ACCESS DEVICES:  [X] Peripheral IV  [ ] Central Venous Line	[ ] R	[ ] L	[ ] IJ	[ ] Fem	[ ] SC	Placed:   [ ] Arterial Line		[ ] R	[ ] L	[ ] Fem	[ ] Rad	[ ] Ax	Placed:   [ ] PICC:					[ ] Mediport  [ ] Urinary Catheter, Date Placed:   [x] Necessity of urinary, arterial, and venous catheters discussed    OTHER MEDICATIONS:  chlorhexidine 2% Cloths 1 Application(s) Topical <User Schedule> SICU DAILY PROGRESS NOTE    79y Female PMHx of multiple sclerosis (progressive and not currently on any medication), HTN, neurogenic bladder (has had a Kendrick catheter for many years)  and peripheral vascular disease presents with right foot pain for one month. Patient with multiple gangrenous wounds of right lower extremity. Patient was set for right BKA on 09/30 when patient complained of SOB on the floor with a decreased PO2. CTPA performed to rule out PE which was negative for acute pulmonary embolism. Decision made to take patient to OR due to sepsis from wet gangrene of RLE. 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 Patient left intubated post-operatively due to hypoxia pre-operatively. SICU consulted for ventilator management post-operatively.     24 HOUR EVENTS:  - Extubated  - NGT removed  - Started on Regular diet  - Given lasix 20 mg IV  - Home wheelchair at bedside  - PT ordered  - Restarted on home meds    SUBJECTIVE/ROS:  [X] A ten-point review of systems was otherwise negative except as noted.  [ ] Due to altered mental status/intubation, subjective information were not able to be obtained from the patient. History was obtained, to the extent possible, from review of the chart and collateral sources of information.    NEURO  Exam: awake, alert, oriented   Meds: acetaminophen   Tablet .. 650 milliGRAM(s) Oral every 6 hours PRN Mild Pain (1 - 3), Moderate Pain (4 - 6)  baclofen 20 milliGRAM(s) Oral <User Schedule>  clonazePAM  Tablet 0.5 milliGRAM(s) Oral at bedtime PRN Anxiety    [x] Adequacy of sedation and pain control has been assessed and adjusted    RESPIRATORY  RR: 32 (10-03-19 @ 03:00) (14 - 34)  SpO2: 96% (10-03-19 @ 03:00) (95% - 100%)  Exam: unlabored, clear to auscultation bilaterally  pH: 7.40  /  pCO2: 42    /  pO2: 49    / HCO3: 25    / Base Excess: .7    /  SaO2: 82      Lactate: x        CARDIOVASCULAR  HR: 101 (10-03-19 @ 03:00) (65 - 122)  BP: 177/86 (10-03-19 @ 03:00) (99/53 - 177/86)  BP(mean): 124 (10-03-19 @ 03:00) (74 - 125)    Exam: regular rate and rhythm  Cardiac Rhythm: sinus  Perfusion     [x]Adequate   [ ]Inadequate  Mentation   [x]Normal       [ ]Reduced  Extremities  [x]Warm         [ ]Cool  Volume Status [ ]Hypervolemic [x]Euvolemic [ ]Hypovolemic  Meds: ATENolol  Tablet 50 milliGRAM(s) Oral daily    GI/NUTRITION  Exam: soft, nontender, nondistended   Diet: Regular diet  Meds: docusate sodium 100 milliGRAM(s) Oral three times a day  pantoprazole    Tablet 40 milliGRAM(s) Oral before breakfast  senna 2 Tablet(s) Oral at bedtime    GENITOURINARY  I&O's Detail    10-01 @ 07:01  -  10-02 @ 07:00  --------------------------------------------------------  IN:    dexmedetomidine Infusion: 104.5 mL    dextrose 5% + sodium chloride 0.45%.: 1000 mL    lactated ringers.: 200 mL    ns in tub fed  [id number]: 360 mL    Solution: 325 mL  Total IN: 1989.5 mL    OUT:    Indwelling Catheter - Urethral: 570 mL  Total OUT: 570 mL    Total NET: 1419.5 mL      10-02 @ 07:01  -  10-03 @ 04:55  --------------------------------------------------------  IN:    dexmedetomidine Infusion: 25.8 mL    dextrose 5% + sodium chloride 0.45% with potassium chloride 20 mEq/L: 900 mL    ns in tub fed  [id number]: 80 mL    Oral Fluid: 200 mL    sodium chloride 0.9%: 225 mL    Solution: 200 mL  Total IN: 1630.8 mL    OUT:    Indwelling Catheter - Urethral: 2975 mL  Total OUT: 2975 mL    Total NET: -1344.2 mL      10-02    141  |  105  |  14  ----------------------------<  162<H>  3.5   |  23  |  0.82    Ca    8.4      02 Oct 2019 04:31  Phos  3.3     10-02  Mg     2.0     10-02    Meds: dextrose 5% + sodium chloride 0.45% with potassium chloride 20 mEq/L 1000 milliLiter(s) IV Continuous <Continuous>  oxybutynin 5 milliGRAM(s) Oral daily    HEMATOLOGIC  Meds: enoxaparin Injectable 40 milliGRAM(s) SubCutaneous daily    [x] VTE Prophylaxis                        8.8    6.46  )-----------( 272      ( 02 Oct 2019 16:22 )             27.4     INFECTIOUS DISEASES  WBC Count: 6.46 K/uL (10-02 @ 16:22)    RECENT CULTURES:  Specimen Source: .Blood  Date/Time: 09-28 @ 22:11  Culture Results:   No growth to date.  Specimen Source: .Urine  Date/Time: 09-28 @ 22:00  Culture Results:   <10,000 CFU/mL Normal Urogenital Alicia    Meds: influenza   Vaccine 0.5 milliLiter(s) IntraMuscular once  piperacillin/tazobactam IVPB.. 3.375 Gram(s) IV Intermittent every 8 hours    ENDOCRINE  CAPILLARY BLOOD GLUCOSE  Meds:       ACCESS DEVICES:  [X] Peripheral IV  [ ] Central Venous Line	[ ] R	[ ] L	[ ] IJ	[ ] Fem	[ ] SC	Placed:   [ ] Arterial Line		[ ] R	[ ] L	[ ] Fem	[ ] Rad	[ ] Ax	Placed:   [ ] PICC:					[ ] Mediport  [ ] Urinary Catheter, Date Placed:   [x] Necessity of urinary, arterial, and venous catheters discussed    OTHER MEDICATIONS:  chlorhexidine 2% Cloths 1 Application(s) Topical <User Schedule>

## 2019-10-03 NOTE — PROGRESS NOTE ADULT - ASSESSMENT
78 yo female patient w/ multiple gangrenous wounds of right lower extremity causing sepsis, now s/p HA claire and recovering in SICU    Plan:   - OR Monday for completion BKA  - Rec: Add zosyn  - Potential transfer to floor if continues to remain stable 80 yo female patient w/ multiple gangrenous wounds of right lower extremity causing sepsis, now s/p RLE alethea and recovering in SICU    Plan:   - OR Monday for completion  rle amp d/w pt indications risks and benefits of rt aka   - Rec: Add zosyn  - Potential transfer to floor if continues to remain stable

## 2019-10-03 NOTE — PROGRESS NOTE ADULT - ASSESSMENT
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 Patient remains intubated post-operatively due to hypoxia; likely pulmonary edema; motion artifact on pre-op CT scan. Negative for PE. SICU consulted for ventilatory management.     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 Patient remained intubated post-operatively due to hypoxia; likely pulmonary edema; motion artifact on pre-op CT scan. Negative for PE. SICU consulted for ventilatory management. Has since been extubated and is now tolerating a regular diet and pain is well controlled    PLAN    Neuro: Hx of MS, postoperative pain  - Restarted baclofen and klonopin  - Pain is well controlled on current regimen    Respiratory: extubated  - Saturating well on RA    CV: No active issue  - No active issues     GI: Nausea (new onset vs baseline nausea)  - Regular diet  - Zofran prn    Renal: No active issues  - IV locked    Heme: No active issues  - Lovenox for VTE prophylaxis     ID: Emperic coverage  - Zosyn for emperic coverage to continue until completion BKA performed    Endo: No concerns     Dispo: SICU full code 79 year old female w/p right guillotine BKA on 10/1 for sepsis due to wet gangrene of lower extremity. Patient remained intubated post-operatively due to hypoxia; likely pulmonary edema; motion artifact on pre-op CT scan. Negative for PE. SICU consulted for ventilatory management. Has since been extubated and is now tolerating a regular diet and pain is well controlled    PLAN    Neuro: Hx of MS, postoperative pain  - Restarted baclofen and klonopin  - Pain is well controlled on current regimen    Respiratory: extubated  - Saturating well on RA    CV: No active issue  - No active issues     GI: Nausea (new onset vs baseline nausea)  - Regular diet  - Zofran prn    Renal: No active issues  - IV locked    Heme: No active issues  - Lovenox for VTE prophylaxis     ID: Emperic coverage  - Zosyn for emperic coverage to continue until completion BKA performed    Endo: No concerns     Dispo: SICU full code

## 2019-10-03 NOTE — PROGRESS NOTE ADULT - SUBJECTIVE AND OBJECTIVE BOX
Subjective:  No acute vascular surgery concerns overnight.  Patient states her pain is controlled.    OBJECTIVE:     ** PHYSICAL EXAM **    General: Well developed, well nourished  Neuro: Arousable  Respiratory: extubated  Extremities: RLE BKA; dressing c/d/i    ** VITAL SIGNS / I&O's **    Vital Signs Last 24 Hrs  T(C): 37 (03 Oct 2019 11:00), Max: 37 (03 Oct 2019 11:00)  T(F): 98.6 (03 Oct 2019 11:00), Max: 98.6 (03 Oct 2019 11:00)  HR: 86 (03 Oct 2019 11:00) (74 - 122)  BP: 149/70 (03 Oct 2019 11:00) (145/67 - 194/81)  BP(mean): 100 (03 Oct 2019 11:00) (96 - 139)  RR: 28 (03 Oct 2019 11:00) (26 - 34)  SpO2: 97% (03 Oct 2019 11:00) (95% - 100%)      02 Oct 2019 07:01  -  03 Oct 2019 07:00  --------------------------------------------------------  IN:    dexmedetomidine Infusion: 25.8 mL    dextrose 5% + sodium chloride 0.45% with potassium chloride 20 mEq/L: 1000 mL    ns in tub fed  [id number]: 80 mL    Oral Fluid: 200 mL    sodium chloride 0.9%: 225 mL    Solution: 275 mL  Total IN: 1805.8 mL    OUT:    Indwelling Catheter - Urethral: 2975 mL  Total OUT: 2975 mL    Total NET: -1169.2 mL      03 Oct 2019 07:01  -  03 Oct 2019 12:02  --------------------------------------------------------  IN:    dextrose 5% + sodium chloride 0.45% with potassium chloride 20 mEq/L: 50 mL    Solution: 25 mL    Solution: 350 mL  Total IN: 425 mL    OUT:    Indwelling Catheter - Urethral: 700 mL  Total OUT: 700 mL    Total NET: -275 mL            ** LABS **                          8.6    6.14  )-----------( 282      ( 03 Oct 2019 05:24 )             26.7     03 Oct 2019 05:24    141    |  104    |  12     ----------------------------<  112    2.9     |  24     |  0.94     Ca    8.4        03 Oct 2019 05:24  Phos  2.5       03 Oct 2019 05:24  Mg     1.8       03 Oct 2019 05:24      MEDICATIONS  (STANDING):  ATENolol  Tablet 50 milliGRAM(s) Oral daily  baclofen 20 milliGRAM(s) Oral <User Schedule>  chlorhexidine 2% Cloths 1 Application(s) Topical <User Schedule>  dextrose 5% + sodium chloride 0.45% with potassium chloride 20 mEq/L 1000 milliLiter(s) (50 mL/Hr) IV Continuous <Continuous>  docusate sodium 100 milliGRAM(s) Oral three times a day  enoxaparin Injectable 40 milliGRAM(s) SubCutaneous daily  influenza   Vaccine 0.5 milliLiter(s) IntraMuscular once  oxybutynin 5 milliGRAM(s) Oral daily  pantoprazole    Tablet 40 milliGRAM(s) Oral before breakfast  piperacillin/tazobactam IVPB.. 3.375 Gram(s) IV Intermittent every 8 hours  potassium phosphate IVPB 15 milliMole(s) IV Intermittent once  senna 2 Tablet(s) Oral at bedtime  vancomycin  IVPB 750 milliGRAM(s) IV Intermittent every 12 hours    MEDICATIONS  (PRN):  acetaminophen   Tablet .. 650 milliGRAM(s) Oral every 6 hours PRN Mild Pain (1 - 3), Moderate Pain (4 - 6)  clonazePAM  Tablet 0.5 milliGRAM(s) Oral at bedtime PRN Anxiety Subjective:  No acute vascular surgery concerns overnight.  Patient states her pain is controlled.  s/p rle guillotine amp     OBJECTIVE:     ** PHYSICAL EXAM **    General: Well developed, well nourished  Neuro: Arousable  Respiratory: extubated  Extremities: RLE BKA; dressing c/d/i    ** VITAL SIGNS / I&O's **    Vital Signs Last 24 Hrs  T(C): 37 (03 Oct 2019 11:00), Max: 37 (03 Oct 2019 11:00)  T(F): 98.6 (03 Oct 2019 11:00), Max: 98.6 (03 Oct 2019 11:00)  HR: 86 (03 Oct 2019 11:00) (74 - 122)  BP: 149/70 (03 Oct 2019 11:00) (145/67 - 194/81)  BP(mean): 100 (03 Oct 2019 11:00) (96 - 139)  RR: 28 (03 Oct 2019 11:00) (26 - 34)  SpO2: 97% (03 Oct 2019 11:00) (95% - 100%)      02 Oct 2019 07:01  -  03 Oct 2019 07:00  --------------------------------------------------------  IN:    dexmedetomidine Infusion: 25.8 mL    dextrose 5% + sodium chloride 0.45% with potassium chloride 20 mEq/L: 1000 mL    ns in tub fed  [id number]: 80 mL    Oral Fluid: 200 mL    sodium chloride 0.9%: 225 mL    Solution: 275 mL  Total IN: 1805.8 mL    OUT:    Indwelling Catheter - Urethral: 2975 mL  Total OUT: 2975 mL    Total NET: -1169.2 mL      03 Oct 2019 07:01  -  03 Oct 2019 12:02  --------------------------------------------------------  IN:    dextrose 5% + sodium chloride 0.45% with potassium chloride 20 mEq/L: 50 mL    Solution: 25 mL    Solution: 350 mL  Total IN: 425 mL    OUT:    Indwelling Catheter - Urethral: 700 mL  Total OUT: 700 mL    Total NET: -275 mL            ** LABS **                          8.6    6.14  )-----------( 282      ( 03 Oct 2019 05:24 )             26.7     03 Oct 2019 05:24    141    |  104    |  12     ----------------------------<  112    2.9     |  24     |  0.94     Ca    8.4        03 Oct 2019 05:24  Phos  2.5       03 Oct 2019 05:24  Mg     1.8       03 Oct 2019 05:24      MEDICATIONS  (STANDING):  ATENolol  Tablet 50 milliGRAM(s) Oral daily  baclofen 20 milliGRAM(s) Oral <User Schedule>  chlorhexidine 2% Cloths 1 Application(s) Topical <User Schedule>  dextrose 5% + sodium chloride 0.45% with potassium chloride 20 mEq/L 1000 milliLiter(s) (50 mL/Hr) IV Continuous <Continuous>  docusate sodium 100 milliGRAM(s) Oral three times a day  enoxaparin Injectable 40 milliGRAM(s) SubCutaneous daily  influenza   Vaccine 0.5 milliLiter(s) IntraMuscular once  oxybutynin 5 milliGRAM(s) Oral daily  pantoprazole    Tablet 40 milliGRAM(s) Oral before breakfast  piperacillin/tazobactam IVPB.. 3.375 Gram(s) IV Intermittent every 8 hours  potassium phosphate IVPB 15 milliMole(s) IV Intermittent once  senna 2 Tablet(s) Oral at bedtime  vancomycin  IVPB 750 milliGRAM(s) IV Intermittent every 12 hours    MEDICATIONS  (PRN):  acetaminophen   Tablet .. 650 milliGRAM(s) Oral every 6 hours PRN Mild Pain (1 - 3), Moderate Pain (4 - 6)  clonazePAM  Tablet 0.5 milliGRAM(s) Oral at bedtime PRN Anxiety Subjective:  No acute vascular surgery concerns overnight.  Patient states her pain is controlled.  s/p rle guillotine amp     OBJECTIVE:     ** PHYSICAL EXAM **    General: Well developed, well nourished  Neuro: Arousable  Respiratory: extubated  Extremities: RLE tierneylotine amp ; dressing c/d/i    ** VITAL SIGNS / I&O's **    Vital Signs Last 24 Hrs  T(C): 37 (03 Oct 2019 11:00), Max: 37 (03 Oct 2019 11:00)  T(F): 98.6 (03 Oct 2019 11:00), Max: 98.6 (03 Oct 2019 11:00)  HR: 86 (03 Oct 2019 11:00) (74 - 122)  BP: 149/70 (03 Oct 2019 11:00) (145/67 - 194/81)  BP(mean): 100 (03 Oct 2019 11:00) (96 - 139)  RR: 28 (03 Oct 2019 11:00) (26 - 34)  SpO2: 97% (03 Oct 2019 11:00) (95% - 100%)      02 Oct 2019 07:01  -  03 Oct 2019 07:00  --------------------------------------------------------  IN:    dexmedetomidine Infusion: 25.8 mL    dextrose 5% + sodium chloride 0.45% with potassium chloride 20 mEq/L: 1000 mL    ns in tub fed  [id number]: 80 mL    Oral Fluid: 200 mL    sodium chloride 0.9%: 225 mL    Solution: 275 mL  Total IN: 1805.8 mL    OUT:    Indwelling Catheter - Urethral: 2975 mL  Total OUT: 2975 mL    Total NET: -1169.2 mL      03 Oct 2019 07:01  -  03 Oct 2019 12:02  --------------------------------------------------------  IN:    dextrose 5% + sodium chloride 0.45% with potassium chloride 20 mEq/L: 50 mL    Solution: 25 mL    Solution: 350 mL  Total IN: 425 mL    OUT:    Indwelling Catheter - Urethral: 700 mL  Total OUT: 700 mL    Total NET: -275 mL            ** LABS **                          8.6    6.14  )-----------( 282      ( 03 Oct 2019 05:24 )             26.7     03 Oct 2019 05:24    141    |  104    |  12     ----------------------------<  112    2.9     |  24     |  0.94     Ca    8.4        03 Oct 2019 05:24  Phos  2.5       03 Oct 2019 05:24  Mg     1.8       03 Oct 2019 05:24      MEDICATIONS  (STANDING):  ATENolol  Tablet 50 milliGRAM(s) Oral daily  baclofen 20 milliGRAM(s) Oral <User Schedule>  chlorhexidine 2% Cloths 1 Application(s) Topical <User Schedule>  dextrose 5% + sodium chloride 0.45% with potassium chloride 20 mEq/L 1000 milliLiter(s) (50 mL/Hr) IV Continuous <Continuous>  docusate sodium 100 milliGRAM(s) Oral three times a day  enoxaparin Injectable 40 milliGRAM(s) SubCutaneous daily  influenza   Vaccine 0.5 milliLiter(s) IntraMuscular once  oxybutynin 5 milliGRAM(s) Oral daily  pantoprazole    Tablet 40 milliGRAM(s) Oral before breakfast  piperacillin/tazobactam IVPB.. 3.375 Gram(s) IV Intermittent every 8 hours  potassium phosphate IVPB 15 milliMole(s) IV Intermittent once  senna 2 Tablet(s) Oral at bedtime  vancomycin  IVPB 750 milliGRAM(s) IV Intermittent every 12 hours    MEDICATIONS  (PRN):  acetaminophen   Tablet .. 650 milliGRAM(s) Oral every 6 hours PRN Mild Pain (1 - 3), Moderate Pain (4 - 6)  clonazePAM  Tablet 0.5 milliGRAM(s) Oral at bedtime PRN Anxiety

## 2019-10-04 LAB
ANION GAP SERPL CALC-SCNC: 9 MMOL/L — SIGNIFICANT CHANGE UP (ref 5–17)
BUN SERPL-MCNC: 12 MG/DL — SIGNIFICANT CHANGE UP (ref 7–23)
CALCIUM SERPL-MCNC: 8.6 MG/DL — SIGNIFICANT CHANGE UP (ref 8.4–10.5)
CHLORIDE SERPL-SCNC: 105 MMOL/L — SIGNIFICANT CHANGE UP (ref 96–108)
CO2 SERPL-SCNC: 27 MMOL/L — SIGNIFICANT CHANGE UP (ref 22–31)
CREAT SERPL-MCNC: 1.13 MG/DL — SIGNIFICANT CHANGE UP (ref 0.5–1.3)
GLUCOSE SERPL-MCNC: 120 MG/DL — HIGH (ref 70–99)
HCT VFR BLD CALC: 28.4 % — LOW (ref 34.5–45)
HGB BLD-MCNC: 9 G/DL — LOW (ref 11.5–15.5)
MAGNESIUM SERPL-MCNC: 2.4 MG/DL — SIGNIFICANT CHANGE UP (ref 1.6–2.6)
MCHC RBC-ENTMCNC: 27.5 PG — SIGNIFICANT CHANGE UP (ref 27–34)
MCHC RBC-ENTMCNC: 31.7 GM/DL — LOW (ref 32–36)
MCV RBC AUTO: 86.9 FL — SIGNIFICANT CHANGE UP (ref 80–100)
NRBC # BLD: 0 /100 WBCS — SIGNIFICANT CHANGE UP (ref 0–0)
PHOSPHATE SERPL-MCNC: 2.9 MG/DL — SIGNIFICANT CHANGE UP (ref 2.5–4.5)
PLATELET # BLD AUTO: 309 K/UL — SIGNIFICANT CHANGE UP (ref 150–400)
POTASSIUM SERPL-MCNC: 4.3 MMOL/L — SIGNIFICANT CHANGE UP (ref 3.5–5.3)
POTASSIUM SERPL-SCNC: 4.3 MMOL/L — SIGNIFICANT CHANGE UP (ref 3.5–5.3)
RBC # BLD: 3.27 M/UL — LOW (ref 3.8–5.2)
RBC # FLD: 15.6 % — HIGH (ref 10.3–14.5)
SODIUM SERPL-SCNC: 141 MMOL/L — SIGNIFICANT CHANGE UP (ref 135–145)
WBC # BLD: 5.02 K/UL — SIGNIFICANT CHANGE UP (ref 3.8–10.5)
WBC # FLD AUTO: 5.02 K/UL — SIGNIFICANT CHANGE UP (ref 3.8–10.5)

## 2019-10-04 PROCEDURE — 99232 SBSQ HOSP IP/OBS MODERATE 35: CPT | Mod: 24

## 2019-10-04 PROCEDURE — 74018 RADEX ABDOMEN 1 VIEW: CPT | Mod: 26

## 2019-10-04 RX ORDER — AMLODIPINE BESYLATE 2.5 MG/1
10 TABLET ORAL DAILY
Refills: 0 | Status: DISCONTINUED | OUTPATIENT
Start: 2019-10-04 | End: 2019-10-11

## 2019-10-04 RX ORDER — ONDANSETRON 8 MG/1
4 TABLET, FILM COATED ORAL ONCE
Refills: 0 | Status: COMPLETED | OUTPATIENT
Start: 2019-10-04 | End: 2019-10-04

## 2019-10-04 RX ORDER — METOCLOPRAMIDE HCL 10 MG
10 TABLET ORAL ONCE
Refills: 0 | Status: COMPLETED | OUTPATIENT
Start: 2019-10-04 | End: 2019-10-04

## 2019-10-04 RX ORDER — ACETAMINOPHEN 500 MG
975 TABLET ORAL EVERY 6 HOURS
Refills: 0 | Status: DISCONTINUED | OUTPATIENT
Start: 2019-10-04 | End: 2019-10-11

## 2019-10-04 RX ADMIN — Medication 20 MILLIGRAM(S): at 18:41

## 2019-10-04 RX ADMIN — Medication 62.5 MILLIMOLE(S): at 06:34

## 2019-10-04 RX ADMIN — PIPERACILLIN AND TAZOBACTAM 25 GRAM(S): 4; .5 INJECTION, POWDER, LYOPHILIZED, FOR SOLUTION INTRAVENOUS at 14:04

## 2019-10-04 RX ADMIN — PANTOPRAZOLE SODIUM 40 MILLIGRAM(S): 20 TABLET, DELAYED RELEASE ORAL at 08:28

## 2019-10-04 RX ADMIN — Medication 250 MILLIGRAM(S): at 05:30

## 2019-10-04 RX ADMIN — SENNA PLUS 2 TABLET(S): 8.6 TABLET ORAL at 22:36

## 2019-10-04 RX ADMIN — ONDANSETRON 4 MILLIGRAM(S): 8 TABLET, FILM COATED ORAL at 09:19

## 2019-10-04 RX ADMIN — ENOXAPARIN SODIUM 40 MILLIGRAM(S): 100 INJECTION SUBCUTANEOUS at 12:26

## 2019-10-04 RX ADMIN — AMLODIPINE BESYLATE 10 MILLIGRAM(S): 2.5 TABLET ORAL at 08:27

## 2019-10-04 RX ADMIN — Medication 10 MILLIGRAM(S): at 13:49

## 2019-10-04 RX ADMIN — Medication 250 MILLIGRAM(S): at 17:52

## 2019-10-04 RX ADMIN — Medication 0.5 MILLIGRAM(S): at 22:36

## 2019-10-04 RX ADMIN — Medication 20 MILLIGRAM(S): at 10:21

## 2019-10-04 RX ADMIN — PIPERACILLIN AND TAZOBACTAM 25 GRAM(S): 4; .5 INJECTION, POWDER, LYOPHILIZED, FOR SOLUTION INTRAVENOUS at 22:35

## 2019-10-04 RX ADMIN — ATENOLOL 50 MILLIGRAM(S): 25 TABLET ORAL at 05:29

## 2019-10-04 RX ADMIN — Medication 100 MILLIGRAM(S): at 05:29

## 2019-10-04 RX ADMIN — Medication 100 MILLIGRAM(S): at 22:36

## 2019-10-04 RX ADMIN — PIPERACILLIN AND TAZOBACTAM 25 GRAM(S): 4; .5 INJECTION, POWDER, LYOPHILIZED, FOR SOLUTION INTRAVENOUS at 05:29

## 2019-10-04 RX ADMIN — Medication 20 MILLIGRAM(S): at 22:36

## 2019-10-04 RX ADMIN — ONDANSETRON 4 MILLIGRAM(S): 8 TABLET, FILM COATED ORAL at 20:37

## 2019-10-04 NOTE — PROGRESS NOTE ADULT - ASSESSMENT
78 yo female patient w/ multiple gangrenous wounds of right lower extremity causing sepsis, now s/p HA claire and recovering in SICU    Plan:  - Consider transfer to regular surgical floor, 9M  - Rec: promote sleep cycle   - Continue ABx  - Plan for OR on Monday for BKA formalization vs. AKA 78 yo female patient w/ multiple gangrenous wounds of right lower extremity causing sepsis, now s/p HA claire and recovering in SICU    Plan:  - Consider transfer to regular surgical floor, 9M  - Rec: promote sleep cycle   - Continue ABx  - Plan for OR on Monday for rt AKA

## 2019-10-04 NOTE — PROGRESS NOTE ADULT - SUBJECTIVE AND OBJECTIVE BOX
Subjective:    Patient seen and evaluated this AM with team.  No acute vascular surgery events overnight.  Patient states pain is under control and has no acute concerns.    OBJECTIVE:     ** PHYSICAL EXAM **    General: Well developed, well nourished  Neuro: Arousable  Respiratory: extubated  Extremities: RLE guillotine amp ; dressing c/d/i    ** VITAL SIGNS / I&O's **    Vital Signs Last 24 Hrs  T(C): 36.1 (04 Oct 2019 08:00), Max: 37 (03 Oct 2019 11:00)  T(F): 97 (04 Oct 2019 08:00), Max: 98.6 (03 Oct 2019 11:00)  HR: 62 (04 Oct 2019 08:00) (62 - 97)  BP: 205/88 (04 Oct 2019 08:00) (140/75 - 205/88)  BP(mean): 127 (04 Oct 2019 08:00) (93 - 130)  RR: 39 (04 Oct 2019 08:00) (20 - 39)  SpO2: 95% (04 Oct 2019 08:00) (94% - 100%)      03 Oct 2019 07:01  -  04 Oct 2019 07:00  --------------------------------------------------------  IN:    dextrose 5% + sodium chloride 0.45% with potassium chloride 20 mEq/L: 150 mL    Oral Fluid: 1440 mL    Solution: 250 mL    Solution: 725 mL    Solution: 300 mL  Total IN: 2865 mL    OUT:    Indwelling Catheter - Urethral: 4200 mL  Total OUT: 4200 mL    Total NET: -1335 mL      04 Oct 2019 07:01  -  04 Oct 2019 09:00  --------------------------------------------------------  IN:    Solution: 25 mL    Solution: 62.5 mL  Total IN: 87.5 mL    OUT:  Total OUT: 0 mL    Total NET: 87.5 mL            ** LABS **                          9.0    5.02  )-----------( 309      ( 04 Oct 2019 00:44 )             28.4     04 Oct 2019 00:44    141    |  105    |  12     ----------------------------<  120    4.3     |  27     |  1.13     Ca    8.6        04 Oct 2019 00:44  Phos  2.9       04 Oct 2019 00:44  Mg     2.4       04 Oct 2019 00:44      MEDICATIONS  (STANDING):  amLODIPine   Tablet 10 milliGRAM(s) Oral daily  ATENolol  Tablet 50 milliGRAM(s) Oral daily  baclofen 20 milliGRAM(s) Oral <User Schedule>  chlorhexidine 2% Cloths 1 Application(s) Topical <User Schedule>  docusate sodium 100 milliGRAM(s) Oral three times a day  enoxaparin Injectable 40 milliGRAM(s) SubCutaneous daily  influenza   Vaccine 0.5 milliLiter(s) IntraMuscular once  oxybutynin 5 milliGRAM(s) Oral daily  pantoprazole    Tablet 40 milliGRAM(s) Oral before breakfast  piperacillin/tazobactam IVPB.. 3.375 Gram(s) IV Intermittent every 8 hours  senna 2 Tablet(s) Oral at bedtime  vancomycin  IVPB 750 milliGRAM(s) IV Intermittent every 12 hours    MEDICATIONS  (PRN):  acetaminophen   Tablet .. 975 milliGRAM(s) Oral every 6 hours PRN Mild Pain (1 - 3)  clonazePAM  Tablet 0.5 milliGRAM(s) Oral at bedtime PRN Anxiety Subjective:    Patient seen and evaluated this AM with team.  No acute vascular surgery events overnight.  Patient states pain is under control and has no acute concerns.    OBJECTIVE:     ** PHYSICAL EXAM **    General: Well developed, well nourished  Neuro: Arousable  Respiratory: extubated  Extremities: RLE guillotine amp ; dressing c/d/i    ** VITAL SIGNS / I&O's **    Vital Signs Last 24 Hrs  T(C): 36.1 (04 Oct 2019 08:00), Max: 37 (03 Oct 2019 11:00)  T(F): 97 (04 Oct 2019 08:00), Max: 98.6 (03 Oct 2019 11:00)  HR: 62 (04 Oct 2019 08:00) (62 - 97)  BP: 205/88 (04 Oct 2019 08:00) (140/75 - 205/88)  BP(mean): 127 (04 Oct 2019 08:00) (93 - 130)  RR: 39 (04 Oct 2019 08:00) (20 - 39)  SpO2: 95% (04 Oct 2019 08:00) (94% - 100%)      03 Oct 2019 07:01  -  04 Oct 2019 07:00  --------------------------------------------------------  IN:    dextrose 5% + sodium chloride 0.45% with potassium chloride 20 mEq/L: 150 mL    Oral Fluid: 1440 mL    Solution: 250 mL    Solution: 725 mL    Solution: 300 mL  Total IN: 2865 mL    OUT:    Indwelling Catheter - Urethral: 4200 mL  Total OUT: 4200 mL    Total NET: -1335 mL      04 Oct 2019 07:01  -  04 Oct 2019 09:00  --------------------------------------------------------  IN:    Solution: 25 mL    Solution: 62.5 mL  Total IN: 87.5 mL    OUT:  Total OUT: 0 mL    Total NET: 87.5 mL      ** LABS **                          9.0    5.02  )-----------( 309      ( 04 Oct 2019 00:44 )             28.4     04 Oct 2019 00:44    141    |  105    |  12     ----------------------------<  120    4.3     |  27     |  1.13     Ca    8.6        04 Oct 2019 00:44  Phos  2.9       04 Oct 2019 00:44  Mg     2.4       04 Oct 2019 00:44      MEDICATIONS  (STANDING):  amLODIPine   Tablet 10 milliGRAM(s) Oral daily  ATENolol  Tablet 50 milliGRAM(s) Oral daily  baclofen 20 milliGRAM(s) Oral <User Schedule>  chlorhexidine 2% Cloths 1 Application(s) Topical <User Schedule>  docusate sodium 100 milliGRAM(s) Oral three times a day  enoxaparin Injectable 40 milliGRAM(s) SubCutaneous daily  influenza   Vaccine 0.5 milliLiter(s) IntraMuscular once  oxybutynin 5 milliGRAM(s) Oral daily  pantoprazole    Tablet 40 milliGRAM(s) Oral before breakfast  piperacillin/tazobactam IVPB.. 3.375 Gram(s) IV Intermittent every 8 hours  senna 2 Tablet(s) Oral at bedtime  vancomycin  IVPB 750 milliGRAM(s) IV Intermittent every 12 hours    MEDICATIONS  (PRN):  acetaminophen   Tablet .. 975 milliGRAM(s) Oral every 6 hours PRN Mild Pain (1 - 3)  clonazePAM  Tablet 0.5 milliGRAM(s) Oral at bedtime PRN Anxiety

## 2019-10-04 NOTE — OCCUPATIONAL THERAPY INITIAL EVALUATION ADULT - LIVES WITH, PROFILE
pt lives in private home, ramp to enter. Prior to admission, pt requires assist with all functional mobility, dependent with ADLs. Pt transfers to/ from motorized wheelchair with assist via sliding from one surface to the other./spouse AS per chart, pt lives in private home, ramp to enter. Prior to admission, pt requires assist with all functional mobility, dependent with ADLs. Pt transfers to/ from motorized wheelchair with assist via sliding from one surface to the other./spouse

## 2019-10-04 NOTE — OCCUPATIONAL THERAPY INITIAL EVALUATION ADULT - STRENGTHENING, PT EVAL
Pt will increase RUE strength 1/2 grade throughout to improve independence with self-feeding within 4 weeks

## 2019-10-04 NOTE — PROGRESS NOTE ADULT - SUBJECTIVE AND OBJECTIVE BOX
HISTORY  79y Female    24 HOUR EVENTS:  - OOBTC with PT, home wheelchair  - given lasix 20 mg IV for pleural effusion  - started on amlodipine 5 mg PO qd  - plan for OR on 10/7    SUBJECTIVE/ROS:  [x] A ten-point review of systems was otherwise negative except as noted.  [ ] Due to altered mental status/intubation, subjective information were not able to be obtained from the patient. History was obtained, to the extent possible, from review of the chart and collateral sources of information.      NEURO  Exam: awake, alert, oriented  Meds: acetaminophen   Tablet .. 650 milliGRAM(s) Oral every 6 hours PRN Mild Pain (1 - 3), Moderate Pain (4 - 6)  baclofen 20 milliGRAM(s) Oral <User Schedule>  clonazePAM  Tablet 0.5 milliGRAM(s) Oral at bedtime PRN Anxiety    [x] Adequacy of sedation and pain control has been assessed and adjusted      RESPIRATORY  RR: 24 (10-04-19 @ 02:00) (22 - 36)  SpO2: 95% (10-04-19 @ 02:00) (95% - 100%)  Wt(kg): --  Exam: unlabored, clear to auscultation bilaterally  Mechanical Ventilation:   ABG - ( 02 Oct 2019 10:21 )  pH: 7.40  /  pCO2: 42    /  pO2: 49    / HCO3: 25    / Base Excess: .7    /  SaO2: 82      Lactate: x                [N/A] Extubation Readiness Assessed  Meds:       CARDIOVASCULAR  HR: 79 (10-04-19 @ 02:00) (75 - 101)  BP: 165/99 (10-04-19 @ 02:00) (140/76 - 194/81)  BP(mean): 125 (10-04-19 @ 02:00) (93 - 139)  ABP: --  ABP(mean): --  Wt(kg): --  CVP(cm H2O): --      Exam: regular rate and rhythm  Cardiac Rhythm: sinus  Perfusion     [x]Adequate   [ ]Inadequate  Mentation   [x]Normal       [ ]Reduced  Extremities  [x]Warm         [ ]Cool  Volume Status [ ]Hypervolemic [x]Euvolemic [ ]Hypovolemic  Meds: amLODIPine   Tablet 5 milliGRAM(s) Oral daily  ATENolol  Tablet 50 milliGRAM(s) Oral daily        GI/NUTRITION  Exam: soft, nontender, nondistended, incision C/D/I  Diet: regular  Meds: docusate sodium 100 milliGRAM(s) Oral three times a day  pantoprazole    Tablet 40 milliGRAM(s) Oral before breakfast  senna 2 Tablet(s) Oral at bedtime      GENITOURINARY  I&O's Detail    10-02 @ 07:01  -  10-03 @ 07:00  --------------------------------------------------------  IN:    dexmedetomidine Infusion: 25.8 mL    dextrose 5% + sodium chloride 0.45% with potassium chloride 20 mEq/L: 1000 mL    ns in tub fed  [id number]: 80 mL    Oral Fluid: 200 mL    sodium chloride 0.9%: 225 mL    Solution: 275 mL  Total IN: 1805.8 mL    OUT:    Indwelling Catheter - Urethral: 2975 mL  Total OUT: 2975 mL    Total NET: -1169.2 mL      10-03 @ 07:01  -  10-04 @ 02:14  --------------------------------------------------------  IN:    dextrose 5% + sodium chloride 0.45% with potassium chloride 20 mEq/L: 150 mL    Oral Fluid: 1440 mL    Solution: 600 mL    Solution: 225 mL  Total IN: 2415 mL    OUT:    Indwelling Catheter - Urethral: 2950 mL  Total OUT: 2950 mL    Total NET: -535 mL          10-04    141  |  105  |  12  ----------------------------<  120<H>  4.3   |  27  |  1.13    Ca    8.6      04 Oct 2019 00:44  Phos  2.9     10-04  Mg     2.4     10-04      [ ] Kendrick catheter, indication: N/A  Meds: oxybutynin 5 milliGRAM(s) Oral daily        HEMATOLOGIC  Meds: enoxaparin Injectable 40 milliGRAM(s) SubCutaneous daily    [x] VTE Prophylaxis                        9.0    5.02  )-----------( 309      ( 04 Oct 2019 00:44 )             28.4       Transfusion     [ ] PRBC   [ ] Platelets   [ ] FFP   [ ] Cryoprecipitate      INFECTIOUS DISEASES  WBC Count: 5.02 K/uL (10-04 @ 00:44)  WBC Count: 6.14 K/uL (10-03 @ 05:24)    RECENT CULTURES:    Meds: influenza   Vaccine 0.5 milliLiter(s) IntraMuscular once  piperacillin/tazobactam IVPB.. 3.375 Gram(s) IV Intermittent every 8 hours  vancomycin  IVPB 750 milliGRAM(s) IV Intermittent every 12 hours        ENDOCRINE  CAPILLARY BLOOD GLUCOSE        Meds:       OTHER MEDICATIONS:  chlorhexidine 2% Cloths 1 Application(s) Topical <User Schedule>      CODE STATUS: Full      IMAGING: No new CXR

## 2019-10-04 NOTE — OCCUPATIONAL THERAPY INITIAL EVALUATION ADULT - ADDITIONAL COMMENTS
chest xray 10/2- The heart is enlarged. Small left pleural effusion cannot be ruled out. The right lung appears to be clear. Endotracheal tube and NG tube are in good position.

## 2019-10-04 NOTE — OCCUPATIONAL THERAPY INITIAL EVALUATION ADULT - PRECAUTIONS/LIMITATIONS, REHAB EVAL
Patient developed necrotic tissue on both the dorsum of the right foot and near the right side lateral malleolus. Patient has been seeing private vascular surgeon on the outside who states that the foot is unlikely to be salvageable. Patient also having periodic fevers and decrease in appetite. While in the ED pulses were poor and foot cool to touch so podiatry and vascular surgery were consulted. They are both ultimately recommending right BKA./fall precautions

## 2019-10-04 NOTE — OCCUPATIONAL THERAPY INITIAL EVALUATION ADULT - PERTINENT HX OF CURRENT PROBLEM, REHAB EVAL
79F with PMHx of multiple sclerosis and peripheral vascular disease p/w right foot pain for one month. Approximately one month prior patient was in her wheelchair and rammed her right foot into a wall. She initially presented to the emergency room and given Keflex to treat for cellulitis. The right foot progressively becoming more painful, at times erythematous and malodorous.

## 2019-10-04 NOTE — OCCUPATIONAL THERAPY INITIAL EVALUATION ADULT - RANGE OF MOTION EXAMINATION, UPPER EXTREMITY
Right UE Active Assistive ROM was WFL  (within functional limits)/Left shoulder 0-80/Left UE Passive ROM was WFL  (within functional limits)

## 2019-10-04 NOTE — OCCUPATIONAL THERAPY INITIAL EVALUATION ADULT - ASR WT BEARING STATUS EVAL
S/p R guillotine BKA performed for septic foot and eschar removed on 10/1; extubated 10/3; 1 unit PRBC 8/29 Right LE/S/p R guillotine BKA performed for septic foot and eschar removed on 10/1; extubated 10/3; 1 unit PRBC 8/29

## 2019-10-04 NOTE — PROGRESS NOTE ADULT - ASSESSMENT
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 Patient remained intubated post-operatively due to hypoxia; likely pulmonary edema; motion artifact on pre-op CT scan. Negative for PE. SICU consulted for ventilatory management. Has since been extubated and is now tolerating a regular diet and pain is well controlled    PLAN    Neuro: Hx of MS, postoperative pain  - Restarted baclofen and klonopin  - Pain is well controlled on current regimen    Respiratory: extubated  - Saturating well on RA  - OOBTC as tolerated    CV: No active issue  - No active issues   - on home meds    GI: Nausea (new onset vs baseline nausea)  - Regular diet  - Zofran prn  - senna    Renal: No active issues  - IV locked  - started on oxybutynin    Heme: No active issues  - Lovenox for VTE prophylaxis     ID: Emperic coverage  - Vancomycin and Zosyn for emperic coverage to continue until completion BKA performed    Endo: No concerns     Dispo: SICU full code    - Willem Villegas DO

## 2019-10-05 LAB
ANION GAP SERPL CALC-SCNC: 14 MMOL/L — SIGNIFICANT CHANGE UP (ref 5–17)
BUN SERPL-MCNC: 10 MG/DL — SIGNIFICANT CHANGE UP (ref 7–23)
CALCIUM SERPL-MCNC: 8.7 MG/DL — SIGNIFICANT CHANGE UP (ref 8.4–10.5)
CHLORIDE SERPL-SCNC: 103 MMOL/L — SIGNIFICANT CHANGE UP (ref 96–108)
CO2 SERPL-SCNC: 24 MMOL/L — SIGNIFICANT CHANGE UP (ref 22–31)
CREAT SERPL-MCNC: 0.65 MG/DL — SIGNIFICANT CHANGE UP (ref 0.5–1.3)
GLUCOSE SERPL-MCNC: 93 MG/DL — SIGNIFICANT CHANGE UP (ref 70–99)
HCT VFR BLD CALC: 33.9 % — LOW (ref 34.5–45)
HGB BLD-MCNC: 10.8 G/DL — LOW (ref 11.5–15.5)
MAGNESIUM SERPL-MCNC: 2.2 MG/DL — SIGNIFICANT CHANGE UP (ref 1.6–2.6)
MCHC RBC-ENTMCNC: 28 PG — SIGNIFICANT CHANGE UP (ref 27–34)
MCHC RBC-ENTMCNC: 31.9 GM/DL — LOW (ref 32–36)
MCV RBC AUTO: 87.8 FL — SIGNIFICANT CHANGE UP (ref 80–100)
NRBC # BLD: 0 /100 WBCS — SIGNIFICANT CHANGE UP (ref 0–0)
PHOSPHATE SERPL-MCNC: 3.1 MG/DL — SIGNIFICANT CHANGE UP (ref 2.5–4.5)
PLATELET # BLD AUTO: 303 K/UL — SIGNIFICANT CHANGE UP (ref 150–400)
POTASSIUM SERPL-MCNC: 4 MMOL/L — SIGNIFICANT CHANGE UP (ref 3.5–5.3)
POTASSIUM SERPL-SCNC: 4 MMOL/L — SIGNIFICANT CHANGE UP (ref 3.5–5.3)
RBC # BLD: 3.86 M/UL — SIGNIFICANT CHANGE UP (ref 3.8–5.2)
RBC # FLD: 15.7 % — HIGH (ref 10.3–14.5)
SODIUM SERPL-SCNC: 141 MMOL/L — SIGNIFICANT CHANGE UP (ref 135–145)
VANCOMYCIN TROUGH SERPL-MCNC: 16.2 UG/ML — SIGNIFICANT CHANGE UP (ref 10–20)
WBC # BLD: 5.81 K/UL — SIGNIFICANT CHANGE UP (ref 3.8–10.5)
WBC # FLD AUTO: 5.81 K/UL — SIGNIFICANT CHANGE UP (ref 3.8–10.5)

## 2019-10-05 PROCEDURE — 99232 SBSQ HOSP IP/OBS MODERATE 35: CPT

## 2019-10-05 RX ORDER — ATENOLOL 25 MG/1
50 TABLET ORAL ONCE
Refills: 0 | Status: COMPLETED | OUTPATIENT
Start: 2019-10-05 | End: 2019-10-05

## 2019-10-05 RX ORDER — ONDANSETRON 8 MG/1
4 TABLET, FILM COATED ORAL ONCE
Refills: 0 | Status: COMPLETED | OUTPATIENT
Start: 2019-10-05 | End: 2019-10-05

## 2019-10-05 RX ORDER — ONDANSETRON 8 MG/1
4 TABLET, FILM COATED ORAL ONCE
Refills: 0 | Status: DISCONTINUED | OUTPATIENT
Start: 2019-10-05 | End: 2019-10-05

## 2019-10-05 RX ORDER — HYDRALAZINE HCL 50 MG
10 TABLET ORAL ONCE
Refills: 0 | Status: COMPLETED | OUTPATIENT
Start: 2019-10-05 | End: 2019-10-05

## 2019-10-05 RX ORDER — TETRACAINE/BENZOCAINE/BUTAMBEN 2%-14%-2%
1 OINTMENT (GRAM) TOPICAL
Refills: 0 | Status: DISCONTINUED | OUTPATIENT
Start: 2019-10-05 | End: 2019-10-11

## 2019-10-05 RX ORDER — BENZOCAINE AND MENTHOL 5; 1 G/100ML; G/100ML
1 LIQUID ORAL
Refills: 0 | Status: DISCONTINUED | OUTPATIENT
Start: 2019-10-05 | End: 2019-10-11

## 2019-10-05 RX ORDER — ATENOLOL 25 MG/1
50 TABLET ORAL ONCE
Refills: 0 | Status: DISCONTINUED | OUTPATIENT
Start: 2019-10-05 | End: 2019-10-05

## 2019-10-05 RX ADMIN — Medication 250 MILLIGRAM(S): at 06:02

## 2019-10-05 RX ADMIN — Medication 250 MILLIGRAM(S): at 21:03

## 2019-10-05 RX ADMIN — Medication 20 MILLIGRAM(S): at 14:35

## 2019-10-05 RX ADMIN — Medication 100 MILLIGRAM(S): at 15:46

## 2019-10-05 RX ADMIN — PIPERACILLIN AND TAZOBACTAM 25 GRAM(S): 4; .5 INJECTION, POWDER, LYOPHILIZED, FOR SOLUTION INTRAVENOUS at 23:00

## 2019-10-05 RX ADMIN — Medication 10 MILLIGRAM(S): at 06:00

## 2019-10-05 RX ADMIN — Medication 100 MILLIGRAM(S): at 21:03

## 2019-10-05 RX ADMIN — ONDANSETRON 4 MILLIGRAM(S): 8 TABLET, FILM COATED ORAL at 06:22

## 2019-10-05 RX ADMIN — Medication 5 MILLIGRAM(S): at 14:37

## 2019-10-05 RX ADMIN — PIPERACILLIN AND TAZOBACTAM 25 GRAM(S): 4; .5 INJECTION, POWDER, LYOPHILIZED, FOR SOLUTION INTRAVENOUS at 15:37

## 2019-10-05 RX ADMIN — ENOXAPARIN SODIUM 40 MILLIGRAM(S): 100 INJECTION SUBCUTANEOUS at 14:39

## 2019-10-05 RX ADMIN — Medication 20 MILLIGRAM(S): at 21:03

## 2019-10-05 RX ADMIN — Medication 100 MILLIGRAM(S): at 06:02

## 2019-10-05 RX ADMIN — Medication 20 MILLIGRAM(S): at 18:49

## 2019-10-05 RX ADMIN — SENNA PLUS 2 TABLET(S): 8.6 TABLET ORAL at 21:03

## 2019-10-05 RX ADMIN — ATENOLOL 50 MILLIGRAM(S): 25 TABLET ORAL at 18:49

## 2019-10-05 RX ADMIN — PIPERACILLIN AND TAZOBACTAM 25 GRAM(S): 4; .5 INJECTION, POWDER, LYOPHILIZED, FOR SOLUTION INTRAVENOUS at 06:03

## 2019-10-05 RX ADMIN — ONDANSETRON 4 MILLIGRAM(S): 8 TABLET, FILM COATED ORAL at 12:05

## 2019-10-05 NOTE — PROGRESS NOTE ADULT - SUBJECTIVE AND OBJECTIVE BOX
HISTORY  78 y/o F with PMH of multiple sclerosis (progressive and not currently on any medication), HTN, neurogenic bladder (has had a Kendrick catheter for many years)  and peripheral vascular disease presents with right foot pain for one month. Patient with multiple gangrenous wounds of right lower extremity. Patient was set for right BKA on 09/30 when patient complained of SOB on the floor with a decreased PO2. CTPA performed to rule out PE which was negative for acute pulmonary embolism. Decision made to take patient to OR due to sepsis from wet gangrene of RLE. 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 Patient left intubated post-operatively due to hypoxia pre-operatively. SICU consulted for ventilator management post-operatively.       24 HOUR EVENTS:  - OOBTC with PT, home wheelchair  - Zofran for nausea   - Listed for floor bed   - plan for OR on 10/7  - amlodipine switched to 10 mg PO qd    SUBJECTIVE/ROS:  [x] A ten-point review of systems was otherwise negative except as noted.  [ ] Due to altered mental status/intubation, subjective information were not able to be obtained from the patient. History was obtained, to the extent possible, from review of the chart and collateral sources of information.      NEURO  Exam: awake, alert, oriented  Meds: acetaminophen   Tablet .. 975 milliGRAM(s) Oral every 6 hours PRN Mild Pain (1 - 3)  baclofen 20 milliGRAM(s) Oral <User Schedule>  clonazePAM  Tablet 0.5 milliGRAM(s) Oral at bedtime PRN Anxiety    [x] Adequacy of sedation and pain control has been assessed and adjusted      RESPIRATORY  RR: 15 (10-05-19 @ 00:00) (15 - 41)  SpO2: 94% (10-05-19 @ 00:00) (91% - 98%)  Wt(kg): --  Exam: unlabored, clear to auscultation bilaterally  Mechanical Ventilation:     [N/A] Extubation Readiness Assessed  Meds:       CARDIOVASCULAR  HR: 61 (10-05-19 @ 00:00) (53 - 79)  BP: 164/81 (10-05-19 @ 00:00) (127/60 - 205/88)  BP(mean): 114 (10-05-19 @ 00:00) (87 - 127)  ABP: --  ABP(mean): --  Wt(kg): --  CVP(cm H2O): --      Exam: regular rate and rhythm  Cardiac Rhythm: sinus  Perfusion     [x]Adequate   [ ]Inadequate  Mentation   [x]Normal       [ ]Reduced  Extremities  [x]Warm         [ ]Cool  Volume Status [ ]Hypervolemic [x]Euvolemic [ ]Hypovolemic  Meds: amLODIPine   Tablet 10 milliGRAM(s) Oral daily  ATENolol  Tablet 50 milliGRAM(s) Oral daily        GI/NUTRITION  Exam: soft, nontender, nondistended, incision C/D/I  Diet: regular diet  Meds: docusate sodium 100 milliGRAM(s) Oral three times a day  pantoprazole    Tablet 40 milliGRAM(s) Oral before breakfast  senna 2 Tablet(s) Oral at bedtime      GENITOURINARY  I&O's Detail    10-03 @ 07:01  -  10-04 @ 07:00  --------------------------------------------------------  IN:    dextrose 5% + sodium chloride 0.45% with potassium chloride 20 mEq/L: 150 mL    Oral Fluid: 1440 mL    Solution: 250 mL    Solution: 725 mL    Solution: 300 mL  Total IN: 2865 mL    OUT:    Indwelling Catheter - Urethral: 4200 mL  Total OUT: 4200 mL    Total NET: -1335 mL      10-04 @ 07:01  -  10-05 @ 01:54  --------------------------------------------------------  IN:    Solution: 25 mL    Solution: 125 mL  Total IN: 150 mL    OUT:    Indwelling Catheter - Urethral: 1800 mL  Total OUT: 1800 mL    Total NET: -1650 mL          10-04    141  |  105  |  12  ----------------------------<  120<H>  4.3   |  27  |  1.13    Ca    8.6      04 Oct 2019 00:44  Phos  2.9     10-04  Mg     2.4     10-04      [ ] Kendrick catheter, indication: N/A  Meds: oxybutynin 5 milliGRAM(s) Oral daily        HEMATOLOGIC  Meds: enoxaparin Injectable 40 milliGRAM(s) SubCutaneous daily    [x] VTE Prophylaxis                        9.0    5.02  )-----------( 309      ( 04 Oct 2019 00:44 )             28.4       Transfusion     [ ] PRBC   [ ] Platelets   [ ] FFP   [ ] Cryoprecipitate      INFECTIOUS DISEASES    RECENT CULTURES:    Meds: influenza   Vaccine 0.5 milliLiter(s) IntraMuscular once  piperacillin/tazobactam IVPB.. 3.375 Gram(s) IV Intermittent every 8 hours  vancomycin  IVPB 750 milliGRAM(s) IV Intermittent every 12 hours        ENDOCRINE  CAPILLARY BLOOD GLUCOSE        Meds:       OTHER MEDICATIONS:  chlorhexidine 2% Cloths 1 Application(s) Topical <User Schedule>      CODE STATUS:      IMAGING:  N/A

## 2019-10-05 NOTE — PROGRESS NOTE ADULT - ASSESSMENT
78 yo female patient w/ multiple gangrenous wounds of right lower extremity causing sepsis, now s/p HA claire and recovering in SICU    Plan:  - Consider transfer to regular surgical floor, 9M  - Rec: promote sleep cycle   - Continue ABx  - Plan for OR on Monday for rt AKA 78 yo female patient w/ multiple gangrenous wounds of right lower extremity causing sepsis, now s/p RLE alethea and recovering in SICU    Plan:  - Consider transfer to regular surgical floor, 9M  - Rec: promote sleep cycle   - Plan for OR on Monday for rt AKA  - Rec: medicine consult for long term HTN mgmt  - Knee immobilizer to prevent flexion contracture RLE

## 2019-10-05 NOTE — CHART NOTE - NSCHARTNOTEFT_GEN_A_CORE
SICU Transfer Note    Transfer from: SICU  Transfer to:  9  Accepting physician: Alexandra      SICU COURSE:    HOLLY FRY is a 79y Female PMHx of multiple sclerosis (progressive and not currently on any medication), HTN, neurogenic bladder (has had a Kendrick catheter for many years)  and peripheral vascular disease presents with right foot pain for one month. Patient with multiple gangrenous wounds of right lower extremity. Patient was set for right BKA on 09/30 when patient complained of SOB on the floor with a decreased PO2. CTPA performed to rule out PE which was negative for acute pulmonary embolism. Decision made to take patient to OR due to sepsis from wet gangrene of RLE. Patient underwent right guillotine BKA on  10/1. Patient left intubated post-operatively due to hypoxia pre-operatively. SICU consulted for ventilator management post-operatively.     10/2:   Patient continued to remain intubated dur to pulmonary status    10/3:  24 HOUR EVENTS:  - Extubated  - NGT removed  - Started on Regular diet  - Given lasix 20 mg IV  - Home wheelchair at bedside  - PT ordered  - Restarted on home meds    10/4:  24 HOUR EVENTS:  - OOBTC with PT, home wheelchair  - given lasix 20 mg IV for pleural effusion  - started on amlodipine 5 mg PO qd  - plan for OR on 10/7    10/5:  24 HOUR EVENTS:  - OOBTC with PT, home wheelchair  - Zofran for nausea   - Listed for floor bed   - plan for OR on 10/7  - amlodipine switched to 10 mg PO qd  - Transfer to 9      ASSESSMENT & PLAN:   78 yo female patient w/ multiple gangrenous wounds of right lower extremity causing sepsis, now s/p RLE guillotine and recovering in SICU  - Rec: promote sleep cycle   - Plan for OR on Monday for rt AKA  - Rec: medicine consult for long term HTN mgmt  - Knee immobilizer to prevent flexion contracture RLE        Vital Signs Last 24 Hrs  T(C): 36.2 (05 Oct 2019 07:00), Max: 36.5 (04 Oct 2019 20:00)  T(F): 97.2 (05 Oct 2019 07:00), Max: 97.7 (04 Oct 2019 20:00)  HR: 79 (05 Oct 2019 09:00) (52 - 90)  BP: 152/73 (05 Oct 2019 09:00) (125/59 - 227/94)  BP(mean): 105 (05 Oct 2019 09:00) (85 - 135)  RR: 49 (05 Oct 2019 09:00) (15 - 49)  SpO2: 96% (05 Oct 2019 09:00) (91% - 98%)  I&O's Summary    04 Oct 2019 07:01  -  05 Oct 2019 07:00  --------------------------------------------------------  IN: 275 mL / OUT: 3250 mL / NET: -2975 mL          MEDICATIONS  (STANDING):  amLODIPine   Tablet 10 milliGRAM(s) Oral daily  ATENolol  Tablet 50 milliGRAM(s) Oral daily  baclofen 20 milliGRAM(s) Oral <User Schedule>  docusate sodium 100 milliGRAM(s) Oral three times a day  enoxaparin Injectable 40 milliGRAM(s) SubCutaneous daily  influenza   Vaccine 0.5 milliLiter(s) IntraMuscular once  oxybutynin 5 milliGRAM(s) Oral daily  pantoprazole    Tablet 40 milliGRAM(s) Oral before breakfast  piperacillin/tazobactam IVPB.. 3.375 Gram(s) IV Intermittent every 8 hours  senna 2 Tablet(s) Oral at bedtime  vancomycin  IVPB 750 milliGRAM(s) IV Intermittent every 12 hours    MEDICATIONS  (PRN):  acetaminophen   Tablet .. 975 milliGRAM(s) Oral every 6 hours PRN Mild Pain (1 - 3)  clonazePAM  Tablet 0.5 milliGRAM(s) Oral at bedtime PRN Anxiety        LABS                                            10.8                  Neurophils% (auto):   x      (10-05 @ 05:47):    5.81 )-----------(303          Lymphocytes% (auto):  x                                             33.9                   Eosinphils% (auto):   x        Manual%: Neutrophils x    ; Lymphocytes x    ; Eosinophils x    ; Bands%: x    ; Blasts x                                    141    |  103    |  10                  Calcium: 8.7   / iCa: x      (10-05 @ 05:47)    ----------------------------<  93        Magnesium: 2.2                              4.0     |  24     |  0.65             Phosphorous: 3.1

## 2019-10-05 NOTE — PROGRESS NOTE ADULT - SUBJECTIVE AND OBJECTIVE BOX
Subjective:    Patient seen and evaluated this AM with team.  No acute vascular surgery events overnight.  Patient sedated but able to arouse after vigorous stimulation.    OBJECTIVE:     ** PHYSICAL EXAM **    General: Sedated; lying in bed  Neuro: Arousable  Respiratory: extubated  Extremities: RLE guillotine amp ; dressing c/d/i      ** VITAL SIGNS / I&O's **    Vital Signs Last 24 Hrs  T(C): 36.1 (05 Oct 2019 03:00), Max: 36.5 (04 Oct 2019 20:00)  T(F): 97 (05 Oct 2019 03:00), Max: 97.7 (04 Oct 2019 20:00)  HR: 61 (05 Oct 2019 06:15) (52 - 74)  BP: 157/70 (05 Oct 2019 06:15) (125/59 - 227/94)  BP(mean): 101 (05 Oct 2019 06:15) (85 - 135)  RR: 27 (05 Oct 2019 06:15) (15 - 41)  SpO2: 93% (05 Oct 2019 06:15) (91% - 98%)      04 Oct 2019 07:01  -  05 Oct 2019 07:00  --------------------------------------------------------  IN:    Solution: 125 mL    Solution: 150 mL  Total IN: 275 mL    OUT:    Indwelling Catheter - Urethral: 3250 mL  Total OUT: 3250 mL    Total NET: -2975 mL            ** LABS **                          10.8   5.81  )-----------( 303      ( 05 Oct 2019 05:47 )             33.9     05 Oct 2019 05:47    141    |  103    |  10     ----------------------------<  93     4.0     |  24     |  0.65     Ca    8.7        05 Oct 2019 05:47  Phos  3.1       05 Oct 2019 05:47  Mg     2.2       05 Oct 2019 05:47      MEDICATIONS  (STANDING):  amLODIPine   Tablet 10 milliGRAM(s) Oral daily  ATENolol  Tablet 50 milliGRAM(s) Oral daily  baclofen 20 milliGRAM(s) Oral <User Schedule>  chlorhexidine 2% Cloths 1 Application(s) Topical <User Schedule>  docusate sodium 100 milliGRAM(s) Oral three times a day  enoxaparin Injectable 40 milliGRAM(s) SubCutaneous daily  influenza   Vaccine 0.5 milliLiter(s) IntraMuscular once  oxybutynin 5 milliGRAM(s) Oral daily  pantoprazole    Tablet 40 milliGRAM(s) Oral before breakfast  piperacillin/tazobactam IVPB.. 3.375 Gram(s) IV Intermittent every 8 hours  senna 2 Tablet(s) Oral at bedtime  vancomycin  IVPB 750 milliGRAM(s) IV Intermittent every 12 hours    MEDICATIONS  (PRN):  acetaminophen   Tablet .. 975 milliGRAM(s) Oral every 6 hours PRN Mild Pain (1 - 3)  clonazePAM  Tablet 0.5 milliGRAM(s) Oral at bedtime PRN Anxiety Subjective:    Patient seen and evaluated this AM with team.  No acute vascular surgery events overnight.  Was + x2 last night.  Patient sedated but able to arouse after vigorous stimulation.    OBJECTIVE:     ** PHYSICAL EXAM **    General: Sedated; lying in bed  Neuro: Arousable  Respiratory: extubated  Extremities: RLE guillotine amp ; dressing c/d/i      ** VITAL SIGNS / I&O's **    Vital Signs Last 24 Hrs  T(C): 36.1 (05 Oct 2019 03:00), Max: 36.5 (04 Oct 2019 20:00)  T(F): 97 (05 Oct 2019 03:00), Max: 97.7 (04 Oct 2019 20:00)  HR: 61 (05 Oct 2019 06:15) (52 - 74)  BP: 157/70 (05 Oct 2019 06:15) (125/59 - 227/94)  BP(mean): 101 (05 Oct 2019 06:15) (85 - 135)  RR: 27 (05 Oct 2019 06:15) (15 - 41)  SpO2: 93% (05 Oct 2019 06:15) (91% - 98%)      04 Oct 2019 07:01  -  05 Oct 2019 07:00  --------------------------------------------------------  IN:    Solution: 125 mL    Solution: 150 mL  Total IN: 275 mL    OUT:    Indwelling Catheter - Urethral: 3250 mL  Total OUT: 3250 mL    Total NET: -2975 mL            ** LABS **                          10.8   5.81  )-----------( 303      ( 05 Oct 2019 05:47 )             33.9     05 Oct 2019 05:47    141    |  103    |  10     ----------------------------<  93     4.0     |  24     |  0.65     Ca    8.7        05 Oct 2019 05:47  Phos  3.1       05 Oct 2019 05:47  Mg     2.2       05 Oct 2019 05:47      MEDICATIONS  (STANDING):  amLODIPine   Tablet 10 milliGRAM(s) Oral daily  ATENolol  Tablet 50 milliGRAM(s) Oral daily  baclofen 20 milliGRAM(s) Oral <User Schedule>  chlorhexidine 2% Cloths 1 Application(s) Topical <User Schedule>  docusate sodium 100 milliGRAM(s) Oral three times a day  enoxaparin Injectable 40 milliGRAM(s) SubCutaneous daily  influenza   Vaccine 0.5 milliLiter(s) IntraMuscular once  oxybutynin 5 milliGRAM(s) Oral daily  pantoprazole    Tablet 40 milliGRAM(s) Oral before breakfast  piperacillin/tazobactam IVPB.. 3.375 Gram(s) IV Intermittent every 8 hours  senna 2 Tablet(s) Oral at bedtime  vancomycin  IVPB 750 milliGRAM(s) IV Intermittent every 12 hours    MEDICATIONS  (PRN):  acetaminophen   Tablet .. 975 milliGRAM(s) Oral every 6 hours PRN Mild Pain (1 - 3)  clonazePAM  Tablet 0.5 milliGRAM(s) Oral at bedtime PRN Anxiety

## 2019-10-05 NOTE — PROGRESS NOTE ADULT - ASSESSMENT
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 Patient remained intubated post-operatively due to hypoxia; likely pulmonary edema; motion artifact on pre-op CT scan. Negative for PE. SICU consulted for ventilatory management. Has since been extubated and is now tolerating a regular diet and pain is well controlled    PLAN    Neuro: Hx of MS, postoperative pain  - Restarted baclofen and klonopin  - Pain is well controlled on current regimen    Respiratory: extubated  - Saturating well on RA  - OOBTC as tolerated    CV: No active issue  - No active issues   - on home meds    GI: Nausea (new onset vs baseline nausea)  - Regular diet  - Zofran prn  - senna, colace  - Protonix for GI ppx    Renal: No active issues  - IV locked  - started on oxybutynin    Heme: No active issues  - Lovenox for VTE prophylaxis     ID: Emperic coverage  - Vancomycin and Zosyn for emperic coverage to continue until completion BKA performed    Endo: No concerns     Dispo: listed for medicine floor    - Willem Villegas DO EM PGY-1

## 2019-10-06 ENCOUNTER — TRANSCRIPTION ENCOUNTER (OUTPATIENT)
Age: 79
End: 2019-10-06

## 2019-10-06 LAB
ANION GAP SERPL CALC-SCNC: 11 MMOL/L — SIGNIFICANT CHANGE UP (ref 5–17)
BUN SERPL-MCNC: 15 MG/DL — SIGNIFICANT CHANGE UP (ref 7–23)
CALCIUM SERPL-MCNC: 8.6 MG/DL — SIGNIFICANT CHANGE UP (ref 8.4–10.5)
CHLORIDE SERPL-SCNC: 101 MMOL/L — SIGNIFICANT CHANGE UP (ref 96–108)
CO2 SERPL-SCNC: 25 MMOL/L — SIGNIFICANT CHANGE UP (ref 22–31)
CREAT SERPL-MCNC: 0.74 MG/DL — SIGNIFICANT CHANGE UP (ref 0.5–1.3)
GAS PNL BLDV: SIGNIFICANT CHANGE UP
GLUCOSE SERPL-MCNC: 99 MG/DL — SIGNIFICANT CHANGE UP (ref 70–99)
HCT VFR BLD CALC: 31.5 % — LOW (ref 34.5–45)
HCT VFR BLD CALC: 34 % — LOW (ref 34.5–45)
HGB BLD-MCNC: 10.8 G/DL — LOW (ref 11.5–15.5)
HGB BLD-MCNC: 9.7 G/DL — LOW (ref 11.5–15.5)
LACTATE SERPL-SCNC: 1.6 MMOL/L — SIGNIFICANT CHANGE UP (ref 0.7–2)
MAGNESIUM SERPL-MCNC: 2.1 MG/DL — SIGNIFICANT CHANGE UP (ref 1.6–2.6)
MCHC RBC-ENTMCNC: 27.1 PG — SIGNIFICANT CHANGE UP (ref 27–34)
MCHC RBC-ENTMCNC: 27.8 PG — SIGNIFICANT CHANGE UP (ref 27–34)
MCHC RBC-ENTMCNC: 30.8 GM/DL — LOW (ref 32–36)
MCHC RBC-ENTMCNC: 31.8 GM/DL — LOW (ref 32–36)
MCV RBC AUTO: 87.4 FL — SIGNIFICANT CHANGE UP (ref 80–100)
MCV RBC AUTO: 88 FL — SIGNIFICANT CHANGE UP (ref 80–100)
NRBC # BLD: 0 /100 WBCS — SIGNIFICANT CHANGE UP (ref 0–0)
NRBC # BLD: 0 /100 WBCS — SIGNIFICANT CHANGE UP (ref 0–0)
PHOSPHATE SERPL-MCNC: 2.7 MG/DL — SIGNIFICANT CHANGE UP (ref 2.5–4.5)
PLATELET # BLD AUTO: 329 K/UL — SIGNIFICANT CHANGE UP (ref 150–400)
PLATELET # BLD AUTO: 341 K/UL — SIGNIFICANT CHANGE UP (ref 150–400)
POTASSIUM SERPL-MCNC: 3.5 MMOL/L — SIGNIFICANT CHANGE UP (ref 3.5–5.3)
POTASSIUM SERPL-SCNC: 3.5 MMOL/L — SIGNIFICANT CHANGE UP (ref 3.5–5.3)
RBC # BLD: 3.58 M/UL — LOW (ref 3.8–5.2)
RBC # BLD: 3.89 M/UL — SIGNIFICANT CHANGE UP (ref 3.8–5.2)
RBC # FLD: 15.9 % — HIGH (ref 10.3–14.5)
RBC # FLD: 15.9 % — HIGH (ref 10.3–14.5)
SODIUM SERPL-SCNC: 137 MMOL/L — SIGNIFICANT CHANGE UP (ref 135–145)
WBC # BLD: 6.16 K/UL — SIGNIFICANT CHANGE UP (ref 3.8–10.5)
WBC # BLD: 6.55 K/UL — SIGNIFICANT CHANGE UP (ref 3.8–10.5)
WBC # FLD AUTO: 6.16 K/UL — SIGNIFICANT CHANGE UP (ref 3.8–10.5)
WBC # FLD AUTO: 6.55 K/UL — SIGNIFICANT CHANGE UP (ref 3.8–10.5)

## 2019-10-06 RX ORDER — ONDANSETRON 8 MG/1
4 TABLET, FILM COATED ORAL EVERY 6 HOURS
Refills: 0 | Status: DISCONTINUED | OUTPATIENT
Start: 2019-10-06 | End: 2019-10-11

## 2019-10-06 RX ORDER — SODIUM CHLORIDE 9 MG/ML
1000 INJECTION, SOLUTION INTRAVENOUS
Refills: 0 | Status: DISCONTINUED | OUTPATIENT
Start: 2019-10-07 | End: 2019-10-07

## 2019-10-06 RX ORDER — POTASSIUM PHOSPHATE, MONOBASIC POTASSIUM PHOSPHATE, DIBASIC 236; 224 MG/ML; MG/ML
30 INJECTION, SOLUTION INTRAVENOUS ONCE
Refills: 0 | Status: COMPLETED | OUTPATIENT
Start: 2019-10-06 | End: 2019-10-06

## 2019-10-06 RX ADMIN — Medication 250 MILLIGRAM(S): at 10:49

## 2019-10-06 RX ADMIN — PIPERACILLIN AND TAZOBACTAM 25 GRAM(S): 4; .5 INJECTION, POWDER, LYOPHILIZED, FOR SOLUTION INTRAVENOUS at 18:58

## 2019-10-06 RX ADMIN — Medication 5 MILLIGRAM(S): at 10:51

## 2019-10-06 RX ADMIN — SENNA PLUS 2 TABLET(S): 8.6 TABLET ORAL at 21:39

## 2019-10-06 RX ADMIN — ENOXAPARIN SODIUM 40 MILLIGRAM(S): 100 INJECTION SUBCUTANEOUS at 12:31

## 2019-10-06 RX ADMIN — Medication 20 MILLIGRAM(S): at 21:39

## 2019-10-06 RX ADMIN — Medication 20 MILLIGRAM(S): at 10:50

## 2019-10-06 RX ADMIN — AMLODIPINE BESYLATE 10 MILLIGRAM(S): 2.5 TABLET ORAL at 05:09

## 2019-10-06 RX ADMIN — Medication 100 MILLIGRAM(S): at 05:09

## 2019-10-06 RX ADMIN — Medication 250 MILLIGRAM(S): at 23:10

## 2019-10-06 RX ADMIN — Medication 100 MILLIGRAM(S): at 10:52

## 2019-10-06 RX ADMIN — Medication 20 MILLIGRAM(S): at 18:58

## 2019-10-06 RX ADMIN — ONDANSETRON 4 MILLIGRAM(S): 8 TABLET, FILM COATED ORAL at 21:36

## 2019-10-06 RX ADMIN — PANTOPRAZOLE SODIUM 40 MILLIGRAM(S): 20 TABLET, DELAYED RELEASE ORAL at 10:51

## 2019-10-06 RX ADMIN — PIPERACILLIN AND TAZOBACTAM 25 GRAM(S): 4; .5 INJECTION, POWDER, LYOPHILIZED, FOR SOLUTION INTRAVENOUS at 05:17

## 2019-10-06 RX ADMIN — Medication 20 MILLIGRAM(S): at 13:38

## 2019-10-06 RX ADMIN — POTASSIUM PHOSPHATE, MONOBASIC POTASSIUM PHOSPHATE, DIBASIC 83.33 MILLIMOLE(S): 236; 224 INJECTION, SOLUTION INTRAVENOUS at 12:31

## 2019-10-06 RX ADMIN — Medication 100 MILLIGRAM(S): at 21:39

## 2019-10-06 NOTE — PROGRESS NOTE ADULT - ASSESSMENT
80 yo female patient w/ multiple gangrenous wounds of right lower extremity causing sepsis, now s/p RLE alethea and recovering in SICU, plan for AKA or formalization on 10/7    Plan:  - Promote sleep cycle, education  - Preop/consent for procedure   - Knee immobilizer to prevent flexion contracture RLE  - Remainder care of chronic stable medical conditions unchanged    #[id number] Vascular

## 2019-10-06 NOTE — PROGRESS NOTE ADULT - SUBJECTIVE AND OBJECTIVE BOX
Vascular Surgery Progress Note/Preop Note    Subjective:    Patient seen and evaluated this AM with team. No acute vascular surgery events overnight. Transferred to floor. Still with bad insomnia, also says is lonely. No other concerns     OBJECTIVE:     ** PHYSICAL EXAM **    General: well-appearing; laying in bed  Neuro: Awake, alert  Respiratory: comfortable, NAD  Extremities: RLE guillotine amp; dressing mostly c/d/i, minimal strikethrough    Vital Signs Last 24 Hrs  T(C): 36.9 (06 Oct 2019 05:03), Max: 37 (06 Oct 2019 01:30)  T(F): 98.4 (06 Oct 2019 05:03), Max: 98.6 (06 Oct 2019 01:30)  HR: 72 (06 Oct 2019 05:03) (72 - 93)  BP: 170/77 (06 Oct 2019 05:03) (151/68 - 176/77)  BP(mean): --  RR: 17 (06 Oct 2019 05:03) (17 - 20)  SpO2: 99% (06 Oct 2019 05:03) (94% - 99%)    I&O's Detail    05 Oct 2019 07:01  -  06 Oct 2019 07:00  --------------------------------------------------------  IN:    Oral Fluid: 1180 mL    Solution: 100 mL  Total IN: 1280 mL    OUT:    Indwelling Catheter - Urethral: 1200 mL  Total OUT: 1200 mL    Total NET: 80 mL      MEDICATIONS  (STANDING):  amLODIPine   Tablet 10 milliGRAM(s) Oral daily  baclofen 20 milliGRAM(s) Oral <User Schedule>  docusate sodium 100 milliGRAM(s) Oral three times a day  enoxaparin Injectable 40 milliGRAM(s) SubCutaneous daily  influenza   Vaccine 0.5 milliLiter(s) IntraMuscular once  oxybutynin 5 milliGRAM(s) Oral daily  pantoprazole    Tablet 40 milliGRAM(s) Oral before breakfast  piperacillin/tazobactam IVPB.. 3.375 Gram(s) IV Intermittent every 8 hours  potassium phosphate IVPB 30 milliMole(s) IV Intermittent once  senna 2 Tablet(s) Oral at bedtime  vancomycin  IVPB 750 milliGRAM(s) IV Intermittent every 12 hours    MEDICATIONS  (PRN):  acetaminophen   Tablet .. 975 milliGRAM(s) Oral every 6 hours PRN Mild Pain (1 - 3)  benzocaine 15 mG/menthol 3.6 mG (Sugar-Free) Lozenge 1 Lozenge Oral every 3 hours PRN irritation  clonazePAM  Tablet 0.5 milliGRAM(s) Oral at bedtime PRN Anxiety  tetracaine/benzocaine/butamben Spray 1 Spray(s) Topical every 3 hours PRN irritation      LABS:                        10.8   5.81  )-----------( 303      ( 05 Oct 2019 05:47 )             33.9     10-06    137  |  101  |  15  ----------------------------<  99  3.5   |  25  |  0.74    Ca    8.6      06 Oct 2019 06:33  Phos  2.7     10-06  Mg     2.1     10-06

## 2019-10-06 NOTE — PROGRESS NOTE ADULT - ASSESSMENT
Pt previously seen by Dr. Hernandez.  Planned for right AKA tomorrow.  medical pre op eval: no active cardiac conditions, RCRI 0  no further medical workup pre op required  medically optimized for OR

## 2019-10-07 ENCOUNTER — RESULT REVIEW (OUTPATIENT)
Age: 79
End: 2019-10-07

## 2019-10-07 LAB
ANION GAP SERPL CALC-SCNC: 10 MMOL/L — SIGNIFICANT CHANGE UP (ref 5–17)
APTT BLD: 33.1 SEC — SIGNIFICANT CHANGE UP (ref 27.5–36.3)
BLD GP AB SCN SERPL QL: NEGATIVE — SIGNIFICANT CHANGE UP
BUN SERPL-MCNC: 13 MG/DL — SIGNIFICANT CHANGE UP (ref 7–23)
CALCIUM SERPL-MCNC: 8.3 MG/DL — LOW (ref 8.4–10.5)
CHLORIDE SERPL-SCNC: 99 MMOL/L — SIGNIFICANT CHANGE UP (ref 96–108)
CO2 SERPL-SCNC: 28 MMOL/L — SIGNIFICANT CHANGE UP (ref 22–31)
CREAT SERPL-MCNC: 0.56 MG/DL — SIGNIFICANT CHANGE UP (ref 0.5–1.3)
GLUCOSE SERPL-MCNC: 128 MG/DL — HIGH (ref 70–99)
HCT VFR BLD CALC: 30.4 % — LOW (ref 34.5–45)
HGB BLD-MCNC: 9.5 G/DL — LOW (ref 11.5–15.5)
INR BLD: 0.99 RATIO — SIGNIFICANT CHANGE UP (ref 0.88–1.16)
MAGNESIUM SERPL-MCNC: 2.1 MG/DL — SIGNIFICANT CHANGE UP (ref 1.6–2.6)
MCHC RBC-ENTMCNC: 27.3 PG — SIGNIFICANT CHANGE UP (ref 27–34)
MCHC RBC-ENTMCNC: 31.3 GM/DL — LOW (ref 32–36)
MCV RBC AUTO: 87.4 FL — SIGNIFICANT CHANGE UP (ref 80–100)
NRBC # BLD: 0 /100 WBCS — SIGNIFICANT CHANGE UP (ref 0–0)
PHOSPHATE SERPL-MCNC: 3.1 MG/DL — SIGNIFICANT CHANGE UP (ref 2.5–4.5)
PLATELET # BLD AUTO: 321 K/UL — SIGNIFICANT CHANGE UP (ref 150–400)
POTASSIUM SERPL-MCNC: 3.5 MMOL/L — SIGNIFICANT CHANGE UP (ref 3.5–5.3)
POTASSIUM SERPL-SCNC: 3.5 MMOL/L — SIGNIFICANT CHANGE UP (ref 3.5–5.3)
PROTHROM AB SERPL-ACNC: 11.4 SEC — SIGNIFICANT CHANGE UP (ref 10–12.9)
RBC # BLD: 3.48 M/UL — LOW (ref 3.8–5.2)
RBC # FLD: 16.1 % — HIGH (ref 10.3–14.5)
RH IG SCN BLD-IMP: POSITIVE — SIGNIFICANT CHANGE UP
SODIUM SERPL-SCNC: 137 MMOL/L — SIGNIFICANT CHANGE UP (ref 135–145)
SURGICAL PATHOLOGY STUDY: SIGNIFICANT CHANGE UP
VANCOMYCIN TROUGH SERPL-MCNC: 18.8 UG/ML — SIGNIFICANT CHANGE UP (ref 10–20)
WBC # BLD: 6.3 K/UL — SIGNIFICANT CHANGE UP (ref 3.8–10.5)
WBC # FLD AUTO: 6.3 K/UL — SIGNIFICANT CHANGE UP (ref 3.8–10.5)

## 2019-10-07 PROCEDURE — 76377 3D RENDER W/INTRP POSTPROCES: CPT

## 2019-10-07 PROCEDURE — 73502 X-RAY EXAM HIP UNI 2-3 VIEWS: CPT

## 2019-10-07 PROCEDURE — 23500 CLTX CLAVICULAR FX W/O MNPJ: CPT | Mod: LT

## 2019-10-07 PROCEDURE — 93005 ELECTROCARDIOGRAM TRACING: CPT | Mod: XU

## 2019-10-07 PROCEDURE — 85610 PROTHROMBIN TIME: CPT

## 2019-10-07 PROCEDURE — 73000 X-RAY EXAM OF COLLAR BONE: CPT

## 2019-10-07 PROCEDURE — 99284 EMERGENCY DEPT VISIT MOD MDM: CPT | Mod: 25

## 2019-10-07 PROCEDURE — 72170 X-RAY EXAM OF PELVIS: CPT

## 2019-10-07 PROCEDURE — 73030 X-RAY EXAM OF SHOULDER: CPT

## 2019-10-07 PROCEDURE — 27590 AMPUTATE LEG AT THIGH: CPT | Mod: RT

## 2019-10-07 PROCEDURE — 86901 BLOOD TYPING SEROLOGIC RH(D): CPT

## 2019-10-07 PROCEDURE — 88311 DECALCIFY TISSUE: CPT

## 2019-10-07 PROCEDURE — 85730 THROMBOPLASTIN TIME PARTIAL: CPT

## 2019-10-07 PROCEDURE — 73552 X-RAY EXAM OF FEMUR 2/>: CPT

## 2019-10-07 PROCEDURE — 80053 COMPREHEN METABOLIC PANEL: CPT

## 2019-10-07 PROCEDURE — 71045 X-RAY EXAM CHEST 1 VIEW: CPT

## 2019-10-07 PROCEDURE — 88307 TISSUE EXAM BY PATHOLOGIST: CPT

## 2019-10-07 PROCEDURE — 86922 COMPATIBILITY TEST ANTIGLOB: CPT

## 2019-10-07 PROCEDURE — 88307 TISSUE EXAM BY PATHOLOGIST: CPT | Mod: 26

## 2019-10-07 PROCEDURE — 88311 DECALCIFY TISSUE: CPT | Mod: 26

## 2019-10-07 PROCEDURE — 73700 CT LOWER EXTREMITY W/O DYE: CPT

## 2019-10-07 PROCEDURE — 86900 BLOOD TYPING SEROLOGIC ABO: CPT

## 2019-10-07 PROCEDURE — 85027 COMPLETE CBC AUTOMATED: CPT

## 2019-10-07 PROCEDURE — 86850 RBC ANTIBODY SCREEN: CPT

## 2019-10-07 RX ORDER — SODIUM CHLORIDE 9 MG/ML
1000 INJECTION, SOLUTION INTRAVENOUS
Refills: 0 | Status: DISCONTINUED | OUTPATIENT
Start: 2019-10-07 | End: 2019-10-07

## 2019-10-07 RX ORDER — SODIUM CHLORIDE 9 MG/ML
1000 INJECTION, SOLUTION INTRAVENOUS
Refills: 0 | Status: DISCONTINUED | OUTPATIENT
Start: 2019-10-07 | End: 2019-10-08

## 2019-10-07 RX ORDER — SODIUM CHLORIDE 9 MG/ML
250 INJECTION, SOLUTION INTRAVENOUS ONCE
Refills: 0 | Status: COMPLETED | OUTPATIENT
Start: 2019-10-07 | End: 2019-10-07

## 2019-10-07 RX ORDER — ONDANSETRON 8 MG/1
4 TABLET, FILM COATED ORAL ONCE
Refills: 0 | Status: DISCONTINUED | OUTPATIENT
Start: 2019-10-07 | End: 2019-10-07

## 2019-10-07 RX ADMIN — SODIUM CHLORIDE 500 MILLILITER(S): 9 INJECTION, SOLUTION INTRAVENOUS at 11:09

## 2019-10-07 RX ADMIN — SODIUM CHLORIDE 100 MILLILITER(S): 9 INJECTION, SOLUTION INTRAVENOUS at 05:32

## 2019-10-07 RX ADMIN — PIPERACILLIN AND TAZOBACTAM 25 GRAM(S): 4; .5 INJECTION, POWDER, LYOPHILIZED, FOR SOLUTION INTRAVENOUS at 14:02

## 2019-10-07 RX ADMIN — AMLODIPINE BESYLATE 10 MILLIGRAM(S): 2.5 TABLET ORAL at 05:32

## 2019-10-07 RX ADMIN — Medication 250 MILLIGRAM(S): at 11:27

## 2019-10-07 RX ADMIN — Medication 100 MILLIGRAM(S): at 21:07

## 2019-10-07 RX ADMIN — ENOXAPARIN SODIUM 40 MILLIGRAM(S): 100 INJECTION SUBCUTANEOUS at 12:09

## 2019-10-07 RX ADMIN — Medication 100 MILLIGRAM(S): at 05:32

## 2019-10-07 RX ADMIN — PIPERACILLIN AND TAZOBACTAM 25 GRAM(S): 4; .5 INJECTION, POWDER, LYOPHILIZED, FOR SOLUTION INTRAVENOUS at 03:21

## 2019-10-07 RX ADMIN — SODIUM CHLORIDE 100 MILLILITER(S): 9 INJECTION, SOLUTION INTRAVENOUS at 11:41

## 2019-10-07 RX ADMIN — ONDANSETRON 4 MILLIGRAM(S): 8 TABLET, FILM COATED ORAL at 19:27

## 2019-10-07 RX ADMIN — SENNA PLUS 2 TABLET(S): 8.6 TABLET ORAL at 21:07

## 2019-10-07 RX ADMIN — Medication 20 MILLIGRAM(S): at 21:07

## 2019-10-07 RX ADMIN — ONDANSETRON 4 MILLIGRAM(S): 8 TABLET, FILM COATED ORAL at 05:33

## 2019-10-07 RX ADMIN — Medication 20 MILLIGRAM(S): at 17:25

## 2019-10-07 RX ADMIN — PIPERACILLIN AND TAZOBACTAM 25 GRAM(S): 4; .5 INJECTION, POWDER, LYOPHILIZED, FOR SOLUTION INTRAVENOUS at 21:06

## 2019-10-07 NOTE — BRIEF OPERATIVE NOTE - NSICDXBRIEFPROCEDURE_GEN_ALL_CORE_FT
PROCEDURES:  Right above knee amputation 07-Oct-2019 09:46:03  Vlad Soto
PROCEDURES:  Amputation, below knee, alethea 01-Oct-2019 01:23:02  Gricelda Osorio

## 2019-10-07 NOTE — PROGRESS NOTE ADULT - SUBJECTIVE AND OBJECTIVE BOX
VASCULAR SURGERY POST OP NOTE    STATUS POST:  Right above knee amputation  POST OPERATIVE DAY #: 0    SUBJECTIVE: Pt seen and examined at bedside.  Eating lunch, has no complaints. Denies LE pain, chest pain, sob, fever, chills.    OBJECTIVE:  Vital Signs Last 24 Hrs  T(C): 37.1 (07 Oct 2019 14:26), Max: 37.1 (07 Oct 2019 14:26)  T(F): 98.7 (07 Oct 2019 14:26), Max: 98.7 (07 Oct 2019 14:26)  HR: 96 (07 Oct 2019 14:26) (67 - 124)  BP: 152/82 (07 Oct 2019 14:26) (140/75 - 196/100)  BP(mean): 99 (07 Oct 2019 11:30) (99 - 141)  RR: 18 (07 Oct 2019 14:26) (14 - 18)  SpO2: 95% (07 Oct 2019 14:26) (94% - 99%)    I&O's Summary    06 Oct 2019 07:01  -  07 Oct 2019 07:00  --------------------------------------------------------  IN: 2490 mL / OUT: 1300 mL / NET: 1190 mL    07 Oct 2019 07:01  -  07 Oct 2019 15:06  --------------------------------------------------------  IN: 740 mL / OUT: 850 mL / NET: -110 mL    Physical Exam:  General Appearance: Appears well, NAD  Chest: Equal expansion bilaterally, equal breath sounds  CV: Pulse regular presently  Abdomen: Soft, NT, ND  Extremities: RLE warm, NTTP, dressing c/d/i    LABS:                        9.5    6.30  )-----------( 321      ( 07 Oct 2019 02:50 )             30.4     10-07    137  |  99  |  13  ----------------------------<  128<H>  3.5   |  28  |  0.56    Ca    8.3<L>      07 Oct 2019 02:50  Phos  3.1     10-07  Mg     2.1     10-07      PT/INR - ( 07 Oct 2019 02:50 )   PT: 11.4 sec;   INR: 0.99 ratio         PTT - ( 07 Oct 2019 02:50 )  PTT:33.1 sec      RADIOLOGY & ADDITIONAL STUDIES:

## 2019-10-07 NOTE — BRIEF OPERATIVE NOTE - NSICDXBRIEFPOSTOP_GEN_ALL_CORE_FT
POST-OP DIAGNOSIS:  Gangrene of lower extremity 07-Oct-2019 09:46:35  Vlad Soto
POST-OP DIAGNOSIS:  Gangrene 01-Oct-2019 01:23:35  Gricelda Osorio

## 2019-10-07 NOTE — PROGRESS NOTE ADULT - SUBJECTIVE AND OBJECTIVE BOX
Subjective:    Patient seen and evaluated this AM with team.   Patient had some bleeding at stump overnight, but was found to be clinically stable with no acute changes on CBC.  She is ready for OR today for AKA vs. completion BKA.    OBJECTIVE:     ** PHYSICAL EXAM **    General: well-appearing; laying in bed  Neuro: Awake, alert  Respiratory: comfortable, NAD  Extremities: RLE guillotine amp; dressing mostly c/d/i, minimal strikethrough      ** VITAL SIGNS / I&O's **    Vital Signs Last 24 Hrs  T(C): 36.3 (07 Oct 2019 05:26), Max: 37.2 (06 Oct 2019 11:57)  T(F): 97.4 (07 Oct 2019 05:26), Max: 98.9 (06 Oct 2019 11:57)  HR: 77 (07 Oct 2019 05:26) (67 - 79)  BP: 159/78 (07 Oct 2019 05:26) (140/75 - 168/73)  BP(mean): --  RR: 17 (07 Oct 2019 05:26) (16 - 17)  SpO2: 94% (07 Oct 2019 05:26) (94% - 98%)      05 Oct 2019 07:01  -  06 Oct 2019 07:00  --------------------------------------------------------  IN:    Oral Fluid: 1180 mL    Solution: 100 mL  Total IN: 1280 mL    OUT:    Indwelling Catheter - Urethral: 1200 mL  Total OUT: 1200 mL    Total NET: 80 mL      06 Oct 2019 07:01  -  07 Oct 2019 06:10  --------------------------------------------------------  IN:    dextrose 5% + sodium chloride 0.45%: 700 mL    Oral Fluid: 940 mL    Solution: 250 mL    Solution: 500 mL  Total IN: 2390 mL    OUT:    Indwelling Catheter - Urethral: 1200 mL  Total OUT: 1200 mL    Total NET: 1190 mL            ** LABS **                          9.5    6.30  )-----------( 321      ( 07 Oct 2019 02:50 )             30.4     07 Oct 2019 02:50    137    |  99     |  13     ----------------------------<  128    3.5     |  28     |  0.56     Ca    8.3        07 Oct 2019 02:50  Phos  3.1       07 Oct 2019 02:50  Mg     2.1       07 Oct 2019 02:50      PT/INR - ( 07 Oct 2019 02:50 )   PT: 11.4 sec;   INR: 0.99 ratio         PTT - ( 07 Oct 2019 02:50 )  PTT:33.1 sec  CAPILLARY BLOOD GLUCOSE                    MEDICATIONS  (STANDING):  amLODIPine   Tablet 10 milliGRAM(s) Oral daily  baclofen 20 milliGRAM(s) Oral <User Schedule>  dextrose 5% + sodium chloride 0.45% 1000 milliLiter(s) (100 mL/Hr) IV Continuous <Continuous>  docusate sodium 100 milliGRAM(s) Oral three times a day  enoxaparin Injectable 40 milliGRAM(s) SubCutaneous daily  influenza   Vaccine 0.5 milliLiter(s) IntraMuscular once  oxybutynin 5 milliGRAM(s) Oral daily  pantoprazole    Tablet 40 milliGRAM(s) Oral before breakfast  piperacillin/tazobactam IVPB.. 3.375 Gram(s) IV Intermittent every 8 hours  senna 2 Tablet(s) Oral at bedtime  vancomycin  IVPB 750 milliGRAM(s) IV Intermittent every 12 hours    MEDICATIONS  (PRN):  acetaminophen   Tablet .. 975 milliGRAM(s) Oral every 6 hours PRN Mild Pain (1 - 3)  benzocaine 15 mG/menthol 3.6 mG (Sugar-Free) Lozenge 1 Lozenge Oral every 3 hours PRN irritation  clonazePAM  Tablet 0.5 milliGRAM(s) Oral at bedtime PRN Anxiety  ondansetron Injectable 4 milliGRAM(s) IV Push every 6 hours PRN Nausea and/or Vomiting  tetracaine/benzocaine/butamben Spray 1 Spray(s) Topical every 3 hours PRN irritation

## 2019-10-07 NOTE — PROGRESS NOTE ADULT - ASSESSMENT
80 yo female patient w/ multiple gangrenous wounds of right lower extremity causing sepsis, now s/p RLE sunikeiko, transferred from SICU to floor, stable, s/p RLE AKA 10/7    Plan:  - DVT ppx  - Cont abx: Vanc, Zosyn  - Reg diet  - Pain control  - AM labs  - PT re-eval      Vascular Sx [id number]

## 2019-10-07 NOTE — PROGRESS NOTE ADULT - ASSESSMENT
78 yo female patient w/ multiple gangrenous wounds of right lower extremity causing sepsis, now s/p HA claire and recovering in SICU, plan for AKA or formalization on 10/7    Plan:  - OR today

## 2019-10-07 NOTE — BRIEF OPERATIVE NOTE - NSICDXBRIEFPREOP_GEN_ALL_CORE_FT
PRE-OP DIAGNOSIS:  Gas gangrene of lower extremity 07-Oct-2019 09:46:21  Vlad Soto
PRE-OP DIAGNOSIS:  Gangrene 01-Oct-2019 01:23:21  Gricelda Osorio

## 2019-10-08 LAB
ANION GAP SERPL CALC-SCNC: 9 MMOL/L — SIGNIFICANT CHANGE UP (ref 5–17)
BUN SERPL-MCNC: 8 MG/DL — SIGNIFICANT CHANGE UP (ref 7–23)
CALCIUM SERPL-MCNC: 8 MG/DL — LOW (ref 8.4–10.5)
CHLORIDE SERPL-SCNC: 103 MMOL/L — SIGNIFICANT CHANGE UP (ref 96–108)
CO2 SERPL-SCNC: 28 MMOL/L — SIGNIFICANT CHANGE UP (ref 22–31)
CREAT SERPL-MCNC: 0.61 MG/DL — SIGNIFICANT CHANGE UP (ref 0.5–1.3)
GLUCOSE SERPL-MCNC: 111 MG/DL — HIGH (ref 70–99)
HCT VFR BLD CALC: 24.1 % — LOW (ref 34.5–45)
HCT VFR BLD CALC: 24.5 % — LOW (ref 34.5–45)
HGB BLD-MCNC: 7.4 G/DL — LOW (ref 11.5–15.5)
HGB BLD-MCNC: 7.9 G/DL — LOW (ref 11.5–15.5)
MAGNESIUM SERPL-MCNC: 2 MG/DL — SIGNIFICANT CHANGE UP (ref 1.6–2.6)
MCHC RBC-ENTMCNC: 27.2 PG — SIGNIFICANT CHANGE UP (ref 27–34)
MCHC RBC-ENTMCNC: 28.6 PG — SIGNIFICANT CHANGE UP (ref 27–34)
MCHC RBC-ENTMCNC: 30.7 GM/DL — LOW (ref 32–36)
MCHC RBC-ENTMCNC: 32.2 GM/DL — SIGNIFICANT CHANGE UP (ref 32–36)
MCV RBC AUTO: 88.6 FL — SIGNIFICANT CHANGE UP (ref 80–100)
MCV RBC AUTO: 88.8 FL — SIGNIFICANT CHANGE UP (ref 80–100)
NRBC # BLD: 0 /100 WBCS — SIGNIFICANT CHANGE UP (ref 0–0)
NRBC # BLD: 0 /100 WBCS — SIGNIFICANT CHANGE UP (ref 0–0)
PHOSPHATE SERPL-MCNC: 1.8 MG/DL — LOW (ref 2.5–4.5)
PLATELET # BLD AUTO: 243 K/UL — SIGNIFICANT CHANGE UP (ref 150–400)
PLATELET # BLD AUTO: 274 K/UL — SIGNIFICANT CHANGE UP (ref 150–400)
POTASSIUM SERPL-MCNC: 3.4 MMOL/L — LOW (ref 3.5–5.3)
POTASSIUM SERPL-SCNC: 3.4 MMOL/L — LOW (ref 3.5–5.3)
RBC # BLD: 2.72 M/UL — LOW (ref 3.8–5.2)
RBC # BLD: 2.76 M/UL — LOW (ref 3.8–5.2)
RBC # FLD: 16.3 % — HIGH (ref 10.3–14.5)
RBC # FLD: 16.4 % — HIGH (ref 10.3–14.5)
SODIUM SERPL-SCNC: 140 MMOL/L — SIGNIFICANT CHANGE UP (ref 135–145)
WBC # BLD: 7.29 K/UL — SIGNIFICANT CHANGE UP (ref 3.8–10.5)
WBC # BLD: 7.65 K/UL — SIGNIFICANT CHANGE UP (ref 3.8–10.5)
WBC # FLD AUTO: 7.29 K/UL — SIGNIFICANT CHANGE UP (ref 3.8–10.5)
WBC # FLD AUTO: 7.65 K/UL — SIGNIFICANT CHANGE UP (ref 3.8–10.5)

## 2019-10-08 RX ORDER — PIPERACILLIN AND TAZOBACTAM 4; .5 G/20ML; G/20ML
3.38 INJECTION, POWDER, LYOPHILIZED, FOR SOLUTION INTRAVENOUS EVERY 8 HOURS
Refills: 0 | Status: DISCONTINUED | OUTPATIENT
Start: 2019-10-08 | End: 2019-10-09

## 2019-10-08 RX ORDER — TRAMADOL HYDROCHLORIDE 50 MG/1
50 TABLET ORAL ONCE
Refills: 0 | Status: DISCONTINUED | OUTPATIENT
Start: 2019-10-08 | End: 2019-10-08

## 2019-10-08 RX ORDER — TRAMADOL HYDROCHLORIDE 50 MG/1
25 TABLET ORAL ONCE
Refills: 0 | Status: DISCONTINUED | OUTPATIENT
Start: 2019-10-08 | End: 2019-10-08

## 2019-10-08 RX ORDER — POTASSIUM PHOSPHATE, MONOBASIC POTASSIUM PHOSPHATE, DIBASIC 236; 224 MG/ML; MG/ML
30 INJECTION, SOLUTION INTRAVENOUS ONCE
Refills: 0 | Status: COMPLETED | OUTPATIENT
Start: 2019-10-08 | End: 2019-10-08

## 2019-10-08 RX ADMIN — Medication 100 MILLIGRAM(S): at 05:16

## 2019-10-08 RX ADMIN — POTASSIUM PHOSPHATE, MONOBASIC POTASSIUM PHOSPHATE, DIBASIC 83.33 MILLIMOLE(S): 236; 224 INJECTION, SOLUTION INTRAVENOUS at 09:49

## 2019-10-08 RX ADMIN — SENNA PLUS 2 TABLET(S): 8.6 TABLET ORAL at 22:29

## 2019-10-08 RX ADMIN — PIPERACILLIN AND TAZOBACTAM 25 GRAM(S): 4; .5 INJECTION, POWDER, LYOPHILIZED, FOR SOLUTION INTRAVENOUS at 14:51

## 2019-10-08 RX ADMIN — Medication 250 MILLIGRAM(S): at 00:43

## 2019-10-08 RX ADMIN — Medication 20 MILLIGRAM(S): at 08:11

## 2019-10-08 RX ADMIN — Medication 20 MILLIGRAM(S): at 11:23

## 2019-10-08 RX ADMIN — PANTOPRAZOLE SODIUM 40 MILLIGRAM(S): 20 TABLET, DELAYED RELEASE ORAL at 08:58

## 2019-10-08 RX ADMIN — ENOXAPARIN SODIUM 40 MILLIGRAM(S): 100 INJECTION SUBCUTANEOUS at 11:24

## 2019-10-08 RX ADMIN — TRAMADOL HYDROCHLORIDE 50 MILLIGRAM(S): 50 TABLET ORAL at 06:29

## 2019-10-08 RX ADMIN — TRAMADOL HYDROCHLORIDE 25 MILLIGRAM(S): 50 TABLET ORAL at 10:15

## 2019-10-08 RX ADMIN — PIPERACILLIN AND TAZOBACTAM 25 GRAM(S): 4; .5 INJECTION, POWDER, LYOPHILIZED, FOR SOLUTION INTRAVENOUS at 22:28

## 2019-10-08 RX ADMIN — Medication 100 MILLIGRAM(S): at 13:41

## 2019-10-08 RX ADMIN — SODIUM CHLORIDE 50 MILLILITER(S): 9 INJECTION, SOLUTION INTRAVENOUS at 05:16

## 2019-10-08 RX ADMIN — Medication 20 MILLIGRAM(S): at 17:32

## 2019-10-08 RX ADMIN — TRAMADOL HYDROCHLORIDE 25 MILLIGRAM(S): 50 TABLET ORAL at 09:48

## 2019-10-08 RX ADMIN — Medication 100 MILLIGRAM(S): at 22:29

## 2019-10-08 RX ADMIN — Medication 20 MILLIGRAM(S): at 22:29

## 2019-10-08 RX ADMIN — AMLODIPINE BESYLATE 10 MILLIGRAM(S): 2.5 TABLET ORAL at 05:16

## 2019-10-08 RX ADMIN — Medication 5 MILLIGRAM(S): at 11:26

## 2019-10-08 RX ADMIN — TRAMADOL HYDROCHLORIDE 50 MILLIGRAM(S): 50 TABLET ORAL at 05:49

## 2019-10-08 NOTE — CHART NOTE - NSCHARTNOTEFT_GEN_A_CORE
Source: Patient [ x]    HHA but provided little to no information  Patient seen for routine length of stay follow up. Patient found resting in bed, pleasant but c/o nausea.  Patient ate most of Bengali toast, egg, leggett and cranberry juice and now with upset stomach.  Discussed need to maintain protein and calorie intake in wound healing,  Suggested Health Shakes and patient interested but firmly not interested in Eder.  Explained benefits and wrote down name of supplement for possible future use. Patient noted to take many supplements at home but HHA unable to discuss.  Patient noted to like Ensure Enlive PTA. Patient noted with Stage 3 wound to sacrum.    Diet : Regular      Patient reports [x ] nausea  and RN made aware.   PO intake:  < 50% [ ] 50-75% [x ]   % [ ]  other :          Current Weight: Weight (kg): 57.5 (10-07 @ 07:13)  % Weight Change- weight stable at 127 pounds    Pertinent Medications: MEDICATIONS  (STANDING):  amLODIPine   Tablet 10 milliGRAM(s) Oral daily  baclofen 20 milliGRAM(s) Oral <User Schedule>  dextrose 5% + sodium chloride 0.45% 1000 milliLiter(s) (50 mL/Hr) IV Continuous <Continuous>  docusate sodium 100 milliGRAM(s) Oral three times a day  enoxaparin Injectable 40 milliGRAM(s) SubCutaneous daily  influenza   Vaccine 0.5 milliLiter(s) IntraMuscular once  oxybutynin 5 milliGRAM(s) Oral daily  pantoprazole    Tablet 40 milliGRAM(s) Oral before breakfast  potassium phosphate IVPB 30 milliMole(s) IV Intermittent once  senna 2 Tablet(s) Oral at bedtime  traMADol 25 milliGRAM(s) Oral once  vancomycin  IVPB 750 milliGRAM(s) IV Intermittent every 12 hours    MEDICATIONS  (PRN):  acetaminophen   Tablet .. 975 milliGRAM(s) Oral every 6 hours PRN Mild Pain (1 - 3)  benzocaine 15 mG/menthol 3.6 mG (Sugar-Free) Lozenge 1 Lozenge Oral every 3 hours PRN irritation  clonazePAM  Tablet 0.5 milliGRAM(s) Oral at bedtime PRN Anxiety  ondansetron Injectable 4 milliGRAM(s) IV Push every 6 hours PRN Nausea and/or Vomiting  tetracaine/benzocaine/butamben Spray 1 Spray(s) Topical every 3 hours PRN irritation    Pertinent Labs:  10-08 Na140 mmol/L Glu 111 mg/dL<H> K+ 3.4 mmol/L<L> Cr  0.61 mg/dL BUN 8 mg/dL 10-08 Phos 1.8 mg/dL<L>      Skin: Stage 3 to sacrum , Right stump wound    Estimated Needs:   [ ] no change since previous assessment  [x ] recalculated: [id number] kcals- 25-30kcals/kg BW, 57-69 gram pro- 1.0- 1.2 grams/kg BW      Previous Nutrition Diagnosis:     [ ] Inadequate Energy Intake [ ]Inadequate Oral Intake [ ] Excessive Energy Intake     [ ] Underweight [x ] Increased Nutrient Needs [ ] Overweight/Obesity     [ ] Altered GI Function [ ] Unintended Weight Loss [ ] Food & Nutrition Related Knowledge Deficit [ ] Malnutrition          Nutrition Diagnosis is [x ] ongoing and persists related to sacral wound and right AKA stump.    Suggest addition of Ensure Enlive 8 ounces x2, multivitamin, Vit C and Vit D           Recommend    [ ] Change Diet To:    [ x] Nutrition Supplement- ensure Enlive 8 ounces x2  Eder x2 if patient allows      [x ] Other: multivitamin, vit C and D       Monitoring and Evaluation: Monitor diet tolerance, po intake, GI tolerance, weight trends, labs, and skin integrity      RDN remains available for follow up   Manuela Arnold MA,RD,CHES,CDN,CSG  Beeper [phone]

## 2019-10-08 NOTE — PROGRESS NOTE ADULT - ASSESSMENT
79F with PMHx of multiple sclerosis (progressive and not currently on any medication), HTN, neurogenic bladder (has had a Kendrick catheter for many years) and peripheral vascular disease presents with right foot pain for one month. sp emergent AKA for wet gangrene 10/1. sp right above knee amputation.

## 2019-10-08 NOTE — CHART NOTE - NSCHARTNOTEFT_GEN_A_CORE
noted tachycardia    pt seen and examined at bedside. No complaints. pain is well controlled. denies dizziness, SOB, CP or palpitations.     Vitals  /73, ID [id number], O2sat 95% on RA  PE   Gen: NAD, Oriented to people and situation  Pulm: non labor breathing   Heart: borderline tachy  Ext: right AKA stump with dressing in place, c/d/i, no strikethrough bleeding     Plan  - continue monitor vitals  - will send CBC if persistent tachy  - Pain control    Vascular Surgery   [id number]

## 2019-10-08 NOTE — PROGRESS NOTE ADULT - ASSESSMENT
78 yo female patient w/ multiple gangrenous wounds of right lower extremity causing sepsis, now s/p RLE alethea, transferred from SICU to floor, stable, s/p RLE AKA 10/7    Plan:  - Continue ABx  - PT re-eval  - f/u CBC 80 yo female patient w/ multiple gangrenous wounds of right lower extremity causing sepsis, now s/p RLE alethea, transferred from SICU to floor, stable, s/p RLE AKA 10/7    Plan:  - dc vanc, continue zosyn  - PT re-eval  - f/u CBC 80 yo female patient w/ multiple gangrenous wounds of right lower extremity causing sepsis, now s/p RLE alethea, transferred from SICU to floor, stable, s/p RLE JAMIR 10/7    Plan:  - continue  vanc, continue zosyn for an additional 24 hours   - PT re-eval  - f/u CBC  d/c planning

## 2019-10-08 NOTE — PROGRESS NOTE ADULT - SUBJECTIVE AND OBJECTIVE BOX
Subjective:    Patient seen and evaluated this AM with team.  She reports no acute complaints, but she has noted she feels "loopy" sometimes after receiving her pain medications.  Otherwise, pain under control.  Tolerating diet.      OBJECTIVE:     ** PHYSICAL EXAM **    General Appearance: Appears well, NAD  Chest: Equal expansion bilaterally  Abdomen: Soft, NT, ND  Extremities: RLE warm, NTTP, dressing c/d/i    ** VITAL SIGNS / I&O's **    Vital Signs Last 24 Hrs  T(C): 37.2 (08 Oct 2019 09:15), Max: 37.5 (07 Oct 2019 22:12)  T(F): 99 (08 Oct 2019 09:15), Max: 99.5 (07 Oct 2019 22:12)  HR: 102 (08 Oct 2019 09:15) (80 - 124)  BP: 132/73 (08 Oct 2019 09:15) (126/68 - 196/100)  BP(mean): 99 (07 Oct 2019 11:30) (99 - 141)  RR: 16 (08 Oct 2019 09:15) (16 - 18)  SpO2: 95% (08 Oct 2019 09:15) (94% - 99%)      07 Oct 2019 07:01  -  08 Oct 2019 07:00  --------------------------------------------------------  IN:    dextrose 5% + sodium chloride 0.45%: 600 mL    Oral Fluid: 940 mL    Solution: 250 mL    Solution: 250 mL  Total IN: 2040 mL    OUT:    Indwelling Catheter - Urethral: 2925 mL  Total OUT: 2925 mL    Total NET: -885 mL      08 Oct 2019 07:01  -  08 Oct 2019 09:50  --------------------------------------------------------  IN:    Oral Fluid: 240 mL  Total IN: 240 mL    OUT:    Indwelling Catheter - Urethral: 300 mL  Total OUT: 300 mL    Total NET: -60 mL            ** LABS **                          7.9    7.65  )-----------( 274      ( 08 Oct 2019 06:30 )             24.5     08 Oct 2019 06:30    140    |  103    |  8      ----------------------------<  111    3.4     |  28     |  0.61     Ca    8.0        08 Oct 2019 06:30  Phos  1.8       08 Oct 2019 06:30  Mg     2.0       08 Oct 2019 06:30      PT/INR - ( 07 Oct 2019 02:50 )   PT: 11.4 sec;   INR: 0.99 ratio         PTT - ( 07 Oct 2019 02:50 )  PTT:33.1 sec    MEDICATIONS  (STANDING):  amLODIPine   Tablet 10 milliGRAM(s) Oral daily  baclofen 20 milliGRAM(s) Oral <User Schedule>  dextrose 5% + sodium chloride 0.45% 1000 milliLiter(s) (50 mL/Hr) IV Continuous <Continuous>  docusate sodium 100 milliGRAM(s) Oral three times a day  enoxaparin Injectable 40 milliGRAM(s) SubCutaneous daily  influenza   Vaccine 0.5 milliLiter(s) IntraMuscular once  oxybutynin 5 milliGRAM(s) Oral daily  pantoprazole    Tablet 40 milliGRAM(s) Oral before breakfast  piperacillin/tazobactam IVPB.. 3.375 Gram(s) IV Intermittent every 8 hours  potassium phosphate IVPB 30 milliMole(s) IV Intermittent once  senna 2 Tablet(s) Oral at bedtime  traMADol 25 milliGRAM(s) Oral once    MEDICATIONS  (PRN):  acetaminophen   Tablet .. 975 milliGRAM(s) Oral every 6 hours PRN Mild Pain (1 - 3)  benzocaine 15 mG/menthol 3.6 mG (Sugar-Free) Lozenge 1 Lozenge Oral every 3 hours PRN irritation  clonazePAM  Tablet 0.5 milliGRAM(s) Oral at bedtime PRN Anxiety  ondansetron Injectable 4 milliGRAM(s) IV Push every 6 hours PRN Nausea and/or Vomiting  tetracaine/benzocaine/butamben Spray 1 Spray(s) Topical every 3 hours PRN irritation Subjective:    Patient seen and evaluated this AM with team. pt states some incisional pain. Tolerating diet.      OBJECTIVE:     ** PHYSICAL EXAM **    General Appearance: Appears well, NAD  Chest: Equal expansion bilaterally  Abdomen: Soft, NT, ND  Extremities: RLE warm, NTTP, dressing c/d/i    ** VITAL SIGNS / I&O's **    Vital Signs Last 24 Hrs  T(C): 37.2 (08 Oct 2019 09:15), Max: 37.5 (07 Oct 2019 22:12)  T(F): 99 (08 Oct 2019 09:15), Max: 99.5 (07 Oct 2019 22:12)  HR: 102 (08 Oct 2019 09:15) (80 - 124)  BP: 132/73 (08 Oct 2019 09:15) (126/68 - 196/100)  BP(mean): 99 (07 Oct 2019 11:30) (99 - 141)  RR: 16 (08 Oct 2019 09:15) (16 - 18)  SpO2: 95% (08 Oct 2019 09:15) (94% - 99%)      07 Oct 2019 07:01  -  08 Oct 2019 07:00  --------------------------------------------------------  IN:    dextrose 5% + sodium chloride 0.45%: 600 mL    Oral Fluid: 940 mL    Solution: 250 mL    Solution: 250 mL  Total IN: 2040 mL    OUT:    Indwelling Catheter - Urethral: 2925 mL  Total OUT: 2925 mL    Total NET: -885 mL      08 Oct 2019 07:01  -  08 Oct 2019 09:50  --------------------------------------------------------  IN:    Oral Fluid: 240 mL  Total IN: 240 mL    OUT:    Indwelling Catheter - Urethral: 300 mL  Total OUT: 300 mL    Total NET: -60 mL      ** LABS **                          7.9    7.65  )-----------( 274      ( 08 Oct 2019 06:30 )             24.5     08 Oct 2019 06:30    140    |  103    |  8      ----------------------------<  111    3.4     |  28     |  0.61     Ca    8.0        08 Oct 2019 06:30  Phos  1.8       08 Oct 2019 06:30  Mg     2.0       08 Oct 2019 06:30      PT/INR - ( 07 Oct 2019 02:50 )   PT: 11.4 sec;   INR: 0.99 ratio         PTT - ( 07 Oct 2019 02:50 )  PTT:33.1 sec    MEDICATIONS  (STANDING):  amLODIPine   Tablet 10 milliGRAM(s) Oral daily  baclofen 20 milliGRAM(s) Oral <User Schedule>  dextrose 5% + sodium chloride 0.45% 1000 milliLiter(s) (50 mL/Hr) IV Continuous <Continuous>  docusate sodium 100 milliGRAM(s) Oral three times a day  enoxaparin Injectable 40 milliGRAM(s) SubCutaneous daily  influenza   Vaccine 0.5 milliLiter(s) IntraMuscular once  oxybutynin 5 milliGRAM(s) Oral daily  pantoprazole    Tablet 40 milliGRAM(s) Oral before breakfast  piperacillin/tazobactam IVPB.. 3.375 Gram(s) IV Intermittent every 8 hours  potassium phosphate IVPB 30 milliMole(s) IV Intermittent once  senna 2 Tablet(s) Oral at bedtime  traMADol 25 milliGRAM(s) Oral once    MEDICATIONS  (PRN):  acetaminophen   Tablet .. 975 milliGRAM(s) Oral every 6 hours PRN Mild Pain (1 - 3)  benzocaine 15 mG/menthol 3.6 mG (Sugar-Free) Lozenge 1 Lozenge Oral every 3 hours PRN irritation  clonazePAM  Tablet 0.5 milliGRAM(s) Oral at bedtime PRN Anxiety  ondansetron Injectable 4 milliGRAM(s) IV Push every 6 hours PRN Nausea and/or Vomiting  tetracaine/benzocaine/butamben Spray 1 Spray(s) Topical every 3 hours PRN irritation

## 2019-10-08 NOTE — PROGRESS NOTE ADULT - SUBJECTIVE AND OBJECTIVE BOX
Patient is a 79y old  Female who presents with a chief complaint of Right Leg Pain (08 Oct 2019 09:49)      SUBJECTIVE / OVERNIGHT EVENTS:    Patient seen and examined. denies cp sob. co pain right leg.       Vital Signs Last 24 Hrs  T(C): 37.2 (08 Oct 2019 09:15), Max: 37.5 (07 Oct 2019 22:12)  T(F): 99 (08 Oct 2019 09:15), Max: 99.5 (07 Oct 2019 22:12)  HR: 102 (08 Oct 2019 09:15) (80 - 109)  BP: 132/73 (08 Oct 2019 09:15) (126/68 - 156/72)  BP(mean): 99 (07 Oct 2019 11:30) (99 - 104)  RR: 16 (08 Oct 2019 09:15) (16 - 18)  SpO2: 95% (08 Oct 2019 09:15) (94% - 96%)  I&O's Summary    07 Oct 2019 07:01  -  08 Oct 2019 07:00  --------------------------------------------------------  IN: 2040 mL / OUT: 2925 mL / NET: -885 mL    08 Oct 2019 07:01  -  08 Oct 2019 10:45  --------------------------------------------------------  IN: 240 mL / OUT: 300 mL / NET: -60 mL        PE:  GENERAL: NAD, AAOx2  HEAD:  Atraumatic, Normocephalic  EYES: EOMI, PERRLA, conjunctiva and sclera clear  NECK: Supple, No JVD  CHEST/LUNG: CTABL, No wheeze  HEART: Regular rate and rhythm; no murmur  ABDOMEN: Soft, Nontender, Nondistended; Bowel sounds present  EXTREMITIES:  right AKA dressed  SKIN: No rashes or lesions  NEURO: No focal deficits    LABS:                        7.9    7.65  )-----------( 274      ( 08 Oct 2019 06:30 )             24.5     10-08    140  |  103  |  8   ----------------------------<  111<H>  3.4<L>   |  28  |  0.61    Ca    8.0<L>      08 Oct 2019 06:30  Phos  1.8     10-08  Mg     2.0     10-08      PT/INR - ( 07 Oct 2019 02:50 )   PT: 11.4 sec;   INR: 0.99 ratio         PTT - ( 07 Oct 2019 02:50 )  PTT:33.1 sec  CAPILLARY BLOOD GLUCOSE                RADIOLOGY & ADDITIONAL TESTS:    Imaging Personally Reviewed:  [x] YES  [ ] NO    Consultant(s) Notes Reviewed:  [x] YES  [ ] NO    MEDICATIONS  (STANDING):  amLODIPine   Tablet 10 milliGRAM(s) Oral daily  baclofen 20 milliGRAM(s) Oral <User Schedule>  dextrose 5% + sodium chloride 0.45% 1000 milliLiter(s) (50 mL/Hr) IV Continuous <Continuous>  docusate sodium 100 milliGRAM(s) Oral three times a day  enoxaparin Injectable 40 milliGRAM(s) SubCutaneous daily  influenza   Vaccine 0.5 milliLiter(s) IntraMuscular once  oxybutynin 5 milliGRAM(s) Oral daily  pantoprazole    Tablet 40 milliGRAM(s) Oral before breakfast  piperacillin/tazobactam IVPB.. 3.375 Gram(s) IV Intermittent every 8 hours  senna 2 Tablet(s) Oral at bedtime    MEDICATIONS  (PRN):  acetaminophen   Tablet .. 975 milliGRAM(s) Oral every 6 hours PRN Mild Pain (1 - 3)  benzocaine 15 mG/menthol 3.6 mG (Sugar-Free) Lozenge 1 Lozenge Oral every 3 hours PRN irritation  clonazePAM  Tablet 0.5 milliGRAM(s) Oral at bedtime PRN Anxiety  ondansetron Injectable 4 milliGRAM(s) IV Push every 6 hours PRN Nausea and/or Vomiting  tetracaine/benzocaine/butamben Spray 1 Spray(s) Topical every 3 hours PRN irritation      Care Discussed with Consultants/Other Providers [x] YES  [ ] NO    HEALTH ISSUES - PROBLEM Dx:  Arterial insufficiency with ischemic ulcer: Arterial insufficiency with ischemic ulcer  Neurogenic bladder: Neurogenic bladder  HTN (hypertension): HTN (hypertension)  MS (multiple sclerosis): MS (multiple sclerosis)  Gangrene of foot: Gangrene of foot

## 2019-10-09 LAB
ANION GAP SERPL CALC-SCNC: 9 MMOL/L — SIGNIFICANT CHANGE UP (ref 5–17)
BUN SERPL-MCNC: 10 MG/DL — SIGNIFICANT CHANGE UP (ref 7–23)
CALCIUM SERPL-MCNC: 8.7 MG/DL — SIGNIFICANT CHANGE UP (ref 8.4–10.5)
CHLORIDE SERPL-SCNC: 100 MMOL/L — SIGNIFICANT CHANGE UP (ref 96–108)
CO2 SERPL-SCNC: 28 MMOL/L — SIGNIFICANT CHANGE UP (ref 22–31)
CREAT SERPL-MCNC: 0.55 MG/DL — SIGNIFICANT CHANGE UP (ref 0.5–1.3)
GLUCOSE SERPL-MCNC: 96 MG/DL — SIGNIFICANT CHANGE UP (ref 70–99)
HCT VFR BLD CALC: 28.8 % — LOW (ref 34.5–45)
HGB BLD-MCNC: 9.2 G/DL — LOW (ref 11.5–15.5)
MAGNESIUM SERPL-MCNC: 2 MG/DL — SIGNIFICANT CHANGE UP (ref 1.6–2.6)
MCHC RBC-ENTMCNC: 28 PG — SIGNIFICANT CHANGE UP (ref 27–34)
MCHC RBC-ENTMCNC: 31.9 GM/DL — LOW (ref 32–36)
MCV RBC AUTO: 87.5 FL — SIGNIFICANT CHANGE UP (ref 80–100)
NRBC # BLD: 0 /100 WBCS — SIGNIFICANT CHANGE UP (ref 0–0)
PHOSPHATE SERPL-MCNC: 2.9 MG/DL — SIGNIFICANT CHANGE UP (ref 2.5–4.5)
PLATELET # BLD AUTO: 225 K/UL — SIGNIFICANT CHANGE UP (ref 150–400)
POTASSIUM SERPL-MCNC: 3.5 MMOL/L — SIGNIFICANT CHANGE UP (ref 3.5–5.3)
POTASSIUM SERPL-SCNC: 3.5 MMOL/L — SIGNIFICANT CHANGE UP (ref 3.5–5.3)
RBC # BLD: 3.29 M/UL — LOW (ref 3.8–5.2)
RBC # FLD: 17 % — HIGH (ref 10.3–14.5)
SODIUM SERPL-SCNC: 137 MMOL/L — SIGNIFICANT CHANGE UP (ref 135–145)
WBC # BLD: 7.93 K/UL — SIGNIFICANT CHANGE UP (ref 3.8–10.5)
WBC # FLD AUTO: 7.93 K/UL — SIGNIFICANT CHANGE UP (ref 3.8–10.5)

## 2019-10-09 RX ORDER — PIPERACILLIN AND TAZOBACTAM 4; .5 G/20ML; G/20ML
3.38 INJECTION, POWDER, LYOPHILIZED, FOR SOLUTION INTRAVENOUS EVERY 8 HOURS
Refills: 0 | Status: DISCONTINUED | OUTPATIENT
Start: 2019-10-09 | End: 2019-10-11

## 2019-10-09 RX ORDER — ATENOLOL 25 MG/1
50 TABLET ORAL DAILY
Refills: 0 | Status: DISCONTINUED | OUTPATIENT
Start: 2019-10-09 | End: 2019-10-11

## 2019-10-09 RX ORDER — POTASSIUM CHLORIDE 20 MEQ
20 PACKET (EA) ORAL
Refills: 0 | Status: COMPLETED | OUTPATIENT
Start: 2019-10-09 | End: 2019-10-09

## 2019-10-09 RX ORDER — ASPIRIN/CALCIUM CARB/MAGNESIUM 324 MG
81 TABLET ORAL DAILY
Refills: 0 | Status: DISCONTINUED | OUTPATIENT
Start: 2019-10-09 | End: 2019-10-11

## 2019-10-09 RX ORDER — TRAMADOL HYDROCHLORIDE 50 MG/1
25 TABLET ORAL EVERY 6 HOURS
Refills: 0 | Status: DISCONTINUED | OUTPATIENT
Start: 2019-10-09 | End: 2019-10-11

## 2019-10-09 RX ORDER — ATENOLOL 25 MG/1
50 TABLET ORAL DAILY
Refills: 0 | Status: DISCONTINUED | OUTPATIENT
Start: 2019-10-09 | End: 2019-10-09

## 2019-10-09 RX ADMIN — Medication 20 MILLIGRAM(S): at 21:48

## 2019-10-09 RX ADMIN — PIPERACILLIN AND TAZOBACTAM 25 GRAM(S): 4; .5 INJECTION, POWDER, LYOPHILIZED, FOR SOLUTION INTRAVENOUS at 14:22

## 2019-10-09 RX ADMIN — ONDANSETRON 4 MILLIGRAM(S): 8 TABLET, FILM COATED ORAL at 21:43

## 2019-10-09 RX ADMIN — AMLODIPINE BESYLATE 10 MILLIGRAM(S): 2.5 TABLET ORAL at 06:19

## 2019-10-09 RX ADMIN — Medication 5 MILLIGRAM(S): at 11:37

## 2019-10-09 RX ADMIN — Medication 100 MILLIGRAM(S): at 06:19

## 2019-10-09 RX ADMIN — PIPERACILLIN AND TAZOBACTAM 25 GRAM(S): 4; .5 INJECTION, POWDER, LYOPHILIZED, FOR SOLUTION INTRAVENOUS at 21:48

## 2019-10-09 RX ADMIN — PIPERACILLIN AND TAZOBACTAM 25 GRAM(S): 4; .5 INJECTION, POWDER, LYOPHILIZED, FOR SOLUTION INTRAVENOUS at 06:19

## 2019-10-09 RX ADMIN — PANTOPRAZOLE SODIUM 40 MILLIGRAM(S): 20 TABLET, DELAYED RELEASE ORAL at 07:52

## 2019-10-09 RX ADMIN — Medication 20 MILLIGRAM(S): at 11:37

## 2019-10-09 RX ADMIN — TRAMADOL HYDROCHLORIDE 25 MILLIGRAM(S): 50 TABLET ORAL at 11:30

## 2019-10-09 RX ADMIN — Medication 20 MILLIEQUIVALENT(S): at 11:36

## 2019-10-09 RX ADMIN — Medication 100 MILLIGRAM(S): at 21:47

## 2019-10-09 RX ADMIN — Medication 20 MILLIGRAM(S): at 07:52

## 2019-10-09 RX ADMIN — Medication 100 MILLIGRAM(S): at 14:24

## 2019-10-09 RX ADMIN — Medication 20 MILLIGRAM(S): at 18:05

## 2019-10-09 RX ADMIN — ENOXAPARIN SODIUM 40 MILLIGRAM(S): 100 INJECTION SUBCUTANEOUS at 11:37

## 2019-10-09 RX ADMIN — SENNA PLUS 2 TABLET(S): 8.6 TABLET ORAL at 21:48

## 2019-10-09 RX ADMIN — TRAMADOL HYDROCHLORIDE 25 MILLIGRAM(S): 50 TABLET ORAL at 10:20

## 2019-10-09 RX ADMIN — Medication 20 MILLIEQUIVALENT(S): at 10:20

## 2019-10-09 NOTE — PROGRESS NOTE ADULT - ASSESSMENT
78 yo female patient w/ multiple gangrenous wounds of right lower extremity causing sepsis, now s/p RLE alethea, transferred from SICU to floor, stable, s/p RLE JAMIR 10/7    Plan:  - continue Vanc, continue Zosyn for an additional 24 hours   - OT eval for upper extremity maneuvers  - criericka tierney, dc planning    #[id number] Vascular

## 2019-10-09 NOTE — PROGRESS NOTE ADULT - ASSESSMENT
79F with PMHx of multiple sclerosis (progressive and not currently on any medication), HTN, neurogenic bladder (has had a Kendrick catheter for many years) and peripheral vascular disease presents with right foot pain for one month. sp emergent AKA for wet gangrene 10/1. sp right above knee amputation. 79F with PMHx of multiple sclerosis (progressive and not currently on any medication), HTN, neurogenic bladder (has had a Kendrick catheter for many years) and peripheral vascular disease presents with right foot pain for one month. sp emergent BKA for wet gangrene 10/1. sp right above knee amputation.

## 2019-10-09 NOTE — PROGRESS NOTE ADULT - SUBJECTIVE AND OBJECTIVE BOX
Subjective:    Patient seen and evaluated this AM with team. Dressing changed, some pain but well-controlled with oral meds    OBJECTIVE:     ** PHYSICAL EXAM **    General Appearance: Appears well, NAD  Chest: Equal expansion bilaterally  Abdomen: Soft, ND  Extremities: RLE warm, minimally TTP by incision, staple line c/d/i    Vital Signs Last 24 Hrs  T(C): 36.8 (09 Oct 2019 09:11), Max: 37.5 (09 Oct 2019 01:29)  T(F): 98.2 (09 Oct 2019 09:11), Max: 99.5 (09 Oct 2019 01:29)  HR: 96 (09 Oct 2019 09:11) (89 - 104)  BP: 153/76 (09 Oct 2019 09:11) (129/66 - 176/96)  BP(mean): --  RR: 16 (09 Oct 2019 09:11) (16 - 16)  SpO2: 95% (09 Oct 2019 09:11) (94% - 99%)    I&O's Detail    08 Oct 2019 07:01  -  09 Oct 2019 07:00  --------------------------------------------------------  IN:    Oral Fluid: 840 mL  Total IN: 840 mL    OUT:    Indwelling Catheter - Urethral: 1950 mL  Total OUT: 1950 mL    Total NET: -1110 mL      09 Oct 2019 07:01  -  09 Oct 2019 10:10  --------------------------------------------------------  IN:    Oral Fluid: 240 mL  Total IN: 240 mL    OUT:    Indwelling Catheter - Urethral: 850 mL  Total OUT: 850 mL    Total NET: -610 mL      MEDICATIONS  (STANDING):  amLODIPine   Tablet 10 milliGRAM(s) Oral daily  ATENolol  Tablet 50 milliGRAM(s) Oral daily  baclofen 20 milliGRAM(s) Oral <User Schedule>  docusate sodium 100 milliGRAM(s) Oral three times a day  enoxaparin Injectable 40 milliGRAM(s) SubCutaneous daily  influenza   Vaccine 0.5 milliLiter(s) IntraMuscular once  oxybutynin 5 milliGRAM(s) Oral daily  pantoprazole    Tablet 40 milliGRAM(s) Oral before breakfast  piperacillin/tazobactam IVPB.. 3.375 Gram(s) IV Intermittent every 8 hours  potassium chloride   Solution 20 milliEquivalent(s) Oral every 2 hours  senna 2 Tablet(s) Oral at bedtime    MEDICATIONS  (PRN):  acetaminophen   Tablet .. 975 milliGRAM(s) Oral every 6 hours PRN Mild Pain (1 - 3)  benzocaine 15 mG/menthol 3.6 mG (Sugar-Free) Lozenge 1 Lozenge Oral every 3 hours PRN irritation  clonazePAM  Tablet 0.5 milliGRAM(s) Oral at bedtime PRN Anxiety  ondansetron Injectable 4 milliGRAM(s) IV Push every 6 hours PRN Nausea and/or Vomiting  tetracaine/benzocaine/butamben Spray 1 Spray(s) Topical every 3 hours PRN irritation  traMADol 25 milliGRAM(s) Oral every 6 hours PRN Moderate Pain (4 - 6)      LABS:                        9.2    7.93  )-----------( 225      ( 09 Oct 2019 06:29 )             28.8     10-09    137  |  100  |  10  ----------------------------<  96  3.5   |  28  |  0.55    Ca    8.7      09 Oct 2019 06:29  Phos  2.9     10-09  Mg     2.0     10-09

## 2019-10-10 ENCOUNTER — TRANSCRIPTION ENCOUNTER (OUTPATIENT)
Age: 79
End: 2019-10-10

## 2019-10-10 LAB
ANION GAP SERPL CALC-SCNC: 10 MMOL/L — SIGNIFICANT CHANGE UP (ref 5–17)
BUN SERPL-MCNC: 13 MG/DL — SIGNIFICANT CHANGE UP (ref 7–23)
CALCIUM SERPL-MCNC: 8.7 MG/DL — SIGNIFICANT CHANGE UP (ref 8.4–10.5)
CHLORIDE SERPL-SCNC: 104 MMOL/L — SIGNIFICANT CHANGE UP (ref 96–108)
CO2 SERPL-SCNC: 28 MMOL/L — SIGNIFICANT CHANGE UP (ref 22–31)
CREAT SERPL-MCNC: 0.55 MG/DL — SIGNIFICANT CHANGE UP (ref 0.5–1.3)
GLUCOSE SERPL-MCNC: 111 MG/DL — HIGH (ref 70–99)
HCT VFR BLD CALC: 29.9 % — LOW (ref 34.5–45)
HGB BLD-MCNC: 9.5 G/DL — LOW (ref 11.5–15.5)
MCHC RBC-ENTMCNC: 28.2 PG — SIGNIFICANT CHANGE UP (ref 27–34)
MCHC RBC-ENTMCNC: 31.8 GM/DL — LOW (ref 32–36)
MCV RBC AUTO: 88.7 FL — SIGNIFICANT CHANGE UP (ref 80–100)
NRBC # BLD: 0 /100 WBCS — SIGNIFICANT CHANGE UP (ref 0–0)
PLATELET # BLD AUTO: 245 K/UL — SIGNIFICANT CHANGE UP (ref 150–400)
POTASSIUM SERPL-MCNC: 3.9 MMOL/L — SIGNIFICANT CHANGE UP (ref 3.5–5.3)
POTASSIUM SERPL-SCNC: 3.9 MMOL/L — SIGNIFICANT CHANGE UP (ref 3.5–5.3)
RBC # BLD: 3.37 M/UL — LOW (ref 3.8–5.2)
RBC # FLD: 16.9 % — HIGH (ref 10.3–14.5)
SODIUM SERPL-SCNC: 142 MMOL/L — SIGNIFICANT CHANGE UP (ref 135–145)
WBC # BLD: 10.29 K/UL — SIGNIFICANT CHANGE UP (ref 3.8–10.5)
WBC # FLD AUTO: 10.29 K/UL — SIGNIFICANT CHANGE UP (ref 3.8–10.5)

## 2019-10-10 RX ADMIN — Medication 20 MILLIGRAM(S): at 21:37

## 2019-10-10 RX ADMIN — SENNA PLUS 2 TABLET(S): 8.6 TABLET ORAL at 21:37

## 2019-10-10 RX ADMIN — TRAMADOL HYDROCHLORIDE 25 MILLIGRAM(S): 50 TABLET ORAL at 08:51

## 2019-10-10 RX ADMIN — PIPERACILLIN AND TAZOBACTAM 25 GRAM(S): 4; .5 INJECTION, POWDER, LYOPHILIZED, FOR SOLUTION INTRAVENOUS at 21:36

## 2019-10-10 RX ADMIN — Medication 100 MILLIGRAM(S): at 12:54

## 2019-10-10 RX ADMIN — TRAMADOL HYDROCHLORIDE 25 MILLIGRAM(S): 50 TABLET ORAL at 08:21

## 2019-10-10 RX ADMIN — Medication 20 MILLIGRAM(S): at 12:54

## 2019-10-10 RX ADMIN — PIPERACILLIN AND TAZOBACTAM 25 GRAM(S): 4; .5 INJECTION, POWDER, LYOPHILIZED, FOR SOLUTION INTRAVENOUS at 06:08

## 2019-10-10 RX ADMIN — Medication 81 MILLIGRAM(S): at 12:54

## 2019-10-10 RX ADMIN — PANTOPRAZOLE SODIUM 40 MILLIGRAM(S): 20 TABLET, DELAYED RELEASE ORAL at 08:21

## 2019-10-10 RX ADMIN — PIPERACILLIN AND TAZOBACTAM 25 GRAM(S): 4; .5 INJECTION, POWDER, LYOPHILIZED, FOR SOLUTION INTRAVENOUS at 12:54

## 2019-10-10 RX ADMIN — AMLODIPINE BESYLATE 10 MILLIGRAM(S): 2.5 TABLET ORAL at 06:08

## 2019-10-10 RX ADMIN — Medication 100 MILLIGRAM(S): at 21:37

## 2019-10-10 RX ADMIN — Medication 100 MILLIGRAM(S): at 06:08

## 2019-10-10 RX ADMIN — Medication 20 MILLIGRAM(S): at 18:47

## 2019-10-10 RX ADMIN — ONDANSETRON 4 MILLIGRAM(S): 8 TABLET, FILM COATED ORAL at 12:48

## 2019-10-10 RX ADMIN — ATENOLOL 50 MILLIGRAM(S): 25 TABLET ORAL at 06:08

## 2019-10-10 RX ADMIN — Medication 5 MILLIGRAM(S): at 12:54

## 2019-10-10 RX ADMIN — ENOXAPARIN SODIUM 40 MILLIGRAM(S): 100 INJECTION SUBCUTANEOUS at 12:54

## 2019-10-10 RX ADMIN — Medication 20 MILLIGRAM(S): at 08:21

## 2019-10-10 NOTE — DISCHARGE NOTE PROVIDER - NSDCFUSCHEDAPPT_GEN_ALL_CORE_FT
HOLLY FRY ; 10/28/2019 ; N[facility] Surg Wound [address] [facility] ; 11/01/2019 ; NPP Surg Vasc [address] ; 01/03/2020 ; NPP Med Endocr [address] [facility] HOLLY FRY ; 10/28/2019 ; NPP Surg Wound [address] ; 11/01/2019 ; NPP Surg Vasc [address] [facility] [address] ; 01/03/2020 ; NPP Med Endocr [address] [facility] HO[facility] FRY ; 10/28/2019 ; NPP Surg Wound [address] [address] ; 11/01/2019 ; NPP Surg Vasc [address] [address] ; 01/03/2020 ; NPP Med Endocr [address] [facility] HOLLY FRY ; 10/28/2019 ; NPP Surg Wound [address] ; 11/01/2019 ; NPP Surg Vasc [address] ; 01/03/2020 ; NPP Med Endocr [address] [facility]

## 2019-10-10 NOTE — DISCHARGE NOTE PROVIDER - NSDCCPTREATMENT_GEN_ALL_CORE_FT
PRINCIPAL PROCEDURE  Procedure: Amputation, below knee, alethea  Findings and Treatment: · Operative Findings	R suniine BKA performed for septic foot; eschar removed.        SECONDARY PROCEDURE  Procedure: Right above knee amputation  Findings and Treatment: · Operative Findings	Right above knee amputation

## 2019-10-10 NOTE — DISCHARGE NOTE PROVIDER - HOSPITAL COURSE
79F with PMHx of multiple sclerosis (progressive and not currently on any medication), HTN, neurogenic bladder (has had a Kendrick catheter for many years)  and peripheral vascular disease presents with right foot pain for one month. The history was obtained from patient's  given her drowsiness. She is arousable and protecting her airway, but unable to give a history. Approximately one month prior patient was in her wheelchair and rammed her right foot into a wall. She initially presented to the emergency room and given Keflex to treat for cellulitis. The right foot progressively got worse. It has been becoming more painful, at times erythematous and malodorous. Patient developed necrotic tissue on both the dorsum of the right foot and near the right side lateral malleolus. Patient has been seeing private vascular surgeon on the outside who states that the foot is unlikely to be salvageable. Patient also having periodic fevers and decrease in appetite. While in the ED pulses were poor and foot cool to touch so podiatry and vascular surgery were consulted. They are both ultimately recommending right BKA. Patient's family currently refusing until they can speak with their private neurologist Dr. Vladimir Parry. Ankle of XR shows subcutaneous gas. patient given Vancomycin, Zosyn, and Clindamycin as well as lactate ringer 1.9 L and Tylenol. Patient admitted to medicine for further management.         9/28 Podiatry: Right foot is non salvageable from podiatry standpoint, management under Vascular/Medicine for work up for potential vascular occlusion    Surgery: c/w IV abx, patient will need amputation; however family would like to further discuss     - sepsis, -Medically cleared for urgent right BKA, no medical clearance needed for emergent procedure.     - vanco, zosyn, clindamycin    - chronic Kendrick, exchanged today    9/29  Pt and family agree to BKA.              Vascular will see pt tomorrow to plan surgery    9/30  Pt with episode of SOB and brief mental status change. Vitals stable. Improved with duoneb. EKG, ABGs unremarkable. CXR pending. Vascular surgery notified and discussing with Dr. Hernandez.    9/30: Night NP- MICU consult appreciated. Pt not a canidate at this time. Vanco trough of 30.0 . dose held X 1 and decreased Vancomycin to 1000mg IV BID    10/1: s/p R guillotine BKA. Transferred from medicine onto Vascular Surgery service.    10/2: NGT removed. Started on Regular diet. Given lasix 20 mg IV. PT ordered. Restarted on home meds.    10/3: OOBTC with PT, home wheelchair, given lasix 20 mg IV for pleural effusion, started on amlodipine 5 mg PO qd, plan for OR on 10/7    10/4: OOBTC with PT, home wheelchair, Zofran for nausea, Listed for floor bed, plan for OR on 10/7, amlodipine switched to 10 mg PO qd    10/5: Transferred from SICU to [facility], stable    10/6: Patient had some bleeding at stump at night, but was found to be clinically stable with no acute changes on CBC.    10/7: s/p R AKA 79F with PMHx of multiple sclerosis (progressive and not currently on any medication), HTN, neurogenic bladder (has had a Kendrick catheter for many years)  and peripheral vascular disease presents with right foot pain for one month. The history was obtained from patient's  given her drowsiness. She is arousable and protecting her airway, but unable to give a history. Approximately one month prior patient was in her wheelchair and rammed her right foot into a wall. She initially presented to the emergency room and given Keflex to treat for cellulitis. The right foot progressively got worse. It has been becoming more painful, at times erythematous and malodorous. Patient developed necrotic tissue on both the dorsum of the right foot and near the right side lateral malleolus. Patient has been seeing private vascular surgeon on the outside who states that the foot is unlikely to be salvageable. Patient also having periodic fevers and decrease in appetite. While in the ED pulses were poor and foot cool to touch so podiatry and vascular surgery were consulted. They are both ultimately recommending right BKA. Patient's family currently refusing until they can speak with their private neurologist Dr. Vladimir Parry. Ankle of XR shows subcutaneous gas. patient given Vancomycin, Zosyn, and Clindamycin as well as lactate ringer 1.9 L and Tylenol. Patient admitted to medicine for further management.         9/28 Podiatry: Right foot is non salvageable from podiatry standpoint, management under Vascular/Medicine for work up for potential vascular occlusion    Surgery: c/w IV abx, patient will need amputation; however family would like to further discuss     - sepsis, -Medically cleared for urgent right BKA, no medical clearance needed for emergent procedure.     - vanco, zosyn, clindamycin    - chronic Kendrick, exchanged today    9/29  Pt and family agree to BKA.              Vascular will see pt tomorrow to plan surgery    9/30  Pt with episode of SOB and brief mental status change. Vitals stable. Improved with duoneb. EKG, ABGs unremarkable. CXR pending. Vascular surgery notified and discussing with Dr. Hernandez.    9/30: Night NP- MICU consult appreciated. Pt not a canidate at this time. Vanco trough of 30.0 . dose held X 1 and decreased Vancomycin to 1000mg IV BID    10/1: s/p R guillotine BKA. Transferred from medicine onto Vascular Surgery service.    10/2: NGT removed. Started on Regular diet. Given lasix 20 mg IV. PT ordered. Restarted on home meds.    10/3: OOBTC with PT, home wheelchair, given lasix 20 mg IV for pleural effusion, started on amlodipine 5 mg PO qd, plan for OR on 10/7    10/4: OOBTC with PT, home wheelchair, Zofran for nausea, Listed for floor bed, plan for OR on 10/7, amlodipine switched to 10 mg PO qd    10/5: Transferred from SICU to [facility], stable    10/6: Patient had some bleeding at stump at night, but was found to be clinically stable with no acute changes on CBC.    10/7: s/p R AKA     10/8: post op blood loss anemia sp 1 unit with proper response.     10/10: OT eval for upper extremity maneuvers    At the time of discharge, the patient was hemodynamically stable, was tolerating PO diet, was voiding urine and passing stool, was ambulating, and was comfortable with adequate pain control. The patient was instructed to follow up with Dr. Morris within 1-2 weeks after discharge from the hospital. The patient felt comfortable with discharge. The patient was discharged to home. The patient had no other issues.

## 2019-10-10 NOTE — PROGRESS NOTE ADULT - ASSESSMENT
78 yo female patient w/ multiple gangrenous wounds of right lower extremity causing sepsis, now s/p RLGORDO claire, transferred from SICU to floor, stable, s/p RLE AKA 10/7    Plan  - OT  - dc home

## 2019-10-10 NOTE — PROGRESS NOTE ADULT - SUBJECTIVE AND OBJECTIVE BOX
Subjective:    Patient seen and evaluated this AM with team.  Patient states that she is doing well.  Denies any pain or discomfort.    OBJECTIVE:     ** PHYSICAL EXAM **    General Appearance: Appears well, NAD  Chest: Equal expansion bilaterally  Abdomen: Soft, ND  Extremities: RLE warm, minimally TTP by incision, staple line c/d/i      ** VITAL SIGNS / I&O's **    Vital Signs Last 24 Hrs  T(C): 37.6 (10 Oct 2019 09:20), Max: 37.6 (10 Oct 2019 01:48)  T(F): 99.6 (10 Oct 2019 09:20), Max: 99.6 (10 Oct 2019 01:48)  HR: 90 (10 Oct 2019 09:20) (90 - 106)  BP: 125/74 (10 Oct 2019 09:20) (125/74 - 173/84)  BP(mean): --  RR: 18 (10 Oct 2019 09:20) (16 - 18)  SpO2: 94% (10 Oct 2019 09:20) (94% - 96%)      09 Oct 2019 07:01  -  10 Oct 2019 07:00  --------------------------------------------------------  IN:    Oral Fluid: 880 mL  Total IN: 880 mL    OUT:    Indwelling Catheter - Urethral: 3400 mL  Total OUT: 3400 mL    Total NET: -2520 mL            ** LABS **                          9.5    10.29 )-----------( 245      ( 10 Oct 2019 06:43 )             29.9     10 Oct 2019 06:43    142    |  104    |  13     ----------------------------<  111    3.9     |  28     |  0.55     Ca    8.7        10 Oct 2019 06:43  Phos  2.9       09 Oct 2019 06:29  Mg     2.0       09 Oct 2019 06:29      MEDICATIONS  (STANDING):  amLODIPine   Tablet 10 milliGRAM(s) Oral daily  aspirin  chewable 81 milliGRAM(s) Oral daily  ATENolol  Tablet 50 milliGRAM(s) Oral daily  baclofen 20 milliGRAM(s) Oral <User Schedule>  docusate sodium 100 milliGRAM(s) Oral three times a day  enoxaparin Injectable 40 milliGRAM(s) SubCutaneous daily  influenza   Vaccine 0.5 milliLiter(s) IntraMuscular once  oxybutynin 5 milliGRAM(s) Oral daily  pantoprazole    Tablet 40 milliGRAM(s) Oral before breakfast  piperacillin/tazobactam IVPB.. 3.375 Gram(s) IV Intermittent every 8 hours  senna 2 Tablet(s) Oral at bedtime    MEDICATIONS  (PRN):  acetaminophen   Tablet .. 975 milliGRAM(s) Oral every 6 hours PRN Mild Pain (1 - 3)  benzocaine 15 mG/menthol 3.6 mG (Sugar-Free) Lozenge 1 Lozenge Oral every 3 hours PRN irritation  clonazePAM  Tablet 0.5 milliGRAM(s) Oral at bedtime PRN Anxiety  ondansetron Injectable 4 milliGRAM(s) IV Push every 6 hours PRN Nausea and/or Vomiting  tetracaine/benzocaine/butamben Spray 1 Spray(s) Topical every 3 hours PRN irritation  traMADol 25 milliGRAM(s) Oral every 6 hours PRN Moderate Pain (4 - 6)

## 2019-10-10 NOTE — DISCHARGE NOTE PROVIDER - NSDCCPCAREPLAN_GEN_ALL_CORE_FT
PRINCIPAL DISCHARGE DIAGNOSIS  Diagnosis: Gangrene of foot  Assessment and Plan of Treatment: PRINCIPAL DISCHARGE DIAGNOSIS  Diagnosis: Gangrene of foot  Assessment and Plan of Treatment: WOUND CARE: Staples will be removed at follow up office visit.  Continue dressing changes daily with 4x4, kerlix, and ace wrap.  For Lower extremity ulcers: apply telfa and kerlix as needed.  BATHING: Please do not submerge wound underwater. You may shower and/or sponge bathe.  ACTIVITY: No heavy lifting anything more than 10-15lbs or straining. Otherwise, you may return to your usual level of physical activity. If you are taking narcotic pain medication (such as Percocet), do NOT drive a car, operate machinery or make important decisions.  DIET: Regular diet  NOTIFY YOUR SURGEON IF: You have any bleeding that does not stop, any pus draining from your wound, any fever (over 100.4 F) or chills, persistent nausea/vomiting with inability to tolerate food or liquids, persistent diarrhea, or if your pain is not controlled on your discharge pain medications.  FOLLOW-UP:  1. Please call to make a follow-up appointment within one week of discharge.   2. Please follow up with your primary care physician in one week regarding your hospitalization.      SECONDARY DISCHARGE DIAGNOSES  Diagnosis: MS (multiple sclerosis)  Assessment and Plan of Treatment: No acute intervention, not on any medications  outpt follow up Dr. Vladimir Parry (neurologist) [phone]      Diagnosis: Neurogenic bladder  Assessment and Plan of Treatment: Patient chronically has Kendrick cath   Continue with Oxybutynin.

## 2019-10-10 NOTE — PROGRESS NOTE ADULT - ASSESSMENT
79F with PMHx of multiple sclerosis (progressive and not currently on any medication), HTN, neurogenic bladder (has had a Kendrick catheter for many years) and peripheral vascular disease presents with right foot pain for one month. sp emergent BKA for wet gangrene 10/1. sp right above knee amputation.

## 2019-10-10 NOTE — PROGRESS NOTE ADULT - SUBJECTIVE AND OBJECTIVE BOX
Patient is a 79y old  Female who presents with a chief complaint of Right Leg Pain (10 Oct 2019 10:08)      INTERVAL HPI/OVERNIGHT EVENTS:feels well, pain well controlled      Vital Signs Last 24 Hrs  T(C): 37.5 (10 Oct 2019 14:11), Max: 37.6 (10 Oct 2019 01:48)  T(F): 99.5 (10 Oct 2019 14:11), Max: 99.6 (10 Oct 2019 01:48)  HR: 88 (10 Oct 2019 14:11) (88 - 106)  BP: 156/48 (10 Oct 2019 14:11) (125/74 - 173/84)  BP(mean): --  RR: 16 (10 Oct 2019 14:11) (16 - 18)  SpO2: 96% (10 Oct 2019 14:11) (94% - 96%)    acetaminophen   Tablet .. 975 milliGRAM(s) Oral every 6 hours PRN  amLODIPine   Tablet 10 milliGRAM(s) Oral daily  aspirin  chewable 81 milliGRAM(s) Oral daily  ATENolol  Tablet 50 milliGRAM(s) Oral daily  baclofen 20 milliGRAM(s) Oral <User Schedule>  benzocaine 15 mG/menthol 3.6 mG (Sugar-Free) Lozenge 1 Lozenge Oral every 3 hours PRN  clonazePAM  Tablet 0.5 milliGRAM(s) Oral at bedtime PRN  docusate sodium 100 milliGRAM(s) Oral three times a day  enoxaparin Injectable 40 milliGRAM(s) SubCutaneous daily  influenza   Vaccine 0.5 milliLiter(s) IntraMuscular once  ondansetron Injectable 4 milliGRAM(s) IV Push every 6 hours PRN  oxybutynin 5 milliGRAM(s) Oral daily  pantoprazole    Tablet 40 milliGRAM(s) Oral before breakfast  piperacillin/tazobactam IVPB.. 3.375 Gram(s) IV Intermittent every 8 hours  senna 2 Tablet(s) Oral at bedtime  tetracaine/benzocaine/butamben Spray 1 Spray(s) Topical every 3 hours PRN  traMADol 25 milliGRAM(s) Oral every 6 hours PRN      PHYSICAL EXAM:  GENERAL: NAD,   EYES: conjunctiva and sclera clear  ENMT: Moist mucous membranes  NECK: Supple, No JVD, Normal thyroid  NERVOUS SYSTEM:  Alert & Oriented X,   CHEST/LUNG: Clear to auscultation bilaterally; No rales, rhonchi, wheezing, or rubs  HEART: Regular rate and rhythm; No murmurs, rubs, or gallops  ABDOMEN: Soft, Nontender, Nondistended; Bowel sounds present  EXTREMITIES:  2+ Peripheral Pulses, No clubbing, cyanosis, or edema  LYMPH: No lymphadenopathy noted  SKIN: No rashes or lesions    Consultant(s) Notes Reviewed:  [x ] YES  [ ] NO  Care Discussed with Consultants/Other Providers [ x] YES  [ ] NO    LABS:                        9.5    10.29 )-----------( 245      ( 10 Oct 2019 06:43 )             29.9     10-10    142  |  104  |  13  ----------------------------<  111<H>  3.9   |  28  |  0.55    Ca    8.7      10 Oct 2019 06:43  Phos  2.9     10-09  Mg     2.0     10-09          CAPILLARY BLOOD GLUCOSE                  RADIOLOGY & ADDITIONAL TESTS:    Imaging Personally Reviewed:  [x ] YES  [ ] NO

## 2019-10-10 NOTE — DISCHARGE NOTE PROVIDER - CARE PROVIDER_API CALL
Sanjiv Morris)  Vascular Surgery  [address], [address]  [place] [address]  Phone: [phone]  Fax: [phone]  Follow Up Time:

## 2019-10-11 ENCOUNTER — TRANSCRIPTION ENCOUNTER (OUTPATIENT)
Age: 79
End: 2019-10-11

## 2019-10-11 VITALS
RESPIRATION RATE: 16 BRPM | HEART RATE: 60 BPM | SYSTOLIC BLOOD PRESSURE: 120 MMHG | TEMPERATURE: 98 F | OXYGEN SATURATION: 97 % | DIASTOLIC BLOOD PRESSURE: 74 MMHG

## 2019-10-11 LAB
ANION GAP SERPL CALC-SCNC: 10 MMOL/L — SIGNIFICANT CHANGE UP (ref 5–17)
BUN SERPL-MCNC: 17 MG/DL — SIGNIFICANT CHANGE UP (ref 7–23)
CALCIUM SERPL-MCNC: 9.3 MG/DL — SIGNIFICANT CHANGE UP (ref 8.4–10.5)
CHLORIDE SERPL-SCNC: 101 MMOL/L — SIGNIFICANT CHANGE UP (ref 96–108)
CO2 SERPL-SCNC: 29 MMOL/L — SIGNIFICANT CHANGE UP (ref 22–31)
CREAT SERPL-MCNC: 0.56 MG/DL — SIGNIFICANT CHANGE UP (ref 0.5–1.3)
GLUCOSE SERPL-MCNC: 97 MG/DL — SIGNIFICANT CHANGE UP (ref 70–99)
HCT VFR BLD CALC: 28.4 % — LOW (ref 34.5–45)
HGB BLD-MCNC: 8.7 G/DL — LOW (ref 11.5–15.5)
MAGNESIUM SERPL-MCNC: 2 MG/DL — SIGNIFICANT CHANGE UP (ref 1.6–2.6)
MCHC RBC-ENTMCNC: 27.2 PG — SIGNIFICANT CHANGE UP (ref 27–34)
MCHC RBC-ENTMCNC: 30.6 GM/DL — LOW (ref 32–36)
MCV RBC AUTO: 88.8 FL — SIGNIFICANT CHANGE UP (ref 80–100)
NRBC # BLD: 0 /100 WBCS — SIGNIFICANT CHANGE UP (ref 0–0)
PHOSPHATE SERPL-MCNC: 2.6 MG/DL — SIGNIFICANT CHANGE UP (ref 2.5–4.5)
PLATELET # BLD AUTO: 232 K/UL — SIGNIFICANT CHANGE UP (ref 150–400)
POTASSIUM SERPL-MCNC: 3.4 MMOL/L — LOW (ref 3.5–5.3)
POTASSIUM SERPL-SCNC: 3.4 MMOL/L — LOW (ref 3.5–5.3)
RBC # BLD: 3.2 M/UL — LOW (ref 3.8–5.2)
RBC # FLD: 17 % — HIGH (ref 10.3–14.5)
SODIUM SERPL-SCNC: 140 MMOL/L — SIGNIFICANT CHANGE UP (ref 135–145)
WBC # BLD: 7.04 K/UL — SIGNIFICANT CHANGE UP (ref 3.8–10.5)
WBC # FLD AUTO: 7.04 K/UL — SIGNIFICANT CHANGE UP (ref 3.8–10.5)

## 2019-10-11 RX ORDER — ASPIRIN/CALCIUM CARB/MAGNESIUM 324 MG
1 TABLET ORAL
Qty: 0 | Refills: 0 | DISCHARGE
Start: 2019-10-11

## 2019-10-11 RX ORDER — TRAMADOL HYDROCHLORIDE 50 MG/1
0.5 TABLET ORAL
Qty: 6 | Refills: 0
Start: 2019-10-11

## 2019-10-11 RX ORDER — AMLODIPINE BESYLATE 2.5 MG/1
1 TABLET ORAL
Qty: 30 | Refills: 0
Start: 2019-10-11 | End: 2019-11-09

## 2019-10-11 RX ORDER — SODIUM,POTASSIUM PHOSPHATES 278-250MG
1 POWDER IN PACKET (EA) ORAL
Refills: 0 | Status: DISCONTINUED | OUTPATIENT
Start: 2019-10-11 | End: 2019-10-11

## 2019-10-11 RX ORDER — TRAMADOL HYDROCHLORIDE 50 MG/1
25 TABLET ORAL ONCE
Refills: 0 | Status: DISCONTINUED | OUTPATIENT
Start: 2019-10-11 | End: 2019-10-11

## 2019-10-11 RX ADMIN — Medication 100 MILLIGRAM(S): at 05:53

## 2019-10-11 RX ADMIN — Medication 1 TABLET(S): at 08:38

## 2019-10-11 RX ADMIN — TRAMADOL HYDROCHLORIDE 25 MILLIGRAM(S): 50 TABLET ORAL at 09:46

## 2019-10-11 RX ADMIN — ATENOLOL 50 MILLIGRAM(S): 25 TABLET ORAL at 05:53

## 2019-10-11 RX ADMIN — ONDANSETRON 4 MILLIGRAM(S): 8 TABLET, FILM COATED ORAL at 01:24

## 2019-10-11 RX ADMIN — AMLODIPINE BESYLATE 10 MILLIGRAM(S): 2.5 TABLET ORAL at 05:53

## 2019-10-11 RX ADMIN — PANTOPRAZOLE SODIUM 40 MILLIGRAM(S): 20 TABLET, DELAYED RELEASE ORAL at 08:03

## 2019-10-11 RX ADMIN — TRAMADOL HYDROCHLORIDE 25 MILLIGRAM(S): 50 TABLET ORAL at 09:01

## 2019-10-11 RX ADMIN — TRAMADOL HYDROCHLORIDE 25 MILLIGRAM(S): 50 TABLET ORAL at 08:03

## 2019-10-11 RX ADMIN — Medication 20 MILLIGRAM(S): at 08:03

## 2019-10-11 NOTE — PROGRESS NOTE ADULT - ASSESSMENT
78 yo female patient w/ multiple gangrenous wounds of right lower extremity causing sepsis, now s/p RLE alethea, transferred from SICU to floor, stable, s/p RLE JAMIR 10/7    Plan:  - Continue coumadin bridge  - Monitor coags  - dialysis tomorrow 80 yo female patient w/ multiple gangrenous wounds of right lower extremity causing sepsis, now s/p HA claire, transferred from SICU to floor, stable, s/p RLE AKA 10/7    - PT today  - DC home

## 2019-10-11 NOTE — PROGRESS NOTE ADULT - PROBLEM SELECTOR PLAN 2
-No acute intervention, not on any medications  consult house neuro-covering Dr. Vladimir Parry (neurologist) [phone]  -Continue with Baclofen 20 q6 hours (slight change in home medication) for spasticity
-No acute intervention, not on any medications  consult house neuro-covering Dr. Vladimir Parry (neurologist) [phone]  -Continue with Baclofen 20 q6 hours (slight change in home medication) for spasticity
-No acute intervention, not on any medications  consult house neuro-covering Dr. Vladimir Parry (neurologist) [phone]  -Continue with Baclofen 20 q6 hours (slight change in home medication) for spasticity
I have personally  seen and examined the patient today and have noted the findings and formulated the plan of care.
No acute intervention, not on any medications  outpt fu Dr. Vladimir Parry (neurologist) [phone]  Continue with Baclofen 20 q6 hours (slight change in home medication) for spasticity
No acute intervention, not on any medications  outpt fu Dr. Vladimir Parry (neurologist) [phone]  Continue with Baclofen 20 q6 hours (slight change in home medication) for spasticity
No acute intervention, not on any medications  outpt fu Dr. Vladimir Parry (neurologist) [phone]  Continue with Baclofen 20 q6 hours (slight change in home medication) for spasticity
No acute intervention, not on any medications  outpt fu Dr. Vladimir Parry (neurologist) [phone]  Continue with Baclofen 20 q6 hours (slight change in home medication) for spasticity
I have seen and examined the patient today and agree with  the  evaluation, assessment and plan of the surgical house officer

## 2019-10-11 NOTE — PROGRESS NOTE ADULT - PROVIDER SPECIALTY LIST ADULT
Internal Medicine
SICU
Vascular Surgery
Internal Medicine

## 2019-10-11 NOTE — PROGRESS NOTE ADULT - PROBLEM SELECTOR PROBLEM 2
Gangrene of foot
MS (multiple sclerosis)
Gangrene of foot
MS (multiple sclerosis)
Gangrene of foot

## 2019-10-11 NOTE — PROGRESS NOTE ADULT - SUBJECTIVE AND OBJECTIVE BOX
Subjective:    Patient seen and evaluated this AM with team.  No acute concerns overnight or this morning.  Patient states pain is under control.    OBJECTIVE:     ** PHYSICAL EXAM **    General Appearance: Appears well, NAD  Chest: Equal expansion bilaterally  Abdomen: Soft, ND  Extremities: RLE warm, minimally TTP by incision, staple line c/d/i      ** VITAL SIGNS / I&O's **    Vital Signs Last 24 Hrs  T(C): 37.1 (11 Oct 2019 05:52), Max: 37.5 (10 Oct 2019 14:11)  T(F): 98.8 (11 Oct 2019 05:52), Max: 99.5 (10 Oct 2019 14:11)  HR: 85 (11 Oct 2019 08:05) (85 - 88)  BP: 161/71 (11 Oct 2019 08:05) (120/65 - 170/83)  BP(mean): --  RR: 16 (11 Oct 2019 05:52) (16 - 16)  SpO2: 96% (11 Oct 2019 05:52) (95% - 96%)      10 Oct 2019 07:01  -  11 Oct 2019 07:00  --------------------------------------------------------  IN:    Oral Fluid: 1020 mL    Solution: 100 mL  Total IN: 1120 mL    OUT:    Indwelling Catheter - Urethral: 1750 mL  Total OUT: 1750 mL    Total NET: -630 mL            ** LABS **                          8.7    7.04  )-----------( 232      ( 11 Oct 2019 06:49 )             28.4     11 Oct 2019 06:49    140    |  101    |  17     ----------------------------<  97     3.4     |  29     |  0.56     Ca    9.3        11 Oct 2019 06:49  Phos  2.6       11 Oct 2019 06:49  Mg     2.0       11 Oct 2019 06:49      MEDICATIONS  (STANDING):  amLODIPine   Tablet 10 milliGRAM(s) Oral daily  aspirin  chewable 81 milliGRAM(s) Oral daily  ATENolol  Tablet 50 milliGRAM(s) Oral daily  baclofen 20 milliGRAM(s) Oral <User Schedule>  docusate sodium 100 milliGRAM(s) Oral three times a day  enoxaparin Injectable 40 milliGRAM(s) SubCutaneous daily  influenza   Vaccine 0.5 milliLiter(s) IntraMuscular once  oxybutynin 5 milliGRAM(s) Oral daily  pantoprazole    Tablet 40 milliGRAM(s) Oral before breakfast  potassium acid phosphate/sodium acid phosphate tablet (K-PHOS No. 2) 1 Tablet(s) Oral four times a day with meals  senna 2 Tablet(s) Oral at bedtime  traMADol 25 milliGRAM(s) Oral once    MEDICATIONS  (PRN):  acetaminophen   Tablet .. 975 milliGRAM(s) Oral every 6 hours PRN Mild Pain (1 - 3)  benzocaine 15 mG/menthol 3.6 mG (Sugar-Free) Lozenge 1 Lozenge Oral every 3 hours PRN irritation  ondansetron Injectable 4 milliGRAM(s) IV Push every 6 hours PRN Nausea and/or Vomiting  tetracaine/benzocaine/butamben Spray 1 Spray(s) Topical every 3 hours PRN irritation  traMADol 25 milliGRAM(s) Oral every 6 hours PRN Moderate Pain (4 - 6)

## 2019-10-11 NOTE — DISCHARGE NOTE NURSING/CASE MANAGEMENT/SOCIAL WORK - NSSCTYPOFSERV_GEN_ALL_CORE
for RN evaluation, Post op wound care assessment and follow up care, PT evaluation, Home OT, Aide evaluation

## 2019-10-11 NOTE — PROGRESS NOTE ADULT - PROBLEM SELECTOR PLAN 1
Follow up with podiatry and vascular  -Given sepsis secondary to gangrene/necrotizing fasciitis of foot will treat broadly with Vancomycin, Zosyn, and Clindamycin  -Follow up blood cultures  -Hydrate with D5NS at 70 cc/hr  -Medically cleared for urgent right BKA, no medical clearance needed for emergent procedure
-Follow up CT runoff  -Follow up with podiatry and vascular  -Given sepsis secondary to gangrene/necrotizing fasciitis of foot will treat broadly with Vancomycin, Zosyn, and Clindamycin  -Follow up blood cultures  -Hydrate with D5NS at 70 cc/hr  -Medically cleared for urgent right BKA, no medical clearance needed for emergent procedure
I have personally  seen and examined the patient today and have noted the findings and formulated the plan of care.
sp emergent amputation  -Given sepsis secondary to wet gangrene/necrotizing fasciitis of foot with hold Vancomycin- elev trough   cont, Zosyn
sp right AKA   appreciate vascular recs  cont zosyn  monitor HR, if remains tachycardic, check EKG  anemia likely post op blood loss, transfuse >7
sp right AKA   appreciate vascular recs  cont zosyn  post op blood loss anemia sp 1 unit with proper response
I have seen and examined the patient today and agree with  the  evaluation, assessment and plan of the surgical house officer

## 2019-10-11 NOTE — PROGRESS NOTE ADULT - REASON FOR ADMISSION
Right Leg Pain

## 2019-10-11 NOTE — PROGRESS NOTE ADULT - ATTENDING COMMENTS
Tammy Cullen DO  [facility]  [phone]
patient is doing well and is stable to go to the floor
patient with post surgical respiratory failure  picture complicated with baseline multiple sclerosis  previously had not tolerated breathing trials  titrating sedatives and trying to find right window for possible extubation  after 35 minutes of critical care management ultimately able to move toward extubation  continued monitoring  patient doing well and breathing comfortable
recent surgery  recent intubation for respiratory failure  now extubated  resumed oral medication related to cystic fibrosis  has poor baseline functional status - needs specialized weal chair to be out of bed  continuing aggressive pulmonary care to facilitate mobilization, coughing deep breathing
I have personally  seen and examined the patient today and have noted the findings and formulated the plan of care.
I have seen and examined the patient today and agree with  the  evaluation, assessment and plan of the surgical house officer
[facility]  [phone]
Dr. Hernandez will be covering tomorrow 10/10. Please contact with any questions or concerns [phone].
[facility]  [phone]
Please contact with any questions or concerns [phone].
[facility]  [phone]
addendum: called by NP on the floor about change in pt status- pt laboured breathing, tachypnea, while in OR , Spoke to anethesia attg-,abg-hypoxia, A-a gradient, high suspicion for PE  anesthesia cancelled the procedure d/t being high risk for intraop mortality with PE dx  d/w Dr Morris- who is concerned that if the gangrenous leg is not addressed promptly pt might not survive a septic shock   Given high risk preop-for emergency amputation- will need to discuss plan of care w family- Anesthesia attg and Dr Morris spoke to family.  so plan was made to obtain ct angio to r/o PE and anticoagulate if any and then surgery   meanwhile pt's critical condition will need intensive monitoring- MICU/SICU consult , stat labs cbc, bmp, lactate, ddimer.    above plan dw medicine NP on the floor in detail    [facility]  [phone]
care per sicu team    Dr Cullen will be covering  starting 10/02/19  [facility]  [phone]

## 2019-10-14 LAB — SURGICAL PATHOLOGY STUDY: SIGNIFICANT CHANGE UP

## 2019-10-28 ENCOUNTER — APPOINTMENT (OUTPATIENT)
Dept: WOUND CARE | Facility: CLINIC | Age: 79
End: 2019-10-28
Payer: MEDICARE

## 2019-10-28 DIAGNOSIS — L97.409 NON-PRESSURE CHRONIC ULCER OF UNSPECIFIED HEEL AND MIDFOOT WITH UNSPECIFIED SEVERITY: ICD-10-CM

## 2019-10-28 PROCEDURE — 99213 OFFICE O/P EST LOW 20 MIN: CPT

## 2019-10-28 NOTE — HISTORY OF PRESENT ILLNESS
[FreeTextEntry1] : Ms. HOLLY FRY   presents to the office with a wound for 3.5 months duration.  The wound is located on  the left heel and posterior left leg .  The patient has been dressing the wound with betadine daily, as prescribed by primary.  The patient denies fevers or chills.  The patient has localized pain to the wound upon dressing changes.  The patient has no other complaints or associated symptoms. s/p AKA  RLE\par \par August 2018, hospitalized at [facility] for sepsis, ICU, hospitalized for 7 days, shortly after when home, noticed redness and pain on left heel, then skin broke down. Prior wound on left heel several years ago, followed by podiatry, eventually healed.\par \par Patient could not undergo revascularization as per patient.

## 2019-10-28 NOTE — PHYSICAL EXAM
[Normal Breath Sounds] : Normal breath sounds [Normal Heart Sounds] : normal heart sounds [0] : left 0 [Ankle Swelling On The Left] : of the left ankle [Alert] : alert [Oriented to Person] : oriented to person [Oriented to Place] : oriented to place [Oriented to Time] : oriented to time [Calm] : calm [4 x 4] : 4 x 4  [JVD] : no jugular venous distention  [Varicose Veins Of Lower Extremities] : not present [] : not present [de-identified] : well nourished, well groomed [de-identified] : indwelling catheter changed 2x/month [de-identified] : not ambulatory, wheelchair bound due to multiple sclerosis [de-identified] : wound on left heel [FreeTextEntry4] : 0.0 [de-identified] : 0 [de-identified] : 14/8 [FreeTextEntry1] : right medial AMPUTATION AKA [FreeTextEntry2] : 0 [FreeTextEntry3] : 0 [de-identified] : deep tissue injury [FreeTextEntry7] : right heel AKA AMPUTATION [FreeTextEntry8] : 0 [FreeTextEntry9] : 0 [de-identified] : left heel  [de-identified] : 3 [de-identified] : 2.3 cm [de-identified] : 0.1 [de-identified] : betadine [de-identified] : left medial heel  [de-identified] : 0 [de-identified] : 0 [de-identified] : 0 [de-identified] : betadine [de-identified] : deep tissue injury  [de-identified] : MEDIAL METATARSAL [de-identified] : 1.5 [de-identified] : 0.5 [de-identified] : .1CM [de-identified] : 5TH METATARSAL [de-identified] : 1 [de-identified] : 1 [de-identified] : 0.2 [de-identified] : betadine [TWNoteComboBox3] : FT [TWNoteComboBox4] : None [TWNoteComboBox5] : No [TWNoteComboBox6] : Pressure [de-identified] : No [de-identified] : Normal [de-identified] : None [de-identified] : 100% [de-identified] : Yes [TWNoteComboBox1] : Right [TWNoteComboBox9] : Right [de-identified] : FT [de-identified] : Left [de-identified] : None [de-identified] : No [de-identified] : Pressure [de-identified] : No [de-identified] : None [de-identified] : 100% [de-identified] : Yes [de-identified] : Left [de-identified] : FT [de-identified] : None [de-identified] : No [de-identified] : Pressure [de-identified] : No [de-identified] : Normal [de-identified] : None [de-identified] : 100% [de-identified] : None [de-identified] : Yes [de-identified] : Left [de-identified] : FT [de-identified] : None [de-identified] : No [de-identified] : Pressure [de-identified] : No [de-identified] : None [de-identified] : 100% [de-identified] : None [de-identified] : Yes [de-identified] : Left [de-identified] : FT [de-identified] : Small [de-identified] : No [de-identified] : Pressure [de-identified] : No [de-identified] : 100% [de-identified] : None [de-identified] : Yes

## 2019-11-01 ENCOUNTER — APPOINTMENT (OUTPATIENT)
Dept: VASCULAR SURGERY | Facility: CLINIC | Age: 79
End: 2019-11-01
Payer: MEDICARE

## 2019-11-01 VITALS
HEIGHT: 63 IN | HEART RATE: 67 BPM | SYSTOLIC BLOOD PRESSURE: 166 MMHG | DIASTOLIC BLOOD PRESSURE: 78 MMHG | BODY MASS INDEX: 21.26 KG/M2 | WEIGHT: 120 LBS | TEMPERATURE: 98.9 F

## 2019-11-01 PROCEDURE — 99024 POSTOP FOLLOW-UP VISIT: CPT

## 2019-11-01 NOTE — PHYSICAL EXAM
[JVD] : no jugular venous distention  [Normal Breath Sounds] : Normal breath sounds [1+] : left 1+ [0] : right 0 [Ankle Swelling (On Exam)] : not present [Ankle Swelling On The Left] : of the left ankle [Varicose Veins Of Lower Extremities] : not present [Varicose Veins Of The Left Leg] : of the left leg [] : of the left leg [Abdomen Masses] : No abdominal masses [No HSM] : no hepatosplenomegaly [Tender] : was nontender [Stool Sample Taken] : No stool obtained  on rectal exam [No Rash or Lesion] : No rash or lesion [Alert] : alert [Oriented to Person] : oriented to person [Oriented to Place] : oriented to place [Oriented to Time] : oriented to time [Calm] : calm [de-identified] : nad [de-identified] : wnl [FreeTextEntry1] : mild art insuff w mild trophic skin changes\par left calf and foot dressing in place  [de-identified] : wnl [de-identified] : wnl [de-identified] : Pipo Cranial nerves 2-12 pipo grossly intact [de-identified] : cooperative

## 2019-11-01 NOTE — ASSESSMENT
[FreeTextEntry1] : Impression arterial insuff s/p rt aka and left foot and left calf wound \par \par Plan Med Conservative management rt aka woundcare (betadine)\par continue lle woundcare w Dr Monreal\par pt and  inquired re lle amp \par Indications risks and benefits of lle amp were explained to pt who understands\par I advised them to d/w Dr Monreal and if there is any deterioration pt will need lle amp most likely AKA\par rto prn\par ov 2mo to re eval  [Arterial/Venous Disease] : arterial/venous disease [Foot care/Footwear] : foot care/footwear

## 2019-11-01 NOTE — HISTORY OF PRESENT ILLNESS
[FreeTextEntry1] : pt is s/p rt aka\par pt is wheelchair bound \par pt presents for rle staples d/c\par pt is receiving for lrft foot and calf  wounds woundcare by Dr Monreal

## 2019-11-12 PROCEDURE — 85014 HEMATOCRIT: CPT

## 2019-11-12 PROCEDURE — 71275 CT ANGIOGRAPHY CHEST: CPT

## 2019-11-12 PROCEDURE — 74018 RADEX ABDOMEN 1 VIEW: CPT

## 2019-11-12 PROCEDURE — 84100 ASSAY OF PHOSPHORUS: CPT

## 2019-11-12 PROCEDURE — 83605 ASSAY OF LACTIC ACID: CPT

## 2019-11-12 PROCEDURE — 73610 X-RAY EXAM OF ANKLE: CPT

## 2019-11-12 PROCEDURE — 87040 BLOOD CULTURE FOR BACTERIA: CPT

## 2019-11-12 PROCEDURE — 97530 THERAPEUTIC ACTIVITIES: CPT

## 2019-11-12 PROCEDURE — 83735 ASSAY OF MAGNESIUM: CPT

## 2019-11-12 PROCEDURE — 85730 THROMBOPLASTIN TIME PARTIAL: CPT

## 2019-11-12 PROCEDURE — 86922 COMPATIBILITY TEST ANTIGLOB: CPT

## 2019-11-12 PROCEDURE — 96375 TX/PRO/DX INJ NEW DRUG ADDON: CPT

## 2019-11-12 PROCEDURE — 86140 C-REACTIVE PROTEIN: CPT

## 2019-11-12 PROCEDURE — 93306 TTE W/DOPPLER COMPLETE: CPT

## 2019-11-12 PROCEDURE — 82803 BLOOD GASES ANY COMBINATION: CPT

## 2019-11-12 PROCEDURE — 85379 FIBRIN DEGRADATION QUANT: CPT

## 2019-11-12 PROCEDURE — 71045 X-RAY EXAM CHEST 1 VIEW: CPT

## 2019-11-12 PROCEDURE — 86900 BLOOD TYPING SEROLOGIC ABO: CPT

## 2019-11-12 PROCEDURE — 82947 ASSAY GLUCOSE BLOOD QUANT: CPT

## 2019-11-12 PROCEDURE — 85652 RBC SED RATE AUTOMATED: CPT

## 2019-11-12 PROCEDURE — 94003 VENT MGMT INPAT SUBQ DAY: CPT

## 2019-11-12 PROCEDURE — 88311 DECALCIFY TISSUE: CPT

## 2019-11-12 PROCEDURE — 94640 AIRWAY INHALATION TREATMENT: CPT

## 2019-11-12 PROCEDURE — 99285 EMERGENCY DEPT VISIT HI MDM: CPT | Mod: 25

## 2019-11-12 PROCEDURE — 80053 COMPREHEN METABOLIC PANEL: CPT

## 2019-11-12 PROCEDURE — 82435 ASSAY OF BLOOD CHLORIDE: CPT

## 2019-11-12 PROCEDURE — 96374 THER/PROPH/DIAG INJ IV PUSH: CPT | Mod: XU

## 2019-11-12 PROCEDURE — 86901 BLOOD TYPING SEROLOGIC RH(D): CPT

## 2019-11-12 PROCEDURE — 82565 ASSAY OF CREATININE: CPT

## 2019-11-12 PROCEDURE — 81003 URINALYSIS AUTO W/O SCOPE: CPT

## 2019-11-12 PROCEDURE — 97167 OT EVAL HIGH COMPLEX 60 MIN: CPT

## 2019-11-12 PROCEDURE — 84295 ASSAY OF SERUM SODIUM: CPT

## 2019-11-12 PROCEDURE — 82330 ASSAY OF CALCIUM: CPT

## 2019-11-12 PROCEDURE — P9016: CPT

## 2019-11-12 PROCEDURE — 88307 TISSUE EXAM BY PATHOLOGIST: CPT

## 2019-11-12 PROCEDURE — 87086 URINE CULTURE/COLONY COUNT: CPT

## 2019-11-12 PROCEDURE — 94002 VENT MGMT INPAT INIT DAY: CPT

## 2019-11-12 PROCEDURE — C1889: CPT

## 2019-11-12 PROCEDURE — 36430 TRANSFUSION BLD/BLD COMPNT: CPT

## 2019-11-12 PROCEDURE — 97162 PT EVAL MOD COMPLEX 30 MIN: CPT

## 2019-11-12 PROCEDURE — 81001 URINALYSIS AUTO W/SCOPE: CPT

## 2019-11-12 PROCEDURE — 86850 RBC ANTIBODY SCREEN: CPT

## 2019-11-12 PROCEDURE — 93005 ELECTROCARDIOGRAM TRACING: CPT

## 2019-11-12 PROCEDURE — 36600 WITHDRAWAL OF ARTERIAL BLOOD: CPT

## 2019-11-12 PROCEDURE — 80048 BASIC METABOLIC PNL TOTAL CA: CPT

## 2019-11-12 PROCEDURE — 97535 SELF CARE MNGMENT TRAINING: CPT

## 2019-11-12 PROCEDURE — 85027 COMPLETE CBC AUTOMATED: CPT

## 2019-11-12 PROCEDURE — 75635 CT ANGIO ABDOMINAL ARTERIES: CPT

## 2019-11-12 PROCEDURE — 80202 ASSAY OF VANCOMYCIN: CPT

## 2019-11-12 PROCEDURE — 85610 PROTHROMBIN TIME: CPT

## 2019-11-12 PROCEDURE — 84132 ASSAY OF SERUM POTASSIUM: CPT

## 2019-11-26 NOTE — PROGRESS NOTE ADULT - ASSESSMENT
78 yo female patient w/ multiple gangrenous wounds of right lower extremity in sepsis.    Plan:  - Patient to have discussion with family  - Will re-evaluate with patient / family 78 yo female patient w/ multiple gangrenous wounds of right lower extremity in sepsis. and foot gangrene     Plan:  bedside d/w pt and  re above  pt's  insists to d/w neurologist  Dr URVASHI Singh  current condition first then will make decision   they are aware of the risk of progression sepsis and death   - Patient to have discussion with family  - Will re-evaluate with patient / family  pt's  states that they do not wish to f/u w Dr Israel at this time   will follow 80 yo female patient w/ multiple gangrenous wounds of right lower extremity in sepsis. and foot gangrene     Plan:  bedside d/w pt and  re above  will proceed w staged intervention of rle guillotine amp todai  Indications risks and benefits were explained to patient who understands and consents   this is an emergent procedure due to  progression of right foot ischemia and gangrene No

## 2019-12-02 ENCOUNTER — APPOINTMENT (OUTPATIENT)
Dept: WOUND CARE | Facility: CLINIC | Age: 79
End: 2019-12-02

## 2019-12-16 ENCOUNTER — EMERGENCY (EMERGENCY)
Facility: HOSPITAL | Age: 79
LOS: 1 days | Discharge: HOME | End: 2019-12-16
Attending: STUDENT IN AN ORGANIZED HEALTH CARE EDUCATION/TRAINING PROGRAM
Payer: MEDICARE

## 2019-12-16 VITALS
SYSTOLIC BLOOD PRESSURE: 160 MMHG | HEART RATE: 84 BPM | RESPIRATION RATE: 18 BRPM | DIASTOLIC BLOOD PRESSURE: 82 MMHG | OXYGEN SATURATION: 95 % | TEMPERATURE: 98 F

## 2019-12-16 VITALS
OXYGEN SATURATION: 99 % | RESPIRATION RATE: 18 BRPM | HEART RATE: 67 BPM | TEMPERATURE: 98 F | SYSTOLIC BLOOD PRESSURE: 142 MMHG | DIASTOLIC BLOOD PRESSURE: 74 MMHG | WEIGHT: 130.07 LBS

## 2019-12-16 DIAGNOSIS — Z90.710 ACQUIRED ABSENCE OF BOTH CERVIX AND UTERUS: Chronic | ICD-10-CM

## 2019-12-16 DIAGNOSIS — Z96.7 PRESENCE OF OTHER BONE AND TENDON IMPLANTS: Chronic | ICD-10-CM

## 2019-12-16 DIAGNOSIS — Z98.890 OTHER SPECIFIED POSTPROCEDURAL STATES: Chronic | ICD-10-CM

## 2019-12-16 LAB
ALBUMIN SERPL ELPH-MCNC: 3.3 G/DL — SIGNIFICANT CHANGE UP (ref 3.3–5)
ALP SERPL-CCNC: 174 U/L — HIGH (ref 40–120)
ALT FLD-CCNC: 17 U/L — SIGNIFICANT CHANGE UP (ref 10–45)
ANION GAP SERPL CALC-SCNC: 10 MMOL/L — SIGNIFICANT CHANGE UP (ref 5–17)
APTT BLD: 23.5 SEC — LOW (ref 27.5–36.3)
AST SERPL-CCNC: 15 U/L — SIGNIFICANT CHANGE UP (ref 10–40)
BILIRUB SERPL-MCNC: 0.2 MG/DL — SIGNIFICANT CHANGE UP (ref 0.2–1.2)
BLD GP AB SCN SERPL QL: NEGATIVE — SIGNIFICANT CHANGE UP
BUN SERPL-MCNC: 19 MG/DL — SIGNIFICANT CHANGE UP (ref 7–23)
CALCIUM SERPL-MCNC: 9.8 MG/DL — SIGNIFICANT CHANGE UP (ref 8.4–10.5)
CHLORIDE SERPL-SCNC: 101 MMOL/L — SIGNIFICANT CHANGE UP (ref 96–108)
CO2 SERPL-SCNC: 31 MMOL/L — SIGNIFICANT CHANGE UP (ref 22–31)
CREAT SERPL-MCNC: 0.38 MG/DL — LOW (ref 0.5–1.3)
ERYTHROCYTE [SEDIMENTATION RATE] IN BLOOD: 55 MM/HR — HIGH (ref 0–20)
GAS PNL BLDV: SIGNIFICANT CHANGE UP
GLUCOSE SERPL-MCNC: 113 MG/DL — HIGH (ref 70–99)
HCT VFR BLD CALC: 42.7 % — SIGNIFICANT CHANGE UP (ref 34.5–45)
HGB BLD-MCNC: 12.6 G/DL — SIGNIFICANT CHANGE UP (ref 11.5–15.5)
INR BLD: 0.92 RATIO — SIGNIFICANT CHANGE UP (ref 0.88–1.16)
MCHC RBC-ENTMCNC: 27.1 PG — SIGNIFICANT CHANGE UP (ref 27–34)
MCHC RBC-ENTMCNC: 29.5 GM/DL — LOW (ref 32–36)
MCV RBC AUTO: 91.8 FL — SIGNIFICANT CHANGE UP (ref 80–100)
NRBC # BLD: 0 /100 WBCS — SIGNIFICANT CHANGE UP (ref 0–0)
PLATELET # BLD AUTO: 200 K/UL — SIGNIFICANT CHANGE UP (ref 150–400)
POTASSIUM SERPL-MCNC: 3.7 MMOL/L — SIGNIFICANT CHANGE UP (ref 3.5–5.3)
POTASSIUM SERPL-SCNC: 3.7 MMOL/L — SIGNIFICANT CHANGE UP (ref 3.5–5.3)
PROT SERPL-MCNC: 7.4 G/DL — SIGNIFICANT CHANGE UP (ref 6–8.3)
PROTHROM AB SERPL-ACNC: 10.6 SEC — SIGNIFICANT CHANGE UP (ref 10–12.9)
RBC # BLD: 4.65 M/UL — SIGNIFICANT CHANGE UP (ref 3.8–5.2)
RBC # FLD: 15.4 % — HIGH (ref 10.3–14.5)
RH IG SCN BLD-IMP: POSITIVE — SIGNIFICANT CHANGE UP
SODIUM SERPL-SCNC: 142 MMOL/L — SIGNIFICANT CHANGE UP (ref 135–145)
WBC # BLD: 6.7 K/UL — SIGNIFICANT CHANGE UP (ref 3.8–10.5)
WBC # FLD AUTO: 6.7 K/UL — SIGNIFICANT CHANGE UP (ref 3.8–10.5)

## 2019-12-16 PROCEDURE — 82803 BLOOD GASES ANY COMBINATION: CPT

## 2019-12-16 PROCEDURE — 99284 EMERGENCY DEPT VISIT MOD MDM: CPT | Mod: 25

## 2019-12-16 PROCEDURE — 74177 CT ABD & PELVIS W/CONTRAST: CPT

## 2019-12-16 PROCEDURE — 73590 X-RAY EXAM OF LOWER LEG: CPT | Mod: 26,LT

## 2019-12-16 PROCEDURE — 99284 EMERGENCY DEPT VISIT MOD MDM: CPT

## 2019-12-16 PROCEDURE — 86140 C-REACTIVE PROTEIN: CPT

## 2019-12-16 PROCEDURE — 85027 COMPLETE CBC AUTOMATED: CPT

## 2019-12-16 PROCEDURE — 85610 PROTHROMBIN TIME: CPT

## 2019-12-16 PROCEDURE — 73610 X-RAY EXAM OF ANKLE: CPT

## 2019-12-16 PROCEDURE — 86901 BLOOD TYPING SEROLOGIC RH(D): CPT

## 2019-12-16 PROCEDURE — 85014 HEMATOCRIT: CPT

## 2019-12-16 PROCEDURE — 84295 ASSAY OF SERUM SODIUM: CPT

## 2019-12-16 PROCEDURE — 85730 THROMBOPLASTIN TIME PARTIAL: CPT

## 2019-12-16 PROCEDURE — 83605 ASSAY OF LACTIC ACID: CPT

## 2019-12-16 PROCEDURE — 85652 RBC SED RATE AUTOMATED: CPT

## 2019-12-16 PROCEDURE — 80053 COMPREHEN METABOLIC PANEL: CPT

## 2019-12-16 PROCEDURE — 86850 RBC ANTIBODY SCREEN: CPT

## 2019-12-16 PROCEDURE — 82947 ASSAY GLUCOSE BLOOD QUANT: CPT

## 2019-12-16 PROCEDURE — 82435 ASSAY OF BLOOD CHLORIDE: CPT

## 2019-12-16 PROCEDURE — 84132 ASSAY OF SERUM POTASSIUM: CPT

## 2019-12-16 PROCEDURE — 74177 CT ABD & PELVIS W/CONTRAST: CPT | Mod: 26

## 2019-12-16 PROCEDURE — 73630 X-RAY EXAM OF FOOT: CPT

## 2019-12-16 PROCEDURE — 73630 X-RAY EXAM OF FOOT: CPT | Mod: 26,LT

## 2019-12-16 PROCEDURE — 86900 BLOOD TYPING SEROLOGIC ABO: CPT

## 2019-12-16 PROCEDURE — 73590 X-RAY EXAM OF LOWER LEG: CPT

## 2019-12-16 PROCEDURE — 86922 COMPATIBILITY TEST ANTIGLOB: CPT

## 2019-12-16 PROCEDURE — 82330 ASSAY OF CALCIUM: CPT

## 2019-12-16 PROCEDURE — 73610 X-RAY EXAM OF ANKLE: CPT | Mod: 26,LT

## 2019-12-16 RX ORDER — CEPHALEXIN 500 MG
500 CAPSULE ORAL ONCE
Refills: 0 | Status: COMPLETED | OUTPATIENT
Start: 2019-12-16 | End: 2019-12-16

## 2019-12-16 RX ORDER — CEPHALEXIN 500 MG
1 CAPSULE ORAL
Qty: 28 | Refills: 0
Start: 2019-12-16 | End: 2019-12-22

## 2019-12-16 RX ADMIN — Medication 500 MILLIGRAM(S): at 22:56

## 2019-12-16 NOTE — ED PROVIDER NOTE - NSFOLLOWUPINSTRUCTIONS_ED_ALL_ED_FT
You were seen in the emergency department today and we recommend that you return if you develop chest pain, abdominal pain, shortness of breath, lightheadedness, vomiting, leg swelling, fever, headache, slurred speech, weakness or blurry vision.     Please follow up with your primary care doctor over the next 2-3 days for further management of your symptoms and to review your bloodwork to ensure no additional tests, imaging or bloodwork, need to be performed.    Please take the antibiotics cephalexin 500 mg four times a day as needed for the next week.   Please follow up with wound care over the next week- we sent a referral to wound care (they will call you in the next 24-48 hours to help set up).   You had a ct scan that showed no intraabdominal infection and no evidence of infection to the bone.

## 2019-12-16 NOTE — ED PROVIDER NOTE - PATIENT PORTAL LINK FT
You can access the FollowMyHealth Patient Portal offered by [facility] by registering at the following website: [email or web address]. By joining [employer]’s FollowMyHealth portal, you will also be able to view your health information using other applications (apps) compatible with our system.

## 2019-12-16 NOTE — ED PROVIDER NOTE - PHYSICAL EXAMINATION
I have reviewed the triage vital signs. Const: chronically ill appearing  Eyes: no conjunctival injection and no scleral icterus ENMT: dry mucus membranes, CVS: +S1/S2 2+ L lower extremity DP pulse,  RESP: Unlabored respiratory effort, Clear to auscultation bilaterally GI: Nontender/Nondistended soft abdomen, no CVA tenderness : wound on the sacrum w/ eschar on the buttock area, w/ mild erythema,  fragoso in place draining clear drainage, MSK: aka on the R leg, black eschar w/ open wound on the lateral tibial area, w/ pressure wound on the Heel, Psych: Awake, Alert, & Orientedx3;  Appropriate mood and affect, cooperative

## 2019-12-16 NOTE — ED ADULT NURSE NOTE - OBJECTIVE STATEMENT
79 year old female presenting to ED from home, accompanied by  and home health aid, c/o worsening sacral pressure wound. Sacral pressure wound unstageable due to dried tissue covering depth of wound, about the size of a tennis ball, came in open to air. Pt also has a pressure ulcer on her left lateral calf with a dressing the aid is unaware of the name. No active bleeding or pus noted from either wounds. 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 Pt has Kendrick in place upon arrival (changed Friday) draining clear macario urine. Pt A+Ox3 denies recent fevers or chills, headache, dizziness or lightheadedness, chest pain, SOB, N/V, abdominal pain, changes in urinary or bowel patterns.

## 2019-12-16 NOTE — ED PROVIDER NOTE - PROGRESS NOTE DETAILS
s/p ct wound dressed on buttock and on the L lower extremity   is mad states that he wants to see a wound care doctor.   pt and  informed that wound care can be arranged as an outpt will give referral  pt will be started on oncephalexin for cellulitis surrounding the sacral wound

## 2019-12-16 NOTE — ED PROVIDER NOTE - CLINICAL SUMMARY MEDICAL DECISION MAKING FREE TEXT BOX
Selina Watson MD   79 F w. 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 Pt states that symptoms have been progressively worsening over the past 2-3 days. afebrile, nontoxic appearing  will obtain ct scan to eval for possible deep space infection that might require surgical debridement.

## 2019-12-16 NOTE — ED PROVIDER NOTE - OBJECTIVE STATEMENT
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 No fevers, no chills, no nausea no vomiting. Baseline decreased sensation in the lower extremities. pt has mild erythema surrounding the wound.

## 2019-12-17 LAB — CRP SERPL-MCNC: 1.84 MG/DL — HIGH (ref 0–0.4)

## 2020-01-01 NOTE — AIRWAY REMOVAL NOTE  ADULT & PEDS - ARTIFICAL AIRWAY REMOVAL COMMENTS
Written order for extubation verified. The patient was identified by full name and birth date compared to the identification band. Present during the procedure was JOHANA Ashton.
normal...

## 2020-01-03 ENCOUNTER — APPOINTMENT (OUTPATIENT)
Dept: ENDOCRINOLOGY | Facility: CLINIC | Age: 80
End: 2020-01-03
Payer: MEDICARE

## 2020-01-03 VITALS
DIASTOLIC BLOOD PRESSURE: 80 MMHG | OXYGEN SATURATION: 95 % | HEART RATE: 66 BPM | SYSTOLIC BLOOD PRESSURE: 130 MMHG | HEIGHT: 63 IN

## 2020-01-03 VITALS — HEIGHT: 63 IN

## 2020-01-03 DIAGNOSIS — E05.90 THYROTOXICOSIS, UNSPECIFIED W/OUT THYROTOXIC CRISIS OR STORM: ICD-10-CM

## 2020-01-03 DIAGNOSIS — R79.89 OTHER SPECIFIED ABNORMAL FINDINGS OF BLOOD CHEMISTRY: ICD-10-CM

## 2020-01-03 DIAGNOSIS — M81.0 AGE-RELATED OSTEOPOROSIS W/OUT CURRENT PATHOLOGICAL FRACTURE: ICD-10-CM

## 2020-01-03 DIAGNOSIS — D35.00 BENIGN NEOPLASM OF UNSPECIFIED ADRENAL GLAND: ICD-10-CM

## 2020-01-03 PROCEDURE — 99214 OFFICE O/P EST MOD 30 MIN: CPT

## 2020-01-03 NOTE — ASSESSMENT
[FreeTextEntry1] : Patient is a 78 yo woman with MS, adrenal adenoma, hyperthyroidism vs. thyroiditis, osteoporosis\par \par 1. Hyperthyroid\par -HR is at goal\par -not currently on methimazole\par -clinically euthyroid\par -repeat TFT's today\par \par 2. Osteoporosis\par -patient states neurologist does not want her to go on prolia because it may make the MS worse. No evidence that I am aware of other than prolia is monoclonal antibody with possible increased risk of infection though small\par -rheumatologist provided IV reclast for osteoporosis instead\par -can continue rheumatology care\par -she has received 4 doses to date, possibly 2. If the patient wants endocrine to manage osteoporosis, asked for records so infusion can be providded\par -fall risk precuations discussed\par \par 3. Adrenal adenoma\par -metanephrines were negative\par -patient did not have dexamethasone suppression testing and opts not to do so. Even if she is found to have functional adenoma, she was told that she is not a surgical candidate due to the progressive nature of her MS\par -conservative management for now\par -repeat CT scan in the hospital showed stable adrenal adenoma\par -repeat metanephrines this year\par \par 4. vitamin D deficiency\par -check D levels and replete\par \par If labs are normal, follow up in 8 months, sooner if needed\par \par

## 2020-01-03 NOTE — DATA REVIEWED
[FreeTextEntry1] : EXAM: CT ABDOMEN AND PELVIS IC \par \par \par PROCEDURE DATE: 12/16/2019 \par \par \par \par \par INTERPRETATION: CLINICAL INFORMATION: Sacral wound. \par \par COMPARISON: None. \par \par PROCEDURE: \par CT of the Abdomen and Pelvis was performed with intravenous contrast. \par Intravenous contrast: 90 ml Omnipaque 350. 10 ml discarded. \par Oral contrast: None. \par Sagittal and coronal reformats were performed. \par \par FINDINGS: \par \par LOWER CHEST: Right lower lobe linear atelectasis. \par \par LIVER: Within normal limits. \par BILE DUCTS: Normal caliber. \par GALLBLADDER: Cholelithiasis. \par SPLEEN: Within normal limits. \par PANCREAS: Within normal limits. \par ADRENALS: Indeterminate hypodense left adrenal lesion measuring 2.5 x 2.2 \par cm. Thickening/nodularity is noted involving the right adrenal gland. \par KIDNEYS/URETERS: Within normal limits. \par \par BLADDER: Kendrick catheter. \par REPRODUCTIVE ORGANS: Hysterectomy. No adnexal masses. \par \par BOWEL: No bowel obstruction. Appendix is not visualized. \par PERITONEUM: No ascites. \par VESSELS: Atherosclerotic changes. \par RETROPERITONEUM/LYMPH NODES: No lymphadenopathy. \par ABDOMINAL WALL: Subcutaneous fat stranding posterior to the sacrum. No CT \par evidence of osteomyelitis. No drainable fluid collection. \par BONES: Left hip ORIF. Degenerative changes. \par \par IMPRESSION: \par \par Subcutaneous inflammatory changes posterior to the sacrum. No CT evidence of \par osteomyelitis. If there is clinical concern for ostium myelitis, MRI or \par tagged white blood cell scan can be performed for further evaluation. \par \par Indeterminate left adrenal nodule. Adrenal protocol CT or MRI is recommended for further evaluation. \par \par NARGIS WEISS M.D., RADIOLOGY RESIDENT \par This document has been electronically signed. \par ETELVINA PATINO M.D., ATTENDING RADIOLOGIST \par This document has been electronically signed. Dec 16 2019 8:38PM \par

## 2020-01-03 NOTE — REVIEW OF SYSTEMS
[Negative] : Constitutional [All other systems negative] : All other systems negative [Fatigue] : no fatigue [Dysphagia] : no dysphagia [Decreased Appetite] : appetite not decreased [Chest Pain] : no chest pain [Palpitations] : no palpitations [Dysphonia] : no dysphonia [Heart Rate Is Slow] : the heart rate was not slow [Heart Rate Is Fast] : the heart rate was not fast [Depression] : no depression [Diarrhea] : no diarrhea [Constipation] : no constipation [Anxiety] : no anxiety [FreeTextEntry8] : +fragoso

## 2020-01-03 NOTE — PHYSICAL EXAM
[No Acute Distress] : no acute distress [Alert] : alert [Well Nourished] : well nourished [Well Developed] : well developed [No Thyroid Nodules] : there were no palpable thyroid nodules [No Proptosis] : no proptosis [Thyroid Not Enlarged] : the thyroid was not enlarged [No Accessory Muscle Use] : no accessory muscle use [Normal Rate and Effort] : normal respiratory rhythm and effort [No Respiratory Distress] : no respiratory distress [Normal Rate] : heart rate was normal  [Clear to Auscultation] : lungs were clear to auscultation bilaterally [Normal Bowel Sounds] : normal bowel sounds [Normal S1, S2] : normal S1 and S2 [Not Tender] : non-tender [Kyphosis] : kyphosis present [Soft] : abdomen soft [Scoliosis] : scoliosis present [Normal Mood] : the mood was normal [de-identified] : frail elderly woman, wheelchair bound.  present [Normal Affect] : the affect was normal [de-identified] : +fragoso [de-identified] : transfers via electronic chair, right AKA stump  [de-identified] : Transfers via wheelchair

## 2020-01-03 NOTE — HISTORY OF PRESENT ILLNESS
[FreeTextEntry1] : Patient is a 80 yo woman with hx of multiple sclerosis wheel chair dependent, osteoporosis, hyperthyroidism and bilateral adrenal masses\par \par Previous endocrinologist Dr. Howard for thyroid abnormalities. She was never previously on thyroid medications prior to the hospitalization in 2018. The patient was using iodine drops for about 6 months when she was getting recurrent UTI. Then 3 months prior to hospitalization, she was taking an iodine supplement containing 50 mg of iodine in each pill to help with her MS. She thyroid function tests done on admission and she had +TSH receptor antibody 39.61, free t4 of 5.1, TSH of < 0.01. \par On Methimazole 10 mg daily and atenolol. Definitive therapy with GRIFFIN was offered but she had deferred it in favor of methimazole\par She no longer takes iodine supplementation.  In February 2019, TSH was 17.80, so methimazole was stopped\par \par The patient had a CT scan done while in the hospital which showed adrenal incidentalomas measuring 2.6 x 2 cm on the left and 1.3 x 1 cm on the right. Metanephrine, DHEAs were negative. Patient refused dexamethasone suppression testing and opted for conservative surveillance at the last visit. Surveillance MRI in March 2019 confirmed lipid rich adenomas.  During her recent hospitalization, she also had a CT scan done which confirmed left adrenal lesion measuring 2.5 x 2.2 cm. Thickening/nodularity is noted involving the right adrenal gland. \par \par Osteoporosis: \par Had about 3 doses of Reclast. Her osteoporosis is managed Dr. Hyde/Rheumatologist\par Wheelchair bound, DXA done in 2015\par T score L1-L4 -2.4\par Femoral neck T score -4.7\par Total hip T score -5.0\par At the last visit, I recommend prolia as she has progressive decline in bone density. She wanted to defer discussion with her PCP instead. Per her neurology Dr. Parry, patient was told prolia would not be a good choice based on her MS. She received Reclast already\par Since the last visit, patient had Right lower extremity amputation due to osteomyelitis. She banged her foot, started off as a bruise then progressed to gangrene

## 2020-01-07 ENCOUNTER — APPOINTMENT (OUTPATIENT)
Dept: VASCULAR SURGERY | Facility: CLINIC | Age: 80
End: 2020-01-07
Payer: MEDICARE

## 2020-01-07 VITALS
TEMPERATURE: 98.6 F | HEIGHT: 63 IN | BODY MASS INDEX: 22.15 KG/M2 | SYSTOLIC BLOOD PRESSURE: 174 MMHG | DIASTOLIC BLOOD PRESSURE: 86 MMHG | WEIGHT: 125 LBS | HEART RATE: 69 BPM

## 2020-01-07 LAB
25(OH)D3 SERPL-MCNC: 31.5 NG/ML
ALBUMIN SERPL ELPH-MCNC: 3.5 G/DL
ALP BLD-CCNC: 139 U/L
ALT SERPL-CCNC: 15 U/L
ANION GAP SERPL CALC-SCNC: 12 MMOL/L
AST SERPL-CCNC: 20 U/L
BILIRUB SERPL-MCNC: 0.2 MG/DL
BUN SERPL-MCNC: 17 MG/DL
CALCIUM SERPL-MCNC: 9.5 MG/DL
CHLORIDE SERPL-SCNC: 102 MMOL/L
CO2 SERPL-SCNC: 29 MMOL/L
CORTIS SERPL-MCNC: 15.1 UG/DL
CREAT SERPL-MCNC: 0.28 MG/DL
DHEA-S SERPL-MCNC: 32.3 UG/DL
GLUCOSE SERPL-MCNC: 148 MG/DL
POTASSIUM SERPL-SCNC: 3.5 MMOL/L
PROT SERPL-MCNC: 6.8 G/DL
SODIUM SERPL-SCNC: 143 MMOL/L
T3 SERPL-MCNC: 87 NG/DL
T4 FREE SERPL-MCNC: 1.2 NG/DL
THYROGLOB AB SERPL-ACNC: <20 IU/ML
THYROPEROXIDASE AB SERPL IA-ACNC: 32.5 IU/ML
TSH RECEPTOR AB: <1.1 IU/L
TSH SERPL-ACNC: 1.39 UIU/ML
TSI ACT/NOR SER: <0.1 IU/L

## 2020-01-07 PROCEDURE — 99213 OFFICE O/P EST LOW 20 MIN: CPT

## 2020-01-07 NOTE — HISTORY OF PRESENT ILLNESS
[FreeTextEntry1] : pt is s/p rt aka\par pt is wheelchair bound \par pt is receiving for left  foot and calf  wounds woundcare by Dr Monreal \par pt states that she was advised that wounds are healing \par pt wants to discuss rle aka prosthesis

## 2020-01-07 NOTE — PHYSICAL EXAM
[Normal Breath Sounds] : Normal breath sounds [0] : right 0 [1+] : left 1+ [Ankle Swelling On The Left] : of the left ankle [Varicose Veins Of The Left Leg] : of the left leg [No HSM] : no hepatosplenomegaly [No Rash or Lesion] : No rash or lesion [Oriented to Person] : oriented to person [Alert] : alert [Oriented to Place] : oriented to place [Calm] : calm [Oriented to Time] : oriented to time [JVD] : no jugular venous distention  [Ankle Swelling (On Exam)] : not present [] : not present [Abdomen Masses] : No abdominal masses [Varicose Veins Of Lower Extremities] : not present [Tender] : was nontender [Stool Sample Taken] : No stool obtained  on rectal exam [de-identified] : wnl [de-identified] : nad [FreeTextEntry1] : mild art insuff w mild trophic skin changes\par left calf and foot dressing in place  wounds on the distal shin w moderate granulation tissue \par rt aka well healed  [de-identified] : wnl [de-identified] : wnl [de-identified] : Pipo Cranial nerves 2-12 pipo grossly intact [de-identified] : cooperative

## 2020-01-07 NOTE — ASSESSMENT
[Arterial/Venous Disease] : arterial/venous disease [Foot care/Footwear] : foot care/footwear [FreeTextEntry1] : Impression arterial insuff s/p rt aka and left foot and left calf wound \par \par Plan Med Conservative management rt aka\par continue lle woundcare w Dr Monreal\par pt and  inquired re lle amp \par Indications risks and benefits of lle amp were explained to pt who understands\par pt plans to f/u w Dr Monreal to have lle wounds re eval\par pt also wants to try rt aka prosthesis and possibly pt/ot for some kind of activity\par I advised her to try pt/ot but given the duration of her being wheelchair bound  the likelyhood of successful wt bearing and ambulation is limited \par ov 3-4mo to re eval

## 2020-01-09 LAB
METANEPHRINE, PL: 30 PG/ML
NORMETANEPHRINE, PL: 102 PG/ML

## 2020-01-13 ENCOUNTER — RX RENEWAL (OUTPATIENT)
Age: 80
End: 2020-01-13

## 2020-01-16 ENCOUNTER — APPOINTMENT (OUTPATIENT)
Dept: WOUND CARE | Facility: CLINIC | Age: 80
End: 2020-01-16
Payer: MEDICARE

## 2020-01-16 DIAGNOSIS — H02.403 UNSPECIFIED PTOSIS OF BILATERAL EYELIDS: ICD-10-CM

## 2020-01-16 DIAGNOSIS — L98.499 NON-PRESSURE CHRONIC ULCER OF SKIN OF OTHER SITES WITH UNSPECIFIED SEVERITY: ICD-10-CM

## 2020-01-16 DIAGNOSIS — S81.809D UNSPECIFIED OPEN WOUND, UNSPECIFIED LOWER LEG, SUBSEQUENT ENCOUNTER: ICD-10-CM

## 2020-01-16 PROCEDURE — 99204 OFFICE O/P NEW MOD 45 MIN: CPT | Mod: 25

## 2020-01-16 PROCEDURE — 11042 DBRDMT SUBQ TIS 1ST 20SQCM/<: CPT

## 2020-01-17 PROBLEM — L98.499 SKIN ULCER: Status: ACTIVE | Noted: 2018-10-10

## 2020-02-02 NOTE — ASSESSMENT
[FreeTextEntry1] : Patient underwent arterial Dopplers which show severe disease. At this time patient may not be a candidate for revascularization. Patient may require amputation

## 2020-02-02 NOTE — HISTORY OF PRESENT ILLNESS
[FreeTextEntry1] : 79-year-old female with a history of multiple sclerosis has a several month history of left heel ulceration\par Patient has been treated at the wound care center. She has undergone lower extremity arterial Dopplers which show significant disease bilaterally. She is now here for evaluation for right leg ulcers

## 2020-02-02 NOTE — PHYSICAL EXAM
[JVD] : no jugular venous distention  [Normal Breath Sounds] : Normal breath sounds [2+] : left 2+ [0] : left 0 [Varicose Veins Of Lower Extremities] : not present [Ankle Swelling (On Exam)] : not present [Abdomen Tenderness] : ~T ~M No abdominal tenderness [] : not present [Skin Ulcer] : ulcer [No Rash or Lesion] : No rash or lesion [Alert] : alert [Calm] : calm [de-identified] : wheelchair-bound [de-identified] : Poor motor function [FreeTextEntry1] : Left heel with a clean eschar

## 2020-02-06 ENCOUNTER — APPOINTMENT (OUTPATIENT)
Dept: WOUND CARE | Facility: CLINIC | Age: 80
End: 2020-02-06
Payer: MEDICARE

## 2020-02-06 DIAGNOSIS — F41.9 ANXIETY DISORDER, UNSPECIFIED: ICD-10-CM

## 2020-02-06 PROBLEM — H02.403 PTOSIS, BILATERAL: Status: ACTIVE | Noted: 2017-10-30

## 2020-02-06 PROBLEM — S81.809D: Status: ACTIVE | Noted: 2019-09-23

## 2020-02-06 PROCEDURE — 99214 OFFICE O/P EST MOD 30 MIN: CPT

## 2020-02-06 RX ORDER — COLLAGENASE SANTYL 250 [ARB'U]/G
250 OINTMENT TOPICAL DAILY
Qty: 1 | Refills: 0 | Status: ACTIVE | COMMUNITY
Start: 2020-02-06 | End: 1900-01-01

## 2020-02-06 RX ORDER — FLUCONAZOLE 100 MG/1
100 TABLET ORAL DAILY
Qty: 3 | Refills: 2 | Status: ACTIVE | COMMUNITY
Start: 2020-02-06 | End: 1900-01-01

## 2020-02-06 NOTE — ASSESSMENT
[FreeTextEntry1] :  79 yr old htn , PAD, s/p aka, MS  left, calf and  left foot /toe wounds\par midline sacral  larger and iscial stage 2-healed\par asked to dc gentian blue and switch to betadine/ santyl\par candida judd sacral- diflucan and nystatin ointment perisacral\par \par  datacomplexity lab, xr, old rec, test resultsreview,, visualize imagecptreview previous\par risk surgery\par  \par Patient wound cleaned and debrided mechanically patient tolerated tx well. \par \par Plan- apply betadine to left toe wounds \par and prontosan-ordered santyl to sacral wound alginate/foam \par duoderm to L ischium

## 2020-02-06 NOTE — HISTORY OF PRESENT ILLNESS
[FreeTextEntry1] : Ms. HOLLY FRY   presents to the office with a wound for 3.5 months duration.\par   The wound is located on  the left heel and posterior left leg . \par  The patient has been dressing the wound with betadine daily, as prescribed by primary. \par  The patient denies fevers or chills.  \par The patient has localized pain to the wound upon dressing changes.  \par The patient has no other complaints or associated symptoms.\par  s/p AKA  RLE\par \par August 2018, hospitalized at [facility] for sepsis, ICU, hospitalized for 7 days, shortly after when home, noticed redness and pain on left heel, then skin broke down. Prior wound on left heel several years ago, followed by podiatry, eventually healed.\par \par Patient could not undergo revascularization as per patient.

## 2020-02-06 NOTE — PHYSICAL EXAM
[Normal Breath Sounds] : Normal breath sounds [Normal Heart Sounds] : normal heart sounds [0] : right 0 [Ankle Swelling On The Left] : of the left ankle [Alert] : alert [Oriented to Person] : oriented to person [Oriented to Place] : oriented to place [Oriented to Time] : oriented to time [Calm] : calm [4 x 4] : 4 x 4  [JVD] : no jugular venous distention  [] : not present [Varicose Veins Of Lower Extremities] : not present [de-identified] : not ambulatory, wheelchair bound due to multiple sclerosis [de-identified] : indwelling catheter changed 2x/month [de-identified] : wound on left heel [de-identified] : Skin andsubcutaneous tissue [de-identified] : gentian blue removed [FreeTextEntry1] : 5th metatarsal ( lateral) [FreeTextEntry2] : 0 [FreeTextEntry3] : 0 [FreeTextEntry4] : 0 [de-identified] : betadine [de-identified] : deep tissue injury [FreeTextEntry7] : 5th metatarsal eschar head  [FreeTextEntry8] : 1.4 [de-identified] : 0.3 [FreeTextEntry9] : 1.2 [de-identified] : betadine/ gentian blue removed [de-identified] : pad [FreeTextEntry6] : alginate [de-identified] : calf  [de-identified] : 7.5 cm [de-identified] : scalpel #15 1 mm deeper into tendon/dermis [de-identified] : betadine [de-identified] : 2.5  cm [de-identified] : 0.6 cm  [de-identified] : left medial heel  [de-identified] : gentian blue stained tissue, portion excised [de-identified] : 0 [de-identified] : 0 [de-identified] : 0 [de-identified] : deep tissue injury  [de-identified] : betadine [de-identified] : sacral [de-identified] : 10 [de-identified] : baljinder painted by the aid  [de-identified] : 1 mm deeper into dermis excisional #15 [de-identified] : 9 [de-identified] : 1 CM [de-identified] : scalpel 15 [de-identified] : santyl [de-identified] : 2 [de-identified] : previous 7/9/1\par \par kalie surrounding skin [de-identified] : 0.7 [de-identified] : ischum [de-identified] : betadine [de-identified] : 0.1 [de-identified] : 1 mm deeper into dermis 15 blade [TWNoteComboBox4] : Moderate [TWNoteComboBox6] : Pressure [de-identified] : No [TWNoteComboBox5] : No [de-identified] : Normal [de-identified] : None [de-identified] : 100% [de-identified] : Yes [de-identified] : Debridement performed of all devitalized tissue to bleeding viable tissue [de-identified] : No [de-identified] : Normal [de-identified] : Debridement performed of all devitalized tissue to bleeding viable tissue [TWNoteComboBox1] : Left [TWNoteComboBox7] : Mechanical [de-identified] : Tendon [TWNoteComboBox9] : Left [de-identified] : FT [de-identified] : Pressure [de-identified] : <20% [de-identified] : <20% [de-identified] : Yes [TWNoteComboBox8] : Mechanical [de-identified] : Tendon [de-identified] : None [de-identified] : Left [de-identified] : Pressure [de-identified] : No [de-identified] : No [de-identified] : Yes [de-identified] : 100% [de-identified] : None [de-identified] : Tendon [de-identified] : Debridement excisional [de-identified] : Lyly [de-identified] : Left [de-identified] : FT [de-identified] : No [de-identified] : No [de-identified] : Pressure [de-identified] : None [de-identified] : Normal [de-identified] : None [de-identified] : 100% [de-identified] : None [de-identified] : Midline [de-identified] : Yes [de-identified] : FT [de-identified] : None [de-identified] : 2 [de-identified] : Pressure [de-identified] : No [de-identified] : None [de-identified] : None [de-identified] : No [de-identified] : 100% [de-identified] : Yes [de-identified] : Debridement excisional [de-identified] : Lyly [de-identified] : 2 [de-identified] : Left [de-identified] : Small [de-identified] : FT [de-identified] : No [de-identified] : Pressure [de-identified] : None [de-identified] : 100% [de-identified] : Yes [de-identified] : No [de-identified] : Lyly [de-identified] : Debridement excisional

## 2020-02-06 NOTE — HISTORY OF PRESENT ILLNESS
[FreeTextEntry1] : Ms. HOLLY FRY   presents to the office with a wound for 3.5 months duration.\par   The wound is located on  the left heel and posterior left leg . \par  The patient has been dressing the wound with   blue gentian/ daily, as prescribed by primary. \par  The patient denies fevers or chills.  \par The patient has localized pain to the wound upon dressing changes.  \par The patient has no other complaints or associated symptoms.\par  s/p AKA  RLE\par \par August 2018, hospitalized at [place] for sepsis, ICU, hospitalized for 7 days, shortly after when home, noticed redness and pain on left heel, then skin broke down. Prior wound on left heel several years ago, followed by podiatry, eventually healed.\par \par Patient could not undergo revascularization as per patient.

## 2020-02-06 NOTE — REVIEW OF SYSTEMS
[Skin Wound] : skin wound [As Noted in HPI] : as noted in HPI [Negative] : Endocrine [FreeTextEntry3] : wears glasses [FreeTextEntry9] : spasms due to MS

## 2020-02-06 NOTE — REASON FOR VISIT
[Follow-Up: _____] : a [unfilled] follow-up visit [Formal Caregiver] : formal caregiver [Family Member] : family member [FreeTextEntry1] : foot / leg and ischial wound

## 2020-02-06 NOTE — PHYSICAL EXAM
[Normal Breath Sounds] : Normal breath sounds [Normal Heart Sounds] : normal heart sounds [0] : left 0 [Ankle Swelling On The Left] : of the left ankle [Alert] : alert [Oriented to Place] : oriented to place [Oriented to Person] : oriented to person [Calm] : calm [Oriented to Time] : oriented to time [4 x 4] : 4 x 4  [] : not present [Varicose Veins Of Lower Extremities] : not present [JVD] : no jugular venous distention  [de-identified] : indwelling catheter changed 2x/month [de-identified] : not ambulatory, wheelchair bound due to multiple sclerosis [de-identified] : wound on left heel [FreeTextEntry6] : 4x4 [de-identified] : Betadine  [de-identified] : Skin andsubcutaneous tissue [de-identified] : 14/8 [FreeTextEntry2] : 0.2 [FreeTextEntry1] : 5th metatarsal [FreeTextEntry3] : 0.8 [de-identified] : betadine [FreeTextEntry4] : 0.1 [FreeTextEntry7] : 5th metatarsal eschar head  [de-identified] : deep tissue injury [FreeTextEntry9] : 1 [FreeTextEntry8] : 2 [de-identified] : 0.1 [de-identified] : 8 cm [de-identified] : calf  [de-identified] : 3 cm [de-identified] : 0.4cm  [de-identified] : scalpel #15 1 mm deeper into dermis [de-identified] : left medial heel  [de-identified] : betadine [de-identified] : 0 [de-identified] : e [de-identified] : 0 [de-identified] : 0 [de-identified] : deep tissue injury  [de-identified] : betadine [de-identified] : 7 [de-identified] : sacral [de-identified] : 0.3 CM [de-identified] : 9 [de-identified] : 0.7 [de-identified] : ischum [de-identified] : 1 mm deeper into dermis excisional #15 [de-identified] : 1 mm deeper into dermis 15 blade [de-identified] : 0.1 [de-identified] : 2 [de-identified] : betadine [TWNoteComboBox4] : Moderate [TWNoteComboBox5] : No [TWNoteComboBox6] : Pressure [de-identified] : No [de-identified] : None [de-identified] : Normal [de-identified] : 100% [de-identified] : Yes [de-identified] : Debridement performed of all devitalized tissue to bleeding viable tissue [de-identified] : Pressure [de-identified] : Normal [de-identified] : No [de-identified] : Debridement performed of all devitalized tissue to bleeding viable tissue [TWNoteComboBox1] : Left [de-identified] : FT [TWNoteComboBox7] : Mechanical [TWNoteComboBox9] : Left [TWNoteComboBox8] : Mechanical [de-identified] : Left [de-identified] : None [de-identified] : No [de-identified] : Pressure [de-identified] : None [de-identified] : 100% [de-identified] : No [de-identified] : Debridement excisional [de-identified] : Lyly [de-identified] : Yes [de-identified] : FT [de-identified] : Left [de-identified] : Pressure [de-identified] : No [de-identified] : None [de-identified] : No [de-identified] : Normal [de-identified] : None [de-identified] : 100% [de-identified] : None [de-identified] : Yes [de-identified] : Midline [de-identified] : 2 [de-identified] : FT [de-identified] : Pressure [de-identified] : None [de-identified] : No [de-identified] : None [de-identified] : No [de-identified] : 100% [de-identified] : Yes [de-identified] : Lyly [de-identified] : None [de-identified] : Debridement excisional [de-identified] : FT [de-identified] : Left [de-identified] : 2 [de-identified] : Small [de-identified] : No [de-identified] : No [de-identified] : Pressure [de-identified] : None [de-identified] : 100% [de-identified] : Yes [de-identified] : Lyly [de-identified] : Debridement excisional

## 2020-02-06 NOTE — ASSESSMENT
[FreeTextEntry1] :  79 yr old htn , PAD, s/p aka, MS  left, calf and  left foot /toe wounds\par midline sacral and iscial stage 2\par 4datacomplexity lab, xr, old rec, test resultsreview,, visualize imagecptreview previous\par risk surgery\par  \par Patient wound cleaned and debrided mechanically patient tolerated tx well. \par \par Plan- apply betadine to left leg wounds \par and dakins and santyl to sacral wound and duoderm to L ischium

## 2020-03-19 ENCOUNTER — APPOINTMENT (OUTPATIENT)
Dept: WOUND CARE | Facility: CLINIC | Age: 80
End: 2020-03-19
Payer: MEDICARE

## 2020-03-19 VITALS — HEIGHT: 63 IN | WEIGHT: 125 LBS | TEMPERATURE: 97.5 F | BODY MASS INDEX: 22.15 KG/M2

## 2020-03-19 PROCEDURE — 11043 DBRDMT MUSC&/FSCA 1ST 20/<: CPT

## 2020-03-19 PROCEDURE — 99214 OFFICE O/P EST MOD 30 MIN: CPT | Mod: 25

## 2020-04-24 ENCOUNTER — APPOINTMENT (OUTPATIENT)
Dept: WOUND CARE | Facility: CLINIC | Age: 80
End: 2020-04-24
Payer: MEDICARE

## 2020-04-24 VITALS — BODY MASS INDEX: 22.15 KG/M2 | TEMPERATURE: 98.2 F | HEIGHT: 63 IN | WEIGHT: 125 LBS

## 2020-04-24 PROCEDURE — 99213 OFFICE O/P EST LOW 20 MIN: CPT

## 2020-04-25 NOTE — ASSESSMENT
[FreeTextEntry1] :  79 yr old htn , PAD, s/p aka, MS  left, calf and  left foot /toe wounds\par midline sacral  improved.  Recommend VAC dressing\par and iscial stage 2-healed\par asked to dc gentian blue and switch to betadine/ santyl\par candida judd sacral- diflucan and nystatin ointment perisacral\par datacomplexity lab, xr, old rec, test resultsreview,, visualize imagecptreview previous\par risk surgery- low-mod\par  Patient wound cleaned and debrided mechanically patient tolerated tx well. \par \par Plan- apply betadine to left toe wounds \par and prontosan-ordered santyl to sacral wound alginate/foam\par Awaiting revascularization\par Patient given Pleuristem information \par

## 2020-04-25 NOTE — PHYSICAL EXAM
[Normal Breath Sounds] : Normal breath sounds [Normal Heart Sounds] : normal heart sounds [Ankle Swelling On The Left] : of the left ankle [0] : right 0 [Alert] : alert [Oriented to Place] : oriented to place [Oriented to Time] : oriented to time [Oriented to Person] : oriented to person [Calm] : calm [4 x 4] : 4 x 4  [JVD] : no jugular venous distention  [] : not present [Varicose Veins Of Lower Extremities] : not present [de-identified] : indwelling catheter changed 2x/month [de-identified] : not ambulatory, wheelchair bound due to multiple sclerosis [de-identified] : wound on left heel [de-identified] : 11-2 o clock 2.3 cm [FreeTextEntry5] : curette [de-identified] : Skin andsubcutaneous tissue [de-identified] : gentian blue removed\par \par 0.8/0.4 [FreeTextEntry1] : left 5th metatarsal ( lateral) [FreeTextEntry2] : .7 cm [FreeTextEntry4] : 0 [de-identified] : betadine [FreeTextEntry3] : 1.1 cm [FreeTextEntry8] : .7 [de-identified] : deep tissue injury [FreeTextEntry7] : 5th metatarsal eschar head  [FreeTextEntry9] : 1.2 [de-identified] : 0.3 [de-identified] : pad [FreeTextEntry6] : alginate [de-identified] : left calf  [de-identified] : 6.3 [de-identified] : betadine/ gentian blue removed [de-identified] : 0.7 cm [de-identified] : 1.5 cm [de-identified] : betadine [de-identified] : scalpel #15 1 mm deeper into tendon/dermis [de-identified] : gentian blue stained tissue, portion excised\par \par 7.5/2.5  [de-identified] : left medial heel  [de-identified] : 0 [de-identified] : 0 [de-identified] : 0 [de-identified] : deep tissue injury  [de-identified] : betadine [de-identified] : baljinder painted by the aid  [de-identified] : 4.8 cm [de-identified] : sacral [de-identified] : 5.2  [de-identified] : 12 o clock 2.1 cm  [de-identified] : 1.3 cm [de-identified] : 1 mm deeper into muscle/tendon excisional #15 [de-identified] : scalpel 15 [de-identified] : santyl [de-identified] : previous 10/9\par \par kalie surrounding skin\par \par oozing with debridement  on nsaids and asa\par 4 ag nitrate sticks plus kaldestat and pressure\par ooze resolved [de-identified] : left ischum [de-identified] : 0 [de-identified] : 0 [de-identified] : 0 [de-identified] : betadine [TWNoteComboBox4] : Moderate [TWNoteComboBox3] : FT [TWNoteComboBox5] : No [TWNoteComboBox6] : Pressure [de-identified] : Normal [de-identified] : None [de-identified] : Yes [de-identified] : 2.5% Lidocaine Topical [de-identified] : Yes [de-identified] : 100% [de-identified] : Debridement performed of all devitalized tissue to bleeding viable tissue [de-identified] : Normal [de-identified] : No [de-identified] : Debridement performed of all devitalized tissue to bleeding viable tissue [de-identified] : Tendon [TWNoteComboBox7] : Mechanical [TWNoteComboBox1] : Left [de-identified] : FT [TWNoteComboBox9] : Left [de-identified] : Pressure [de-identified] : <20% [de-identified] : <20% [de-identified] : Tendon [TWNoteComboBox8] : Mechanical [de-identified] : Yes [de-identified] : None [de-identified] : No [de-identified] : Left [de-identified] : Pressure [de-identified] : No [de-identified] : None [de-identified] : 100% [de-identified] : Yes [de-identified] : Lyly [de-identified] : Tendon [de-identified] : FT [de-identified] : Left [de-identified] : Debridement excisional [de-identified] : None [de-identified] : No [de-identified] : No [de-identified] : Pressure [de-identified] : Normal [de-identified] : 100% [de-identified] : None [de-identified] : None [de-identified] : Yes [de-identified] : Midline [de-identified] : 2 [de-identified] : None [de-identified] : FT [de-identified] : None [de-identified] : 50% [de-identified] : No [de-identified] : Pressure [de-identified] : Yes [de-identified] : None [de-identified] : Debridement excisional [de-identified] : Lyly [de-identified] : 2 [de-identified] : Left [de-identified] : FT [de-identified] : Small [de-identified] : No [de-identified] : Pressure [de-identified] : No [de-identified] : 100% [de-identified] : None [de-identified] : Lyly [de-identified] : Yes [de-identified] : Debridement excisional

## 2020-04-25 NOTE — HISTORY OF PRESENT ILLNESS
[FreeTextEntry1] : Ms. HOLLY FRY   presents to the office with a wound for 3.5 months duration.\par using betadine on leg and santyl on sacrum.  She follows up for examination of the sacral wound.\par   The wound is located on  the left heel and posterior left leg . \par  The patient has been dressing the wound with   blue gentian/ daily, as prescribed by primary. \par  The patient denies fevers or chills.  \par The patient has localized pain to the wound upon dressing changes.  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 Morris\par \par August 2018, hospitalized at [place] for sepsis, ICU, hospitalized for 7 days, shortly after when home, noticed redness and pain on left heel, then skin broke down. Prior wound on left heel several years ago, followed by podiatry, eventually healed.\par \par Patient could not undergo revascularization as per patient.

## 2020-05-01 ENCOUNTER — APPOINTMENT (OUTPATIENT)
Dept: WOUND CARE | Facility: CLINIC | Age: 80
End: 2020-05-01
Payer: MEDICARE

## 2020-05-01 PROCEDURE — 99213 OFFICE O/P EST LOW 20 MIN: CPT | Mod: 95

## 2020-05-01 RX ORDER — LIDOCAINE AND PRILOCAINE 25; 25 MG/G; MG/G
2.5-2.5 CREAM TOPICAL
Qty: 3 | Refills: 5 | Status: ACTIVE | COMMUNITY
Start: 2020-05-01 | End: 1900-01-01

## 2020-05-01 RX ORDER — NYSTATIN 100000 U/G
100000 OINTMENT TOPICAL 3 TIMES DAILY
Qty: 1 | Refills: 5 | Status: ACTIVE | COMMUNITY
Start: 2020-02-06 | End: 1900-01-01

## 2020-05-04 NOTE — PHYSICAL EXAM
[Normal Breath Sounds] : Normal breath sounds [Normal Heart Sounds] : normal heart sounds [Ankle Swelling On The Left] : of the left ankle [0] : left 0 [Oriented to Place] : oriented to place [Alert] : alert [Oriented to Person] : oriented to person [Calm] : calm [Oriented to Time] : oriented to time [4 x 4] : 4 x 4  [de-identified] : 11-2 o clock 2.3 cm [FreeTextEntry6] : alginate [de-identified] : betadine/ gentian blue removed [de-identified] : betadine [de-identified] : betadine [de-identified] : sacral [de-identified] : 5.2  [de-identified] : 1.3 cm [de-identified] : 4.8 cm [de-identified] : 12 o clock 2.1 cm  [de-identified] : scalpel 15 [de-identified] : 1 mm deeper into muscle/tendon excisional #15 [de-identified] : previous 10/9\par \par kalei surrounding skin\par \par oozing with debridement  on nsaids and asa\par 4 ag nitrate sticks plus kaldestat and pressure\par ooze resolved [de-identified] : santyl [de-identified] : betadine [de-identified] : No [de-identified] : Normal [de-identified] : False [JVD] : no jugular venous distention  [Varicose Veins Of Lower Extremities] : not present [] : not present [de-identified] : well nourished, well groomed [de-identified] : indwelling catheter changed 2x/month [de-identified] : not ambulatory, wheelchair bound due to multiple sclerosis [de-identified] : wound on left heel [de-identified] : 12 0'clock 2.1 cm [FreeTextEntry5] : Curette [de-identified] : Skin and subcutaneous tissue [de-identified] : offload [de-identified] : gentian blue removed\par \par 0.8/0.4 [FreeTextEntry1] : left 5th metatarsal ( lateral) [FreeTextEntry2] : .7 cm [FreeTextEntry4] : 0 [FreeTextEntry3] : 1.1 cm [de-identified] : betadine [FreeTextEntry7] : 5th metatarsal eschar head  [de-identified] : deep tissue injury [FreeTextEntry9] : 0 [FreeTextEntry8] : 0 [de-identified] : pad [de-identified] : 0 [de-identified] : left calf  [de-identified] : 0 [de-identified] : 0 [de-identified] : 0 [de-identified] : left medial heel  [de-identified] : gentian blue stained tissue, portion excised\par \par 7.5/2.5  [de-identified] : 0 [de-identified] : 0 [de-identified] : deep tissue injury  [de-identified] : 0 [de-identified] : baljinder painted by the aid  [de-identified] : left ischum [de-identified] : 0 [de-identified] : 0 [de-identified] : 0 [TWNoteComboBox2] : 3 [TWNoteComboBox3] : FT [TWNoteComboBox4] : Moderate [TWNoteComboBox5] : Yes [TWNoteComboBox6] : Pressure [de-identified] : Normal [de-identified] : No [de-identified] : None [de-identified] : No [de-identified] : Mild [de-identified] : Debridement performed of all devitalized tissue to bleeding viable tissue [de-identified] : Tendon [TWNoteComboBox1] : Left [TWNoteComboBox7] : Mechanical [de-identified] : FT [TWNoteComboBox9] : Left [de-identified] : <20% [de-identified] : Pressure [de-identified] : <20% [de-identified] : Yes [TWNoteComboBox8] : Mechanical [de-identified] : Tendon [de-identified] : Left [de-identified] : None [de-identified] : No [de-identified] : Pressure [de-identified] : No [de-identified] : None [de-identified] : 100% [de-identified] : Tendon [de-identified] : Lyly [de-identified] : Yes [de-identified] : Debridement excisional [de-identified] : Left [de-identified] : None [de-identified] : FT [de-identified] : Pressure [de-identified] : No [de-identified] : No [de-identified] : Normal [de-identified] : 100% [de-identified] :  <~strike~>None<~strike0~><~amendment~>\super 1 \nosupersub <~amendment0~> [de-identified] : None [de-identified] : Midline [de-identified] : Yes [de-identified] : FT [de-identified] : 2 [de-identified] : No [de-identified] : None [de-identified] : Pressure [de-identified] : None [de-identified] : 50% [de-identified] : None [de-identified] : Yes [de-identified] : Lyly [de-identified] : Left [de-identified] : Debridement excisional [de-identified] : FT [de-identified] : 2 [de-identified] : Small [de-identified] : No [de-identified] : Pressure [de-identified] : 100% [de-identified] : No [de-identified] : None [de-identified] : Lyly [de-identified] : Yes [de-identified] : Debridement excisional

## 2020-05-04 NOTE — REASON FOR VISIT
[Follow-Up: _____] : a [unfilled] follow-up visit [Family Member] : family member [Formal Caregiver] : formal caregiver [FreeTextEntry1] : foot / leg and ischial wound

## 2020-05-04 NOTE — HISTORY OF PRESENT ILLNESS
[Home] : at home, [unfilled] , at the time of the visit. [Medical Office: ([facility])___] : at the medical office located in  [Spouse] : spouse [Patient] : the patient [Other:____] : [unfilled] [Self] : self [FreeTextEntry4] : SimranNP [FreeTextEntry1] : Ms. HOLLY FRY   presents to the office with a wound for 3.5 months duration.\par using betadine on leg and santyl on sacrum.  She follows up for examination of the sacral wound.\par   The wound is located on  the left heel and posterior left leg . \par  The patient has been dressing the wound with   blue gentian/ daily, as prescribed by primary. \par  The patient denies fevers or chills.  \par The patient has localized pain to the wound upon dressing changes.  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 Morris\par \par August 2018, hospitalized at [place] for sepsis, ICU, hospitalized for 7 days, shortly after when home, noticed redness and pain on left heel, then skin broke down. Prior wound on left heel several years ago, followed by podiatry, eventually healed.\par \par Patient could not undergo revascularization as per patient.

## 2020-05-04 NOTE — PLAN
[FreeTextEntry1] : 5/1/20\par Plan - order vac, order nurse, nystatin and prilocaine renewed\par Follow up telehealth with nurse

## 2020-05-04 NOTE — ASSESSMENT
[FreeTextEntry1] :  79 yr old htn , PAD, s/p aka, MS  left, calf and  left foot /toe wounds\par \par 5/1/20\par sacral wound clean and pink, slight periwound candida, has been using santyl

## 2020-05-04 NOTE — HISTORY OF PRESENT ILLNESS
[Home] : at home, [unfilled] , at the time of the visit. [Medical Office: ([facility])___] : at the medical office located in  [Patient] : the patient [Spouse] : spouse [Other:____] : [unfilled] [Self] : self [FreeTextEntry4] : SimranNP [FreeTextEntry1] : Ms. HOLLY FRY   presents to the office with a wound for 3.5 months duration.\par using betadine on leg and santyl on sacrum.  She follows up for examination of the sacral wound.\par   The wound is located on  the left heel and posterior left leg . \par  The patient has been dressing the wound with   blue gentian/ daily, as prescribed by primary. \par  The patient denies fevers or chills.  \par The patient has localized pain to the wound upon dressing changes.  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 Morris\par \par August 2018, hospitalized at [place] for sepsis, ICU, hospitalized for 7 days, shortly after when home, noticed redness and pain on left heel, then skin broke down. Prior wound on left heel several years ago, followed by podiatry, eventually healed.\par \par Patient could not undergo revascularization as per patient.

## 2020-05-04 NOTE — REVIEW OF SYSTEMS
[As Noted in HPI] : as noted in HPI [Skin Wound] : skin wound [Negative] : Psychiatric [FreeTextEntry3] : wears glasses [FreeTextEntry9] : spasms due to MS

## 2020-05-04 NOTE — PHYSICAL EXAM
[Normal Breath Sounds] : Normal breath sounds [Normal Heart Sounds] : normal heart sounds [0] : left 0 [Ankle Swelling On The Left] : of the left ankle [Alert] : alert [Oriented to Place] : oriented to place [Oriented to Person] : oriented to person [Oriented to Time] : oriented to time [Calm] : calm [4 x 4] : 4 x 4  [de-identified] : 11-2 o clock 2.3 cm [FreeTextEntry6] : alginate [de-identified] : betadine/ gentian blue removed [de-identified] : betadine [de-identified] : betadine [de-identified] : sacral [de-identified] : 5.2  [de-identified] : 1.3 cm [de-identified] : 4.8 cm [de-identified] : scalpel 15 [de-identified] : 1 mm deeper into muscle/tendon excisional #15 [de-identified] : 12 o clock 2.1 cm  [de-identified] : santyl [de-identified] : previous 10/9\par \par kalie surrounding skin\par \par oozing with debridement  on nsaids and asa\par 4 ag nitrate sticks plus kaldestat and pressure\par ooze resolved [de-identified] : betadine [de-identified] : Normal [de-identified] : No [de-identified] : False [JVD] : no jugular venous distention  [Varicose Veins Of Lower Extremities] : not present [] : not present [de-identified] : well nourished, well groomed [de-identified] : indwelling catheter changed 2x/month [de-identified] : not ambulatory, wheelchair bound due to multiple sclerosis [de-identified] : wound on left heel [de-identified] : 12 0'clock 2.1 cm [FreeTextEntry5] : Curette [de-identified] : Skin and subcutaneous tissue [de-identified] : offload [de-identified] : gentian blue removed\par \par 0.8/0.4 [FreeTextEntry1] : left 5th metatarsal ( lateral) [FreeTextEntry2] : .7 cm [FreeTextEntry4] : 0 [FreeTextEntry3] : 1.1 cm [de-identified] : deep tissue injury [de-identified] : betadine [FreeTextEntry7] : 5th metatarsal eschar head  [FreeTextEntry8] : 0 [FreeTextEntry9] : 0 [de-identified] : pad [de-identified] : 0 [de-identified] : left calf  [de-identified] : 0 [de-identified] : 0 [de-identified] : 0 [de-identified] : left medial heel  [de-identified] : gentian blue stained tissue, portion excised\par \par 7.5/2.5  [de-identified] : 0 [de-identified] : 0 [de-identified] : 0 [de-identified] : deep tissue injury  [de-identified] : left ischum [de-identified] : baljinder painted by the aid  [de-identified] : 0 [de-identified] : 0 [TWNoteComboBox2] : 3 [de-identified] : 0 [TWNoteComboBox3] : FT [TWNoteComboBox4] : Moderate [TWNoteComboBox6] : Pressure [TWNoteComboBox5] : Yes [de-identified] : Normal [de-identified] : No [de-identified] : None [de-identified] : Mild [de-identified] : No [de-identified] : Debridement performed of all devitalized tissue to bleeding viable tissue [TWNoteComboBox1] : Left [de-identified] : Tendon [TWNoteComboBox7] : Mechanical [de-identified] : FT [TWNoteComboBox9] : Left [de-identified] : Pressure [de-identified] : <20% [de-identified] : <20% [de-identified] : Yes [de-identified] : Left [de-identified] : Tendon [TWNoteComboBox8] : Mechanical [de-identified] : No [de-identified] : Pressure [de-identified] : None [de-identified] : None [de-identified] : No [de-identified] : 100% [de-identified] : Yes [de-identified] : Tendon [de-identified] : Lyly [de-identified] : Debridement excisional [de-identified] : Left [de-identified] : FT [de-identified] : None [de-identified] : No [de-identified] : Pressure [de-identified] : Normal [de-identified] : No [de-identified] :  <~strike~>None<~strike0~><~amendment~>\super 1 \nosupersub <~amendment0~> [de-identified] : None [de-identified] : 100% [de-identified] : Midline [de-identified] : Yes [de-identified] : FT [de-identified] : 2 [de-identified] : None [de-identified] : No [de-identified] : Pressure [de-identified] : 50% [de-identified] : None [de-identified] : None [de-identified] : Yes [de-identified] : Lyly [de-identified] : Debridement excisional [de-identified] : Left [de-identified] : FT [de-identified] : 2 [de-identified] : Small [de-identified] : Pressure [de-identified] : No [de-identified] : No [de-identified] : 100% [de-identified] : None [de-identified] : Yes [de-identified] : Lyly [de-identified] : Debridement excisional

## 2020-05-04 NOTE — REVIEW OF SYSTEMS
[As Noted in HPI] : as noted in HPI [Skin Wound] : skin wound [Negative] : Endocrine [FreeTextEntry3] : wears glasses [FreeTextEntry9] : spasms due to MS

## 2020-05-08 ENCOUNTER — APPOINTMENT (OUTPATIENT)
Dept: WOUND CARE | Facility: CLINIC | Age: 80
End: 2020-05-08
Payer: MEDICARE

## 2020-05-08 PROCEDURE — 99213 OFFICE O/P EST LOW 20 MIN: CPT | Mod: 95

## 2020-05-08 RX ORDER — NYSTATIN 100000 1/G
100000 POWDER TOPICAL 3 TIMES DAILY
Qty: 1 | Refills: 5 | Status: ACTIVE | COMMUNITY
Start: 2020-05-08 | End: 1900-01-01

## 2020-05-10 NOTE — ASSESSMENT
[FreeTextEntry1] :  79 yr old htn , PAD, s/p aka, MS  left, calf and  left foot /toe wounds\par \par 5/8/20\par sacral wound clean and pink, vac started yesterday

## 2020-05-10 NOTE — REVIEW OF SYSTEMS
[As Noted in HPI] : as noted in HPI [Skin Wound] : skin wound [Negative] : Heme/Lymph [FreeTextEntry9] : spasms due to MS [FreeTextEntry3] : wears glasses

## 2020-05-10 NOTE — REASON FOR VISIT
[Formal Caregiver] : formal caregiver [Follow-Up: _____] : a [unfilled] follow-up visit [Family Member] : family member [FreeTextEntry1] : foot / leg and ischial wound

## 2020-05-10 NOTE — PHYSICAL EXAM
[Normal Breath Sounds] : Normal breath sounds [Normal Heart Sounds] : normal heart sounds [Ankle Swelling On The Left] : of the left ankle [0] : left 0 [Alert] : alert [Oriented to Person] : oriented to person [Oriented to Time] : oriented to time [Oriented to Place] : oriented to place [Calm] : calm [4 x 4] : 4 x 4  [JVD] : no jugular venous distention  [] : not present [Varicose Veins Of Lower Extremities] : not present [de-identified] : well nourished, well groomed [de-identified] : not ambulatory, wheelchair bound due to multiple sclerosis [de-identified] : indwelling catheter changed 2x/month [de-identified] : wound on left heel [de-identified] : 12 - 2 0'clock 2.7 cm [de-identified] : offload [de-identified] : gentian blue removed\par \par 0.8/0.4 [FreeTextEntry1] : left 5th metatarsal ( lateral) [FreeTextEntry2] : .7 cm [FreeTextEntry3] : 1.1 cm [FreeTextEntry4] : 0 [de-identified] : betadine [de-identified] : deep tissue injury [FreeTextEntry7] : 5th metatarsal eschar head  [FreeTextEntry9] : 0 [FreeTextEntry8] : 0 [de-identified] : 0 [de-identified] : pad [de-identified] : 0 [de-identified] : left calf  [de-identified] : gentian blue stained tissue, portion excised\par \par 7.5/2.5  [de-identified] : 0 [de-identified] : 0 [de-identified] : left medial heel  [de-identified] : 0 [de-identified] : 0 [de-identified] : 0 [de-identified] : deep tissue injury  [de-identified] : left ischum [de-identified] : baljinder painted by the aid  [de-identified] : 0 [de-identified] : 0 [de-identified] : 0 [TWNoteComboBox2] : 3 [TWNoteComboBox3] : FT [TWNoteComboBox4] : Moderate [TWNoteComboBox5] : Yes [TWNoteComboBox6] : Pressure [de-identified] : No [de-identified] : Mild [de-identified] : Normal [de-identified] : None [de-identified] : No [TWNoteComboBox7] : False [de-identified] : False [TWNoteComboBox1] : Left [TWNoteComboBox9] : Left [de-identified] : Pressure [de-identified] : FT [de-identified] : <20% [de-identified] : <20% [de-identified] : Pressure [de-identified] : Left [de-identified] : Left [de-identified] : FT [de-identified] : None [de-identified] : Left

## 2020-05-10 NOTE — HISTORY OF PRESENT ILLNESS
[Medical Office: ([facility])___] : at the medical office located in  [Home] : at home, [unfilled] , at the time of the visit. [Spouse] : spouse [Other:____] : [unfilled] [Self] : self [Patient] : the patient [FreeTextEntry4] : SimranNP [FreeTextEntry1] : Ms. HOLLY FRY   presents to the office with a wound for 3.5 months duration.\par using betadine on leg and santyl on sacrum.  She follows up for examination of the sacral wound.\par   The wound is located on  the left heel and posterior left leg . \par  The patient has been dressing the wound with   blue gentian/ daily, as prescribed by primary. \par  The patient denies fevers or chills.  \par The patient has localized pain to the wound upon dressing changes.  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 Morris\par \par August 2018, hospitalized at [place] for sepsis, ICU, hospitalized for 7 days, shortly after when home, noticed redness and pain on left heel, then skin broke down. Prior wound on left heel several years ago, followed by podiatry, eventually healed.\par \par Patient could not undergo revascularization as per patient.

## 2020-05-15 ENCOUNTER — APPOINTMENT (OUTPATIENT)
Dept: WOUND CARE | Facility: CLINIC | Age: 80
End: 2020-05-15
Payer: MEDICARE

## 2020-05-15 PROCEDURE — 99213 OFFICE O/P EST LOW 20 MIN: CPT | Mod: 95

## 2020-05-15 NOTE — PHYSICAL EXAM
[Normal Breath Sounds] : Normal breath sounds [Normal Heart Sounds] : normal heart sounds [0] : left 0 [Ankle Swelling On The Left] : of the left ankle [Alert] : alert [Oriented to Place] : oriented to place [Oriented to Person] : oriented to person [Oriented to Time] : oriented to time [Calm] : calm [4 x 4] : 4 x 4  [JVD] : no jugular venous distention  [Varicose Veins Of Lower Extremities] : not present [] : not present [de-identified] : well nourished, well groomed [de-identified] : indwelling catheter changed 2x/month [de-identified] : not ambulatory, wheelchair bound due to multiple sclerosis [de-identified] : wound on left heel [de-identified] : offload [de-identified] : 12 - 2 0'clock 2.7 cm [de-identified] : gentian blue removed\par \par 0.8/0.4 [FreeTextEntry1] : left 5th metatarsal ( lateral) [FreeTextEntry2] : .7 cm [FreeTextEntry3] : 1.1 cm [FreeTextEntry4] : 0 [de-identified] : betadine [de-identified] : deep tissue injury [FreeTextEntry7] : 5th metatarsal eschar head  [FreeTextEntry8] : 0 [FreeTextEntry9] : 0 [de-identified] : 0 [de-identified] : pad [de-identified] : left calf  [de-identified] : 0.3 [de-identified] : 3.9 [de-identified] : santyl/alginate [de-identified] : gentian blue stained tissue, portion excised\par \par 7.5/2.5  [de-identified] : 0.1 [de-identified] : left medial heel  [de-identified] : 0 [de-identified] : 0 [de-identified] : 0 [de-identified] : deep tissue injury  [de-identified] : baljinder painted by the aid  [de-identified] : left ischum [de-identified] : 0 [de-identified] : 0 [de-identified] : 0 [TWNoteComboBox2] : 3 [TWNoteComboBox3] : FT [TWNoteComboBox5] : Yes [TWNoteComboBox4] : Moderate [TWNoteComboBox6] : Pressure [de-identified] : No [de-identified] : Normal [de-identified] : Mild [de-identified] : None [de-identified] : No [TWNoteComboBox1] : Left [TWNoteComboBox9] : Left [de-identified] : Pressure [de-identified] : FT [de-identified] : <20% [de-identified] : <20% [de-identified] : Left [de-identified] : Arterial [de-identified] : Left [de-identified] : FT [de-identified] : None [de-identified] : Left

## 2020-05-15 NOTE — ASSESSMENT
[FreeTextEntry1] :  79 yr old htn , PAD, s/p aka, MS  left, calf and  left foot /toe wounds\par \par 5/15/20\par sacral wound clean, has had vac on, left calf arterial wound getting santyl and alginate , improving

## 2020-05-15 NOTE — HISTORY OF PRESENT ILLNESS
[Home] : at home, [unfilled] , at the time of the visit. [Medical Office: ([facility])___] : at the medical office located in  [Spouse] : spouse [Other:____] : [unfilled] [Patient] : the patient [Self] : self [FreeTextEntry4] : SimranNP [FreeTextEntry1] : Ms. HOLLY FRY   presents to the office with a wound for 3.5 months duration.\par using betadine on leg and santyl on sacrum.  She follows up for examination of the sacral wound.\par   The wound is located on  the left heel and posterior left leg . \par  The patient has been dressing the wound with   blue gentian/ daily, as prescribed by primary. \par  The patient denies fevers or chills.  \par The patient has localized pain to the wound upon dressing changes.  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 Morris\par \par August 2018, hospitalized at [place] for sepsis, ICU, hospitalized for 7 days, shortly after when home, noticed redness and pain on left heel, then skin broke down. Prior wound on left heel several years ago, followed by podiatry, eventually healed.\par \par Patient could not undergo revascularization as per patient.

## 2020-05-15 NOTE — PLAN
[FreeTextEntry1] : 5/15/20\par Plan - c/w vac, add prontosan cleanser\par Follow up telehealth with nurse

## 2020-05-22 ENCOUNTER — APPOINTMENT (OUTPATIENT)
Dept: WOUND CARE | Facility: CLINIC | Age: 80
End: 2020-05-22

## 2020-05-29 ENCOUNTER — APPOINTMENT (OUTPATIENT)
Dept: WOUND CARE | Facility: CLINIC | Age: 80
End: 2020-05-29

## 2020-05-29 ENCOUNTER — APPOINTMENT (OUTPATIENT)
Dept: VASCULAR SURGERY | Facility: CLINIC | Age: 80
End: 2020-05-29

## 2020-06-08 RX ORDER — CLOTRIMAZOLE AND BETAMETHASONE DIPROPIONATE 10; .5 MG/G; MG/G
1-0.05 CREAM TOPICAL TWICE DAILY
Qty: 1 | Refills: 2 | Status: ACTIVE | COMMUNITY
Start: 2020-06-08 | End: 1900-01-01

## 2020-08-12 NOTE — ED ADULT NURSE NOTE - NSFALLRSKASSESSTYPE_ED_ALL_ED
DISPLAY PLAN FREE TEXT DISPLAY PLAN FREE TEXT DISPLAY PLAN FREE TEXT DISPLAY PLAN FREE TEXT DISPLAY PLAN FREE TEXT DISPLAY PLAN FREE TEXT DISPLAY PLAN FREE TEXT Initial (On Arrival)

## 2020-08-26 ENCOUNTER — APPOINTMENT (OUTPATIENT)
Dept: WOUND CARE | Facility: CLINIC | Age: 80
End: 2020-08-26
Payer: MEDICARE

## 2020-08-26 PROCEDURE — G0179 MD RECERTIFICATION HHA PT: CPT

## 2020-08-26 PROCEDURE — 11042 DBRDMT SUBQ TIS 1ST 20SQCM/<: CPT

## 2020-08-26 NOTE — HISTORY OF PRESENT ILLNESS
[FreeTextEntry1] : Ms. HOLLY FRY   presents to the office with a wound for 3.5 months duration.\par using betadine on leg and santyl on sacrum.  She follows up for examination of the sacral wound.\par   The wound is located on  the left heel and posterior left leg . \par  The patient has been dressing the wound with   blue gentian/ daily, as prescribed by primary. \par  The patient denies fevers or chills.  \par The patient has localized pain to the wound upon dressing changes.  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 Morris\par \par August 2018, hospitalized at [place] for sepsis, ICU, hospitalized for 7 days, shortly after when home, noticed redness and pain on left heel, then skin broke down. Prior wound on left heel several years ago, followed by podiatry, eventually healed.\par \par Patient could not undergo revascularization as per patient.

## 2020-08-26 NOTE — PLAN
[FreeTextEntry1] : 5/15/20\par Plan - c/w vac, add prontosan cleanser\par Follow up telehealth with nurse\par \par 8/26/20\par Patient had refused VAC but agreed to VAC at 75 mmHg  3 times per week\par Alginate to be changed daily until then (family can assist in changing alginate) of the sacral wound\par Deep tissue injury to the left lateral leg\par Toe ulcer stable.  Continue iodosorb.

## 2020-08-26 NOTE — PHYSICAL EXAM
[Normal Breath Sounds] : Normal breath sounds [JVD] : no jugular venous distention  [Normal Heart Sounds] : normal heart sounds [0] : right 0 [Ankle Swelling On The Left] : of the left ankle [Varicose Veins Of Lower Extremities] : not present [Alert] : alert [] : not present [Oriented to Time] : oriented to time [Oriented to Person] : oriented to person [Oriented to Place] : oriented to place [Calm] : calm [de-identified] : well nourished, well groomed [de-identified] : indwelling catheter changed 2x/month [de-identified] : soft, positive bowel sounds [FreeTextEntry1] : MARILY done < 0.5 bilaterally , discussed with pt. [Please See PDF for [employer]] : Please See PDF for [employer]. [de-identified] : not ambulatory, wheelchair bound due to multiple sclerosis [de-identified] : wound on left heel

## 2020-08-26 NOTE — REVIEW OF SYSTEMS
[Skin Wound] : skin wound [As Noted in HPI] : as noted in HPI [Negative] : Heme/Lymph [FreeTextEntry9] : spasms due to MS [FreeTextEntry3] : wears glasses

## 2020-09-04 ENCOUNTER — APPOINTMENT (OUTPATIENT)
Dept: ENDOCRINOLOGY | Facility: CLINIC | Age: 80
End: 2020-09-04

## 2020-09-10 ENCOUNTER — APPOINTMENT (OUTPATIENT)
Dept: WOUND CARE | Facility: CLINIC | Age: 80
End: 2020-09-10
Payer: MEDICARE

## 2020-09-10 DIAGNOSIS — L97.502 NON-PRESSURE CHRONIC ULCER OF OTHER PART OF UNSPECIFIED FOOT WITH FAT LAYER EXPOSED: ICD-10-CM

## 2020-09-10 DIAGNOSIS — L89.152 PRESSURE ULCER OF SACRAL REGION, STAGE 2: ICD-10-CM

## 2020-09-10 PROCEDURE — 99213 OFFICE O/P EST LOW 20 MIN: CPT | Mod: 95

## 2020-09-10 NOTE — HISTORY OF PRESENT ILLNESS
[Medical Office: ([facility])___] : at the medical office located in  [Home] : at home, [unfilled] , at the time of the visit. [Spouse] : spouse [Formal Caregiver] : formal caregiver [Verbal consent obtained from patient] : the patient, [unfilled] [FreeTextEntry4] : Simran NP [FreeTextEntry1] : Ms. HOLLY FRY   presents to the office with a wound for 3.5 months duration.\par using betadine on leg and santyl on sacrum.  She follows up for examination of the sacral wound.\par   The wound is located on  the left heel and posterior left leg . \par  The patient has been dressing the wound with   blue gentian/ daily, as prescribed by primary. \par  The patient denies fevers or chills.  \par The patient has localized pain to the wound upon dressing changes.  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 Morris\par \par August 2018, hospitalized at [place] for sepsis, ICU, hospitalized for 7 days, shortly after when home, noticed redness and pain on left heel, then skin broke down. Prior wound on left heel several years ago, followed by podiatry, eventually healed.\par \par Patient could not undergo revascularization as per patient.

## 2020-09-10 NOTE — ASSESSMENT
[FreeTextEntry1] :  79 yr old htn , PAD, s/p aka, MS  left, calf and  left foot /toe wounds\par \par 9/19/20\par left calf wound is now dry, skin color is purplish in color, no drainage, foot/toe wounds drying up using betadine, sacral is clean and pink, aquacel being used

## 2020-09-10 NOTE — PHYSICAL EXAM
[JVD] : no jugular venous distention  [Normal Breath Sounds] : Normal breath sounds [Normal Heart Sounds] : normal heart sounds [0] : left 0 [Ankle Swelling On The Left] : of the left ankle [Varicose Veins Of Lower Extremities] : not present [] : not present [Alert] : alert [Oriented to Person] : oriented to person [Oriented to Place] : oriented to place [Oriented to Time] : oriented to time [Calm] : calm [de-identified] : well nourished, well groomed [FreeTextEntry1] : MARILY done < 0.5 bilaterally , discussed with pt. [de-identified] : soft, positive bowel sounds [de-identified] : indwelling catheter changed 2x/month [de-identified] : not ambulatory, wheelchair bound due to multiple sclerosis [de-identified] : wound on left heel [Please See PDF for [employer]] : Please See PDF for [employer].

## 2020-09-10 NOTE — PLAN
[FreeTextEntry1] : 9/10/20\par Plan - c/w betadine to feet, offload, wrap, sacral c/w aquacel, left voicemail for visiting nurse to confirm sacral size\par follow up telehealth

## 2020-10-30 ENCOUNTER — APPOINTMENT (OUTPATIENT)
Dept: WOUND CARE | Facility: CLINIC | Age: 80
End: 2020-10-30
Payer: MEDICARE

## 2020-10-30 VITALS
TEMPERATURE: 98 F | HEART RATE: 61 BPM | SYSTOLIC BLOOD PRESSURE: 137 MMHG | DIASTOLIC BLOOD PRESSURE: 65 MMHG | RESPIRATION RATE: 16 BRPM

## 2020-10-30 PROCEDURE — 99213 OFFICE O/P EST LOW 20 MIN: CPT

## 2020-11-03 NOTE — ASSESSMENT
Anesthesia Evaluation     Patient summary reviewed and Nursing notes reviewed   no history of anesthetic complications:  NPO Solid Status: > 8 hours  NPO Liquid Status: > 8 hours           Airway   Mallampati: II  TM distance: >3 FB  Neck ROM: full  No difficulty expected  Dental - normal exam     Pulmonary - negative pulmonary ROS and normal exam    breath sounds clear to auscultation  Cardiovascular - negative cardio ROS and normal exam    Rhythm: regular  Rate: normal        Neuro/Psych  (+) headaches (h/o migraine headaches), psychiatric history Anxiety,     GI/Hepatic/Renal/Endo    (+) obesity,   thyroid problem hypothyroidism    Musculoskeletal     (+) back pain, radiculopathy Right lower extremity  Abdominal  - normal exam   Substance History - negative use     OB/GYN negative ob/gyn ROS         Other   arthritis,                      Anesthesia Plan    ASA 2     general and general with block   (TAP Block for post op pain control )  intravenous induction     Anesthetic plan, all risks, benefits, and alternatives have been provided, discussed and informed consent has been obtained with: patient and spouse/significant other.    Plan discussed with CRNA.       [FreeTextEntry1] : 78 yr/ old with multiple sclerosis, wheelchair bound, presents with left heel wound, since October 2018, was treating with iodosorb daily and offloading. \par s/p Right AKA.  Left LE wounds stable\par  Patient compliant to offloading measures.  Wound demonstrates no improvement.   Deep tissue injuries progressed since past visit to [facility] with additional deep tissue injuries.  Family received offloading boots today.  Patient has followed up with her vascular surgeon, Dr. Israel   and aide present..\par \par Previously, the patient underwent evaluation for peripheral arterial disease using a eduFire diagnostic machine.  Ankle brachial index was obtained using blood pressure cuffs of the ankle and brachial segments of both legs.  A distal pulse volume recording was noted digitally on the device.  The procedure was supervised and interpreted by the physician.  MARILY of the right lower extremity  < 0.5  MARILY of the left lower extremity < 0.5.  Waveform noted to be monophasic  Patient tolerated procedure well in the office.  Results discussed with the patient.  Venous duplex was subsequently obtained demonstrating significant severe superficial insufficiency of GSV and SSV of the LLE.   No evidence of DVT/SVT.  \par MARILY/PVR 0.7 with monophasic waveforms distally of bilateral lower extremities\par \par Pt. had a angiogram 3/2019, per op note by Dr Israel due to severity of disease a bypass is recommended  Patient not a candidate for bypass.  d/w patient alternative options.\par 9/11/19\par Pt. returns today, per HHA wounds have gotten necrotic/purplish/red over past week, HHA have been applying medihoney to all sites, not all skin is broken, legs are kept offloaded in boots  Yesterday had a temp. of 102 degrees, no tylenol given, \par Plan - medihoney to necrotic areas, yelena, offload better to legs, number given for medical logistics-  to call to buy better offloading boots, instructed to follow up with vascular surgery, fluids today, go to ER if fever happens again,  aware, face to face done, supplies ordered\par \par \par

## 2020-11-16 ENCOUNTER — APPOINTMENT (OUTPATIENT)
Dept: WOUND CARE | Facility: CLINIC | Age: 80
End: 2020-11-16

## 2020-11-16 DIAGNOSIS — L97.412 NON-PRESSURE CHRONIC ULCER OF RIGHT HEEL AND MIDFOOT WITH FAT LAYER EXPOSED: ICD-10-CM

## 2020-11-19 ENCOUNTER — APPOINTMENT (OUTPATIENT)
Dept: WOUND CARE | Facility: CLINIC | Age: 80
End: 2020-11-19

## 2020-11-23 NOTE — CHART NOTE - NSCHARTNOTEFT_GEN_A_CORE
Called patient no answer, LVM to call back if any questions.     Vicky Sifuentes, DO  Internal Medicine PGY-1  [facility]   Nurse called about blood saturated dressing at right BKA stump    pt seen and examined at beside. No complaints. denies bleeding from right BKA stump before today. denies dizziness, SOB, CP or palpitations.     Vitals  /75, NY 79, RR 17, O2Sat 95% on RA    PE   Gen: NAD, oriented to people and situation  Pulm: non-labor breathing   Heart: RRR  Ext: right BKA stump: bleeding through ACE wrap. dressing removed. no active bleeding noted at stump. abd applied with pressure. re-wrapped with ACE wrap with pressure.     Plan    - CBC/VBG/Lactate  - will re-evaluate in 2 hours.     d/w Dr. Ayala PGY3    Nikki Bond PAKEISHA  [id number]

## 2020-12-01 NOTE — HISTORY OF PRESENT ILLNESS
[FreeTextEntry1] : Ms. HOLLY FRY   presents to the office with new wounds since July 2020.\par using betadine on leg and santyl on sacrum.  She follows up for examination of the sacral wound.\par   The wound is located on  the left heel and posterior left leg . \par  The patient has been dressing the wound with   blue gentian/ daily, as prescribed by primary. \par  The patient denies fevers or chills.  \par The patient has localized pain to the wound upon dressing changes.  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 Morris\par \par August 2018, hospitalized at [place] for sepsis, ICU, hospitalized for 7 days, shortly after when home, noticed redness and pain on left heel, then skin broke down. Prior wound on left heel several years ago, followed by podiatry, eventually healed.\par \par Patient could not undergo revascularization as per patient.

## 2020-12-01 NOTE — PLAN
[FreeTextEntry1] : 9/10/20\par Plan - c/w betadine to feet, offload, wrap, sacral c/w aquacel, left voicemail for visiting nurse to confirm sacral size\par follow up telehealth\par 10/30/2020\par Sacral wound clean and granulating.  Recommend VAC dressing.\par No clinical sign of infection\par Leg and foot wounds healed.  Continue offloading\par Patient has heel boot.\par Order group 2 mattress

## 2020-12-01 NOTE — ASSESSMENT
[FreeTextEntry1] :  79 yr old htn , PAD, s/p aka, MS  left, calf and  left foot /toe wounds\par \par 9/19/20\par left calf wound is now dry, skin color is purplish in color, no drainage, foot/toe wounds drying up using betadine, sacral is clean and pink, aquacel being used\par 10/30/20\par Ischial stage 3\par Needs group 2 mattress

## 2020-12-01 NOTE — PHYSICAL EXAM
[Normal Breath Sounds] : Normal breath sounds [Normal Heart Sounds] : normal heart sounds [0] : left 0 [Ankle Swelling On The Left] : of the left ankle [Alert] : alert [Oriented to Person] : oriented to person [Oriented to Place] : oriented to place [Oriented to Time] : oriented to time [Calm] : calm [Please See PDF for Tissue Analytics] : Please See PDF for Tissue Analytics. [JVD] : no jugular venous distention  [Varicose Veins Of Lower Extremities] : not present [] : not present [de-identified] : well nourished, well groomed [FreeTextEntry1] : MARILY done < 0.5 bilaterally , discussed with pt. [de-identified] : soft, positive bowel sounds [de-identified] : indwelling catheter changed 2x/month [de-identified] : not ambulatory, wheelchair bound due to multiple sclerosis [de-identified] : wound on left heel

## 2021-03-01 ENCOUNTER — APPOINTMENT (OUTPATIENT)
Dept: WOUND CARE | Facility: CLINIC | Age: 81
End: 2021-03-01
Payer: MEDICARE

## 2021-03-01 DIAGNOSIS — L89.153 PRESSURE ULCER OF SACRAL REGION, STAGE 3: ICD-10-CM

## 2021-03-01 DIAGNOSIS — L89.309 PRESSURE ULCER OF UNSPECIFIED BUTTOCK, UNSPECIFIED STAGE: ICD-10-CM

## 2021-03-01 PROCEDURE — 99213 OFFICE O/P EST LOW 20 MIN: CPT | Mod: 95

## 2021-03-02 ENCOUNTER — TRANSCRIPTION ENCOUNTER (OUTPATIENT)
Age: 81
End: 2021-03-02

## 2021-03-26 ENCOUNTER — APPOINTMENT (OUTPATIENT)
Dept: WOUND CARE | Facility: CLINIC | Age: 81
End: 2021-03-26
Payer: MEDICARE

## 2021-03-26 DIAGNOSIS — B36.9 SUPERFICIAL MYCOSIS, UNSPECIFIED: ICD-10-CM

## 2021-03-26 PROCEDURE — 99212 OFFICE O/P EST SF 10 MIN: CPT | Mod: 95

## 2021-04-15 ENCOUNTER — NON-APPOINTMENT (OUTPATIENT)
Age: 81
End: 2021-04-15

## 2021-04-16 ENCOUNTER — APPOINTMENT (OUTPATIENT)
Dept: WOUND CARE | Facility: CLINIC | Age: 81
End: 2021-04-16
Payer: MEDICARE

## 2021-04-16 DIAGNOSIS — B37.2 CANDIDIASIS OF SKIN AND NAIL: ICD-10-CM

## 2021-04-16 DIAGNOSIS — L22 CANDIDIASIS OF SKIN AND NAIL: ICD-10-CM

## 2021-04-16 DIAGNOSIS — G35 MULTIPLE SCLEROSIS: ICD-10-CM

## 2021-04-16 PROCEDURE — 99213 OFFICE O/P EST LOW 20 MIN: CPT | Mod: 95

## 2021-05-09 NOTE — DISCHARGE NOTE NURSING/CASE MANAGEMENT/SOCIAL WORK - PATIENT PORTAL LINK FT
You can access the FollowMyHealth Patient Portal offered by [facility] by registering at the following website: [email or web address]. By joining [facility]’s FollowMyHealth portal, you will also be able to view your health information using other applications (apps) compatible with our system.
no chest pain/no peripheral edema/no syncope

## 2021-06-16 NOTE — PROGRESS NOTE ADULT - REASON FOR ADMISSION
sepsis, metabolic encephalopathy
English

## 2021-09-02 NOTE — DISCHARGE NOTE PROVIDER - NSDCHOSPICE_GEN_A_CORE
Patient order is already Symbicort.   Thank you
Pt needs name brand sent to [facility] will not pay for generic thank you
Robin from [employer] called stating they will not cover inhaler that was sent on 8/19/2021 they will cover Symbicort same dose please advise thank you
No

## 2021-09-14 ENCOUNTER — APPOINTMENT (OUTPATIENT)
Dept: WOUND CARE | Facility: CLINIC | Age: 81
End: 2021-09-14
Payer: MEDICARE

## 2021-09-14 DIAGNOSIS — S91.302A UNSPECIFIED OPEN WOUND, LEFT FOOT, INITIAL ENCOUNTER: ICD-10-CM

## 2021-09-14 PROCEDURE — 99212 OFFICE O/P EST SF 10 MIN: CPT | Mod: 95

## 2021-09-14 NOTE — HISTORY OF PRESENT ILLNESS
[Home] : at home, [unfilled] , at the time of the visit. [Medical Office: ([facility])___] : at the medical office located in  [Spouse] : spouse [Verbal consent obtained from patient] : the patient, [unfilled] [FreeTextEntry1] : Ms. HOLLY FRY   presents to the office with new wounds since July 2020.\par using betadine on leg and santyl on sacrum.  She follows up for examination of the sacral wound.\par   The wound is located on  the left heel and posterior left leg . \par  The patient has been dressing the wound with   blue gentian/ daily, as prescribed by primary. \par  The patient denies fevers or chills.  \par The patient has localized pain to the wound upon dressing changes.  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 Prior wound on left heel several years ago, followed by podiatry, eventually healed.\par \par Patient could not undergo revascularization as per patient.\par \par 3/1/21 79 yo female who is s/p right aka , and is non ambulatory\par JULY , UMAIR, present- has a  group 2 mattress\par  also present\par To send photos\par sacral wound is "better" , and being treated with a VAC\par Wearing diapers - advised against\par has a Kendrick cath and is incontinent of stools\par MAD surrounding healing stage 4- D/C diapers\par \par 4/16/21 reports that a "silver" Kendrick has been placed by \par  reports that photos were sent but none were received \par He was asked to have VN phone wound dimensions to JOHN Sampson NP who reports that VAc will be D/luis miguel unless measurements are received \par This info was relayed to pt and \par She has tried to not use diapers\par Marcia Duff, also present and status discussed\par \par \par 3/26/21 Again has MAD like changes in the judd wound location \par Continues to use diaper as is incontinent of stool and urine\par Again discussed avoidance of diapers and use of incontinence pads

## 2021-09-14 NOTE — REASON FOR VISIT
[Follow-Up: _____] : a [unfilled] follow-up visit [Spouse] : spouse [Formal Caregiver] : formal caregiver [FreeTextEntry1] : left leg wounds

## 2021-09-14 NOTE — HISTORY OF PRESENT ILLNESS
[Home] : at home, [unfilled] , at the time of the visit. [Medical Office: ([facility])___] : at the medical office located in  [Spouse] : spouse [Verbal consent obtained from patient] : the patient, [unfilled] [FreeTextEntry1] : Ms. HOLLY FRY   presents to the office with new wounds since July 2020.\par using betadine on leg and santyl on sacrum.  She follows up for examination of the sacral wound.\par   The wound is located on  the left heel and posterior left leg . \par  The patient has been dressing the wound with   blue gentian/ daily, as prescribed by primary. \par  The patient denies fevers or chills.  \par The patient has localized pain to the wound upon dressing changes.  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 Prior wound on left heel several years ago, followed by podiatry, eventually healed.\par \par Patient could not undergo revascularization as per patient.\par \par 3/1/21 81 yo female who is s/p right aka , and is non ambulatory\par JULY , UMAIR, present- has a  group 2 mattress\par  also present\par To send photos\par sacral wound is "better" , and being treated with a VAC\par Wearing diapers - advised against\par has a Kendrick cath and is incontinent of stools\par MAD surrounding healing stage 4- D/C diapers\par \par 4/16/21 reports that a "silver" Kendrick has been placed by \par  reports that photos were sent but none were received \par He was asked to have VN phone wound dimensions to JOHN Sampson NP who reports that VAc will be D/luis miguel unless measurements are received \par This info was relayed to pt and \par She has tried to not use diapers\par Marcia Duff, also present and status discussed\par \par \par 3/26/21 Again has MAD like changes in the judd wound location \par Continues to use diaper as is incontinent of stool and urine\par Again discussed avoidance of diapers and use of incontinence pads

## 2021-09-14 NOTE — PHYSICAL EXAM
[Please See PDF for Tissue Analytics] : Please See PDF for [employer] Analytics. [de-identified] : AD, well nourished, Semi sitting position [de-identified] : non labored [de-identified] : fragoso [de-identified] : not ambulatory [de-identified] : atrophic [de-identified] : conversant

## 2021-09-14 NOTE — ASSESSMENT
[FreeTextEntry1] : will dress wounds of left lower leg daily with Honey after scrub\par Informed of uncertain outcome\par RTO 1 wk

## 2021-09-14 NOTE — PHYSICAL EXAM
[Ankle Swelling On The Left] : of the left ankle [Please See PDF for Tissue Analytics] : Please See PDF for [employer] Analytics. [Ankle Swelling (On Exam)] : not present [de-identified] : awake, alert . at bedrest, well groomed [de-identified] : wearing corrective lens [de-identified] : not labored [FreeTextEntry1] : left lower leg described as "normal"temperature [de-identified] : not ambulatory [de-identified] : atrophic [de-identified] : conversant
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
## 2021-09-14 NOTE — PHYSICAL EXAM
[Please See PDF for Tissue Analytics] : Please See PDF for [employer] Analytics. [de-identified] : AD, well nourished, Semi sitting position [de-identified] : non labored [de-identified] : fragoso [de-identified] : not ambulatory [de-identified] : atrophic [de-identified] : conversant

## 2021-09-21 ENCOUNTER — APPOINTMENT (OUTPATIENT)
Dept: WOUND CARE | Facility: CLINIC | Age: 81
End: 2021-09-21
Payer: MEDICARE

## 2021-09-21 PROCEDURE — 99212 OFFICE O/P EST SF 10 MIN: CPT | Mod: 95
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
## 2021-09-21 NOTE — ASSESSMENT
[FreeTextEntry1] : discussed advisability of hospitalization  with  given pain and appearance of left leg\par he will present patient tomorrow

## 2021-09-21 NOTE — PHYSICAL EXAM
[de-identified] : At bedrest,NAD,well groomed [de-identified] : not labored [de-identified] : not ambulatory [de-identified] : scattered areas of dry gangrene left leg [de-identified] : conversant [Please See PDF for Tissue Analytics] : Please See PDF for Tissue Analytics.

## 2021-09-21 NOTE — HISTORY OF PRESENT ILLNESS
[FreeTextEntry1] : Ms. HOLLY FRY   presents to the office with new wounds since July 2020.\par using betadine on leg and santyl on sacrum.  She follows up for examination of the sacral wound.\par   The wound is located on  the left heel and posterior left leg . \par  The patient has been dressing the wound with   blue gentian/ daily, as prescribed by primary. \par  The patient denies fevers or chills.  \par The patient has localized pain to the wound upon dressing changes.  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 Prior wound on left heel several years ago, followed by podiatry, eventually healed.\par \par Patient could not undergo revascularization as per patient.\par \par 3/1/21 81 yo female who is s/p right aka , and is non ambulatory\par VN , NSLIJ, present- has a  group 2 mattress\par  also present\par To send photos\par sacral wound is "better" , and being treated with a VAC\par Wearing diapers - advised against\par has a Kendrick cath and is incontinent of stools\par MAD surrounding healing stage 4- D/C diapers

## 2021-09-21 NOTE — PHYSICAL EXAM
[Please See PDF for Tissue Analytics] : Please See PDF for [employer] Analytics. [de-identified] : At bedrest, well groomed [de-identified] : not labored [de-identified] : Kendrick Cath [de-identified] : Incontinent [de-identified] : not ambulatory  [de-identified] : conversant [FreeTextEntry1] : sacrum [de-identified] : healing 4 [FreeTextEntry2] : 1.8 [FreeTextEntry3] : 2 [FreeTextEntry4] : .5 [de-identified] : NPWT [TWNoteComboBox1] : Midline [TWNoteComboBox2] : 4

## 2021-09-21 NOTE — PLAN
[FreeTextEntry1] : Advised to d/c diapers \par Reported as using duoderm in judd wound location- can substitute long acting or regular cavilon\par F/U in 2 wks \par Submit wound photos

## 2021-09-22 NOTE — HISTORY OF PRESENT ILLNESS
[Home] : at home, [unfilled] , at the time of the visit. [Medical Office: ([facility])___] : at the medical office located in  [Spouse] : spouse [Formal Caregiver] : formal caregiver [FreeTextEntry3] : , Mr Louis [FreeTextEntry1] : Ms. HOLLY FRY   presents to the office with new wounds since July 2020.\par using betadine on leg and santyl on sacrum.  She follows up for examination of the sacral wound.\par   The wound is located on  the left heel and posterior left leg . \par  The patient has been dressing the wound with   blue gentian/ daily, as prescribed by primary. \par  The patient denies fevers or chills.  \par The patient has localized pain to the wound upon dressing changes.  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 Prior wound on left heel several years ago, followed by podiatry, eventually healed.\par \par Patient could not undergo revascularization as per patient.\par \par 3/1/21 79 yo female who is s/p right aka , and is non ambulatory\par VN , NSLIJ, present- has a  group 2 mattress\par  also present\par To send photos\par sacral wound is "better" , and being treated with a VAC\par Wearing diapers - advised against\par has a Kendrick cath and is incontinent of stools\par MAD surrounding healing stage 4- D/C diapers\par \par 3/26/21 Again has MAD like changes in the judd wound location \par Continues to use diaper as is incontinent of stool and urine\par Again discussed avoidance of diapers and use of incontinence pads

## 2021-09-22 NOTE — REASON FOR VISIT
[Follow-Up: _____] : a [unfilled] follow-up visit [Spouse] : spouse [Formal Caregiver] : formal caregiver [FreeTextEntry1] : healing stage 4 sacral decubitus

## 2021-09-22 NOTE — PHYSICAL EXAM
[Please See PDF for Tissue Analytics] : Please See PDF for [employer] Analytics. [de-identified] : at bedrest, well nourished [de-identified] : not labored [de-identified] : reported as incontinent [de-identified] : reported as incontinent [de-identified] : not ambulatory [de-identified] : functional quad, capable of limited conversation
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
## 2021-09-23 ENCOUNTER — TRANSCRIPTION ENCOUNTER (OUTPATIENT)
Age: 81
End: 2021-09-23

## 2021-09-23 ENCOUNTER — INPATIENT (INPATIENT)
Facility: HOSPITAL | Age: 81
LOS: 1 days | Discharge: HOME HEALTH | DRG: 592 | End: 2021-09-25
Attending: INTERNAL MEDICINE | Admitting: INTERNAL MEDICINE
Payer: MEDICARE

## 2021-09-23 VITALS
HEIGHT: 63 IN | WEIGHT: 139.99 LBS | RESPIRATION RATE: 19 BRPM | OXYGEN SATURATION: 100 % | TEMPERATURE: 99 F | DIASTOLIC BLOOD PRESSURE: 68 MMHG | SYSTOLIC BLOOD PRESSURE: 134 MMHG | HEART RATE: 78 BPM

## 2021-09-23 DIAGNOSIS — Z96.7 PRESENCE OF OTHER BONE AND TENDON IMPLANTS: Chronic | ICD-10-CM

## 2021-09-23 DIAGNOSIS — Z90.710 ACQUIRED ABSENCE OF BOTH CERVIX AND UTERUS: Chronic | ICD-10-CM

## 2021-09-23 DIAGNOSIS — Z98.890 OTHER SPECIFIED POSTPROCEDURAL STATES: Chronic | ICD-10-CM

## 2021-09-23 DIAGNOSIS — T30.0 BURN OF UNSPECIFIED BODY REGION, UNSPECIFIED DEGREE: ICD-10-CM

## 2021-09-23 LAB
ALBUMIN SERPL ELPH-MCNC: 3.5 G/DL — SIGNIFICANT CHANGE UP (ref 3.3–5)
ALP SERPL-CCNC: 173 U/L — HIGH (ref 40–120)
ALT FLD-CCNC: 22 U/L — SIGNIFICANT CHANGE UP (ref 10–45)
ANION GAP SERPL CALC-SCNC: 14 MMOL/L — SIGNIFICANT CHANGE UP (ref 5–17)
APPEARANCE UR: CLEAR — SIGNIFICANT CHANGE UP
AST SERPL-CCNC: 20 U/L — SIGNIFICANT CHANGE UP (ref 10–40)
BACTERIA # UR AUTO: ABNORMAL
BASE EXCESS BLDV CALC-SCNC: 8.7 MMOL/L — HIGH (ref -2–2)
BASOPHILS # BLD AUTO: 0.03 K/UL — SIGNIFICANT CHANGE UP (ref 0–0.2)
BASOPHILS NFR BLD AUTO: 0.3 % — SIGNIFICANT CHANGE UP (ref 0–2)
BILIRUB SERPL-MCNC: 0.2 MG/DL — SIGNIFICANT CHANGE UP (ref 0.2–1.2)
BILIRUB UR-MCNC: NEGATIVE — SIGNIFICANT CHANGE UP
BUN SERPL-MCNC: 38 MG/DL — HIGH (ref 7–23)
CA-I SERPL-SCNC: 1.3 MMOL/L — SIGNIFICANT CHANGE UP (ref 1.15–1.33)
CALCIUM SERPL-MCNC: 10.2 MG/DL — SIGNIFICANT CHANGE UP (ref 8.4–10.5)
CHLORIDE BLDV-SCNC: 101 MMOL/L — SIGNIFICANT CHANGE UP (ref 96–108)
CHLORIDE SERPL-SCNC: 99 MMOL/L — SIGNIFICANT CHANGE UP (ref 96–108)
CO2 BLDV-SCNC: 38 MMOL/L — HIGH (ref 22–26)
CO2 SERPL-SCNC: 25 MMOL/L — SIGNIFICANT CHANGE UP (ref 22–31)
COLOR SPEC: SIGNIFICANT CHANGE UP
CREAT SERPL-MCNC: <0.3 MG/DL — LOW (ref 0.5–1.3)
DIFF PNL FLD: ABNORMAL
EOSINOPHIL # BLD AUTO: 0.25 K/UL — SIGNIFICANT CHANGE UP (ref 0–0.5)
EOSINOPHIL NFR BLD AUTO: 2.3 % — SIGNIFICANT CHANGE UP (ref 0–6)
EPI CELLS # UR: 0 /HPF — SIGNIFICANT CHANGE UP
GAS PNL BLDV: 139 MMOL/L — SIGNIFICANT CHANGE UP (ref 136–145)
GAS PNL BLDV: SIGNIFICANT CHANGE UP
GLUCOSE BLDV-MCNC: 109 MG/DL — HIGH (ref 70–99)
GLUCOSE SERPL-MCNC: 103 MG/DL — HIGH (ref 70–99)
GLUCOSE UR QL: NEGATIVE — SIGNIFICANT CHANGE UP
HCO3 BLDV-SCNC: 36 MMOL/L — HIGH (ref 22–29)
HCT VFR BLD CALC: 40.2 % — SIGNIFICANT CHANGE UP (ref 34.5–45)
HCT VFR BLDA CALC: 39 % — SIGNIFICANT CHANGE UP (ref 34.5–46.5)
HGB BLD CALC-MCNC: 13 G/DL — SIGNIFICANT CHANGE UP (ref 11.7–16.1)
HGB BLD-MCNC: 12.5 G/DL — SIGNIFICANT CHANGE UP (ref 11.5–15.5)
HYALINE CASTS # UR AUTO: 0 /LPF — SIGNIFICANT CHANGE UP (ref 0–2)
IMM GRANULOCYTES NFR BLD AUTO: 0.8 % — SIGNIFICANT CHANGE UP (ref 0–1.5)
KETONES UR-MCNC: NEGATIVE — SIGNIFICANT CHANGE UP
LACTATE BLDV-MCNC: 1.4 MMOL/L — SIGNIFICANT CHANGE UP (ref 0.7–2)
LEUKOCYTE ESTERASE UR-ACNC: ABNORMAL
LYMPHOCYTES # BLD AUTO: 1.22 K/UL — SIGNIFICANT CHANGE UP (ref 1–3.3)
LYMPHOCYTES # BLD AUTO: 11.3 % — LOW (ref 13–44)
MCHC RBC-ENTMCNC: 28.6 PG — SIGNIFICANT CHANGE UP (ref 27–34)
MCHC RBC-ENTMCNC: 31.1 GM/DL — LOW (ref 32–36)
MCV RBC AUTO: 92 FL — SIGNIFICANT CHANGE UP (ref 80–100)
MONOCYTES # BLD AUTO: 0.85 K/UL — SIGNIFICANT CHANGE UP (ref 0–0.9)
MONOCYTES NFR BLD AUTO: 7.9 % — SIGNIFICANT CHANGE UP (ref 2–14)
NEUTROPHILS # BLD AUTO: 8.38 K/UL — HIGH (ref 1.8–7.4)
NEUTROPHILS NFR BLD AUTO: 77.4 % — HIGH (ref 43–77)
NITRITE UR-MCNC: POSITIVE
NRBC # BLD: 0 /100 WBCS — SIGNIFICANT CHANGE UP (ref 0–0)
PCO2 BLDV: 59 MMHG — HIGH (ref 39–42)
PH BLDV: 7.39 — SIGNIFICANT CHANGE UP (ref 7.32–7.43)
PH UR: 6 — SIGNIFICANT CHANGE UP (ref 5–8)
PLATELET # BLD AUTO: 293 K/UL — SIGNIFICANT CHANGE UP (ref 150–400)
PO2 BLDV: 21 MMHG — LOW (ref 25–45)
POTASSIUM BLDV-SCNC: 2.9 MMOL/L — CRITICAL LOW (ref 3.5–5.1)
POTASSIUM SERPL-MCNC: 3.2 MMOL/L — LOW (ref 3.5–5.3)
POTASSIUM SERPL-SCNC: 3.2 MMOL/L — LOW (ref 3.5–5.3)
PROT SERPL-MCNC: 7.4 G/DL — SIGNIFICANT CHANGE UP (ref 6–8.3)
PROT UR-MCNC: NEGATIVE — SIGNIFICANT CHANGE UP
RBC # BLD: 4.37 M/UL — SIGNIFICANT CHANGE UP (ref 3.8–5.2)
RBC # FLD: 15 % — HIGH (ref 10.3–14.5)
RBC CASTS # UR COMP ASSIST: 3 /HPF — SIGNIFICANT CHANGE UP (ref 0–4)
SAO2 % BLDV: 27.6 % — LOW (ref 67–88)
SARS-COV-2 RNA SPEC QL NAA+PROBE: SIGNIFICANT CHANGE UP
SODIUM SERPL-SCNC: 138 MMOL/L — SIGNIFICANT CHANGE UP (ref 135–145)
SP GR SPEC: 1.01 — SIGNIFICANT CHANGE UP (ref 1.01–1.02)
UROBILINOGEN FLD QL: NEGATIVE — SIGNIFICANT CHANGE UP
WBC # BLD: 10.82 K/UL — HIGH (ref 3.8–10.5)
WBC # FLD AUTO: 10.82 K/UL — HIGH (ref 3.8–10.5)
WBC UR QL: 32 /HPF — HIGH (ref 0–5)

## 2021-09-23 PROCEDURE — 99222 1ST HOSP IP/OBS MODERATE 55: CPT

## 2021-09-23 PROCEDURE — 73590 X-RAY EXAM OF LOWER LEG: CPT | Mod: 26,LT

## 2021-09-23 PROCEDURE — 99284 EMERGENCY DEPT VISIT MOD MDM: CPT

## 2021-09-23 PROCEDURE — 99285 EMERGENCY DEPT VISIT HI MDM: CPT | Mod: GC

## 2021-09-23 PROCEDURE — 93010 ELECTROCARDIOGRAM REPORT: CPT

## 2021-09-23 PROCEDURE — 73560 X-RAY EXAM OF KNEE 1 OR 2: CPT | Mod: 26,LT

## 2021-09-23 PROCEDURE — 73552 X-RAY EXAM OF FEMUR 2/>: CPT | Mod: 26,LT

## 2021-09-23 RX ORDER — ATENOLOL 25 MG/1
50 TABLET ORAL DAILY
Refills: 0 | Status: DISCONTINUED | OUTPATIENT
Start: 2021-09-23 | End: 2021-09-25

## 2021-09-23 RX ORDER — PANTOPRAZOLE SODIUM 20 MG/1
40 TABLET, DELAYED RELEASE ORAL
Refills: 0 | Status: DISCONTINUED | OUTPATIENT
Start: 2021-09-23 | End: 2021-09-25

## 2021-09-23 RX ORDER — PIPERACILLIN AND TAZOBACTAM 4; .5 G/20ML; G/20ML
3.38 INJECTION, POWDER, LYOPHILIZED, FOR SOLUTION INTRAVENOUS EVERY 8 HOURS
Refills: 0 | Status: DISCONTINUED | OUTPATIENT
Start: 2021-09-23 | End: 2021-09-23

## 2021-09-23 RX ORDER — VANCOMYCIN HCL 1 G
1000 VIAL (EA) INTRAVENOUS ONCE
Refills: 0 | Status: COMPLETED | OUTPATIENT
Start: 2021-09-23 | End: 2021-09-23

## 2021-09-23 RX ORDER — POTASSIUM CHLORIDE 20 MEQ
40 PACKET (EA) ORAL ONCE
Refills: 0 | Status: COMPLETED | OUTPATIENT
Start: 2021-09-23 | End: 2021-09-23

## 2021-09-23 RX ORDER — PIPERACILLIN AND TAZOBACTAM 4; .5 G/20ML; G/20ML
3.38 INJECTION, POWDER, LYOPHILIZED, FOR SOLUTION INTRAVENOUS ONCE
Refills: 0 | Status: COMPLETED | OUTPATIENT
Start: 2021-09-23 | End: 2021-09-23

## 2021-09-23 RX ORDER — LACTOBACILLUS ACIDOPHILUS 100MM CELL
1 CAPSULE ORAL
Refills: 0 | Status: DISCONTINUED | OUTPATIENT
Start: 2021-09-23 | End: 2021-09-25

## 2021-09-23 RX ORDER — CLONAZEPAM 1 MG
0.5 TABLET ORAL AT BEDTIME
Refills: 0 | Status: DISCONTINUED | OUTPATIENT
Start: 2021-09-23 | End: 2021-09-23

## 2021-09-23 RX ORDER — POLYETHYLENE GLYCOL 3350 17 G/17G
17 POWDER, FOR SOLUTION ORAL
Refills: 0 | Status: DISCONTINUED | OUTPATIENT
Start: 2021-09-23 | End: 2021-09-25

## 2021-09-23 RX ORDER — ATORVASTATIN CALCIUM 80 MG/1
20 TABLET, FILM COATED ORAL AT BEDTIME
Refills: 0 | Status: DISCONTINUED | OUTPATIENT
Start: 2021-09-23 | End: 2021-09-25

## 2021-09-23 RX ORDER — VANCOMYCIN HCL 1 G
VIAL (EA) INTRAVENOUS
Refills: 0 | Status: DISCONTINUED | OUTPATIENT
Start: 2021-09-23 | End: 2021-09-23

## 2021-09-23 RX ORDER — CLONAZEPAM 1 MG
1 TABLET ORAL
Refills: 0 | Status: DISCONTINUED | OUTPATIENT
Start: 2021-09-23 | End: 2021-09-25

## 2021-09-23 RX ORDER — ACETAMINOPHEN 500 MG
650 TABLET ORAL ONCE
Refills: 0 | Status: COMPLETED | OUTPATIENT
Start: 2021-09-23 | End: 2021-09-23

## 2021-09-23 RX ORDER — SODIUM CHLORIDE 9 MG/ML
1000 INJECTION, SOLUTION INTRAVENOUS ONCE
Refills: 0 | Status: COMPLETED | OUTPATIENT
Start: 2021-09-23 | End: 2021-09-23

## 2021-09-23 RX ORDER — VANCOMYCIN HCL 1 G
1000 VIAL (EA) INTRAVENOUS ONCE
Refills: 0 | Status: DISCONTINUED | OUTPATIENT
Start: 2021-09-23 | End: 2021-09-23

## 2021-09-23 RX ORDER — INFLUENZA VIRUS VACCINE 15; 15; 15; 15 UG/.5ML; UG/.5ML; UG/.5ML; UG/.5ML
0.5 SUSPENSION INTRAMUSCULAR ONCE
Refills: 0 | Status: DISCONTINUED | OUTPATIENT
Start: 2021-09-23 | End: 2021-09-25

## 2021-09-23 RX ORDER — HEPARIN SODIUM 5000 [USP'U]/ML
5000 INJECTION INTRAVENOUS; SUBCUTANEOUS EVERY 12 HOURS
Refills: 0 | Status: DISCONTINUED | OUTPATIENT
Start: 2021-09-23 | End: 2021-09-25

## 2021-09-23 RX ORDER — ASPIRIN/CALCIUM CARB/MAGNESIUM 324 MG
81 TABLET ORAL DAILY
Refills: 0 | Status: DISCONTINUED | OUTPATIENT
Start: 2021-09-23 | End: 2021-09-25

## 2021-09-23 RX ORDER — PIPERACILLIN AND TAZOBACTAM 4; .5 G/20ML; G/20ML
3.38 INJECTION, POWDER, LYOPHILIZED, FOR SOLUTION INTRAVENOUS ONCE
Refills: 0 | Status: DISCONTINUED | OUTPATIENT
Start: 2021-09-23 | End: 2021-09-23

## 2021-09-23 RX ORDER — SENNA PLUS 8.6 MG/1
2 TABLET ORAL AT BEDTIME
Refills: 0 | Status: DISCONTINUED | OUTPATIENT
Start: 2021-09-23 | End: 2021-09-25

## 2021-09-23 RX ORDER — OXYBUTYNIN CHLORIDE 5 MG
5 TABLET ORAL DAILY
Refills: 0 | Status: DISCONTINUED | OUTPATIENT
Start: 2021-09-23 | End: 2021-09-23

## 2021-09-23 RX ORDER — BACLOFEN 100 %
20 POWDER (GRAM) MISCELLANEOUS
Refills: 0 | Status: DISCONTINUED | OUTPATIENT
Start: 2021-09-23 | End: 2021-09-25

## 2021-09-23 RX ORDER — TRIAMTERENE/HYDROCHLOROTHIAZID 75 MG-50MG
1 TABLET ORAL DAILY
Refills: 0 | Status: DISCONTINUED | OUTPATIENT
Start: 2021-09-23 | End: 2021-09-24

## 2021-09-23 RX ORDER — AMLODIPINE BESYLATE 2.5 MG/1
10 TABLET ORAL DAILY
Refills: 0 | Status: DISCONTINUED | OUTPATIENT
Start: 2021-09-23 | End: 2021-09-25

## 2021-09-23 RX ORDER — ASCORBIC ACID 60 MG
500 TABLET,CHEWABLE ORAL DAILY
Refills: 0 | Status: DISCONTINUED | OUTPATIENT
Start: 2021-09-23 | End: 2021-09-25

## 2021-09-23 RX ADMIN — Medication 40 MILLIEQUIVALENT(S): at 12:47

## 2021-09-23 RX ADMIN — PIPERACILLIN AND TAZOBACTAM 200 GRAM(S): 4; .5 INJECTION, POWDER, LYOPHILIZED, FOR SOLUTION INTRAVENOUS at 12:21

## 2021-09-23 RX ADMIN — Medication 650 MILLIGRAM(S): at 15:24

## 2021-09-23 RX ADMIN — ATORVASTATIN CALCIUM 20 MILLIGRAM(S): 80 TABLET, FILM COATED ORAL at 21:40

## 2021-09-23 RX ADMIN — Medication 250 MILLIGRAM(S): at 12:46

## 2021-09-23 RX ADMIN — HEPARIN SODIUM 5000 UNIT(S): 5000 INJECTION INTRAVENOUS; SUBCUTANEOUS at 21:39

## 2021-09-23 RX ADMIN — SODIUM CHLORIDE 1000 MILLILITER(S): 9 INJECTION, SOLUTION INTRAVENOUS at 15:24

## 2021-09-23 RX ADMIN — Medication 20 MILLIGRAM(S): at 21:39

## 2021-09-23 RX ADMIN — SENNA PLUS 2 TABLET(S): 8.6 TABLET ORAL at 21:38

## 2021-09-23 RX ADMIN — Medication 1 MILLIGRAM(S): at 21:39

## 2021-09-23 NOTE — H&P ADULT - NSHPPHYSICALEXAM_GEN_ALL_CORE
PHYSICAL EXAMINATION:  Vital Signs Last 24 Hrs  T(C): 36.4 (23 Sep 2021 14:28), Max: 37.1 (23 Sep 2021 09:46)  T(F): 97.5 (23 Sep 2021 14:28), Max: 98.7 (23 Sep 2021 09:46)  HR: 63 (23 Sep 2021 16:00) (63 - 78)  BP: 110/79 (23 Sep 2021 16:00) (110/79 - 134/68)  BP(mean): --  RR: 19 (23 Sep 2021 16:00) (17 - 19)  SpO2: 98% (23 Sep 2021 16:00) (97% - 100%)  CAPILLARY BLOOD GLUCOSE          GENERAL: NAD, well-groomed, well-developed  HEAD:  atraumatic, normocephalic  EYES: sclera anicteric  ENMT: mucous membranes moist  NECK: supple, No JVD  CHEST/LUNG: clear to auscultation bilaterally; no rales, rhonchi, or wheezing b/l  HEART: normal S1, S2  ABDOMEN: BS+, soft, ND, NT   EXTREMITIES:  pulses palpable; no clubbing, cyanosis, or edema b/l LEs  NEURO: awake, alert, interactive; moves all extremities  SKIN: no rashes or lesions PHYSICAL EXAMINATION:  Vital Signs Last 24 Hrs  T(C): 36.4 (23 Sep 2021 14:28), Max: 37.1 (23 Sep 2021 09:46)  T(F): 97.5 (23 Sep 2021 14:28), Max: 98.7 (23 Sep 2021 09:46)  HR: 63 (23 Sep 2021 16:00) (63 - 78)  BP: 110/79 (23 Sep 2021 16:00) (110/79 - 134/68)  BP(mean): --  RR: 19 (23 Sep 2021 16:00) (17 - 19)  SpO2: 98% (23 Sep 2021 16:00) (97% - 100%)  CAPILLARY BLOOD GLUCOSE          GENERAL: NAD, seen on 3 [address], comfortable,  at bedside.   HEAD:  atraumatic, normocephalic  EYES: sclera anicteric  ENMT: mucous membranes moist  NECK: supple, No JVD  CHEST/LUNG: clear to auscultation bilaterally; no rales, rhonchi, or wheezing b/l  HEART: normal S1, S2  ABDOMEN: BS+, soft, ND, NT   EXTREMITIES:  pulses palpable; no clubbing, cyanosis, or edema b/l LEs  NEURO: awake, alert, interactive; moves all extremities  SKIN: no rashes or lesions
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
## 2021-09-23 NOTE — ED PROVIDER NOTE - NS ED ROS FT
GENERAL: No fever or chills  EYES: No change in vision  HEENT: No trouble swallowing or speaking  CARDIAC: No chest pain  PULMONARY: No cough or SOB  GI: No abdominal pain, no nausea or no vomiting, no diarrhea or constipation  : No changes in urination  SKIN: No rashes  NEURO: No headache, no numbness  MSK: +leg wound  Otherwise as HPI or negative.
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
## 2021-09-23 NOTE — H&P ADULT - NSHPLABSRESULTS_GEN_ALL_CORE
LABS:                        12.5   10.82 )-----------( 293      ( 23 Sep 2021 11:52 )             40.2         138  |  99  |  38<H>  ----------------------------<  103<H>  3.2<L>   |  25  |  <0.30<L>    Ca    10.2      23 Sep 2021 11:52    TPro  7.4  /  Alb  3.5  /  TBili  0.2  /  DBili  x   /  AST  20  /  ALT  22  /  AlkPhos  173<H>        Urinalysis Basic - ( 23 Sep 2021 14:11 )    Color: Light Yellow / Appearance: Clear / S.013 / pH: x  Gluc: x / Ketone: Negative  / Bili: Negative / Urobili: Negative   Blood: x / Protein: Negative / Nitrite: Positive   Leuk Esterase: Large / RBC: 3 /hpf / WBC 32 /HPF   Sq Epi: x / Non Sq Epi: 0 /hpf / Bacteria: Few          RADIOLOGY & ADDITIONAL TESTS:
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
## 2021-09-23 NOTE — ED PROVIDER NOTE - PHYSICAL EXAMINATION
Gen: In mild distress. A&Ox4. Non-toxic appearing  HEENT: Normocephalic and atraumatic. PERRL, EOMI, no nasal discharge, mucous membranes moist, no scleral icterus.  CV: Regular rate and rhythm, +S1/S2, no M/R/G. No significant lower extremity edema. Radial and DP pulses present and symmetrical. Capillary refill less than 2 seconds.  Resp: Normal effort and rate. CTAB, no rales, rhonchi, or wheezes.  GI: Abdomen soft, non-distended, non tender to palpation. No masses appreciated. Bowel sounds present.  MSK: Left lateral lower leg wounds with eschars, dry.   Vascular: RLE AKA. LE digits with erythema and darkened base of 5th metatarsal. No open wounds.   Neuro: Following commands, speaking in full sentences, moving extremities spontaneously.  Psych: Anxious mood, cooperative  : Kendrick catheter in place
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
## 2021-09-23 NOTE — DISCHARGE NOTE PROVIDER - NSDCMRMEDTOKEN_GEN_ALL_CORE_FT
Aleve 220 mg oral tablet:   amLODIPine 10 mg oral tablet: 1 tab(s) orally once a day  aspirin 81 mg oral tablet, chewable: 1 tab(s) orally once a day  atenolol 50 mg oral tablet: 1 tab(s) orally once a day   baclofen 10 mg oral tablet: orally 10 times a day, 2 tabs 8AM, 12PM, 3 tabs 6PM, 10PM  clonazePAM 0.5 mg oral tablet: 1 tab(s) orally once a day (at bedtime)  Keflex 500 mg oral capsule: 1 cap(s) orally 4 times a day   omeprazole 20 mg oral delayed release tablet: 1 tab(s) orally 2 times a day  oxybutynin 5 mg/24 hours oral tablet, extended release: 1 tab(s) orally once a day at 6pm  polyethylene glycol 3350 oral powder for reconstitution: 17 gram(s) orally once a day, As Needed FOR CONSTIPATION   senna oral tablet: 2 tab(s) orally once a day (at bedtime), As Needed   traMADol 50 mg oral tablet: 0.5 tab(s) orally every 6 hours, As needed, Moderate Pain (4 - 6) MDD:four 0.5 tab/day  Tylenol 500 mg oral tablet:   urelle oral tablet:   VESIcare 5 mg oral tablet: 1 tab(s) orally once a day   ascorbic acid 500 mg oral tablet: 1 tab(s) orally once a day  aspirin 81 mg oral tablet, chewable: 1 tab(s) orally once a day  atenolol 50 mg oral tablet: 1 tab(s) orally once a day   baclofen 10 mg oral tablet: orally 10 times a day, 2 tabs 8AM, 12PM, 3 tabs 6PM, 10PM  baclofen 20 mg oral tablet: 1 tab(s) orally 4 times a day  clonazePAM 0.5 mg oral tablet: 1 tab(s) orally once a day (at bedtime)  clonazePAM 1 mg oral tablet: 1 tab(s) orally 2 times a day  lactobacillus acidophilus oral capsule: 1 tab(s) orally 2 times a day  omeprazole 20 mg oral delayed release tablet: 1 tab(s) orally 2 times a day  oxybutynin 5 mg/24 hours oral tablet, extended release: 1 tab(s) orally once a day at 6pm  polyethylene glycol 3350 oral powder for reconstitution: 17 gram(s) orally 2 times a day  polyethylene glycol 3350 oral powder for reconstitution: 17 gram(s) orally once a day, As Needed FOR CONSTIPATION   senna oral tablet: 2 tab(s) orally once a day (at bedtime), As Needed   traMADol 50 mg oral tablet: 0.5 tab(s) orally every 6 hours, As needed, Moderate Pain (4 - 6) MDD:four 0.5 tab/day  Tylenol 500 mg oral tablet:   urelle oral tablet:   VESIcare 5 mg oral tablet: 1 tab(s) orally once a day   amLODIPine 10 mg oral tablet: 1 tab(s) orally once a day  ascorbic acid 500 mg oral tablet: 1 tab(s) orally once a day  aspirin 81 mg oral tablet, chewable: 1 tab(s) orally once a day  atenolol 50 mg oral tablet: 1 tab(s) orally once a day   atorvastatin 20 mg oral tablet: 1 tab(s) orally once a day (at bedtime)  baclofen 20 mg oral tablet: 1 tab(s) orally 4 times a day  clonazePAM 1 mg oral tablet: 1 tab(s) orally 2 times a day  lactobacillus acidophilus oral capsule: 1 tab(s) orally 2 times a day  omeprazole 20 mg oral delayed release tablet: 1 tab(s) orally 2 times a day  polyethylene glycol 3350 oral powder for reconstitution: 17 gram(s) orally 2 times a day  senna oral tablet: 2 tab(s) orally once a day (at bedtime), As Needed   traMADol 50 mg oral tablet: 0.5 tab(s) orally every 6 hours, As needed, Moderate Pain (4 - 6) MDD:four 0.5 tab/day  Tylenol 500 mg oral tablet:
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
## 2021-09-23 NOTE — ED PROVIDER NOTE - PROGRESS NOTE DETAILS
Attending MD Orosco: patient declining any diagnostics (including blood work and XR) until her wound care physician sees her in the ED. Attempts have been made to contact Dr. Vera (pages sent), no response yet from this physician. I have not confirmed yet who from wound care team is going to be consulting on this patient as of yet. Spoke to Dr. Smith ([phone]) and NP spectra link [phone]. Dr will come reassess pt himself around 4/5 pm today. Dr. Smith request Labs work, ABX and admission with vascular consult. Per Dr. Smith pt may need additional amputation. Consulted Vascular (spoke to Beatrice).
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
## 2021-09-23 NOTE — DISCHARGE NOTE PROVIDER - CARE PROVIDERS DIRECT ADDRESSES
,[email or web address]
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
## 2021-09-23 NOTE — DISCHARGE NOTE PROVIDER - PROVIDER TOKENS
PROVIDER:[TOKEN:[[id number]],FOLLOWUP:[1 week]],PROVIDER:[TOKEN:[[id number]],FOLLOWUP:[1 week]] PROVIDER:[TOKEN:[[id number]],FOLLOWUP:[1 week]],PROVIDER:[TOKEN:[[id number]],FOLLOWUP:[1 week]],PROVIDER:[TOKEN:[[id number]],FOLLOWUP:[2 weeks]]

## 2021-09-23 NOTE — DISCHARGE NOTE PROVIDER - NSDCFUADDINST_GEN_ALL_CORE_FT
Podiatry Discharge Instructions:  Follow up: Please follow up with Dr. Montaño within 1 week of discharge from the hospital, please call [phone] for appointment and discuss that you recently were seen in the hospital.  Wound Care: Please offload L heel w/ dustin abarca

## 2021-09-23 NOTE — DISCHARGE NOTE PROVIDER - CARE PROVIDER_API CALL
Dennis Smith (MD)  Surgery  [facility] [address], [address]  [place] [address]  Phone: [phone]  Fax: [phone]  Follow Up Time: 1 week    Durga Montaño (DPM)  Podiatric Medicine and Surgery  [address] [place] [address]  Phone: [phone]  Fax: [phone]  Follow Up Time: 1 week   Dennis Smith (MD)  Surgery  [facility] [address], [address]  [place] [address]  Phone: [phone]  Fax: [phone]  Follow Up Time: 1 week    Durga Montaño (DPM)  Podiatric Medicine and Surgery  [address] [place] [address]  Phone: [phone]  Fax: [phone]  Follow Up Time: 1 week    Marv Kim)  Orthopedics  [address] [place] [address]  Phone: [phone]  Fax: [phone]  Follow Up Time: 2 weeks

## 2021-09-24 LAB
ANION GAP SERPL CALC-SCNC: 15 MMOL/L — SIGNIFICANT CHANGE UP (ref 5–17)
BUN SERPL-MCNC: 26 MG/DL — HIGH (ref 7–23)
CALCIUM SERPL-MCNC: 9.9 MG/DL — SIGNIFICANT CHANGE UP (ref 8.4–10.5)
CHLORIDE SERPL-SCNC: 100 MMOL/L — SIGNIFICANT CHANGE UP (ref 96–108)
CO2 SERPL-SCNC: 26 MMOL/L — SIGNIFICANT CHANGE UP (ref 22–31)
COVID-19 SPIKE DOMAIN AB INTERP: POSITIVE
COVID-19 SPIKE DOMAIN ANTIBODY RESULT: >250 U/ML — HIGH
CREAT SERPL-MCNC: 0.5 MG/DL — SIGNIFICANT CHANGE UP (ref 0.5–1.3)
GLUCOSE SERPL-MCNC: 104 MG/DL — HIGH (ref 70–99)
HCT VFR BLD CALC: 37.8 % — SIGNIFICANT CHANGE UP (ref 34.5–45)
HGB BLD-MCNC: 12.2 G/DL — SIGNIFICANT CHANGE UP (ref 11.5–15.5)
MAGNESIUM SERPL-MCNC: 2.1 MG/DL — SIGNIFICANT CHANGE UP (ref 1.6–2.6)
MCHC RBC-ENTMCNC: 29.5 PG — SIGNIFICANT CHANGE UP (ref 27–34)
MCHC RBC-ENTMCNC: 32.3 GM/DL — SIGNIFICANT CHANGE UP (ref 32–36)
MCV RBC AUTO: 91.5 FL — SIGNIFICANT CHANGE UP (ref 80–100)
NRBC # BLD: 0 /100 WBCS — SIGNIFICANT CHANGE UP (ref 0–0)
PLATELET # BLD AUTO: 259 K/UL — SIGNIFICANT CHANGE UP (ref 150–400)
POTASSIUM SERPL-MCNC: 3.6 MMOL/L — SIGNIFICANT CHANGE UP (ref 3.5–5.3)
POTASSIUM SERPL-SCNC: 3.6 MMOL/L — SIGNIFICANT CHANGE UP (ref 3.5–5.3)
RBC # BLD: 4.13 M/UL — SIGNIFICANT CHANGE UP (ref 3.8–5.2)
RBC # FLD: 14.8 % — HIGH (ref 10.3–14.5)
SARS-COV-2 IGG+IGM SERPL QL IA: >250 U/ML — HIGH
SARS-COV-2 IGG+IGM SERPL QL IA: POSITIVE
SODIUM SERPL-SCNC: 141 MMOL/L — SIGNIFICANT CHANGE UP (ref 135–145)
WBC # BLD: 10.79 K/UL — HIGH (ref 3.8–10.5)
WBC # FLD AUTO: 10.79 K/UL — HIGH (ref 3.8–10.5)

## 2021-09-24 PROCEDURE — 73620 X-RAY EXAM OF FOOT: CPT | Mod: 26,LT

## 2021-09-24 RX ORDER — FLUTICASONE PROPIONATE 50 MCG
1 SPRAY, SUSPENSION NASAL
Refills: 0 | Status: DISCONTINUED | OUTPATIENT
Start: 2021-09-24 | End: 2021-09-25

## 2021-09-24 RX ORDER — CLONAZEPAM 1 MG
1 TABLET ORAL
Qty: 0 | Refills: 0 | DISCHARGE
Start: 2021-09-24

## 2021-09-24 RX ORDER — ASCORBIC ACID 60 MG
1 TABLET,CHEWABLE ORAL
Qty: 0 | Refills: 0 | DISCHARGE
Start: 2021-09-24

## 2021-09-24 RX ORDER — ATORVASTATIN CALCIUM 80 MG/1
1 TABLET, FILM COATED ORAL
Qty: 30 | Refills: 0
Start: 2021-09-24 | End: 2021-10-23

## 2021-09-24 RX ORDER — BACLOFEN 100 %
1 POWDER (GRAM) MISCELLANEOUS
Qty: 0 | Refills: 0 | DISCHARGE
Start: 2021-09-24

## 2021-09-24 RX ORDER — LACTOBACILLUS ACIDOPHILUS 100MM CELL
1 CAPSULE ORAL
Qty: 0 | Refills: 0 | DISCHARGE
Start: 2021-09-24

## 2021-09-24 RX ORDER — ACETAMINOPHEN 500 MG
650 TABLET ORAL ONCE
Refills: 0 | Status: COMPLETED | OUTPATIENT
Start: 2021-09-24 | End: 2021-09-24

## 2021-09-24 RX ORDER — POLYETHYLENE GLYCOL 3350 17 G/17G
17 POWDER, FOR SOLUTION ORAL
Qty: 0 | Refills: 0 | DISCHARGE
Start: 2021-09-24

## 2021-09-24 RX ORDER — AMLODIPINE BESYLATE 2.5 MG/1
1 TABLET ORAL
Qty: 30 | Refills: 0
Start: 2021-09-24 | End: 2021-10-23

## 2021-09-24 RX ORDER — LORATADINE 10 MG/1
10 TABLET ORAL DAILY
Refills: 0 | Status: DISCONTINUED | OUTPATIENT
Start: 2021-09-24 | End: 2021-09-25

## 2021-09-24 RX ADMIN — SENNA PLUS 2 TABLET(S): 8.6 TABLET ORAL at 21:35

## 2021-09-24 RX ADMIN — Medication 20 MILLIGRAM(S): at 18:48

## 2021-09-24 RX ADMIN — Medication 650 MILLIGRAM(S): at 16:06

## 2021-09-24 RX ADMIN — Medication 20 MILLIGRAM(S): at 12:02

## 2021-09-24 RX ADMIN — POLYETHYLENE GLYCOL 3350 17 GRAM(S): 17 POWDER, FOR SOLUTION ORAL at 05:08

## 2021-09-24 RX ADMIN — Medication 1 SPRAY(S): at 18:46

## 2021-09-24 RX ADMIN — Medication 1 MILLIGRAM(S): at 05:07

## 2021-09-24 RX ADMIN — Medication 500 MILLIGRAM(S): at 12:02

## 2021-09-24 RX ADMIN — Medication 1 MILLIGRAM(S): at 18:52

## 2021-09-24 RX ADMIN — Medication 20 MILLIGRAM(S): at 01:37

## 2021-09-24 RX ADMIN — Medication 81 MILLIGRAM(S): at 12:03

## 2021-09-24 RX ADMIN — ATORVASTATIN CALCIUM 20 MILLIGRAM(S): 80 TABLET, FILM COATED ORAL at 21:35

## 2021-09-24 RX ADMIN — LORATADINE 10 MILLIGRAM(S): 10 TABLET ORAL at 06:24

## 2021-09-24 RX ADMIN — Medication 20 MILLIGRAM(S): at 05:07

## 2021-09-24 RX ADMIN — HEPARIN SODIUM 5000 UNIT(S): 5000 INJECTION INTRAVENOUS; SUBCUTANEOUS at 05:06

## 2021-09-24 RX ADMIN — ATENOLOL 50 MILLIGRAM(S): 25 TABLET ORAL at 05:07

## 2021-09-24 RX ADMIN — Medication 1 SPRAY(S): at 05:08

## 2021-09-24 RX ADMIN — PANTOPRAZOLE SODIUM 40 MILLIGRAM(S): 20 TABLET, DELAYED RELEASE ORAL at 05:09

## 2021-09-24 RX ADMIN — POLYETHYLENE GLYCOL 3350 17 GRAM(S): 17 POWDER, FOR SOLUTION ORAL at 18:47

## 2021-09-24 RX ADMIN — Medication 1 TABLET(S): at 18:48

## 2021-09-24 RX ADMIN — HEPARIN SODIUM 5000 UNIT(S): 5000 INJECTION INTRAVENOUS; SUBCUTANEOUS at 18:47

## 2021-09-24 RX ADMIN — Medication 1 SPRAY(S): at 01:37

## 2021-09-24 RX ADMIN — Medication 650 MILLIGRAM(S): at 15:20

## 2021-09-24 RX ADMIN — Medication 1 TABLET(S): at 05:07

## 2021-09-24 RX ADMIN — AMLODIPINE BESYLATE 10 MILLIGRAM(S): 2.5 TABLET ORAL at 05:07

## 2021-09-24 NOTE — PROGRESS NOTE ADULT - ASSESSMENT
Pt is 80yo w/ LF pressure related wounds  -pt seen and examined  -Afebrile, WBC 10.82  -Extensive LLE eschars and wounds. Left posterior heel deep tissue injury, submet 4 and 5 ecchymosis possible secondary due to pressure, no open wounds on left foot, no malodor, no cellulitis of foot.   -LF xray: no gas, no OM   -Please apply betadine to left foot lateral plantar aspect and DSD.  -Stable for discharge from podiatry standpoint   -F/u instructions and information can be found in the f/u section of discharge provider note  -Seen w/ attending
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
## 2021-09-24 NOTE — PHYSICAL THERAPY INITIAL EVALUATION ADULT - ADDITIONAL COMMENTS
Pt lives with spouse; bedbound/wheelchair bound prior to admission; pt owns hospital bed and motorized wheelchair that has 8 positions, 1 on which is flat. Pt has [facility]HA 12h/day and  assists as needed. Pt transfers from bed<>chair via "sliding".

## 2021-09-24 NOTE — PHYSICAL THERAPY INITIAL EVALUATION ADULT - ACTIVE RANGE OF MOTION EXAMINATION, REHAB EVAL
decreased extension to R digits/Right UE Active ROM was WFL (within functional limits)/deficits as listed below
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
## 2021-09-24 NOTE — PHYSICAL THERAPY INITIAL EVALUATION ADULT - PASSIVE RANGE OF MOTION EXAMINATION, REHAB EVAL
decreased PROM to L digits/Left UE Passive ROM was WFL (within functional limits)/deficits as listed below

## 2021-09-24 NOTE — PHYSICAL THERAPY INITIAL EVALUATION ADULT - GENERAL OBSERVATIONS, REHAB EVAL
pt received semi-supine in bed in NAD +R AKA, wounds to LLE, gauze wrap to L foot, +fragoso,  at bedside

## 2021-09-24 NOTE — PROGRESS NOTE ADULT - ASSESSMENT
Impression:    Left lower leg arterial ulcers  Stage 4 healing pressure injury Sacrum  LLE PAD    Recommend:  1.) topical therapy: Left lower leg wounds - cleanse with NS, pat dry, apply medihoney, cover with adaptic, then DSD, secure with loosely wrapped yelena daily  Sacral wound - cleanse with incontinence cleanser, pat dry, apply allevyn foam dressing every other day  2.) Consider Vascular surgery, Ortho, Podiatry input noted  3.) Maintain on an alternating air with low air loss surface  4.) Offload Left heel with complete cair air fluidized boot  5.) Chair cushion for chair sitting  6.) Nutrition optimization  7.) Turn and reposition Q2 hours    Care as per medicine will follow w/ you  Upon discharge f/[facility] outpatient at [facility] [address] [place] [address] [phone]  Discussed with ACP and clinical nurse  Thank you for this consult  Maria Victoria Case, MUNIRA-C, CWOCN [id number]
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
## 2021-09-24 NOTE — CONSULT NOTE ADULT - REASON FOR ADMISSION
Chronic left LE wound infection.
Worsening left LE wound infection.
Chronic left LE wound infection.
Chronic left LE wound infection.
worsening Left leg wound

## 2021-09-25 ENCOUNTER — TRANSCRIPTION ENCOUNTER (OUTPATIENT)
Age: 81
End: 2021-09-25

## 2021-09-25 VITALS
RESPIRATION RATE: 18 BRPM | OXYGEN SATURATION: 94 % | SYSTOLIC BLOOD PRESSURE: 104 MMHG | TEMPERATURE: 99 F | HEART RATE: 79 BPM | DIASTOLIC BLOOD PRESSURE: 68 MMHG

## 2021-09-25 LAB
ANION GAP SERPL CALC-SCNC: 11 MMOL/L — SIGNIFICANT CHANGE UP (ref 5–17)
BUN SERPL-MCNC: 20 MG/DL — SIGNIFICANT CHANGE UP (ref 7–23)
CALCIUM SERPL-MCNC: 9.6 MG/DL — SIGNIFICANT CHANGE UP (ref 8.4–10.5)
CHLORIDE SERPL-SCNC: 100 MMOL/L — SIGNIFICANT CHANGE UP (ref 96–108)
CO2 SERPL-SCNC: 26 MMOL/L — SIGNIFICANT CHANGE UP (ref 22–31)
CREAT SERPL-MCNC: 0.3 MG/DL — LOW (ref 0.5–1.3)
GLUCOSE SERPL-MCNC: 93 MG/DL — SIGNIFICANT CHANGE UP (ref 70–99)
MAGNESIUM SERPL-MCNC: 2.1 MG/DL — SIGNIFICANT CHANGE UP (ref 1.6–2.6)
PHOSPHATE SERPL-MCNC: 2.7 MG/DL — SIGNIFICANT CHANGE UP (ref 2.5–4.5)
POTASSIUM SERPL-MCNC: 3.6 MMOL/L — SIGNIFICANT CHANGE UP (ref 3.5–5.3)
POTASSIUM SERPL-SCNC: 3.6 MMOL/L — SIGNIFICANT CHANGE UP (ref 3.5–5.3)
SODIUM SERPL-SCNC: 137 MMOL/L — SIGNIFICANT CHANGE UP (ref 135–145)

## 2021-09-25 PROCEDURE — 82330 ASSAY OF CALCIUM: CPT

## 2021-09-25 PROCEDURE — U0003: CPT

## 2021-09-25 PROCEDURE — 73590 X-RAY EXAM OF LOWER LEG: CPT

## 2021-09-25 PROCEDURE — 73560 X-RAY EXAM OF KNEE 1 OR 2: CPT

## 2021-09-25 PROCEDURE — 87077 CULTURE AEROBIC IDENTIFY: CPT

## 2021-09-25 PROCEDURE — 85014 HEMATOCRIT: CPT

## 2021-09-25 PROCEDURE — 73552 X-RAY EXAM OF FEMUR 2/>: CPT

## 2021-09-25 PROCEDURE — 87086 URINE CULTURE/COLONY COUNT: CPT

## 2021-09-25 PROCEDURE — 85027 COMPLETE CBC AUTOMATED: CPT

## 2021-09-25 PROCEDURE — 85018 HEMOGLOBIN: CPT

## 2021-09-25 PROCEDURE — 97161 PT EVAL LOW COMPLEX 20 MIN: CPT

## 2021-09-25 PROCEDURE — 93005 ELECTROCARDIOGRAM TRACING: CPT

## 2021-09-25 PROCEDURE — 85025 COMPLETE CBC W/AUTO DIFF WBC: CPT

## 2021-09-25 PROCEDURE — 99285 EMERGENCY DEPT VISIT HI MDM: CPT

## 2021-09-25 PROCEDURE — 82435 ASSAY OF BLOOD CHLORIDE: CPT

## 2021-09-25 PROCEDURE — 81001 URINALYSIS AUTO W/SCOPE: CPT

## 2021-09-25 PROCEDURE — 84295 ASSAY OF SERUM SODIUM: CPT

## 2021-09-25 PROCEDURE — 84484 ASSAY OF TROPONIN QUANT: CPT

## 2021-09-25 PROCEDURE — 96374 THER/PROPH/DIAG INJ IV PUSH: CPT

## 2021-09-25 PROCEDURE — 87186 SC STD MICRODIL/AGAR DIL: CPT

## 2021-09-25 PROCEDURE — 84100 ASSAY OF PHOSPHORUS: CPT

## 2021-09-25 PROCEDURE — 86769 SARS-COV-2 COVID-19 ANTIBODY: CPT

## 2021-09-25 PROCEDURE — 80048 BASIC METABOLIC PNL TOTAL CA: CPT

## 2021-09-25 PROCEDURE — 80053 COMPREHEN METABOLIC PANEL: CPT

## 2021-09-25 PROCEDURE — 82803 BLOOD GASES ANY COMBINATION: CPT

## 2021-09-25 PROCEDURE — 73620 X-RAY EXAM OF FOOT: CPT

## 2021-09-25 PROCEDURE — 93923 UPR/LXTR ART STDY 3+ LVLS: CPT

## 2021-09-25 PROCEDURE — 83605 ASSAY OF LACTIC ACID: CPT

## 2021-09-25 PROCEDURE — 82947 ASSAY GLUCOSE BLOOD QUANT: CPT

## 2021-09-25 PROCEDURE — 96375 TX/PRO/DX INJ NEW DRUG ADDON: CPT

## 2021-09-25 PROCEDURE — 94640 AIRWAY INHALATION TREATMENT: CPT

## 2021-09-25 PROCEDURE — U0005: CPT

## 2021-09-25 PROCEDURE — 83735 ASSAY OF MAGNESIUM: CPT

## 2021-09-25 PROCEDURE — 84132 ASSAY OF SERUM POTASSIUM: CPT

## 2021-09-25 RX ORDER — SOLIFENACIN SUCCINATE 10 MG/1
1 TABLET ORAL
Qty: 0 | Refills: 0 | DISCHARGE

## 2021-09-25 RX ORDER — CLONAZEPAM 1 MG
1 TABLET ORAL
Qty: 0 | Refills: 0 | DISCHARGE

## 2021-09-25 RX ORDER — OXYBUTYNIN CHLORIDE 5 MG
1 TABLET ORAL
Qty: 0 | Refills: 0 | DISCHARGE

## 2021-09-25 RX ORDER — BACLOFEN 100 %
0 POWDER (GRAM) MISCELLANEOUS
Qty: 0 | Refills: 0 | DISCHARGE

## 2021-09-25 RX ORDER — METHENAMINE, SODIUM PHOSPHATE, MONOBASIC, MONOHYDRATE, PHENYL SALICYLATE, METHYLENE BLUE, AND HYOSCYAMINE SULFATE 120; 40.8; 36; 10; .12 MG/1; MG/1; MG/1; MG/1; MG/1
0 CAPSULE ORAL
Qty: 0 | Refills: 0 | DISCHARGE

## 2021-09-25 RX ADMIN — Medication 81 MILLIGRAM(S): at 12:12

## 2021-09-25 RX ADMIN — Medication 1 TABLET(S): at 05:28

## 2021-09-25 RX ADMIN — Medication 1 MILLIGRAM(S): at 05:32

## 2021-09-25 RX ADMIN — Medication 20 MILLIGRAM(S): at 00:25

## 2021-09-25 RX ADMIN — Medication 20 MILLIGRAM(S): at 05:28

## 2021-09-25 RX ADMIN — POLYETHYLENE GLYCOL 3350 17 GRAM(S): 17 POWDER, FOR SOLUTION ORAL at 05:27

## 2021-09-25 RX ADMIN — Medication 1 SPRAY(S): at 05:27

## 2021-09-25 RX ADMIN — Medication 500 MILLIGRAM(S): at 12:12

## 2021-09-25 RX ADMIN — PANTOPRAZOLE SODIUM 40 MILLIGRAM(S): 20 TABLET, DELAYED RELEASE ORAL at 05:29

## 2021-09-25 RX ADMIN — AMLODIPINE BESYLATE 10 MILLIGRAM(S): 2.5 TABLET ORAL at 05:28

## 2021-09-25 RX ADMIN — LORATADINE 10 MILLIGRAM(S): 10 TABLET ORAL at 05:27

## 2021-09-25 RX ADMIN — ATENOLOL 50 MILLIGRAM(S): 25 TABLET ORAL at 05:28

## 2021-09-25 RX ADMIN — Medication 20 MILLIGRAM(S): at 12:12

## 2021-09-25 RX ADMIN — HEPARIN SODIUM 5000 UNIT(S): 5000 INJECTION INTRAVENOUS; SUBCUTANEOUS at 05:27
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
## 2021-09-25 NOTE — DISCHARGE NOTE NURSING/CASE MANAGEMENT/SOCIAL WORK - PATIENT PORTAL LINK FT
You can access the FollowMyHealth Patient Portal offered by [facility] by registering at the following website: [email or web address]. By joining [facility]’s FollowMyHealth portal, you will also be able to view your health information using other applications (apps) compatible with our system.

## 2021-09-25 NOTE — PROGRESS NOTE ADULT - REASON FOR ADMISSION
Chronic left LE wound infection.

## 2021-09-25 NOTE — PROGRESS NOTE ADULT - SUBJECTIVE AND OBJECTIVE BOX
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 proximal phalanx shaft with lateral curving deformity of the 4th toe. Slightly tapered appearing distal 5th metatarsal.    No dislocations or acute appearing fractures.    Tarsometatarsal alignment maintained without evidence for a Lisfranc injury.    Preserved remaining joint spaces and no joint margin erosions.    Redemonstrated generalized profound osteopenia otherwise no discrete lytic or blastic lesions.    Scant scattered vascular calcifications.    --- End of Report ---                ERON HOPKINS MD; Attending Radiologist  This document has been electronically signed. Sep 24 2021 10:49AM    < end of copied text >

## 2021-09-26 LAB
-  AMIKACIN: SIGNIFICANT CHANGE UP
-  AZTREONAM: SIGNIFICANT CHANGE UP
-  CEFEPIME: SIGNIFICANT CHANGE UP
-  CEFTAZIDIME: SIGNIFICANT CHANGE UP
-  CIPROFLOXACIN: SIGNIFICANT CHANGE UP
-  GENTAMICIN: SIGNIFICANT CHANGE UP
-  IMIPENEM: SIGNIFICANT CHANGE UP
-  LEVOFLOXACIN: SIGNIFICANT CHANGE UP
-  MEROPENEM: SIGNIFICANT CHANGE UP
-  PIPERACILLIN/TAZOBACTAM: SIGNIFICANT CHANGE UP
-  TOBRAMYCIN: SIGNIFICANT CHANGE UP
METHOD TYPE: SIGNIFICANT CHANGE UP

## 2021-09-29 ENCOUNTER — APPOINTMENT (OUTPATIENT)
Dept: WOUND CARE | Facility: CLINIC | Age: 81
End: 2021-09-29
Payer: MEDICARE

## 2021-09-29 DIAGNOSIS — S81.802A UNSPECIFIED OPEN WOUND, LEFT LOWER LEG, INITIAL ENCOUNTER: ICD-10-CM

## 2021-09-29 DIAGNOSIS — L89.154 PRESSURE ULCER OF SACRAL REGION, STAGE 4: ICD-10-CM

## 2021-09-29 LAB
-  AMIKACIN: SIGNIFICANT CHANGE UP
-  AMOXICILLIN/CLAVULANIC ACID: SIGNIFICANT CHANGE UP
-  AMPICILLIN/SULBACTAM: SIGNIFICANT CHANGE UP
-  AMPICILLIN: SIGNIFICANT CHANGE UP
-  AZTREONAM: SIGNIFICANT CHANGE UP
-  CEFAZOLIN: SIGNIFICANT CHANGE UP
-  CEFEPIME: SIGNIFICANT CHANGE UP
-  CEFOXITIN: SIGNIFICANT CHANGE UP
-  CEFTRIAXONE: SIGNIFICANT CHANGE UP
-  CIPROFLOXACIN: SIGNIFICANT CHANGE UP
-  ERTAPENEM: SIGNIFICANT CHANGE UP
-  GENTAMICIN: SIGNIFICANT CHANGE UP
-  IMIPENEM: SIGNIFICANT CHANGE UP
-  LEVOFLOXACIN: SIGNIFICANT CHANGE UP
-  MEROPENEM: SIGNIFICANT CHANGE UP
-  NITROFURANTOIN: SIGNIFICANT CHANGE UP
-  PIPERACILLIN/TAZOBACTAM: SIGNIFICANT CHANGE UP
-  TIGECYCLINE: SIGNIFICANT CHANGE UP
-  TOBRAMYCIN: SIGNIFICANT CHANGE UP
-  TRIMETHOPRIM/SULFAMETHOXAZOLE: SIGNIFICANT CHANGE UP
CULTURE RESULTS: SIGNIFICANT CHANGE UP
METHOD TYPE: SIGNIFICANT CHANGE UP
ORGANISM # SPEC MICROSCOPIC CNT: SIGNIFICANT CHANGE UP
SPECIMEN SOURCE: SIGNIFICANT CHANGE UP

## 2021-09-29 PROCEDURE — 99442: CPT | Mod: 95

## 2021-09-29 NOTE — HISTORY OF PRESENT ILLNESS
[Home] : at home, [unfilled] , at the time of the visit. [Medical Office: ([facility])___] : at the medical office located in  [Spouse] : spouse [Formal Caregiver] : formal caregiver [FreeTextEntry3] : Mr nicholas  [FreeTextEntry1] : was in hospital X 2 days last week\par Declined rec for left AKA- aware of ischemia\par  unable to perform Telehealth today\par Has a VN who dress left leg with Honey and sacrum with foam\par Rx sent to VN \par aware of my upcoming absences\par No fever reported \par Encouraged to make in office appointment\par No photos available\par Reports use of foam on sacral decub- has declined evaluation in hospital\par

## 2021-09-29 NOTE — REASON FOR VISIT
[Follow-Up: _____] : a [unfilled] follow-up visit [Spouse] : spouse [FreeTextEntry1] : left leg wounds , sacrum

## 2021-10-15 ENCOUNTER — APPOINTMENT (OUTPATIENT)
Dept: WOUND CARE | Facility: CLINIC | Age: 81
End: 2021-10-15
Payer: MEDICARE

## 2021-10-15 VITALS — TEMPERATURE: 97.5 F

## 2021-10-15 DIAGNOSIS — L98.499 STRICTURE OF ARTERY: ICD-10-CM

## 2021-10-15 DIAGNOSIS — I77.1 STRICTURE OF ARTERY: ICD-10-CM

## 2021-10-15 DIAGNOSIS — L97.923: ICD-10-CM

## 2021-10-15 PROCEDURE — 99213 OFFICE O/P EST LOW 20 MIN: CPT

## 2021-10-18 ENCOUNTER — NON-APPOINTMENT (OUTPATIENT)
Age: 81
End: 2021-10-18

## 2021-10-19 ENCOUNTER — INPATIENT (INPATIENT)
Facility: HOSPITAL | Age: 81
LOS: 19 days | Discharge: HOME HEALTH | DRG: 239 | End: 2021-11-08
Attending: SURGERY | Admitting: SURGERY
Payer: MEDICARE

## 2021-10-19 VITALS
RESPIRATION RATE: 18 BRPM | OXYGEN SATURATION: 95 % | WEIGHT: 139.99 LBS | DIASTOLIC BLOOD PRESSURE: 64 MMHG | HEART RATE: 70 BPM | TEMPERATURE: 98 F | SYSTOLIC BLOOD PRESSURE: 120 MMHG | HEIGHT: 62 IN

## 2021-10-19 DIAGNOSIS — T14.8XXA OTHER INJURY OF UNSPECIFIED BODY REGION, INITIAL ENCOUNTER: ICD-10-CM

## 2021-10-19 DIAGNOSIS — Z90.710 ACQUIRED ABSENCE OF BOTH CERVIX AND UTERUS: Chronic | ICD-10-CM

## 2021-10-19 DIAGNOSIS — Z98.890 OTHER SPECIFIED POSTPROCEDURAL STATES: Chronic | ICD-10-CM

## 2021-10-19 DIAGNOSIS — Z96.7 PRESENCE OF OTHER BONE AND TENDON IMPLANTS: Chronic | ICD-10-CM

## 2021-10-19 LAB
ALBUMIN SERPL ELPH-MCNC: 3.3 G/DL — SIGNIFICANT CHANGE UP (ref 3.3–5)
ALP SERPL-CCNC: 274 U/L — HIGH (ref 40–120)
ALT FLD-CCNC: 37 U/L — SIGNIFICANT CHANGE UP (ref 10–45)
ANION GAP SERPL CALC-SCNC: 15 MMOL/L — SIGNIFICANT CHANGE UP (ref 5–17)
APPEARANCE UR: ABNORMAL
APTT BLD: 29.7 SEC — SIGNIFICANT CHANGE UP (ref 27.5–35.5)
AST SERPL-CCNC: 39 U/L — SIGNIFICANT CHANGE UP (ref 10–40)
BACTERIA # UR AUTO: ABNORMAL
BASE EXCESS BLDV CALC-SCNC: 16.2 MMOL/L — HIGH (ref -2–2)
BASOPHILS # BLD AUTO: 0.04 K/UL — SIGNIFICANT CHANGE UP (ref 0–0.2)
BASOPHILS NFR BLD AUTO: 0.4 % — SIGNIFICANT CHANGE UP (ref 0–2)
BILIRUB SERPL-MCNC: 0.3 MG/DL — SIGNIFICANT CHANGE UP (ref 0.2–1.2)
BILIRUB UR-MCNC: NEGATIVE — SIGNIFICANT CHANGE UP
BLD GP AB SCN SERPL QL: NEGATIVE — SIGNIFICANT CHANGE UP
BUN SERPL-MCNC: 33 MG/DL — HIGH (ref 7–23)
CA-I SERPL-SCNC: 1.18 MMOL/L — SIGNIFICANT CHANGE UP (ref 1.15–1.33)
CALCIUM SERPL-MCNC: 9.5 MG/DL — SIGNIFICANT CHANGE UP (ref 8.4–10.5)
CHLORIDE BLDV-SCNC: 93 MMOL/L — LOW (ref 96–108)
CHLORIDE SERPL-SCNC: 91 MMOL/L — LOW (ref 96–108)
CO2 BLDV-SCNC: 45 MMOL/L — HIGH (ref 22–26)
CO2 SERPL-SCNC: 33 MMOL/L — HIGH (ref 22–31)
COLOR SPEC: ABNORMAL
CREAT SERPL-MCNC: <0.3 MG/DL — LOW (ref 0.5–1.3)
CRP SERPL-MCNC: 88 MG/L — HIGH (ref 0–4)
DIFF PNL FLD: ABNORMAL
EOSINOPHIL # BLD AUTO: 0.15 K/UL — SIGNIFICANT CHANGE UP (ref 0–0.5)
EOSINOPHIL NFR BLD AUTO: 1.4 % — SIGNIFICANT CHANGE UP (ref 0–6)
EPI CELLS # UR: 4 /HPF — SIGNIFICANT CHANGE UP
ERYTHROCYTE [SEDIMENTATION RATE] IN BLOOD: 120 MM/HR — HIGH (ref 0–20)
GAS PNL BLDV: 135 MMOL/L — LOW (ref 136–145)
GAS PNL BLDV: SIGNIFICANT CHANGE UP
GAS PNL BLDV: SIGNIFICANT CHANGE UP
GLUCOSE BLDV-MCNC: 96 MG/DL — SIGNIFICANT CHANGE UP (ref 70–99)
GLUCOSE SERPL-MCNC: 94 MG/DL — SIGNIFICANT CHANGE UP (ref 70–99)
GLUCOSE UR QL: NEGATIVE — SIGNIFICANT CHANGE UP
HCO3 BLDV-SCNC: 43 MMOL/L — HIGH (ref 22–29)
HCT VFR BLD CALC: 37.2 % — SIGNIFICANT CHANGE UP (ref 34.5–45)
HCT VFR BLDA CALC: 37 % — SIGNIFICANT CHANGE UP (ref 34.5–46.5)
HGB BLD CALC-MCNC: 12.3 G/DL — SIGNIFICANT CHANGE UP (ref 11.7–16.1)
HGB BLD-MCNC: 11.6 G/DL — SIGNIFICANT CHANGE UP (ref 11.5–15.5)
HYALINE CASTS # UR AUTO: 3 /LPF — HIGH (ref 0–2)
IMM GRANULOCYTES NFR BLD AUTO: 0.7 % — SIGNIFICANT CHANGE UP (ref 0–1.5)
INR BLD: 1.11 RATIO — SIGNIFICANT CHANGE UP (ref 0.88–1.16)
KETONES UR-MCNC: NEGATIVE — SIGNIFICANT CHANGE UP
LACTATE BLDV-MCNC: 2.3 MMOL/L — HIGH (ref 0.7–2)
LEUKOCYTE ESTERASE UR-ACNC: ABNORMAL
LYMPHOCYTES # BLD AUTO: 1.63 K/UL — SIGNIFICANT CHANGE UP (ref 1–3.3)
LYMPHOCYTES # BLD AUTO: 15.6 % — SIGNIFICANT CHANGE UP (ref 13–44)
MCHC RBC-ENTMCNC: 28.3 PG — SIGNIFICANT CHANGE UP (ref 27–34)
MCHC RBC-ENTMCNC: 31.2 GM/DL — LOW (ref 32–36)
MCV RBC AUTO: 90.7 FL — SIGNIFICANT CHANGE UP (ref 80–100)
MONOCYTES # BLD AUTO: 1.02 K/UL — HIGH (ref 0–0.9)
MONOCYTES NFR BLD AUTO: 9.8 % — SIGNIFICANT CHANGE UP (ref 2–14)
NEUTROPHILS # BLD AUTO: 7.52 K/UL — HIGH (ref 1.8–7.4)
NEUTROPHILS NFR BLD AUTO: 72.1 % — SIGNIFICANT CHANGE UP (ref 43–77)
NITRITE UR-MCNC: NEGATIVE — SIGNIFICANT CHANGE UP
NRBC # BLD: 0 /100 WBCS — SIGNIFICANT CHANGE UP (ref 0–0)
PCO2 BLDV: 62 MMHG — HIGH (ref 39–42)
PH BLDV: 7.45 — HIGH (ref 7.32–7.43)
PH UR: 7.5 — SIGNIFICANT CHANGE UP (ref 5–8)
PLATELET # BLD AUTO: 339 K/UL — SIGNIFICANT CHANGE UP (ref 150–400)
PO2 BLDV: 24 MMHG — LOW (ref 25–45)
POTASSIUM BLDV-SCNC: 3.1 MMOL/L — LOW (ref 3.5–5.1)
POTASSIUM SERPL-MCNC: 2.6 MMOL/L — CRITICAL LOW (ref 3.5–5.3)
POTASSIUM SERPL-SCNC: 2.6 MMOL/L — CRITICAL LOW (ref 3.5–5.3)
PROT SERPL-MCNC: 7.5 G/DL — SIGNIFICANT CHANGE UP (ref 6–8.3)
PROT UR-MCNC: ABNORMAL
PROTHROM AB SERPL-ACNC: 13.2 SEC — SIGNIFICANT CHANGE UP (ref 10.6–13.6)
RBC # BLD: 4.1 M/UL — SIGNIFICANT CHANGE UP (ref 3.8–5.2)
RBC # FLD: 15.3 % — HIGH (ref 10.3–14.5)
RBC CASTS # UR COMP ASSIST: 26 /HPF — HIGH (ref 0–4)
RH IG SCN BLD-IMP: POSITIVE — SIGNIFICANT CHANGE UP
SAO2 % BLDV: 31.6 % — LOW (ref 67–88)
SARS-COV-2 RNA SPEC QL NAA+PROBE: SIGNIFICANT CHANGE UP
SODIUM SERPL-SCNC: 139 MMOL/L — SIGNIFICANT CHANGE UP (ref 135–145)
SP GR SPEC: 1.02 — SIGNIFICANT CHANGE UP (ref 1.01–1.02)
TRI-PHOS CRY UR QL COMP ASSIST: ABNORMAL
UROBILINOGEN FLD QL: ABNORMAL
WBC # BLD: 10.43 K/UL — SIGNIFICANT CHANGE UP (ref 3.8–10.5)
WBC # FLD AUTO: 10.43 K/UL — SIGNIFICANT CHANGE UP (ref 3.8–10.5)
WBC UR QL: 238 /HPF — HIGH (ref 0–5)

## 2021-10-19 PROCEDURE — 99285 EMERGENCY DEPT VISIT HI MDM: CPT | Mod: GC

## 2021-10-19 PROCEDURE — 73610 X-RAY EXAM OF ANKLE: CPT | Mod: 26,LT

## 2021-10-19 PROCEDURE — 73590 X-RAY EXAM OF LOWER LEG: CPT | Mod: 26,LT

## 2021-10-19 PROCEDURE — 99222 1ST HOSP IP/OBS MODERATE 55: CPT | Mod: 57

## 2021-10-19 RX ORDER — ATENOLOL 25 MG/1
50 TABLET ORAL DAILY
Refills: 0 | Status: DISCONTINUED | OUTPATIENT
Start: 2021-10-19 | End: 2021-10-19

## 2021-10-19 RX ORDER — POTASSIUM CHLORIDE 20 MEQ
40 PACKET (EA) ORAL ONCE
Refills: 0 | Status: COMPLETED | OUTPATIENT
Start: 2021-10-19 | End: 2021-10-19

## 2021-10-19 RX ORDER — VANCOMYCIN HCL 1 G
1000 VIAL (EA) INTRAVENOUS ONCE
Refills: 0 | Status: DISCONTINUED | OUTPATIENT
Start: 2021-10-19 | End: 2021-10-19

## 2021-10-19 RX ORDER — TRAMADOL HYDROCHLORIDE 50 MG/1
25 TABLET ORAL
Refills: 0 | Status: DISCONTINUED | OUTPATIENT
Start: 2021-10-19 | End: 2021-10-21

## 2021-10-19 RX ORDER — BACLOFEN 100 %
10 POWDER (GRAM) MISCELLANEOUS
Refills: 0 | Status: DISCONTINUED | OUTPATIENT
Start: 2021-10-19 | End: 2021-10-21

## 2021-10-19 RX ORDER — INFLUENZA VIRUS VACCINE 15; 15; 15; 15 UG/.5ML; UG/.5ML; UG/.5ML; UG/.5ML
0.5 SUSPENSION INTRAMUSCULAR ONCE
Refills: 0 | Status: COMPLETED | OUTPATIENT
Start: 2021-10-19 | End: 2021-10-26

## 2021-10-19 RX ORDER — ACETAMINOPHEN 500 MG
650 TABLET ORAL EVERY 6 HOURS
Refills: 0 | Status: DISCONTINUED | OUTPATIENT
Start: 2021-10-19 | End: 2021-10-21

## 2021-10-19 RX ORDER — ATENOLOL 25 MG/1
50 TABLET ORAL EVERY 24 HOURS
Refills: 0 | Status: DISCONTINUED | OUTPATIENT
Start: 2021-10-19 | End: 2021-10-21

## 2021-10-19 RX ORDER — MORPHINE SULFATE 50 MG/1
4 CAPSULE, EXTENDED RELEASE ORAL ONCE
Refills: 0 | Status: DISCONTINUED | OUTPATIENT
Start: 2021-10-19 | End: 2021-10-19

## 2021-10-19 RX ORDER — TRAMADOL HYDROCHLORIDE 50 MG/1
25 TABLET ORAL
Refills: 0 | Status: DISCONTINUED | OUTPATIENT
Start: 2021-10-19 | End: 2021-10-19

## 2021-10-19 RX ORDER — ATORVASTATIN CALCIUM 80 MG/1
20 TABLET, FILM COATED ORAL AT BEDTIME
Refills: 0 | Status: DISCONTINUED | OUTPATIENT
Start: 2021-10-19 | End: 2021-10-21

## 2021-10-19 RX ORDER — VANCOMYCIN HCL 1 G
1000 VIAL (EA) INTRAVENOUS DAILY
Refills: 0 | Status: DISCONTINUED | OUTPATIENT
Start: 2021-10-19 | End: 2021-10-19

## 2021-10-19 RX ORDER — POTASSIUM CHLORIDE 20 MEQ
10 PACKET (EA) ORAL
Refills: 0 | Status: COMPLETED | OUTPATIENT
Start: 2021-10-19 | End: 2021-10-19

## 2021-10-19 RX ORDER — PIPERACILLIN AND TAZOBACTAM 4; .5 G/20ML; G/20ML
3.38 INJECTION, POWDER, LYOPHILIZED, FOR SOLUTION INTRAVENOUS ONCE
Refills: 0 | Status: COMPLETED | OUTPATIENT
Start: 2021-10-19 | End: 2021-10-19

## 2021-10-19 RX ORDER — BACLOFEN 100 %
20 POWDER (GRAM) MISCELLANEOUS
Refills: 0 | Status: DISCONTINUED | OUTPATIENT
Start: 2021-10-19 | End: 2021-10-19

## 2021-10-19 RX ORDER — PIPERACILLIN AND TAZOBACTAM 4; .5 G/20ML; G/20ML
3.38 INJECTION, POWDER, LYOPHILIZED, FOR SOLUTION INTRAVENOUS EVERY 8 HOURS
Refills: 0 | Status: DISCONTINUED | OUTPATIENT
Start: 2021-10-19 | End: 2021-10-21

## 2021-10-19 RX ORDER — CLONAZEPAM 1 MG
1 TABLET ORAL
Refills: 0 | Status: DISCONTINUED | OUTPATIENT
Start: 2021-10-19 | End: 2021-10-21

## 2021-10-19 RX ORDER — ENOXAPARIN SODIUM 100 MG/ML
40 INJECTION SUBCUTANEOUS EVERY 24 HOURS
Refills: 0 | Status: DISCONTINUED | OUTPATIENT
Start: 2021-10-19 | End: 2021-10-21

## 2021-10-19 RX ORDER — OXYCODONE HYDROCHLORIDE 5 MG/1
10 TABLET ORAL EVERY 6 HOURS
Refills: 0 | Status: DISCONTINUED | OUTPATIENT
Start: 2021-10-19 | End: 2021-10-21

## 2021-10-19 RX ORDER — VANCOMYCIN HCL 1 G
1000 VIAL (EA) INTRAVENOUS EVERY 8 HOURS
Refills: 0 | Status: DISCONTINUED | OUTPATIENT
Start: 2021-10-19 | End: 2021-10-19

## 2021-10-19 RX ORDER — OXYCODONE HYDROCHLORIDE 5 MG/1
5 TABLET ORAL EVERY 4 HOURS
Refills: 0 | Status: DISCONTINUED | OUTPATIENT
Start: 2021-10-19 | End: 2021-10-21

## 2021-10-19 RX ORDER — POLYETHYLENE GLYCOL 3350 17 G/17G
17 POWDER, FOR SOLUTION ORAL DAILY
Refills: 0 | Status: DISCONTINUED | OUTPATIENT
Start: 2021-10-19 | End: 2021-10-21

## 2021-10-19 RX ORDER — HYDROMORPHONE HYDROCHLORIDE 2 MG/ML
0.25 INJECTION INTRAMUSCULAR; INTRAVENOUS; SUBCUTANEOUS EVERY 4 HOURS
Refills: 0 | Status: DISCONTINUED | OUTPATIENT
Start: 2021-10-19 | End: 2021-10-21

## 2021-10-19 RX ORDER — VANCOMYCIN HCL 1 G
1000 VIAL (EA) INTRAVENOUS DAILY
Refills: 0 | Status: DISCONTINUED | OUTPATIENT
Start: 2021-10-19 | End: 2021-10-21

## 2021-10-19 RX ORDER — VANCOMYCIN HCL 1 G
VIAL (EA) INTRAVENOUS
Refills: 0 | Status: DISCONTINUED | OUTPATIENT
Start: 2021-10-19 | End: 2021-10-19

## 2021-10-19 RX ORDER — VANCOMYCIN HCL 1 G
1000 VIAL (EA) INTRAVENOUS EVERY 12 HOURS
Refills: 0 | Status: DISCONTINUED | OUTPATIENT
Start: 2021-10-19 | End: 2021-10-19

## 2021-10-19 RX ORDER — HYDROMORPHONE HYDROCHLORIDE 2 MG/ML
1 INJECTION INTRAMUSCULAR; INTRAVENOUS; SUBCUTANEOUS EVERY 4 HOURS
Refills: 0 | Status: DISCONTINUED | OUTPATIENT
Start: 2021-10-19 | End: 2021-10-19

## 2021-10-19 RX ORDER — ASPIRIN/CALCIUM CARB/MAGNESIUM 324 MG
81 TABLET ORAL DAILY
Refills: 0 | Status: DISCONTINUED | OUTPATIENT
Start: 2021-10-19 | End: 2021-10-21

## 2021-10-19 RX ORDER — AMLODIPINE BESYLATE 2.5 MG/1
10 TABLET ORAL DAILY
Refills: 0 | Status: DISCONTINUED | OUTPATIENT
Start: 2021-10-19 | End: 2021-10-21

## 2021-10-19 RX ORDER — PANTOPRAZOLE SODIUM 20 MG/1
40 TABLET, DELAYED RELEASE ORAL
Refills: 0 | Status: DISCONTINUED | OUTPATIENT
Start: 2021-10-19 | End: 2021-10-21

## 2021-10-19 RX ADMIN — Medication 1 MILLIGRAM(S): at 18:16

## 2021-10-19 RX ADMIN — Medication 100 MILLIEQUIVALENT(S): at 15:56

## 2021-10-19 RX ADMIN — MORPHINE SULFATE 4 MILLIGRAM(S): 50 CAPSULE, EXTENDED RELEASE ORAL at 13:03

## 2021-10-19 RX ADMIN — Medication 100 MILLIEQUIVALENT(S): at 14:00

## 2021-10-19 RX ADMIN — Medication 10 MILLIGRAM(S): at 19:08

## 2021-10-19 RX ADMIN — MORPHINE SULFATE 4 MILLIGRAM(S): 50 CAPSULE, EXTENDED RELEASE ORAL at 12:00

## 2021-10-19 RX ADMIN — TRAMADOL HYDROCHLORIDE 25 MILLIGRAM(S): 50 TABLET ORAL at 19:08

## 2021-10-19 RX ADMIN — Medication 100 MILLIEQUIVALENT(S): at 17:05

## 2021-10-19 RX ADMIN — PIPERACILLIN AND TAZOBACTAM 200 GRAM(S): 4; .5 INJECTION, POWDER, LYOPHILIZED, FOR SOLUTION INTRAVENOUS at 13:08

## 2021-10-19 RX ADMIN — Medication 250 MILLIGRAM(S): at 23:09

## 2021-10-19 RX ADMIN — Medication 81 MILLIGRAM(S): at 13:12

## 2021-10-19 RX ADMIN — ATORVASTATIN CALCIUM 20 MILLIGRAM(S): 80 TABLET, FILM COATED ORAL at 23:09

## 2021-10-19 RX ADMIN — Medication 10 MILLIGRAM(S): at 23:09

## 2021-10-19 RX ADMIN — PIPERACILLIN AND TAZOBACTAM 25 GRAM(S): 4; .5 INJECTION, POWDER, LYOPHILIZED, FOR SOLUTION INTRAVENOUS at 18:16

## 2021-10-19 RX ADMIN — Medication 40 MILLIEQUIVALENT(S): at 23:10

## 2021-10-19 RX ADMIN — ATENOLOL 50 MILLIGRAM(S): 25 TABLET ORAL at 23:12

## 2021-10-19 RX ADMIN — ENOXAPARIN SODIUM 40 MILLIGRAM(S): 100 INJECTION SUBCUTANEOUS at 18:16
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
## 2021-10-19 NOTE — ED PROVIDER NOTE - NS ED ROS FT
CONSTITUTIONAL: No fevers, no chills, no lightheadedness, no dizziness  EYES: no visual changes, no eye pain  EARS: no ear drainage, no ear pain, no change in hearing  NOSE: no nasal congestion  MOUTH/THROAT: no sore throat  CV: No chest pain, no palpitations  RESP: No SOB, no cough  GI: No n/v/d, no abd pain  : no dysuria, no hematuria, no flank pain  MSK: see HPI   SKIN: see HPI   NEURO: no headache  PSYCHIATRIC: no known mental health issues

## 2021-10-19 NOTE — ED ADULT NURSE REASSESSMENT NOTE - NS ED NURSE REASSESS COMMENT FT1
potassium 1400 was administered on time, zebra did not complete order after I pressed complete for medication administration.
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
## 2021-10-19 NOTE — H&P ADULT - NSHPLABSRESULTS_GEN_ALL_CORE
Vital Signs Last 24 Hrs  T(C): 36.7 (19 Oct 2021 10:56), Max: 36.7 (19 Oct 2021 10:36)  T(F): 98 (19 Oct 2021 10:56), Max: 98.1 (19 Oct 2021 10:36)  HR: 72 (19 Oct 2021 10:57) (70 - 74)  BP: 125/77 (19 Oct 2021 10:57) (120/64 - 129/82)  BP(mean): --  RR: 17 (19 Oct 2021 10:57) (17 - 18)  SpO2: 96% (19 Oct 2021 10:57) (95% - 96%)    LABS:
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
## 2021-10-19 NOTE — H&P ADULT - HISTORY OF PRESENT ILLNESS
80 y/o female who presents to the ED with chronic Left lower leg wound present since 2018 after an ICU admission for sepsis. She has been followed in the [facility] wound care center by Mounika Smith/Rah/Kishan since 2019. Yesterday, she saw her wound care doctor who contacted Dr. Morris that the wound was getting worse. Presented to ED this morning to get leg evaluated. Denies nausea, vomiting, fever, chills, shortness of breath, rapid heart rate.

## 2021-10-19 NOTE — ED PROVIDER NOTE - CLINICAL SUMMARY MEDICAL DECISION MAKING FREE TEXT BOX
80 y/o F w/ multiple gangrenous ulcers and 2 toes with gangrene. Concern for 82 y/o F w/ multiple gangrenous ulcers and 2 toes with gangrene. Given examination, low suspicion for necrotizing fascitis. Concern for osteomyletis given chronic 82 y/o F w/ multiple gangrenous ulcers and 2 toes with gangrene. Given examination, low suspicion for necrotizing fascitis. Concern for osteomyelitis vs gangrene given chronic wound infection. Dr. Morris sees patient, will consult gen surgery for recs. Will order blood cultures, wound culture, ua +cx, cbc, cmp, esr, crp, covid pcr, xrays tib/fib 82 y/o F w/ multiple gangrenous ulcers and 2 toes with gangrene. Given examination, low suspicion for necrotizing fascitis. Concern for osteomyelitis vs gangrene given chronic wound infection. Dr. Morris sees patient, will consult gen surgery for recs. Will order blood cultures, wound culture, ua +cx, cbc, cmp, esr, crp, covid pcr, xrays tib/fib vascular cons admission ZR

## 2021-10-19 NOTE — H&P ADULT - NSHPPHYSICALEXAM_GEN_ALL_CORE
PHYSICAL EXAM:    Constitutional: NAD, lying in bed  Eyes: anicteric  ENMT: Normocephalic, atraumatic  Respiratory: nonlabored, equal chest rise  Cardiovascular: RRR  Gastrointestinal: soft, nontender, nondistended, no rebound/guarding, no organomegaly  Extremities: RLE AKA incision clean, dry, and intact. LLE with significant wound on lateral lower leg, heel. Malodorous. Sensation intact to approximately upper third of lower leg, necrotic 4/5 digits  Neurological: awake and alert

## 2021-10-19 NOTE — H&P ADULT - ASSESSMENT
80 y/o, wheelchair bound F with hx of MS, HTN, neurogenic bladder, PAD s/p RLE AKA for gangrenous wound and known LLE arterial disease presents to the ED with worsening of left lower leg wound. Being admitted for AKA planning    - Admit to Dr. Morris  - Plan for AKA this admission  - Will need clearance, reaching out to cardiologist Dr. Brewster  - [facility]    D/w attending  Vascular   [id number]   80 y/o, wheelchair bound F with hx of MS, HTN, neurogenic bladder, PAD s/p RLE AKA for gangrenous wound and known LLE arterial disease presents to the ED with worsening of left lower leg wound. Being admitted for AKA planning    - Admit to Dr. Morris  - Plan for AKA this admission  - Will need clearance, reaching out to cardiologist Dr. Hinson  - [facility]    D/w attending  Vascular   [id number]   82 y/o, wheelchair bound F with hx of MS, HTN, neurogenic bladder, PAD s/p RLE AKA for gangrenous wound and known LLE arterial disease presents to the ED with worsening of left lower leg wound. Being admitted for AKA planning    - Admit to Dr. Morris  - Plan for AKA this admission  - Will need clearance, reaching out to cardiologist Dr. Hinson/Charissa   - Home meds    D/w attending  Vascular   [id number]

## 2021-10-19 NOTE — ED PROVIDER NOTE - PHYSICAL EXAMINATION
General: Alert and Orientated x 3. No apparent distress.  Head: Normocephalic and atraumatic.  Eyes: PERRLA with EOMI.  Neck: Supple. Trachea midline.   Cardiac: Normal S1 and S2 w/ RRR. No murmurs appreciated.  Pulmonary: Vesicular breath sounds bilaterally. No increased WOB. No wheezes or crackles.  Abdominal: Soft, non-tender. (+) bowel sounds appreciated in all 4 quadrants. No hepatosplenomegaly.   Neurologic: No focal sensory or motor deficits.  Musculoskeletal: Strength appropriate in all 4 extremities for age with no limited ROM.  Skin: Color appropriate for race. Intact, warm, and well-perfused.  Lymphatic: No cervical or inguinal adenopathy appreciated.  Psychiatric: Appropriate mood and affect. No apparent risk to self or others. General: Alert and Orientated x 3. No apparent distress.   Head: Normocephalic and atraumatic.  Eyes: PERRLA with EOMI.  Neck: Supple. Trachea midline.   Cardiac: Normal S1 and S2 w/ RRR. No murmurs appreciated.  Pulmonary: Vesicular breath sounds bilaterally. No increased WOB. No wheezes or crackles.  Abdominal: Soft, non-tender. (+) bowel sounds appreciated in all 4 quadrants. No hepatosplenomegaly.   Neurologic: Decreased sensation on LLE. CN 2-12 grossly intact.   Musculoskeletal: R above knee amputation. L lower extremity with multiple open wounds with black-bloody discharge and pus. Wounds are on the lateral LLE and M LLE below the knee. Gangrene of last two metatarsals. Strength appropriate in all 4 extremities for age with no limited ROM. L pedal pulses not palpable.   Skin: Color appropriate for race. see MSK above.   Psychiatric: Appropriate mood and affect. No apparent risk to self or others.

## 2021-10-19 NOTE — ED ADULT NURSE NOTE - NSIMPLEMENTINTERV_GEN_ALL_ED
Implemented All Fall Risk Interventions:  [place] to call system. Call bell, personal items and telephone within reach. Instruct patient to call for assistance. Room bathroom lighting operational. Non-slip footwear when patient is off stretcher. Physically safe environment: no spills, clutter or unnecessary equipment. Stretcher in lowest position, wheels locked, appropriate side rails in place. Provide visual cue, wrist band, yellow gown, etc. Monitor gait and stability. Monitor for mental status changes and reorient to person, place, and time. Review medications for side effects contributing to fall risk. Reinforce activity limits and safety measures with patient and family.

## 2021-10-19 NOTE — ED PROVIDER NOTE - PROGRESS NOTE DETAILS
Jose Francisco PGY 1: Consulted General surgery resident on call, will come and evaluate patient. Jose Francisco PGY 1: Patient to be admitted under Dr. Talley Jose Francisco PGY 1: Patient to be admitted under Dr. Talley. Per surgical team rec, will not start antibiotics in the ED at this time.

## 2021-10-20 ENCOUNTER — TRANSCRIPTION ENCOUNTER (OUTPATIENT)
Age: 81
End: 2021-10-20

## 2021-10-20 DIAGNOSIS — I77.1 STRICTURE OF ARTERY: ICD-10-CM

## 2021-10-20 LAB
ANION GAP SERPL CALC-SCNC: 11 MMOL/L — SIGNIFICANT CHANGE UP (ref 5–17)
BUN SERPL-MCNC: 14 MG/DL — SIGNIFICANT CHANGE UP (ref 7–23)
CALCIUM SERPL-MCNC: 9.4 MG/DL — SIGNIFICANT CHANGE UP (ref 8.4–10.5)
CHLORIDE SERPL-SCNC: 94 MMOL/L — LOW (ref 96–108)
CO2 SERPL-SCNC: 29 MMOL/L — SIGNIFICANT CHANGE UP (ref 22–31)
COVID-19 SPIKE DOMAIN AB INTERP: POSITIVE
COVID-19 SPIKE DOMAIN ANTIBODY RESULT: >250 U/ML — HIGH
CREAT SERPL-MCNC: <0.3 MG/DL — LOW (ref 0.5–1.3)
GLUCOSE SERPL-MCNC: 91 MG/DL — SIGNIFICANT CHANGE UP (ref 70–99)
HCT VFR BLD CALC: 32 % — LOW (ref 34.5–45)
HGB BLD-MCNC: 10.1 G/DL — LOW (ref 11.5–15.5)
MAGNESIUM SERPL-MCNC: 2.4 MG/DL — SIGNIFICANT CHANGE UP (ref 1.6–2.6)
MCHC RBC-ENTMCNC: 28.1 PG — SIGNIFICANT CHANGE UP (ref 27–34)
MCHC RBC-ENTMCNC: 31.6 GM/DL — LOW (ref 32–36)
MCV RBC AUTO: 89.1 FL — SIGNIFICANT CHANGE UP (ref 80–100)
NRBC # BLD: 0 /100 WBCS — SIGNIFICANT CHANGE UP (ref 0–0)
PHOSPHATE SERPL-MCNC: 2 MG/DL — LOW (ref 2.5–4.5)
PLATELET # BLD AUTO: 304 K/UL — SIGNIFICANT CHANGE UP (ref 150–400)
POTASSIUM SERPL-MCNC: 3.8 MMOL/L — SIGNIFICANT CHANGE UP (ref 3.5–5.3)
POTASSIUM SERPL-SCNC: 3.8 MMOL/L — SIGNIFICANT CHANGE UP (ref 3.5–5.3)
RBC # BLD: 3.59 M/UL — LOW (ref 3.8–5.2)
RBC # FLD: 15.3 % — HIGH (ref 10.3–14.5)
SARS-COV-2 IGG+IGM SERPL QL IA: >250 U/ML — HIGH
SARS-COV-2 IGG+IGM SERPL QL IA: POSITIVE
SODIUM SERPL-SCNC: 134 MMOL/L — LOW (ref 135–145)
WBC # BLD: 8.82 K/UL — SIGNIFICANT CHANGE UP (ref 3.8–10.5)
WBC # FLD AUTO: 8.82 K/UL — SIGNIFICANT CHANGE UP (ref 3.8–10.5)

## 2021-10-20 PROCEDURE — 99232 SBSQ HOSP IP/OBS MODERATE 35: CPT | Mod: 24

## 2021-10-20 RX ORDER — POTASSIUM CHLORIDE 20 MEQ
20 PACKET (EA) ORAL ONCE
Refills: 0 | Status: COMPLETED | OUTPATIENT
Start: 2021-10-20 | End: 2021-10-20

## 2021-10-20 RX ORDER — CHLORHEXIDINE GLUCONATE 213 G/1000ML
1 SOLUTION TOPICAL
Refills: 0 | Status: DISCONTINUED | OUTPATIENT
Start: 2021-10-20 | End: 2021-10-21

## 2021-10-20 RX ADMIN — Medication 81 MILLIGRAM(S): at 12:06

## 2021-10-20 RX ADMIN — PIPERACILLIN AND TAZOBACTAM 25 GRAM(S): 4; .5 INJECTION, POWDER, LYOPHILIZED, FOR SOLUTION INTRAVENOUS at 18:34

## 2021-10-20 RX ADMIN — Medication 85 MILLIMOLE(S): at 13:28

## 2021-10-20 RX ADMIN — Medication 1 MILLIGRAM(S): at 06:35

## 2021-10-20 RX ADMIN — TRAMADOL HYDROCHLORIDE 25 MILLIGRAM(S): 50 TABLET ORAL at 12:07

## 2021-10-20 RX ADMIN — Medication 10 MILLIGRAM(S): at 12:06

## 2021-10-20 RX ADMIN — TRAMADOL HYDROCHLORIDE 25 MILLIGRAM(S): 50 TABLET ORAL at 01:09

## 2021-10-20 RX ADMIN — Medication 10 MILLIGRAM(S): at 18:48

## 2021-10-20 RX ADMIN — ENOXAPARIN SODIUM 40 MILLIGRAM(S): 100 INJECTION SUBCUTANEOUS at 18:49

## 2021-10-20 RX ADMIN — PIPERACILLIN AND TAZOBACTAM 25 GRAM(S): 4; .5 INJECTION, POWDER, LYOPHILIZED, FOR SOLUTION INTRAVENOUS at 01:10

## 2021-10-20 RX ADMIN — ATORVASTATIN CALCIUM 20 MILLIGRAM(S): 80 TABLET, FILM COATED ORAL at 21:45

## 2021-10-20 RX ADMIN — Medication 20 MILLIEQUIVALENT(S): at 12:06

## 2021-10-20 RX ADMIN — PIPERACILLIN AND TAZOBACTAM 25 GRAM(S): 4; .5 INJECTION, POWDER, LYOPHILIZED, FOR SOLUTION INTRAVENOUS at 09:20

## 2021-10-20 RX ADMIN — Medication 250 MILLIGRAM(S): at 23:09

## 2021-10-20 RX ADMIN — PANTOPRAZOLE SODIUM 40 MILLIGRAM(S): 20 TABLET, DELAYED RELEASE ORAL at 06:35

## 2021-10-20 RX ADMIN — Medication 10 MILLIGRAM(S): at 06:35

## 2021-10-20 RX ADMIN — Medication 10 MILLIGRAM(S): at 23:09

## 2021-10-20 RX ADMIN — Medication 1 MILLIGRAM(S): at 18:49

## 2021-10-20 RX ADMIN — TRAMADOL HYDROCHLORIDE 25 MILLIGRAM(S): 50 TABLET ORAL at 06:38

## 2021-10-20 RX ADMIN — ATENOLOL 50 MILLIGRAM(S): 25 TABLET ORAL at 23:09

## 2021-10-20 NOTE — PROGRESS NOTE ADULT - ATTENDING COMMENTS
I Sanjiv Morris MD have seen and examined the patient today and agree with  the  evaluation, assessment and plan of the surgical house officer  RAN Morris MD have personally seen and examined the patient at bedside today at 8 am

## 2021-10-20 NOTE — PROGRESS NOTE ADULT - SUBJECTIVE AND OBJECTIVE BOX
Surgery Progress Note    INTERVAl/SUBJECTIVE: No acute event overnight.     Vital Signs Last 24 Hrs  T(C): 37.1 (20 Oct 2021 01:38), Max: 37.2 (19 Oct 2021 17:50)  T(F): 98.7 (20 Oct 2021 01:38), Max: 98.9 (19 Oct 2021 17:50)  HR: 72 (20 Oct 2021 01:38) (70 - 80)  BP: 103/64 (20 Oct 2021 01:38) (103/64 - 129/82)  BP(mean): --  RR: 18 (20 Oct 2021 01:38) (16 - 18)  SpO2: 99% (20 Oct 2021 01:38) (94% - 99%)    Physical Exam:  General:  Neuro:    CV:   Abdomen:     LABS:                        11.6   10.43 )-----------( 339      ( 19 Oct 2021 12:27 )             37.2     10    139  |  91<L>  |  33<H>  ----------------------------<  94  2.6<LL>   |  33<H>  |  <0.30<L>    Ca    9.5      19 Oct 2021 12:27    TPro  7.5  /  Alb  3.3  /  TBili  0.3  /  DBili  x   /  AST  39  /  ALT  37  /  AlkPhos  274<H>  10-19    PT/INR - ( 19 Oct 2021 13:09 )   PT: 13.2 sec;   INR: 1.11 ratio         PTT - ( 19 Oct 2021 13:09 )  PTT:29.7 sec  Urinalysis Basic - ( 19 Oct 2021 12:43 )    Color: Light Orange / Appearance: Slightly Turbid / S.018 / pH: x  Gluc: x / Ketone: Negative  / Bili: Negative / Urobili: 2 mg/dL   Blood: x / Protein: 30 mg/dL / Nitrite: Negative   Leuk Esterase: Large / RBC: 26 /hpf /  /HPF   Sq Epi: x / Non Sq Epi: 4 /hpf / Bacteria: Many        INs and OUTs:    10-19-21 @ 07:01  -  10-20-21 @ 02:54  --------------------------------------------------------  IN: 830 mL / OUT: 1100 mL / NET: -270 mL     Surgery Progress Note    INTERVAl/SUBJECTIVE: No acute event overnight. Cleared by cardiology for OR Thursday     Vital Signs Last 24 Hrs  T(C): 37.1 (20 Oct 2021 01:38), Max: 37.2 (19 Oct 2021 17:50)  T(F): 98.7 (20 Oct 2021 01:38), Max: 98.9 (19 Oct 2021 17:50)  HR: 72 (20 Oct 2021 01:38) (70 - 80)  BP: 103/64 (20 Oct 2021 01:38) (103/64 - 129/82)  BP(mean): --  RR: 18 (20 Oct 2021 01:38) (16 - 18)  SpO2: 99% (20 Oct 2021 01:38) (94% - 99%)    Physical Exam:  Constitutional: NAD, lying in bed  Respiratory: nonlabored, equal chest rise  Cardiovascular: RRR  Gastrointestinal: soft, nontender, nondistended, no rebound/guarding, no organomegaly  Extremities: RLE AKA incision clean, dry, and intact. LLE with significant wound on lateral lower leg, heel. Malodorous. Sensation intact to approximately upper third of lower leg, necrotic 4/5 digits  Neurological: awake and alert    LABS:                        11.6   10.43 )-----------( 339      ( 19 Oct 2021 12:27 )             37.2     10-19    139  |  91<L>  |  33<H>  ----------------------------<  94  2.6<LL>   |  33<H>  |  <0.30<L>    Ca    9.5      19 Oct 2021 12:27    TPro  7.5  /  Alb  3.3  /  TBili  0.3  /  DBili  x   /  AST  39  /  ALT  37  /  AlkPhos  274<H>  1019    PT/INR - ( 19 Oct 2021 13:09 )   PT: 13.2 sec;   INR: 1.11 ratio         PTT - ( 19 Oct 2021 13:09 )  PTT:29.7 sec  Urinalysis Basic - ( 19 Oct 2021 12:43 )    Color: Light Orange / Appearance: Slightly Turbid / S.018 / pH: x  Gluc: x / Ketone: Negative  / Bili: Negative / Urobili: 2 mg/dL   Blood: x / Protein: 30 mg/dL / Nitrite: Negative   Leuk Esterase: Large / RBC: 26 /hpf /  /HPF   Sq Epi: x / Non Sq Epi: 4 /hpf / Bacteria: Many        INs and OUTs:    10-19-21 @ 07:01  -  10-20-21 @ 02:54  --------------------------------------------------------  IN: 830 mL / OUT: 1100 mL / NET: -270 mL     Surgery Progress Note    INTERVAl/SUBJECTIVE: No acute event overnight. Cleared by cardiology for OR Thursday   pt states that she wants to proceed w lle aka       Vital Signs Last 24 Hrs  T(C): 37.1 (20 Oct 2021 01:38), Max: 37.2 (19 Oct 2021 17:50)  T(F): 98.7 (20 Oct 2021 01:38), Max: 98.9 (19 Oct 2021 17:50)  HR: 72 (20 Oct 2021 01:38) (70 - 80)  BP: 103/64 (20 Oct 2021 01:38) (103/64 - 129/82)  BP(mean): --  RR: 18 (20 Oct 2021 01:38) (16 - 18)  SpO2: 99% (20 Oct 2021 01:38) (94% - 99%)    Physical Exam:  Constitutional: NAD, lying in bed  Respiratory: nonlabored, equal chest rise  Cardiovascular: RRR  Gastrointestinal: soft, nontender, nondistended, no rebound/guarding, no organomegaly  Extremities: RLE AKA incision clean, dry, and intact.   LLE with significant wound on lateral lower leg, heel. Malodorous. Sensation intact to approximately upper third of lower leg, necrotic 4/5 digits, lle in partial non reducible contraction   pt is bedridden and non ambulatory   Neurological: awake and alert    LABS:                        11.6   10.43 )-----------( 339      ( 19 Oct 2021 12:27 )             37.2     10    139  |  91<L>  |  33<H>  ----------------------------<  94  2.6<LL>   |  33<H>  |  <0.30<L>    Ca    9.5      19 Oct 2021 12:27    TPro  7.5  /  Alb  3.3  /  TBili  0.3  /  DBili  x   /  AST  39  /  ALT  37  /  AlkPhos  274<H>  10    PT/INR - ( 19 Oct 2021 13:09 )   PT: 13.2 sec;   INR: 1.11 ratio         PTT - ( 19 Oct 2021 13:09 )  PTT:29.7 sec  Urinalysis Basic - ( 19 Oct 2021 12:43 )    Color: Light Orange / Appearance: Slightly Turbid / S.018 / pH: x  Gluc: x / Ketone: Negative  / Bili: Negative / Urobili: 2 mg/dL   Blood: x / Protein: 30 mg/dL / Nitrite: Negative   Leuk Esterase: Large / RBC: 26 /hpf /  /HPF   Sq Epi: x / Non Sq Epi: 4 /hpf / Bacteria: Many        INs and OUTs:    10-19-21 @ 07:01  -  10-20-21 @ 02:54  --------------------------------------------------------  IN: 830 mL / OUT: 1100 mL / NET: -270 mL

## 2021-10-20 NOTE — PROGRESS NOTE ADULT - ASSESSMENT
82 y/o, wheelchair bound F with hx of MS, HTN, neurogenic bladder, PAD s/p RLE AKA for gangrenous wound and known LLE arterial disease presents to the ED with worsening of left lower leg wound. Being admitted for AKA planning    - appreciate cards recs, cleared for surgery  - home meds  - f/u AM labs     D/w attending  Vascular   [id number]     82 y/o, wheelchair bound F with hx of MS, HTN, neurogenic bladder, PAD s/p RLE AKA for gangrenous wound and known LLE arterial disease presents to the ED with worsening of left lower leg wound. Being admitted for AKA planning    - appreciate cards recs, cleared for surgery lle akirvin diego   - home meds  - f/u AM labs     D/w attending  Vascular   [id number]

## 2021-10-20 NOTE — CONSULT NOTE ADULT - SUBJECTIVE AND OBJECTIVE BOX
HPI:  82 y/o female   who presents to the ED with chronic Left lower leg wound present since 2018 after an ICU admission for sepsis.    She has been followed in the [facility] wound care center by Mounika Smith/Rah/Kishan since 2019.    Dr. Morris  admitted pt  Presented to Er,   to get leg evaluated.    Denies nausea, vomiting, fever, chills, shortness of breath, rapid heart rate. (19 Oct 2021 11:49)      REVIEW OF SYSTEMS:  GEN: no fever,    no chills  RESP: no SOB,   no cough  CVS: no chest pain,   no palpitations  GI: no abdominal pain,   no nausea,   no vomiting,   no constipation,   no diarrhea  : no dysuria,   no frequency  NEURO: no headache,   no dizziness  PSYCH: no depression,   not anxious  Derm : no rash    Allergies    No Known Allergies    Intolerances        CAPILLARY BLOOD GLUCOSE        I&O's Summary    19 Oct 2021 07:01  -  20 Oct 2021 07:00  --------------------------------------------------------  IN: 950 mL / OUT: 1225 mL / NET: -275 mL        Vital Signs Last 24 Hrs  T(C): 37 (20 Oct 2021 09:14), Max: 37.2 (19 Oct 2021 17:50)  T(F): 98.6 (20 Oct 2021 09:14), Max: 98.9 (19 Oct 2021 17:50)  HR: 72 (20 Oct 2021 09:14) (71 - 80)  BP: 97/64 (20 Oct 2021 09:14) (97/59 - 128/77)  BP(mean): --  RR: 18 (20 Oct 2021 09:14) (16 - 18)  SpO2: 93% (20 Oct 2021 09:14) (93% - 99%)  PHYSICAL EXAM:  GENERAL: NAD,   HEAD:  Atraumatic, Normocephalic  EYES: EOMI,  NECK: Supple, No JVD  CHEST/LUNG: Clear to auscultation bilaterally; No wheeze  HEART: Regular rate and rhythm;        murmur  ABDOMEN: Soft, Nontender,   EXTREMITIES:        c/c left leg wound/ right ampuattaion  NEUROLOGY:  alert   c/c fragoso    MEDICATIONS  (STANDING):  amLODIPine   Tablet 10 milliGRAM(s) Oral daily  aspirin  chewable 81 milliGRAM(s) Oral daily  ATENolol  Tablet 50 milliGRAM(s) Oral every 24 hours  atorvastatin 20 milliGRAM(s) Oral at bedtime  baclofen 10 milliGRAM(s) Oral four times a day  clonazePAM  Tablet 1 milliGRAM(s) Oral two times a day  enoxaparin Injectable 40 milliGRAM(s) SubCutaneous every 24 hours  influenza   Vaccine 0.5 milliLiter(s) IntraMuscular once  pantoprazole    Tablet 40 milliGRAM(s) Oral before breakfast  piperacillin/tazobactam IVPB.. 3.375 Gram(s) IV Intermittent every 8 hours  sodium phosphate IVPB 30 milliMole(s) IV Intermittent once  traMADol 25 milliGRAM(s) Oral four times a day  vancomycin  IVPB 1000 milliGRAM(s) IV Intermittent daily    MEDICATIONS  (PRN):  acetaminophen   Tablet .. 650 milliGRAM(s) Oral every 6 hours PRN Mild Pain (1 - 3)  HYDROmorphone  Injectable 0.25 milliGRAM(s) IV Push every 4 hours PRN breakthrough pain  oxyCODONE    IR 5 milliGRAM(s) Oral every 4 hours PRN Moderate Pain (4 - 6)  oxyCODONE    IR 10 milliGRAM(s) Oral every 6 hours PRN Severe Pain (7 - 10)  polyethylene glycol 3350 17 Gram(s) Oral daily PRN Constipation    LABS:                        10.1   8.82  )-----------( 304      ( 20 Oct 2021 07:15 )             32.0     10-20    134<L>  |  94<L>  |  14  ----------------------------<  91  3.8   |  29  |  <0.30<L>    Ca    9.4      20 Oct 2021 07:13  Phos  2.0     10-20  Mg     2.4     10-20    TPro  7.5  /  Alb  3.3  /  TBili  0.3  /  DBili  x   /  AST  39  /  ALT  37  /  AlkPhos  274<H>  10-19    PT/INR - ( 19 Oct 2021 13:09 )   PT: 13.2 sec;   INR: 1.11 ratio         PTT - ( 19 Oct 2021 13:09 )  PTT:29.7 sec      Urinalysis Basic - ( 19 Oct 2021 12:43 )    Color: Light Orange / Appearance: Slightly Turbid / S.018 / pH: x  Gluc: x / Ketone: Negative  / Bili: Negative / Urobili: 2 mg/dL   Blood: x / Protein: 30 mg/dL / Nitrite: Negative   Leuk Esterase: Large / RBC: 26 /hpf /  /HPF   Sq Epi: x / Non Sq Epi: 4 /hpf / Bacteria: Many          10-19 @ 12:27  3.1  24              Consultant(s) Notes Reviewed:      Care Discussed with Consultants/Other Providers:  Plan:

## 2021-10-20 NOTE — PROGRESS NOTE ADULT - SUBJECTIVE AND OBJECTIVE BOX
CARDIOLOGY     PROGRESS  NOTE   ________________________________________________    CHIEF COMPLAINT:Patient is a 81y old  Female who presents with a chief complaint of Left lower extremity wound (20 Oct 2021 02:54)  no complain.  	  REVIEW OF SYSTEMS:  CONSTITUTIONAL: No fever, weight loss, or fatigue  EYES: No eye pain, visual disturbances, or discharge  ENT:  No difficulty hearing, tinnitus, vertigo; No sinus or throat pain  NECK: No pain or stiffness  RESPIRATORY: No cough, wheezing, chills or hemoptysis; No Shortness of Breath  CARDIOVASCULAR: No chest pain, palpitations, passing out, dizziness, or leg swelling  GASTROINTESTINAL: No abdominal or epigastric pain. No nausea, vomiting, or hematemesis; No diarrhea or constipation. No melena or hematochezia.  GENITOURINARY: No dysuria, frequency, hematuria, or incontinence  NEUROLOGICAL: No headaches, memory loss, loss of strength, numbness, or tremors  SKIN: No itching, burning, rashes, or lesions   LYMPH Nodes: No enlarged glands  ENDOCRINE: No heat or cold intolerance; No hair loss  MUSCULOSKELETAL: No joint pain or swelling; No muscle, back, or extremity pain  PSYCHIATRIC: No depression, anxiety, mood swings, or difficulty sleeping  HEME/LYMPH: No easy bruising, or bleeding gums  ALLERGY AND IMMUNOLOGIC: No hives or eczema	    [ ] All others negative	  [ ] Unable to obtain    PHYSICAL EXAM:  T(C): 36.9 (10-20-21 @ 06:30), Max: 37.2 (10-19-21 @ 17:50)  HR: 71 (10-20-21 @ 06:30) (70 - 80)  BP: 97/59 (10-20-21 @ 06:30) (97/59 - 129/82)  RR: 17 (10-20-21 @ 06:30) (16 - 18)  SpO2: 94% (10-20-21 @ 06:30) (94% - 99%)  Wt(kg): --  I&O's Summary    19 Oct 2021 07:01  -  20 Oct 2021 07:00  --------------------------------------------------------  IN: 950 mL / OUT: 1225 mL / NET: -275 mL        Appearance: Normal	  HEENT:   Normal oral mucosa, PERRL, EOMI	  Lymphatic: No lymphadenopathy  Cardiovascular: Normal S1 S2, No JVD, + murmurs, No edema  Respiratory: Lungs clear to auscultation	  Psychiatry: A & O x 3, Mood & affect appropriate  Gastrointestinal:  Soft, Non-tender, + BS	  Skin: No rashes, No ecchymoses, No cyanosis	  Neurologic: Non-focal  Extremities: Normal range of motion, R BKA., l gangrene  Vascular: Peripheral pulses palpable 2+ bilaterally    MEDICATIONS  (STANDING):  amLODIPine   Tablet 10 milliGRAM(s) Oral daily  aspirin  chewable 81 milliGRAM(s) Oral daily  ATENolol  Tablet 50 milliGRAM(s) Oral every 24 hours  atorvastatin 20 milliGRAM(s) Oral at bedtime  baclofen 10 milliGRAM(s) Oral four times a day  clonazePAM  Tablet 1 milliGRAM(s) Oral two times a day  enoxaparin Injectable 40 milliGRAM(s) SubCutaneous every 24 hours  influenza   Vaccine 0.5 milliLiter(s) IntraMuscular once  pantoprazole    Tablet 40 milliGRAM(s) Oral before breakfast  piperacillin/tazobactam IVPB.. 3.375 Gram(s) IV Intermittent every 8 hours  potassium chloride    Tablet ER 20 milliEquivalent(s) Oral once  sodium phosphate IVPB 30 milliMole(s) IV Intermittent once  traMADol 25 milliGRAM(s) Oral four times a day  vancomycin  IVPB 1000 milliGRAM(s) IV Intermittent daily      TELEMETRY: 	    ECG:  	  RADIOLOGY:  OTHER: 	  	  LABS:	 	    CARDIAC MARKERS:                                10.1   8.82  )-----------( 304      ( 20 Oct 2021 07:15 )             32.0     10-20    134<L>  |  94<L>  |  14  ----------------------------<  91  3.8   |  29  |  <0.30<L>    Ca    9.4      20 Oct 2021 07:13  Phos  2.0     10-20  Mg     2.4     10-20    TPro  7.5  /  Alb  3.3  /  TBili  0.3  /  DBili  x   /  AST  39  /  ALT  37  /  AlkPhos  274<H>  10-19    proBNP:   Lipid Profile:   HgA1c:   TSH:   PT/INR - ( 19 Oct 2021 13:09 )   PT: 13.2 sec;   INR: 1.11 ratio         PTT - ( 19 Oct 2021 13:09 )  PTT:29.7 sec  < from: Transthoracic Echocardiogram (10.01.19 @ 07:50) >  Mitral Valve: Normal appearing mitral valve leaflets.  Mitral annular calcification.  Mild mitral regurgitation directed posteriorly.  Aortic Valve/Aorta: Calcified aortic valve with normal  opening. Minimal aortic regurgitation.  Normal aortic root size.  Left Atrium: Normal left atrium.  Left Ventricle: Normal left ventricular internal  dimensions.  Mild concentric left ventricular hypertrophy.  Normal left ventricular systolic function. No segmental  wall motion abnormalities.  Normal diastolic function.  Right Heart: Normal right atrium. Normal right ventricular  size and function. Normal tricuspid valve. Mild tricuspid  regurgitation. Normal pulmonic valve.  Pericardium/Pleura: Normal pericardium with no pericardial  effusion.  Hemodynamic: Estiamted right atrial pressue is normal.  No evidence of pulmonary hypertension.  No PFO seen with color Doppler.  ------------------------------------------------------------------------  Conclusions:  Normal left ventricular systolic function. No segmental  wall motion abnormalities.      Assessment and plan  ---------------------------  80 y/o female who presents to the ED with chronic Left lower leg wound present since 2018 after an ICU admission for sepsis. She has been followed in the [facility] wound care center by Mounika Smith/Rah/Kishan since 2019. Yesterday, she saw her wound care doctor who contacted Dr. Morris that the wound was getting worse. Presented to ED this morning to get leg evaluated. Denies nausea, vomiting, fever, chills, shortness of breath, rapid heart rate.  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  pt with no cardiac complain, no evidence of chf on exam  continue current bp meds  pt will be clear for amputation  dvt prophylaxis  awaiting ecg  continue abx  asa daily  dvt prophylaxis

## 2021-10-21 ENCOUNTER — RESULT REVIEW (OUTPATIENT)
Age: 81
End: 2021-10-21

## 2021-10-21 LAB
ANION GAP SERPL CALC-SCNC: 13 MMOL/L — SIGNIFICANT CHANGE UP (ref 5–17)
ANION GAP SERPL CALC-SCNC: 13 MMOL/L — SIGNIFICANT CHANGE UP (ref 5–17)
APTT BLD: 29.8 SEC — SIGNIFICANT CHANGE UP (ref 27.5–35.5)
BLD GP AB SCN SERPL QL: NEGATIVE — SIGNIFICANT CHANGE UP
BUN SERPL-MCNC: 10 MG/DL — SIGNIFICANT CHANGE UP (ref 7–23)
BUN SERPL-MCNC: 10 MG/DL — SIGNIFICANT CHANGE UP (ref 7–23)
CALCIUM SERPL-MCNC: 8.5 MG/DL — SIGNIFICANT CHANGE UP (ref 8.4–10.5)
CALCIUM SERPL-MCNC: 8.6 MG/DL — SIGNIFICANT CHANGE UP (ref 8.4–10.5)
CHLORIDE SERPL-SCNC: 94 MMOL/L — LOW (ref 96–108)
CHLORIDE SERPL-SCNC: 95 MMOL/L — LOW (ref 96–108)
CO2 SERPL-SCNC: 28 MMOL/L — SIGNIFICANT CHANGE UP (ref 22–31)
CO2 SERPL-SCNC: 28 MMOL/L — SIGNIFICANT CHANGE UP (ref 22–31)
CREAT SERPL-MCNC: 0.33 MG/DL — LOW (ref 0.5–1.3)
CREAT SERPL-MCNC: <0.3 MG/DL — LOW (ref 0.5–1.3)
CULTURE RESULTS: SIGNIFICANT CHANGE UP
GLUCOSE SERPL-MCNC: 101 MG/DL — HIGH (ref 70–99)
GLUCOSE SERPL-MCNC: 116 MG/DL — HIGH (ref 70–99)
HCT VFR BLD CALC: 28.8 % — LOW (ref 34.5–45)
HCT VFR BLD CALC: 29.2 % — LOW (ref 34.5–45)
HGB BLD-MCNC: 9 G/DL — LOW (ref 11.5–15.5)
HGB BLD-MCNC: 9.2 G/DL — LOW (ref 11.5–15.5)
INR BLD: 1.1 RATIO — SIGNIFICANT CHANGE UP (ref 0.88–1.16)
MAGNESIUM SERPL-MCNC: 2.1 MG/DL — SIGNIFICANT CHANGE UP (ref 1.6–2.6)
MAGNESIUM SERPL-MCNC: 2.4 MG/DL — SIGNIFICANT CHANGE UP (ref 1.6–2.6)
MCHC RBC-ENTMCNC: 27.8 PG — SIGNIFICANT CHANGE UP (ref 27–34)
MCHC RBC-ENTMCNC: 28.1 PG — SIGNIFICANT CHANGE UP (ref 27–34)
MCHC RBC-ENTMCNC: 31.3 GM/DL — LOW (ref 32–36)
MCHC RBC-ENTMCNC: 31.5 GM/DL — LOW (ref 32–36)
MCV RBC AUTO: 88.2 FL — SIGNIFICANT CHANGE UP (ref 80–100)
MCV RBC AUTO: 90 FL — SIGNIFICANT CHANGE UP (ref 80–100)
MRSA PCR RESULT.: SIGNIFICANT CHANGE UP
NRBC # BLD: 0 /100 WBCS — SIGNIFICANT CHANGE UP (ref 0–0)
NRBC # BLD: 0 /100 WBCS — SIGNIFICANT CHANGE UP (ref 0–0)
PHOSPHATE SERPL-MCNC: 3 MG/DL — SIGNIFICANT CHANGE UP (ref 2.5–4.5)
PHOSPHATE SERPL-MCNC: 3.5 MG/DL — SIGNIFICANT CHANGE UP (ref 2.5–4.5)
PLATELET # BLD AUTO: 266 K/UL — SIGNIFICANT CHANGE UP (ref 150–400)
PLATELET # BLD AUTO: 314 K/UL — SIGNIFICANT CHANGE UP (ref 150–400)
POTASSIUM SERPL-MCNC: 3.1 MMOL/L — LOW (ref 3.5–5.3)
POTASSIUM SERPL-MCNC: 3.5 MMOL/L — SIGNIFICANT CHANGE UP (ref 3.5–5.3)
POTASSIUM SERPL-SCNC: 3.1 MMOL/L — LOW (ref 3.5–5.3)
POTASSIUM SERPL-SCNC: 3.5 MMOL/L — SIGNIFICANT CHANGE UP (ref 3.5–5.3)
PROTHROM AB SERPL-ACNC: 13.1 SEC — SIGNIFICANT CHANGE UP (ref 10.6–13.6)
RBC # BLD: 3.2 M/UL — LOW (ref 3.8–5.2)
RBC # BLD: 3.31 M/UL — LOW (ref 3.8–5.2)
RBC # FLD: 15.5 % — HIGH (ref 10.3–14.5)
RBC # FLD: 15.7 % — HIGH (ref 10.3–14.5)
RH IG SCN BLD-IMP: POSITIVE — SIGNIFICANT CHANGE UP
S AUREUS DNA NOSE QL NAA+PROBE: DETECTED
SODIUM SERPL-SCNC: 135 MMOL/L — SIGNIFICANT CHANGE UP (ref 135–145)
SODIUM SERPL-SCNC: 136 MMOL/L — SIGNIFICANT CHANGE UP (ref 135–145)
SPECIMEN SOURCE: SIGNIFICANT CHANGE UP
TROPONIN T, HIGH SENSITIVITY RESULT: 50 NG/L — SIGNIFICANT CHANGE UP (ref 0–51)
WBC # BLD: 10.44 K/UL — SIGNIFICANT CHANGE UP (ref 3.8–10.5)
WBC # BLD: 7.96 K/UL — SIGNIFICANT CHANGE UP (ref 3.8–10.5)
WBC # FLD AUTO: 10.44 K/UL — SIGNIFICANT CHANGE UP (ref 3.8–10.5)
WBC # FLD AUTO: 7.96 K/UL — SIGNIFICANT CHANGE UP (ref 3.8–10.5)

## 2021-10-21 PROCEDURE — 88307 TISSUE EXAM BY PATHOLOGIST: CPT | Mod: 26

## 2021-10-21 PROCEDURE — 88311 DECALCIFY TISSUE: CPT | Mod: 26

## 2021-10-21 PROCEDURE — 27590 AMPUTATE LEG AT THIGH: CPT | Mod: LT

## 2021-10-21 PROCEDURE — 93010 ELECTROCARDIOGRAM REPORT: CPT

## 2021-10-21 PROCEDURE — 99232 SBSQ HOSP IP/OBS MODERATE 35: CPT

## 2021-10-21 RX ORDER — DEXTROSE MONOHYDRATE, SODIUM CHLORIDE, AND POTASSIUM CHLORIDE 50; .745; 4.5 G/1000ML; G/1000ML; G/1000ML
1000 INJECTION, SOLUTION INTRAVENOUS
Refills: 0 | Status: DISCONTINUED | OUTPATIENT
Start: 2021-10-21 | End: 2021-10-21

## 2021-10-21 RX ORDER — ATENOLOL 25 MG/1
50 TABLET ORAL EVERY 24 HOURS
Refills: 0 | Status: DISCONTINUED | OUTPATIENT
Start: 2021-10-21 | End: 2021-10-21

## 2021-10-21 RX ORDER — POLYETHYLENE GLYCOL 3350 17 G/17G
17 POWDER, FOR SOLUTION ORAL DAILY
Refills: 0 | Status: DISCONTINUED | OUTPATIENT
Start: 2021-10-21 | End: 2021-11-08

## 2021-10-21 RX ORDER — ENOXAPARIN SODIUM 100 MG/ML
40 INJECTION SUBCUTANEOUS EVERY 24 HOURS
Refills: 0 | Status: DISCONTINUED | OUTPATIENT
Start: 2021-10-21 | End: 2021-11-08

## 2021-10-21 RX ORDER — AMLODIPINE BESYLATE 2.5 MG/1
10 TABLET ORAL DAILY
Refills: 0 | Status: DISCONTINUED | OUTPATIENT
Start: 2021-10-21 | End: 2021-10-21

## 2021-10-21 RX ORDER — POTASSIUM CHLORIDE 20 MEQ
10 PACKET (EA) ORAL
Refills: 0 | Status: COMPLETED | OUTPATIENT
Start: 2021-10-21 | End: 2021-10-21

## 2021-10-21 RX ORDER — LACTOBACILLUS ACIDOPHILUS 100MM CELL
1 CAPSULE ORAL DAILY
Refills: 0 | Status: DISCONTINUED | OUTPATIENT
Start: 2021-10-21 | End: 2021-11-08

## 2021-10-21 RX ORDER — HYDROMORPHONE HYDROCHLORIDE 2 MG/ML
0.25 INJECTION INTRAMUSCULAR; INTRAVENOUS; SUBCUTANEOUS
Refills: 0 | Status: DISCONTINUED | OUTPATIENT
Start: 2021-10-21 | End: 2021-10-21

## 2021-10-21 RX ORDER — POLYETHYLENE GLYCOL 3350 17 G/17G
17 POWDER, FOR SOLUTION ORAL DAILY
Refills: 0 | Status: DISCONTINUED | OUTPATIENT
Start: 2021-10-21 | End: 2021-10-21

## 2021-10-21 RX ORDER — ACETAMINOPHEN 500 MG
500 TABLET ORAL EVERY 6 HOURS
Refills: 0 | Status: DISCONTINUED | OUTPATIENT
Start: 2021-10-21 | End: 2021-10-24

## 2021-10-21 RX ORDER — OXYCODONE HYDROCHLORIDE 5 MG/1
10 TABLET ORAL EVERY 6 HOURS
Refills: 0 | Status: DISCONTINUED | OUTPATIENT
Start: 2021-10-21 | End: 2021-10-22

## 2021-10-21 RX ORDER — ASPIRIN/CALCIUM CARB/MAGNESIUM 324 MG
81 TABLET ORAL DAILY
Refills: 0 | Status: DISCONTINUED | OUTPATIENT
Start: 2021-10-22 | End: 2021-11-08

## 2021-10-21 RX ORDER — PHENYLEPHRINE HYDROCHLORIDE 10 MG/ML
0.5 INJECTION INTRAVENOUS
Qty: 40 | Refills: 0 | Status: DISCONTINUED | OUTPATIENT
Start: 2021-10-21 | End: 2021-10-23

## 2021-10-21 RX ORDER — SODIUM CHLORIDE 9 MG/ML
1000 INJECTION, SOLUTION INTRAVENOUS
Refills: 0 | Status: DISCONTINUED | OUTPATIENT
Start: 2021-10-21 | End: 2021-10-23

## 2021-10-21 RX ORDER — PANTOPRAZOLE SODIUM 20 MG/1
40 TABLET, DELAYED RELEASE ORAL
Refills: 0 | Status: DISCONTINUED | OUTPATIENT
Start: 2021-10-21 | End: 2021-11-08

## 2021-10-21 RX ORDER — ASPIRIN/CALCIUM CARB/MAGNESIUM 324 MG
81 TABLET ORAL DAILY
Refills: 0 | Status: DISCONTINUED | OUTPATIENT
Start: 2021-10-21 | End: 2021-10-21

## 2021-10-21 RX ORDER — TRAMADOL HYDROCHLORIDE 50 MG/1
25 TABLET ORAL
Refills: 0 | Status: DISCONTINUED | OUTPATIENT
Start: 2021-10-21 | End: 2021-10-22

## 2021-10-21 RX ORDER — SENNA PLUS 8.6 MG/1
2 TABLET ORAL AT BEDTIME
Refills: 0 | Status: DISCONTINUED | OUTPATIENT
Start: 2021-10-21 | End: 2021-11-08

## 2021-10-21 RX ORDER — HYDROMORPHONE HYDROCHLORIDE 2 MG/ML
0.25 INJECTION INTRAMUSCULAR; INTRAVENOUS; SUBCUTANEOUS EVERY 4 HOURS
Refills: 0 | Status: DISCONTINUED | OUTPATIENT
Start: 2021-10-21 | End: 2021-10-22

## 2021-10-21 RX ORDER — OXYCODONE HYDROCHLORIDE 5 MG/1
5 TABLET ORAL EVERY 4 HOURS
Refills: 0 | Status: DISCONTINUED | OUTPATIENT
Start: 2021-10-21 | End: 2021-10-22

## 2021-10-21 RX ORDER — SODIUM CHLORIDE 9 MG/ML
500 INJECTION, SOLUTION INTRAVENOUS ONCE
Refills: 0 | Status: COMPLETED | OUTPATIENT
Start: 2021-10-21 | End: 2021-10-21

## 2021-10-21 RX ORDER — PIPERACILLIN AND TAZOBACTAM 4; .5 G/20ML; G/20ML
3.38 INJECTION, POWDER, LYOPHILIZED, FOR SOLUTION INTRAVENOUS EVERY 8 HOURS
Refills: 0 | Status: DISCONTINUED | OUTPATIENT
Start: 2021-10-21 | End: 2021-10-25

## 2021-10-21 RX ORDER — ACETAMINOPHEN 500 MG
650 TABLET ORAL EVERY 6 HOURS
Refills: 0 | Status: DISCONTINUED | OUTPATIENT
Start: 2021-10-21 | End: 2021-10-21

## 2021-10-21 RX ORDER — ATORVASTATIN CALCIUM 80 MG/1
20 TABLET, FILM COATED ORAL AT BEDTIME
Refills: 0 | Status: DISCONTINUED | OUTPATIENT
Start: 2021-10-21 | End: 2021-11-08

## 2021-10-21 RX ORDER — SODIUM CHLORIDE 9 MG/ML
1000 INJECTION INTRAMUSCULAR; INTRAVENOUS; SUBCUTANEOUS
Refills: 0 | Status: DISCONTINUED | OUTPATIENT
Start: 2021-10-21 | End: 2021-10-21

## 2021-10-21 RX ORDER — BACLOFEN 100 %
10 POWDER (GRAM) MISCELLANEOUS
Refills: 0 | Status: DISCONTINUED | OUTPATIENT
Start: 2021-10-21 | End: 2021-10-21

## 2021-10-21 RX ORDER — VANCOMYCIN HCL 1 G
1000 VIAL (EA) INTRAVENOUS EVERY 24 HOURS
Refills: 0 | Status: DISCONTINUED | OUTPATIENT
Start: 2021-10-21 | End: 2021-10-21

## 2021-10-21 RX ORDER — CHLORHEXIDINE GLUCONATE 213 G/1000ML
1 SOLUTION TOPICAL
Refills: 0 | Status: DISCONTINUED | OUTPATIENT
Start: 2021-10-22 | End: 2021-10-25

## 2021-10-21 RX ORDER — VANCOMYCIN HCL 1 G
1000 VIAL (EA) INTRAVENOUS EVERY 12 HOURS
Refills: 0 | Status: DISCONTINUED | OUTPATIENT
Start: 2021-10-21 | End: 2021-10-21

## 2021-10-21 RX ORDER — ACETAMINOPHEN 500 MG
1000 TABLET ORAL ONCE
Refills: 0 | Status: COMPLETED | OUTPATIENT
Start: 2021-10-21 | End: 2021-10-21

## 2021-10-21 RX ORDER — CLONAZEPAM 1 MG
1 TABLET ORAL
Refills: 0 | Status: DISCONTINUED | OUTPATIENT
Start: 2021-10-21 | End: 2021-10-28

## 2021-10-21 RX ORDER — VANCOMYCIN HCL 1 G
1000 VIAL (EA) INTRAVENOUS ONCE
Refills: 0 | Status: DISCONTINUED | OUTPATIENT
Start: 2021-10-21 | End: 2021-10-21

## 2021-10-21 RX ORDER — POTASSIUM CHLORIDE 20 MEQ
10 PACKET (EA) ORAL
Refills: 0 | Status: DISCONTINUED | OUTPATIENT
Start: 2021-10-21 | End: 2021-10-21

## 2021-10-21 RX ADMIN — TRAMADOL HYDROCHLORIDE 25 MILLIGRAM(S): 50 TABLET ORAL at 17:25

## 2021-10-21 RX ADMIN — PIPERACILLIN AND TAZOBACTAM 25 GRAM(S): 4; .5 INJECTION, POWDER, LYOPHILIZED, FOR SOLUTION INTRAVENOUS at 22:19

## 2021-10-21 RX ADMIN — Medication 10 MILLIGRAM(S): at 05:13

## 2021-10-21 RX ADMIN — Medication 500 MILLIGRAM(S): at 23:58

## 2021-10-21 RX ADMIN — TRAMADOL HYDROCHLORIDE 25 MILLIGRAM(S): 50 TABLET ORAL at 05:13

## 2021-10-21 RX ADMIN — SODIUM CHLORIDE 100 MILLILITER(S): 9 INJECTION, SOLUTION INTRAVENOUS at 14:30

## 2021-10-21 RX ADMIN — Medication 100 MILLIEQUIVALENT(S): at 15:50

## 2021-10-21 RX ADMIN — Medication 100 MILLIEQUIVALENT(S): at 14:27

## 2021-10-21 RX ADMIN — Medication 1 MILLIGRAM(S): at 17:26

## 2021-10-21 RX ADMIN — PIPERACILLIN AND TAZOBACTAM 25 GRAM(S): 4; .5 INJECTION, POWDER, LYOPHILIZED, FOR SOLUTION INTRAVENOUS at 15:50

## 2021-10-21 RX ADMIN — ATORVASTATIN CALCIUM 20 MILLIGRAM(S): 80 TABLET, FILM COATED ORAL at 22:20

## 2021-10-21 RX ADMIN — TRAMADOL HYDROCHLORIDE 25 MILLIGRAM(S): 50 TABLET ORAL at 23:59

## 2021-10-21 RX ADMIN — Medication 1 MILLIGRAM(S): at 05:13

## 2021-10-21 RX ADMIN — Medication 400 MILLIGRAM(S): at 05:13

## 2021-10-21 RX ADMIN — CHLORHEXIDINE GLUCONATE 1 APPLICATION(S): 213 SOLUTION TOPICAL at 05:51

## 2021-10-21 RX ADMIN — TRAMADOL HYDROCHLORIDE 25 MILLIGRAM(S): 50 TABLET ORAL at 17:55

## 2021-10-21 RX ADMIN — SODIUM CHLORIDE 100 MILLILITER(S): 9 INJECTION, SOLUTION INTRAVENOUS at 19:18

## 2021-10-21 RX ADMIN — AMLODIPINE BESYLATE 10 MILLIGRAM(S): 2.5 TABLET ORAL at 05:13

## 2021-10-21 RX ADMIN — PIPERACILLIN AND TAZOBACTAM 25 GRAM(S): 4; .5 INJECTION, POWDER, LYOPHILIZED, FOR SOLUTION INTRAVENOUS at 00:22

## 2021-10-21 RX ADMIN — PHENYLEPHRINE HYDROCHLORIDE 11.9 MICROGRAM(S)/KG/MIN: 10 INJECTION INTRAVENOUS at 19:18

## 2021-10-21 RX ADMIN — ENOXAPARIN SODIUM 40 MILLIGRAM(S): 100 INJECTION SUBCUTANEOUS at 15:50

## 2021-10-21 RX ADMIN — Medication 100 MILLIEQUIVALENT(S): at 12:49

## 2021-10-21 RX ADMIN — SODIUM CHLORIDE 500 MILLILITER(S): 9 INJECTION, SOLUTION INTRAVENOUS at 14:05

## 2021-10-21 RX ADMIN — Medication 81 MILLIGRAM(S): at 15:49

## 2021-10-21 RX ADMIN — PHENYLEPHRINE HYDROCHLORIDE 11.9 MICROGRAM(S)/KG/MIN: 10 INJECTION INTRAVENOUS at 12:03

## 2021-10-21 RX ADMIN — PHENYLEPHRINE HYDROCHLORIDE 11.9 MICROGRAM(S)/KG/MIN: 10 INJECTION INTRAVENOUS at 14:00

## 2021-10-21 NOTE — PROGRESS NOTE ADULT - SUBJECTIVE AND OBJECTIVE BOX
SUBJECTIVE: Pt seen, chart reviewed.  82 yo female , h/o MS, discharged several wks ago after eval confirmed an ischemic left left leg and Left AKA was advised and declined by patient.  revealuated on 10/15/21 by Dr Monreal, who advised admission for progression of ischemia , and was readmitted on 10/19/21 by vascular who again advised a left AKA. Sacral wound evaluated today after patient granted permission for exam  Evaluated on 10/20/21    Allergies    No Known Allergies    Intolerances            PAST MEDICAL & SURGICAL HISTORY:  Ptosis of both eyelids    Neurogenic bladder    Dysphagia  no special diet; no h/o aspiration PNA    Osteoporosis    HTN (hypertension)    MS (multiple sclerosis)    S/P hysterectomy  &gt; 30 yrs ago    S/P breast biopsy    S/P ORIF (open reduction internal fixation) fracture  left femur, right hip        HEALTH ISSUES - PROBLEM Dx:  Arterial insufficiency with ischemic ulcer            FAMILY HISTORY:      Physical Exam:  Vital Signs Last 24 Hrs  T(C): 38.3 (21 Oct 2021 07:17), Max: 38.3 (21 Oct 2021 04:45)  T(F): 101 (21 Oct 2021 04:45), Max: 101 (21 Oct 2021 04:45)  HR: 72 (21 Oct 2021 07:17) (69 - 87)  BP: 112/71 (21 Oct 2021 07:17) (94/61 - 119/69)  BP(mean): --  RR: 18 (21 Oct 2021 07:17) (18 - 18)  SpO2: 92% (21 Oct 2021 07:17) (91% - 94%)  82 yo female , at bedrest, awake , and communicative, gave permission for evaluation of sacral wound  Evaluation last admit had been declined by patient  The sacral area was dressed with a foam   There is a 2 cm US sacral wound POA, with surrounding small satellite lesions c/w pressure and moisture  related changes  S/P right AKA  Left leg per vascular  advise - offload per protocol, may continue with foam dressing, changed every 2 days  sacral Wound may be washed with soap and water    LABS:                        9.2    7.96  )-----------( 266      ( 21 Oct 2021 05:05 )             29.2     PT/INR - ( 21 Oct 2021 05:05 )   PT: 13.1 sec;   INR: 1.10 ratio         PTT - ( 21 Oct 2021 05:05 )  PTT:29.8 sec     SUBJECTIVE: Pt seen, chart reviewed.  82 yo female , h/o MS, discharged several wks ago after eval confirmed an ischemic left left leg and Left AKA was advised and declined by patient.  revealuated on 10/15/21 by Dr Monreal, who advised admission for progression of ischemia , and was readmitted on 10/19/21 by vascular who again advised a left AKA. Sacral wound evaluated today after patient granted permission for exam  Evaluated on 10/20/21  T/C with  today on 10/21/21 regarding wife's status    Allergies    No Known Allergies    Intolerances            PAST MEDICAL & SURGICAL HISTORY:  Ptosis of both eyelids    Neurogenic bladder    Dysphagia  no special diet; no h/o aspiration PNA    Osteoporosis    HTN (hypertension)    MS (multiple sclerosis)    S/P hysterectomy  &gt; 30 yrs ago    S/P breast biopsy    S/P ORIF (open reduction internal fixation) fracture  left femur, right hip        HEALTH ISSUES - PROBLEM Dx:  Arterial insufficiency with ischemic ulcer            FAMILY HISTORY:      Physical Exam:  Vital Signs Last 24 Hrs  T(C): 38.3 (21 Oct 2021 07:17), Max: 38.3 (21 Oct 2021 04:45)  T(F): 101 (21 Oct 2021 04:45), Max: 101 (21 Oct 2021 04:45)  HR: 72 (21 Oct 2021 07:17) (69 - 87)  BP: 112/71 (21 Oct 2021 07:17) (94/61 - 119/69)  BP(mean): --  RR: 18 (21 Oct 2021 07:17) (18 - 18)  SpO2: 92% (21 Oct 2021 07:17) (91% - 94%)  82 yo female , at bedrest, awake , and communicative, gave permission for evaluation of sacral wound  Evaluation last admit had been declined by patient  The sacral area was dressed with a foam   There is a 2 cm US sacral wound POA, with surrounding small satellite lesions c/w pressure and moisture  related changes  S/P right AKA  Left leg per vascular  advise - offload per protocol, may continue with foam dressing, changed every 2 days  sacral Wound may be washed with soap and water    LABS:                        9.2    7.96  )-----------( 266      ( 21 Oct 2021 05:05 )             29.2     PT/INR - ( 21 Oct 2021 05:05 )   PT: 13.1 sec;   INR: 1.10 ratio         PTT - ( 21 Oct 2021 05:05 )  PTT:29.8 sec

## 2021-10-21 NOTE — PHARMACOTHERAPY INTERVENTION NOTE - COMMENTS
81 year old female with a chronic left lower extremity wound present since an ICU admission she had in 2018. Patient was placed on Zosyn and Vancomycin 1000mg q24h for chronic wound infection. After a left AKA procedure, antibiotic orders were re-entered with vancomycin 1000mg ordered for twice daily. Recommended switching patient back to vancomycin 1000mg q24h given patient's older age and her concomitant use of zosyn.    Sandro Lucio PharmD  PGY-1 Pharmacy Resident   Spectralink: [id number]

## 2021-10-21 NOTE — PROGRESS NOTE ADULT - ASSESSMENT
80 y/o, wheelchair bound F with hx of MS, HTN, neurogenic bladder, PAD s/p RLE AKA for gangrenous wound and known LLE arterial disease presents to the ED with worsening of left lower leg wound. Being admitted for AKA planning    Plan: 80 y/o, wheelchair bound F with hx of MS, HTN, neurogenic bladder, PAD s/p RLE AKA for gangrenous wound and known LLE arterial disease presents to the ED with worsening of left lower leg wound. Being admitted for AKA planning    - OR today for L AKA   - Change fragoso intraop   - follow up blood culture     D/w attending  Vascular   [phone]

## 2021-10-21 NOTE — PROGRESS NOTE ADULT - SUBJECTIVE AND OBJECTIVE BOX
Surgery Progress Note    INTERVAl/SUBJECTIVE: No acute event overnight.     Vital Signs Last 24 Hrs  T(C): 37.9 (21 Oct 2021 01:40), Max: 37.9 (21 Oct 2021 01:40)  T(F): 100.2 (21 Oct 2021 01:40), Max: 100.2 (21 Oct 2021 01:40)  HR: 71 (21 Oct 2021 01:40) (69 - 87)  BP: 107/60 (21 Oct 2021 01:40) (94/61 - 119/69)  BP(mean): --  RR: 18 (21 Oct 2021 01:40) (17 - 18)  SpO2: 94% (21 Oct 2021 01:40) (91% - 94%)    Physical Exam:  General:  Neuro:    CV:   Abdomen:     LABS:                        10.1   8.82  )-----------( 304      ( 20 Oct 2021 07:15 )             32.0     10-20    134<L>  |  94<L>  |  14  ----------------------------<  91  3.8   |  29  |  <0.30<L>    Ca    9.4      20 Oct 2021 07:13  Phos  2.0     10-20  Mg     2.4     10-20    TPro  7.5  /  Alb  3.3  /  TBili  0.3  /  DBili  x   /  AST  39  /  ALT  37  /  AlkPhos  274<H>  10-19    PT/INR - ( 19 Oct 2021 13:09 )   PT: 13.2 sec;   INR: 1.11 ratio         PTT - ( 19 Oct 2021 13:09 )  PTT:29.7 sec  Urinalysis Basic - ( 19 Oct 2021 12:43 )    Color: Light Orange / Appearance: Slightly Turbid / S.018 / pH: x  Gluc: x / Ketone: Negative  / Bili: Negative / Urobili: 2 mg/dL   Blood: x / Protein: 30 mg/dL / Nitrite: Negative   Leuk Esterase: Large / RBC: 26 /hpf /  /HPF   Sq Epi: x / Non Sq Epi: 4 /hpf / Bacteria: Many        INs and OUTs:    10-19-21 @ 07:01  -  10-20-21 @ 07:00  --------------------------------------------------------  IN: 950 mL / OUT: 1225 mL / NET: -275 mL    10-20-21 @ 07:01  -  10-21-21 @ 02:36  --------------------------------------------------------  IN: 470 mL / OUT: 1125 mL / NET: -655 mL     Surgery Progress Note    INTERVAl/SUBJECTIVE: No acute event overnight.     Vital Signs Last 24 Hrs  T(C): 37.9 (21 Oct 2021 01:40), Max: 37.9 (21 Oct 2021 01:40)  T(F): 100.2 (21 Oct 2021 01:40), Max: 100.2 (21 Oct 2021 01:40)  HR: 71 (21 Oct 2021 01:40) (69 - 87)  BP: 107/60 (21 Oct 2021 01:40) (94/61 - 119/69)  BP(mean): --  RR: 18 (21 Oct 2021 01:40) (17 - 18)  SpO2: 94% (21 Oct 2021 01:40) (91% - 94%)    Physical Exam:  Constitutional: NAD, lying in bed  Respiratory: nonlabored, equal chest rise  Cardiovascular: RRR  Gastrointestinal: soft, nontender, nondistended, no rebound/guarding, no organomegaly  Extremities: RLE AKA incision clean, dry, and intact.   LLE with significant wound on lateral lower leg, heel. Malodorous. Sensation intact to approximately upper third of lower leg, necrotic 4/5 digits, lle in partial non reducible contraction   pt is bedridden and non ambulatory   Neurological: awake and alert    LABS:                        10.1   8.82  )-----------( 304      ( 20 Oct 2021 07:15 )             32.0     10-20    134<L>  |  94<L>  |  14  ----------------------------<  91  3.8   |  29  |  <0.30<L>    Ca    9.4      20 Oct 2021 07:13  Phos  2.0     10-20  Mg     2.4     10-20    TPro  7.5  /  Alb  3.3  /  TBili  0.3  /  DBili  x   /  AST  39  /  ALT  37  /  AlkPhos  274<H>  10-19    PT/INR - ( 19 Oct 2021 13:09 )   PT: 13.2 sec;   INR: 1.11 ratio         PTT - ( 19 Oct 2021 13:09 )  PTT:29.7 sec  Urinalysis Basic - ( 19 Oct 2021 12:43 )    Color: Light Orange / Appearance: Slightly Turbid / S.018 / pH: x  Gluc: x / Ketone: Negative  / Bili: Negative / Urobili: 2 mg/dL   Blood: x / Protein: 30 mg/dL / Nitrite: Negative   Leuk Esterase: Large / RBC: 26 /hpf /  /HPF   Sq Epi: x / Non Sq Epi: 4 /hpf / Bacteria: Many        INs and OUTs:    10-19-21 @ 07:01  -  10-20-21 @ 07:00  --------------------------------------------------------  IN: 950 mL / OUT: 1225 mL / NET: -275 mL    10-20-21 @ 07:01  -  10-21-21 @ 02:36  --------------------------------------------------------  IN: 470 mL / OUT: 1125 mL / NET: -655 mL

## 2021-10-21 NOTE — CONSULT NOTE ADULT - ATTENDING COMMENTS
Pt seen and examined with SICU team on 10/21, agree with above.    1. Postop hypotension after L AKA:  - Pt feeling well  - Dressing dry  - Plan to r/o MI given vascular history, trend hct, fluid challenge. Had AKA for wet gangrene, so could be septic. Continue antibiotics for now. As lidia is low-dose, will not place line for levo for now given risks of line placement.

## 2021-10-21 NOTE — CHART NOTE - NSCHARTNOTEFT_GEN_A_CORE
Post Operative Check    Patient is post op from an AKA with Dr. Morris, 10/21.     Vitals    T(C): 36 (10-21-21 @ 13:00), Max: 38.3 (10-21-21 @ 04:45)  HR: 71 (10-21-21 @ 13:00) (69 - 87)  BP: 85/54 (10-21-21 @ 13:00) (76/45 - 119/69)  RR: 16 (10-21-21 @ 13:00) (14 - 18)  SpO2: 100% (10-21-21 @ 13:00) (91% - 100%)      10-20 @ 07:01  -  10-21 @ 07:00  --------------------------------------------------------  IN:    IV PiggyBack: 50 mL    Oral Fluid: 360 mL    sodium chloride 0.9%: 250 mL  Total IN: 660 mL    OUT:    Indwelling Catheter - Urethral (mL): 1450 mL  Total OUT: 1450 mL    Total NET: -790 mL      10-21 @ 07:01  -  10-21 @ 13:12  --------------------------------------------------------  IN:    dextrose 5% + lactated ringers w/ Additives: 200 mL    Phenylephrine: 26.2 mL  Total IN: 226.2 mL    OUT:    Indwelling Catheter - Urethral (mL): 60 mL  Total OUT: 60 mL    Total NET: 166.2 mL          Labs                        9.0    10.44 )-----------( 314      ( 21 Oct 2021 11:35 )             28.8       CBC Full  -  ( 21 Oct 2021 11:35 )  WBC Count : 10.44 K/uL  Hemoglobin : 9.0 g/dL  Hematocrit : 28.8 %  Platelet Count - Automated : 314 K/uL  Mean Cell Volume : 90.0 fl  Mean Cell Hemoglobin : 28.1 pg  Mean Cell Hemoglobin Concentration : 31.3 gm/dL  Auto Neutrophil # : x  Auto Lymphocyte # : x  Auto Monocyte # : x  Auto Eosinophil # : x  Auto Basophil # : x  Auto Neutrophil % : x  Auto Lymphocyte % : x  Auto Monocyte % : x  Auto Eosinophil % : x  Auto Basophil % : x    Physical Exam:  Constitutional: NAD, lying in bed  Respiratory: nonlabored, equal chest rise  Extremities: RLE AKA incision clean, dry, and intact. LLE AKA site dressing c/d/i. Femoral pulses intact b/l.    Neurological: awake and alert      Patient is a 81y old Female s/p L AKA with Dr. Morris on 10/21. SICU consulted for hypotension, requiring pressor support.     Plan  - SICU Consult for hypotension  - Dressing removed in 3d  - Cont vanc/zosyn  - Kendrick exchanged, f/u urine clx  - f/u blood clx  - pain control    [id number]

## 2021-10-21 NOTE — CONSULT NOTE ADULT - SUBJECTIVE AND OBJECTIVE BOX
SICU Consultation Note  =====================================================  HPI:    82 y/o female HTN, MS, hysterectomy, R AKA who presents to the ED with chronic Left lower leg wound present since 2018 after an ICU admission for sepsis. She has been followed in the [facility] wound care center by Mounika Smith/Rah/Kishan since 2019. Yesterday, she saw her wound care doctor who contacted Dr. Morris that the wound was getting worse. Presented to ED this morning to get leg evaluated. Denies nausea, vomiting, fever, chills, shortness of breath, rapid heart rate. (19 Oct 2021 11:49)      Surgery Information  OR time:      EBL:          IV Fluids:       Blood Products:   UOP:          PAST MEDICAL & SURGICAL HISTORY:  Ptosis of both eyelids    Neurogenic bladder    Dysphagia  no special diet; no h/o aspiration PNA    Osteoporosis    HTN (hypertension)    MS (multiple sclerosis)    S/P hysterectomy  &gt; 30 yrs ago    S/P breast biopsy    S/P ORIF (open reduction internal fixation) fracture  left femur, right hip      Home Meds: Home Medications:  ascorbic acid 500 mg oral tablet: 1 tab(s) orally once a day (24 Sep 2021 11:46)  aspirin 81 mg oral tablet, chewable: 1 tab(s) orally once a day (11 Oct 2019 09:09)  baclofen 20 mg oral tablet: 1 tab(s) orally 4 times a day (24 Sep 2021 11:46)  clonazePAM 1 mg oral tablet: 1 tab(s) orally 2 times a day (24 Sep 2021 11:46)  lactobacillus acidophilus oral capsule: 1 tab(s) orally 2 times a day (24 Sep 2021 11:46)  omeprazole 20 mg oral delayed release tablet: 1 tab(s) orally 2 times a day (28 Sep 2019 23:37)  polyethylene glycol 3350 oral powder for reconstitution: 17 gram(s) orally 2 times a day (24 Sep 2021 11:46)  Tylenol 500 mg oral tablet:  (28 Sep 2019 23:37)    Allergies: Allergies    No Known Allergies    Intolerances      Soc:   Advanced Directives: Presumed Full Code     ROS:    REVIEW OF SYSTEMS    [ ] A ten-point review of systems was otherwise negative except as noted.  [ ] Due to altered mental status/intubation, subjective information were not able to be obtained from the patient. History was obtained, to the extent possible, from review of the chart and collateral sources of information.      CURRENT MEDICATIONS:   --------------------------------------------------------------------------------------  Neurologic Medications  acetaminophen     Tablet .. 650 milliGRAM(s) Oral every 6 hours PRN Mild Pain (1 - 3)  baclofen 10 milliGRAM(s) Oral four times a day  clonazePAM  Tablet 1 milliGRAM(s) Oral two times a day  HYDROmorphone  Injectable 0.25 milliGRAM(s) IV Push every 10 minutes PRN Moderate Pain (4 - 6)  HYDROmorphone  Injectable 0.25 milliGRAM(s) IV Push every 4 hours PRN breakthrough pain  oxyCODONE    IR 5 milliGRAM(s) Oral every 4 hours PRN Moderate Pain (4 - 6)  oxyCODONE    IR 10 milliGRAM(s) Oral every 6 hours PRN Severe Pain (7 - 10)  traMADol 25 milliGRAM(s) Oral four times a day    Respiratory Medications    Cardiovascular Medications  amLODIPine   Tablet 10 milliGRAM(s) Oral daily  ATENolol  Tablet 50 milliGRAM(s) Oral every 24 hours  phenylephrine    Infusion 0.5 MICROgram(s)/kG/Min IV Continuous <Continuous>    Gastrointestinal Medications  dextrose 5% + lactated ringers with potassium chloride 20 mEq/L 1000 milliLiter(s) IV Continuous <Continuous>  pantoprazole    Tablet 40 milliGRAM(s) Oral before breakfast  polyethylene glycol 3350 17 Gram(s) Oral daily PRN Constipation  potassium chloride  10 mEq/100 mL IVPB 10 milliEquivalent(s) IV Intermittent every 1 hour    Genitourinary Medications    Hematologic/Oncologic Medications  aspirin  chewable 81 milliGRAM(s) Oral daily  enoxaparin Injectable 40 milliGRAM(s) SubCutaneous every 24 hours  influenza   Vaccine 0.5 milliLiter(s) IntraMuscular once    Antimicrobial/Immunologic Medications  piperacillin/tazobactam IVPB.. 3.375 Gram(s) IV Intermittent every 8 hours  vancomycin  IVPB 1000 milliGRAM(s) IV Intermittent every 12 hours    Endocrine/Metabolic Medications  atorvastatin 20 milliGRAM(s) Oral at bedtime    Topical/Other Medications    --------------------------------------------------------------------------------------    VITAL SIGNS, INS/OUTS (last 24 hours):  --------------------------------------------------------------------------------------  ICU Vital Signs Last 24 Hrs  T(C): 36 (21 Oct 2021 13:00), Max: 38.3 (21 Oct 2021 04:45)  T(F): 96.8 (21 Oct 2021 13:00), Max: 101 (21 Oct 2021 04:45)  HR: 71 (21 Oct 2021 13:00) (69 - 87)  BP: 85/54 (21 Oct 2021 13:00) (76/45 - 119/69)  BP(mean): 66 (21 Oct 2021 13:00) (56 - 70)  ABP: --  ABP(mean): --  RR: 16 (21 Oct 2021 13:00) (14 - 18)  SpO2: 100% (21 Oct 2021 13:00) (92% - 100%)    I&O's Summary    20 Oct 2021 07:01  -  21 Oct 2021 07:00  --------------------------------------------------------  IN: 660 mL / OUT: 1450 mL / NET: -790 mL    21 Oct 2021 07:01  -  21 Oct 2021 13:39  --------------------------------------------------------  IN: 226.2 mL / OUT: 60 mL / NET: 166.2 mL      --------------------------------------------------------------------------------------    EXAM:  General/Neuro  RASS:   GCS:   Exam: Normal, NAD, alert, oriented x 3, no focal deficits. PERRLA  ***    Respiratory  Exam: Lungs clear to auscultation, Normal expansion/effort.  ***  [] Tracheostomy   [] Intubated  Mechanical Ventilation:     Cardiovascular  Exam: S1, S2.  Regular rate and rhythm.  Peripheral edema  ***  Cardiac Rhythm: Normal Sinus Rhythm  ECHO:     GI  Exam: Abdomen soft, Non-tender, Non-distended.  Gastrostomy / Jejunostomy tube in place.  Nasogastric tube in place.  Colostomy / Ileostomy.  ***  Wound:   ***  Current Diet:  NPO***      Tubes/Lines/Drains  ***  [x] Peripheral IV  [] Central Venous Line     	[] R	[] L	[] IJ	[] Fem	[] SC        Type:	    Date Placed:   [] Arterial Line		[] R	[] L	[] Fem	[] Rad	[] Ax	Date Placed:   [] PICC:         	[] Midline		[] Mediport           [] Urinary Catheter		Date Placed:     Extremities  Exam: Extremities warm, pink, well-perfused.        Derm:  Exam: Good skin turgor, no skin breakdown.      :   Exam: Kendrick catheter in place.     LABS  --------------------------------------------------------------------------------------  Labs:  CAPILLARY BLOOD GLUCOSE                              9.0    10.44 )-----------( 314      ( 21 Oct 2021 11:35 )             28.8         10-21    136  |  95<L>  |  10  ----------------------------<  116<H>  3.5   |  28  |  0.33<L>      Calcium, Total Serum: 8.5 mg/dL (10-21-21 @ 11:35)      LFTs:     Blood Gas Venous - Lactate: 2.3 mmol/L (10-19-21 @ 12:27)      Coags:     13.1   ----< 1.10    ( 21 Oct 2021 05:05 )     29.8                    Culture - Urine (collected 19 Oct 2021 17:01)  Source: Catheterized Catheterized  Final Report (21 Oct 2021 01:07):    >=3 organisms. Probable collection contamination.    Culture - Blood (collected 19 Oct 2021 17:01)  Source: .Blood Blood-Peripheral  Preliminary Report (20 Oct 2021 18:01):    No growth to date.    Culture - Blood (collected 19 Oct 2021 17:01)  Source: .Blood Blood-Peripheral  Preliminary Report (20 Oct 2021 18:01):    No growth to date.        --------------------------------------------------------------------------------------    OTHER LABS    IMAGING RESULTS      ASSESSMENT:  81y Female ***    PLAN:   Neurologic:   Respiratory:   Cardiovascular:   Gastrointestinal/Nutrition:   Renal/Genitourinary:   Hematologic:   Infectious Disease:   Lines/Tubes:  Endocrine:   Disposition:     --------------------------------------------------------------------------------------    Critical Care Diagnoses:     SICU Consultation Note  =====================================================  HPI:    82 y/o female HTN, MS, hysterectomy, R AKA who presents to the ED with LLE nonhealing wound with wet gangrene now s/p L AKA. Patient hypoxic in the preoperative period and hypotensive in the postop period requiring a umesh gtt. SICU consulted for hemodynamic support.    PAST MEDICAL & SURGICAL HISTORY:  Ptosis of both eyelids    Neurogenic bladder    Dysphagia  no special diet; no h/o aspiration PNA    Osteoporosis    HTN (hypertension)    MS (multiple sclerosis)    S/P hysterectomy  &gt; 30 yrs ago    S/P breast biopsy    S/P ORIF (open reduction internal fixation) fracture  left femur, right hip      Home Meds: Home Medications:  ascorbic acid 500 mg oral tablet: 1 tab(s) orally once a day (24 Sep 2021 11:46)  aspirin 81 mg oral tablet, chewable: 1 tab(s) orally once a day (11 Oct 2019 09:09)  baclofen 20 mg oral tablet: 1 tab(s) orally 4 times a day (24 Sep 2021 11:46)  clonazePAM 1 mg oral tablet: 1 tab(s) orally 2 times a day (24 Sep 2021 11:46)  lactobacillus acidophilus oral capsule: 1 tab(s) orally 2 times a day (24 Sep 2021 11:46)  omeprazole 20 mg oral delayed release tablet: 1 tab(s) orally 2 times a day (28 Sep 2019 23:37)  polyethylene glycol 3350 oral powder for reconstitution: 17 gram(s) orally 2 times a day (24 Sep 2021 11:46)  Tylenol 500 mg oral tablet:  (28 Sep 2019 23:37)    Allergies: Allergies  NKDA    Soc:   Advanced Directives: Presumed Full Code     ROS:    REVIEW OF SYSTEMS    [x] A ten-point review of systems was otherwise negative except as noted.  [ ] Due to altered mental status/intubation, subjective information were not able to be obtained from the patient. History was obtained, to the extent possible, from review of the chart and collateral sources of information.      CURRENT MEDICATIONS:   --------------------------------------------------------------------------------------  Neurologic Medications  acetaminophen     Tablet .. 650 milliGRAM(s) Oral every 6 hours PRN Mild Pain (1 - 3)  baclofen 10 milliGRAM(s) Oral four times a day  clonazePAM  Tablet 1 milliGRAM(s) Oral two times a day  HYDROmorphone  Injectable 0.25 milliGRAM(s) IV Push every 10 minutes PRN Moderate Pain (4 - 6)  HYDROmorphone  Injectable 0.25 milliGRAM(s) IV Push every 4 hours PRN breakthrough pain  oxyCODONE    IR 5 milliGRAM(s) Oral every 4 hours PRN Moderate Pain (4 - 6)  oxyCODONE    IR 10 milliGRAM(s) Oral every 6 hours PRN Severe Pain (7 - 10)  traMADol 25 milliGRAM(s) Oral four times a day    Respiratory Medications    Cardiovascular Medications  amLODIPine   Tablet 10 milliGRAM(s) Oral daily  ATENolol  Tablet 50 milliGRAM(s) Oral every 24 hours  phenylephrine    Infusion 0.5 MICROgram(s)/kG/Min IV Continuous <Continuous>    Gastrointestinal Medications  dextrose 5% + lactated ringers with potassium chloride 20 mEq/L 1000 milliLiter(s) IV Continuous <Continuous>  pantoprazole    Tablet 40 milliGRAM(s) Oral before breakfast  polyethylene glycol 3350 17 Gram(s) Oral daily PRN Constipation  potassium chloride  10 mEq/100 mL IVPB 10 milliEquivalent(s) IV Intermittent every 1 hour    Genitourinary Medications    Hematologic/Oncologic Medications  aspirin  chewable 81 milliGRAM(s) Oral daily  enoxaparin Injectable 40 milliGRAM(s) SubCutaneous every 24 hours  influenza   Vaccine 0.5 milliLiter(s) IntraMuscular once    Antimicrobial/Immunologic Medications  piperacillin/tazobactam IVPB.. 3.375 Gram(s) IV Intermittent every 8 hours  vancomycin  IVPB 1000 milliGRAM(s) IV Intermittent every 12 hours    Endocrine/Metabolic Medications  atorvastatin 20 milliGRAM(s) Oral at bedtime    Topical/Other Medications    --------------------------------------------------------------------------------------    VITAL SIGNS, INS/OUTS (last 24 hours):  --------------------------------------------------------------------------------------  ICU Vital Signs Last 24 Hrs  T(C): 36 (21 Oct 2021 13:00), Max: 38.3 (21 Oct 2021 04:45)  T(F): 96.8 (21 Oct 2021 13:00), Max: 101 (21 Oct 2021 04:45)  HR: 71 (21 Oct 2021 13:00) (69 - 87)  BP: 85/54 (21 Oct 2021 13:00) (76/45 - 119/69)  BP(mean): 66 (21 Oct 2021 13:00) (56 - 70)  ABP: --  ABP(mean): --  RR: 16 (21 Oct 2021 13:00) (14 - 18)  SpO2: 100% (21 Oct 2021 13:00) (92% - 100%)      I&O's Summary    20 Oct 2021 07:01  -  21 Oct 2021 07:00  --------------------------------------------------------  IN: 660 mL / OUT: 1450 mL / NET: -790 mL    21 Oct 2021 07:01  -  21 Oct 2021 13:39  --------------------------------------------------------  IN: 226.2 mL / OUT: 60 mL / NET: 166.2 mL      --------------------------------------------------------------------------------------    EXAM:  General/Neuro  Exam: Normal, NAD, alert, oriented x 3, no focal deficits. PERRLA     Respiratory  Exam: Lungs clear to auscultation, Normal expansion/effort.    Cardiovascular  Exam: S1, S2.  Regular rate and rhythm.    Cardiac Rhythm: Normal Sinus Rhythm    GI  Exam: Abdomen soft, Non-tender, Non-distended.    Current Diet:  NPO      Tubes/Lines/Drains   [x] Peripheral IV     [x] Urinary Catheter	    Extremities  Exam: R AKA incision well-healed, L AKA dressing c/d/i, no strikethrough    Derm:  Exam: Good skin turgor, no skin breakdown.      :   Exam: Kendrick catheter in place. Clear yellow urine    LABS  --------------------------------------------------------------------------------------  Labs:                        9.0    10.44 )-----------( 314      ( 21 Oct 2021 11:35 )             28.8         10-21    136  |  95<L>  |  10  ----------------------------<  116<H>  3.5   |  28  |  0.33<L>      Calcium, Total Serum: 8.5 mg/dL (10-21-21 @ 11:35)      LFTs:     Blood Gas Venous - Lactate: 2.3 mmol/L (10-19-21 @ 12:27)      Coags:     13.1   ----< 1.10    ( 21 Oct 2021 05:05 )     29.8        Culture - Urine (collected 19 Oct 2021 17:01)  Source: Catheterized Catheterized  Final Report (21 Oct 2021 01:07):    >=3 organisms. Probable collection contamination.    Culture - Blood (collected 19 Oct 2021 17:01)  Source: .Blood Blood-Peripheral  Preliminary Report (20 Oct 2021 18:01):    No growth to date.    Culture - Blood (collected 19 Oct 2021 17:01)  Source: .Blood Blood-Peripheral  Preliminary Report (20 Oct 2021 18:01):    No growth to date.      --------------------------------------------------------------------------------------    ASSESSMENT:  82 y/o female HTN, MS, hysterectomy, R AKA who presents to the ED with LLE nonhealing wound with wet gangrene now s/p L AKA. Patient hypoxic in the preoperative period and hypotensive in the postop period requiring a umesh gtt. SICU consulted for hemodynamic support.    PLAN:     Neurologic: h/o MS  - AAO x3  - Multimodal pain control with tylenol, tramadol, oxycodone, dilaudid  - C/w home clonazepam     Respiratory:  - Monitor pulse oximetry  - 2L NC    Cardiovascular: h/o HTN  - Umesh gtt   - c/w home atorvastatin  - Holding home atenolol and amlodipine i/s/o hypotension  - C/w home ASA    Gastrointestinal/Nutrition:   - NPO  - Miralax  - Protonix     Renal/Genitourinary:   - Kendrick in place  - Trend electrolytes    Hematologic:   - Trend H&H  - DVT ppx with lovenox    Infectious Disease: wet gangrene  - C/w vancomycin 1g qdaily and zosyn    Endocrine:   - Monitor glucose on BMP    Disposition: SICU    --------------------------------------------------------------------------------------    Critical Care Diagnoses:

## 2021-10-21 NOTE — PROGRESS NOTE ADULT - SUBJECTIVE AND OBJECTIVE BOX
CARDIOLOGY     PROGRESS  NOTE   ________________________________________________    CHIEF COMPLAINT:Patient is a 81y old  Female who presents with a chief complaint of Left lower extremity wound (21 Oct 2021 02:36)  no complain.  	  REVIEW OF SYSTEMS:  CONSTITUTIONAL: No fever, weight loss, or fatigue  EYES: No eye pain, visual disturbances, or discharge  ENT:  No difficulty hearing, tinnitus, vertigo; No sinus or throat pain  NECK: No pain or stiffness  RESPIRATORY: No cough, wheezing, chills or hemoptysis; No Shortness of Breath  CARDIOVASCULAR: No chest pain, palpitations, passing out, dizziness, or leg swelling  GASTROINTESTINAL: No abdominal or epigastric pain. No nausea, vomiting, or hematemesis; No diarrhea or constipation. No melena or hematochezia.  GENITOURINARY: No dysuria, frequency, hematuria, or incontinence  NEUROLOGICAL: No headaches, memory loss, loss of strength, numbness, or tremors  SKIN: No itching, burning, rashes, or lesions   LYMPH Nodes: No enlarged glands  ENDOCRINE: No heat or cold intolerance; No hair loss  MUSCULOSKELETAL: No joint pain or swelling; No muscle, back, or extremity pain  PSYCHIATRIC: No depression, anxiety, mood swings, or difficulty sleeping  HEME/LYMPH: No easy bruising, or bleeding gums  ALLERGY AND IMMUNOLOGIC: No hives or eczema	    [ ] All others negative	  [ ] Unable to obtain    PHYSICAL EXAM:  T(C): 38.3 (10-21-21 @ 07:17), Max: 38.3 (10-21-21 @ 04:45)  HR: 72 (10-21-21 @ 07:17) (69 - 87)  BP: 112/71 (10-21-21 @ 07:17) (94/61 - 119/69)  RR: 18 (10-21-21 @ 07:17) (18 - 18)  SpO2: 92% (10-21-21 @ 07:17) (91% - 94%)  Wt(kg): --  I&O's Summary    20 Oct 2021 07:01  -  21 Oct 2021 07:00  --------------------------------------------------------  IN: 660 mL / OUT: 1450 mL / NET: -790 mL        Appearance: Normal	  HEENT:   Normal oral mucosa, PERRL, EOMI	  Lymphatic: No lymphadenopathy  Cardiovascular: Normal S1 S2, No JVD, No murmurs, No edema  Respiratory: Lungs clear to auscultation	  Psychiatry: A & O x 3, Mood & affect appropriate  Gastrointestinal:  Soft, Non-tender, + BS	  Skin: No rashes, No ecchymoses, No cyanosis	  Neurologic: Non-focal  Extremities: Normal range of motion, lle ischemia/ R bka  Vascular: Peripheral pulses palpable 2+ bilaterally    MEDICATIONS  (STANDING):  influenza   Vaccine 0.5 milliLiter(s) IntraMuscular once      TELEMETRY: 	    ECG:  	  RADIOLOGY:  OTHER: 	  	  LABS:	 	    CARDIAC MARKERS:                                9.2    7.96  )-----------( 266      ( 21 Oct 2021 05:05 )             29.2     10-21    135  |  94<L>  |  10  ----------------------------<  101<H>  3.1<L>   |  28  |  <0.30<L>    Ca    8.6      21 Oct 2021 05:05  Phos  3.0     10-21  Mg     2.1     10-21    TPro  7.5  /  Alb  3.3  /  TBili  0.3  /  DBili  x   /  AST  39  /  ALT  37  /  AlkPhos  274<H>  10-19    proBNP:   Lipid Profile:   HgA1c:   TSH:   PT/INR - ( 21 Oct 2021 05:05 )   PT: 13.1 sec;   INR: 1.10 ratio         PTT - ( 21 Oct 2021 05:05 )  PTT:29.8 sec  < from: 12 Lead ECG (10.19.21 @ 14:57) >  Diagnosis Line NORMAL SINUS RHYTHM  LEFT VENTRICULAR HYPERTROPHY WITH REPOLARIZATION ABNORMALITY  ABNORMAL ECG  WHEN COMPARED WITH ECG OF 23-SEP-2021 11:59,  NO SIGNIFICANT CHANGE WAS FOUND    < end of copied text >      Assessment and plan  ---------------------------  82 y/o female who presents to the ED with chronic Left lower leg wound present since 2018 after an ICU admission for sepsis. She has been followed in the [facility] wound care center by Mounika Smith/Rah/Kishan since 2019. Yesterday, she saw her wound care doctor who contacted Dr. Morris that the wound was getting worse. Presented to ED this morning to get leg evaluated. Denies nausea, vomiting, fever, chills, shortness of breath, rapid heart rate.  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  pt with no cardiac complain, no evidence of chf on exam  continue current bp meds  pt will be clear for amputation  dvt prophylaxis  continue abx  asa daily  dvt prophylaxis  replete K    	                    CARDIOLOGY     PROGRESS  NOTE   ________________________________________________    CHIEF COMPLAINT:Patient is a 81y old  Female who presents with a chief complaint of Left lower extremity wound (21 Oct 2021 02:36)  icu post op  	  REVIEW OF SYSTEMS:  CONSTITUTIONAL: No fever, weight loss, or fatigue  EYES: No eye pain, visual disturbances, or discharge  ENT:  No difficulty hearing, tinnitus, vertigo; No sinus or throat pain  NECK: No pain or stiffness  RESPIRATORY: No cough, wheezing, chills or hemoptysis; No Shortness of Breath  CARDIOVASCULAR: No chest pain, palpitations, passing out, dizziness, or leg swelling  GASTROINTESTINAL: No abdominal or epigastric pain. No nausea, vomiting, or hematemesis; No diarrhea or constipation. No melena or hematochezia.  GENITOURINARY: No dysuria, frequency, hematuria, or incontinence  NEUROLOGICAL: No headaches, memory loss, loss of strength, numbness, or tremors  SKIN: No itching, burning, rashes, or lesions   LYMPH Nodes: No enlarged glands  ENDOCRINE: No heat or cold intolerance; No hair loss  MUSCULOSKELETAL: No joint pain or swelling; No muscle, back, or extremity pain  PSYCHIATRIC: No depression, anxiety, mood swings, or difficulty sleeping  HEME/LYMPH: No easy bruising, or bleeding gums  ALLERGY AND IMMUNOLOGIC: No hives or eczema	    [ ] All others negative	  [x ] Unable to obtain    PHYSICAL EXAM:  T(C): 38.3 (10-21-21 @ 07:17), Max: 38.3 (10-21-21 @ 04:45)  HR: 72 (10-21-21 @ 07:17) (69 - 87)  BP: 112/71 (10-21-21 @ 07:17) (94/61 - 119/69)  RR: 18 (10-21-21 @ 07:17) (18 - 18)  SpO2: 92% (10-21-21 @ 07:17) (91% - 94%)  Wt(kg): --  I&O's Summary    20 Oct 2021 07:01  -  21 Oct 2021 07:00  --------------------------------------------------------  IN: 660 mL / OUT: 1450 mL / NET: -790 mL        Appearance: Normal	  HEENT:   Normal oral mucosa, PERRL, EOMI	  Lymphatic: No lymphadenopathy  Cardiovascular: Normal S1 S2, No JVD, +murmurs, No edema  Respiratory: Lungs clear to auscultation	  Psychiatry: A & O x 3, Mood & affect appropriate  Gastrointestinal:  Soft, Non-tender, + BS	  Skin: No rashes, No ecchymoses, No cyanosis	  Neurologic: Non-focal  Extremities: Normal range of motion, bl aka  Vascular: Peripheral pulses palpable 2+ bilaterally    MEDICATIONS  (STANDING):  influenza   Vaccine 0.5 milliLiter(s) IntraMuscular once      TELEMETRY: 	    ECG:  	  RADIOLOGY:  OTHER: 	  	  LABS:	 	    CARDIAC MARKERS:                                9.2    7.96  )-----------( 266      ( 21 Oct 2021 05:05 )             29.2     10-21    135  |  94<L>  |  10  ----------------------------<  101<H>  3.1<L>   |  28  |  <0.30<L>    Ca    8.6      21 Oct 2021 05:05  Phos  3.0     10-21  Mg     2.1     10-21    TPro  7.5  /  Alb  3.3  /  TBili  0.3  /  DBili  x   /  AST  39  /  ALT  37  /  AlkPhos  274<H>  10-19    proBNP:   Lipid Profile:   HgA1c:   TSH:   PT/INR - ( 21 Oct 2021 05:05 )   PT: 13.1 sec;   INR: 1.10 ratio         PTT - ( 21 Oct 2021 05:05 )  PTT:29.8 sec  < from: 12 Lead ECG (10.19.21 @ 14:57) >  Diagnosis Line NORMAL SINUS RHYTHM  LEFT VENTRICULAR HYPERTROPHY WITH REPOLARIZATION ABNORMALITY  ABNORMAL ECG  WHEN COMPARED WITH ECG OF 23-SEP-2021 11:59,  NO SIGNIFICANT CHANGE WAS FOUND    < end of copied text >      Assessment and plan  ---------------------------  80 y/o female who presents to the ED with chronic Left lower leg wound present since 2018 after an ICU admission for sepsis. She has been followed in the [facility] wound care center by Mounika Smith/Rah/Kishan since 2019. Yesterday, she saw her wound care doctor who contacted Dr. Morris that the wound was getting worse. Presented to ED this morning to get leg evaluated. Denies nausea, vomiting, fever, chills, shortness of breath, rapid heart rate.  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  pt with no cardiac complain, no evidence of chf on exam  s/p bl aka pt transferred to SICU sec to hypotension, no cardiac complain  doing well family at the bed side  continue observation  repeat ecg/ cbc

## 2021-10-22 LAB
A1C WITH ESTIMATED AVERAGE GLUCOSE RESULT: 5.5 % — SIGNIFICANT CHANGE UP (ref 4–5.6)
ANION GAP SERPL CALC-SCNC: 11 MMOL/L — SIGNIFICANT CHANGE UP (ref 5–17)
ANION GAP SERPL CALC-SCNC: 11 MMOL/L — SIGNIFICANT CHANGE UP (ref 5–17)
BUN SERPL-MCNC: 10 MG/DL — SIGNIFICANT CHANGE UP (ref 7–23)
BUN SERPL-MCNC: 6 MG/DL — LOW (ref 7–23)
CALCIUM SERPL-MCNC: 8.2 MG/DL — LOW (ref 8.4–10.5)
CALCIUM SERPL-MCNC: 8.6 MG/DL — SIGNIFICANT CHANGE UP (ref 8.4–10.5)
CHLORIDE SERPL-SCNC: 102 MMOL/L — SIGNIFICANT CHANGE UP (ref 96–108)
CHLORIDE SERPL-SCNC: 98 MMOL/L — SIGNIFICANT CHANGE UP (ref 96–108)
CO2 SERPL-SCNC: 26 MMOL/L — SIGNIFICANT CHANGE UP (ref 22–31)
CO2 SERPL-SCNC: 27 MMOL/L — SIGNIFICANT CHANGE UP (ref 22–31)
CREAT SERPL-MCNC: 0.32 MG/DL — LOW (ref 0.5–1.3)
CREAT SERPL-MCNC: <0.3 MG/DL — LOW (ref 0.5–1.3)
CULTURE RESULTS: NO GROWTH — SIGNIFICANT CHANGE UP
ESTIMATED AVERAGE GLUCOSE: 111 MG/DL — SIGNIFICANT CHANGE UP (ref 68–114)
GAS PNL BLDV: SIGNIFICANT CHANGE UP
GLUCOSE SERPL-MCNC: 112 MG/DL — HIGH (ref 70–99)
GLUCOSE SERPL-MCNC: 138 MG/DL — HIGH (ref 70–99)
HCT VFR BLD CALC: 23.2 % — LOW (ref 34.5–45)
HCT VFR BLD CALC: 24.4 % — LOW (ref 34.5–45)
HCT VFR BLD CALC: 25.9 % — LOW (ref 34.5–45)
HGB BLD-MCNC: 7.4 G/DL — LOW (ref 11.5–15.5)
HGB BLD-MCNC: 7.8 G/DL — LOW (ref 11.5–15.5)
HGB BLD-MCNC: 8.2 G/DL — LOW (ref 11.5–15.5)
MAGNESIUM SERPL-MCNC: 1.8 MG/DL — SIGNIFICANT CHANGE UP (ref 1.6–2.6)
MAGNESIUM SERPL-MCNC: 2.1 MG/DL — SIGNIFICANT CHANGE UP (ref 1.6–2.6)
MCHC RBC-ENTMCNC: 28.1 PG — SIGNIFICANT CHANGE UP (ref 27–34)
MCHC RBC-ENTMCNC: 28.7 PG — SIGNIFICANT CHANGE UP (ref 27–34)
MCHC RBC-ENTMCNC: 28.7 PG — SIGNIFICANT CHANGE UP (ref 27–34)
MCHC RBC-ENTMCNC: 31.7 GM/DL — LOW (ref 32–36)
MCHC RBC-ENTMCNC: 31.9 GM/DL — LOW (ref 32–36)
MCHC RBC-ENTMCNC: 32 GM/DL — SIGNIFICANT CHANGE UP (ref 32–36)
MCV RBC AUTO: 88.7 FL — SIGNIFICANT CHANGE UP (ref 80–100)
MCV RBC AUTO: 89.7 FL — SIGNIFICANT CHANGE UP (ref 80–100)
MCV RBC AUTO: 89.9 FL — SIGNIFICANT CHANGE UP (ref 80–100)
NIGHT BLUE STAIN TISS: SIGNIFICANT CHANGE UP
NRBC # BLD: 0 /100 WBCS — SIGNIFICANT CHANGE UP (ref 0–0)
PHOSPHATE SERPL-MCNC: 1.6 MG/DL — LOW (ref 2.5–4.5)
PHOSPHATE SERPL-MCNC: 2.2 MG/DL — LOW (ref 2.5–4.5)
PLATELET # BLD AUTO: 230 K/UL — SIGNIFICANT CHANGE UP (ref 150–400)
PLATELET # BLD AUTO: 273 K/UL — SIGNIFICANT CHANGE UP (ref 150–400)
PLATELET # BLD AUTO: 313 K/UL — SIGNIFICANT CHANGE UP (ref 150–400)
POTASSIUM SERPL-MCNC: 3.4 MMOL/L — LOW (ref 3.5–5.3)
POTASSIUM SERPL-MCNC: 4.1 MMOL/L — SIGNIFICANT CHANGE UP (ref 3.5–5.3)
POTASSIUM SERPL-SCNC: 3.4 MMOL/L — LOW (ref 3.5–5.3)
POTASSIUM SERPL-SCNC: 4.1 MMOL/L — SIGNIFICANT CHANGE UP (ref 3.5–5.3)
RBC # BLD: 2.58 M/UL — LOW (ref 3.8–5.2)
RBC # BLD: 2.72 M/UL — LOW (ref 3.8–5.2)
RBC # BLD: 2.92 M/UL — LOW (ref 3.8–5.2)
RBC # FLD: 15.4 % — HIGH (ref 10.3–14.5)
RBC # FLD: 15.5 % — HIGH (ref 10.3–14.5)
RBC # FLD: 15.6 % — HIGH (ref 10.3–14.5)
SODIUM SERPL-SCNC: 136 MMOL/L — SIGNIFICANT CHANGE UP (ref 135–145)
SODIUM SERPL-SCNC: 139 MMOL/L — SIGNIFICANT CHANGE UP (ref 135–145)
SPECIMEN SOURCE: SIGNIFICANT CHANGE UP
SPECIMEN SOURCE: SIGNIFICANT CHANGE UP
TROPONIN T, HIGH SENSITIVITY RESULT: 46 NG/L — SIGNIFICANT CHANGE UP (ref 0–51)
WBC # BLD: 14.66 K/UL — HIGH (ref 3.8–10.5)
WBC # BLD: 8.74 K/UL — SIGNIFICANT CHANGE UP (ref 3.8–10.5)
WBC # BLD: 9.5 K/UL — SIGNIFICANT CHANGE UP (ref 3.8–10.5)
WBC # FLD AUTO: 14.66 K/UL — HIGH (ref 3.8–10.5)
WBC # FLD AUTO: 8.74 K/UL — SIGNIFICANT CHANGE UP (ref 3.8–10.5)
WBC # FLD AUTO: 9.5 K/UL — SIGNIFICANT CHANGE UP (ref 3.8–10.5)

## 2021-10-22 PROCEDURE — 99232 SBSQ HOSP IP/OBS MODERATE 35: CPT

## 2021-10-22 PROCEDURE — 71045 X-RAY EXAM CHEST 1 VIEW: CPT | Mod: 26

## 2021-10-22 PROCEDURE — 93010 ELECTROCARDIOGRAM REPORT: CPT

## 2021-10-22 RX ORDER — ASCORBIC ACID 60 MG
500 TABLET,CHEWABLE ORAL DAILY
Refills: 0 | Status: DISCONTINUED | OUTPATIENT
Start: 2021-10-22 | End: 2021-11-08

## 2021-10-22 RX ORDER — HALOPERIDOL DECANOATE 100 MG/ML
1 INJECTION INTRAMUSCULAR ONCE
Refills: 0 | Status: DISCONTINUED | OUTPATIENT
Start: 2021-10-22 | End: 2021-10-23

## 2021-10-22 RX ORDER — TRAMADOL HYDROCHLORIDE 50 MG/1
25 TABLET ORAL EVERY 6 HOURS
Refills: 0 | Status: DISCONTINUED | OUTPATIENT
Start: 2021-10-22 | End: 2021-10-24

## 2021-10-22 RX ADMIN — Medication 500 MILLIGRAM(S): at 06:42

## 2021-10-22 RX ADMIN — Medication 250 MILLIMOLE(S): at 04:12

## 2021-10-22 RX ADMIN — TRAMADOL HYDROCHLORIDE 25 MILLIGRAM(S): 50 TABLET ORAL at 06:42

## 2021-10-22 RX ADMIN — CHLORHEXIDINE GLUCONATE 1 APPLICATION(S): 213 SOLUTION TOPICAL at 06:13

## 2021-10-22 RX ADMIN — Medication 81 MILLIGRAM(S): at 12:19

## 2021-10-22 RX ADMIN — PIPERACILLIN AND TAZOBACTAM 25 GRAM(S): 4; .5 INJECTION, POWDER, LYOPHILIZED, FOR SOLUTION INTRAVENOUS at 06:14

## 2021-10-22 RX ADMIN — Medication 500 MILLIGRAM(S): at 06:12

## 2021-10-22 RX ADMIN — Medication 500 MILLIGRAM(S): at 12:18

## 2021-10-22 RX ADMIN — POLYETHYLENE GLYCOL 3350 17 GRAM(S): 17 POWDER, FOR SOLUTION ORAL at 12:19

## 2021-10-22 RX ADMIN — ATORVASTATIN CALCIUM 20 MILLIGRAM(S): 80 TABLET, FILM COATED ORAL at 21:31

## 2021-10-22 RX ADMIN — PIPERACILLIN AND TAZOBACTAM 25 GRAM(S): 4; .5 INJECTION, POWDER, LYOPHILIZED, FOR SOLUTION INTRAVENOUS at 15:23

## 2021-10-22 RX ADMIN — Medication 500 MILLIGRAM(S): at 17:56

## 2021-10-22 RX ADMIN — Medication 250 MILLIMOLE(S): at 02:54

## 2021-10-22 RX ADMIN — Medication 500 MILLIGRAM(S): at 00:30

## 2021-10-22 RX ADMIN — TRAMADOL HYDROCHLORIDE 25 MILLIGRAM(S): 50 TABLET ORAL at 06:14

## 2021-10-22 RX ADMIN — Medication 1 TABLET(S): at 12:19

## 2021-10-22 RX ADMIN — Medication 500 MILLIGRAM(S): at 19:21

## 2021-10-22 RX ADMIN — PHENYLEPHRINE HYDROCHLORIDE 11.9 MICROGRAM(S)/KG/MIN: 10 INJECTION INTRAVENOUS at 06:11

## 2021-10-22 RX ADMIN — ENOXAPARIN SODIUM 40 MILLIGRAM(S): 100 INJECTION SUBCUTANEOUS at 15:24

## 2021-10-22 RX ADMIN — TRAMADOL HYDROCHLORIDE 25 MILLIGRAM(S): 50 TABLET ORAL at 00:30

## 2021-10-22 RX ADMIN — Medication 1 MILLIGRAM(S): at 17:56

## 2021-10-22 RX ADMIN — TRAMADOL HYDROCHLORIDE 25 MILLIGRAM(S): 50 TABLET ORAL at 22:01

## 2021-10-22 RX ADMIN — Medication 500 MILLIGRAM(S): at 14:14

## 2021-10-22 RX ADMIN — PANTOPRAZOLE SODIUM 40 MILLIGRAM(S): 20 TABLET, DELAYED RELEASE ORAL at 06:12

## 2021-10-22 RX ADMIN — PIPERACILLIN AND TAZOBACTAM 25 GRAM(S): 4; .5 INJECTION, POWDER, LYOPHILIZED, FOR SOLUTION INTRAVENOUS at 21:31

## 2021-10-22 RX ADMIN — TRAMADOL HYDROCHLORIDE 25 MILLIGRAM(S): 50 TABLET ORAL at 21:31

## 2021-10-22 RX ADMIN — Medication 1 MILLIGRAM(S): at 06:12

## 2021-10-22 NOTE — DIETITIAN INITIAL EVALUATION ADULT. - OTHER INFO
- Pt reports good appetite in-house.   - Denies nausea, vomiting, constipation, diarrhea. Reports last BM today. Pt currently on bowel regimen (Miralax, Senna.) - Per EMR, pt currently receiving lactobacillus acidophilus supplement in-house.  - Denies chewing difficulty. Reports sometimes difficulty swallowing with dry foods. Encouraged pt to moisten food with gravies, sauces, etc, and to communicate any changes in swallowing to team.  - Reports  lbs/56.8 kg. Denies recent wt changes PTA. Wt history: 121 lbs (10/22), 119.9 lbs (10/21 - *s/p rt AKA*), 139.9 lbs (10/20), 140 lbs (9/23/21), 125 lbs (4/23/2020). Dosing wt: 63.5 kg consistent with UBW, but not consistent with current wt s/p rt AKA.

## 2021-10-22 NOTE — DIETITIAN INITIAL EVALUATION ADULT. - PHYSCIAL ASSESSMENT
Pressure injuries per nursing flowsheets:  1) sacrum - stage III  2) L lat thigh unstageable  3) R upper buttocks unstageable  - IAD  IBW based on jae AKA. % IBW: 140%

## 2021-10-22 NOTE — PROGRESS NOTE ADULT - ATTENDING COMMENTS
Pt seen and examined with SICU team on 10/22, agree with above.    1. Postop hypotension:  - Received 1U PRBC overnight  - Remains on low-dose lidia  - TTE with LVH, unchanged  - Holding home antihypertensives  - BP not fluid responsive  - Hct responded to transfusion

## 2021-10-22 NOTE — PROGRESS NOTE ADULT - SUBJECTIVE AND OBJECTIVE BOX
Surgery Progress Note    INTERVAl/SUBJECTIVE: No acute event overnight.     Vital Signs Last 24 Hrs  T(C): 36.5 (21 Oct 2021 23:00), Max: 38.3 (21 Oct 2021 04:45)  T(F): 97.7 (21 Oct 2021 23:00), Max: 101 (21 Oct 2021 04:45)  HR: 71 (21 Oct 2021 23:30) (61 - 81)  BP: 100/54 (21 Oct 2021 23:30) (76/45 - 125/56)  BP(mean): 73 (21 Oct 2021 23:30) (56 - 81)  RR: 22 (21 Oct 2021 23:30) (14 - 28)  SpO2: 93% (21 Oct 2021 23:30) (91% - 100%)    Physical Exam:  General:  Neuro:    CV:   Abdomen:     LABS:                        9.0    10.44 )-----------( 314      ( 21 Oct 2021 11:35 )             28.8     10-21    136  |  95<L>  |  10  ----------------------------<  116<H>  3.5   |  28  |  0.33<L>    Ca    8.5      21 Oct 2021 11:35  Phos  3.5     10-21  Mg     2.4     10-21      PT/INR - ( 21 Oct 2021 05:05 )   PT: 13.1 sec;   INR: 1.10 ratio         PTT - ( 21 Oct 2021 05:05 )  PTT:29.8 sec      INs and OUTs:    10-20-21 @ 07:01  -  10-21-21 @ 07:00  --------------------------------------------------------  IN: 660 mL / OUT: 1450 mL / NET: -790 mL    10-21-21 @ 07:01  -  10-22-21 @ 00:17  --------------------------------------------------------  IN: 1853.5 mL / OUT: 915 mL / NET: 938.5 mL     Surgery Progress Note    INTERVAl/SUBJECTIVE: Hg drops from 9.0 to 7.4, received 1 unit RBC.     Vital Signs Last 24 Hrs  T(C): 36.5 (21 Oct 2021 23:00), Max: 38.3 (21 Oct 2021 04:45)  T(F): 97.7 (21 Oct 2021 23:00), Max: 101 (21 Oct 2021 04:45)  HR: 71 (21 Oct 2021 23:30) (61 - 81)  BP: 100/54 (21 Oct 2021 23:30) (76/45 - 125/56)  BP(mean): 73 (21 Oct 2021 23:30) (56 - 81)  RR: 22 (21 Oct 2021 23:30) (14 - 28)  SpO2: 93% (21 Oct 2021 23:30) (91% - 100%)    Physical Exam:  General:  Neuro:    CV:   Abdomen:     LABS:                        9.0    10.44 )-----------( 314      ( 21 Oct 2021 11:35 )             28.8     10-21    136  |  95<L>  |  10  ----------------------------<  116<H>  3.5   |  28  |  0.33<L>    Ca    8.5      21 Oct 2021 11:35  Phos  3.5     10-21  Mg     2.4     10-21      PT/INR - ( 21 Oct 2021 05:05 )   PT: 13.1 sec;   INR: 1.10 ratio         PTT - ( 21 Oct 2021 05:05 )  PTT:29.8 sec      INs and OUTs:    10-20-21 @ 07:01  -  10-21-21 @ 07:00  --------------------------------------------------------  IN: 660 mL / OUT: 1450 mL / NET: -790 mL    10-21-21 @ 07:01  -  10-22-21 @ 00:17  --------------------------------------------------------  IN: 1853.5 mL / OUT: 915 mL / NET: 938.5 mL     Surgery Progress Note    INTERVAl/SUBJECTIVE: Hg drops from 9.0 to 7.4, received 1 unit RBC.   pt states good pain control     Vital Signs Last 24 Hrs  T(C): 36.5 (21 Oct 2021 23:00), Max: 38.3 (21 Oct 2021 04:45)  T(F): 97.7 (21 Oct 2021 23:00), Max: 101 (21 Oct 2021 04:45)  HR: 71 (21 Oct 2021 23:30) (61 - 81)  BP: 100/54 (21 Oct 2021 23:30) (76/45 - 125/56)  BP(mean): 73 (21 Oct 2021 23:30) (56 - 81)  RR: 22 (21 Oct 2021 23:30) (14 - 28)  SpO2: 93% (21 Oct 2021 23:30) (91% - 100%)    Physical Exam:  lle dressing c/d/i    LABS:                        9.0    10.44 )-----------( 314      ( 21 Oct 2021 11:35 )             28.8     10-21    136  |  95<L>  |  10  ----------------------------<  116<H>  3.5   |  28  |  0.33<L>    Ca    8.5      21 Oct 2021 11:35  Phos  3.5     10-21  Mg     2.4     10-21      PT/INR - ( 21 Oct 2021 05:05 )   PT: 13.1 sec;   INR: 1.10 ratio         PTT - ( 21 Oct 2021 05:05 )  PTT:29.8 sec      INs and OUTs:    10-20-21 @ 07:01  -  10-21-21 @ 07:00  --------------------------------------------------------  IN: 660 mL / OUT: 1450 mL / NET: -790 mL    10-21-21 @ 07:01  -  10-22-21 @ 00:17  --------------------------------------------------------  IN: 1853.5 mL / OUT: 915 mL / NET: 938.5 mL

## 2021-10-22 NOTE — DIETITIAN INITIAL EVALUATION ADULT. - OTHER CALCULATIONS
fluid needs deferred to team fluid needs deferred to team. protein-energy calculations based on current wt s/p rt AKA

## 2021-10-22 NOTE — PROGRESS NOTE ADULT - SUBJECTIVE AND OBJECTIVE BOX
CARDIOLOGY     PROGRESS  NOTE   ________________________________________________    CHIEF COMPLAINT:Patient is a 81y old  Female who presents with a chief complaint of Left lower extremity wound (22 Oct 2021 04:13)  comfortable.  	  REVIEW OF SYSTEMS:  CONSTITUTIONAL: No fever, weight loss, or fatigue  EYES: No eye pain, visual disturbances, or discharge  ENT:  No difficulty hearing, tinnitus, vertigo; No sinus or throat pain  NECK: No pain or stiffness  RESPIRATORY: No cough, wheezing, chills or hemoptysis; No Shortness of Breath  CARDIOVASCULAR: No chest pain, palpitations, passing out, dizziness, or leg swelling  GASTROINTESTINAL: No abdominal or epigastric pain. No nausea, vomiting, or hematemesis; No diarrhea or constipation. No melena or hematochezia.  GENITOURINARY: No dysuria, frequency, hematuria, or incontinence  NEUROLOGICAL: No headaches, memory loss, loss of strength, numbness, or tremors  SKIN: No itching, burning, rashes, or lesions   LYMPH Nodes: No enlarged glands  ENDOCRINE: No heat or cold intolerance; No hair loss  MUSCULOSKELETAL: No joint pain or swelling; No muscle, back, or extremity pain  PSYCHIATRIC: No depression, anxiety, mood swings, or difficulty sleeping  HEME/LYMPH: No easy bruising, or bleeding gums  ALLERGY AND IMMUNOLOGIC: No hives or eczema	    [ ] All others negative	  [ ] Unable to obtain    PHYSICAL EXAM:  T(C): 36.3 (10-22-21 @ 03:00), Max: 37 (10-21-21 @ 19:00)  HR: 69 (10-22-21 @ 07:15) (61 - 81)  BP: 88/48 (10-22-21 @ 07:15) (76/45 - 125/56)  RR: 23 (10-22-21 @ 07:15) (14 - 28)  SpO2: 92% (10-22-21 @ 07:15) (91% - 100%)  Wt(kg): --  I&O's Summary    21 Oct 2021 07:01  -  22 Oct 2021 07:00  --------------------------------------------------------  IN: 3861.3 mL / OUT: 1740 mL / NET: 2121.3 mL        Appearance: Normal	  HEENT:   Normal oral mucosa, PERRL, EOMI	  Lymphatic: No lymphadenopathy  Cardiovascular: Normal S1 S2, No JVD, + murmurs, No edema  Respiratory: Lungs clear to auscultation	  Psychiatry: A & O x 3, Mood & affect appropriate  Gastrointestinal:  Soft, Non-tender, + BS	  Skin: No rashes, No ecchymoses, No cyanosis	  Neurologic: Non-focal  Extremities: Normal range of motion, s/p bka bl  Vascular: Peripheral pulses palpable 2+ bilaterally    MEDICATIONS  (STANDING):  acetaminophen     Tablet .. 500 milliGRAM(s) Oral every 6 hours  aspirin enteric coated 81 milliGRAM(s) Oral daily  atorvastatin 20 milliGRAM(s) Oral at bedtime  chlorhexidine 2% Cloths 1 Application(s) Topical <User Schedule>  clonazePAM  Tablet 1 milliGRAM(s) Oral two times a day  enoxaparin Injectable 40 milliGRAM(s) SubCutaneous every 24 hours  influenza   Vaccine 0.5 milliLiter(s) IntraMuscular once  lactated ringers. 1000 milliLiter(s) (100 mL/Hr) IV Continuous <Continuous>  lactobacillus acidophilus 1 Tablet(s) Oral daily  pantoprazole    Tablet 40 milliGRAM(s) Oral before breakfast  phenylephrine    Infusion 0.5 MICROgram(s)/kG/Min (11.9 mL/Hr) IV Continuous <Continuous>  piperacillin/tazobactam IVPB.. 3.375 Gram(s) IV Intermittent every 8 hours  polyethylene glycol 3350 17 Gram(s) Oral daily  senna 2 Tablet(s) Oral at bedtime      TELEMETRY: 	    ECG:  	  RADIOLOGY:  OTHER: 	  	  LABS:	 	    CARDIAC MARKERS:                                7.4    14.66 )-----------( 313      ( 22 Oct 2021 00:40 )             23.2     10-22    136  |  98  |  10  ----------------------------<  112<H>  4.1   |  27  |  <0.30<L>    Ca    8.6      22 Oct 2021 00:40  Phos  2.2     10-22  Mg     2.1     10-22      proBNP:   Lipid Profile:   HgA1c:   TSH:   PT/INR - ( 21 Oct 2021 05:05 )   PT: 13.1 sec;   INR: 1.10 ratio         PTT - ( 21 Oct 2021 05:05 )  PTT:29.8 sec      Assessment and plan  ---------------------------  80 y/o female who presents to the ED with chronic Left lower leg wound present since 2018 after an ICU admission for sepsis. She has been followed in the [facility] wound care center by Mounika Smith/Rah/Kishan since 2019. Yesterday, she saw her wound care doctor who contacted Dr. Morris that the wound was getting worse. Presented to ED this morning to get leg evaluated. Denies nausea, vomiting, fever, chills, shortness of breath, rapid heart rate.  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  pt with no cardiac complain, no evidence of chf on exam  s/p l  aka pt transferred to SICU sec to hypotension, no cardiac complain, ecg negative as per icu  doing well family at the bed side last night  continue observation  continue pressors keep MAP>65  transfuse keep hgb>8

## 2021-10-22 NOTE — DIETITIAN INITIAL EVALUATION ADULT. - REASON
Nutrition-focused physical examination deferred at this time - pt with stable wt hx, reporting good PO intake PTA. No overt signs of fat/muscle wasting observed

## 2021-10-22 NOTE — DIETITIAN INITIAL EVALUATION ADULT. - PERTINENT MEDS FT
MEDICATIONS  (STANDING):  acetaminophen     Tablet .. 500 milliGRAM(s) Oral every 6 hours  aspirin enteric coated 81 milliGRAM(s) Oral daily  atorvastatin 20 milliGRAM(s) Oral at bedtime  chlorhexidine 2% Cloths 1 Application(s) Topical <User Schedule>  clonazePAM  Tablet 1 milliGRAM(s) Oral two times a day  enoxaparin Injectable 40 milliGRAM(s) SubCutaneous every 24 hours  influenza   Vaccine 0.5 milliLiter(s) IntraMuscular once  lactated ringers. 1000 milliLiter(s) (100 mL/Hr) IV Continuous <Continuous>  lactobacillus acidophilus 1 Tablet(s) Oral daily  pantoprazole    Tablet 40 milliGRAM(s) Oral before breakfast  phenylephrine    Infusion 0.5 MICROgram(s)/kG/Min (11.9 mL/Hr) IV Continuous <Continuous>  piperacillin/tazobactam IVPB.. 3.375 Gram(s) IV Intermittent every 8 hours  polyethylene glycol 3350 17 Gram(s) Oral daily  senna 2 Tablet(s) Oral at bedtime    MEDICATIONS  (PRN):  traMADol 25 milliGRAM(s) Oral every 6 hours PRN Mild Pain (1 - 3)

## 2021-10-22 NOTE — DIETITIAN INITIAL EVALUATION ADULT. - ORAL INTAKE PTA/DIET HISTORY
Pt interviewed at bedside with  present, Reports good appetite PTA, not following any particular diet, "low sodium when I feel like I need it." Reports NKFA. Reports taking: vitamin c, probiotic, zinc, vitamin D, glycine, B complex, Medtrition 3 oz/day, and ginkgo supplements at home. Denies chewing/swallowing difficulty PTA.  Per pt, pt has home health aide.

## 2021-10-22 NOTE — PROGRESS NOTE ADULT - SUBJECTIVE AND OBJECTIVE BOX
SICU Daily Progress Note  =====================================================  Interval/Overnight Events:       - Left AKA (10/21)  - EKG performed  - trop downtrending; f/u  - 1 U pRBC for downtrended H/H from baseline to try and wean pressor    HPI:  80 y/o female who presents to the ED with chronic Left lower leg wound present since 2018 after an ICU admission for sepsis. She has been followed in the [facility] wound care center by Mounika Smith/Rah/Kishan since 2019. Yesterday, she saw her wound care doctor who contacted Dr. Morris that the wound was getting worse. Presented to ED this morning to get leg evaluated. Denies nausea, vomiting, fever, chills, shortness of breath, rapid heart rate. (19 Oct 2021 11:49)      PMH:   ***    Allergies: No Known Allergies      MEDICATIONS:   --------------------------------------------------------------------------------------  Neurologic Medications  acetaminophen     Tablet .. 500 milliGRAM(s) Oral every 6 hours  clonazePAM  Tablet 1 milliGRAM(s) Oral two times a day  HYDROmorphone  Injectable 0.25 milliGRAM(s) IV Push every 4 hours PRN breakthrough pain  oxyCODONE    IR 5 milliGRAM(s) Oral every 4 hours PRN Moderate Pain (4 - 6)  oxyCODONE    IR 10 milliGRAM(s) Oral every 6 hours PRN Severe Pain (7 - 10)  traMADol 25 milliGRAM(s) Oral four times a day    Respiratory Medications    Cardiovascular Medications  phenylephrine    Infusion 0.5 MICROgram(s)/kG/Min IV Continuous <Continuous>    Gastrointestinal Medications  lactated ringers. 1000 milliLiter(s) IV Continuous <Continuous>  pantoprazole    Tablet 40 milliGRAM(s) Oral before breakfast  polyethylene glycol 3350 17 Gram(s) Oral daily  senna 2 Tablet(s) Oral at bedtime    Genitourinary Medications    Hematologic/Oncologic Medications  aspirin enteric coated 81 milliGRAM(s) Oral daily  enoxaparin Injectable 40 milliGRAM(s) SubCutaneous every 24 hours  influenza   Vaccine 0.5 milliLiter(s) IntraMuscular once    Antimicrobial/Immunologic Medications  piperacillin/tazobactam IVPB.. 3.375 Gram(s) IV Intermittent every 8 hours    Endocrine/Metabolic Medications  atorvastatin 20 milliGRAM(s) Oral at bedtime    Topical/Other Medications  chlorhexidine 2% Cloths 1 Application(s) Topical <User Schedule>  lactobacillus acidophilus 1 Tablet(s) Oral daily    --------------------------------------------------------------------------------------    VITAL SIGNS, INS/OUTS (last 24 hours):  --------------------------------------------------------------------------------------  ICU Vital Signs Last 24 Hrs  T(C): 36.3 (22 Oct 2021 03:00), Max: 38.3 (21 Oct 2021 04:45)  T(F): 97.3 (22 Oct 2021 03:00), Max: 101 (21 Oct 2021 04:45)  HR: 65 (22 Oct 2021 04:00) (61 - 81)  BP: 97/50 (22 Oct 2021 04:00) (76/45 - 125/56)  BP(mean): 70 (22 Oct 2021 04:00) (56 - 81)  ABP: --  ABP(mean): --  RR: 22 (22 Oct 2021 04:00) (14 - 28)  SpO2: 92% (22 Oct 2021 04:00) (91% - 100%)  --------------------------------------------------------------------------------------    EXAM:  General/Neuro  Exam: Normal, NAD, alert, oriented x 3, no focal deficits. PERRLA     Respiratory  Exam: Lungs clear to auscultation, Normal expansion/effort.    Cardiovascular  Exam: S1, S2.  Regular rate and rhythm.    Cardiac Rhythm: Normal Sinus Rhythm    GI  Exam: Abdomen soft, Non-tender, Non-distended.    Current Diet:  NPO      Tubes/Lines/Drains   [x] Peripheral IV     [x] Urinary Catheter	    Extremities  Exam: R AKA incision well-healed, L AKA dressing c/d/i, no strikethrough    Derm:  Exam: Good skin turgor, no skin breakdown.      :   Exam: Kendrick catheter in place. Clear yellow urine    METABOLIC/FLUIDS/ELECTROLYTES  lactated ringers. 1000 milliLiter(s) IV Continuous <Continuous>      HEMATOLOGIC  [x] VTE Prophylaxis: aspirin enteric coated 81 milliGRAM(s) Oral daily  enoxaparin Injectable 40 milliGRAM(s) SubCutaneous every 24 hours    Transfusions:	[] PRBC	[] Platelets		[] FFP	[] Cryoprecipitate    INFECTIOUS DISEASE  Antimicrobials/Immunologic Medications:  influenza   Vaccine 0.5 milliLiter(s) IntraMuscular once  piperacillin/tazobactam IVPB.. 3.375 Gram(s) IV Intermittent every 8 hours    Day #      of     ***    Tubes/Lines/Drains  ***  [x] Peripheral IV  [] Central Venous Line     	[] R	[] L	[] IJ	[] Fem	[] SC	Date Placed:   [] Arterial Line		[] R	[] L	[] Fem	[] Rad	[] Ax	Date Placed:   [] PICC		[] Midline		[] Mediport  [] Urinary Catheter		Date Placed:   [x] Necessity of urinary, arterial, and venous catheters discussed    LABS  --------------------------------------------------------------------------------------  ((Insert SICU Labs here))***  --------------------------------------------------------------------------------------    OTHER LABORATORY:     IMAGING STUDIES:   CXR:       NEURO:  -Pain control: standing tramadol; tylenol, oxy prn; dilaudid for breakthrough  - C/w home clonazepam     RESPIRATORY:  -Monitor O2 sat    CARDIOVASCULAR: h/o HTN  - Umesh gtt   - c/w home atorvastatin  - Holding home atenolol and amlodipine i/s/o hypotension    GASTROINTESTINAL/NUTRITION:  -NPO  -Protonix 40mg daily  -Miralax    /RENAL:  -LR @ 100ml/hr- can likely IVL once tolerating diet  -Monitor I&Os and electrolytes  -Aroldo in place    HEMATOLOGY:  -ASA 81  -Lovenox for VTE ppx  -Trend H&H    INFECTIOUS DISEASE:  -Vanc  -Zosyn    ENDOCRINE:  - Monitor glucose on BMP    DISPO: SICU

## 2021-10-22 NOTE — DIETITIAN INITIAL EVALUATION ADULT. - ORAL NUTRITION SUPPLEMENTS
would recommend Vitamin C for wound healing, Eder 2x/day for wound healing, and multivitamin as medically feasible would recommend Vitamin C for wound healing, Eder 2x/day for wound healing, multivitamin as medically feasible. Mighty shake 2x/day added by RD

## 2021-10-22 NOTE — DIETITIAN INITIAL EVALUATION ADULT. - PERTINENT LABORATORY DATA
10-22 Na 136 mmol/L  Glu 112 mg/dL<H>  K+ 4.1 mmol/L  Cr <0.30 mg/dL<L>  BUN 10 mg/dL  Phos 2.2 mg/dL<L>

## 2021-10-22 NOTE — DIETITIAN INITIAL EVALUATION ADULT. - WEIGHT CHANGE
Reports low back pain radiating to right leg acute on chronic 7/10 took 600mg of ibuprofen at 0630.  
no

## 2021-10-22 NOTE — PROGRESS NOTE ADULT - ASSESSMENT
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 Patient hypoxic in the preoperative period and hypotensive in the postop period requiring a lidia gtt. Transferred to SICU for hemodynamic support.    Plan: 80 y/o female HTN, MS, hysterectomy, R AKA who presents to the ED with LLE nonhealing wound with wet gangrene now s/p L AKA on 10/21/2021. Patient hypoxic in the preoperative period and hypotensive in the postop period requiring a lidia gtt. Transferred to SICU for hemodynamic support.    Plan: pain management   will remove dressing pod 3  continue sicu supportive care  will follow

## 2021-10-22 NOTE — PROGRESS NOTE ADULT - ATTENDING COMMENTS
I Sanjiv Morris MD have seen and examined the patient today and agree with  the  evaluation, assessment and plan of the surgical house officer  RAN Morris MD have personally seen and examined the patient at bedside today at 7 am

## 2021-10-22 NOTE — DIETITIAN INITIAL EVALUATION ADULT. - CHIEF COMPLAINT
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 Patient hypoxic in the preoperative period and hypotensive in the postop period requiring a lidia gtt. Transferred to SICU for hemodynamic support.  Pt also has pre-existing right AKA

## 2021-10-23 LAB
ANION GAP SERPL CALC-SCNC: 11 MMOL/L — SIGNIFICANT CHANGE UP (ref 5–17)
BLD GP AB SCN SERPL QL: NEGATIVE — SIGNIFICANT CHANGE UP
BUN SERPL-MCNC: 5 MG/DL — LOW (ref 7–23)
CALCIUM SERPL-MCNC: 8.6 MG/DL — SIGNIFICANT CHANGE UP (ref 8.4–10.5)
CHLORIDE SERPL-SCNC: 105 MMOL/L — SIGNIFICANT CHANGE UP (ref 96–108)
CO2 SERPL-SCNC: 24 MMOL/L — SIGNIFICANT CHANGE UP (ref 22–31)
CREAT SERPL-MCNC: 0.34 MG/DL — LOW (ref 0.5–1.3)
GLUCOSE SERPL-MCNC: 109 MG/DL — HIGH (ref 70–99)
HCT VFR BLD CALC: 23.3 % — LOW (ref 34.5–45)
HCT VFR BLD CALC: 23.9 % — LOW (ref 34.5–45)
HGB BLD-MCNC: 7.1 G/DL — LOW (ref 11.5–15.5)
HGB BLD-MCNC: 7.5 G/DL — LOW (ref 11.5–15.5)
MAGNESIUM SERPL-MCNC: 2 MG/DL — SIGNIFICANT CHANGE UP (ref 1.6–2.6)
MCHC RBC-ENTMCNC: 27.7 PG — SIGNIFICANT CHANGE UP (ref 27–34)
MCHC RBC-ENTMCNC: 28.3 PG — SIGNIFICANT CHANGE UP (ref 27–34)
MCHC RBC-ENTMCNC: 30.5 GM/DL — LOW (ref 32–36)
MCHC RBC-ENTMCNC: 31.4 GM/DL — LOW (ref 32–36)
MCV RBC AUTO: 90.2 FL — SIGNIFICANT CHANGE UP (ref 80–100)
MCV RBC AUTO: 91 FL — SIGNIFICANT CHANGE UP (ref 80–100)
NRBC # BLD: 0 /100 WBCS — SIGNIFICANT CHANGE UP (ref 0–0)
NRBC # BLD: 0 /100 WBCS — SIGNIFICANT CHANGE UP (ref 0–0)
PHOSPHATE SERPL-MCNC: 2.3 MG/DL — LOW (ref 2.5–4.5)
PLATELET # BLD AUTO: 212 K/UL — SIGNIFICANT CHANGE UP (ref 150–400)
PLATELET # BLD AUTO: 227 K/UL — SIGNIFICANT CHANGE UP (ref 150–400)
POTASSIUM SERPL-MCNC: 3.7 MMOL/L — SIGNIFICANT CHANGE UP (ref 3.5–5.3)
POTASSIUM SERPL-SCNC: 3.7 MMOL/L — SIGNIFICANT CHANGE UP (ref 3.5–5.3)
RBC # BLD: 2.56 M/UL — LOW (ref 3.8–5.2)
RBC # BLD: 2.65 M/UL — LOW (ref 3.8–5.2)
RBC # FLD: 15.9 % — HIGH (ref 10.3–14.5)
RBC # FLD: 15.9 % — HIGH (ref 10.3–14.5)
RH IG SCN BLD-IMP: POSITIVE — SIGNIFICANT CHANGE UP
SODIUM SERPL-SCNC: 140 MMOL/L — SIGNIFICANT CHANGE UP (ref 135–145)
WBC # BLD: 6.59 K/UL — SIGNIFICANT CHANGE UP (ref 3.8–10.5)
WBC # BLD: 7.27 K/UL — SIGNIFICANT CHANGE UP (ref 3.8–10.5)
WBC # FLD AUTO: 6.59 K/UL — SIGNIFICANT CHANGE UP (ref 3.8–10.5)
WBC # FLD AUTO: 7.27 K/UL — SIGNIFICANT CHANGE UP (ref 3.8–10.5)

## 2021-10-23 PROCEDURE — 99233 SBSQ HOSP IP/OBS HIGH 50: CPT

## 2021-10-23 RX ORDER — MAGNESIUM SULFATE 500 MG/ML
2 VIAL (ML) INJECTION ONCE
Refills: 0 | Status: COMPLETED | OUTPATIENT
Start: 2021-10-23 | End: 2021-10-23

## 2021-10-23 RX ORDER — POTASSIUM PHOSPHATE, MONOBASIC POTASSIUM PHOSPHATE, DIBASIC 236; 224 MG/ML; MG/ML
15 INJECTION, SOLUTION INTRAVENOUS ONCE
Refills: 0 | Status: COMPLETED | OUTPATIENT
Start: 2021-10-23 | End: 2021-10-23

## 2021-10-23 RX ORDER — POTASSIUM PHOSPHATE, MONOBASIC POTASSIUM PHOSPHATE, DIBASIC 236; 224 MG/ML; MG/ML
30 INJECTION, SOLUTION INTRAVENOUS ONCE
Refills: 0 | Status: COMPLETED | OUTPATIENT
Start: 2021-10-23 | End: 2021-10-23

## 2021-10-23 RX ADMIN — Medication 1 MILLIGRAM(S): at 05:22

## 2021-10-23 RX ADMIN — Medication 500 MILLIGRAM(S): at 11:01

## 2021-10-23 RX ADMIN — Medication 81 MILLIGRAM(S): at 11:06

## 2021-10-23 RX ADMIN — Medication 1 MILLIGRAM(S): at 17:09

## 2021-10-23 RX ADMIN — Medication 500 MILLIGRAM(S): at 23:29

## 2021-10-23 RX ADMIN — PIPERACILLIN AND TAZOBACTAM 25 GRAM(S): 4; .5 INJECTION, POWDER, LYOPHILIZED, FOR SOLUTION INTRAVENOUS at 21:18

## 2021-10-23 RX ADMIN — Medication 500 MILLIGRAM(S): at 13:10

## 2021-10-23 RX ADMIN — TRAMADOL HYDROCHLORIDE 25 MILLIGRAM(S): 50 TABLET ORAL at 22:35

## 2021-10-23 RX ADMIN — PIPERACILLIN AND TAZOBACTAM 25 GRAM(S): 4; .5 INJECTION, POWDER, LYOPHILIZED, FOR SOLUTION INTRAVENOUS at 05:23

## 2021-10-23 RX ADMIN — Medication 500 MILLIGRAM(S): at 17:09

## 2021-10-23 RX ADMIN — Medication 500 MILLIGRAM(S): at 19:00

## 2021-10-23 RX ADMIN — Medication 50 GRAM(S): at 01:06

## 2021-10-23 RX ADMIN — Medication 500 MILLIGRAM(S): at 05:22

## 2021-10-23 RX ADMIN — ENOXAPARIN SODIUM 40 MILLIGRAM(S): 100 INJECTION SUBCUTANEOUS at 15:54

## 2021-10-23 RX ADMIN — TRAMADOL HYDROCHLORIDE 25 MILLIGRAM(S): 50 TABLET ORAL at 23:15

## 2021-10-23 RX ADMIN — ATORVASTATIN CALCIUM 20 MILLIGRAM(S): 80 TABLET, FILM COATED ORAL at 21:19

## 2021-10-23 RX ADMIN — SENNA PLUS 2 TABLET(S): 8.6 TABLET ORAL at 21:19

## 2021-10-23 RX ADMIN — Medication 1 TABLET(S): at 11:07

## 2021-10-23 RX ADMIN — POTASSIUM PHOSPHATE, MONOBASIC POTASSIUM PHOSPHATE, DIBASIC 83.33 MILLIMOLE(S): 236; 224 INJECTION, SOLUTION INTRAVENOUS at 03:36

## 2021-10-23 RX ADMIN — POTASSIUM PHOSPHATE, MONOBASIC POTASSIUM PHOSPHATE, DIBASIC 62.5 MILLIMOLE(S): 236; 224 INJECTION, SOLUTION INTRAVENOUS at 23:19

## 2021-10-23 RX ADMIN — PIPERACILLIN AND TAZOBACTAM 25 GRAM(S): 4; .5 INJECTION, POWDER, LYOPHILIZED, FOR SOLUTION INTRAVENOUS at 14:00

## 2021-10-23 RX ADMIN — Medication 1 TABLET(S): at 11:02

## 2021-10-23 RX ADMIN — TRAMADOL HYDROCHLORIDE 25 MILLIGRAM(S): 50 TABLET ORAL at 10:43

## 2021-10-23 RX ADMIN — PANTOPRAZOLE SODIUM 40 MILLIGRAM(S): 20 TABLET, DELAYED RELEASE ORAL at 05:22

## 2021-10-23 RX ADMIN — Medication 500 MILLIGRAM(S): at 23:19

## 2021-10-23 RX ADMIN — CHLORHEXIDINE GLUCONATE 1 APPLICATION(S): 213 SOLUTION TOPICAL at 05:23

## 2021-10-23 RX ADMIN — TRAMADOL HYDROCHLORIDE 25 MILLIGRAM(S): 50 TABLET ORAL at 10:01

## 2021-10-23 NOTE — PROGRESS NOTE ADULT - SUBJECTIVE AND OBJECTIVE BOX
SICU Daily Progress Note  =====================================================  Interval/Overnight Events:    - off pressors  - Resumed home med baclofen  - H/H did not respond appropriately to 1 U pRBC from yesterday; monitoring    HPI:  80 y/o female who presents to the ED with chronic Left lower leg wound present since 2018 after an ICU admission for sepsis. She has been followed in the [facility] wound care center by Mounika Smith/Rah/Kishan since 2019. Yesterday, she saw her wound care doctor who contacted Dr. Morris that the wound was getting worse. Presented to ED this morning to get leg evaluated. Denies nausea, vomiting, fever, chills, shortness of breath, rapid heart rate. (19 Oct 2021 11:49)    Allergies: No Known Allergies      MEDICATIONS:   --------------------------------------------------------------------------------------  Neurologic Medications  acetaminophen     Tablet .. 500 milliGRAM(s) Oral every 6 hours  clonazePAM  Tablet 1 milliGRAM(s) Oral two times a day  traMADol 25 milliGRAM(s) Oral every 6 hours PRN Mild Pain (1 - 3)    Respiratory Medications    Cardiovascular Medications  phenylephrine    Infusion 0.5 MICROgram(s)/kG/Min IV Continuous <Continuous>    Gastrointestinal Medications  ascorbic acid 500 milliGRAM(s) Oral daily  lactated ringers. 1000 milliLiter(s) IV Continuous <Continuous>  multivitamin 1 Tablet(s) Oral daily  pantoprazole    Tablet 40 milliGRAM(s) Oral before breakfast  polyethylene glycol 3350 17 Gram(s) Oral daily  potassium phosphate IVPB 30 milliMole(s) IV Intermittent once  senna 2 Tablet(s) Oral at bedtime    Genitourinary Medications    Hematologic/Oncologic Medications  aspirin enteric coated 81 milliGRAM(s) Oral daily  enoxaparin Injectable 40 milliGRAM(s) SubCutaneous every 24 hours  influenza   Vaccine 0.5 milliLiter(s) IntraMuscular once    Antimicrobial/Immunologic Medications  piperacillin/tazobactam IVPB.. 3.375 Gram(s) IV Intermittent every 8 hours    Endocrine/Metabolic Medications  atorvastatin 20 milliGRAM(s) Oral at bedtime    Topical/Other Medications  chlorhexidine 2% Cloths 1 Application(s) Topical <User Schedule>  lactobacillus acidophilus 1 Tablet(s) Oral daily    --------------------------------------------------------------------------------------  EXAM:  General/Neuro  Exam: Normal, NAD, alert, oriented x 3, no focal deficits. PERRLA     Respiratory  Exam: Lungs clear to auscultation, Normal expansion/effort.    Cardiovascular  Exam: S1, S2.  Regular rate and rhythm.    Cardiac Rhythm: Normal Sinus Rhythm    GI  Exam: Abdomen soft, Non-tender, Non-distended.    Current Diet:  NPO      Tubes/Lines/Drains   [x] Peripheral IV     [x] Urinary Catheter	    Extremities  Exam: R AKA incision well-healed, L AKA dressing c/d/i, no strikethrough    Derm:  Exam: Good skin turgor, no skin breakdown.      :   Exam: Kendrick catheter in place. Clear yellow urine    VITAL SIGNS, INS/OUTS (last 24 hours):  --------------------------------------------------------------------------------------  ICU Vital Signs Last 24 Hrs  T(C): 37.2 (22 Oct 2021 23:00), Max: 37.2 (22 Oct 2021 23:00)  T(F): 99 (22 Oct 2021 23:00), Max: 99 (22 Oct 2021 23:00)  HR: 109 (23 Oct 2021 01:00) (63 - 114)  BP: 104/52 (23 Oct 2021 01:00) (76/44 - 129/63)  BP(mean): 75 (23 Oct 2021 01:00) (55 - 90)  ABP: --  ABP(mean): --  RR: 34 (23 Oct 2021 01:00) (19 - 46)  SpO2: 90% (23 Oct 2021 01:00) (89% - 95%)    --------------------------------------------------------------------------------------  METABOLIC/FLUIDS/ELECTROLYTES  ascorbic acid 500 milliGRAM(s) Oral daily  lactated ringers. 1000 milliLiter(s) IV Continuous <Continuous>  multivitamin 1 Tablet(s) Oral daily  potassium phosphate IVPB 30 milliMole(s) IV Intermittent once      HEMATOLOGIC  [x] VTE Prophylaxis: aspirin enteric coated 81 milliGRAM(s) Oral daily  enoxaparin Injectable 40 milliGRAM(s) SubCutaneous every 24 hours    Transfusions:	[] PRBC	[] Platelets		[] FFP	[] Cryoprecipitate    INFECTIOUS DISEASE  Antimicrobials/Immunologic Medications:  influenza   Vaccine 0.5 milliLiter(s) IntraMuscular once  piperacillin/tazobactam IVPB.. 3.375 Gram(s) IV Intermittent every 8 hours    Day #      of     ***    Tubes/Lines/Drains  ***  [x] Peripheral IV  [] Central Venous Line     	[] R	[] L	[] IJ	[] Fem	[] SC	Date Placed:   [] Arterial Line		[] R	[] L	[] Fem	[] Rad	[] Ax	Date Placed:   [] PICC		[] Midline		[] Mediport  [] Urinary Catheter		Date Placed:   [x] Necessity of urinary, arterial, and venous catheters discussed    LABS  --------------------------------------------------------------------------------------  ((Insert SICU Labs here))***  --------------------------------------------------------------------------------------    OTHER LABORATORY:     IMAGING STUDIES:   CXR:     NEURO:  - Pain control: tylenol 500mg q6h, tramadol 25mg PRN  - C/w home clonazepam     RESPIRATORY:  - Monitor O2 sat    CARDIOVASCULAR: h/o HTN  - off pressors  - c/w home atorvastatin  - Holding home atenolol and amlodipine i/s/o hypotension  - C/w home ASA    GASTROINTESTINAL/NUTRITION:  - Reg diet   - Protonix 40mg daily  - Bowel regimen with miralax and senna    /RENAL:  - LR @ 100ml/hr  - Monitor I&Os and electrolytes  - Kendrick in place    HEMATOLOGY:  - Lovenox for VTE ppx  - Trend H&H  - s/p 1u PRBC (10/21)    INFECTIOUS DISEASE:  - s/p vanco (MRSA swab negative)  - Zosyn (10/19-)    ENDOCRINE: A1c 5.5  - Monitor glucose on BMP    DISPO: SICU

## 2021-10-23 NOTE — OCCUPATIONAL THERAPY INITIAL EVALUATION ADULT - ADL RETRAINING, OT EVAL
1: complete self-feeding with S/setup within 4 weeks, 2: complete face washing with S/setup within 4 weeks.

## 2021-10-23 NOTE — PROGRESS NOTE ADULT - SUBJECTIVE AND OBJECTIVE BOX
CARDIOLOGY     PROGRESS  NOTE   ________________________________________________    CHIEF COMPLAINT:Patient is a 81y old  Female who presents with a chief complaint of Left lower extremity wound (23 Oct 2021 01:52)  no complain.  	  REVIEW OF SYSTEMS:  CONSTITUTIONAL: No fever, weight loss, or fatigue  EYES: No eye pain, visual disturbances, or discharge  ENT:  No difficulty hearing, tinnitus, vertigo; No sinus or throat pain  NECK: No pain or stiffness  RESPIRATORY: No cough, wheezing, chills or hemoptysis; No Shortness of Breath  CARDIOVASCULAR: No chest pain, palpitations, passing out, dizziness, or leg swelling  GASTROINTESTINAL: No abdominal or epigastric pain. No nausea, vomiting, or hematemesis; No diarrhea or constipation. No melena or hematochezia.  GENITOURINARY: No dysuria, frequency, hematuria, or incontinence  NEUROLOGICAL: No headaches, memory loss, loss of strength, numbness, or tremors  SKIN: No itching, burning, rashes, or lesions   LYMPH Nodes: No enlarged glands  ENDOCRINE: No heat or cold intolerance; No hair loss  MUSCULOSKELETAL: No joint pain or swelling; No muscle, back, or extremity pain  PSYCHIATRIC: No depression, anxiety, mood swings, or difficulty sleeping  HEME/LYMPH: No easy bruising, or bleeding gums  ALLERGY AND IMMUNOLOGIC: No hives or eczema	    [ ] All others negative	  [ ] Unable to obtain    PHYSICAL EXAM:  T(C): 37 (10-23-21 @ 07:00), Max: 37.2 (10-22-21 @ 23:00)  HR: 99 (10-23-21 @ 09:30) (74 - 115)  BP: 116/57 (10-23-21 @ 09:30) (83/47 - 153/65)  RR: 35 (10-23-21 @ 09:30) (19 - 46)  SpO2: 96% (10-23-21 @ 09:30) (89% - 98%)  Wt(kg): --  I&O's Summary    22 Oct 2021 07:01  -  23 Oct 2021 07:00  --------------------------------------------------------  IN: 2993.5 mL / OUT: 2745 mL / NET: 248.5 mL    23 Oct 2021 07:01  -  23 Oct 2021 09:56  --------------------------------------------------------  IN: 300 mL / OUT: 400 mL / NET: -100 mL        Appearance: Normal	  HEENT:   Normal oral mucosa, PERRL, EOMI	  Lymphatic: No lymphadenopathy  Cardiovascular: Normal S1 S2, No JVD, + murmurs, No edema  Respiratory: Lungs clear to auscultation	  Psychiatry: A & O x 3, Mood & affect appropriate  Gastrointestinal:  Soft, Non-tender, + BS	  Skin: No rashes, No ecchymoses, No cyanosis	  Neurologic: Non-focal  Extremities: Normal range of motion, bl bka  Vascular: Peripheral pulses palpable 2+ bilaterally    MEDICATIONS  (STANDING):  acetaminophen     Tablet .. 500 milliGRAM(s) Oral every 6 hours  ascorbic acid 500 milliGRAM(s) Oral daily  aspirin enteric coated 81 milliGRAM(s) Oral daily  atorvastatin 20 milliGRAM(s) Oral at bedtime  chlorhexidine 2% Cloths 1 Application(s) Topical <User Schedule>  clonazePAM  Tablet 1 milliGRAM(s) Oral two times a day  enoxaparin Injectable 40 milliGRAM(s) SubCutaneous every 24 hours  influenza   Vaccine 0.5 milliLiter(s) IntraMuscular once  lactated ringers. 1000 milliLiter(s) (100 mL/Hr) IV Continuous <Continuous>  lactobacillus acidophilus 1 Tablet(s) Oral daily  multivitamin 1 Tablet(s) Oral daily  pantoprazole    Tablet 40 milliGRAM(s) Oral before breakfast  phenylephrine    Infusion 0.5 MICROgram(s)/kG/Min (11.9 mL/Hr) IV Continuous <Continuous>  piperacillin/tazobactam IVPB.. 3.375 Gram(s) IV Intermittent every 8 hours  polyethylene glycol 3350 17 Gram(s) Oral daily  senna 2 Tablet(s) Oral at bedtime      TELEMETRY: 	    ECG:  	  RADIOLOGY:  OTHER: 	  	  LABS:	 	    CARDIAC MARKERS:                                8.2    8.74  )-----------( 230      ( 22 Oct 2021 23:15 )             25.9     10-22    139  |  102  |  6<L>  ----------------------------<  138<H>  3.4<L>   |  26  |  0.32<L>    Ca    8.2<L>      22 Oct 2021 23:15  Phos  1.6     10-22  Mg     1.8     10-22      proBNP:   Lipid Profile:   HgA1c:   TSH:     - off pressors  - Resumed home med baclofen  - H/H did not respond appropriately to 1 U pRBC from yesterday; monitoring      Assessment and plan  ---------------------------  80 y/o female who presents to the ED with chronic Left lower leg wound present since 2018 after an ICU admission for sepsis. She has been followed in the [facility] wound care center by Mounika Smith/Rah/Kishan since 2019. Yesterday, she saw her wound care doctor who contacted Dr. Morris that the wound was getting worse. Presented to ED this morning to get leg evaluated. Denies nausea, vomiting, fever, chills, shortness of breath, rapid heart rate.  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  pt with no cardiac complain, no evidence of chf on exam  s/p l  aka pt transferred to SICU sec to hypotension, no cardiac complain, ecg negative as per icu  doing well family at the bed side last night  continue observation  transfuse keep hgb>8  doing better, off pressor  re started on iv abx  keep K>4

## 2021-10-23 NOTE — OCCUPATIONAL THERAPY INITIAL EVALUATION ADULT - PERTINENT HX OF CURRENT PROBLEM, REHAB EVAL
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 Patient hypoxic in the preoperative period and hypotensive in the postop period requiring a lidia gtt. Transferred to SICU for hemodynamic support.

## 2021-10-23 NOTE — OCCUPATIONAL THERAPY INITIAL EVALUATION ADULT - LIVES WITH, PROFILE
per  pt lives at home with  and has HHA 7days/week from 8a-4p. total A for dressing/toileting/grooming. pt was able to self-feed at baseline with S/setup.  pt has not been OOB for 2-3 months but prior to that pt was totalAx2  for sliding bed to chair.

## 2021-10-23 NOTE — OCCUPATIONAL THERAPY INITIAL EVALUATION ADULT - RANGE OF MOTION EXAMINATION, UPPER EXTREMITY
LUE weak due to hx MS/Left UE Passive ROM was WNL (within normal limits)/Right UE Active Assistive ROM was WFL  (within functional limits)

## 2021-10-23 NOTE — PROGRESS NOTE ADULT - SUBJECTIVE AND OBJECTIVE BOX
Surgery Progress Note    INTERVAl/SUBJECTIVE: No acute event overnight.     Vital Signs Last 24 Hrs  T(C): 37.2 (22 Oct 2021 23:00), Max: 37.2 (22 Oct 2021 23:00)  T(F): 99 (22 Oct 2021 23:00), Max: 99 (22 Oct 2021 23:00)  HR: 109 (23 Oct 2021 00:00) (63 - 114)  BP: 111/56 (23 Oct 2021 00:00) (76/44 - 129/63)  BP(mean): 80 (23 Oct 2021 00:00) (55 - 90)  RR: 36 (23 Oct 2021 00:00) (17 - 46)  SpO2: 92% (23 Oct 2021 00:00) (89% - 97%)    Physical Exam:  General:  Neuro:    CV:   Abdomen:     LABS:                        8.2    8.74  )-----------( 230      ( 22 Oct 2021 23:15 )             25.9     10-22    139  |  102  |  6<L>  ----------------------------<  138<H>  3.4<L>   |  26  |  0.32<L>    Ca    8.2<L>      22 Oct 2021 23:15  Phos  1.6     10-22  Mg     1.8     10-22      PT/INR - ( 21 Oct 2021 05:05 )   PT: 13.1 sec;   INR: 1.10 ratio         PTT - ( 21 Oct 2021 05:05 )  PTT:29.8 sec      INs and OUTs:    10-21-21 @ 07:01  -  10-22-21 @ 07:00  --------------------------------------------------------  IN: 3861.3 mL / OUT: 1740 mL / NET: 2121.3 mL    10-22-21 @ 07:01  -  10-23-21 @ 00:34  --------------------------------------------------------  IN: 1727 mL / OUT: 1395 mL / NET: 332 mL     Surgery Progress Note    INTERVAl/SUBJECTIVE: No acute event overnight. Patient seen this am. No longer requiring pressor support. States minimal pain at stump site. No other complaints.     Vital Signs Last 24 Hrs  T(C): 37.2 (22 Oct 2021 23:00), Max: 37.2 (22 Oct 2021 23:00)  T(F): 99 (22 Oct 2021 23:00), Max: 99 (22 Oct 2021 23:00)  HR: 109 (23 Oct 2021 00:00) (63 - 114)  BP: 111/56 (23 Oct 2021 00:00) (76/44 - 129/63)  BP(mean): 80 (23 Oct 2021 00:00) (55 - 90)  RR: 36 (23 Oct 2021 00:00) (17 - 46)  SpO2: 92% (23 Oct 2021 00:00) (89% - 97%)    Physical exam:  LLE: dressing in place c.d.i  Respiratory: breathing comfortably on room air.       LABS:                        8.2    8.74  )-----------( 230      ( 22 Oct 2021 23:15 )             25.9     10-22    139  |  102  |  6<L>  ----------------------------<  138<H>  3.4<L>   |  26  |  0.32<L>    Ca    8.2<L>      22 Oct 2021 23:15  Phos  1.6     10-22  Mg     1.8     10-22      PT/INR - ( 21 Oct 2021 05:05 )   PT: 13.1 sec;   INR: 1.10 ratio         PTT - ( 21 Oct 2021 05:05 )  PTT:29.8 sec      INs and OUTs:    10-21-21 @ 07:01  -  10-22-21 @ 07:00  --------------------------------------------------------  IN: 3861.3 mL / OUT: 1740 mL / NET: 2121.3 mL    10-22-21 @ 07:01  -  10-23-21 @ 00:34  --------------------------------------------------------  IN: 1727 mL / OUT: 1395 mL / NET: 332 mL

## 2021-10-23 NOTE — PROGRESS NOTE ADULT - ASSESSMENT
80 y/o female HTN, MS, hysterectomy, R AKA who presents to the ED with LLE nonhealing wound with wet gangrene now s/p L AKA on 10/21/2021. Patient hypoxic in the preoperative period and hypotensive in the postop period requiring a lidia gtt. Transferred to SICU for hemodynamic support.      Plan: 80 y/o female HTN, MS, hysterectomy, R AKA who presents to the ED with LLE nonhealing wound with wet gangrene now s/p L AKA on 10/21/2021. Patient hypoxic in the preoperative period and hypotensive in the postop period requiring a lidia gtt. Transferred to SICU for hemodynamic support.      Plan:  - Dressing change tomorrow 10/24  - Stable hg/hct, recheck tomorrow am  - Appreciate great care per SICU.    [id number]

## 2021-10-23 NOTE — PROGRESS NOTE ADULT - ATTENDING COMMENTS
Pt seen and examined with SICU team on 10/23, agree with above.    1. Postop hypotension:  - Off lidia since last night  - Holding home atenolol and norvasc for now, will restart if remains off pressors    2. Dementia:  - Per pt's aides at bedside, pt is often pleasantly confused. They note that she is at her baseline.  - Pt is on her home meds, which include klonopin standing, which was continued due to risk of withdrawal.    3. Hypophosphatemia:  - Replete and recheck    4. Posthemorrhagic anemia:  - Hb decreased again, but no apparent source of bleeding (dressings are dry, although could have had some bleeding into her thigh)  - Monitor

## 2021-10-24 LAB
ANION GAP SERPL CALC-SCNC: 11 MMOL/L — SIGNIFICANT CHANGE UP (ref 5–17)
BUN SERPL-MCNC: 5 MG/DL — LOW (ref 7–23)
CALCIUM SERPL-MCNC: 8.6 MG/DL — SIGNIFICANT CHANGE UP (ref 8.4–10.5)
CHLORIDE SERPL-SCNC: 105 MMOL/L — SIGNIFICANT CHANGE UP (ref 96–108)
CO2 SERPL-SCNC: 24 MMOL/L — SIGNIFICANT CHANGE UP (ref 22–31)
CREAT SERPL-MCNC: <0.3 MG/DL — LOW (ref 0.5–1.3)
CULTURE RESULTS: SIGNIFICANT CHANGE UP
CULTURE RESULTS: SIGNIFICANT CHANGE UP
GLUCOSE SERPL-MCNC: 99 MG/DL — SIGNIFICANT CHANGE UP (ref 70–99)
HCT VFR BLD CALC: 30 % — LOW (ref 34.5–45)
HCT VFR BLD CALC: 30.5 % — LOW (ref 34.5–45)
HGB BLD-MCNC: 10.1 G/DL — LOW (ref 11.5–15.5)
HGB BLD-MCNC: 9.6 G/DL — LOW (ref 11.5–15.5)
MAGNESIUM SERPL-MCNC: 2.1 MG/DL — SIGNIFICANT CHANGE UP (ref 1.6–2.6)
MCHC RBC-ENTMCNC: 29.3 PG — SIGNIFICANT CHANGE UP (ref 27–34)
MCHC RBC-ENTMCNC: 29.4 PG — SIGNIFICANT CHANGE UP (ref 27–34)
MCHC RBC-ENTMCNC: 32 GM/DL — SIGNIFICANT CHANGE UP (ref 32–36)
MCHC RBC-ENTMCNC: 33.1 GM/DL — SIGNIFICANT CHANGE UP (ref 32–36)
MCV RBC AUTO: 88.9 FL — SIGNIFICANT CHANGE UP (ref 80–100)
MCV RBC AUTO: 91.5 FL — SIGNIFICANT CHANGE UP (ref 80–100)
NRBC # BLD: 0 /100 WBCS — SIGNIFICANT CHANGE UP (ref 0–0)
NRBC # BLD: 0 /100 WBCS — SIGNIFICANT CHANGE UP (ref 0–0)
PHOSPHATE SERPL-MCNC: 3 MG/DL — SIGNIFICANT CHANGE UP (ref 2.5–4.5)
PLATELET # BLD AUTO: 223 K/UL — SIGNIFICANT CHANGE UP (ref 150–400)
PLATELET # BLD AUTO: 224 K/UL — SIGNIFICANT CHANGE UP (ref 150–400)
POTASSIUM SERPL-MCNC: 3.9 MMOL/L — SIGNIFICANT CHANGE UP (ref 3.5–5.3)
POTASSIUM SERPL-SCNC: 3.9 MMOL/L — SIGNIFICANT CHANGE UP (ref 3.5–5.3)
RBC # BLD: 3.28 M/UL — LOW (ref 3.8–5.2)
RBC # BLD: 3.43 M/UL — LOW (ref 3.8–5.2)
RBC # FLD: 14.9 % — HIGH (ref 10.3–14.5)
RBC # FLD: 15.6 % — HIGH (ref 10.3–14.5)
SODIUM SERPL-SCNC: 140 MMOL/L — SIGNIFICANT CHANGE UP (ref 135–145)
SPECIMEN SOURCE: SIGNIFICANT CHANGE UP
SPECIMEN SOURCE: SIGNIFICANT CHANGE UP
WBC # BLD: 6.23 K/UL — SIGNIFICANT CHANGE UP (ref 3.8–10.5)
WBC # BLD: 6.52 K/UL — SIGNIFICANT CHANGE UP (ref 3.8–10.5)
WBC # FLD AUTO: 6.23 K/UL — SIGNIFICANT CHANGE UP (ref 3.8–10.5)
WBC # FLD AUTO: 6.52 K/UL — SIGNIFICANT CHANGE UP (ref 3.8–10.5)

## 2021-10-24 PROCEDURE — 71045 X-RAY EXAM CHEST 1 VIEW: CPT | Mod: 26

## 2021-10-24 PROCEDURE — 99232 SBSQ HOSP IP/OBS MODERATE 35: CPT

## 2021-10-24 RX ORDER — TRAMADOL HYDROCHLORIDE 50 MG/1
50 TABLET ORAL EVERY 6 HOURS
Refills: 0 | Status: DISCONTINUED | OUTPATIENT
Start: 2021-10-24 | End: 2021-10-31

## 2021-10-24 RX ORDER — ATENOLOL 25 MG/1
25 TABLET ORAL DAILY
Refills: 0 | Status: DISCONTINUED | OUTPATIENT
Start: 2021-10-24 | End: 2021-11-02

## 2021-10-24 RX ORDER — ACETAMINOPHEN 500 MG
1000 TABLET ORAL EVERY 6 HOURS
Refills: 0 | Status: DISCONTINUED | OUTPATIENT
Start: 2021-10-24 | End: 2021-10-24

## 2021-10-24 RX ORDER — POTASSIUM CHLORIDE 20 MEQ
20 PACKET (EA) ORAL ONCE
Refills: 0 | Status: COMPLETED | OUTPATIENT
Start: 2021-10-24 | End: 2021-10-24

## 2021-10-24 RX ADMIN — Medication 500 MILLIGRAM(S): at 12:00

## 2021-10-24 RX ADMIN — Medication 500 MILLIGRAM(S): at 06:21

## 2021-10-24 RX ADMIN — TRAMADOL HYDROCHLORIDE 25 MILLIGRAM(S): 50 TABLET ORAL at 05:35

## 2021-10-24 RX ADMIN — ATENOLOL 25 MILLIGRAM(S): 25 TABLET ORAL at 06:24

## 2021-10-24 RX ADMIN — CHLORHEXIDINE GLUCONATE 1 APPLICATION(S): 213 SOLUTION TOPICAL at 05:42

## 2021-10-24 RX ADMIN — PANTOPRAZOLE SODIUM 40 MILLIGRAM(S): 20 TABLET, DELAYED RELEASE ORAL at 06:24

## 2021-10-24 RX ADMIN — Medication 81 MILLIGRAM(S): at 11:14

## 2021-10-24 RX ADMIN — Medication 500 MILLIGRAM(S): at 11:14

## 2021-10-24 RX ADMIN — TRAMADOL HYDROCHLORIDE 25 MILLIGRAM(S): 50 TABLET ORAL at 18:20

## 2021-10-24 RX ADMIN — PIPERACILLIN AND TAZOBACTAM 25 GRAM(S): 4; .5 INJECTION, POWDER, LYOPHILIZED, FOR SOLUTION INTRAVENOUS at 05:34

## 2021-10-24 RX ADMIN — Medication 500 MILLIGRAM(S): at 18:03

## 2021-10-24 RX ADMIN — Medication 1 MILLIGRAM(S): at 17:14

## 2021-10-24 RX ADMIN — PIPERACILLIN AND TAZOBACTAM 25 GRAM(S): 4; .5 INJECTION, POWDER, LYOPHILIZED, FOR SOLUTION INTRAVENOUS at 14:45

## 2021-10-24 RX ADMIN — PIPERACILLIN AND TAZOBACTAM 25 GRAM(S): 4; .5 INJECTION, POWDER, LYOPHILIZED, FOR SOLUTION INTRAVENOUS at 21:21

## 2021-10-24 RX ADMIN — Medication 500 MILLIGRAM(S): at 05:35

## 2021-10-24 RX ADMIN — Medication 1 TABLET(S): at 11:14

## 2021-10-24 RX ADMIN — TRAMADOL HYDROCHLORIDE 25 MILLIGRAM(S): 50 TABLET ORAL at 06:00

## 2021-10-24 RX ADMIN — SENNA PLUS 2 TABLET(S): 8.6 TABLET ORAL at 21:21

## 2021-10-24 RX ADMIN — TRAMADOL HYDROCHLORIDE 25 MILLIGRAM(S): 50 TABLET ORAL at 17:14

## 2021-10-24 RX ADMIN — Medication 1 MILLIGRAM(S): at 05:42

## 2021-10-24 RX ADMIN — Medication 500 MILLIGRAM(S): at 17:14

## 2021-10-24 RX ADMIN — Medication 20 MILLIEQUIVALENT(S): at 05:42

## 2021-10-24 RX ADMIN — ENOXAPARIN SODIUM 40 MILLIGRAM(S): 100 INJECTION SUBCUTANEOUS at 14:53

## 2021-10-24 RX ADMIN — ATORVASTATIN CALCIUM 20 MILLIGRAM(S): 80 TABLET, FILM COATED ORAL at 21:21

## 2021-10-24 NOTE — PROGRESS NOTE ADULT - SUBJECTIVE AND OBJECTIVE BOX
Subjective:   Patient seen at bedside this AM. Reports feeling well, without complaints. Denies chest pain, SOB. Tolerating diet without N/V.     24h Events:   - Overnight, no acute events    Objective:  Vital Signs  T(C): 36.7 (10-23 @ 20:00), Max: 37 (10-23 @ 07:00)  HR: 104 (10-23 @ 23:00) (91 - 115)  BP: 129/61 (10-23 @ 23:00) (85/48 - 153/65)  RR: 26 (10-23 @ 23:00) (22 - 35)  SpO2: 94% (10-23 @ 23:00) (89% - 100%)  10-22-21 @ 07:01  -  10-23-21 @ 07:00  --------------------------------------------------------  IN:  Total IN: 0 mL    OUT:    Indwelling Catheter - Urethral (mL): 2745 mL  Total OUT: 2745 mL    Total NET: -2745 mL      10-23-21 @ 07:01  -  10-24-21 @ 00:27  --------------------------------------------------------  IN:  Total IN: 0 mL    OUT:    Indwelling Catheter - Urethral (mL): 2750 mL  Total OUT: 2750 mL    Total NET: -2750 mL        Physical exam:  LLE: dressing in place c.d.i  Respiratory: breathing comfortably on room air.     Labs:                        7.5    7.27  )-----------( 227      ( 23 Oct 2021 20:19 )             23.9   10-23    140  |  105  |  5<L>  ----------------------------<  109<H>  3.7   |  24  |  0.34<L>    Ca    8.6      23 Oct 2021 20:19  Phos  2.3     10-23  Mg     2.0     10-23      CAPILLARY BLOOD GLUCOSE          Medications:   MEDICATIONS  (STANDING):  acetaminophen     Tablet .. 500 milliGRAM(s) Oral every 6 hours  ascorbic acid 500 milliGRAM(s) Oral daily  aspirin enteric coated 81 milliGRAM(s) Oral daily  atorvastatin 20 milliGRAM(s) Oral at bedtime  chlorhexidine 2% Cloths 1 Application(s) Topical <User Schedule>  clonazePAM  Tablet 1 milliGRAM(s) Oral two times a day  enoxaparin Injectable 40 milliGRAM(s) SubCutaneous every 24 hours  influenza   Vaccine 0.5 milliLiter(s) IntraMuscular once  lactobacillus acidophilus 1 Tablet(s) Oral daily  multivitamin 1 Tablet(s) Oral daily  pantoprazole    Tablet 40 milliGRAM(s) Oral before breakfast  piperacillin/tazobactam IVPB.. 3.375 Gram(s) IV Intermittent every 8 hours  polyethylene glycol 3350 17 Gram(s) Oral daily  senna 2 Tablet(s) Oral at bedtime    MEDICATIONS  (PRN):  traMADol 25 milliGRAM(s) Oral every 6 hours PRN Mild Pain (1 - 3)      Imaging:     Subjective:   Patient seen at bedside this AM. Patient reports pain is not controlled with medication. L aka dressing changed.     24h Events:   - received 1u pRBC. H/H overresponded (7.5/23.9 --> 10.1/30.5)  - on and off of lidia drip   - tachy overnight, restarted atenolol 25 mg QD      Objective:  Vital Signs  T(C): 36.7 (10-23 @ 20:00), Max: 37 (10-23 @ 07:00)  HR: 104 (10-23 @ 23:00) (91 - 115)  BP: 129/61 (10-23 @ 23:00) (85/48 - 153/65)  RR: 26 (10-23 @ 23:00) (22 - 35)  SpO2: 94% (10-23 @ 23:00) (89% - 100%)  10-22-21 @ 07:01  -  10-23-21 @ 07:00  --------------------------------------------------------  IN:  Total IN: 0 mL    OUT:    Indwelling Catheter - Urethral (mL): 2745 mL  Total OUT: 2745 mL    Total NET: -2745 mL      10-23-21 @ 07:01  -  10-24-21 @ 00:27  --------------------------------------------------------  IN:  Total IN: 0 mL    OUT:    Indwelling Catheter - Urethral (mL): 2750 mL  Total OUT: 2750 mL    Total NET: -2750 mL        Physical exam:  LLE: dressing in place c.d.i  Respiratory: breathing comfortably on room air.     Labs:                        7.5    7.27  )-----------( 227      ( 23 Oct 2021 20:19 )             23.9   10-23    140  |  105  |  5<L>  ----------------------------<  109<H>  3.7   |  24  |  0.34<L>    Ca    8.6      23 Oct 2021 20:19  Phos  2.3     10-23  Mg     2.0     10-23      CAPILLARY BLOOD GLUCOSE          Medications:   MEDICATIONS  (STANDING):  acetaminophen     Tablet .. 500 milliGRAM(s) Oral every 6 hours  ascorbic acid 500 milliGRAM(s) Oral daily  aspirin enteric coated 81 milliGRAM(s) Oral daily  atorvastatin 20 milliGRAM(s) Oral at bedtime  chlorhexidine 2% Cloths 1 Application(s) Topical <User Schedule>  clonazePAM  Tablet 1 milliGRAM(s) Oral two times a day  enoxaparin Injectable 40 milliGRAM(s) SubCutaneous every 24 hours  influenza   Vaccine 0.5 milliLiter(s) IntraMuscular once  lactobacillus acidophilus 1 Tablet(s) Oral daily  multivitamin 1 Tablet(s) Oral daily  pantoprazole    Tablet 40 milliGRAM(s) Oral before breakfast  piperacillin/tazobactam IVPB.. 3.375 Gram(s) IV Intermittent every 8 hours  polyethylene glycol 3350 17 Gram(s) Oral daily  senna 2 Tablet(s) Oral at bedtime    MEDICATIONS  (PRN):  traMADol 25 milliGRAM(s) Oral every 6 hours PRN Mild Pain (1 - 3)      Imaging:

## 2021-10-24 NOTE — PROGRESS NOTE ADULT - ASSESSMENT
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 Patient hypoxic in the preoperative period and hypotensive in the postop period requiring a lidia gtt. Transferred to SICU for hemodynamic support.      Plan:  - Dressing change today  - Stable hg/hct, recheck tomorrow am  - Appreciate great care per SICU.    900x

## 2021-10-24 NOTE — PHYSICAL THERAPY INITIAL EVALUATION ADULT - PERTINENT HX OF CURRENT PROBLEM, REHAB EVAL
Pt is a 82 y/o female admitted to [facility] on 10/19/21 who presents to the ED with chronic Left lower leg wound present since 2018 after an ICU admission for sepsis. She has been followed in the [facility] wound care center. Pt is a 82 y/o female admitted to [facility] on 10/19/21 who presents to the ED with chronic Left lower leg wound present since 2018 after an ICU admission for sepsis. She has been followed in the [facility] wound care center. Yesterday, she saw her wound care doctor who contacted Dr. Morris that the wound was getting worse. Presented to ED this morning to get leg evaluated.

## 2021-10-24 NOTE — PROGRESS NOTE ADULT - ATTENDING COMMENTS
Pt seen and examined with SICU team on 10/24, agree with above.    Pt doing well. Has been off pressors. Home atenolol restarted. Norvasc being held for now, restart if becomes hypertensive. ASA/ statin restarted for CAD. MS at baseline, on home standing klonopin. Chronic indwelling fragoso in place. Received 1U PRBC overnight with good response. Doubt active bleeding at this point given hemodynamic stability.

## 2021-10-24 NOTE — PROGRESS NOTE ADULT - SUBJECTIVE AND OBJECTIVE BOX
SICU Daily Progress Note  =====================================================  HPI:  82 y/o female who presents to the ED with chronic Left lower leg wound present since 2018 after an ICU admission for sepsis. She has been followed in the [facility] wound care center by Mounika Smith/Rah/Kishan since 2019. Yesterday, she saw her wound care doctor who contacted Dr. Morris that the wound was getting worse. Presented to ED this morning to get leg evaluated. Denies nausea, vomiting, fever, chills, shortness of breath, rapid heart rate. (19 Oct 2021 11:49)        Allergies: No Known Allergies      MEDICATIONS:   --------------------------------------------------------------------------------------  Neurologic Medications  acetaminophen     Tablet .. 500 milliGRAM(s) Oral every 6 hours  clonazePAM  Tablet 1 milliGRAM(s) Oral two times a day  traMADol 25 milliGRAM(s) Oral every 6 hours PRN Mild Pain (1 - 3)    Respiratory Medications    Cardiovascular Medications    Gastrointestinal Medications  ascorbic acid 500 milliGRAM(s) Oral daily  multivitamin 1 Tablet(s) Oral daily  pantoprazole    Tablet 40 milliGRAM(s) Oral before breakfast  polyethylene glycol 3350 17 Gram(s) Oral daily  senna 2 Tablet(s) Oral at bedtime    Genitourinary Medications    Hematologic/Oncologic Medications  aspirin enteric coated 81 milliGRAM(s) Oral daily  enoxaparin Injectable 40 milliGRAM(s) SubCutaneous every 24 hours  influenza   Vaccine 0.5 milliLiter(s) IntraMuscular once    Antimicrobial/Immunologic Medications  piperacillin/tazobactam IVPB.. 3.375 Gram(s) IV Intermittent every 8 hours    Endocrine/Metabolic Medications  atorvastatin 20 milliGRAM(s) Oral at bedtime    Topical/Other Medications  chlorhexidine 2% Cloths 1 Application(s) Topical <User Schedule>  lactobacillus acidophilus 1 Tablet(s) Oral daily    --------------------------------------------------------------------------------------    VITAL SIGNS, INS/OUTS (last 24 hours):  --------------------------------------------------------------------------------------  ICU Vital Signs Last 24 Hrs  T(C): 36.6 (24 Oct 2021 00:00), Max: 37 (23 Oct 2021 07:00)  T(F): 97.9 (24 Oct 2021 00:00), Max: 98.6 (23 Oct 2021 07:00)  HR: 110 (24 Oct 2021 01:00) (91 - 115)  BP: 146/68 (24 Oct 2021 01:00) (85/48 - 153/65)  BP(mean): 98 (24 Oct 2021 01:00) (64 - 101)  ABP: --  ABP(mean): --  RR: 27 (24 Oct 2021 01:00) (22 - 35)  SpO2: 94% (24 Oct 2021 01:00) (89% - 100%)    --------------------------------------------------------------------------------------  --------------------------------------------------------------------------------------  EXAM:  General/Neuro  Exam: Normal, NAD, alert, oriented x 3, no focal deficits. PERRLA     Respiratory  Exam: Lungs clear to auscultation, Normal expansion/effort.    Cardiovascular  Exam: S1, S2.  Regular rate and rhythm.    Cardiac Rhythm: Normal Sinus Rhythm    GI  Exam: Abdomen soft, Non-tender, Non-distended.    Current Diet:  NPO      Tubes/Lines/Drains   [x] Peripheral IV     [x] Urinary Catheter	    Extremities  Exam: R AKA incision well-healed, L AKA dressing c/d/i, no strikethrough    Derm:  Exam: Good skin turgor, no skin breakdown.      :   Exam: Kendrick catheter in place. Clear yellow urine  METABOLIC/FLUIDS/ELECTROLYTES  ascorbic acid 500 milliGRAM(s) Oral daily  multivitamin 1 Tablet(s) Oral daily      HEMATOLOGIC  [x] VTE Prophylaxis: aspirin enteric coated 81 milliGRAM(s) Oral daily  enoxaparin Injectable 40 milliGRAM(s) SubCutaneous every 24 hours    Transfusions:	[] PRBC	[] Platelets		[] FFP	[] Cryoprecipitate    INFECTIOUS DISEASE  Antimicrobials/Immunologic Medications:  influenza   Vaccine 0.5 milliLiter(s) IntraMuscular once  piperacillin/tazobactam IVPB.. 3.375 Gram(s) IV Intermittent every 8 hours    Day #      of     ***    Tubes/Lines/Drains  ***  [x] Peripheral IV  [] Central Venous Line     	[] R	[] L	[] IJ	[] Fem	[] SC	Date Placed:   [] Arterial Line		[] R	[] L	[] Fem	[] Rad	[] Ax	Date Placed:   [] PICC		[] Midline		[] Mediport  [] Urinary Catheter		Date Placed:   [x] Necessity of urinary, arterial, and venous catheters discussed    LABS  --------------------------------------------------------------------------------------  ((Insert SICU Labs here))***  --------------------------------------------------------------------------------------    OTHER LABORATORY:     IMAGING STUDIES:   CXR:     82 YO F hx of R AKA now s/p L AKA for wet gangrene    Interval/Overnight Events  - off pressors  - Resumed home med baclofen  - baseline mental status waxing and waning awareness  - received 1 U pRBC overnight for low H/H  - f/u labs    NEURO:  - Pain control: tylenol 500mg q6h, tramadol 25mg PRN  - C/w home clonazepam     RESPIRATORY:  - Monitor O2 sat    CARDIOVASCULAR: h/o HTN  - off pressors  - c/w home atorvastatin  - Holding home atenolol and amlodipine i/s/o hypotension  - C/w home ASA    GASTROINTESTINAL/NUTRITION:  - Reg diet   - Protonix 40mg daily  - Bowel regimen with miralax and senna    /RENAL:  - Monitor I&Os and electrolytes  - Kendrick in place (do not dc; catheterized at home)  - persistent hypophosphatemia (1.6) s/p repletion with gtt; pending re-draw at 20:00     HEMATOLOGY:  - Lovenox for VTE ppx  - Trend H&H    INFECTIOUS DISEASE:  - s/p vanco (MRSA swab negative)  - Zosyn (10/19-) vascular to give recs for dc on 10/25     ENDOCRINE: A1c 5.5  - Monitor glucose on BMP    DISPO: SICU

## 2021-10-24 NOTE — PROGRESS NOTE ADULT - SUBJECTIVE AND OBJECTIVE BOX
CARDIOLOGY     PROGRESS  NOTE   ________________________________________________    CHIEF COMPLAINT:Patient is a 81y old  Female who presents with a chief complaint of Left lower extremity wound (24 Oct 2021 01:25)  no complain.  	  REVIEW OF SYSTEMS:  CONSTITUTIONAL: No fever, weight loss, or fatigue  EYES: No eye pain, visual disturbances, or discharge  ENT:  No difficulty hearing, tinnitus, vertigo; No sinus or throat pain  NECK: No pain or stiffness  RESPIRATORY: No cough, wheezing, chills or hemoptysis; No Shortness of Breath  CARDIOVASCULAR: No chest pain, palpitations, passing out, dizziness, or leg swelling  GASTROINTESTINAL: No abdominal or epigastric pain. No nausea, vomiting, or hematemesis; No diarrhea or constipation. No melena or hematochezia.  GENITOURINARY: No dysuria, frequency, hematuria, or incontinence  NEUROLOGICAL: No headaches, memory loss, loss of strength, numbness, or tremors  SKIN: No itching, burning, rashes, or lesions   LYMPH Nodes: No enlarged glands  ENDOCRINE: No heat or cold intolerance; No hair loss  MUSCULOSKELETAL: No joint pain or swelling; No muscle, back, or extremity pain  PSYCHIATRIC: No depression, anxiety, mood swings, or difficulty sleeping  HEME/LYMPH: No easy bruising, or bleeding gums  ALLERGY AND IMMUNOLOGIC: No hives or eczema	    [ ] All others negative	  [ ] Unable to obtain    PHYSICAL EXAM:  T(C): 36.9 (10-24-21 @ 07:00), Max: 37 (10-23-21 @ 11:00)  HR: 82 (10-24-21 @ 09:00) (78 - 114)  BP: 109/56 (10-24-21 @ 09:00) (84/47 - 151/77)  RR: 27 (10-24-21 @ 09:00) (21 - 36)  SpO2: 94% (10-24-21 @ 09:00) (89% - 100%)  Wt(kg): --  I&O's Summary    23 Oct 2021 07:01  -  24 Oct 2021 07:00  --------------------------------------------------------  IN: 1425 mL / OUT: 4050 mL / NET: -2625 mL    24 Oct 2021 07:01  -  24 Oct 2021 09:40  --------------------------------------------------------  IN: 0 mL / OUT: 150 mL / NET: -150 mL        Appearance: Normal	  HEENT:   Normal oral mucosa, PERRL, EOMI	  Lymphatic: No lymphadenopathy  Cardiovascular: Normal S1 S2, No JVD, +murmurs, No edema  Respiratory: Lungs clear to auscultation	  Psychiatry: A & O x 3, Mood & affect appropriate  Gastrointestinal:  Soft, Non-tender, + BS	  Skin: No rashes, No ecchymoses, No cyanosis	  Neurologic: Non-focal  Extremities: Normal range of motion, bl bka  Vascular: Peripheral pulses palpable 2+ bilaterally    MEDICATIONS  (STANDING):  acetaminophen     Tablet .. 500 milliGRAM(s) Oral every 6 hours  ascorbic acid 500 milliGRAM(s) Oral daily  aspirin enteric coated 81 milliGRAM(s) Oral daily  ATENolol  Tablet 25 milliGRAM(s) Oral daily  atorvastatin 20 milliGRAM(s) Oral at bedtime  chlorhexidine 2% Cloths 1 Application(s) Topical <User Schedule>  clonazePAM  Tablet 1 milliGRAM(s) Oral two times a day  enoxaparin Injectable 40 milliGRAM(s) SubCutaneous every 24 hours  influenza   Vaccine 0.5 milliLiter(s) IntraMuscular once  lactobacillus acidophilus 1 Tablet(s) Oral daily  multivitamin 1 Tablet(s) Oral daily  pantoprazole    Tablet 40 milliGRAM(s) Oral before breakfast  piperacillin/tazobactam IVPB.. 3.375 Gram(s) IV Intermittent every 8 hours  polyethylene glycol 3350 17 Gram(s) Oral daily  senna 2 Tablet(s) Oral at bedtime      TELEMETRY: 	    ECG:  	  RADIOLOGY:  OTHER: 	  	  LABS:	 	    CARDIAC MARKERS:                                10.1   6.52  )-----------( 223      ( 24 Oct 2021 02:17 )             30.5     10-24    140  |  105  |  5<L>  ----------------------------<  99  3.9   |  24  |  <0.30<L>    Ca    8.6      24 Oct 2021 02:17  Phos  3.0     10-24  Mg     2.1     10-24      proBNP:   Lipid Profile:   HgA1c:   TSH:         Assessment and plan  ---------------------------  82 y/o female who presents to the ED with chronic Left lower leg wound present since 2018 after an ICU admission for sepsis. She has been followed in the [facility] wound care center by Mounika Smith/Rah/Kishan since 2019. Yesterday, she saw her wound care doctor who contacted Dr. Morris that the wound was getting worse. Presented to ED this morning to get leg evaluated. Denies nausea, vomiting, fever, chills, shortness of breath, rapid heart rate.  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  pt with no cardiac complain, no evidence of chf on exam  s/p l  aka pt transferred to SICU sec to hypotension, no cardiac complain, ecg negative as per icu  doing well family at the bed side last night  continue observation  transfuse keep hgb>8  doing better, off pressor  re started on iv abx  keep K>4  doing well over all  nutrion  oob to chair as tolerated

## 2021-10-24 NOTE — PHYSICAL THERAPY INITIAL EVALUATION ADULT - ADDITIONAL COMMENTS
Pt lives in a private house with spouse in a private house with ramp access. Pt owns w/c and hospital bed. Pt is non ambulatory and required assist for all ADLs.

## 2021-10-25 ENCOUNTER — TRANSCRIPTION ENCOUNTER (OUTPATIENT)
Age: 81
End: 2021-10-25

## 2021-10-25 PROBLEM — L97.923: Status: ACTIVE | Noted: 2021-10-25

## 2021-10-25 PROBLEM — I77.1 ARTERIAL INSUFFICIENCY WITH ISCHEMIC ULCER: Status: ACTIVE | Noted: 2020-01-07

## 2021-10-25 LAB
ANION GAP SERPL CALC-SCNC: 12 MMOL/L — SIGNIFICANT CHANGE UP (ref 5–17)
BUN SERPL-MCNC: 6 MG/DL — LOW (ref 7–23)
CALCIUM SERPL-MCNC: 9.1 MG/DL — SIGNIFICANT CHANGE UP (ref 8.4–10.5)
CHLORIDE SERPL-SCNC: 104 MMOL/L — SIGNIFICANT CHANGE UP (ref 96–108)
CO2 SERPL-SCNC: 21 MMOL/L — LOW (ref 22–31)
CREAT SERPL-MCNC: <0.3 MG/DL — LOW (ref 0.5–1.3)
GLUCOSE SERPL-MCNC: 70 MG/DL — SIGNIFICANT CHANGE UP (ref 70–99)
HCT VFR BLD CALC: 32.5 % — LOW (ref 34.5–45)
HGB BLD-MCNC: 10.3 G/DL — LOW (ref 11.5–15.5)
MAGNESIUM SERPL-MCNC: 2 MG/DL — SIGNIFICANT CHANGE UP (ref 1.6–2.6)
MCHC RBC-ENTMCNC: 29.2 PG — SIGNIFICANT CHANGE UP (ref 27–34)
MCHC RBC-ENTMCNC: 31.7 GM/DL — LOW (ref 32–36)
MCV RBC AUTO: 92.1 FL — SIGNIFICANT CHANGE UP (ref 80–100)
NRBC # BLD: 0 /100 WBCS — SIGNIFICANT CHANGE UP (ref 0–0)
PHOSPHATE SERPL-MCNC: 1.9 MG/DL — LOW (ref 2.5–4.5)
PLATELET # BLD AUTO: 253 K/UL — SIGNIFICANT CHANGE UP (ref 150–400)
POTASSIUM SERPL-MCNC: 3.8 MMOL/L — SIGNIFICANT CHANGE UP (ref 3.5–5.3)
POTASSIUM SERPL-SCNC: 3.8 MMOL/L — SIGNIFICANT CHANGE UP (ref 3.5–5.3)
RBC # BLD: 3.53 M/UL — LOW (ref 3.8–5.2)
RBC # FLD: 16.1 % — HIGH (ref 10.3–14.5)
SODIUM SERPL-SCNC: 137 MMOL/L — SIGNIFICANT CHANGE UP (ref 135–145)
WBC # BLD: 6.61 K/UL — SIGNIFICANT CHANGE UP (ref 3.8–10.5)
WBC # FLD AUTO: 6.61 K/UL — SIGNIFICANT CHANGE UP (ref 3.8–10.5)

## 2021-10-25 RX ORDER — ACETAMINOPHEN 500 MG
975 TABLET ORAL EVERY 6 HOURS
Refills: 0 | Status: DISCONTINUED | OUTPATIENT
Start: 2021-10-25 | End: 2021-11-08

## 2021-10-25 RX ORDER — SODIUM,POTASSIUM PHOSPHATES 278-250MG
2 POWDER IN PACKET (EA) ORAL ONCE
Refills: 0 | Status: COMPLETED | OUTPATIENT
Start: 2021-10-25 | End: 2021-10-25

## 2021-10-25 RX ADMIN — PIPERACILLIN AND TAZOBACTAM 25 GRAM(S): 4; .5 INJECTION, POWDER, LYOPHILIZED, FOR SOLUTION INTRAVENOUS at 06:02

## 2021-10-25 RX ADMIN — Medication 81 MILLIGRAM(S): at 12:20

## 2021-10-25 RX ADMIN — Medication 500 MILLIGRAM(S): at 12:20

## 2021-10-25 RX ADMIN — Medication 975 MILLIGRAM(S): at 22:16

## 2021-10-25 RX ADMIN — TRAMADOL HYDROCHLORIDE 50 MILLIGRAM(S): 50 TABLET ORAL at 19:11

## 2021-10-25 RX ADMIN — ENOXAPARIN SODIUM 40 MILLIGRAM(S): 100 INJECTION SUBCUTANEOUS at 15:25

## 2021-10-25 RX ADMIN — SENNA PLUS 2 TABLET(S): 8.6 TABLET ORAL at 22:16

## 2021-10-25 RX ADMIN — Medication 1 MILLIGRAM(S): at 06:01

## 2021-10-25 RX ADMIN — Medication 975 MILLIGRAM(S): at 15:00

## 2021-10-25 RX ADMIN — Medication 2 TABLET(S): at 12:20

## 2021-10-25 RX ADMIN — Medication 1 TABLET(S): at 12:20

## 2021-10-25 RX ADMIN — Medication 975 MILLIGRAM(S): at 14:47

## 2021-10-25 RX ADMIN — TRAMADOL HYDROCHLORIDE 50 MILLIGRAM(S): 50 TABLET ORAL at 19:42

## 2021-10-25 RX ADMIN — TRAMADOL HYDROCHLORIDE 50 MILLIGRAM(S): 50 TABLET ORAL at 13:00

## 2021-10-25 RX ADMIN — ATENOLOL 25 MILLIGRAM(S): 25 TABLET ORAL at 06:01

## 2021-10-25 RX ADMIN — Medication 1 MILLIGRAM(S): at 17:34

## 2021-10-25 RX ADMIN — ATORVASTATIN CALCIUM 20 MILLIGRAM(S): 80 TABLET, FILM COATED ORAL at 22:16

## 2021-10-25 RX ADMIN — TRAMADOL HYDROCHLORIDE 50 MILLIGRAM(S): 50 TABLET ORAL at 12:20

## 2021-10-25 RX ADMIN — PANTOPRAZOLE SODIUM 40 MILLIGRAM(S): 20 TABLET, DELAYED RELEASE ORAL at 06:01

## 2021-10-25 NOTE — PROGRESS NOTE ADULT - ATTENDING COMMENTS
RAN Morris MD have seen and examined the patient today and agree with  the  evaluation, assessment and plan of the surgical house officer  RAN Morris MD have personally seen and examined the patient at bedside today at  11am

## 2021-10-25 NOTE — CHART NOTE - NSCHARTNOTEFT_GEN_A_CORE
Patient requires a Hoyerlift which is medically necessary due to patient having bilateral above knee amputations. Patient has generalized weakness and requires maximum assist from 2 people for transfers, she is unable to shift her weight due to bilateral amputations posing a high risk of falling.     Vascular sx [id number]

## 2021-10-25 NOTE — PHYSICAL EXAM
[JVD] : no jugular venous distention  [Normal Breath Sounds] : Normal breath sounds [de-identified] : NAD [de-identified] : AT [de-identified] : supple

## 2021-10-25 NOTE — PROGRESS NOTE ADULT - ASSESSMENT
a/p 80 y/o female HTN, MS, hysterectomy, R AKA who presents to the ED with LLE nonhealing wound with wet gangrene now s/p L AKA on 10/21/2021. Patient hypoxic in the preoperative period and hypotensive in the postop period requiring a lidia gtt.  Transferred to SICU for hemodynamic support. now off pressors for >24 hours, transferred to floor overnight     - Regular diet  - OOB with assistance  - Pain control  - f/u AM labs  - restarted half dose of home atenolol. monitor vitals  - DVT ppx: Lovenox, continue ASA    Vascular Surgery [id number] a/p 82 y/o female HTN, MS, hysterectomy, R AKA who presents to the ED with LLE nonhealing wound with wet gangrene now s/p L AKA on 10/21/2021. Patient hypoxic in the preoperative period and hypotensive in the postop period requiring a lidia gtt.  Transferred to SICU for hemodynamic support. now off pressors for >24 hours, transferred to floor overnight     - Regular diet  - OOB with assistance  - Pain control  - f/u AM labs  - DC planning to home per PT recs.   - restarted half dose of home atenolol. monitor vitals  - DVT ppx: Lovenox, continue ASA    Vascular Surgery [id number]

## 2021-10-25 NOTE — HISTORY OF PRESENT ILLNESS
[Home] : at home, [unfilled] , at the time of the visit. [Medical Office: ([facility])___] : at the medical office located in  [Spouse] : spouse [Formal Caregiver] : formal caregiver [FreeTextEntry1] : was in hospital X 2 days last week\par Declined rec for left AKA- aware of ischemia\par  unable to perform Telehealth today\par Has a VN who dress left leg with Honey and sacrum with foam\par Rx sent to VN \par aware of my upcoming absences\par No fever reported \par Encouraged to make in office appointment\par No photos available\par Reports use of foam on sacral decub- has declined evaluation in hospital\par  [FreeTextEntry3] : Mr nicholas

## 2021-10-25 NOTE — DISCHARGE NOTE PROVIDER - HOSPITAL COURSE
2 y/o female who presents to the ED with chronic Left lower leg wound present since 2018 after an ICU admission for sepsis. She has been followed in the [facility] wound care center by Mounika Smith/Rah/Kishan since 2019. Yesterday, she saw her wound care doctor who contacted Dr. Morris that the wound was getting worse. Presented to ED this morning to get leg evaluated. Denies nausea, vomiting, fever, chills, shortness of breath, rapid heart rate.    patient admitted started on IV ABx.. patient seen by medicine and cardiology for clearance prior to OR. Patient taken to OR 10/21 for left AKA. Post op in PACU hypotensive requiring pressors, SICU consulted. patient brought to SICU. patient given 1 U pRBC for downtrended H/H from baseline to try and wean pressors. Able to wean off pressors, home BP meds on hold. Patient with episode of tachycardia - half home dose of atenolol started and HR improved.     10/24 patient transferred out SICU.     Seen by PT/OT- recommend home PT    80 y/o female who presents to the ED with chronic Left lower leg wound present since 2018 after an ICU admission for sepsis. She has been followed in the [facility] wound care center by Mounika Smith/Rah/Kishan since 2019. Yesterday, she saw her wound care doctor who contacted Dr. Morris that the wound was getting worse. Presented to ED this morning to get leg evaluated. Denies nausea, vomiting, fever, chills, shortness of breath, rapid heart rate.    She was admitted to the vascular surgery service and was started on IV ABx.  She was seen by medicine and cardiology for clearance prior to OR. Patient taken to OR 10/21 for left AKA. Post op in PACU hypotensive requiring pressors, SICU consulted. patient brought to SICU. patient given 1 U pRBC for downtrending H/H from baseline.  In SICU, pressors were weaned and, home BP meds were put on hold. Patient with episode of tachycardia - half home dose of atenolol started and HR improved. 10/24 patient transferred out SICU onto floor where she remained stable.  She was evaluated by PT/OT- who recommended home PT on discharge.  Arrangements were made for medical equipment at home.  She is tolerating a diet, pain is controlled, and she is stable for discharge home, to follow-up with Dr. Morris within 1-2 weeks.   82 y/o female who presents to the ED with chronic Left lower leg wound present since 2018 after an ICU admission for sepsis. She has been followed in the [facility] wound care center by Mounika Smith/Rah/Kishan since 2019. Yesterday, she saw her wound care doctor who contacted Dr. Morris that the wound was getting worse. Presented to ED this morning to get leg evaluated. Denies nausea, vomiting, fever, chills, shortness of breath, rapid heart rate.    She was admitted to the vascular surgery service and was started on IV ABx.  She was seen by medicine and cardiology for clearance prior to OR. Patient taken to OR 10/21 for left AKA. Post op in PACU hypotensive requiring pressors, SICU consulted. patient brought to SICU. patient given 1 U pRBC for downtrending H/H from baseline.  In SICU, pressors were weaned and, home BP meds were put on hold. Patient with episode of tachycardia - half home dose of atenolol started and HR improved. 10/24 patient transferred out SICU onto floor where she remained stable.  She was evaluated by PT/OT- who recommended home PT on discharge.  Arrangements were made for medical equipment at home. Patients dressing was changed daily until discharge. Cardiology- saw patient during stay on floor as well.  She is tolerating a diet, pain is controlled, and she is stable for discharge home, to follow-up with Dr. Morris within 1-2 weeks.

## 2021-10-25 NOTE — DISCHARGE NOTE PROVIDER - NSDCCPCAREPLAN_GEN_ALL_CORE_FT
PRINCIPAL DISCHARGE DIAGNOSIS  Diagnosis: Infected wound  Assessment and Plan of Treatment: s/p above knee amputation   daily dressing changes to stump- paint with betadine, cover with with gauze wrap with keurlix then ace   follow up with Dr. Morris in 1 week please call to schedule an appointment   notify Dr. Morris if develop fever, chills, redness around incision, pus draining from incision  Please follow up with your regular medical doctor in 1 week, please call to schedule an appointment to discuss recent hospitalization         PRINCIPAL DISCHARGE DIAGNOSIS  Diagnosis: Infected wound  Assessment and Plan of Treatment: s/p above knee amputation   daily dressing changes to stump- paint with betadine, cover with with gauze wrap with keurlix then ace   Follow up with Dr. Morris in 1 week please call to schedule an appointment   Notify Dr. Morris if develop fever, chills, redness around incision, pus draining from incision  Please follow up with your regular medical doctor in 1 week, please call to schedule an appointment to discuss recent hospitalization         PRINCIPAL DISCHARGE DIAGNOSIS  Diagnosis: Infected wound  Assessment and Plan of Treatment: s/p above knee amputation   daily dressing changes to stump- paint with betadine, cover with with gauze wrap with keurlix then ace   Please take antibiotic Augmentin two times a day for 1 week.   Follow up with Dr. Morris in 1 week please call to schedule an appointment   Notify Dr. Morris if develop fever, chills, redness around incision, pus draining from incision  Please follow up with your regular medical doctor in 1 week, please call to schedule an appointment to discuss recent hospitalization

## 2021-10-25 NOTE — ASSESSMENT
[FreeTextEntry1] : Severe PAD with multiple necrotic gangrenous ulcerations from the toes to the knee of the LLE\par Recommend hospitalization with amputation of the LLE\par Patient has declined in the past. However, the wounds have worsened and odor.\par D/w patient the risk of delayed treatment.  Patient was initially refusing hospitalization. However, she states that she will go to the hospital.\par  present

## 2021-10-25 NOTE — PHYSICAL EXAM
[JVD] : no jugular venous distention  [Normal Breath Sounds] : Normal breath sounds [de-identified] : NAD [de-identified] : AT [de-identified] : supple

## 2021-10-25 NOTE — DISCHARGE NOTE PROVIDER - CARE PROVIDER_API CALL
Sanjiv Morris)  Vascular Surgery  [address], [address]  [place] [address]  Phone: [phone]  Fax: [phone]  Follow Up Time: 1 week

## 2021-10-25 NOTE — PROGRESS NOTE ADULT - SUBJECTIVE AND OBJECTIVE BOX
CARDIOLOGY     PROGRESS  NOTE   ________________________________________________    CHIEF COMPLAINT:Patient is a 81y old  Female who presents with a chief complaint of Left lower extremity wound (25 Oct 2021 01:50)  doing well.  	  REVIEW OF SYSTEMS:  CONSTITUTIONAL: No fever, weight loss, or fatigue  EYES: No eye pain, visual disturbances, or discharge  ENT:  No difficulty hearing, tinnitus, vertigo; No sinus or throat pain  NECK: No pain or stiffness  RESPIRATORY: No cough, wheezing, chills or hemoptysis; No Shortness of Breath  CARDIOVASCULAR: No chest pain, palpitations, passing out, dizziness, or leg swelling  GASTROINTESTINAL: No abdominal or epigastric pain. No nausea, vomiting, or hematemesis; No diarrhea or constipation. No melena or hematochezia.  GENITOURINARY: No dysuria, frequency, hematuria, or incontinence  NEUROLOGICAL: No headaches, memory loss, loss of strength, numbness, or tremors  SKIN: No itching, burning, rashes, or lesions   LYMPH Nodes: No enlarged glands  ENDOCRINE: No heat or cold intolerance; No hair loss  MUSCULOSKELETAL: No joint pain or swelling; No muscle, back, or extremity pain  PSYCHIATRIC: No depression, anxiety, mood swings, or difficulty sleeping  HEME/LYMPH: No easy bruising, or bleeding gums  ALLERGY AND IMMUNOLOGIC: No hives or eczema	    [ ] All others negative	  [ x] Unable to obtain    PHYSICAL EXAM:  T(C): 37.1 (10-25-21 @ 05:54), Max: 37.2 (10-25-21 @ 01:27)  HR: 99 (10-25-21 @ 05:54) (67 - 103)  BP: 158/84 (10-25-21 @ 05:54) (77/42 - 158/84)  RR: 19 (10-25-21 @ 05:54) (16 - 33)  SpO2: 93% (10-25-21 @ 05:54) (93% - 100%)  Wt(kg): --  I&O's Summary    24 Oct 2021 07:01  -  25 Oct 2021 07:00  --------------------------------------------------------  IN: 490 mL / OUT: 2475 mL / NET: -1985 mL        Appearance: Normal	  HEENT:   Normal oral mucosa, PERRL, EOMI	  Lymphatic: No lymphadenopathy  Cardiovascular: Normal S1 S2, No JVD, + murmurs, No edema  Respiratory: rghonchi  Psychiatry: A & O x 3, Mood & affect appropriate  Gastrointestinal:  Soft, Non-tender, + BS	  Skin: No rashes, No ecchymoses, No cyanosis	  Neurologic: Non-focal  Extremities: Normal range of motion, bl bka  Vascular: Peripheral pulses palpable 2+ bilaterally    MEDICATIONS  (STANDING):  ascorbic acid 500 milliGRAM(s) Oral daily  aspirin enteric coated 81 milliGRAM(s) Oral daily  ATENolol  Tablet 25 milliGRAM(s) Oral daily  atorvastatin 20 milliGRAM(s) Oral at bedtime  clonazePAM  Tablet 1 milliGRAM(s) Oral two times a day  enoxaparin Injectable 40 milliGRAM(s) SubCutaneous every 24 hours  influenza   Vaccine 0.5 milliLiter(s) IntraMuscular once  lactobacillus acidophilus 1 Tablet(s) Oral daily  multivitamin 1 Tablet(s) Oral daily  pantoprazole    Tablet 40 milliGRAM(s) Oral before breakfast  polyethylene glycol 3350 17 Gram(s) Oral daily  senna 2 Tablet(s) Oral at bedtime      TELEMETRY: 	    ECG:  	  RADIOLOGY:  OTHER: 	  	  LABS:	 	    CARDIAC MARKERS:                                10.3   6.61  )-----------( 253      ( 25 Oct 2021 07:16 )             32.5     10-24    140  |  105  |  5<L>  ----------------------------<  99  3.9   |  24  |  <0.30<L>    Ca    8.6      24 Oct 2021 02:17  Phos  3.0     10-24  Mg     2.1     10-24      proBNP:   Lipid Profile:   HgA1c:   TSH:         Assessment and plan  ---------------------------  82 y/o female who presents to the ED with chronic Left lower leg wound present since 2018 after an ICU admission for sepsis. She has been followed in the [facility] wound care center by Mounika Smith/Rah/Kishan since 2019. Yesterday, she saw her wound care doctor who contacted Dr. Morris that the wound was getting worse. Presented to ED this morning to get leg evaluated. Denies nausea, vomiting, fever, chills, shortness of breath, rapid heart rate.  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  pt with no cardiac complain, no evidence of chf on exam  s/p bka  doing better  increase bp ?sec to pain and discomfort.  pain meds  continue Lipitor / atenolol

## 2021-10-25 NOTE — PLAN
[FreeTextEntry1] : 10/15/21\par Plan - discussed left AKA, will consider AKA, will follow up with Dr. Morris\par betadine to necrotic areas, adaptic to all open full thickness/abdominal pads/yelena

## 2021-10-25 NOTE — HISTORY OF PRESENT ILLNESS
[Home] : at home, [unfilled] , at the time of the visit. [Medical Office: ([facility])___] : at the medical office located in  [Spouse] : spouse [Formal Caregiver] : formal caregiver [FreeTextEntry1] : was in hospital X 2 days last week\par Declined rec for left AKA- aware of ischemia\par  unable to perform Telehealth today\par Has a VN who dress left leg with Honey and sacrum with foam\par Rx sent to VN \par aware of my upcoming absences\par No fever reported \par Encouraged to make in office appointment\par No photos available\par Reports use of foam on sacral decub- has declined evaluation in hospital\par  [FreeTextEntry3] : Mr nicholas

## 2021-10-25 NOTE — DISCHARGE NOTE PROVIDER - NSDCMRMEDTOKEN_GEN_ALL_CORE_FT
amLODIPine 10 mg oral tablet: 1 tab(s) orally once a day  ascorbic acid 500 mg oral tablet: 1 tab(s) orally once a day  aspirin 81 mg oral tablet, chewable: 1 tab(s) orally once a day  atenolol 50 mg oral tablet: 1 tab(s) orally once a day   atorvastatin 20 mg oral tablet: 1 tab(s) orally once a day (at bedtime)  baclofen 20 mg oral tablet: 1 tab(s) orally 4 times a day  clonazePAM 1 mg oral tablet: 1 tab(s) orally 2 times a day  lactobacillus acidophilus oral capsule: 1 tab(s) orally 2 times a day  omeprazole 20 mg oral delayed release tablet: 1 tab(s) orally 2 times a day  polyethylene glycol 3350 oral powder for reconstitution: 17 gram(s) orally 2 times a day  senna oral tablet: 2 tab(s) orally once a day (at bedtime), As Needed   traMADol 50 mg oral tablet: 0.5 tab(s) orally every 6 hours, As needed, Moderate Pain (4 - 6) MDD:four 0.5 tab/day  Tylenol 500 mg oral tablet:    amLODIPine 10 mg oral tablet: 1 tab(s) orally once a day  ascorbic acid 500 mg oral tablet: 1 tab(s) orally once a day  aspirin 81 mg oral tablet, chewable: 1 tab(s) orally once a day  atenolol 50 mg oral tablet: 1 tab(s) orally once a day   atorvastatin 20 mg oral tablet: 1 tab(s) orally once a day (at bedtime)  baclofen 20 mg oral tablet: 1 tab(s) orally 4 times a day  clonazePAM 1 mg oral tablet: 1 tab(s) orally 2 times a day  Paras lift dx: bilateral above knee amputations :   lactobacillus acidophilus oral capsule: 1 tab(s) orally 2 times a day  omeprazole 20 mg oral delayed release tablet: 1 tab(s) orally 2 times a day  polyethylene glycol 3350 oral powder for reconstitution: 17 gram(s) orally 2 times a day  senna oral tablet: 2 tab(s) orally once a day (at bedtime), As Needed   traMADol 50 mg oral tablet: 0.5 tab(s) orally every 6 hours, As needed, Moderate Pain (4 - 6) MDD:four 0.5 tab/day  Tylenol 500 mg oral tablet:    amLODIPine 10 mg oral tablet: 1 tab(s) orally once a day  ascorbic acid 500 mg oral tablet: 1 tab(s) orally once a day  aspirin 81 mg oral tablet, chewable: 1 tab(s) orally once a day  atenolol 50 mg oral tablet: 1 tab(s) orally once a day   atorvastatin 20 mg oral tablet: 1 tab(s) orally once a day (at bedtime)  baclofen 20 mg oral tablet: 1 tab(s) orally 4 times a day  clonazePAM 1 mg oral tablet: 1 tab(s) orally 2 times a day  Paras lift dx: bilateral above knee amputations :   lactobacillus acidophilus oral capsule: 1 tab(s) orally 2 times a day  Multiple Vitamins oral tablet: 1 tab(s) orally once a day  ocular lubricant ophthalmic solution: 1 drop(s) to each affected eye once a day, As needed, Dry Eyes  pantoprazole 40 mg oral delayed release tablet: 1 tab(s) orally once a day (before a meal)  polyethylene glycol 3350 oral powder for reconstitution: 17 gram(s) orally 2 times a day  senna oral tablet: 2 tab(s) orally once a day (at bedtime), As Needed   traMADol 50 mg oral tablet: 0.5 tab(s) orally every 6 hours, As needed, Moderate Pain (4 - 6) MDD:four 0.5 tab/day  Tylenol 500 mg oral tablet:    amLODIPine 10 mg oral tablet: 1 tab(s) orally once a day  amoxicillin-clavulanate 875 mg-125 mg oral tablet: 1 tab(s) orally 2 times a day   ascorbic acid 500 mg oral tablet: 1 tab(s) orally once a day  aspirin 81 mg oral tablet, chewable: 1 tab(s) orally once a day  atenolol 50 mg oral tablet: 1 tab(s) orally once a day   atorvastatin 20 mg oral tablet: 1 tab(s) orally once a day (at bedtime)  baclofen 20 mg oral tablet: 1 tab(s) orally 4 times a day  clonazePAM 1 mg oral tablet: 1 tab(s) orally 2 times a day  Paras lift dx: bilateral above knee amputations :   lactobacillus acidophilus oral capsule: 1 tab(s) orally 2 times a day  Multiple Vitamins oral tablet: 1 tab(s) orally once a day  ocular lubricant ophthalmic solution: 1 drop(s) to each affected eye once a day, As needed, Dry Eyes  pantoprazole 40 mg oral delayed release tablet: 1 tab(s) orally once a day (before a meal)  polyethylene glycol 3350 oral powder for reconstitution: 17 gram(s) orally 2 times a day  senna oral tablet: 2 tab(s) orally once a day (at bedtime), As Needed   traMADol 50 mg oral tablet: 0.5 tab(s) orally every 6 hours, As needed, Moderate Pain (4 - 6) MDD:four 0.5 tab/day  Tylenol 500 mg oral tablet:

## 2021-10-25 NOTE — PROGRESS NOTE ADULT - SUBJECTIVE AND OBJECTIVE BOX
Vascular Surgery DAILY PROGRESS NOTE:       SUBJECTIVE/ROS: transferred to floor overnight. Pain controlled. tolerating regular diet. denies dizziness, SOB, CP or palpitations         MEDICATIONS  (STANDING):  ascorbic acid 500 milliGRAM(s) Oral daily  aspirin enteric coated 81 milliGRAM(s) Oral daily  ATENolol  Tablet 25 milliGRAM(s) Oral daily  atorvastatin 20 milliGRAM(s) Oral at bedtime  chlorhexidine 2% Cloths 1 Application(s) Topical <User Schedule>  clonazePAM  Tablet 1 milliGRAM(s) Oral two times a day  enoxaparin Injectable 40 milliGRAM(s) SubCutaneous every 24 hours  influenza   Vaccine 0.5 milliLiter(s) IntraMuscular once  lactobacillus acidophilus 1 Tablet(s) Oral daily  multivitamin 1 Tablet(s) Oral daily  pantoprazole    Tablet 40 milliGRAM(s) Oral before breakfast  piperacillin/tazobactam IVPB.. 3.375 Gram(s) IV Intermittent every 8 hours  polyethylene glycol 3350 17 Gram(s) Oral daily  senna 2 Tablet(s) Oral at bedtime    MEDICATIONS  (PRN):  traMADol 25 milliGRAM(s) Oral every 6 hours PRN Mild Pain (1 - 3)  traMADol 50 milliGRAM(s) Oral every 6 hours PRN Severe Pain (7 - 10)      OBJECTIVE:    Vital Signs Last 24 Hrs  T(C): 37.2 (25 Oct 2021 01:27), Max: 37.2 (25 Oct 2021 01:27)  T(F): 98.9 (25 Oct 2021 01:27), Max: 98.9 (25 Oct 2021 01:27)  HR: 93 (25 Oct 2021 01:27) (67 - 114)  BP: 134/82 (25 Oct 2021 01:27) (77/42 - 158/70)  BP(mean): 99 (25 Oct 2021 00:00) (57 - 103)  RR: 20 (25 Oct 2021 01:27) (16 - 36)  SpO2: 94% (25 Oct 2021 01:27) (92% - 100%)    Physical Exam   Gen: NAD, alert  Pulm: non-labor breathing   Heart: RRR  Abd: soft, ND/NT  Ext: R AKA incision well-healed, L AKA dressing c/d/i, no strikethrough      I&O's Detail    23 Oct 2021 07:01  -  24 Oct 2021 07:00  --------------------------------------------------------  IN:    IV PiggyBack: 175 mL    IV PiggyBack: 250 mL    Lactated Ringers: 700 mL    PRBCs (Packed Red Blood Cells): 300 mL  Total IN: 1425 mL    OUT:    Indwelling Catheter - Urethral (mL): 4050 mL    Phenylephrine: 0 mL  Total OUT: 4050 mL    Total NET: -2625 mL      24 Oct 2021 07:01  -  25 Oct 2021 01:50  --------------------------------------------------------  IN:    IV PiggyBack: 270 mL  Total IN: 270 mL    OUT:    Indwelling Catheter - Urethral (mL): 1825 mL  Total OUT: 1825 mL    Total NET: -1555 mL          Daily     Daily     LABS:                        9.6    6.23  )-----------( 224      ( 24 Oct 2021 16:05 )             30.0     10-24    140  |  105  |  5<L>  ----------------------------<  99  3.9   |  24  |  <0.30<L>    Ca    8.6      24 Oct 2021 02:17  Phos  3.0     10-24  Mg     2.1     10-24                     Vascular Surgery DAILY PROGRESS NOTE:       SUBJECTIVE/ROS: transferred to floor overnight. Pain controlled. tolerating regular diet. denies dizziness, SOB, CP or palpitations         MEDICATIONS  (STANDING):  ascorbic acid 500 milliGRAM(s) Oral daily  aspirin enteric coated 81 milliGRAM(s) Oral daily  ATENolol  Tablet 25 milliGRAM(s) Oral daily  atorvastatin 20 milliGRAM(s) Oral at bedtime  chlorhexidine 2% Cloths 1 Application(s) Topical <User Schedule>  clonazePAM  Tablet 1 milliGRAM(s) Oral two times a day  enoxaparin Injectable 40 milliGRAM(s) SubCutaneous every 24 hours  influenza   Vaccine 0.5 milliLiter(s) IntraMuscular once  lactobacillus acidophilus 1 Tablet(s) Oral daily  multivitamin 1 Tablet(s) Oral daily  pantoprazole    Tablet 40 milliGRAM(s) Oral before breakfast  piperacillin/tazobactam IVPB.. 3.375 Gram(s) IV Intermittent every 8 hours  polyethylene glycol 3350 17 Gram(s) Oral daily  senna 2 Tablet(s) Oral at bedtime    MEDICATIONS  (PRN):  traMADol 25 milliGRAM(s) Oral every 6 hours PRN Mild Pain (1 - 3)  traMADol 50 milliGRAM(s) Oral every 6 hours PRN Severe Pain (7 - 10)      OBJECTIVE:    Vital Signs Last 24 Hrs  T(C): 37.2 (25 Oct 2021 01:27), Max: 37.2 (25 Oct 2021 01:27)  T(F): 98.9 (25 Oct 2021 01:27), Max: 98.9 (25 Oct 2021 01:27)  HR: 93 (25 Oct 2021 01:27) (67 - 114)  BP: 134/82 (25 Oct 2021 01:27) (77/42 - 158/70)  BP(mean): 99 (25 Oct 2021 00:00) (57 - 103)  RR: 20 (25 Oct 2021 01:27) (16 - 36)  SpO2: 94% (25 Oct 2021 01:27) (92% - 100%)    Physical Exam   Gen: NAD, alert  Pulm: non-labor breathing   Heart: RRR  Abd: soft, ND/NT  Ext: R AKA incision well-healed, L AKA dressing c/d/i, no strikethrough, incision examined and c/d/i, no areas of dehiscence or oozing.       I&O's Detail    23 Oct 2021 07:01  -  24 Oct 2021 07:00  --------------------------------------------------------  IN:    IV PiggyBack: 175 mL    IV PiggyBack: 250 mL    Lactated Ringers: 700 mL    PRBCs (Packed Red Blood Cells): 300 mL  Total IN: 1425 mL    OUT:    Indwelling Catheter - Urethral (mL): 4050 mL    Phenylephrine: 0 mL  Total OUT: 4050 mL    Total NET: -2625 mL      24 Oct 2021 07:01  -  25 Oct 2021 01:50  --------------------------------------------------------  IN:    IV PiggyBack: 270 mL  Total IN: 270 mL    OUT:    Indwelling Catheter - Urethral (mL): 1825 mL  Total OUT: 1825 mL    Total NET: -1555 mL          Daily     Daily     LABS:                        9.6    6.23  )-----------( 224      ( 24 Oct 2021 16:05 )             30.0     10-24    140  |  105  |  5<L>  ----------------------------<  99  3.9   |  24  |  <0.30<L>    Ca    8.6      24 Oct 2021 02:17  Phos  3.0     10-24  Mg     2.1     10-24                     Vascular Surgery DAILY PROGRESS NOTE:       SUBJECTIVE/ROS: transferred to floor overnight. Pain controlled. tolerating regular diet. denies dizziness, SOB, CP or palpitations         MEDICATIONS  (STANDING):  ascorbic acid 500 milliGRAM(s) Oral daily  aspirin enteric coated 81 milliGRAM(s) Oral daily  ATENolol  Tablet 25 milliGRAM(s) Oral daily  atorvastatin 20 milliGRAM(s) Oral at bedtime  chlorhexidine 2% Cloths 1 Application(s) Topical <User Schedule>  clonazePAM  Tablet 1 milliGRAM(s) Oral two times a day  enoxaparin Injectable 40 milliGRAM(s) SubCutaneous every 24 hours  influenza   Vaccine 0.5 milliLiter(s) IntraMuscular once  lactobacillus acidophilus 1 Tablet(s) Oral daily  multivitamin 1 Tablet(s) Oral daily  pantoprazole    Tablet 40 milliGRAM(s) Oral before breakfast  piperacillin/tazobactam IVPB.. 3.375 Gram(s) IV Intermittent every 8 hours  polyethylene glycol 3350 17 Gram(s) Oral daily  senna 2 Tablet(s) Oral at bedtime    MEDICATIONS  (PRN):  traMADol 25 milliGRAM(s) Oral every 6 hours PRN Mild Pain (1 - 3)  traMADol 50 milliGRAM(s) Oral every 6 hours PRN Severe Pain (7 - 10)      OBJECTIVE:    Vital Signs Last 24 Hrs  T(C): 37.2 (25 Oct 2021 01:27), Max: 37.2 (25 Oct 2021 01:27)  T(F): 98.9 (25 Oct 2021 01:27), Max: 98.9 (25 Oct 2021 01:27)  HR: 93 (25 Oct 2021 01:27) (67 - 114)  BP: 134/82 (25 Oct 2021 01:27) (77/42 - 158/70)  BP(mean): 99 (25 Oct 2021 00:00) (57 - 103)  RR: 20 (25 Oct 2021 01:27) (16 - 36)  SpO2: 94% (25 Oct 2021 01:27) (92% - 100%)    Physical Exam   Gen: NAD, alert  Pulm: non-labor breathing   Heart: RRR  Abd: soft, ND/NT  Ext: R AKA incision well-healed, L AKA dressing c/d/i, no strikethrough, incision examined and c/d/i, no areas of dehiscence or oozing.       I&O's Detail    23 Oct 2021 07:01  -  24 Oct 2021 07:00  --------------------------------------------------------  IN:    IV PiggyBack: 175 mL    IV PiggyBack: 250 mL    Lactated Ringers: 700 mL    PRBCs (Packed Red Blood Cells): 300 mL  Total IN: 1425 mL    OUT:    Indwelling Catheter - Urethral (mL): 4050 mL    Phenylephrine: 0 mL  Total OUT: 4050 mL    Total NET: -2625 mL      24 Oct 2021 07:01  -  25 Oct 2021 01:50  --------------------------------------------------------  IN:    IV PiggyBack: 270 mL  Total IN: 270 mL    OUT:    Indwelling Catheter - Urethral (mL): 1825 mL  Total OUT: 1825 mL    Total NET: -1555 mL    Daily     LABS:                        9.6    6.23  )-----------( 224      ( 24 Oct 2021 16:05 )             30.0     10-24    140  |  105  |  5<L>  ----------------------------<  99  3.9   |  24  |  <0.30<L>    Ca    8.6      24 Oct 2021 02:17  Phos  3.0     10-24  Mg     2.1     10-24

## 2021-10-26 ENCOUNTER — TRANSCRIPTION ENCOUNTER (OUTPATIENT)
Age: 81
End: 2021-10-26

## 2021-10-26 LAB
ANION GAP SERPL CALC-SCNC: 12 MMOL/L — SIGNIFICANT CHANGE UP (ref 5–17)
BUN SERPL-MCNC: 9 MG/DL — SIGNIFICANT CHANGE UP (ref 7–23)
CALCIUM SERPL-MCNC: 8.6 MG/DL — SIGNIFICANT CHANGE UP (ref 8.4–10.5)
CHLORIDE SERPL-SCNC: 105 MMOL/L — SIGNIFICANT CHANGE UP (ref 96–108)
CO2 SERPL-SCNC: 22 MMOL/L — SIGNIFICANT CHANGE UP (ref 22–31)
CREAT SERPL-MCNC: <0.3 MG/DL — LOW (ref 0.5–1.3)
CULTURE RESULTS: SIGNIFICANT CHANGE UP
CULTURE RESULTS: SIGNIFICANT CHANGE UP
GLUCOSE SERPL-MCNC: 82 MG/DL — SIGNIFICANT CHANGE UP (ref 70–99)
HCT VFR BLD CALC: 31.9 % — LOW (ref 34.5–45)
HGB BLD-MCNC: 10 G/DL — LOW (ref 11.5–15.5)
MAGNESIUM SERPL-MCNC: 1.9 MG/DL — SIGNIFICANT CHANGE UP (ref 1.6–2.6)
MCHC RBC-ENTMCNC: 29.2 PG — SIGNIFICANT CHANGE UP (ref 27–34)
MCHC RBC-ENTMCNC: 31.3 GM/DL — LOW (ref 32–36)
MCV RBC AUTO: 93 FL — SIGNIFICANT CHANGE UP (ref 80–100)
NRBC # BLD: 0 /100 WBCS — SIGNIFICANT CHANGE UP (ref 0–0)
PHOSPHATE SERPL-MCNC: 2.3 MG/DL — LOW (ref 2.5–4.5)
PLATELET # BLD AUTO: 247 K/UL — SIGNIFICANT CHANGE UP (ref 150–400)
POTASSIUM SERPL-MCNC: 3.5 MMOL/L — SIGNIFICANT CHANGE UP (ref 3.5–5.3)
POTASSIUM SERPL-SCNC: 3.5 MMOL/L — SIGNIFICANT CHANGE UP (ref 3.5–5.3)
RBC # BLD: 3.43 M/UL — LOW (ref 3.8–5.2)
RBC # FLD: 16.6 % — HIGH (ref 10.3–14.5)
SARS-COV-2 RNA SPEC QL NAA+PROBE: SIGNIFICANT CHANGE UP
SODIUM SERPL-SCNC: 139 MMOL/L — SIGNIFICANT CHANGE UP (ref 135–145)
SPECIMEN SOURCE: SIGNIFICANT CHANGE UP
SPECIMEN SOURCE: SIGNIFICANT CHANGE UP
WBC # BLD: 5.03 K/UL — SIGNIFICANT CHANGE UP (ref 3.8–10.5)
WBC # FLD AUTO: 5.03 K/UL — SIGNIFICANT CHANGE UP (ref 3.8–10.5)

## 2021-10-26 RX ORDER — SODIUM,POTASSIUM PHOSPHATES 278-250MG
2 POWDER IN PACKET (EA) ORAL ONCE
Refills: 0 | Status: COMPLETED | OUTPATIENT
Start: 2021-10-26 | End: 2021-10-26

## 2021-10-26 RX ADMIN — ENOXAPARIN SODIUM 40 MILLIGRAM(S): 100 INJECTION SUBCUTANEOUS at 15:11

## 2021-10-26 RX ADMIN — ATENOLOL 25 MILLIGRAM(S): 25 TABLET ORAL at 06:21

## 2021-10-26 RX ADMIN — Medication 975 MILLIGRAM(S): at 16:15

## 2021-10-26 RX ADMIN — Medication 975 MILLIGRAM(S): at 22:36

## 2021-10-26 RX ADMIN — Medication 975 MILLIGRAM(S): at 06:21

## 2021-10-26 RX ADMIN — INFLUENZA VIRUS VACCINE 0.5 MILLILITER(S): 15; 15; 15; 15 SUSPENSION INTRAMUSCULAR at 15:11

## 2021-10-26 RX ADMIN — SENNA PLUS 2 TABLET(S): 8.6 TABLET ORAL at 22:35

## 2021-10-26 RX ADMIN — PANTOPRAZOLE SODIUM 40 MILLIGRAM(S): 20 TABLET, DELAYED RELEASE ORAL at 06:21

## 2021-10-26 RX ADMIN — Medication 975 MILLIGRAM(S): at 12:16

## 2021-10-26 RX ADMIN — ATORVASTATIN CALCIUM 20 MILLIGRAM(S): 80 TABLET, FILM COATED ORAL at 22:35

## 2021-10-26 RX ADMIN — Medication 975 MILLIGRAM(S): at 13:15

## 2021-10-26 RX ADMIN — Medication 1 TABLET(S): at 12:16

## 2021-10-26 RX ADMIN — Medication 2 PACKET(S): at 12:15

## 2021-10-26 RX ADMIN — Medication 1 MILLIGRAM(S): at 18:14

## 2021-10-26 RX ADMIN — Medication 975 MILLIGRAM(S): at 15:11

## 2021-10-26 RX ADMIN — Medication 1 MILLIGRAM(S): at 06:21

## 2021-10-26 RX ADMIN — Medication 81 MILLIGRAM(S): at 12:16

## 2021-10-26 RX ADMIN — Medication 500 MILLIGRAM(S): at 12:16

## 2021-10-26 NOTE — PROGRESS NOTE ADULT - SUBJECTIVE AND OBJECTIVE BOX
CARDIOLOGY     PROGRESS  NOTE   ________________________________________________    CHIEF COMPLAINT:Patient is a 81y old  Female who presents with a chief complaint of Left lower extremity wound (26 Oct 2021 07:53)  no complain.  	  REVIEW OF SYSTEMS:  CONSTITUTIONAL: No fever, weight loss, or fatigue  EYES: No eye pain, visual disturbances, or discharge  ENT:  No difficulty hearing, tinnitus, vertigo; No sinus or throat pain  NECK: No pain or stiffness  RESPIRATORY: No cough, wheezing, chills or hemoptysis; No Shortness of Breath  CARDIOVASCULAR: No chest pain, palpitations, passing out, dizziness, or leg swelling  GASTROINTESTINAL: No abdominal or epigastric pain. No nausea, vomiting, or hematemesis; No diarrhea or constipation. No melena or hematochezia.  GENITOURINARY: No dysuria, frequency, hematuria, or incontinence  NEUROLOGICAL: No headaches, memory loss, loss of strength, numbness, or tremors  SKIN: No itching, burning, rashes, or lesions   LYMPH Nodes: No enlarged glands  ENDOCRINE: No heat or cold intolerance; No hair loss  MUSCULOSKELETAL: No joint pain or swelling; No muscle, back, or extremity pain  PSYCHIATRIC: No depression, anxiety, mood swings, or difficulty sleeping  HEME/LYMPH: No easy bruising, or bleeding gums  ALLERGY AND IMMUNOLOGIC: No hives or eczema	    [ ] All others negative	  [ ] Unable to obtain    PHYSICAL EXAM:  T(C): 36.8 (10-26-21 @ 06:16), Max: 37.3 (10-25-21 @ 09:57)  HR: 94 (10-26-21 @ 06:16) (74 - 94)  BP: 120/76 (10-26-21 @ 06:16) (96/60 - 124/74)  RR: 17 (10-26-21 @ 06:16) (16 - 18)  SpO2: 94% (10-26-21 @ 06:16) (93% - 94%)  Wt(kg): --  I&O's Summary    25 Oct 2021 07:01  -  26 Oct 2021 07:00  --------------------------------------------------------  IN: 710 mL / OUT: 1450 mL / NET: -740 mL        Appearance: Normal	  HEENT:   Normal oral mucosa, PERRL, EOMI	  Lymphatic: No lymphadenopathy  Cardiovascular: Normal S1 S2, No JVD, + murmurs, No edema  Respiratory: Lungs clear to auscultation	  Psychiatry: A & O x 3, Mood & affect appropriate  Gastrointestinal:  Soft, Non-tender, + BS	  Skin: No rashes, No ecchymoses, No cyanosis	  Neurologic: Non-focal  Extremities: bl bka  Vascular: Peripheral pulses palpable 2+ bilaterally    MEDICATIONS  (STANDING):  acetaminophen     Tablet .. 975 milliGRAM(s) Oral every 6 hours  ascorbic acid 500 milliGRAM(s) Oral daily  aspirin enteric coated 81 milliGRAM(s) Oral daily  ATENolol  Tablet 25 milliGRAM(s) Oral daily  atorvastatin 20 milliGRAM(s) Oral at bedtime  clonazePAM  Tablet 1 milliGRAM(s) Oral two times a day  enoxaparin Injectable 40 milliGRAM(s) SubCutaneous every 24 hours  influenza   Vaccine 0.5 milliLiter(s) IntraMuscular once  lactobacillus acidophilus 1 Tablet(s) Oral daily  multivitamin 1 Tablet(s) Oral daily  pantoprazole    Tablet 40 milliGRAM(s) Oral before breakfast  polyethylene glycol 3350 17 Gram(s) Oral daily  potassium phosphate / sodium phosphate Powder (PHOS-NaK) 2 Packet(s) Oral once  senna 2 Tablet(s) Oral at bedtime      TELEMETRY: 	    ECG:  	  RADIOLOGY:  OTHER: 	  	  LABS:	 	    CARDIAC MARKERS:                                10.0   5.03  )-----------( 247      ( 26 Oct 2021 08:10 )             31.9     10-26    139  |  105  |  9   ----------------------------<  82  3.5   |  22  |  <0.30<L>    Ca    8.6      26 Oct 2021 08:10  Phos  2.3     10-26  Mg     1.9     10-26      proBNP:   Lipid Profile:   HgA1c:   TSH:         Assessment and plan  ---------------------------  82 y/o female who presents to the ED with chronic Left lower leg wound present since 2018 after an ICU admission for sepsis. She has been followed in the [facility] wound care center by Mounika Smith/Rah/Kishan since 2019. Yesterday, she saw her wound care doctor who contacted Dr. Morris that the wound was getting worse. Presented to ED this morning to get leg evaluated. Denies nausea, vomiting, fever, chills, shortness of breath, rapid heart rate.  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  pt with no cardiac complain, no evidence of chf on exam  s/p bka  doing better  increase bp ?sec to pain and discomfort.  pain meds  continue Lipitor / atenolol  replete K, keep K>4

## 2021-10-26 NOTE — DISCHARGE NOTE NURSING/CASE MANAGEMENT/SOCIAL WORK - PATIENT PORTAL LINK FT
You can access the FollowMyHealth Patient Portal offered by [facility] by registering at the following website: [email or web address]. By joining [employer]’s FollowMyHealth portal, you will also be able to view your health information using other applications (apps) compatible with our system.

## 2021-10-26 NOTE — DISCHARGE NOTE NURSING/CASE MANAGEMENT/SOCIAL WORK - NSDCVIVACCINE_GEN_ALL_CORE_FT
No Vaccines Administered. influenza, injectable, quadrivalent, preservative free; 26-Oct-2021 15:11; Richard Zamarripa (RN); [employer]; [id number] (Exp. Date: 30-Jun-2022); IntraMuscular; Deltoid Left.; 0.5 milliLiter(s); VIS (VIS Published: 06-Aug-2021, VIS Presented: 26-Oct-2021);

## 2021-10-26 NOTE — PROGRESS NOTE ADULT - SUBJECTIVE AND OBJECTIVE BOX
VASCULAR SURGERY DAILY PROGRESS NOTE:       Subjective / Overnight events:  No acute overnight events.  Pt without complaints.        Objective:      MEDICATIONS  (STANDING):  acetaminophen     Tablet .. 975 milliGRAM(s) Oral every 6 hours  ascorbic acid 500 milliGRAM(s) Oral daily  aspirin enteric coated 81 milliGRAM(s) Oral daily  ATENolol  Tablet 25 milliGRAM(s) Oral daily  atorvastatin 20 milliGRAM(s) Oral at bedtime  clonazePAM  Tablet 1 milliGRAM(s) Oral two times a day  enoxaparin Injectable 40 milliGRAM(s) SubCutaneous every 24 hours  influenza   Vaccine 0.5 milliLiter(s) IntraMuscular once  lactobacillus acidophilus 1 Tablet(s) Oral daily  multivitamin 1 Tablet(s) Oral daily  pantoprazole    Tablet 40 milliGRAM(s) Oral before breakfast  polyethylene glycol 3350 17 Gram(s) Oral daily  senna 2 Tablet(s) Oral at bedtime    MEDICATIONS  (PRN):  traMADol 25 milliGRAM(s) Oral every 6 hours PRN Mild Pain (1 - 3)  traMADol 50 milliGRAM(s) Oral every 6 hours PRN Severe Pain (7 - 10)      Vital Signs Last 24 Hrs  T(C): 36.8 (26 Oct 2021 06:16), Max: 37.3 (25 Oct 2021 09:57)  T(F): 98.3 (26 Oct 2021 06:16), Max: 99.1 (25 Oct 2021 09:57)  HR: 94 (26 Oct 2021 06:16) (74 - 94)  BP: 120/76 (26 Oct 2021 06:16) (96/60 - 124/74)  BP(mean): --  RR: 17 (26 Oct 2021 06:16) (16 - 18)  SpO2: 94% (26 Oct 2021 06:16) (93% - 94%)    I&O's Detail    25 Oct 2021 07:01  -  26 Oct 2021 07:00  --------------------------------------------------------  IN:    Oral Fluid: 710 mL  Total IN: 710 mL    OUT:    Indwelling Catheter - Urethral (mL): 1450 mL  Total OUT: 1450 mL    Total NET: -740 mL          Daily     Daily     LABS:                        10.3   6.61  )-----------( 253      ( 25 Oct 2021 07:16 )             32.5     10-25    137  |  104  |  6<L>  ----------------------------<  70  3.8   |  21<L>  |  <0.30<L>    Ca    9.1      25 Oct 2021 07:14  Phos  1.9     10-25  Mg     2.0     10-25              PHYSICAL EXAM  --------------------------------------------------------------------------------    	  Physical Exam   Gen: NAD, alert  Pulm: non-labored breathing   Heart: RRR  Abd: soft, ND/NT  Ext: R AKA incision well-healed, L AKA dressing c/d/i, no strikethrough, incision examined and c/d/i, no areas of dehiscence or oozing.

## 2021-10-26 NOTE — PROGRESS NOTE ADULT - ASSESSMENT
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

## 2021-10-27 LAB
ANION GAP SERPL CALC-SCNC: 15 MMOL/L — SIGNIFICANT CHANGE UP (ref 5–17)
BUN SERPL-MCNC: 7 MG/DL — SIGNIFICANT CHANGE UP (ref 7–23)
CALCIUM SERPL-MCNC: 9.1 MG/DL — SIGNIFICANT CHANGE UP (ref 8.4–10.5)
CHLORIDE SERPL-SCNC: 105 MMOL/L — SIGNIFICANT CHANGE UP (ref 96–108)
CO2 SERPL-SCNC: 21 MMOL/L — LOW (ref 22–31)
CREAT SERPL-MCNC: <0.3 MG/DL — LOW (ref 0.5–1.3)
GLUCOSE SERPL-MCNC: 77 MG/DL — SIGNIFICANT CHANGE UP (ref 70–99)
HCT VFR BLD CALC: 34.5 % — SIGNIFICANT CHANGE UP (ref 34.5–45)
HGB BLD-MCNC: 11 G/DL — LOW (ref 11.5–15.5)
MAGNESIUM SERPL-MCNC: 2.1 MG/DL — SIGNIFICANT CHANGE UP (ref 1.6–2.6)
MCHC RBC-ENTMCNC: 29.1 PG — SIGNIFICANT CHANGE UP (ref 27–34)
MCHC RBC-ENTMCNC: 31.9 GM/DL — LOW (ref 32–36)
MCV RBC AUTO: 91.3 FL — SIGNIFICANT CHANGE UP (ref 80–100)
NRBC # BLD: 0 /100 WBCS — SIGNIFICANT CHANGE UP (ref 0–0)
PHOSPHATE SERPL-MCNC: 2.1 MG/DL — LOW (ref 2.5–4.5)
PLATELET # BLD AUTO: 268 K/UL — SIGNIFICANT CHANGE UP (ref 150–400)
POTASSIUM SERPL-MCNC: 3 MMOL/L — LOW (ref 3.5–5.3)
POTASSIUM SERPL-MCNC: 3.7 MMOL/L — SIGNIFICANT CHANGE UP (ref 3.5–5.3)
POTASSIUM SERPL-SCNC: 3 MMOL/L — LOW (ref 3.5–5.3)
POTASSIUM SERPL-SCNC: 3.7 MMOL/L — SIGNIFICANT CHANGE UP (ref 3.5–5.3)
RBC # BLD: 3.78 M/UL — LOW (ref 3.8–5.2)
RBC # FLD: 16.4 % — HIGH (ref 10.3–14.5)
SODIUM SERPL-SCNC: 141 MMOL/L — SIGNIFICANT CHANGE UP (ref 135–145)
WBC # BLD: 5.42 K/UL — SIGNIFICANT CHANGE UP (ref 3.8–10.5)
WBC # FLD AUTO: 5.42 K/UL — SIGNIFICANT CHANGE UP (ref 3.8–10.5)

## 2021-10-27 PROCEDURE — 99222 1ST HOSP IP/OBS MODERATE 55: CPT

## 2021-10-27 RX ORDER — CEFEPIME 1 G/1
1000 INJECTION, POWDER, FOR SOLUTION INTRAMUSCULAR; INTRAVENOUS EVERY 24 HOURS
Refills: 0 | Status: DISCONTINUED | OUTPATIENT
Start: 2021-10-27 | End: 2021-10-27

## 2021-10-27 RX ORDER — POTASSIUM PHOSPHATE, MONOBASIC POTASSIUM PHOSPHATE, DIBASIC 236; 224 MG/ML; MG/ML
30 INJECTION, SOLUTION INTRAVENOUS ONCE
Refills: 0 | Status: COMPLETED | OUTPATIENT
Start: 2021-10-27 | End: 2021-10-27

## 2021-10-27 RX ORDER — VANCOMYCIN HCL 1 G
1000 VIAL (EA) INTRAVENOUS
Refills: 0 | Status: DISCONTINUED | OUTPATIENT
Start: 2021-10-27 | End: 2021-10-30

## 2021-10-27 RX ORDER — POTASSIUM CHLORIDE 20 MEQ
20 PACKET (EA) ORAL ONCE
Refills: 0 | Status: COMPLETED | OUTPATIENT
Start: 2021-10-27 | End: 2021-10-27

## 2021-10-27 RX ORDER — PIPERACILLIN AND TAZOBACTAM 4; .5 G/20ML; G/20ML
3.38 INJECTION, POWDER, LYOPHILIZED, FOR SOLUTION INTRAVENOUS EVERY 8 HOURS
Refills: 0 | Status: DISCONTINUED | OUTPATIENT
Start: 2021-10-27 | End: 2021-10-27

## 2021-10-27 RX ORDER — CEFEPIME 1 G/1
1000 INJECTION, POWDER, FOR SOLUTION INTRAMUSCULAR; INTRAVENOUS EVERY 12 HOURS
Refills: 0 | Status: DISCONTINUED | OUTPATIENT
Start: 2021-10-27 | End: 2021-11-08

## 2021-10-27 RX ORDER — PIPERACILLIN AND TAZOBACTAM 4; .5 G/20ML; G/20ML
3.38 INJECTION, POWDER, LYOPHILIZED, FOR SOLUTION INTRAVENOUS ONCE
Refills: 0 | Status: COMPLETED | OUTPATIENT
Start: 2021-10-27 | End: 2021-10-27

## 2021-10-27 RX ADMIN — Medication 975 MILLIGRAM(S): at 18:23

## 2021-10-27 RX ADMIN — Medication 975 MILLIGRAM(S): at 23:19

## 2021-10-27 RX ADMIN — Medication 20 MILLIEQUIVALENT(S): at 09:00

## 2021-10-27 RX ADMIN — Medication 1 TABLET(S): at 12:33

## 2021-10-27 RX ADMIN — PANTOPRAZOLE SODIUM 40 MILLIGRAM(S): 20 TABLET, DELAYED RELEASE ORAL at 05:36

## 2021-10-27 RX ADMIN — Medication 81 MILLIGRAM(S): at 12:33

## 2021-10-27 RX ADMIN — ATENOLOL 25 MILLIGRAM(S): 25 TABLET ORAL at 05:36

## 2021-10-27 RX ADMIN — Medication 250 MILLIGRAM(S): at 18:23

## 2021-10-27 RX ADMIN — ENOXAPARIN SODIUM 40 MILLIGRAM(S): 100 INJECTION SUBCUTANEOUS at 15:37

## 2021-10-27 RX ADMIN — Medication 975 MILLIGRAM(S): at 13:30

## 2021-10-27 RX ADMIN — Medication 1 MILLIGRAM(S): at 05:39

## 2021-10-27 RX ADMIN — CEFEPIME 100 MILLIGRAM(S): 1 INJECTION, POWDER, FOR SOLUTION INTRAMUSCULAR; INTRAVENOUS at 15:37

## 2021-10-27 RX ADMIN — ATORVASTATIN CALCIUM 20 MILLIGRAM(S): 80 TABLET, FILM COATED ORAL at 22:10

## 2021-10-27 RX ADMIN — Medication 1 MILLIGRAM(S): at 18:23

## 2021-10-27 RX ADMIN — PIPERACILLIN AND TAZOBACTAM 200 GRAM(S): 4; .5 INJECTION, POWDER, LYOPHILIZED, FOR SOLUTION INTRAVENOUS at 08:28

## 2021-10-27 RX ADMIN — Medication 975 MILLIGRAM(S): at 19:30

## 2021-10-27 RX ADMIN — Medication 975 MILLIGRAM(S): at 06:00

## 2021-10-27 RX ADMIN — Medication 500 MILLIGRAM(S): at 12:32

## 2021-10-27 RX ADMIN — Medication 975 MILLIGRAM(S): at 12:33

## 2021-10-27 RX ADMIN — SENNA PLUS 2 TABLET(S): 8.6 TABLET ORAL at 22:10

## 2021-10-27 RX ADMIN — POTASSIUM PHOSPHATE, MONOBASIC POTASSIUM PHOSPHATE, DIBASIC 83.33 MILLIMOLE(S): 236; 224 INJECTION, SOLUTION INTRAVENOUS at 09:00

## 2021-10-27 NOTE — CONSULT NOTE ADULT - ASSESSMENT
80 y/o female who presents to the ED with chronic Left lower leg wound present since 2018 after an ICU admission for sepsis. She has been followed in the [facility] wound care center by Mounika Smith/Rah/Kishan since 2019. Yesterday, she saw her wound care doctor who contacted Dr. Morris that the wound was getting worse. Presented to ED this morning to get leg evaluated. Denies nausea, vomiting, fever, chills, shortness of breath, rapid heart rate.  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  pt with no cardiac complain, no evidence of chf on exam  continue current bp meds  pt will be clear for amputation  dvt prophylaxis  awaiting ecg
80 y/o female     who presents to the ED with chronic Left lower leg wound present since July 2020..  f/p  wound care center by Mounika Smith/Rah  since  2020  . She also has a healing stage 4 sacral pressure ulcer. She is wheelchair bound, incontinent of stools, and wears diapers.    and denies fevers, chills, SOB. 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   .  Has chronic fragoso catheter changed every 2 weeks. //   c/c  left  leg  wound with  ecshar  and  left  tibial  fx.  seen by ortho,  non operative  management       * admitted  with non  healing left   leg wound/  h/o PAD/    and per  surg, awiating AKA  *  HTN.  HLD/  Anxiety    on  Norvasc  ASA ,, Lipitor ,, Klonipen/ used to be on  Baclofen   sacral  ulcer.  c/c,  wound  care.  has  a  c/c  fragoso,  *  h/o  MS,  with  neurogenic  bladder, and  has a   c.c   fragoso    echo , normal  ef    pt cleared for surg     rad< from: Transthoracic Echocardiogram (10.01.19 @ 07:50) >  ------------------------------------------------------------------------  Conclusions:  Normal left ventricular systolic function. No segmental  wall motion abnormalities.  ------------------------------------------------------------------------  < end of copied text >      
81 F with multiple sclerosis, HTN, neurogenic bladder (has had a Kendrick catheter for many years) and peripheral vascular disease presents with left lower leg wound which has been present since 2018, worsening, sent to the ED for wet gangrene s/p AKA on 10/21/21. She was doing well, afebrile, WBC WNL. Has erythema along the surgical site, staples are in place, with posterior thigh wound.    Recommend:  #Post surgical site cellulitis  -Continue vancomycin and cefepime  -Monitor vanco trough  -Continue to renally adjust abx  -Monitor erythema  -Monitor for fevers    #Posterior thigh wound  -Appears chronic, no signs of active infection, no surrounding erythema, no drainage    Arvind Peterson MD  Pager [phone]  After 5pm/weekends call [phone]

## 2021-10-27 NOTE — PROGRESS NOTE ADULT - SUBJECTIVE AND OBJECTIVE BOX
Surgery Progress Note      SUBJECTIVE: Patient seen and examined at bedside with surgical team. No acute events overnight.     OBJECTIVE:    Vital Signs Last 24 Hrs  T(C): 37.2 (27 Oct 2021 05:00), Max: 37.2 (27 Oct 2021 05:00)  T(F): 98.9 (27 Oct 2021 05:00), Max: 98.9 (27 Oct 2021 05:00)  HR: 92 (27 Oct 2021 05:00) (74 - 93)  BP: 162/89 (27 Oct 2021 05:00) (117/71 - 162/89)  BP(mean): --  RR: 18 (27 Oct 2021 05:00) (18 - 18)  SpO2: 93% (27 Oct 2021 05:00) (91% - 95%)I&O's Detail    26 Oct 2021 07:01  -  27 Oct 2021 07:00  --------------------------------------------------------  IN:    Oral Fluid: 890 mL  Total IN: 890 mL    OUT:    Indwelling Catheter - Urethral (mL): 1700 mL  Total OUT: 1700 mL    Total NET: -810 mL      MEDICATIONS  (STANDING):  acetaminophen     Tablet .. 975 milliGRAM(s) Oral every 6 hours  ascorbic acid 500 milliGRAM(s) Oral daily  aspirin enteric coated 81 milliGRAM(s) Oral daily  ATENolol  Tablet 25 milliGRAM(s) Oral daily  atorvastatin 20 milliGRAM(s) Oral at bedtime  clonazePAM  Tablet 1 milliGRAM(s) Oral two times a day  enoxaparin Injectable 40 milliGRAM(s) SubCutaneous every 24 hours  lactobacillus acidophilus 1 Tablet(s) Oral daily  multivitamin 1 Tablet(s) Oral daily  pantoprazole    Tablet 40 milliGRAM(s) Oral before breakfast  piperacillin/tazobactam IVPB. 3.375 Gram(s) IV Intermittent once  piperacillin/tazobactam IVPB.. 3.375 Gram(s) IV Intermittent every 8 hours  polyethylene glycol 3350 17 Gram(s) Oral daily  senna 2 Tablet(s) Oral at bedtime  vancomycin  IVPB 1000 milliGRAM(s) IV Intermittent two times a day    MEDICATIONS  (PRN):  traMADol 25 milliGRAM(s) Oral every 6 hours PRN Mild Pain (1 - 3)  traMADol 50 milliGRAM(s) Oral every 6 hours PRN Severe Pain (7 - 10)      PHYSICAL EXAM:  Gen: NAD, alert  Pulm: non-labored breathing   Heart: RRR  Abd: soft, ND/NT  Ext: R AKA incision well-healed, L AKA dressing c/d/i, no strikethrough, incision examined and there is an area of erythema around medial aspect of incision with induration but no fluctuance or drainage.     LABS:                        11.0   5.42  )-----------( 268      ( 27 Oct 2021 07:05 )             34.5     10-27    141  |  105  |  7   ----------------------------<  77  3.0<L>   |  21<L>  |  <0.30<L>    Ca    9.1      27 Oct 2021 07:02  Phos  2.1     10-27  Mg     2.1     10-27

## 2021-10-27 NOTE — PROGRESS NOTE ADULT - ASSESSMENT
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

## 2021-10-27 NOTE — PROGRESS NOTE ADULT - SUBJECTIVE AND OBJECTIVE BOX
CARDIOLOGY     PROGRESS  NOTE   ________________________________________________    CHIEF COMPLAINT:Patient is a 81y old  Female who presents with a chief complaint of Left lower extremity wound (27 Oct 2021 07:51)  no complain.  	  REVIEW OF SYSTEMS:  CONSTITUTIONAL: No fever, weight loss, or fatigue  EYES: No eye pain, visual disturbances, or discharge  ENT:  No difficulty hearing, tinnitus, vertigo; No sinus or throat pain  NECK: No pain or stiffness  RESPIRATORY: No cough, wheezing, chills or hemoptysis; No Shortness of Breath  CARDIOVASCULAR: No chest pain, palpitations, passing out, dizziness, or leg swelling  GASTROINTESTINAL: No abdominal or epigastric pain. No nausea, vomiting, or hematemesis; No diarrhea or constipation. No melena or hematochezia.  GENITOURINARY: No dysuria, frequency, hematuria, or incontinence  NEUROLOGICAL: No headaches, memory loss, loss of strength, numbness, or tremors  SKIN: No itching, burning, rashes, or lesions   LYMPH Nodes: No enlarged glands  ENDOCRINE: No heat or cold intolerance; No hair loss  MUSCULOSKELETAL: No joint pain or swelling; No muscle, back, or extremity pain  PSYCHIATRIC: No depression, anxiety, mood swings, or difficulty sleeping  HEME/LYMPH: No easy bruising, or bleeding gums  ALLERGY AND IMMUNOLOGIC: No hives or eczema	    [ ] All others negative	  [ ] Unable to obtain    PHYSICAL EXAM:  T(C): 37.2 (10-27-21 @ 05:00), Max: 37.2 (10-27-21 @ 05:00)  HR: 92 (10-27-21 @ 05:00) (74 - 93)  BP: 162/89 (10-27-21 @ 05:00) (117/71 - 162/89)  RR: 18 (10-27-21 @ 05:00) (18 - 18)  SpO2: 93% (10-27-21 @ 05:00) (91% - 95%)  Wt(kg): --  I&O's Summary    26 Oct 2021 07:01  -  27 Oct 2021 07:00  --------------------------------------------------------  IN: 890 mL / OUT: 1700 mL / NET: -810 mL        Appearance: Normal	  HEENT:   Normal oral mucosa, PERRL, EOMI	  Lymphatic: No lymphadenopathy  Cardiovascular: Normal S1 S2, No JVD, + murmurs, No edema  Respiratory: Lungs clear to auscultation	  Psychiatry: A & O x 3, Mood & affect appropriate  Gastrointestinal:  Soft, Non-tender, + BS	  Skin: No rashes, No ecchymoses, No cyanosis	  Neurologic: Non-focal  Extremities: Normal range of motion, bl bka  Vascular: Peripheral pulses palpable 2+ bilaterally    MEDICATIONS  (STANDING):  acetaminophen     Tablet .. 975 milliGRAM(s) Oral every 6 hours  ascorbic acid 500 milliGRAM(s) Oral daily  aspirin enteric coated 81 milliGRAM(s) Oral daily  ATENolol  Tablet 25 milliGRAM(s) Oral daily  atorvastatin 20 milliGRAM(s) Oral at bedtime  clonazePAM  Tablet 1 milliGRAM(s) Oral two times a day  enoxaparin Injectable 40 milliGRAM(s) SubCutaneous every 24 hours  lactobacillus acidophilus 1 Tablet(s) Oral daily  multivitamin 1 Tablet(s) Oral daily  pantoprazole    Tablet 40 milliGRAM(s) Oral before breakfast  piperacillin/tazobactam IVPB. 3.375 Gram(s) IV Intermittent once  piperacillin/tazobactam IVPB.. 3.375 Gram(s) IV Intermittent every 8 hours  polyethylene glycol 3350 17 Gram(s) Oral daily  potassium chloride    Tablet ER 20 milliEquivalent(s) Oral once  potassium phosphate IVPB 30 milliMole(s) IV Intermittent once  senna 2 Tablet(s) Oral at bedtime  vancomycin  IVPB 1000 milliGRAM(s) IV Intermittent two times a day      TELEMETRY: 	    ECG:  	  RADIOLOGY:  OTHER: 	  	  LABS:	 	    CARDIAC MARKERS:                                11.0   5.42  )-----------( 268      ( 27 Oct 2021 07:05 )             34.5     10-27    141  |  105  |  7   ----------------------------<  77  3.0<L>   |  21<L>  |  <0.30<L>    Ca    9.1      27 Oct 2021 07:02  Phos  2.1     10-27  Mg     2.1     10-27      proBNP:   Lipid Profile:   HgA1c:   TSH:   - restart zosyn and vancomycin  - ID consult   - Regular diet  - OOB with assistance  - Pain control  - f/u AM labs  - DC planning, awaiting equipment for home  - DVT ppx: Lovenox, continue ASA        Assessment and plan  ---------------------------  80 y/o female who presents to the ED with chronic Left lower leg wound present since 2018 after an ICU admission for sepsis. She has been followed in the [facility] wound care center by Mounika Smith/Rah/Kishan since 2019. Yesterday, she saw her wound care doctor who contacted Dr. Morris that the wound was getting worse. Presented to ED this morning to get leg evaluated. Denies nausea, vomiting, fever, chills, shortness of breath, rapid heart rate.  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  pt with no cardiac complain, no evidence of chf on exam  s/p bka  doing better  increase bp ?sec to pain and discomfort.  pain meds  continue Lipitor / atenolol  replete K, keep K>4  started on abx  awaiting ID eval  replete K    	         [___ Month(s) Ago] : [unfilled] month(s) ago [Good] : being in good health [Healthy Diet] : a healthy diet [Regular Exercise] : regular exercise [Last Pap ___] : Last cervical pap smear was [unfilled] [Definite:  ___ (Date)] : the last menstrual period was [unfilled] [Sexually Active] : is sexually active [Menarche Age: ____] : age at menarche was [unfilled] [Monogamous] : is monogamous [Male ___] : [unfilled] male [NA] : N/A [Burning] : no burning [Itching] : no itching [Lesion] : no lesion [Stinging] : no stinging [Mass] : no mass [Soreness] : no soreness [Discharge] : no discharge [Localized Pain] : no localized pain [Mass (___cm)] : no palpable mass [Diffused Pain] : no diffused pain [Nipple Discharge] : no nipple discharge [Skin Color Change] : no skin color change [Hot Flashes] : no hot flashes [Night Sweats] : no night sweats [Dyspareunia] : no dyspareunia [Vaginal Itching] : no vaginal itching [Mood Changes] : no mood changes [Contraception] : does not use contraception

## 2021-10-27 NOTE — CONSULT NOTE ADULT - REASON FOR ADMISSION
Left lower extremity wound

## 2021-10-27 NOTE — CONSULT NOTE ADULT - SUBJECTIVE AND OBJECTIVE BOX
HPI:  80 y/o female who presents to the ED with chronic Left lower leg wound present since 2018 after an ICU admission for sepsis. She has been followed in the [facility] wound care center by Mounika Smith/Rah/Kishan since 2019. Yesterday, she saw her wound care doctor who contacted Dr. Morris that the wound was getting worse. Presented to ED this morning to get leg evaluated. Denies nausea, vomiting, fever, chills, shortness of breath, rapid heart rate.     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 Now with erythema along surgical site, staples in place, with posterior thigh wound. No fevers. WBC WNL. Restarted on abx.      PAST MEDICAL & SURGICAL HISTORY:  Ptosis of both eyelids    Neurogenic bladder    Dysphagia  no special diet; no h/o aspiration PNA    Osteoporosis    HTN (hypertension)    MS (multiple sclerosis)    S/P hysterectomy  &gt; 30 yrs ago    S/P breast biopsy    S/P ORIF (open reduction internal fixation) fracture  left femur, right hip    Allergies    No Known Allergies    Intolerances    ANTIMICROBIALS:  cefepime   IVPB 1000 every 24 hours  vancomycin  IVPB 1000 two times a day    OTHER MEDS:  acetaminophen     Tablet .. 975 milliGRAM(s) Oral every 6 hours  ascorbic acid 500 milliGRAM(s) Oral daily  aspirin enteric coated 81 milliGRAM(s) Oral daily  ATENolol  Tablet 25 milliGRAM(s) Oral daily  atorvastatin 20 milliGRAM(s) Oral at bedtime  clonazePAM  Tablet 1 milliGRAM(s) Oral two times a day  enoxaparin Injectable 40 milliGRAM(s) SubCutaneous every 24 hours  lactobacillus acidophilus 1 Tablet(s) Oral daily  multivitamin 1 Tablet(s) Oral daily  pantoprazole    Tablet 40 milliGRAM(s) Oral before breakfast  polyethylene glycol 3350 17 Gram(s) Oral daily  senna 2 Tablet(s) Oral at bedtime  traMADol 25 milliGRAM(s) Oral every 6 hours PRN  traMADol 50 milliGRAM(s) Oral every 6 hours PRN    SOCIAL HISTORY: Denies smoking, alcohol, drug use.    FAMILY HISTORY:  No vascular disease in first degree relatives.      Drug Dosing Weight  Height (cm): 157.5 (21 Oct 2021 07:17)  Weight (kg): 63.5 (21 Oct 2021 07:17)  BMI (kg/m2): 25.6 (21 Oct 2021 07:17)  BSA (m2): 1.64 (21 Oct 2021 07:17)    PE:    Vital Signs Last 24 Hrs  T(C): 36.4 (27 Oct 2021 09:31), Max: 37.2 (27 Oct 2021 05:00)  T(F): 97.5 (27 Oct 2021 09:31), Max: 98.9 (27 Oct 2021 05:00)  HR: 78 (27 Oct 2021 09:31) (76 - 93)  BP: 129/67 (27 Oct 2021 09:31) (119/67 - 162/89)  BP(mean): --  RR: 18 (27 Oct 2021 09:31) (18 - 18)  SpO2: 93% (27 Oct 2021 09:31) (93% - 95%)    Gen: AOx3, NAD  CV: S1+S2 normal, no murmurs  Resp: Clear bilat, no resp distress  Abd: Soft, nontender, +BS  Ext: No LE edema, no wounds  : No Kendrick  IV/Skin: No thrombophlebitis, R AKA intact, L AKA with staples in place, erythema along staple line, posterior leg wound  Msk: No low back pain, no arthralgias, no joint swelling  Neuro: No sensory deficits, no motor deficits    LABS:                          11.0   5.42  )-----------( 268      ( 27 Oct 2021 07:05 )             34.5       10-27    141  |  105  |  7   ----------------------------<  77  3.0<L>   |  21<L>  |  <0.30<L>    Ca    9.1      27 Oct 2021 07:02  Phos  2.1     10-27  Mg     2.1     10-27    MICROBIOLOGY:  v  .Urine Other  10-21-21   Culture is being performed.  --  --      Catheterized Catheterized  10-21-21   No growth  --  --      .Other Other  10-21-21   Testing in progress  --  --      .Blood Blood-Peripheral  10-21-21   No Growth Final  --  --      .Blood Blood-Peripheral  10-19-21   No Growth Final  --  --    RADIOLOGY:    < from: Xray Chest 1 View- PORTABLE-Routine (Xray Chest 1 View- PORTABLE-Routine in AM.) (10.24.21 @ 06:55) >  IMPRESSION:  Bilateral lower lobe atelectasis, left greater than right. Trace bilateral pleural effusions.    < end of copied text >

## 2021-10-28 LAB
ANION GAP SERPL CALC-SCNC: 11 MMOL/L — SIGNIFICANT CHANGE UP (ref 5–17)
ANION GAP SERPL CALC-SCNC: 12 MMOL/L — SIGNIFICANT CHANGE UP (ref 5–17)
BUN SERPL-MCNC: 6 MG/DL — LOW (ref 7–23)
BUN SERPL-MCNC: 7 MG/DL — SIGNIFICANT CHANGE UP (ref 7–23)
CALCIUM SERPL-MCNC: 8.6 MG/DL — SIGNIFICANT CHANGE UP (ref 8.4–10.5)
CALCIUM SERPL-MCNC: 8.8 MG/DL — SIGNIFICANT CHANGE UP (ref 8.4–10.5)
CHLORIDE SERPL-SCNC: 105 MMOL/L — SIGNIFICANT CHANGE UP (ref 96–108)
CHLORIDE SERPL-SCNC: 111 MMOL/L — HIGH (ref 96–108)
CO2 SERPL-SCNC: 18 MMOL/L — LOW (ref 22–31)
CO2 SERPL-SCNC: 23 MMOL/L — SIGNIFICANT CHANGE UP (ref 22–31)
CREAT SERPL-MCNC: <0.3 MG/DL — LOW (ref 0.5–1.3)
CREAT SERPL-MCNC: <0.3 MG/DL — LOW (ref 0.5–1.3)
GLUCOSE SERPL-MCNC: 138 MG/DL — HIGH (ref 70–99)
GLUCOSE SERPL-MCNC: 99 MG/DL — SIGNIFICANT CHANGE UP (ref 70–99)
HCT VFR BLD CALC: 33 % — LOW (ref 34.5–45)
HGB BLD-MCNC: 10.4 G/DL — LOW (ref 11.5–15.5)
MAGNESIUM SERPL-MCNC: 1.8 MG/DL — SIGNIFICANT CHANGE UP (ref 1.6–2.6)
MAGNESIUM SERPL-MCNC: 1.8 MG/DL — SIGNIFICANT CHANGE UP (ref 1.6–2.6)
MCHC RBC-ENTMCNC: 29.2 PG — SIGNIFICANT CHANGE UP (ref 27–34)
MCHC RBC-ENTMCNC: 31.5 GM/DL — LOW (ref 32–36)
MCV RBC AUTO: 92.7 FL — SIGNIFICANT CHANGE UP (ref 80–100)
NRBC # BLD: 0 /100 WBCS — SIGNIFICANT CHANGE UP (ref 0–0)
PHOSPHATE SERPL-MCNC: 1.7 MG/DL — LOW (ref 2.5–4.5)
PHOSPHATE SERPL-MCNC: 3.9 MG/DL — SIGNIFICANT CHANGE UP (ref 2.5–4.5)
PLATELET # BLD AUTO: 238 K/UL — SIGNIFICANT CHANGE UP (ref 150–400)
POTASSIUM SERPL-MCNC: 3 MMOL/L — LOW (ref 3.5–5.3)
POTASSIUM SERPL-MCNC: 5.2 MMOL/L — SIGNIFICANT CHANGE UP (ref 3.5–5.3)
POTASSIUM SERPL-SCNC: 3 MMOL/L — LOW (ref 3.5–5.3)
POTASSIUM SERPL-SCNC: 5.2 MMOL/L — SIGNIFICANT CHANGE UP (ref 3.5–5.3)
RBC # BLD: 3.56 M/UL — LOW (ref 3.8–5.2)
RBC # FLD: 16.8 % — HIGH (ref 10.3–14.5)
SODIUM SERPL-SCNC: 140 MMOL/L — SIGNIFICANT CHANGE UP (ref 135–145)
SODIUM SERPL-SCNC: 140 MMOL/L — SIGNIFICANT CHANGE UP (ref 135–145)
VANCOMYCIN TROUGH SERPL-MCNC: 10.7 UG/ML — SIGNIFICANT CHANGE UP (ref 10–20)
WBC # BLD: 5.03 K/UL — SIGNIFICANT CHANGE UP (ref 3.8–10.5)
WBC # FLD AUTO: 5.03 K/UL — SIGNIFICANT CHANGE UP (ref 3.8–10.5)

## 2021-10-28 RX ORDER — POTASSIUM CHLORIDE 20 MEQ
30 PACKET (EA) ORAL ONCE
Refills: 0 | Status: COMPLETED | OUTPATIENT
Start: 2021-10-28 | End: 2021-10-28

## 2021-10-28 RX ORDER — SODIUM,POTASSIUM PHOSPHATES 278-250MG
1 POWDER IN PACKET (EA) ORAL ONCE
Refills: 0 | Status: COMPLETED | OUTPATIENT
Start: 2021-10-28 | End: 2021-10-28

## 2021-10-28 RX ORDER — POTASSIUM CHLORIDE 20 MEQ
40 PACKET (EA) ORAL EVERY 4 HOURS
Refills: 0 | Status: COMPLETED | OUTPATIENT
Start: 2021-10-28 | End: 2021-10-28

## 2021-10-28 RX ORDER — POTASSIUM PHOSPHATE, MONOBASIC POTASSIUM PHOSPHATE, DIBASIC 236; 224 MG/ML; MG/ML
30 INJECTION, SOLUTION INTRAVENOUS ONCE
Refills: 0 | Status: COMPLETED | OUTPATIENT
Start: 2021-10-28 | End: 2021-10-28

## 2021-10-28 RX ORDER — COLLAGENASE CLOSTRIDIUM HIST. 250 UNIT/G
1 OINTMENT (GRAM) TOPICAL DAILY
Refills: 0 | Status: DISCONTINUED | OUTPATIENT
Start: 2021-10-28 | End: 2021-11-01

## 2021-10-28 RX ORDER — CLONAZEPAM 1 MG
1 TABLET ORAL
Refills: 0 | Status: DISCONTINUED | OUTPATIENT
Start: 2021-10-28 | End: 2021-11-01

## 2021-10-28 RX ADMIN — Medication 250 MILLIGRAM(S): at 20:50

## 2021-10-28 RX ADMIN — Medication 40 MILLIEQUIVALENT(S): at 16:08

## 2021-10-28 RX ADMIN — Medication 40 MILLIEQUIVALENT(S): at 09:40

## 2021-10-28 RX ADMIN — ATENOLOL 25 MILLIGRAM(S): 25 TABLET ORAL at 06:05

## 2021-10-28 RX ADMIN — POTASSIUM PHOSPHATE, MONOBASIC POTASSIUM PHOSPHATE, DIBASIC 83.33 MILLIMOLE(S): 236; 224 INJECTION, SOLUTION INTRAVENOUS at 11:06

## 2021-10-28 RX ADMIN — Medication 1 TABLET(S): at 11:13

## 2021-10-28 RX ADMIN — Medication 975 MILLIGRAM(S): at 06:06

## 2021-10-28 RX ADMIN — Medication 1 APPLICATION(S): at 16:25

## 2021-10-28 RX ADMIN — POLYETHYLENE GLYCOL 3350 17 GRAM(S): 17 POWDER, FOR SOLUTION ORAL at 11:13

## 2021-10-28 RX ADMIN — PANTOPRAZOLE SODIUM 40 MILLIGRAM(S): 20 TABLET, DELAYED RELEASE ORAL at 06:05

## 2021-10-28 RX ADMIN — ENOXAPARIN SODIUM 40 MILLIGRAM(S): 100 INJECTION SUBCUTANEOUS at 16:08

## 2021-10-28 RX ADMIN — Medication 975 MILLIGRAM(S): at 11:12

## 2021-10-28 RX ADMIN — Medication 500 MILLIGRAM(S): at 11:12

## 2021-10-28 RX ADMIN — Medication 1 TABLET(S): at 11:12

## 2021-10-28 RX ADMIN — Medication 975 MILLIGRAM(S): at 12:37

## 2021-10-28 RX ADMIN — Medication 250 MILLIGRAM(S): at 06:05

## 2021-10-28 RX ADMIN — ATORVASTATIN CALCIUM 20 MILLIGRAM(S): 80 TABLET, FILM COATED ORAL at 20:50

## 2021-10-28 RX ADMIN — Medication 81 MILLIGRAM(S): at 11:12

## 2021-10-28 RX ADMIN — Medication 30 MILLIEQUIVALENT(S): at 06:06

## 2021-10-28 RX ADMIN — Medication 1 TABLET(S): at 11:08

## 2021-10-28 RX ADMIN — CEFEPIME 100 MILLIGRAM(S): 1 INJECTION, POWDER, FOR SOLUTION INTRAMUSCULAR; INTRAVENOUS at 06:05

## 2021-10-28 RX ADMIN — CEFEPIME 100 MILLIGRAM(S): 1 INJECTION, POWDER, FOR SOLUTION INTRAMUSCULAR; INTRAVENOUS at 16:36

## 2021-10-28 RX ADMIN — Medication 1 MILLIGRAM(S): at 06:08

## 2021-10-28 NOTE — PROVIDER CONTACT NOTE (OTHER) - ASSESSMENT
remains a&ox3, partially disoriented to situation. Aware she is in hospital, unaware as to why. Appears more forgetful, mumbling to self, illogical at times
Pt is A&Ox4, VSS. No complaints at this time

## 2021-10-28 NOTE — PROGRESS NOTE ADULT - SUBJECTIVE AND OBJECTIVE BOX
Surgery Progress Note    INTERVAl/SUBJECTIVE: No acute event overnight.     Vital Signs Last 24 Hrs  T(C): 37.1 (28 Oct 2021 01:02), Max: 37.2 (27 Oct 2021 05:00)  T(F): 98.8 (28 Oct 2021 01:02), Max: 98.9 (27 Oct 2021 05:00)  HR: 98 (28 Oct 2021 01:02) (70 - 98)  BP: 134/82 (28 Oct 2021 01:02) (122/74 - 162/89)  BP(mean): --  RR: 18 (28 Oct 2021 01:02) (18 - 18)  SpO2: 95% (28 Oct 2021 01:02) (93% - 95%)    Physical Exam:  General:  Neuro:    CV:   Abdomen:     LABS:                        11.0   5.42  )-----------( 268      ( 27 Oct 2021 07:05 )             34.5     10-27    x   |  x   |  x   ----------------------------<  x   3.7   |  x   |  x     Ca    9.1      27 Oct 2021 07:02  Phos  2.1     10-27  Mg     2.1     10-27            INs and OUTs:    10-26-21 @ 07:01  -  10-27-21 @ 07:00  --------------------------------------------------------  IN: 890 mL / OUT: 1700 mL / NET: -810 mL    10-27-21 @ 07:01  -  10-28-21 @ 02:08  --------------------------------------------------------  IN: 600 mL / OUT: 1250 mL / NET: -650 mL     Surgery Progress Note    INTERVAl/SUBJECTIVE: No acute events overnight    Vital Signs Last 24 Hrs  T(C): 37.1 (28 Oct 2021 01:02), Max: 37.2 (27 Oct 2021 05:00)  T(F): 98.8 (28 Oct 2021 01:02), Max: 98.9 (27 Oct 2021 05:00)  HR: 98 (28 Oct 2021 01:02) (70 - 98)  BP: 134/82 (28 Oct 2021 01:02) (122/74 - 162/89)  BP(mean): --  RR: 18 (28 Oct 2021 01:02) (18 - 18)  SpO2: 95% (28 Oct 2021 01:02) (93% - 95%)    Gen: NAD, alert  Pulm: non-labored breathing   Ext: R AKA incision well-healed, L AKA dressing c/d/i, no strikethrough, incision examined and there is an area of erythema around medial aspect of incision with induration but no fluctuance or drainage, posterior thigh ulcer with some bloody drainage       LABS:                        11.0   5.42  )-----------( 268      ( 27 Oct 2021 07:05 )             34.5     10-27    x   |  x   |  x   ----------------------------<  x   3.7   |  x   |  x     Ca    9.1      27 Oct 2021 07:02  Phos  2.1     10-27  Mg     2.1     10-27            INs and OUTs:    10-26-21 @ 07:01  -  10-27-21 @ 07:00  --------------------------------------------------------  IN: 890 mL / OUT: 1700 mL / NET: -810 mL    10-27-21 @ 07:01  -  10-28-21 @ 02:08  --------------------------------------------------------  IN: 600 mL / OUT: 1250 mL / NET: -650 mL     Surgery Progress Note    INTERVAl/SUBJECTIVE: No acute events overnight    Vital Signs Last 24 Hrs  T(C): 37.1 (28 Oct 2021 01:02), Max: 37.2 (27 Oct 2021 05:00)  T(F): 98.8 (28 Oct 2021 01:02), Max: 98.9 (27 Oct 2021 05:00)  HR: 98 (28 Oct 2021 01:02) (70 - 98)  BP: 134/82 (28 Oct 2021 01:02) (122/74 - 162/89)  BP(mean): --  RR: 18 (28 Oct 2021 01:02) (18 - 18)  SpO2: 95% (28 Oct 2021 01:02) (93% - 95%)    Gen: NAD, alert  Pulm: non-labored breathing   Ext: R AKA incision well-healed, L AKA dressing c/d/i,  dressing changed   cellulitis improving  post mid thigh wound  clean w mild granulation tissue     LABS:                        11.0   5.42  )-----------( 268      ( 27 Oct 2021 07:05 )             34.5     10-27    x   |  x   |  x   ----------------------------<  x   3.7   |  x   |  x     Ca    9.1      27 Oct 2021 07:02  Phos  2.1     10-27  Mg     2.1     10-27        INs and OUTs:    10-26-21 @ 07:01  -  10-27-21 @ 07:00  --------------------------------------------------------  IN: 890 mL / OUT: 1700 mL / NET: -810 mL    10-27-21 @ 07:01  -  10-28-21 @ 02:08  --------------------------------------------------------  IN: 600 mL / OUT: 1250 mL / NET: -650 mL

## 2021-10-28 NOTE — PROGRESS NOTE ADULT - ASSESSMENT
82 y/o female HTN, MS, hysterectomy, R AKA who presents to the ED with LLE nonhealing wound with wet gangrene now s/p L JAMIR on 10/21/2021     Plan: 82 y/o female HTN, MS, hysterectomy, R AKA who presents to the ED with LLE nonhealing wound with wet gangrene now s/p L AKA on 10/21/2021     -cont Abx as per ID   -vanco trough 10/29 pre 4am dose   -santyl to posterior thigh ulcer   -dispo home PT once erythema improves    Vascular Sx 9007  82 y/o female HTN, MS, hysterectomy, R AKA who presents to the ED with LLE nonhealing wound with wet gangrene now s/p L AKA on 10/21/2021     -cont Abx as per ID   -vanco trough 10/29 pre 4am dose   -santyl to posterior thigh ulcer   -dispo home PT once erythema improves  will follow    Vascular Sx [id number]

## 2021-10-28 NOTE — PROGRESS NOTE ADULT - ATTENDING COMMENTS
RAN Morris MD have seen and examined the patient today and agree with  the  evaluation, assessment and plan of the surgical house officer  RAN Morris MD have personally seen and examined the patient at bedside today at  12pm

## 2021-10-28 NOTE — PROVIDER CONTACT NOTE (OTHER) - SITUATION
MD stated not to take off dressing to left lower extremity
pt appears more forgetful, mumbling to self, illogical at times

## 2021-10-28 NOTE — PROGRESS NOTE ADULT - SUBJECTIVE AND OBJECTIVE BOX
CARDIOLOGY     PROGRESS  NOTE   ________________________________________________    CHIEF COMPLAINT:Patient is a 81y old  Female who presents with a chief complaint of Left lower extremity wound (28 Oct 2021 02:07)  no complain.  	  REVIEW OF SYSTEMS:  CONSTITUTIONAL: No fever, weight loss, or fatigue  EYES: No eye pain, visual disturbances, or discharge  ENT:  No difficulty hearing, tinnitus, vertigo; No sinus or throat pain  NECK: No pain or stiffness  RESPIRATORY: No cough, wheezing, chills or hemoptysis; No Shortness of Breath  CARDIOVASCULAR: No chest pain, palpitations, passing out, dizziness, or leg swelling  GASTROINTESTINAL: No abdominal or epigastric pain. No nausea, vomiting, or hematemesis; No diarrhea or constipation. No melena or hematochezia.  GENITOURINARY: No dysuria, frequency, hematuria, or incontinence  NEUROLOGICAL: No headaches, memory loss, loss of strength, numbness, or tremors  SKIN: No itching, burning, rashes, or lesions   LYMPH Nodes: No enlarged glands  ENDOCRINE: No heat or cold intolerance; No hair loss  MUSCULOSKELETAL: No joint pain or swelling; No muscle, back, or extremity pain  PSYCHIATRIC: No depression, anxiety, mood swings, or difficulty sleeping  HEME/LYMPH: No easy bruising, or bleeding gums  ALLERGY AND IMMUNOLOGIC: No hives or eczema	    [ ] All others negative	  [ ] Unable to obtain    PHYSICAL EXAM:  T(C): 36.8 (10-28-21 @ 06:02), Max: 37.1 (10-27-21 @ 17:50)  HR: 94 (10-28-21 @ 06:02) (70 - 98)  BP: 136/83 (10-28-21 @ 06:02) (122/74 - 143/87)  RR: 18 (10-28-21 @ 06:02) (18 - 18)  SpO2: 96% (10-28-21 @ 06:02) (93% - 96%)  Wt(kg): --  I&O's Summary    27 Oct 2021 07:01  -  28 Oct 2021 07:00  --------------------------------------------------------  IN: 760 mL / OUT: 1975 mL / NET: -1215 mL        Appearance: Normal	  HEENT:   Normal oral mucosa, PERRL, EOMI	  Lymphatic: No lymphadenopathy  Cardiovascular: Normal S1 S2, No JVD, + murmurs, No edema  Respiratory: Lungs clear to auscultation	  Psychiatry: A & O x 3, Mood & affect appropriate  Gastrointestinal:  Soft, Non-tender, + BS	  Skin: No rashes, No ecchymoses, No cyanosis	  Neurologic: Non-focal  Extremities: Normal range of motion,bka  Vascular: Peripheral pulses palpable 2+ bilaterally    MEDICATIONS  (STANDING):  acetaminophen     Tablet .. 975 milliGRAM(s) Oral every 6 hours  ascorbic acid 500 milliGRAM(s) Oral daily  aspirin enteric coated 81 milliGRAM(s) Oral daily  ATENolol  Tablet 25 milliGRAM(s) Oral daily  atorvastatin 20 milliGRAM(s) Oral at bedtime  cefepime   IVPB 1000 milliGRAM(s) IV Intermittent every 12 hours  clonazePAM  Tablet 1 milliGRAM(s) Oral two times a day  enoxaparin Injectable 40 milliGRAM(s) SubCutaneous every 24 hours  lactobacillus acidophilus 1 Tablet(s) Oral daily  multivitamin 1 Tablet(s) Oral daily  pantoprazole    Tablet 40 milliGRAM(s) Oral before breakfast  polyethylene glycol 3350 17 Gram(s) Oral daily  senna 2 Tablet(s) Oral at bedtime  vancomycin  IVPB 1000 milliGRAM(s) IV Intermittent two times a day      TELEMETRY: 	    ECG:  	  RADIOLOGY:  OTHER: 	  	  LABS:	 	    CARDIAC MARKERS:                                11.0   5.42  )-----------( 268      ( 27 Oct 2021 07:05 )             34.5     10-27    x   |  x   |  x   ----------------------------<  x   3.7   |  x   |  x     Ca    9.1      27 Oct 2021 07:02  Phos  2.1     10-27  Mg     2.1     10-27      proBNP:   Lipid Profile:   HgA1c:   TSH:   #Post surgical site cellulitis  -Continue vancomycin and cefepime  -Monitor vanco trough  -Continue to renally adjust abx  -Monitor erythema  -Monitor for fevers    #Posterior thigh wound  -Appears chronic, no signs of active infection, no surrounding erythema, no drainage        Assessment and plan  ---------------------------  80 y/o female who presents to the ED with chronic Left lower leg wound present since 2018 after an ICU admission for sepsis. She has been followed in the [facility] wound care center by oMunika Smith/Rah/Kishan since 2019. Yesterday, she saw her wound care doctor who contacted Dr. Morris that the wound was getting worse. Presented to ED this morning to get leg evaluated. Denies nausea, vomiting, fever, chills, shortness of breath, rapid heart rate.  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  pt with no cardiac complain, no evidence of chf on exam  s/p bka  doing better  increase bp ?sec to pain and discomfort.  pain meds  continue Lipitor / atenolol  replete K, keep K>4  started on abx   ID yoli nioted on cefepime and vanco  replete K

## 2021-10-28 NOTE — PROVIDER CONTACT NOTE (OTHER) - ACTION/TREATMENT ORDERED:
provider aware of pt's mental status. chart reviewed and assessed at bedside. will continue to monitor and notify of any changes
MD stated not to take off dressing to left lower extremity. MD stated team will assess in the morning

## 2021-10-29 LAB
ANION GAP SERPL CALC-SCNC: 12 MMOL/L — SIGNIFICANT CHANGE UP (ref 5–17)
BUN SERPL-MCNC: 6 MG/DL — LOW (ref 7–23)
CALCIUM SERPL-MCNC: 8.9 MG/DL — SIGNIFICANT CHANGE UP (ref 8.4–10.5)
CHLORIDE SERPL-SCNC: 105 MMOL/L — SIGNIFICANT CHANGE UP (ref 96–108)
CO2 SERPL-SCNC: 20 MMOL/L — LOW (ref 22–31)
CREAT SERPL-MCNC: <0.3 MG/DL — LOW (ref 0.5–1.3)
GLUCOSE SERPL-MCNC: 76 MG/DL — SIGNIFICANT CHANGE UP (ref 70–99)
HCT VFR BLD CALC: 34.4 % — LOW (ref 34.5–45)
HGB BLD-MCNC: 10.5 G/DL — LOW (ref 11.5–15.5)
MAGNESIUM SERPL-MCNC: 1.7 MG/DL — SIGNIFICANT CHANGE UP (ref 1.6–2.6)
MCHC RBC-ENTMCNC: 28.7 PG — SIGNIFICANT CHANGE UP (ref 27–34)
MCHC RBC-ENTMCNC: 30.5 GM/DL — LOW (ref 32–36)
MCV RBC AUTO: 94 FL — SIGNIFICANT CHANGE UP (ref 80–100)
NRBC # BLD: 0 /100 WBCS — SIGNIFICANT CHANGE UP (ref 0–0)
PHOSPHATE SERPL-MCNC: 1.6 MG/DL — LOW (ref 2.5–4.5)
PLATELET # BLD AUTO: 216 K/UL — SIGNIFICANT CHANGE UP (ref 150–400)
POTASSIUM SERPL-MCNC: 3.9 MMOL/L — SIGNIFICANT CHANGE UP (ref 3.5–5.3)
POTASSIUM SERPL-SCNC: 3.9 MMOL/L — SIGNIFICANT CHANGE UP (ref 3.5–5.3)
RBC # BLD: 3.66 M/UL — LOW (ref 3.8–5.2)
RBC # FLD: 17 % — HIGH (ref 10.3–14.5)
SODIUM SERPL-SCNC: 137 MMOL/L — SIGNIFICANT CHANGE UP (ref 135–145)
VANCOMYCIN TROUGH SERPL-MCNC: 15 UG/ML — SIGNIFICANT CHANGE UP (ref 10–20)
WBC # BLD: 7.64 K/UL — SIGNIFICANT CHANGE UP (ref 3.8–10.5)
WBC # FLD AUTO: 7.64 K/UL — SIGNIFICANT CHANGE UP (ref 3.8–10.5)

## 2021-10-29 PROCEDURE — 99231 SBSQ HOSP IP/OBS SF/LOW 25: CPT

## 2021-10-29 RX ORDER — SODIUM,POTASSIUM PHOSPHATES 278-250MG
2 POWDER IN PACKET (EA) ORAL ONCE
Refills: 0 | Status: COMPLETED | OUTPATIENT
Start: 2021-10-29 | End: 2021-10-29

## 2021-10-29 RX ADMIN — Medication 1 TABLET(S): at 11:27

## 2021-10-29 RX ADMIN — Medication 2 TABLET(S): at 11:25

## 2021-10-29 RX ADMIN — Medication 81 MILLIGRAM(S): at 11:26

## 2021-10-29 RX ADMIN — Medication 975 MILLIGRAM(S): at 05:16

## 2021-10-29 RX ADMIN — Medication 1 APPLICATION(S): at 11:25

## 2021-10-29 RX ADMIN — Medication 975 MILLIGRAM(S): at 14:06

## 2021-10-29 RX ADMIN — PANTOPRAZOLE SODIUM 40 MILLIGRAM(S): 20 TABLET, DELAYED RELEASE ORAL at 05:15

## 2021-10-29 RX ADMIN — CEFEPIME 100 MILLIGRAM(S): 1 INJECTION, POWDER, FOR SOLUTION INTRAMUSCULAR; INTRAVENOUS at 18:24

## 2021-10-29 RX ADMIN — CEFEPIME 100 MILLIGRAM(S): 1 INJECTION, POWDER, FOR SOLUTION INTRAMUSCULAR; INTRAVENOUS at 05:18

## 2021-10-29 RX ADMIN — ENOXAPARIN SODIUM 40 MILLIGRAM(S): 100 INJECTION SUBCUTANEOUS at 18:25

## 2021-10-29 RX ADMIN — Medication 500 MILLIGRAM(S): at 11:26

## 2021-10-29 RX ADMIN — Medication 975 MILLIGRAM(S): at 11:26

## 2021-10-29 RX ADMIN — Medication 250 MILLIGRAM(S): at 20:58

## 2021-10-29 RX ADMIN — Medication 975 MILLIGRAM(S): at 18:23

## 2021-10-29 RX ADMIN — ATENOLOL 25 MILLIGRAM(S): 25 TABLET ORAL at 05:15

## 2021-10-29 RX ADMIN — ATORVASTATIN CALCIUM 20 MILLIGRAM(S): 80 TABLET, FILM COATED ORAL at 22:03

## 2021-10-29 RX ADMIN — Medication 1 TABLET(S): at 11:26

## 2021-10-29 RX ADMIN — Medication 250 MILLIGRAM(S): at 08:10

## 2021-10-29 NOTE — PROGRESS NOTE ADULT - ASSESSMENT
80 y/o female HTN, MS, hysterectomy, R AKA who presents to the ED with LLE nonhealing wound with wet gangrene now s/p L AKA on 10/21/2021.     -cont Abx as per ID, follow up for long term antibiotic plan  -vancomycin trough this AM 15, no adjustments necessary. Next vanc trough before 10/31 AM dose  -santyl to posterior thigh ulcer   -dispo home PT once erythema improves  will follow    Vascular Sx 0889    82 y/o female HTN, MS, hysterectomy, R AKA who presents to the ED with LLE nonhealing wound with wet gangrene now s/p L AKA on 10/21/2021.     -left aka cellulitis improving  cont Abx as per ID, follow up for long term antibiotic plan  -vancomycin trough this AM 15, no adjustments necessary. Next vanc trough before 10/31 AM dose  -santyl to posterior thigh ulcer   -dispo home PT once erythema improves  will follow    Vascular Sx 0212

## 2021-10-29 NOTE — PROGRESS NOTE ADULT - ATTENDING COMMENTS
I Sanjiv Morris MD have seen and examined the patient today and agree with  the  evaluation, assessment and plan of the surgical house officer  RAN Morris MD have personally seen and examined the patient at bedside today at  8am

## 2021-10-29 NOTE — CHART NOTE - NSCHARTNOTEFT_GEN_A_CORE
Dermatology Chart Note    82 y/o female who presents to the ED with chronic Left lower leg wound present since 2018 after an ICU admission for sepsis. She has been followed in the [facility] wound care center by Mounika Smith/Rah/Kishan since 2019. Yesterday, she saw her wound care doctor who contacted Dr. Morris that the wound was getting worse. Presented to ED this morning to get leg evaluated. Denies nausea, vomiting, fever, chills, shortness of breath, rapid heart rate.     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 Now with erythema along surgical site, with concern of cellulitis vs contact dermatitis per team. No fevers. WBC WNL. Restarted on abx on 10/27.      PHYSICAL EXAM:  Skin exam notable for:    Linear incision at left AKA site with pink asymmetric erythematous patch      ASSESSMENT/PLAN:  1) Erythema at surgical site- At this time, erythema does not look consistent with contact dermatitis. It is asymmetric (not involving the entirety of the lesion which is exposed to the same topicals and dressings) and not geometric. Cannot rule out a partially treated dermatitis.    At this time:  - Recommend completing course of antibiotics  - Please inform Dermatology should erythema progress or if rash changes in appearance     Discussed with primary team.  Discussed with attending, Dr. Kj May MD  PGY3, Dermatology

## 2021-10-29 NOTE — PROGRESS NOTE ADULT - ASSESSMENT
81 F with multiple sclerosis, HTN, neurogenic bladder (has had a Kendrick catheter for many years) and peripheral vascular disease presents with left lower leg wound which has been present since 2018, worsening, sent to the ED for wet gangrene s/p AKA on 10/21/21. She was doing well, afebrile, WBC WNL. Has erythema along the surgical site, staples are in place, with posterior thigh wound.    Recommend:  #Post surgical site cellulitis  -Continue vancomycin and cefepime  -Decrease vancomycin to 750 mg IV q 12 hours  -Continue to monitor vanco trough prior to 4th dose  -Continue to renally adjust abx  -Monitor erythema - appears more like a contact derm - consider Derm eval given no improvement on Abx. Has remained afebrile, WBC WNL.  -Monitor for fevers    #L Posterior thigh wound  -Appears chronic, no signs of active infection, no surrounding erythema, no drainage    Arvind Peterson MD  Pager [phone]  After 5pm/weekends call [phone]

## 2021-10-29 NOTE — PROGRESS NOTE ADULT - SUBJECTIVE AND OBJECTIVE BOX
CARDIOLOGY     PROGRESS  NOTE   ________________________________________________    CHIEF COMPLAINT:Patient is a 81y old  Female who presents with a chief complaint of Left lower extremity wound (29 Oct 2021 04:08)  no complain.  	  REVIEW OF SYSTEMS:  CONSTITUTIONAL: No fever, weight loss, or fatigue  EYES: No eye pain, visual disturbances, or discharge  ENT:  No difficulty hearing, tinnitus, vertigo; No sinus or throat pain  NECK: No pain or stiffness  RESPIRATORY: No cough, wheezing, chills or hemoptysis; No Shortness of Breath  CARDIOVASCULAR: No chest pain, palpitations, passing out, dizziness, or leg swelling  GASTROINTESTINAL: No abdominal or epigastric pain. No nausea, vomiting, or hematemesis; No diarrhea or constipation. No melena or hematochezia.  GENITOURINARY: No dysuria, frequency, hematuria, or incontinence  NEUROLOGICAL: No headaches, memory loss, loss of strength, numbness, or tremors  SKIN: No itching, burning, rashes, or lesions   LYMPH Nodes: No enlarged glands  ENDOCRINE: No heat or cold intolerance; No hair loss  MUSCULOSKELETAL: No joint pain or swelling; No muscle, back, or extremity pain  PSYCHIATRIC: No depression, anxiety, mood swings, or difficulty sleeping  HEME/LYMPH: No easy bruising, or bleeding gums  ALLERGY AND IMMUNOLOGIC: No hives or eczema	    [ ] All others negative	  [ ] Unable to obtain    PHYSICAL EXAM:  T(C): 37.3 (10-29-21 @ 05:10), Max: 37.3 (10-28-21 @ 09:33)  HR: 100 (10-29-21 @ 05:10) (74 - 100)  BP: 142/85 (10-29-21 @ 05:10) (104/62 - 150/83)  RR: 16 (10-29-21 @ 05:10) (16 - 18)  SpO2: 95% (10-29-21 @ 05:10) (94% - 96%)  Wt(kg): --  I&O's Summary    28 Oct 2021 07:01  -  29 Oct 2021 07:00  --------------------------------------------------------  IN: 820 mL / OUT: 2000 mL / NET: -1180 mL        Appearance: Normal	  HEENT:   Normal oral mucosa, PERRL, EOMI	  Lymphatic: No lymphadenopathy  Cardiovascular: Normal S1 S2, No JVD, + murmurs, No edema  Respiratory: Lungs clear to auscultation	  Psychiatry: A & O x 3, Mood & affect appropriate  Gastrointestinal:  Soft, Non-tender, + BS	  Skin: No rashes, No ecchymoses, No cyanosis	  Neurologic: Non-focal  Extremities: Normal range of motion, bka  Vascular: Peripheral pulses palpable 2+ bilaterally    MEDICATIONS  (STANDING):  acetaminophen     Tablet .. 975 milliGRAM(s) Oral every 6 hours  ascorbic acid 500 milliGRAM(s) Oral daily  aspirin enteric coated 81 milliGRAM(s) Oral daily  ATENolol  Tablet 25 milliGRAM(s) Oral daily  atorvastatin 20 milliGRAM(s) Oral at bedtime  cefepime   IVPB 1000 milliGRAM(s) IV Intermittent every 12 hours  clonazePAM  Tablet 1 milliGRAM(s) Oral two times a day  collagenase Ointment 1 Application(s) Topical daily  enoxaparin Injectable 40 milliGRAM(s) SubCutaneous every 24 hours  lactobacillus acidophilus 1 Tablet(s) Oral daily  multivitamin 1 Tablet(s) Oral daily  pantoprazole    Tablet 40 milliGRAM(s) Oral before breakfast  polyethylene glycol 3350 17 Gram(s) Oral daily  potassium phosphate / sodium phosphate Tablet (K-PHOS No. 2) 2 Tablet(s) Oral once  senna 2 Tablet(s) Oral at bedtime  vancomycin  IVPB 1000 milliGRAM(s) IV Intermittent two times a day      TELEMETRY: 	    ECG:  	  RADIOLOGY:  OTHER: 	  	  LABS:	 	    CARDIAC MARKERS:                                10.5   7.64  )-----------( 216      ( 29 Oct 2021 06:23 )             34.4     10-29    137  |  105  |  6<L>  ----------------------------<  76  3.9   |  20<L>  |  <0.30<L>    Ca    8.9      29 Oct 2021 06:23  Phos  1.6     10-29  Mg     1.7     10-29      proBNP:   Lipid Profile:   HgA1c:   TSH:     #Post surgical site cellulitis  -Continue vancomycin and cefepime  -Monitor vanco trough  -Continue to renally adjust abx  -Monitor erythema  -Monitor for fevers    #Posterior thigh wound  -Appears chronic, no signs of active infection, no surrounding erythema, no drainage    Assessment and plan  ---------------------------  80 y/o female who presents to the ED with chronic Left lower leg wound present since 2018 after an ICU admission for sepsis. She has been followed in the [facility] wound care center by Mounika Smith/Rah/Kishan since 2019. Yesterday, she saw her wound care doctor who contacted Dr. Morris that the wound was getting worse. Presented to ED this morning to get leg evaluated. Denies nausea, vomiting, fever, chills, shortness of breath, rapid heart rate.  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  pt with no cardiac complain, no evidence of chf on exam  s/p bka  doing better  increase bp ?sec to pain and discomfort.  pain meds  continue Lipitor / atenolol  replete K, keep K>4  started on abx   ID eval noted on cefepime and vanco

## 2021-10-29 NOTE — PROGRESS NOTE ADULT - SUBJECTIVE AND OBJECTIVE BOX
CC: Patient is a 81y old  Female who presents with a chief complaint of Left lower extremity wound (29 Oct 2021 08:35)    ID following for LLE wound infection at surgical site    Interval History/ROS: Patient has no new complaints. Appeared confused this morning. Remains with unchanged erythema.    Rest of ROS negative.    Allergies  No Known Allergies    ANTIMICROBIALS:  cefepime   IVPB 1000 every 12 hours  vancomycin  IVPB 1000 two times a day    OTHER MEDS:  acetaminophen     Tablet .. 975 milliGRAM(s) Oral every 6 hours  ascorbic acid 500 milliGRAM(s) Oral daily  aspirin enteric coated 81 milliGRAM(s) Oral daily  ATENolol  Tablet 25 milliGRAM(s) Oral daily  atorvastatin 20 milliGRAM(s) Oral at bedtime  clonazePAM  Tablet 1 milliGRAM(s) Oral two times a day  collagenase Ointment 1 Application(s) Topical daily  enoxaparin Injectable 40 milliGRAM(s) SubCutaneous every 24 hours  lactobacillus acidophilus 1 Tablet(s) Oral daily  multivitamin 1 Tablet(s) Oral daily  pantoprazole    Tablet 40 milliGRAM(s) Oral before breakfast  polyethylene glycol 3350 17 Gram(s) Oral daily  senna 2 Tablet(s) Oral at bedtime  traMADol 25 milliGRAM(s) Oral every 6 hours PRN  traMADol 50 milliGRAM(s) Oral every 6 hours PRN    PE:    Vital Signs Last 24 Hrs  T(C): 36.8 (29 Oct 2021 13:09), Max: 37.3 (29 Oct 2021 05:10)  T(F): 98.2 (29 Oct 2021 13:09), Max: 99.2 (29 Oct 2021 05:10)  HR: 83 (29 Oct 2021 13:09) (76 - 100)  BP: 150/80 (29 Oct 2021 13:09) (127/70 - 150/83)  BP(mean): --  RR: 18 (29 Oct 2021 13:09) (16 - 18)  SpO2: 94% (29 Oct 2021 13:09) (94% - 95%)    Gen: Awake, NAD  CV: S1+S2 normal, no murmurs  Resp: Clear bilat, no resp distress  Abd: Soft, nontender, +BS  Ext: R AKA, L AKA surgical site with staples in place, remains with erythema, underside wound appears chronic  : No Kendrick  IV/Skin: No thrombophlebitis  Neuro: appears confused    LABS:                          10.5   7.64  )-----------( 216      ( 29 Oct 2021 06:23 )             34.4       10-29    137  |  105  |  6<L>  ----------------------------<  76  3.9   |  20<L>  |  <0.30<L>    Ca    8.9      29 Oct 2021 06:23  Phos  1.6     10-29  Mg     1.7     10-29    MICROBIOLOGY:  Vancomycin Level, Trough: 15.0 ug/mL (10-29-21 @ 06:24)  Vancomycin Level, Trough: 10.7 ug/mL (10-28-21 @ 18:41)  v  .Urine Other  10-21-21   Culture is being performed.  --  --      Catheterized Catheterized  10-21-21   No growth  --  --      .Other Other  10-21-21   Testing in progress  --  --      .Blood Blood-Peripheral  10-21-21   No Growth Final  --  --      .Blood Blood-Peripheral  10-19-21   No Growth Final  --  --    RADIOLOGY:    < from: Xray Chest 1 View- PORTABLE-Routine (Xray Chest 1 View- PORTABLE-Routine in AM.) (10.24.21 @ 06:55) >  IMPRESSION:  Bilateral lower lobe atelectasis, left greater than right. Trace bilateral pleural effusions.    < end of copied text >

## 2021-10-29 NOTE — PROGRESS NOTE ADULT - SUBJECTIVE AND OBJECTIVE BOX
Surgery Progress Note    INTERVAl/SUBJECTIVE: No acute event overnight.     Vital Signs Last 24 Hrs  T(C): 36.7 (29 Oct 2021 01:05), Max: 37.3 (28 Oct 2021 09:33)  T(F): 98.1 (29 Oct 2021 01:05), Max: 99.1 (28 Oct 2021 09:33)  HR: 96 (29 Oct 2021 01:05) (74 - 99)  BP: 136/85 (29 Oct 2021 01:05) (104/62 - 150/83)  BP(mean): --  RR: 18 (29 Oct 2021 01:05) (17 - 18)  SpO2: 95% (29 Oct 2021 01:05) (94% - 96%)    Physical Exam:  General:  Neuro:    CV:   Abdomen:     LABS:                        10.4   5.03  )-----------( 238      ( 28 Oct 2021 08:36 )             33.0     10-28    140  |  111<H>  |  6<L>  ----------------------------<  99  5.2   |  18<L>  |  <0.30<L>    Ca    8.8      28 Oct 2021 18:41  Phos  3.9     10-28  Mg     1.8     10-28            INs and OUTs:    10-27-21 @ 07:01  -  10-28-21 @ 07:00  --------------------------------------------------------  IN: 760 mL / OUT: 1975 mL / NET: -1215 mL    10-28-21 @ 07:01  -  10-29-21 @ 04:08  --------------------------------------------------------  IN: 740 mL / OUT: 1650 mL / NET: -910 mL     Surgery Progress Note    INTERVAl/SUBJECTIVE: No acute event overnight. Patient resting comfortably this morning.     Vital Signs Last 24 Hrs  T(C): 36.7 (29 Oct 2021 01:05), Max: 37.3 (28 Oct 2021 09:33)  T(F): 98.1 (29 Oct 2021 01:05), Max: 99.1 (28 Oct 2021 09:33)  HR: 96 (29 Oct 2021 01:05) (74 - 99)  BP: 136/85 (29 Oct 2021 01:05) (104/62 - 150/83)  BP(mean): --  RR: 18 (29 Oct 2021 01:05) (17 - 18)  SpO2: 95% (29 Oct 2021 01:05) (94% - 96%)    Physical Exam:  Gen: NAD, alert  Pulm: non-labored breathing   Ext: R AKA incision well-healed, L AKA dressing c/d/i,  dressing changed   cellulitis stable along incision. clean w mild granulation tissue       LABS:                        10.4   5.03  )-----------( 238      ( 28 Oct 2021 08:36 )             33.0     10-28    140  |  111<H>  |  6<L>  ----------------------------<  99  5.2   |  18<L>  |  <0.30<L>    Ca    8.8      28 Oct 2021 18:41  Phos  3.9     10-28  Mg     1.8     10-28            INs and OUTs:    10-27-21 @ 07:01  -  10-28-21 @ 07:00  --------------------------------------------------------  IN: 760 mL / OUT: 1975 mL / NET: -1215 mL    10-28-21 @ 07:01  -  10-29-21 @ 04:08  --------------------------------------------------------  IN: 740 mL / OUT: 1650 mL / NET: -910 mL     Surgery Progress Note    INTERVAl/SUBJECTIVE: No acute event overnight. Patient resting comfortably this morning.         Vital Signs Last 24 Hrs  T(C): 36.7 (29 Oct 2021 01:05), Max: 37.3 (28 Oct 2021 09:33)  T(F): 98.1 (29 Oct 2021 01:05), Max: 99.1 (28 Oct 2021 09:33)  HR: 96 (29 Oct 2021 01:05) (74 - 99)  BP: 136/85 (29 Oct 2021 01:05) (104/62 - 150/83)  BP(mean): --  RR: 18 (29 Oct 2021 01:05) (17 - 18)  SpO2: 95% (29 Oct 2021 01:05) (94% - 96%)    Physical Exam:  Gen: NAD, alert  Pulm: non-labored breathing   Ext: R AKA incision well-healed, L AKA dressing c/d/i,  dressing changed   cellulitis impoving  along incision. clean w mild granulation tissue       LABS:                        10.4   5.03  )-----------( 238      ( 28 Oct 2021 08:36 )             33.0     10-28    140  |  111<H>  |  6<L>  ----------------------------<  99  5.2   |  18<L>  |  <0.30<L>    Ca    8.8      28 Oct 2021 18:41  Phos  3.9     10-28  Mg     1.8     10-28          INs and OUTs:    10-27-21 @ 07:01  -  10-28-21 @ 07:00  --------------------------------------------------------  IN: 760 mL / OUT: 1975 mL / NET: -1215 mL    10-28-21 @ 07:01  -  10-29-21 @ 04:08  --------------------------------------------------------  IN: 740 mL / OUT: 1650 mL / NET: -910 mL

## 2021-10-30 LAB
ANION GAP SERPL CALC-SCNC: 10 MMOL/L — SIGNIFICANT CHANGE UP (ref 5–17)
ANION GAP SERPL CALC-SCNC: 14 MMOL/L — SIGNIFICANT CHANGE UP (ref 5–17)
BUN SERPL-MCNC: 7 MG/DL — SIGNIFICANT CHANGE UP (ref 7–23)
BUN SERPL-MCNC: 8 MG/DL — SIGNIFICANT CHANGE UP (ref 7–23)
CALCIUM SERPL-MCNC: 8.6 MG/DL — SIGNIFICANT CHANGE UP (ref 8.4–10.5)
CALCIUM SERPL-MCNC: 9 MG/DL — SIGNIFICANT CHANGE UP (ref 8.4–10.5)
CHLORIDE SERPL-SCNC: 106 MMOL/L — SIGNIFICANT CHANGE UP (ref 96–108)
CHLORIDE SERPL-SCNC: 106 MMOL/L — SIGNIFICANT CHANGE UP (ref 96–108)
CO2 SERPL-SCNC: 21 MMOL/L — LOW (ref 22–31)
CO2 SERPL-SCNC: 21 MMOL/L — LOW (ref 22–31)
CREAT SERPL-MCNC: 0.3 MG/DL — LOW (ref 0.5–1.3)
CREAT SERPL-MCNC: <0.3 MG/DL — LOW (ref 0.5–1.3)
GLUCOSE SERPL-MCNC: 73 MG/DL — SIGNIFICANT CHANGE UP (ref 70–99)
GLUCOSE SERPL-MCNC: 99 MG/DL — SIGNIFICANT CHANGE UP (ref 70–99)
HCT VFR BLD CALC: 35.4 % — SIGNIFICANT CHANGE UP (ref 34.5–45)
HGB BLD-MCNC: 10.9 G/DL — LOW (ref 11.5–15.5)
MAGNESIUM SERPL-MCNC: 1.7 MG/DL — SIGNIFICANT CHANGE UP (ref 1.6–2.6)
MAGNESIUM SERPL-MCNC: 2.4 MG/DL — SIGNIFICANT CHANGE UP (ref 1.6–2.6)
MCHC RBC-ENTMCNC: 28.9 PG — SIGNIFICANT CHANGE UP (ref 27–34)
MCHC RBC-ENTMCNC: 30.8 GM/DL — LOW (ref 32–36)
MCV RBC AUTO: 93.9 FL — SIGNIFICANT CHANGE UP (ref 80–100)
NRBC # BLD: 0 /100 WBCS — SIGNIFICANT CHANGE UP (ref 0–0)
PHOSPHATE SERPL-MCNC: 1.8 MG/DL — LOW (ref 2.5–4.5)
PHOSPHATE SERPL-MCNC: 3 MG/DL — SIGNIFICANT CHANGE UP (ref 2.5–4.5)
PLATELET # BLD AUTO: 229 K/UL — SIGNIFICANT CHANGE UP (ref 150–400)
POTASSIUM SERPL-MCNC: 3.3 MMOL/L — LOW (ref 3.5–5.3)
POTASSIUM SERPL-MCNC: 4.7 MMOL/L — SIGNIFICANT CHANGE UP (ref 3.5–5.3)
POTASSIUM SERPL-SCNC: 3.3 MMOL/L — LOW (ref 3.5–5.3)
POTASSIUM SERPL-SCNC: 4.7 MMOL/L — SIGNIFICANT CHANGE UP (ref 3.5–5.3)
RBC # BLD: 3.77 M/UL — LOW (ref 3.8–5.2)
RBC # FLD: 17.4 % — HIGH (ref 10.3–14.5)
SODIUM SERPL-SCNC: 137 MMOL/L — SIGNIFICANT CHANGE UP (ref 135–145)
SODIUM SERPL-SCNC: 141 MMOL/L — SIGNIFICANT CHANGE UP (ref 135–145)
WBC # BLD: 4.66 K/UL — SIGNIFICANT CHANGE UP (ref 3.8–10.5)
WBC # FLD AUTO: 4.66 K/UL — SIGNIFICANT CHANGE UP (ref 3.8–10.5)

## 2021-10-30 PROCEDURE — 99232 SBSQ HOSP IP/OBS MODERATE 35: CPT

## 2021-10-30 RX ORDER — MAGNESIUM SULFATE 500 MG/ML
2 VIAL (ML) INJECTION ONCE
Refills: 0 | Status: COMPLETED | OUTPATIENT
Start: 2021-10-30 | End: 2021-10-30

## 2021-10-30 RX ORDER — POTASSIUM CHLORIDE 20 MEQ
30 PACKET (EA) ORAL
Refills: 0 | Status: DISCONTINUED | OUTPATIENT
Start: 2021-10-30 | End: 2021-10-30

## 2021-10-30 RX ORDER — SODIUM,POTASSIUM PHOSPHATES 278-250MG
2 POWDER IN PACKET (EA) ORAL
Refills: 0 | Status: DISCONTINUED | OUTPATIENT
Start: 2021-10-30 | End: 2021-11-06

## 2021-10-30 RX ORDER — VANCOMYCIN HCL 1 G
750 VIAL (EA) INTRAVENOUS EVERY 12 HOURS
Refills: 0 | Status: DISCONTINUED | OUTPATIENT
Start: 2021-10-30 | End: 2021-11-06

## 2021-10-30 RX ORDER — POTASSIUM CHLORIDE 20 MEQ
20 PACKET (EA) ORAL
Refills: 0 | Status: DISCONTINUED | OUTPATIENT
Start: 2021-10-30 | End: 2021-10-30

## 2021-10-30 RX ORDER — POTASSIUM CHLORIDE 20 MEQ
20 PACKET (EA) ORAL
Refills: 0 | Status: COMPLETED | OUTPATIENT
Start: 2021-10-30 | End: 2021-10-30

## 2021-10-30 RX ADMIN — Medication 975 MILLIGRAM(S): at 05:35

## 2021-10-30 RX ADMIN — ENOXAPARIN SODIUM 40 MILLIGRAM(S): 100 INJECTION SUBCUTANEOUS at 18:30

## 2021-10-30 RX ADMIN — TRAMADOL HYDROCHLORIDE 50 MILLIGRAM(S): 50 TABLET ORAL at 13:13

## 2021-10-30 RX ADMIN — Medication 250 MILLIGRAM(S): at 10:50

## 2021-10-30 RX ADMIN — Medication 250 MILLIGRAM(S): at 22:43

## 2021-10-30 RX ADMIN — CEFEPIME 100 MILLIGRAM(S): 1 INJECTION, POWDER, FOR SOLUTION INTRAMUSCULAR; INTRAVENOUS at 06:26

## 2021-10-30 RX ADMIN — Medication 20 MILLIEQUIVALENT(S): at 15:41

## 2021-10-30 RX ADMIN — PANTOPRAZOLE SODIUM 40 MILLIGRAM(S): 20 TABLET, DELAYED RELEASE ORAL at 05:35

## 2021-10-30 RX ADMIN — Medication 2 TABLET(S): at 22:43

## 2021-10-30 RX ADMIN — Medication 1 TABLET(S): at 12:44

## 2021-10-30 RX ADMIN — Medication 20 MILLIEQUIVALENT(S): at 12:37

## 2021-10-30 RX ADMIN — Medication 1 APPLICATION(S): at 13:02

## 2021-10-30 RX ADMIN — Medication 2 TABLET(S): at 15:38

## 2021-10-30 RX ADMIN — TRAMADOL HYDROCHLORIDE 50 MILLIGRAM(S): 50 TABLET ORAL at 14:00

## 2021-10-30 RX ADMIN — Medication 975 MILLIGRAM(S): at 12:39

## 2021-10-30 RX ADMIN — ATENOLOL 25 MILLIGRAM(S): 25 TABLET ORAL at 05:35

## 2021-10-30 RX ADMIN — Medication 20 MILLIEQUIVALENT(S): at 13:03

## 2021-10-30 RX ADMIN — Medication 975 MILLIGRAM(S): at 18:28

## 2021-10-30 RX ADMIN — CEFEPIME 100 MILLIGRAM(S): 1 INJECTION, POWDER, FOR SOLUTION INTRAMUSCULAR; INTRAVENOUS at 18:34

## 2021-10-30 RX ADMIN — Medication 81 MILLIGRAM(S): at 12:41

## 2021-10-30 RX ADMIN — Medication 50 GRAM(S): at 13:02

## 2021-10-30 RX ADMIN — Medication 500 MILLIGRAM(S): at 12:42

## 2021-10-30 RX ADMIN — SENNA PLUS 2 TABLET(S): 8.6 TABLET ORAL at 22:43

## 2021-10-30 RX ADMIN — Medication 1 DROP(S): at 22:44

## 2021-10-30 RX ADMIN — ATORVASTATIN CALCIUM 20 MILLIGRAM(S): 80 TABLET, FILM COATED ORAL at 22:43

## 2021-10-30 NOTE — PROGRESS NOTE ADULT - SUBJECTIVE AND OBJECTIVE BOX
Surgery Progress Note    INTERVAl/SUBJECTIVE: No acute event overnight.     Vital Signs Last 24 Hrs  T(C): 36.7 (29 Oct 2021 22:17), Max: 37.3 (29 Oct 2021 05:10)  T(F): 98.1 (29 Oct 2021 22:17), Max: 99.2 (29 Oct 2021 05:10)  HR: 82 (29 Oct 2021 22:17) (76 - 100)  BP: 109/68 (29 Oct 2021 22:17) (109/68 - 150/80)  BP(mean): --  RR: 18 (29 Oct 2021 22:17) (16 - 18)  SpO2: 95% (29 Oct 2021 22:17) (94% - 95%)    Physical Exam:  General:  Neuro:    CV:   Abdomen:     LABS:                        10.5   7.64  )-----------( 216      ( 29 Oct 2021 06:23 )             34.4     10-29    137  |  105  |  6<L>  ----------------------------<  76  3.9   |  20<L>  |  <0.30<L>    Ca    8.9      29 Oct 2021 06:23  Phos  1.6     10-29  Mg     1.7     10-29            INs and OUTs:    10-28-21 @ 07:01  -  10-29-21 @ 07:00  --------------------------------------------------------  IN: 820 mL / OUT: 2000 mL / NET: -1180 mL    10-29-21 @ 07:01  -  10-30-21 @ 00:27  --------------------------------------------------------  IN: 375 mL / OUT: 1025 mL / NET: -650 mL     Surgery Progress Note    SUBJECTIVE: Patient seen and examined at bedside with surgical team. No acute events overnight.     OBJECTIVE:    Vital Signs Last 24 Hrs  T(C): 36.9 (30 Oct 2021 05:00), Max: 36.9 (29 Oct 2021 09:36)  T(F): 98.4 (30 Oct 2021 05:00), Max: 98.5 (29 Oct 2021 09:36)  HR: 92 (30 Oct 2021 05:00) (76 - 93)  BP: 130/76 (30 Oct 2021 05:00) (109/68 - 150/80)  BP(mean): --  RR: 18 (30 Oct 2021 05:00) (17 - 18)  SpO2: 95% (30 Oct 2021 05:00) (94% - 97%)I&O's Detail    29 Oct 2021 07:01  -  30 Oct 2021 07:00  --------------------------------------------------------  IN:    Oral Fluid: 625 mL  Total IN: 625 mL    OUT:    Indwelling Catheter - Urethral (mL): 1425 mL  Total OUT: 1425 mL    Total NET: -800 mL      MEDICATIONS  (STANDING):  acetaminophen     Tablet .. 975 milliGRAM(s) Oral every 6 hours  ascorbic acid 500 milliGRAM(s) Oral daily  aspirin enteric coated 81 milliGRAM(s) Oral daily  ATENolol  Tablet 25 milliGRAM(s) Oral daily  atorvastatin 20 milliGRAM(s) Oral at bedtime  cefepime   IVPB 1000 milliGRAM(s) IV Intermittent every 12 hours  clonazePAM  Tablet 1 milliGRAM(s) Oral two times a day  collagenase Ointment 1 Application(s) Topical daily  enoxaparin Injectable 40 milliGRAM(s) SubCutaneous every 24 hours  lactobacillus acidophilus 1 Tablet(s) Oral daily  magnesium sulfate  IVPB 2 Gram(s) IV Intermittent once  multivitamin 1 Tablet(s) Oral daily  pantoprazole    Tablet 40 milliGRAM(s) Oral before breakfast  polyethylene glycol 3350 17 Gram(s) Oral daily  potassium chloride   Solution 20 milliEquivalent(s) Oral every 2 hours  potassium phosphate / sodium phosphate Tablet (K-PHOS No. 2) 2 Tablet(s) Oral two times a day  senna 2 Tablet(s) Oral at bedtime  vancomycin  IVPB 1000 milliGRAM(s) IV Intermittent two times a day    MEDICATIONS  (PRN):  traMADol 50 milliGRAM(s) Oral every 6 hours PRN Severe Pain (7 - 10)      PHYSICAL EXAM:  Constitutional: A&Ox3, NAD  Respiratory: Unlabored breathing  Extremities: R AKA healed incision. L AKA incision with erythema and induration at medial aspects, improved compared to yesterday. No fluctuance or oozing.     LABS:                        10.9   4.66  )-----------( 229      ( 30 Oct 2021 07:11 )             35.4     10-30    141  |  106  |  7   ----------------------------<  73  3.3<L>   |  21<L>  |  <0.30<L>    Ca    9.0      30 Oct 2021 07:11  Phos  1.8     10-30  Mg     1.7     10-30

## 2021-10-30 NOTE — PROGRESS NOTE ADULT - SUBJECTIVE AND OBJECTIVE BOX
INFECTIOUS DISEASES FOLLOW UP-- Micaela Barrientos  [phone]    This is a follow up note for this  81yFemale with  s/p AKA on left leg for gangrene-  wound wiht erythema surrounding the staples  also with ulcer undersurface of thigh area        ROS:  CONSTITUTIONAL:  No fever, awake, alert, interactive  Allergies    No Known Allergies    Intolerances        ANTIBIOTICS/RELEVANT:  antimicrobials  cefepime   IVPB 1000 milliGRAM(s) IV Intermittent every 12 hours  vancomycin  IVPB 750 milliGRAM(s) IV Intermittent every 12 hours    immunologic:    OTHER:  acetaminophen     Tablet .. 975 milliGRAM(s) Oral every 6 hours  ascorbic acid 500 milliGRAM(s) Oral daily  aspirin enteric coated 81 milliGRAM(s) Oral daily  ATENolol  Tablet 25 milliGRAM(s) Oral daily  atorvastatin 20 milliGRAM(s) Oral at bedtime  clonazePAM  Tablet 1 milliGRAM(s) Oral two times a day  collagenase Ointment 1 Application(s) Topical daily  enoxaparin Injectable 40 milliGRAM(s) SubCutaneous every 24 hours  lactobacillus acidophilus 1 Tablet(s) Oral daily  magnesium sulfate  IVPB 2 Gram(s) IV Intermittent once  multivitamin 1 Tablet(s) Oral daily  pantoprazole    Tablet 40 milliGRAM(s) Oral before breakfast  polyethylene glycol 3350 17 Gram(s) Oral daily  potassium chloride    Tablet ER 20 milliEquivalent(s) Oral every 2 hours  potassium phosphate / sodium phosphate Tablet (K-PHOS No. 2) 2 Tablet(s) Oral two times a day  senna 2 Tablet(s) Oral at bedtime  traMADol 50 milliGRAM(s) Oral every 6 hours PRN      Objective:  Vital Signs Last 24 Hrs  T(C): 37.1 (30 Oct 2021 10:08), Max: 37.1 (30 Oct 2021 10:08)  T(F): 98.8 (30 Oct 2021 10:08), Max: 98.8 (30 Oct 2021 10:08)  HR: 73 (30 Oct 2021 10:08) (73 - 93)  BP: 137/84 (30 Oct 2021 10:08) (109/68 - 150/80)  BP(mean): --  RR: 18 (30 Oct 2021 10:08) (17 - 18)  SpO2: 95% (30 Oct 2021 10:08) (94% - 97%)    PHYSICAL EXAM:  Constitutional:no acute distress  Eyes:CHIARA, EOMI  Ear/Nose/Throat: no oral lesions, 	  Respiratory: clear BL  Cardiovascular: S1S2 soft murmur  Gastrointestinal:soft, (+) BS, no tenderness  Extremities:no e/e/c  bilateral AKAs  left AKA area with erythema around the wound stable in appearance compared to yesterday's phto and no drainage  also has an ulcer with a necrotic base under thigh in that leg  left fresh wound with  No Lymphadenopathy  IV sites not inflammed.    LABS:                        10.9   4.66  )-----------( 229      ( 30 Oct 2021 07:11 )             35.4     10-30    141  |  106  |  7   ----------------------------<  73  3.3<L>   |  21<L>  |  <0.30<L>    Ca    9.0      30 Oct 2021 07:11  Phos  1.8     10-30  Mg     1.7     10-30            MICROBIOLOGY:      COVID-19 PCR: NotDetec: You can help in the fight against COVID-19. [employer] [facility] may contact  you to see if you are interested in voluntarily participating in one of  our clinical trials.  Testing is performed using polymerase chain reaction (PCR) or  transcription mediated amplification (TMA). This COVID-19 (SARS-CoV-2)  nucleic acid amplification test was validated by [employer] and is  in use under the FDA Emergency Use Authorization (EUA) for clinical labs  CLIA-certified to perform high complexity testing. Test results should be  correlated with clinical presentation, patient history, and epidemiology. (10.26.21 @ 07:30)          RECENT CULTURES:      RADIOLOGY & ADDITIONAL STUDIES:    Vancomycin Level, Trough: 15.0: Vancomycin trough levels should be rapidly reached and maintained at  15-20 ug/ml for life threatening MRSA  infections such as sepsis, endocarditis, osteomyelitis and pneumonia. A  first trough level should be drawn  before the 3rd or 4th dose.  Risk of renal toxicity is increased for levels >15 ug/ml, in patients on  other nephrotoxic drugs, who are  hemodynamically unstable, have unstable renal function, or are on  Vancomycin therapy for >14 days. Renal function with  creatinine levels should be monitored for those patients. ug/mL (10.29.21 @ 06:24)

## 2021-10-30 NOTE — PROGRESS NOTE ADULT - ASSESSMENT
80 y/o female HTN, MS, hysterectomy, R AKA who presents to the ED with LLE nonhealing wound with wet gangrene now s/p L AKA on 10/21/2021.     Plan: 82 y/o female HTN, MS, hysterectomy, R AKA who presents to the ED with LLE nonhealing wound with wet gangrene now s/p L AKA on 10/21/2021.     Plan:  - Continue IV abx: vanc and cefepime  - appreciate ID recs: adjust dose of vancomycin   - appreciate derm consult: unlikely to be contact dermatitis   - continue daily dressing changes  - dispo: rosendo lift at home and needs private hire nursing   - f/u PM [facility]       Vascular Surgery  [id number]

## 2021-10-30 NOTE — PROGRESS NOTE ADULT - ASSESSMENT
81 F with multiple sclerosis, HTN, neurogenic bladder (has had a Kendrick catheter for many years) and peripheral vascular disease presents with left lower leg wound which has been present since 2018, worsening, sent to the ED for wet gangrene s/p AKA on 10/21/21. She was doing well, afebrile, WBC WNL. Has erythema along the surgical site, staples are in place, with posterior thigh wound.    Recommend:  #Post surgical site cellulitis  -Continue vancomycin and cefepime  -Decreased vancomycin to 750 mg IV q 12 hours  -Continue to monitor vanco trough prior to 4th dose  -Continue to renally adjust abx  -Monitor for fevers    #L Posterior thigh wound  - necrotic base and some surrounding minimal erythema    Would ask wound care to evaluate for posterior thigh region  Would ask physical therapy to evaluate to try to mobilize- wheel chair training and would also get a rehab consult    Lai Barrientos MD  [phone]  After 5pm/weekends [phone]

## 2021-10-30 NOTE — PROGRESS NOTE ADULT - SUBJECTIVE AND OBJECTIVE BOX
CARDIOLOGY     PROGRESS  NOTE   ________________________________________________    CHIEF COMPLAINT:Patient is a 81y old  Female who presents with a chief complaint of Left lower extremity wound (30 Oct 2021 00:27)  c/o pain at the bka site.  	  REVIEW OF SYSTEMS:  CONSTITUTIONAL: No fever, weight loss, or fatigue  EYES: No eye pain, visual disturbances, or discharge  ENT:  No difficulty hearing, tinnitus, vertigo; No sinus or throat pain  NECK: No pain or stiffness  RESPIRATORY: No cough, wheezing, chills or hemoptysis; No Shortness of Breath  CARDIOVASCULAR: No chest pain, palpitations, passing out, dizziness, or leg swelling  GASTROINTESTINAL: No abdominal or epigastric pain. No nausea, vomiting, or hematemesis; No diarrhea or constipation. No melena or hematochezia.  GENITOURINARY: No dysuria, frequency, hematuria, or incontinence  NEUROLOGICAL: No headaches, memory loss, loss of strength, numbness, or tremors  SKIN: No itching, burning, rashes, or lesions   LYMPH Nodes: No enlarged glands  ENDOCRINE: No heat or cold intolerance; No hair loss  MUSCULOSKELETAL: No joint pain or swelling; No muscle, back, or extremity pain  PSYCHIATRIC: No depression, anxiety, mood swings, or difficulty sleeping  HEME/LYMPH: No easy bruising, or bleeding gums  ALLERGY AND IMMUNOLOGIC: No hives or eczema	    [ ] All others negative	  [ ] Unable to obtain    PHYSICAL EXAM:  T(C): 37.1 (10-30-21 @ 10:08), Max: 37.1 (10-30-21 @ 10:08)  HR: 73 (10-30-21 @ 10:08) (73 - 93)  BP: 137/84 (10-30-21 @ 10:08) (109/68 - 150/80)  RR: 18 (10-30-21 @ 10:08) (17 - 18)  SpO2: 95% (10-30-21 @ 10:08) (94% - 97%)  Wt(kg): --  I&O's Summary    29 Oct 2021 07:01  -  30 Oct 2021 07:00  --------------------------------------------------------  IN: 625 mL / OUT: 1425 mL / NET: -800 mL    30 Oct 2021 07:01  -  30 Oct 2021 12:17  --------------------------------------------------------  IN: 200 mL / OUT: 100 mL / NET: 100 mL        Appearance: Normal	  HEENT:   Normal oral mucosa, PERRL, EOMI	  Lymphatic: No lymphadenopathy  Cardiovascular: Normal S1 S2, No JVD, + murmurs, No edema  Respiratory: Lungs clear to auscultation	  Psychiatry: A & O x 3, Mood & affect appropriate  Gastrointestinal:  Soft, Non-tender, + BS	  Skin: No rashes, No ecchymoses, No cyanosis	  Neurologic: Non-focal  Extremities: Normal range of motion, bl bka  Vascular: Peripheral pulses palpable 2+ bilaterally    MEDICATIONS  (STANDING):  acetaminophen     Tablet .. 975 milliGRAM(s) Oral every 6 hours  ascorbic acid 500 milliGRAM(s) Oral daily  aspirin enteric coated 81 milliGRAM(s) Oral daily  ATENolol  Tablet 25 milliGRAM(s) Oral daily  atorvastatin 20 milliGRAM(s) Oral at bedtime  cefepime   IVPB 1000 milliGRAM(s) IV Intermittent every 12 hours  clonazePAM  Tablet 1 milliGRAM(s) Oral two times a day  collagenase Ointment 1 Application(s) Topical daily  enoxaparin Injectable 40 milliGRAM(s) SubCutaneous every 24 hours  lactobacillus acidophilus 1 Tablet(s) Oral daily  magnesium sulfate  IVPB 2 Gram(s) IV Intermittent once  multivitamin 1 Tablet(s) Oral daily  pantoprazole    Tablet 40 milliGRAM(s) Oral before breakfast  polyethylene glycol 3350 17 Gram(s) Oral daily  potassium chloride    Tablet ER 20 milliEquivalent(s) Oral every 2 hours  potassium phosphate / sodium phosphate Tablet (K-PHOS No. 2) 2 Tablet(s) Oral two times a day  senna 2 Tablet(s) Oral at bedtime  vancomycin  IVPB 750 milliGRAM(s) IV Intermittent every 12 hours      TELEMETRY: 	    ECG:  	  RADIOLOGY:  OTHER: 	  	  LABS:	 	    CARDIAC MARKERS:                                10.9   4.66  )-----------( 229      ( 30 Oct 2021 07:11 )             35.4     10-30    141  |  106  |  7   ----------------------------<  73  3.3<L>   |  21<L>  |  <0.30<L>    Ca    9.0      30 Oct 2021 07:11  Phos  1.8     10-30  Mg     1.7     10-30      proBNP:   Lipid Profile:   HgA1c:   TSH:         Assessment and plan  ---------------------------  80 y/o female who presents to the ED with chronic Left lower leg wound present since 2018 after an ICU admission for sepsis. She has been followed in the [facility] wound care center by Mounika Smith/Rah/Kishan since 2019. Yesterday, she saw her wound care doctor who contacted Dr. Morris that the wound was getting worse. Presented to ED this morning to get leg evaluated. Denies nausea, vomiting, fever, chills, shortness of breath, rapid heart rate.  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  pt with no cardiac complain, no evidence of chf on exam  s/p bka  doing better  increase bp ?sec to pain and discomfort.  pain meds  continue Lipitor / atenolol  replete K, keep K>4  started on abx   ID eval noted on cefepime and vanco  bp is better controlled

## 2021-10-31 LAB
ANION GAP SERPL CALC-SCNC: 13 MMOL/L — SIGNIFICANT CHANGE UP (ref 5–17)
BUN SERPL-MCNC: 8 MG/DL — SIGNIFICANT CHANGE UP (ref 7–23)
CALCIUM SERPL-MCNC: 8.7 MG/DL — SIGNIFICANT CHANGE UP (ref 8.4–10.5)
CHLORIDE SERPL-SCNC: 106 MMOL/L — SIGNIFICANT CHANGE UP (ref 96–108)
CO2 SERPL-SCNC: 20 MMOL/L — LOW (ref 22–31)
CREAT SERPL-MCNC: 0.3 MG/DL — LOW (ref 0.5–1.3)
GLUCOSE SERPL-MCNC: 90 MG/DL — SIGNIFICANT CHANGE UP (ref 70–99)
HCT VFR BLD CALC: 35.4 % — SIGNIFICANT CHANGE UP (ref 34.5–45)
HGB BLD-MCNC: 10.8 G/DL — LOW (ref 11.5–15.5)
MAGNESIUM SERPL-MCNC: 2.1 MG/DL — SIGNIFICANT CHANGE UP (ref 1.6–2.6)
MCHC RBC-ENTMCNC: 28.6 PG — SIGNIFICANT CHANGE UP (ref 27–34)
MCHC RBC-ENTMCNC: 30.5 GM/DL — LOW (ref 32–36)
MCV RBC AUTO: 93.9 FL — SIGNIFICANT CHANGE UP (ref 80–100)
NRBC # BLD: 0 /100 WBCS — SIGNIFICANT CHANGE UP (ref 0–0)
PHOSPHATE SERPL-MCNC: 2.2 MG/DL — LOW (ref 2.5–4.5)
PLATELET # BLD AUTO: 239 K/UL — SIGNIFICANT CHANGE UP (ref 150–400)
POTASSIUM SERPL-MCNC: 4 MMOL/L — SIGNIFICANT CHANGE UP (ref 3.5–5.3)
POTASSIUM SERPL-SCNC: 4 MMOL/L — SIGNIFICANT CHANGE UP (ref 3.5–5.3)
RBC # BLD: 3.77 M/UL — LOW (ref 3.8–5.2)
RBC # FLD: 17.3 % — HIGH (ref 10.3–14.5)
SODIUM SERPL-SCNC: 139 MMOL/L — SIGNIFICANT CHANGE UP (ref 135–145)
VANCOMYCIN TROUGH SERPL-MCNC: 12 UG/ML — SIGNIFICANT CHANGE UP (ref 10–20)
WBC # BLD: 4.88 K/UL — SIGNIFICANT CHANGE UP (ref 3.8–10.5)
WBC # FLD AUTO: 4.88 K/UL — SIGNIFICANT CHANGE UP (ref 3.8–10.5)

## 2021-10-31 RX ORDER — BACLOFEN 100 %
20 POWDER (GRAM) MISCELLANEOUS
Refills: 0 | Status: DISCONTINUED | OUTPATIENT
Start: 2021-10-31 | End: 2021-11-08

## 2021-10-31 RX ADMIN — CEFEPIME 100 MILLIGRAM(S): 1 INJECTION, POWDER, FOR SOLUTION INTRAMUSCULAR; INTRAVENOUS at 19:46

## 2021-10-31 RX ADMIN — Medication 1 MILLIGRAM(S): at 18:07

## 2021-10-31 RX ADMIN — Medication 975 MILLIGRAM(S): at 23:21

## 2021-10-31 RX ADMIN — Medication 500 MILLIGRAM(S): at 13:23

## 2021-10-31 RX ADMIN — Medication 250 MILLIGRAM(S): at 09:37

## 2021-10-31 RX ADMIN — Medication 85 MILLIMOLE(S): at 13:25

## 2021-10-31 RX ADMIN — ENOXAPARIN SODIUM 40 MILLIGRAM(S): 100 INJECTION SUBCUTANEOUS at 18:07

## 2021-10-31 RX ADMIN — Medication 1 TABLET(S): at 13:23

## 2021-10-31 RX ADMIN — Medication 1 MILLIGRAM(S): at 03:44

## 2021-10-31 RX ADMIN — SENNA PLUS 2 TABLET(S): 8.6 TABLET ORAL at 22:48

## 2021-10-31 RX ADMIN — Medication 975 MILLIGRAM(S): at 13:22

## 2021-10-31 RX ADMIN — TRAMADOL HYDROCHLORIDE 50 MILLIGRAM(S): 50 TABLET ORAL at 15:36

## 2021-10-31 RX ADMIN — Medication 975 MILLIGRAM(S): at 04:57

## 2021-10-31 RX ADMIN — TRAMADOL HYDROCHLORIDE 50 MILLIGRAM(S): 50 TABLET ORAL at 16:15

## 2021-10-31 RX ADMIN — Medication 1 APPLICATION(S): at 13:23

## 2021-10-31 RX ADMIN — ATORVASTATIN CALCIUM 20 MILLIGRAM(S): 80 TABLET, FILM COATED ORAL at 22:48

## 2021-10-31 RX ADMIN — Medication 250 MILLIGRAM(S): at 23:17

## 2021-10-31 RX ADMIN — Medication 2 TABLET(S): at 04:57

## 2021-10-31 RX ADMIN — PANTOPRAZOLE SODIUM 40 MILLIGRAM(S): 20 TABLET, DELAYED RELEASE ORAL at 05:11

## 2021-10-31 RX ADMIN — CEFEPIME 100 MILLIGRAM(S): 1 INJECTION, POWDER, FOR SOLUTION INTRAMUSCULAR; INTRAVENOUS at 04:58

## 2021-10-31 RX ADMIN — Medication 20 MILLIGRAM(S): at 19:42

## 2021-10-31 RX ADMIN — Medication 975 MILLIGRAM(S): at 18:07

## 2021-10-31 RX ADMIN — Medication 2 TABLET(S): at 18:08

## 2021-10-31 RX ADMIN — Medication 81 MILLIGRAM(S): at 13:22

## 2021-10-31 RX ADMIN — ATENOLOL 25 MILLIGRAM(S): 25 TABLET ORAL at 04:56

## 2021-10-31 NOTE — PROGRESS NOTE ADULT - ASSESSMENT
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     Plan:  - Continue IV abx: vanc and cefepime  - appreciate ID recs: adjusted dose of vancomycin   - appreciate derm consult: unlikely to be contact dermatitis   - continue daily dressing changes  - dispo: rosendo lift at home and needs private hire nursing   - f/u PM [facility]       Vascular Surgery  [id number]

## 2021-10-31 NOTE — PROGRESS NOTE ADULT - SUBJECTIVE AND OBJECTIVE BOX
CARDIOLOGY     PROGRESS  NOTE   ________________________________________________    CHIEF COMPLAINT:Patient is a 81y old  Female who presents with a chief complaint of Left lower extremity wound (31 Oct 2021 02:20)  no complain.  	  REVIEW OF SYSTEMS:  CONSTITUTIONAL: No fever, weight loss, or fatigue  EYES: No eye pain, visual disturbances, or discharge  ENT:  No difficulty hearing, tinnitus, vertigo; No sinus or throat pain  NECK: No pain or stiffness  RESPIRATORY: No cough, wheezing, chills or hemoptysis; No Shortness of Breath  CARDIOVASCULAR: No chest pain, palpitations, passing out, dizziness, or leg swelling  GASTROINTESTINAL: No abdominal or epigastric pain. No nausea, vomiting, or hematemesis; No diarrhea or constipation. No melena or hematochezia.  GENITOURINARY: No dysuria, frequency, hematuria, or incontinence  NEUROLOGICAL: No headaches, memory loss, loss of strength, numbness, or tremors  SKIN: No itching, burning, rashes, or lesions   LYMPH Nodes: No enlarged glands  ENDOCRINE: No heat or cold intolerance; No hair loss  MUSCULOSKELETAL: No joint pain or swelling; No muscle, back, or extremity pain  PSYCHIATRIC: No depression, anxiety, mood swings, or difficulty sleeping  HEME/LYMPH: No easy bruising, or bleeding gums  ALLERGY AND IMMUNOLOGIC: No hives or eczema	    [ ] All others negative	  [ ] Unable to obtain    PHYSICAL EXAM:  T(C): 36.8 (10-31-21 @ 10:00), Max: 37.8 (10-31-21 @ 05:15)  HR: 80 (10-31-21 @ 10:00) (77 - 106)  BP: 115/82 (10-31-21 @ 10:00) (115/82 - 150/73)  RR: 18 (10-31-21 @ 10:00) (18 - 18)  SpO2: 93% (10-31-21 @ 10:00) (93% - 98%)  Wt(kg): --  I&O's Summary    30 Oct 2021 07:01  -  31 Oct 2021 07:00  --------------------------------------------------------  IN: 1010 mL / OUT: 2300 mL / NET: -1290 mL    31 Oct 2021 07:01  -  31 Oct 2021 10:58  --------------------------------------------------------  IN: 150 mL / OUT: 300 mL / NET: -150 mL        Appearance: Normal	  HEENT:   Normal oral mucosa, PERRL, EOMI	  Lymphatic: No lymphadenopathy  Cardiovascular: Normal S1 S2, No JVD, + murmurs, No edema  Respiratory: Lungs clear to auscultation	  Psychiatry: A & O x 3, Mood & affect appropriate  Gastrointestinal:  Soft, Non-tender, + BS	  Skin: No rashes, No ecchymoses, No cyanosis	  Neurologic: Non-focal  Extremities: Normal range of motion, bl bka  Vascular: Peripheral pulses palpable 2+ bilaterally    MEDICATIONS  (STANDING):  acetaminophen     Tablet .. 975 milliGRAM(s) Oral every 6 hours  ascorbic acid 500 milliGRAM(s) Oral daily  aspirin enteric coated 81 milliGRAM(s) Oral daily  ATENolol  Tablet 25 milliGRAM(s) Oral daily  atorvastatin 20 milliGRAM(s) Oral at bedtime  cefepime   IVPB 1000 milliGRAM(s) IV Intermittent every 12 hours  clonazePAM  Tablet 1 milliGRAM(s) Oral two times a day  collagenase Ointment 1 Application(s) Topical daily  enoxaparin Injectable 40 milliGRAM(s) SubCutaneous every 24 hours  lactobacillus acidophilus 1 Tablet(s) Oral daily  multivitamin 1 Tablet(s) Oral daily  pantoprazole    Tablet 40 milliGRAM(s) Oral before breakfast  polyethylene glycol 3350 17 Gram(s) Oral daily  potassium phosphate / sodium phosphate Tablet (K-PHOS No. 2) 2 Tablet(s) Oral two times a day  senna 2 Tablet(s) Oral at bedtime  sodium phosphate IVPB 30 milliMole(s) IV Intermittent once  vancomycin  IVPB 750 milliGRAM(s) IV Intermittent every 12 hours      TELEMETRY: 	    ECG:  	  RADIOLOGY:  OTHER: 	  	  LABS:	 	    CARDIAC MARKERS:                                10.8   4.88  )-----------( 239      ( 31 Oct 2021 06:16 )             35.4     10-31    139  |  106  |  8   ----------------------------<  90  4.0   |  20<L>  |  0.30<L>    Ca    8.7      31 Oct 2021 06:16  Phos  2.2     10-31  Mg     2.1     10-31      proBNP:   Lipid Profile:   HgA1c:   TSH:         Assessment and plan  ---------------------------  82 y/o female who presents to the ED with chronic Left lower leg wound present since 2018 after an ICU admission for sepsis. She has been followed in the [facility] wound care center by Mounika Smith/Rah/Kishan since 2019. Yesterday, she saw her wound care doctor who contacted Dr. Morris that the wound was getting worse. Presented to ED this morning to get leg evaluated. Denies nausea, vomiting, fever, chills, shortness of breath, rapid heart rate.  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  pt with no cardiac complain, no evidence of chf on exam  s/p bka  doing better  increase bp ?sec to pain and discomfort.  pain meds  continue Lipitor / atenolol  replete K, keep K>4  started on abx   ID eval noted on cefepime and vanco  bp is better controlled  continue current meds

## 2021-10-31 NOTE — PROGRESS NOTE ADULT - SUBJECTIVE AND OBJECTIVE BOX
Surgery Progress Note    SUBJECTIVE: Patient seen and examined at bedside with surgical team. No acute events overnight.     OBJECTIVE:    Vital Signs Last 24 Hrs  T(C): 37.4 (31 Oct 2021 01:13), Max: 37.4 (31 Oct 2021 01:13)  T(F): 99.3 (31 Oct 2021 01:13), Max: 99.3 (31 Oct 2021 01:13)  HR: 97 (31 Oct 2021 01:13) (73 - 97)  BP: 116/75 (31 Oct 2021 01:13) (116/75 - 137/84)  BP(mean): --  RR: 18 (31 Oct 2021 01:13) (18 - 18)  SpO2: 95% (31 Oct 2021 01:13) (94% - 98%)I&O's Detail    29 Oct 2021 07:01  -  30 Oct 2021 07:00  --------------------------------------------------------  IN:    Oral Fluid: 625 mL  Total IN: 625 mL    OUT:    Indwelling Catheter - Urethral (mL): 1425 mL  Total OUT: 1425 mL    Total NET: -800 mL      30 Oct 2021 07:01  -  31 Oct 2021 02:20  --------------------------------------------------------  IN:    Oral Fluid: 830 mL  Total IN: 830 mL    OUT:    Indwelling Catheter - Urethral (mL): 2050 mL  Total OUT: 2050 mL    Total NET: -1220 mL      MEDICATIONS  (STANDING):  acetaminophen     Tablet .. 975 milliGRAM(s) Oral every 6 hours  ascorbic acid 500 milliGRAM(s) Oral daily  aspirin enteric coated 81 milliGRAM(s) Oral daily  ATENolol  Tablet 25 milliGRAM(s) Oral daily  atorvastatin 20 milliGRAM(s) Oral at bedtime  cefepime   IVPB 1000 milliGRAM(s) IV Intermittent every 12 hours  clonazePAM  Tablet 1 milliGRAM(s) Oral two times a day  collagenase Ointment 1 Application(s) Topical daily  enoxaparin Injectable 40 milliGRAM(s) SubCutaneous every 24 hours  lactobacillus acidophilus 1 Tablet(s) Oral daily  multivitamin 1 Tablet(s) Oral daily  pantoprazole    Tablet 40 milliGRAM(s) Oral before breakfast  polyethylene glycol 3350 17 Gram(s) Oral daily  potassium phosphate / sodium phosphate Tablet (K-PHOS No. 2) 2 Tablet(s) Oral two times a day  senna 2 Tablet(s) Oral at bedtime  vancomycin  IVPB 750 milliGRAM(s) IV Intermittent every 12 hours    MEDICATIONS  (PRN):  artificial tears (preservative free) Ophthalmic Solution 1 Drop(s) Both EYES daily PRN Dry Eyes  traMADol 50 milliGRAM(s) Oral every 6 hours PRN Severe Pain (7 - 10)      PHYSICAL EXAM:  Constitutional: A&Ox3, NAD  Respiratory: Unlabored breathing  Extremities: R AKA healed incision. L AKA incision with erythema and induration at medial aspects, improved compared to yesterday. No fluctuance or oozing.     LABS:                        10.9   4.66  )-----------( 229      ( 30 Oct 2021 07:11 )             35.4     10-30    137  |  106  |  8   ----------------------------<  99  4.7   |  21<L>  |  0.30<L>    Ca    8.6      30 Oct 2021 18:42  Phos  3.0     10-30  Mg     2.4     10-30

## 2021-11-01 LAB
ANION GAP SERPL CALC-SCNC: 14 MMOL/L — SIGNIFICANT CHANGE UP (ref 5–17)
BUN SERPL-MCNC: 5 MG/DL — LOW (ref 7–23)
CALCIUM SERPL-MCNC: 8.7 MG/DL — SIGNIFICANT CHANGE UP (ref 8.4–10.5)
CHLORIDE SERPL-SCNC: 104 MMOL/L — SIGNIFICANT CHANGE UP (ref 96–108)
CO2 SERPL-SCNC: 22 MMOL/L — SIGNIFICANT CHANGE UP (ref 22–31)
CREAT SERPL-MCNC: <0.3 MG/DL — LOW (ref 0.5–1.3)
GLUCOSE SERPL-MCNC: 110 MG/DL — HIGH (ref 70–99)
HCT VFR BLD CALC: 34.9 % — SIGNIFICANT CHANGE UP (ref 34.5–45)
HGB BLD-MCNC: 10.6 G/DL — LOW (ref 11.5–15.5)
MAGNESIUM SERPL-MCNC: 1.9 MG/DL — SIGNIFICANT CHANGE UP (ref 1.6–2.6)
MCHC RBC-ENTMCNC: 28.5 PG — SIGNIFICANT CHANGE UP (ref 27–34)
MCHC RBC-ENTMCNC: 30.4 GM/DL — LOW (ref 32–36)
MCV RBC AUTO: 93.8 FL — SIGNIFICANT CHANGE UP (ref 80–100)
NRBC # BLD: 0 /100 WBCS — SIGNIFICANT CHANGE UP (ref 0–0)
PHOSPHATE SERPL-MCNC: 2.5 MG/DL — SIGNIFICANT CHANGE UP (ref 2.5–4.5)
PLATELET # BLD AUTO: 224 K/UL — SIGNIFICANT CHANGE UP (ref 150–400)
POTASSIUM SERPL-MCNC: 3.1 MMOL/L — LOW (ref 3.5–5.3)
POTASSIUM SERPL-SCNC: 3.1 MMOL/L — LOW (ref 3.5–5.3)
RBC # BLD: 3.72 M/UL — LOW (ref 3.8–5.2)
RBC # FLD: 17.2 % — HIGH (ref 10.3–14.5)
SARS-COV-2 RNA SPEC QL NAA+PROBE: SIGNIFICANT CHANGE UP
SODIUM SERPL-SCNC: 140 MMOL/L — SIGNIFICANT CHANGE UP (ref 135–145)
WBC # BLD: 5.05 K/UL — SIGNIFICANT CHANGE UP (ref 3.8–10.5)
WBC # FLD AUTO: 5.05 K/UL — SIGNIFICANT CHANGE UP (ref 3.8–10.5)

## 2021-11-01 PROCEDURE — 99232 SBSQ HOSP IP/OBS MODERATE 35: CPT

## 2021-11-01 RX ORDER — POTASSIUM CHLORIDE 20 MEQ
20 PACKET (EA) ORAL
Refills: 0 | Status: DISCONTINUED | OUTPATIENT
Start: 2021-11-01 | End: 2021-11-01

## 2021-11-01 RX ORDER — TRAMADOL HYDROCHLORIDE 50 MG/1
25 TABLET ORAL EVERY 6 HOURS
Refills: 0 | Status: DISCONTINUED | OUTPATIENT
Start: 2021-11-01 | End: 2021-11-08

## 2021-11-01 RX ORDER — TRAMADOL HYDROCHLORIDE 50 MG/1
50 TABLET ORAL EVERY 6 HOURS
Refills: 0 | Status: DISCONTINUED | OUTPATIENT
Start: 2021-11-01 | End: 2021-11-08

## 2021-11-01 RX ORDER — CLONAZEPAM 1 MG
1 TABLET ORAL
Refills: 0 | Status: DISCONTINUED | OUTPATIENT
Start: 2021-11-01 | End: 2021-11-08

## 2021-11-01 RX ORDER — POTASSIUM PHOSPHATE, MONOBASIC POTASSIUM PHOSPHATE, DIBASIC 236; 224 MG/ML; MG/ML
30 INJECTION, SOLUTION INTRAVENOUS ONCE
Refills: 0 | Status: COMPLETED | OUTPATIENT
Start: 2021-11-01 | End: 2021-11-01

## 2021-11-01 RX ORDER — POTASSIUM CHLORIDE 20 MEQ
40 PACKET (EA) ORAL ONCE
Refills: 0 | Status: COMPLETED | OUTPATIENT
Start: 2021-11-01 | End: 2021-11-01

## 2021-11-01 RX ORDER — COLLAGENASE CLOSTRIDIUM HIST. 250 UNIT/G
1 OINTMENT (GRAM) TOPICAL DAILY
Refills: 0 | Status: DISCONTINUED | OUTPATIENT
Start: 2021-11-01 | End: 2021-11-08

## 2021-11-01 RX ADMIN — Medication 2 TABLET(S): at 05:41

## 2021-11-01 RX ADMIN — Medication 1 APPLICATION(S): at 13:33

## 2021-11-01 RX ADMIN — Medication 500 MILLIGRAM(S): at 13:31

## 2021-11-01 RX ADMIN — Medication 20 MILLIGRAM(S): at 17:40

## 2021-11-01 RX ADMIN — Medication 975 MILLIGRAM(S): at 17:39

## 2021-11-01 RX ADMIN — Medication 1 MILLIGRAM(S): at 10:27

## 2021-11-01 RX ADMIN — Medication 40 MILLIEQUIVALENT(S): at 08:37

## 2021-11-01 RX ADMIN — Medication 2 TABLET(S): at 17:39

## 2021-11-01 RX ADMIN — Medication 20 MILLIGRAM(S): at 03:55

## 2021-11-01 RX ADMIN — Medication 250 MILLIGRAM(S): at 09:02

## 2021-11-01 RX ADMIN — Medication 20 MILLIGRAM(S): at 22:31

## 2021-11-01 RX ADMIN — Medication 975 MILLIGRAM(S): at 13:28

## 2021-11-01 RX ADMIN — Medication 20 MILLIGRAM(S): at 08:36

## 2021-11-01 RX ADMIN — Medication 1 TABLET(S): at 13:29

## 2021-11-01 RX ADMIN — POTASSIUM PHOSPHATE, MONOBASIC POTASSIUM PHOSPHATE, DIBASIC 83.33 MILLIMOLE(S): 236; 224 INJECTION, SOLUTION INTRAVENOUS at 11:07

## 2021-11-01 RX ADMIN — Medication 81 MILLIGRAM(S): at 13:30

## 2021-11-01 RX ADMIN — CEFEPIME 100 MILLIGRAM(S): 1 INJECTION, POWDER, FOR SOLUTION INTRAMUSCULAR; INTRAVENOUS at 05:39

## 2021-11-01 RX ADMIN — Medication 1 APPLICATION(S): at 22:32

## 2021-11-01 RX ADMIN — Medication 20 MILLIGRAM(S): at 13:31

## 2021-11-01 RX ADMIN — Medication 250 MILLIGRAM(S): at 22:31

## 2021-11-01 RX ADMIN — ATORVASTATIN CALCIUM 20 MILLIGRAM(S): 80 TABLET, FILM COATED ORAL at 22:31

## 2021-11-01 RX ADMIN — Medication 1 TABLET(S): at 13:32

## 2021-11-01 RX ADMIN — PANTOPRAZOLE SODIUM 40 MILLIGRAM(S): 20 TABLET, DELAYED RELEASE ORAL at 05:40

## 2021-11-01 RX ADMIN — TRAMADOL HYDROCHLORIDE 25 MILLIGRAM(S): 50 TABLET ORAL at 17:50

## 2021-11-01 RX ADMIN — TRAMADOL HYDROCHLORIDE 25 MILLIGRAM(S): 50 TABLET ORAL at 18:43

## 2021-11-01 RX ADMIN — ATENOLOL 25 MILLIGRAM(S): 25 TABLET ORAL at 05:40

## 2021-11-01 RX ADMIN — ENOXAPARIN SODIUM 40 MILLIGRAM(S): 100 INJECTION SUBCUTANEOUS at 18:20

## 2021-11-01 RX ADMIN — Medication 975 MILLIGRAM(S): at 05:40

## 2021-11-01 RX ADMIN — SENNA PLUS 2 TABLET(S): 8.6 TABLET ORAL at 22:31

## 2021-11-01 RX ADMIN — CEFEPIME 100 MILLIGRAM(S): 1 INJECTION, POWDER, FOR SOLUTION INTRAMUSCULAR; INTRAVENOUS at 17:50

## 2021-11-01 NOTE — PROGRESS NOTE ADULT - SUBJECTIVE AND OBJECTIVE BOX
CARDIOLOGY     PROGRESS  NOTE   ________________________________________________    CHIEF COMPLAINT:Patient is a 81y old  Female who presents with a chief complaint of Left lower extremity wound (01 Nov 2021 00:25)  no complian  	  REVIEW OF SYSTEMS:  CONSTITUTIONAL: No fever, weight loss, or fatigue  EYES: No eye pain, visual disturbances, or discharge  ENT:  No difficulty hearing, tinnitus, vertigo; No sinus or throat pain  NECK: No pain or stiffness  RESPIRATORY: No cough, wheezing, chills or hemoptysis; No Shortness of Breath  CARDIOVASCULAR: No chest pain, palpitations, passing out, dizziness, or leg swelling  GASTROINTESTINAL: No abdominal or epigastric pain. No nausea, vomiting, or hematemesis; No diarrhea or constipation. No melena or hematochezia.  GENITOURINARY: No dysuria, frequency, hematuria, or incontinence  NEUROLOGICAL: No headaches, memory loss, loss of strength, numbness, or tremors  SKIN: No itching, burning, rashes, or lesions   LYMPH Nodes: No enlarged glands  ENDOCRINE: No heat or cold intolerance; No hair loss  MUSCULOSKELETAL: No joint pain or swelling; No muscle, back, or extremity pain  PSYCHIATRIC: No depression, anxiety, mood swings, or difficulty sleeping  HEME/LYMPH: No easy bruising, or bleeding gums  ALLERGY AND IMMUNOLOGIC: No hives or eczema	    [ ] All others negative	  [ ] Unable to obtain    PHYSICAL EXAM:  T(C): 36.8 (11-01-21 @ 05:37), Max: 37.1 (11-01-21 @ 01:22)  HR: 81 (11-01-21 @ 05:37) (80 - 93)  BP: 123/80 (11-01-21 @ 05:37) (115/75 - 155/90)  RR: 17 (11-01-21 @ 05:37) (17 - 18)  SpO2: 94% (11-01-21 @ 05:37) (93% - 96%)  Wt(kg): --  I&O's Summary    31 Oct 2021 07:01  -  01 Nov 2021 07:00  --------------------------------------------------------  IN: 690 mL / OUT: 2050 mL / NET: -1360 mL        Appearance: Normal	  HEENT:   Normal oral mucosa, PERRL, EOMI	  Lymphatic: No lymphadenopathy  Cardiovascular: Normal S1 S2, No JVD, + murmurs, No edema  Respiratory: Lungs clear to auscultation	  Psychiatry: A & O x 3, Mood & affect appropriate  Gastrointestinal:  Soft, Non-tender, + BS	  Skin: No rashes, No ecchymoses, No cyanosis	  Neurologic: Non-focal  Extremities: Normal range of motion, bl bka  Vascular: Peripheral pulses palpable 2+ bilaterally    MEDICATIONS  (STANDING):  acetaminophen     Tablet .. 975 milliGRAM(s) Oral every 6 hours  ascorbic acid 500 milliGRAM(s) Oral daily  aspirin enteric coated 81 milliGRAM(s) Oral daily  ATENolol  Tablet 25 milliGRAM(s) Oral daily  atorvastatin 20 milliGRAM(s) Oral at bedtime  baclofen 20 milliGRAM(s) Oral four times a day  cefepime   IVPB 1000 milliGRAM(s) IV Intermittent every 12 hours  clonazePAM  Tablet 1 milliGRAM(s) Oral two times a day  collagenase Ointment 1 Application(s) Topical daily  enoxaparin Injectable 40 milliGRAM(s) SubCutaneous every 24 hours  lactobacillus acidophilus 1 Tablet(s) Oral daily  multivitamin 1 Tablet(s) Oral daily  pantoprazole    Tablet 40 milliGRAM(s) Oral before breakfast  polyethylene glycol 3350 17 Gram(s) Oral daily  potassium phosphate / sodium phosphate Tablet (K-PHOS No. 2) 2 Tablet(s) Oral two times a day  potassium phosphate IVPB 30 milliMole(s) IV Intermittent once  senna 2 Tablet(s) Oral at bedtime  vancomycin  IVPB 750 milliGRAM(s) IV Intermittent every 12 hours      TELEMETRY: 	    ECG:  	  RADIOLOGY:  OTHER: 	  	  LABS:	 	    CARDIAC MARKERS:                                10.6   5.05  )-----------( 224      ( 01 Nov 2021 07:19 )             34.9     11-01    140  |  104  |  5<L>  ----------------------------<  110<H>  3.1<L>   |  22  |  <0.30<L>    Ca    8.7      01 Nov 2021 07:23  Phos  2.5     11-01  Mg     1.9     11-01      proBNP:   Lipid Profile:   HgA1c:   TSH:   - Continue IV abx: vanc and cefepime  - appreciate ID recs: adjusted dose of vancomycin. ?antibiotic plan for possible discharge   - appreciate derm consult: unlikely to be contact dermatitis   - continue daily dressing changes  - dispo: rosendo lift at home and needs private hire nursing   - f/u PM BMP     Recommend:  #Post surgical site cellulitis  -Continue vancomycin and cefepime  -Decreased vancomycin to 750 mg IV q 12 hours  -Continue to monitor vanco trough prior to 4th dose  -Continue to renally adjust abx  -Monitor for fevers    #L Posterior thigh wound  - necrotic base and some surrounding minimal erythema      Assessment and plan  ---------------------------  82 y/o female who presents to the ED with chronic Left lower leg wound present since 2018 after an ICU admission for sepsis. She has been followed in the [facility] wound care center by Mounika Smith/Rah/Kishan since 2019. Yesterday, she saw her wound care doctor who contacted Dr. Morris that the wound was getting worse. Presented to ED this morning to get leg evaluated. Denies nausea, vomiting, fever, chills, shortness of breath, rapid heart rate.  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  pt with no cardiac complain, no evidence of chf on exam  s/p bka  doing better  increase bp ?sec to pain and discomfort.  pain meds  continue Lipitor / atenolol  replete K, keep K>4  started on abx   ID eval noted on cefepime and vanco  bp is better controlled  continue current meds  vascular/ ID noted  keep k>4

## 2021-11-01 NOTE — PROGRESS NOTE ADULT - ATTENDING COMMENTS
I Sanjiv Morris MD have seen and examined the patient today and agree with  the  evaluation, assessment and plan of the surgical house officer  RAN Morris MD have personally seen and examined the patient at bedside today at 10 am

## 2021-11-01 NOTE — PROGRESS NOTE ADULT - SUBJECTIVE AND OBJECTIVE BOX
81F s/p AKA on left leg for gangrene    f/u wound cellulitis    Interval History/ROS:  no fever/chills.  no n/v/d.  no abdominal pain.  no dysuria.  Remainder of ROS otherwise negative.    PAST MEDICAL & SURGICAL HISTORY:  Ptosis of both eyelids  Neurogenic bladder  Dysphagia  Osteoporosis  HTN (hypertension)  MS (multiple sclerosis)  S/P hysterectomy  S/P breast biopsy  S/P ORIF (open reduction internal fixation) fracture left femur, right hip    No Known Allergies  nkda    ANTIBIOTICS/RELEVANT:  cefepime   IVPB 1000 every 12 hours  vancomycin  IVPB 750 every 12 hours    MEDICATIONS  (STANDING):  acetaminophen     Tablet .. 975 every 6 hours  aspirin enteric coated 81 daily  ATENolol  Tablet 25 daily  atorvastatin 20 at bedtime  baclofen 20 four times a day  enoxaparin Injectable 40 every 24 hours  pantoprazole    Tablet 40 before breakfast  polyethylene glycol 3350 17 daily  senna 2 at bedtime    Vital Signs Last 24 Hrs  T(F): 98.6 (11-01-21 @ 09:43), Max: 98.7 (11-01-21 @ 01:22)  HR: 80 (11-01-21 @ 09:43)  BP: 128/78 (11-01-21 @ 09:43)  RR: 18 (11-01-21 @ 09:43)  SpO2: 94% (11-01-21 @ 09:43) (94% - 95%)    PHYSICAL EXAM:  Constitutional: non-toxic, aide at bedside  HEAD/EYES: anicteric  ENT:  supple  Cardiovascular:   normal S1, S2  Respiratory:  clear BS bilaterally  GI:  soft, non-tender, normal bowel sounds  :  fragoso  Musculoskeletal:  b/l AKA, L AKA stump with minimal erythema, no drainage, posterior ulcer that does not appear infected  Neurologic: awake and alert  Skin:  no phlebitis  Psychiatric:  awake, alert, appropriate mood                        10.6   5.05  )-----------( 224      ( 01 Nov 2021 07:19 )             34.9 11-01    140  |  104  |  5   ----------------------------<  110  3.1   |  22  |  <0.30  Ca    8.7      01 Nov 2021 07:23Phos  2.5     11-01Mg     1.9     11-01    MICROBIOLOGY:  Culture - Acid Fast - Urine (collected 10-21-21 @ 16:48)  Source: .Urine Other  Preliminary Report (10-30-21 @ 15:03):    No growth at 1 week.    Culture - Urine (collected 10-21-21 @ 16:47)  Source: Catheterized Catheterized  Final Report (10-22-21 @ 13:38):    No growth    Culture - Fungal, Other (collected 10-21-21 @ 16:46)  Source: .Other Other  Preliminary Report (10-30-21 @ 15:01):    No growth    Culture - Blood (collected 10-21-21 @ 09:03)  Source: .Blood Blood-Peripheral  Final Report (10-26-21 @ 10:00):    No Growth Final    Culture - Blood (collected 10-21-21 @ 09:03)  Source: .Blood Blood-Peripheral  Final Report (10-26-21 @ 10:00):    No Growth Final    Culture - Urine (collected 10-19-21 @ 17:01)  Source: Catheterized Catheterized  Final Report (10-21-21 @ 01:07):    >=3 organisms. Probable collection contamination.    Culture - Blood (collected 10-19-21 @ 17:01)  Source: .Blood Blood-Peripheral  Final Report (10-24-21 @ 18:00):    No Growth Final    Culture - Blood (collected 10-19-21 @ 17:01)  Source: .Blood Blood-Peripheral  Final Report (10-24-21 @ 18:00):    No Growth Final    RADIOLOGY:  Xray Chest 1 View- PORTABLE-Routine (Xray Chest 1 View- PORTABLE-Routine in AM.) (10.24.21 @ 06:55) >  IMPRESSION:  Bilateral lower lobe atelectasis, left greater than right. Trace bilateral pleural effusions.

## 2021-11-01 NOTE — PROGRESS NOTE ADULT - ASSESSMENT
81 F with multiple sclerosis, HTN, neurogenic bladder (has had a Kendrick catheter for many years) and peripheral vascular disease presents with left lower leg wound which has been present since 2018, worsening, sent to the ED for wet gangrene s/p AKA on 10/21/21. She was doing well, afebrile, WBC WNL. Has erythema along the surgical site, staples are in place, with posterior thigh wound.    Recommend:  Post surgical site cellulitis  - Continue vancomycin and cefepime  - can discontinue antibiotics at discharge in a couple of days  -Monitor for fevers    Posterior thigh wound  - necrotic base and some surrounding minimal erythema  - wound care    I have discussed plan of care as detailed above with NP

## 2021-11-01 NOTE — PROGRESS NOTE ADULT - ASSESSMENT
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     Plan: 82 y/o female HTN, MS, hysterectomy, R AKA who presents to the ED with LLE nonhealing wound with wet gangrene now s/p L AKA on 10/21/2021.     Plan:  - Continue IV abx: vanc and cefepime  - appreciate ID recs: adjusted dose of vancomycin. ?antibiotic plan for possible discharge   - appreciate derm consult: unlikely to be contact dermatitis   - continue daily dressing changes  - dispo: rosendo lift at home and needs private hire nursing   - f/u PM [facility]       Vascular Surgery  [id number]  80 y/o female HTN, MS, hysterectomy, R AKA who presents to the ED with LLE nonhealing wound with wet gangrene now s/p L AKA on 10/21/2021.     Plan:  clinically improving   left thigh  wound woundcare as per woundcare service  - Continue IV abx: vanc and cefepime  - appreciate ID recs: adjusted dose of vancomycin. ?antibiotic plan for possible discharge   - appreciate derm consult: unlikely to be contact dermatitis   - continue daily dressing changes  - dispo: rosendo lift at home and needs private hire nursing   - f/u PM BMP   will follow     Vascular Surgery  [id number]

## 2021-11-01 NOTE — PROGRESS NOTE ADULT - SUBJECTIVE AND OBJECTIVE BOX
Surgery Progress Note    INTERVAl/SUBJECTIVE: No acute event overnight.     Vital Signs Last 24 Hrs  T(C): 36.8 (31 Oct 2021 22:36), Max: 37.8 (31 Oct 2021 05:15)  T(F): 98.2 (31 Oct 2021 22:36), Max: 100.1 (31 Oct 2021 05:15)  HR: 92 (31 Oct 2021 22:36) (80 - 106)  BP: 115/75 (31 Oct 2021 22:36) (115/75 - 155/90)  BP(mean): --  RR: 18 (31 Oct 2021 22:36) (18 - 18)  SpO2: 95% (31 Oct 2021 22:36) (93% - 96%)    Physical Exam:  General:  Neuro:    CV:   Abdomen:     LABS:                        10.8   4.88  )-----------( 239      ( 31 Oct 2021 06:16 )             35.4     10-31    139  |  106  |  8   ----------------------------<  90  4.0   |  20<L>  |  0.30<L>    Ca    8.7      31 Oct 2021 06:16  Phos  2.2     10-31  Mg     2.1     10-31            INs and OUTs:    10-30-21 @ 07:01  -  10-31-21 @ 07:00  --------------------------------------------------------  IN: 1010 mL / OUT: 2300 mL / NET: -1290 mL    10-31-21 @ 07:01  -  11-01-21 @ 00:25  --------------------------------------------------------  IN: 350 mL / OUT: 1100 mL / NET: -750 mL     Surgery Progress Note    SUBJECTIVE: Patient seen and examined at bedside with surgical team. No acute events overnight. Patient feels well this morning, no complaints.     OBJECTIVE:    Vital Signs Last 24 Hrs  T(C): 36.8 (01 Nov 2021 05:37), Max: 37.1 (01 Nov 2021 01:22)  T(F): 98.3 (01 Nov 2021 05:37), Max: 98.7 (01 Nov 2021 01:22)  HR: 81 (01 Nov 2021 05:37) (80 - 93)  BP: 123/80 (01 Nov 2021 05:37) (115/75 - 155/90)  BP(mean): --  RR: 17 (01 Nov 2021 05:37) (17 - 18)  SpO2: 94% (01 Nov 2021 05:37) (93% - 96%)I&O's Detail    31 Oct 2021 07:01  -  01 Nov 2021 07:00  --------------------------------------------------------  IN:    Oral Fluid: 690 mL  Total IN: 690 mL    OUT:    Indwelling Catheter - Urethral (mL): 2050 mL  Total OUT: 2050 mL    Total NET: -1360 mL      MEDICATIONS  (STANDING):  acetaminophen     Tablet .. 975 milliGRAM(s) Oral every 6 hours  ascorbic acid 500 milliGRAM(s) Oral daily  aspirin enteric coated 81 milliGRAM(s) Oral daily  ATENolol  Tablet 25 milliGRAM(s) Oral daily  atorvastatin 20 milliGRAM(s) Oral at bedtime  baclofen 20 milliGRAM(s) Oral four times a day  cefepime   IVPB 1000 milliGRAM(s) IV Intermittent every 12 hours  clonazePAM  Tablet 1 milliGRAM(s) Oral two times a day  collagenase Ointment 1 Application(s) Topical daily  enoxaparin Injectable 40 milliGRAM(s) SubCutaneous every 24 hours  lactobacillus acidophilus 1 Tablet(s) Oral daily  multivitamin 1 Tablet(s) Oral daily  pantoprazole    Tablet 40 milliGRAM(s) Oral before breakfast  polyethylene glycol 3350 17 Gram(s) Oral daily  potassium phosphate / sodium phosphate Tablet (K-PHOS No. 2) 2 Tablet(s) Oral two times a day  senna 2 Tablet(s) Oral at bedtime  vancomycin  IVPB 750 milliGRAM(s) IV Intermittent every 12 hours    MEDICATIONS  (PRN):  artificial tears (preservative free) Ophthalmic Solution 1 Drop(s) Both EYES daily PRN Dry Eyes      PHYSICAL EXAM:  Constitutional: A&Ox3, NAD  Respiratory: Unlabored breathing  Extremities: R AKA healed incision. L AKA incision with erythema and induration at medial aspects, improved compared to yesterday. No fluctuance or oozing.     LABS:                        10.8   4.88  )-----------( 239      ( 31 Oct 2021 06:16 )             35.4     10-31    139  |  106  |  8   ----------------------------<  90  4.0   |  20<L>  |  0.30<L>    Ca    8.7      31 Oct 2021 06:16  Phos  2.2     10-31  Mg     2.1     10-31                 Surgery Progress Note    SUBJECTIVE: Patient seen and examined at bedside with surgical team. No acute events overnight. Patient feels well this morning, no complaints.     OBJECTIVE:    Vital Signs Last 24 Hrs  T(C): 36.8 (01 Nov 2021 05:37), Max: 37.1 (01 Nov 2021 01:22)  T(F): 98.3 (01 Nov 2021 05:37), Max: 98.7 (01 Nov 2021 01:22)  HR: 81 (01 Nov 2021 05:37) (80 - 93)  BP: 123/80 (01 Nov 2021 05:37) (115/75 - 155/90)  BP(mean): --  RR: 17 (01 Nov 2021 05:37) (17 - 18)  SpO2: 94% (01 Nov 2021 05:37) (93% - 96%)I&O's Detail    31 Oct 2021 07:01  -  01 Nov 2021 07:00  --------------------------------------------------------  IN:    Oral Fluid: 690 mL  Total IN: 690 mL    OUT:    Indwelling Catheter - Urethral (mL): 2050 mL  Total OUT: 2050 mL    Total NET: -1360 mL      MEDICATIONS  (STANDING):  acetaminophen     Tablet .. 975 milliGRAM(s) Oral every 6 hours  ascorbic acid 500 milliGRAM(s) Oral daily  aspirin enteric coated 81 milliGRAM(s) Oral daily  ATENolol  Tablet 25 milliGRAM(s) Oral daily  atorvastatin 20 milliGRAM(s) Oral at bedtime  baclofen 20 milliGRAM(s) Oral four times a day  cefepime   IVPB 1000 milliGRAM(s) IV Intermittent every 12 hours  clonazePAM  Tablet 1 milliGRAM(s) Oral two times a day  collagenase Ointment 1 Application(s) Topical daily  enoxaparin Injectable 40 milliGRAM(s) SubCutaneous every 24 hours  lactobacillus acidophilus 1 Tablet(s) Oral daily  multivitamin 1 Tablet(s) Oral daily  pantoprazole    Tablet 40 milliGRAM(s) Oral before breakfast  polyethylene glycol 3350 17 Gram(s) Oral daily  potassium phosphate / sodium phosphate Tablet (K-PHOS No. 2) 2 Tablet(s) Oral two times a day  senna 2 Tablet(s) Oral at bedtime  vancomycin  IVPB 750 milliGRAM(s) IV Intermittent every 12 hours    MEDICATIONS  (PRN):  artificial tears (preservative free) Ophthalmic Solution 1 Drop(s) Both EYES daily PRN Dry Eyes      PHYSICAL EXAM:  Constitutional: A&Ox3, NAD  Respiratory: Unlabored breathing  Extremities: R AKA healed incision. L AKA incision erythema induration and cellulitis sig improved  left mid posterior thigh wound  w mild to granulation tissue now       LABS:                        10.8   4.88  )-----------( 239      ( 31 Oct 2021 06:16 )             35.4     10-31    139  |  106  |  8   ----------------------------<  90  4.0   |  20<L>  |  0.30<L>    Ca    8.7      31 Oct 2021 06:16  Phos  2.2     10-31  Mg     2.1     10-31

## 2021-11-02 DIAGNOSIS — L03.116 CELLULITIS OF LEFT LOWER LIMB: ICD-10-CM

## 2021-11-02 LAB
ANION GAP SERPL CALC-SCNC: 12 MMOL/L — SIGNIFICANT CHANGE UP (ref 5–17)
BUN SERPL-MCNC: 9 MG/DL — SIGNIFICANT CHANGE UP (ref 7–23)
CALCIUM SERPL-MCNC: 8.9 MG/DL — SIGNIFICANT CHANGE UP (ref 8.4–10.5)
CHLORIDE SERPL-SCNC: 107 MMOL/L — SIGNIFICANT CHANGE UP (ref 96–108)
CO2 SERPL-SCNC: 22 MMOL/L — SIGNIFICANT CHANGE UP (ref 22–31)
CREAT SERPL-MCNC: <0.3 MG/DL — LOW (ref 0.5–1.3)
GLUCOSE SERPL-MCNC: 78 MG/DL — SIGNIFICANT CHANGE UP (ref 70–99)
HCT VFR BLD CALC: 35.3 % — SIGNIFICANT CHANGE UP (ref 34.5–45)
HGB BLD-MCNC: 10.7 G/DL — LOW (ref 11.5–15.5)
MAGNESIUM SERPL-MCNC: 1.7 MG/DL — SIGNIFICANT CHANGE UP (ref 1.6–2.6)
MCHC RBC-ENTMCNC: 28.5 PG — SIGNIFICANT CHANGE UP (ref 27–34)
MCHC RBC-ENTMCNC: 30.3 GM/DL — LOW (ref 32–36)
MCV RBC AUTO: 94.1 FL — SIGNIFICANT CHANGE UP (ref 80–100)
NRBC # BLD: 0 /100 WBCS — SIGNIFICANT CHANGE UP (ref 0–0)
PHOSPHATE SERPL-MCNC: 2 MG/DL — LOW (ref 2.5–4.5)
PLATELET # BLD AUTO: 226 K/UL — SIGNIFICANT CHANGE UP (ref 150–400)
POTASSIUM SERPL-MCNC: 3.7 MMOL/L — SIGNIFICANT CHANGE UP (ref 3.5–5.3)
POTASSIUM SERPL-SCNC: 3.7 MMOL/L — SIGNIFICANT CHANGE UP (ref 3.5–5.3)
RBC # BLD: 3.75 M/UL — LOW (ref 3.8–5.2)
RBC # FLD: 17.5 % — HIGH (ref 10.3–14.5)
SODIUM SERPL-SCNC: 141 MMOL/L — SIGNIFICANT CHANGE UP (ref 135–145)
VANCOMYCIN TROUGH SERPL-MCNC: 12 UG/ML — SIGNIFICANT CHANGE UP (ref 10–20)
WBC # BLD: 5.38 K/UL — SIGNIFICANT CHANGE UP (ref 3.8–10.5)
WBC # FLD AUTO: 5.38 K/UL — SIGNIFICANT CHANGE UP (ref 3.8–10.5)

## 2021-11-02 PROCEDURE — 99232 SBSQ HOSP IP/OBS MODERATE 35: CPT

## 2021-11-02 RX ORDER — POTASSIUM PHOSPHATE, MONOBASIC POTASSIUM PHOSPHATE, DIBASIC 236; 224 MG/ML; MG/ML
30 INJECTION, SOLUTION INTRAVENOUS ONCE
Refills: 0 | Status: COMPLETED | OUTPATIENT
Start: 2021-11-02 | End: 2021-11-02

## 2021-11-02 RX ORDER — ATENOLOL 25 MG/1
50 TABLET ORAL DAILY
Refills: 0 | Status: DISCONTINUED | OUTPATIENT
Start: 2021-11-02 | End: 2021-11-08

## 2021-11-02 RX ADMIN — Medication 500 MILLIGRAM(S): at 13:09

## 2021-11-02 RX ADMIN — ATORVASTATIN CALCIUM 20 MILLIGRAM(S): 80 TABLET, FILM COATED ORAL at 22:58

## 2021-11-02 RX ADMIN — Medication 2 TABLET(S): at 17:21

## 2021-11-02 RX ADMIN — CEFEPIME 100 MILLIGRAM(S): 1 INJECTION, POWDER, FOR SOLUTION INTRAMUSCULAR; INTRAVENOUS at 08:08

## 2021-11-02 RX ADMIN — Medication 1 APPLICATION(S): at 13:08

## 2021-11-02 RX ADMIN — Medication 250 MILLIGRAM(S): at 23:00

## 2021-11-02 RX ADMIN — SENNA PLUS 2 TABLET(S): 8.6 TABLET ORAL at 22:59

## 2021-11-02 RX ADMIN — Medication 1 TABLET(S): at 13:09

## 2021-11-02 RX ADMIN — Medication 975 MILLIGRAM(S): at 17:21

## 2021-11-02 RX ADMIN — Medication 81 MILLIGRAM(S): at 13:09

## 2021-11-02 RX ADMIN — Medication 250 MILLIGRAM(S): at 10:14

## 2021-11-02 RX ADMIN — Medication 975 MILLIGRAM(S): at 23:08

## 2021-11-02 RX ADMIN — Medication 20 MILLIGRAM(S): at 22:59

## 2021-11-02 RX ADMIN — ENOXAPARIN SODIUM 40 MILLIGRAM(S): 100 INJECTION SUBCUTANEOUS at 18:18

## 2021-11-02 RX ADMIN — PANTOPRAZOLE SODIUM 40 MILLIGRAM(S): 20 TABLET, DELAYED RELEASE ORAL at 08:09

## 2021-11-02 RX ADMIN — Medication 20 MILLIGRAM(S): at 17:21

## 2021-11-02 RX ADMIN — Medication 975 MILLIGRAM(S): at 08:08

## 2021-11-02 RX ADMIN — Medication 975 MILLIGRAM(S): at 14:15

## 2021-11-02 RX ADMIN — ATENOLOL 25 MILLIGRAM(S): 25 TABLET ORAL at 08:09

## 2021-11-02 RX ADMIN — CEFEPIME 100 MILLIGRAM(S): 1 INJECTION, POWDER, FOR SOLUTION INTRAMUSCULAR; INTRAVENOUS at 17:22

## 2021-11-02 RX ADMIN — Medication 975 MILLIGRAM(S): at 13:08

## 2021-11-02 RX ADMIN — Medication 2 TABLET(S): at 08:08

## 2021-11-02 RX ADMIN — Medication 20 MILLIGRAM(S): at 13:09

## 2021-11-02 RX ADMIN — POTASSIUM PHOSPHATE, MONOBASIC POTASSIUM PHOSPHATE, DIBASIC 83.33 MILLIMOLE(S): 236; 224 INJECTION, SOLUTION INTRAVENOUS at 13:10

## 2021-11-02 RX ADMIN — Medication 20 MILLIGRAM(S): at 08:09

## 2021-11-02 NOTE — PROGRESS NOTE ADULT - ASSESSMENT
Stage 4 healing pressure injury Sacrum  Incontinence associated dermatitis  BLE PAD    1.) Sacral wound - TRIAD paste BID prn soiling  2.) continue incontinence management w/ fragoso, triad, and attends pads as per protocol  3.) Maintain on an alternating air with low air loss surface  4.) AKA -post op care as per Vascular  5.) Chair cushion for chair sitting  6.) Nutrition optimization  7.) Turn and reposition Q2 hours  Care as per vascular,  will follow w/ you  Upon discharge f/u as outpatient at [facility] [address] [facility] [phone]  s/w attng and d/w team/ RN

## 2021-11-02 NOTE — PROGRESS NOTE ADULT - ATTENDING COMMENTS
At bedrest  Remains alert , orientated  Not ambulatory , s/p second AKA left leg  Sacral and left buttock wounds  remain superficial  Triad , offload  Status discussed Above noted  At bedrest  Remains alert , orientated  Not ambulatory , s/p second AKA left leg  Sacral and left buttock wounds  remain superficial  Healing stage 4 sacral decubitus  Triad , offload  Status discussed

## 2021-11-02 NOTE — PROGRESS NOTE ADULT - SUBJECTIVE AND OBJECTIVE BOX
[facility]-- WOUND TEAM -- FOLLOW UP NOTE  --------------------------------------------------------------------------------    24 hour events/subjective:      tolerating diet  fragoso  incontinent of stool  no f/c/s    Diet:  Diet, Regular:   Eder(7 Gm Arginine/7 Gm Glut/1.2 Gm HMB     Qty per Day:  2 (10-22-21 @ 18:40)      ROS: General/ SKIN/ MSK see HPI  all other systems negative    ALLERGIES & MEDICATIONS  --------------------------------------------------------------------------------      No Known Allergies      STANDING INPATIENT MEDICATIONS  acetaminophen     Tablet .. 975 milliGRAM(s) Oral every 6 hours  ascorbic acid 500 milliGRAM(s) Oral daily  aspirin enteric coated 81 milliGRAM(s) Oral daily  ATENolol  Tablet 50 milliGRAM(s) Oral daily  atorvastatin 20 milliGRAM(s) Oral at bedtime  baclofen 20 milliGRAM(s) Oral four times a day  cefepime   IVPB 1000 milliGRAM(s) IV Intermittent every 12 hours  collagenase Ointment 1 Application(s) Topical daily  enoxaparin Injectable 40 milliGRAM(s) SubCutaneous every 24 hours  lactobacillus acidophilus 1 Tablet(s) Oral daily  multivitamin 1 Tablet(s) Oral daily  pantoprazole    Tablet 40 milliGRAM(s) Oral before breakfast  polyethylene glycol 3350 17 Gram(s) Oral daily  potassium phosphate / sodium phosphate Tablet (K-PHOS No. 2) 2 Tablet(s) Oral two times a day  senna 2 Tablet(s) Oral at bedtime  vancomycin  IVPB 750 milliGRAM(s) IV Intermittent every 12 hours      PRN INPATIENT MEDICATION  artificial tears (preservative free) Ophthalmic Solution 1 Drop(s) Both EYES daily PRN  clonazePAM  Tablet 1 milliGRAM(s) Oral two times a day PRN  traMADol 25 milliGRAM(s) Oral every 6 hours PRN  traMADol 50 milliGRAM(s) Oral every 6 hours PRN        VITALS/PHYSICAL EXAM  --------------------------------------------------------------------------------  T(C): 36.8 (11-02-21 @ 13:20), Max: 37.2 (11-01-21 @ 21:39)  HR: 95 (11-02-21 @ 13:20) (86 - 110)  BP: 141/88 (11-02-21 @ 13:20) (124/80 - 146/92)  RR: 18 (11-02-21 @ 13:20) (18 - 18)  SpO2: 96% (11-02-21 @ 13:20) (95% - 98%)  Wt(kg): --        11-01-21 @ 07:01  -  11-02-21 @ 07:00  --------------------------------------------------------  IN: 1600 mL / OUT: 1975 mL / NET: -375 mL    11-02-21 @ 07:01  -  11-02-21 @ 17:19  --------------------------------------------------------  IN: 350 mL / OUT: 1350 mL / NET: -1000 mL      NAD A&Ox 3, obese  HEENT: nc/at eomi mucosa moist trachea midline neck supple  gi soft nt/nd (+)bs  psych appropriate  neuro weakened strength  msk s/p bilateral aka  skin moist w/ good turgor  hyperpigmentation c/w moisture dermatitis  lt buttock w/ denuded skin  no active drainage  no induration, fluctuance, erythema, increased warmth      LABS/ CULTURES/ RADIOLOGY:              10.7   5.38  >-----------<  226      [11-02-21 @ 07:26]              35.3     141  |  107  |  9   ----------------------------<  78      [11-02-21 @ 07:26]  3.7   |  22  |  <0.30        Ca     8.9     [11-02-21 @ 07:26]      Mg     1.7     [11-02-21 @ 07:26]      Phos  2.0     [11-02-21 @ 07:26]                            A1C with Estimated Average Glucose Result: 5.5 % (10-22-21 @ 04:15)

## 2021-11-02 NOTE — PROGRESS NOTE ADULT - ASSESSMENT
80 y/o female HTN, MS, hysterectomy, R AKA who presents to the ED with LLE nonhealing wound with wet gangrene now s/p L AKA on 10/21/2021.     Plan:   82 y/o female HTN, MS, hysterectomy, R AKA who presents to the ED with LLE nonhealing wound with wet gangrene now s/p L AKA on 10/21/2021 and resolving lt aka stump cellulitis     Plan: clinically improving  continue woundcare and iv abx  will transition to po abx as per id  d/c planning in sev days   will be d/c when the lt aka stump cellulitis resolves completely   will follow

## 2021-11-02 NOTE — PROGRESS NOTE ADULT - SUBJECTIVE AND OBJECTIVE BOX
Surgery Progress Note    INTERVAl/SUBJECTIVE: No acute event overnight.     Vital Signs Last 24 Hrs  T(C): 37.2 (01 Nov 2021 21:39), Max: 37.2 (01 Nov 2021 21:39)  T(F): 98.9 (01 Nov 2021 21:39), Max: 98.9 (01 Nov 2021 21:39)  HR: 102 (01 Nov 2021 21:39) (79 - 102)  BP: 124/80 (01 Nov 2021 21:39) (114/66 - 128/78)  BP(mean): --  RR: 18 (01 Nov 2021 21:39) (16 - 18)  SpO2: 95% (01 Nov 2021 21:39) (94% - 96%)    Physical Exam:  General:  Neuro:    CV:   Abdomen:     LABS:                        10.6   5.05  )-----------( 224      ( 01 Nov 2021 07:19 )             34.9     11-01    140  |  104  |  5<L>  ----------------------------<  110<H>  3.1<L>   |  22  |  <0.30<L>    Ca    8.7      01 Nov 2021 07:23  Phos  2.5     11-01  Mg     1.9     11-01            INs and OUTs:    10-31-21 @ 07:01  -  11-01-21 @ 07:00  --------------------------------------------------------  IN: 690 mL / OUT: 2050 mL / NET: -1360 mL    11-01-21 @ 07:01  -  11-02-21 @ 00:32  --------------------------------------------------------  IN: 1480 mL / OUT: 1050 mL / NET: 430 mL     Surgery Progress Note    INTERVAl/SUBJECTIVE: No acute event overnight.     Vital Signs Last 24 Hrs  T(C): 37.2 (01 Nov 2021 21:39), Max: 37.2 (01 Nov 2021 21:39)  T(F): 98.9 (01 Nov 2021 21:39), Max: 98.9 (01 Nov 2021 21:39)  HR: 102 (01 Nov 2021 21:39) (79 - 102)  BP: 124/80 (01 Nov 2021 21:39) (114/66 - 128/78)  BP(mean): --  RR: 18 (01 Nov 2021 21:39) (16 - 18)  SpO2: 95% (01 Nov 2021 21:39) (94% - 96%)    Physical Exam:  left aka stump dressing changed   cellulitis sig improved and post thigh wound w mod granulation tissue     LABS:                        10.6   5.05  )-----------( 224      ( 01 Nov 2021 07:19 )             34.9     11-01    140  |  104  |  5<L>  ----------------------------<  110<H>  3.1<L>   |  22  |  <0.30<L>    Ca    8.7      01 Nov 2021 07:23  Phos  2.5     11-01  Mg     1.9     11-01        INs and OUTs:    10-31-21 @ 07:01  -  11-01-21 @ 07:00  --------------------------------------------------------  IN: 690 mL / OUT: 2050 mL / NET: -1360 mL    11-01-21 @ 07:01  -  11-02-21 @ 00:32  --------------------------------------------------------  IN: 1480 mL / OUT: 1050 mL / NET: 430 mL

## 2021-11-02 NOTE — PROGRESS NOTE ADULT - SUBJECTIVE AND OBJECTIVE BOX
CARDIOLOGY     PROGRESS  NOTE   ________________________________________________    CHIEF COMPLAINT:Patient is a 81y old  Female who presents with a chief complaint of Left lower extremity wound (02 Nov 2021 00:32)  no complain.  	  REVIEW OF SYSTEMS:  CONSTITUTIONAL: No fever, weight loss, or fatigue  EYES: No eye pain, visual disturbances, or discharge  ENT:  No difficulty hearing, tinnitus, vertigo; No sinus or throat pain  NECK: No pain or stiffness  RESPIRATORY: No cough, wheezing, chills or hemoptysis; + Shortness of Breath  CARDIOVASCULAR: No chest pain, palpitations, passing out, dizziness, or leg swelling  GASTROINTESTINAL: No abdominal or epigastric pain. No nausea, vomiting, or hematemesis; No diarrhea or constipation. No melena or hematochezia.  GENITOURINARY: No dysuria, frequency, hematuria, or incontinence  NEUROLOGICAL: No headaches, memory loss, loss of strength, numbness, or tremors  SKIN: No itching, burning, rashes, or lesions   LYMPH Nodes: No enlarged glands  ENDOCRINE: No heat or cold intolerance; No hair loss  MUSCULOSKELETAL: No joint pain or swelling; No muscle, back, or extremity pain  PSYCHIATRIC: No depression, anxiety, mood swings, or difficulty sleeping  HEME/LYMPH: No easy bruising, or bleeding gums  ALLERGY AND IMMUNOLOGIC: No hives or eczema	    [ ] All others negative	  [ ] Unable to obtain    PHYSICAL EXAM:  T(C): 37.2 (11-02-21 @ 05:30), Max: 37.2 (11-01-21 @ 21:39)  HR: 110 (11-02-21 @ 05:30) (79 - 110)  BP: 146/92 (11-02-21 @ 05:30) (114/66 - 146/92)  RR: 18 (11-02-21 @ 05:30) (16 - 18)  SpO2: 95% (11-02-21 @ 05:30) (94% - 98%)  Wt(kg): --  I&O's Summary    01 Nov 2021 07:01  -  02 Nov 2021 07:00  --------------------------------------------------------  IN: 1600 mL / OUT: 1975 mL / NET: -375 mL        Appearance: Normal	  HEENT:   Normal oral mucosa, PERRL, EOMI	  Lymphatic: No lymphadenopathy  Cardiovascular: Normal S1 S2, No JVD, +murmurs, No edema  Respiratory: Lungs clear to auscultation	  Psychiatry: A & O x 3, Mood & affect appropriate  Gastrointestinal:  Soft, Non-tender, + BS	  Skin: No rashes, No ecchymoses, No cyanosis	  Neurologic: Non-focal  Extremities: Normal range of motion, bl bka  Vascular: Peripheral pulses palpable 2+ bilaterally    MEDICATIONS  (STANDING):  acetaminophen     Tablet .. 975 milliGRAM(s) Oral every 6 hours  ascorbic acid 500 milliGRAM(s) Oral daily  aspirin enteric coated 81 milliGRAM(s) Oral daily  ATENolol  Tablet 25 milliGRAM(s) Oral daily  atorvastatin 20 milliGRAM(s) Oral at bedtime  baclofen 20 milliGRAM(s) Oral four times a day  cefepime   IVPB 1000 milliGRAM(s) IV Intermittent every 12 hours  collagenase Ointment 1 Application(s) Topical daily  enoxaparin Injectable 40 milliGRAM(s) SubCutaneous every 24 hours  lactobacillus acidophilus 1 Tablet(s) Oral daily  multivitamin 1 Tablet(s) Oral daily  pantoprazole    Tablet 40 milliGRAM(s) Oral before breakfast  polyethylene glycol 3350 17 Gram(s) Oral daily  potassium phosphate / sodium phosphate Tablet (K-PHOS No. 2) 2 Tablet(s) Oral two times a day  senna 2 Tablet(s) Oral at bedtime  vancomycin  IVPB 750 milliGRAM(s) IV Intermittent every 12 hours      TELEMETRY: 	    ECG:  	  RADIOLOGY:  OTHER: 	  	  LABS:	 	    CARDIAC MARKERS:                                10.7   5.38  )-----------( 226      ( 02 Nov 2021 07:26 )             35.3     11-02    141  |  107  |  9   ----------------------------<  78  3.7   |  22  |  <0.30<L>    Ca    8.9      02 Nov 2021 07:26  Phos  2.0     11-02  Mg     1.7     11-02      proBNP:   Lipid Profile:   HgA1c:   TSH:   Recommend:  Post surgical site cellulitis  - Continue vancomycin and cefepime  - can discontinue antibiotics at discharge in a couple of days  -Monitor for fevers    Posterior thigh wound  - necrotic base and some surrounding minimal erythema  - wound care      Assessment and plan  ---------------------------  80 y/o female who presents to the ED with chronic Left lower leg wound present since 2018 after an ICU admission for sepsis. She has been followed in the [facility] wound care center by Mounika Smith/Rah/Kishan since 2019. Yesterday, she saw her wound care doctor who contacted Dr. Morris that the wound was getting worse. Presented to ED this morning to get leg evaluated. Denies nausea, vomiting, fever, chills, shortness of breath, rapid heart rate.  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  pt with no cardiac complain, no evidence of chf on exam  s/p bka  doing better  increase bp ?sec to pain and discomfort.  pain meds  continue Lipitor / atenolol  replete K, keep K>4  started on abx   ID eval noted on cefepime and vanco  bp is better controlled  continue current meds  vascular/ ID noted  keep k>4  will increase atenolol to 50 mg daily if bp remains elevated, at this time increase bp sec to pain

## 2021-11-03 LAB
ANION GAP SERPL CALC-SCNC: 13 MMOL/L — SIGNIFICANT CHANGE UP (ref 5–17)
BUN SERPL-MCNC: 7 MG/DL — SIGNIFICANT CHANGE UP (ref 7–23)
CALCIUM SERPL-MCNC: 9 MG/DL — SIGNIFICANT CHANGE UP (ref 8.4–10.5)
CHLORIDE SERPL-SCNC: 110 MMOL/L — HIGH (ref 96–108)
CO2 SERPL-SCNC: 21 MMOL/L — LOW (ref 22–31)
CREAT SERPL-MCNC: <0.3 MG/DL — LOW (ref 0.5–1.3)
GLUCOSE SERPL-MCNC: 70 MG/DL — SIGNIFICANT CHANGE UP (ref 70–99)
HCT VFR BLD CALC: 35.5 % — SIGNIFICANT CHANGE UP (ref 34.5–45)
HGB BLD-MCNC: 10.7 G/DL — LOW (ref 11.5–15.5)
MAGNESIUM SERPL-MCNC: 1.7 MG/DL — SIGNIFICANT CHANGE UP (ref 1.6–2.6)
MCHC RBC-ENTMCNC: 29.2 PG — SIGNIFICANT CHANGE UP (ref 27–34)
MCHC RBC-ENTMCNC: 30.1 GM/DL — LOW (ref 32–36)
MCV RBC AUTO: 97 FL — SIGNIFICANT CHANGE UP (ref 80–100)
NRBC # BLD: 0 /100 WBCS — SIGNIFICANT CHANGE UP (ref 0–0)
PHOSPHATE SERPL-MCNC: 2.2 MG/DL — LOW (ref 2.5–4.5)
PLATELET # BLD AUTO: 221 K/UL — SIGNIFICANT CHANGE UP (ref 150–400)
POTASSIUM SERPL-MCNC: 3.9 MMOL/L — SIGNIFICANT CHANGE UP (ref 3.5–5.3)
POTASSIUM SERPL-SCNC: 3.9 MMOL/L — SIGNIFICANT CHANGE UP (ref 3.5–5.3)
RBC # BLD: 3.66 M/UL — LOW (ref 3.8–5.2)
RBC # FLD: 17.9 % — HIGH (ref 10.3–14.5)
SODIUM SERPL-SCNC: 144 MMOL/L — SIGNIFICANT CHANGE UP (ref 135–145)
WBC # BLD: 4.23 K/UL — SIGNIFICANT CHANGE UP (ref 3.8–10.5)
WBC # FLD AUTO: 4.23 K/UL — SIGNIFICANT CHANGE UP (ref 3.8–10.5)

## 2021-11-03 RX ADMIN — Medication 1 MILLIGRAM(S): at 07:53

## 2021-11-03 RX ADMIN — Medication 20 MILLIGRAM(S): at 21:53

## 2021-11-03 RX ADMIN — Medication 20 MILLIGRAM(S): at 17:11

## 2021-11-03 RX ADMIN — Medication 975 MILLIGRAM(S): at 05:41

## 2021-11-03 RX ADMIN — PANTOPRAZOLE SODIUM 40 MILLIGRAM(S): 20 TABLET, DELAYED RELEASE ORAL at 05:42

## 2021-11-03 RX ADMIN — Medication 250 MILLIGRAM(S): at 09:58

## 2021-11-03 RX ADMIN — Medication 2 TABLET(S): at 17:11

## 2021-11-03 RX ADMIN — Medication 975 MILLIGRAM(S): at 01:21

## 2021-11-03 RX ADMIN — CEFEPIME 100 MILLIGRAM(S): 1 INJECTION, POWDER, FOR SOLUTION INTRAMUSCULAR; INTRAVENOUS at 05:43

## 2021-11-03 RX ADMIN — ENOXAPARIN SODIUM 40 MILLIGRAM(S): 100 INJECTION SUBCUTANEOUS at 17:22

## 2021-11-03 RX ADMIN — Medication 500 MILLIGRAM(S): at 11:18

## 2021-11-03 RX ADMIN — POLYETHYLENE GLYCOL 3350 17 GRAM(S): 17 POWDER, FOR SOLUTION ORAL at 11:13

## 2021-11-03 RX ADMIN — Medication 81 MILLIGRAM(S): at 11:14

## 2021-11-03 RX ADMIN — Medication 1 TABLET(S): at 11:14

## 2021-11-03 RX ADMIN — Medication 20 MILLIGRAM(S): at 07:52

## 2021-11-03 RX ADMIN — ATENOLOL 50 MILLIGRAM(S): 25 TABLET ORAL at 05:42

## 2021-11-03 RX ADMIN — Medication 975 MILLIGRAM(S): at 11:13

## 2021-11-03 RX ADMIN — Medication 20 MILLIGRAM(S): at 11:15

## 2021-11-03 RX ADMIN — ATORVASTATIN CALCIUM 20 MILLIGRAM(S): 80 TABLET, FILM COATED ORAL at 21:53

## 2021-11-03 RX ADMIN — Medication 1 APPLICATION(S): at 11:14

## 2021-11-03 RX ADMIN — CEFEPIME 100 MILLIGRAM(S): 1 INJECTION, POWDER, FOR SOLUTION INTRAMUSCULAR; INTRAVENOUS at 17:11

## 2021-11-03 RX ADMIN — Medication 975 MILLIGRAM(S): at 17:11

## 2021-11-03 RX ADMIN — Medication 2 TABLET(S): at 05:41

## 2021-11-03 NOTE — PROGRESS NOTE ADULT - SUBJECTIVE AND OBJECTIVE BOX
Surgery Progress Note    INTERVAl/SUBJECTIVE: No acute event overnight.     Vital Signs Last 24 Hrs  T(C): 36.9 (02 Nov 2021 21:30), Max: 37.2 (02 Nov 2021 05:30)  T(F): 98.5 (02 Nov 2021 21:30), Max: 98.9 (02 Nov 2021 05:30)  HR: 97 (02 Nov 2021 21:30) (82 - 110)  BP: 130/78 (02 Nov 2021 21:30) (122/78 - 146/92)  BP(mean): --  RR: 18 (02 Nov 2021 21:30) (18 - 18)  SpO2: 95% (02 Nov 2021 21:30) (95% - 98%)    Physical Exam:  General:  Neuro:    CV:   Abdomen:     LABS:                        10.7   5.38  )-----------( 226      ( 02 Nov 2021 07:26 )             35.3     11-02    141  |  107  |  9   ----------------------------<  78  3.7   |  22  |  <0.30<L>    Ca    8.9      02 Nov 2021 07:26  Phos  2.0     11-02  Mg     1.7     11-02            INs and OUTs:    11-01-21 @ 07:01  -  11-02-21 @ 07:00  --------------------------------------------------------  IN: 1600 mL / OUT: 1975 mL / NET: -375 mL    11-02-21 @ 07:01  -  11-03-21 @ 00:58  --------------------------------------------------------  IN: 450 mL / OUT: 2150 mL / NET: -1700 mL     Surgery Progress Note    INTERVAl/SUBJECTIVE: No acute event overnight. Comfortably in bed. No complaints of worsening pain or discomfort.     Vital Signs Last 24 Hrs  T(C): 36.9 (02 Nov 2021 21:30), Max: 37.2 (02 Nov 2021 05:30)  T(F): 98.5 (02 Nov 2021 21:30), Max: 98.9 (02 Nov 2021 05:30)  HR: 97 (02 Nov 2021 21:30) (82 - 110)  BP: 130/78 (02 Nov 2021 21:30) (122/78 - 146/92)  BP(mean): --  RR: 18 (02 Nov 2021 21:30) (18 - 18)  SpO2: 95% (02 Nov 2021 21:30) (95% - 98%)    Physical Exam:  left aka stump dressing changed   cellulitis sig improved and post thigh wound w mod granulation tissue     LABS:                        10.7   5.38  )-----------( 226      ( 02 Nov 2021 07:26 )             35.3     11-02    141  |  107  |  9   ----------------------------<  78  3.7   |  22  |  <0.30<L>    Ca    8.9      02 Nov 2021 07:26  Phos  2.0     11-02  Mg     1.7     11-02            INs and OUTs:    11-01-21 @ 07:01  -  11-02-21 @ 07:00  --------------------------------------------------------  IN: 1600 mL / OUT: 1975 mL / NET: -375 mL    11-02-21 @ 07:01  -  11-03-21 @ 00:58  --------------------------------------------------------  IN: 450 mL / OUT: 2150 mL / NET: -1700 mL

## 2021-11-03 NOTE — PROGRESS NOTE ADULT - ASSESSMENT
82 y/o female HTN, MS, hysterectomy, R AKA who presents to the ED with LLE nonhealing wound with wet gangrene now s/p L AKA on 10/21/2021 and resolving lt aka stump cellulitis     Plan: 82 y/o female HTN, MS, hysterectomy, R AKA who presents to the ED with LLE nonhealing wound with wet gangrene now s/p L AKA on 10/21/2021 and resolving lt aka stump cellulitis     Plan:  - Cont wound dressing changes, santyl to posterior thigh wound  - dispo planning dc monday  - DVT prophylaxis  - Home medications  - Pain medication    [id number]

## 2021-11-03 NOTE — PROGRESS NOTE ADULT - SUBJECTIVE AND OBJECTIVE BOX
CARDIOLOGY     PROGRESS  NOTE   ________________________________________________    CHIEF COMPLAINT:Patient is a 81y old  Female who presents with a chief complaint of Left lower extremity wound (03 Nov 2021 00:57)  doing better.  	  REVIEW OF SYSTEMS:  CONSTITUTIONAL: No fever, weight loss, or fatigue  EYES: No eye pain, visual disturbances, or discharge  ENT:  No difficulty hearing, tinnitus, vertigo; No sinus or throat pain  NECK: No pain or stiffness  RESPIRATORY: No cough, wheezing, chills or hemoptysis; No Shortness of Breath  CARDIOVASCULAR: No chest pain, palpitations, passing out, dizziness, or leg swelling  GASTROINTESTINAL: No abdominal or epigastric pain. No nausea, vomiting, or hematemesis; No diarrhea or constipation. No melena or hematochezia.  GENITOURINARY: No dysuria, frequency, hematuria, or incontinence  NEUROLOGICAL: No headaches, memory loss, loss of strength, numbness, or tremors  SKIN: No itching, burning, rashes, or lesions   LYMPH Nodes: No enlarged glands  ENDOCRINE: No heat or cold intolerance; No hair loss  MUSCULOSKELETAL: No joint pain or swelling; No muscle, back, or extremity pain  PSYCHIATRIC: No depression, anxiety, mood swings, or difficulty sleeping  HEME/LYMPH: No easy bruising, or bleeding gums  ALLERGY AND IMMUNOLOGIC: No hives or eczema	    [ ] All others negative	  [ ] Unable to obtain    PHYSICAL EXAM:  T(C): 36.7 (11-03-21 @ 05:04), Max: 37.2 (11-02-21 @ 10:27)  HR: 83 (11-03-21 @ 05:04) (82 - 97)  BP: 154/90 (11-03-21 @ 05:04) (122/78 - 154/90)  RR: 18 (11-03-21 @ 05:04) (18 - 18)  SpO2: 98% (11-03-21 @ 05:04) (95% - 98%)  Wt(kg): --  I&O's Summary    02 Nov 2021 07:01  -  03 Nov 2021 07:00  --------------------------------------------------------  IN: 820 mL / OUT: 3150 mL / NET: -2330 mL        Appearance: Normal	  HEENT:   Normal oral mucosa, PERRL, EOMI	  Lymphatic: No lymphadenopathy  Cardiovascular: Normal S1 S2, No JVD, + murmurs, No edema  Respiratory: Lungs clear to auscultation	  Psychiatry: A & O x 3, Mood & affect appropriate  Gastrointestinal:  Soft, Non-tender, + BS	  Skin: No rashes, No ecchymoses, No cyanosis	  Neurologic: Non-focal  Extremities: Normal range of motion, bl bka  Vascular: Peripheral pulses palpable 2+ bilaterally    MEDICATIONS  (STANDING):  acetaminophen     Tablet .. 975 milliGRAM(s) Oral every 6 hours  ascorbic acid 500 milliGRAM(s) Oral daily  aspirin enteric coated 81 milliGRAM(s) Oral daily  ATENolol  Tablet 50 milliGRAM(s) Oral daily  atorvastatin 20 milliGRAM(s) Oral at bedtime  baclofen 20 milliGRAM(s) Oral four times a day  cefepime   IVPB 1000 milliGRAM(s) IV Intermittent every 12 hours  collagenase Ointment 1 Application(s) Topical daily  enoxaparin Injectable 40 milliGRAM(s) SubCutaneous every 24 hours  lactobacillus acidophilus 1 Tablet(s) Oral daily  multivitamin 1 Tablet(s) Oral daily  pantoprazole    Tablet 40 milliGRAM(s) Oral before breakfast  polyethylene glycol 3350 17 Gram(s) Oral daily  potassium phosphate / sodium phosphate Tablet (K-PHOS No. 2) 2 Tablet(s) Oral two times a day  senna 2 Tablet(s) Oral at bedtime  vancomycin  IVPB 750 milliGRAM(s) IV Intermittent every 12 hours      TELEMETRY: 	    ECG:  	  RADIOLOGY:  OTHER: 	  	  LABS:	 	    CARDIAC MARKERS:                                10.7   4.23  )-----------( 221      ( 03 Nov 2021 07:44 )             35.5     11-03    144  |  110<H>  |  7   ----------------------------<  70  3.9   |  21<L>  |  <0.30<L>    Ca    9.0      03 Nov 2021 07:46  Phos  2.2     11-03  Mg     1.7     11-03      proBNP:   Lipid Profile:   HgA1c:   TSH:   - Cont wound dressing changes, santyl to posterior thigh wound  - dispo planning dc monday  - DVT prophylaxis  - Home medications  - Pain medication      Assessment and plan  ---------------------------  80 y/o female who presents to the ED with chronic Left lower leg wound present since 2018 after an ICU admission for sepsis. She has been followed in the [facility] wound care center by Mounika Smith/Rah/Kishan since 2019. Yesterday, she saw her wound care doctor who contacted Dr. Morris that the wound was getting worse. Presented to ED this morning to get leg evaluated. Denies nausea, vomiting, fever, chills, shortness of breath, rapid heart rate.  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  pt with no cardiac complain, no evidence of chf on exam  s/p bka  doing better  increase bp ?sec to pain and discomfort.  pain meds  continue Lipitor / atenolol  replete K, keep K>4  started on abx   ID eval noted on cefepime and vanco  bp is better controlled  continue current meds  vascular/ ID noted  keep k>4  will increase atenolol to 50 mg daily if bp remains elevated, at this time increase bp sec to pain

## 2021-11-04 LAB
ANION GAP SERPL CALC-SCNC: 14 MMOL/L — SIGNIFICANT CHANGE UP (ref 5–17)
BUN SERPL-MCNC: 10 MG/DL — SIGNIFICANT CHANGE UP (ref 7–23)
CALCIUM SERPL-MCNC: 8.9 MG/DL — SIGNIFICANT CHANGE UP (ref 8.4–10.5)
CHLORIDE SERPL-SCNC: 107 MMOL/L — SIGNIFICANT CHANGE UP (ref 96–108)
CO2 SERPL-SCNC: 23 MMOL/L — SIGNIFICANT CHANGE UP (ref 22–31)
CREAT SERPL-MCNC: <0.3 MG/DL — LOW (ref 0.5–1.3)
CULTURE RESULTS: SIGNIFICANT CHANGE UP
GLUCOSE SERPL-MCNC: 82 MG/DL — SIGNIFICANT CHANGE UP (ref 70–99)
HCT VFR BLD CALC: 34.6 % — SIGNIFICANT CHANGE UP (ref 34.5–45)
HGB BLD-MCNC: 10.5 G/DL — LOW (ref 11.5–15.5)
MAGNESIUM SERPL-MCNC: 1.6 MG/DL — SIGNIFICANT CHANGE UP (ref 1.6–2.6)
MCHC RBC-ENTMCNC: 28.8 PG — SIGNIFICANT CHANGE UP (ref 27–34)
MCHC RBC-ENTMCNC: 30.3 GM/DL — LOW (ref 32–36)
MCV RBC AUTO: 95.1 FL — SIGNIFICANT CHANGE UP (ref 80–100)
NRBC # BLD: 0 /100 WBCS — SIGNIFICANT CHANGE UP (ref 0–0)
PHOSPHATE SERPL-MCNC: 2 MG/DL — LOW (ref 2.5–4.5)
PLATELET # BLD AUTO: 214 K/UL — SIGNIFICANT CHANGE UP (ref 150–400)
POTASSIUM SERPL-MCNC: 3.2 MMOL/L — LOW (ref 3.5–5.3)
POTASSIUM SERPL-SCNC: 3.2 MMOL/L — LOW (ref 3.5–5.3)
RBC # BLD: 3.64 M/UL — LOW (ref 3.8–5.2)
RBC # FLD: 18 % — HIGH (ref 10.3–14.5)
SODIUM SERPL-SCNC: 144 MMOL/L — SIGNIFICANT CHANGE UP (ref 135–145)
SPECIMEN SOURCE: SIGNIFICANT CHANGE UP
VANCOMYCIN TROUGH SERPL-MCNC: 10.4 UG/ML — SIGNIFICANT CHANGE UP (ref 10–20)
WBC # BLD: 4.98 K/UL — SIGNIFICANT CHANGE UP (ref 3.8–10.5)
WBC # FLD AUTO: 4.98 K/UL — SIGNIFICANT CHANGE UP (ref 3.8–10.5)

## 2021-11-04 RX ORDER — POTASSIUM PHOSPHATE, MONOBASIC POTASSIUM PHOSPHATE, DIBASIC 236; 224 MG/ML; MG/ML
30 INJECTION, SOLUTION INTRAVENOUS ONCE
Refills: 0 | Status: COMPLETED | OUTPATIENT
Start: 2021-11-04 | End: 2021-11-04

## 2021-11-04 RX ORDER — OMEPRAZOLE 10 MG/1
1 CAPSULE, DELAYED RELEASE ORAL
Qty: 0 | Refills: 0 | DISCHARGE

## 2021-11-04 RX ORDER — ASCORBIC ACID 60 MG
1 TABLET,CHEWABLE ORAL
Qty: 0 | Refills: 0 | DISCHARGE
Start: 2021-11-04

## 2021-11-04 RX ORDER — POTASSIUM CHLORIDE 20 MEQ
40 PACKET (EA) ORAL DAILY
Refills: 0 | Status: DISCONTINUED | OUTPATIENT
Start: 2021-11-04 | End: 2021-11-06

## 2021-11-04 RX ORDER — PANTOPRAZOLE SODIUM 20 MG/1
1 TABLET, DELAYED RELEASE ORAL
Qty: 0 | Refills: 0 | DISCHARGE
Start: 2021-11-04

## 2021-11-04 RX ADMIN — Medication 20 MILLIGRAM(S): at 22:38

## 2021-11-04 RX ADMIN — Medication 975 MILLIGRAM(S): at 06:24

## 2021-11-04 RX ADMIN — Medication 975 MILLIGRAM(S): at 13:38

## 2021-11-04 RX ADMIN — SENNA PLUS 2 TABLET(S): 8.6 TABLET ORAL at 22:38

## 2021-11-04 RX ADMIN — Medication 20 MILLIGRAM(S): at 13:40

## 2021-11-04 RX ADMIN — Medication 250 MILLIGRAM(S): at 01:08

## 2021-11-04 RX ADMIN — Medication 1 MILLIGRAM(S): at 06:30

## 2021-11-04 RX ADMIN — Medication 2 TABLET(S): at 19:14

## 2021-11-04 RX ADMIN — Medication 975 MILLIGRAM(S): at 17:59

## 2021-11-04 RX ADMIN — CEFEPIME 100 MILLIGRAM(S): 1 INJECTION, POWDER, FOR SOLUTION INTRAMUSCULAR; INTRAVENOUS at 06:22

## 2021-11-04 RX ADMIN — Medication 250 MILLIGRAM(S): at 13:40

## 2021-11-04 RX ADMIN — ATENOLOL 50 MILLIGRAM(S): 25 TABLET ORAL at 06:23

## 2021-11-04 RX ADMIN — Medication 1 TABLET(S): at 13:39

## 2021-11-04 RX ADMIN — Medication 20 MILLIGRAM(S): at 17:59

## 2021-11-04 RX ADMIN — Medication 500 MILLIGRAM(S): at 13:40

## 2021-11-04 RX ADMIN — Medication 81 MILLIGRAM(S): at 13:39

## 2021-11-04 RX ADMIN — Medication 40 MILLIEQUIVALENT(S): at 10:59

## 2021-11-04 RX ADMIN — PANTOPRAZOLE SODIUM 40 MILLIGRAM(S): 20 TABLET, DELAYED RELEASE ORAL at 06:24

## 2021-11-04 RX ADMIN — Medication 1 APPLICATION(S): at 16:17

## 2021-11-04 RX ADMIN — ENOXAPARIN SODIUM 40 MILLIGRAM(S): 100 INJECTION SUBCUTANEOUS at 17:58

## 2021-11-04 RX ADMIN — Medication 1 TABLET(S): at 13:40

## 2021-11-04 RX ADMIN — Medication 20 MILLIGRAM(S): at 09:13

## 2021-11-04 RX ADMIN — POTASSIUM PHOSPHATE, MONOBASIC POTASSIUM PHOSPHATE, DIBASIC 83.33 MILLIMOLE(S): 236; 224 INJECTION, SOLUTION INTRAVENOUS at 10:59

## 2021-11-04 RX ADMIN — Medication 2 TABLET(S): at 06:24

## 2021-11-04 RX ADMIN — ATORVASTATIN CALCIUM 20 MILLIGRAM(S): 80 TABLET, FILM COATED ORAL at 22:42

## 2021-11-04 RX ADMIN — CEFEPIME 100 MILLIGRAM(S): 1 INJECTION, POWDER, FOR SOLUTION INTRAMUSCULAR; INTRAVENOUS at 17:59

## 2021-11-04 NOTE — PROGRESS NOTE ADULT - ASSESSMENT
80 y/o female HTN, MS, hysterectomy, R AKA who presents to the ED with LLE nonhealing wound with wet gangrene now s/p L AKA on 10/21/2021 and resolving lt aka stump cellulitis     Plan:  82 y/o female HTN, MS, hysterectomy, R AKA who presents to the ED with LLE nonhealing wound with wet gangrene now s/p L AKA on 10/21/2021 and resolving lt aka stump cellulitis.    Plan:  - Cont wound dressing changes, santyl to posterior thigh wound  - DVT prophylaxis  - Continue on home medications  - Pain medication as needed  - Dispo: Home MONDAY, aids to be reinstated for then    [id number]  Vascular Surgery   80 y/o female HTN, MS, hysterectomy, R AKA who presents to the ED with LLE nonhealing wound with wet gangrene now s/p L AKA on 10/21/2021 and resolving lt aka stump cellulitis.    Plan:  - Cont wound dressing changes, santyl to posterior thigh wound  - DVT prophylaxis  - Continue on home medications  - Pain medication as needed  - Dispo: Home MONDAY, aids to be reinstated for then. Augmentin x7 upon discharge     [id number]  Vascular Surgery

## 2021-11-04 NOTE — PROGRESS NOTE ADULT - SUBJECTIVE AND OBJECTIVE BOX
Surgery Progress Note    INTERVAl/SUBJECTIVE: No acute event overnight.     Vital Signs Last 24 Hrs  T(C): 36.8 (04 Nov 2021 01:05), Max: 36.9 (03 Nov 2021 14:31)  T(F): 98.3 (04 Nov 2021 01:05), Max: 98.4 (03 Nov 2021 14:31)  HR: 78 (04 Nov 2021 01:05) (72 - 89)  BP: 137/81 (04 Nov 2021 01:05) (105/67 - 154/90)  BP(mean): --  RR: 18 (04 Nov 2021 01:05) (16 - 18)  SpO2: 96% (04 Nov 2021 01:05) (93% - 98%)    Physical Exam:  General:  Neuro:    CV:   Abdomen:     LABS:                        10.7   4.23  )-----------( 221      ( 03 Nov 2021 07:44 )             35.5     11-03    144  |  110<H>  |  7   ----------------------------<  70  3.9   |  21<L>  |  <0.30<L>    Ca    9.0      03 Nov 2021 07:46  Phos  2.2     11-03  Mg     1.7     11-03            INs and OUTs:    11-02-21 @ 07:01  -  11-03-21 @ 07:00  --------------------------------------------------------  IN: 820 mL / OUT: 3150 mL / NET: -2330 mL    11-03-21 @ 07:01  -  11-04-21 @ 02:34  --------------------------------------------------------  IN: 860 mL / OUT: 1250 mL / NET: -390 mL     Vascular Surgery Progress Note    INTERVAL/SUBJECTIVE: No acute events overnight. Comfortably in bed. No complaints of worsening pain or discomfort.     Vital Signs Last 24 Hrs  T(C): 36.8 (04 Nov 2021 01:05), Max: 36.9 (03 Nov 2021 14:31)  T(F): 98.3 (04 Nov 2021 01:05), Max: 98.4 (03 Nov 2021 14:31)  HR: 78 (04 Nov 2021 01:05) (72 - 89)  BP: 137/81 (04 Nov 2021 01:05) (105/67 - 154/90)  BP(mean): --  RR: 18 (04 Nov 2021 01:05) (16 - 18)  SpO2: 96% (04 Nov 2021 01:05) (93% - 98%)    Physical Exam:  Gen: A&Ox3, NAD  Resp: unlabored breathing noted  Ext: Left aka stump dressing changed   cellulitis sig improved and post thigh wound w mod granulation tissue     LABS:                        10.7   4.23  )-----------( 221      ( 03 Nov 2021 07:44 )             35.5     11-03    144  |  110<H>  |  7   ----------------------------<  70  3.9   |  21<L>  |  <0.30<L>    Ca    9.0      03 Nov 2021 07:46  Phos  2.2     11-03  Mg     1.7     11-03    INs and OUTs:  11-02-21 @ 07:01  -  11-03-21 @ 07:00  --------------------------------------------------------  IN: 820 mL / OUT: 3150 mL / NET: -2330 mL    11-03-21 @ 07:01  -  11-04-21 @ 02:34  --------------------------------------------------------  IN: 860 mL / OUT: 1250 mL / NET: -390 mL     Vascular Surgery Progress Note    INTERVAL/SUBJECTIVE: No acute events overnight. Comfortably in bed. No complaints of worsening pain or discomfort.     Vital Signs Last 24 Hrs  T(C): 36.8 (04 Nov 2021 01:05), Max: 36.9 (03 Nov 2021 14:31)  T(F): 98.3 (04 Nov 2021 01:05), Max: 98.4 (03 Nov 2021 14:31)  HR: 78 (04 Nov 2021 01:05) (72 - 89)  BP: 137/81 (04 Nov 2021 01:05) (105/67 - 154/90)  BP(mean): --  RR: 18 (04 Nov 2021 01:05) (16 - 18)  SpO2: 96% (04 Nov 2021 01:05) (93% - 98%)    Physical Exam:  Gen: A&Ox3, NAD  Resp: unlabored breathing noted  Ext: Left aka stump dressing changed   cellulitis sig improved and post thigh wound w mod granulation tissue, adaptic placed    LABS:                        10.7   4.23  )-----------( 221      ( 03 Nov 2021 07:44 )             35.5     11-03    144  |  110<H>  |  7   ----------------------------<  70  3.9   |  21<L>  |  <0.30<L>    Ca    9.0      03 Nov 2021 07:46  Phos  2.2     11-03  Mg     1.7     11-03    INs and OUTs:  11-02-21 @ 07:01  -  11-03-21 @ 07:00  --------------------------------------------------------  IN: 820 mL / OUT: 3150 mL / NET: -2330 mL    11-03-21 @ 07:01  -  11-04-21 @ 02:34  --------------------------------------------------------  IN: 860 mL / OUT: 1250 mL / NET: -390 mL

## 2021-11-04 NOTE — PROGRESS NOTE ADULT - SUBJECTIVE AND OBJECTIVE BOX
CARDIOLOGY     PROGRESS  NOTE   ________________________________________________    CHIEF COMPLAINT:Patient is a 81y old  Female who presents with a chief complaint of Left lower extremity wound (04 Nov 2021 02:34)  no complain.  	  REVIEW OF SYSTEMS:  CONSTITUTIONAL: No fever, weight loss, or fatigue  EYES: No eye pain, visual disturbances, or discharge  ENT:  No difficulty hearing, tinnitus, vertigo; No sinus or throat pain  NECK: No pain or stiffness  RESPIRATORY: No cough, wheezing, chills or hemoptysis; No Shortness of Breath  CARDIOVASCULAR: No chest pain, palpitations, passing out, dizziness, or leg swelling  GASTROINTESTINAL: No abdominal or epigastric pain. No nausea, vomiting, or hematemesis; No diarrhea or constipation. No melena or hematochezia.  GENITOURINARY: No dysuria, frequency, hematuria, or incontinence  NEUROLOGICAL: No headaches, memory loss, loss of strength, numbness, or tremors  SKIN: No itching, burning, rashes, or lesions   LYMPH Nodes: No enlarged glands  ENDOCRINE: No heat or cold intolerance; No hair loss  MUSCULOSKELETAL: No joint pain or swelling; No muscle, back, or extremity pain  PSYCHIATRIC: No depression, anxiety, mood swings, or difficulty sleeping  HEME/LYMPH: No easy bruising, or bleeding gums  ALLERGY AND IMMUNOLOGIC: No hives or eczema	    [ ] All others negative	  [ ] Unable to obtain    PHYSICAL EXAM:  T(C): 36.8 (11-04-21 @ 05:01), Max: 36.9 (11-03-21 @ 14:31)  HR: 83 (11-04-21 @ 05:01) (72 - 89)  BP: 145/85 (11-04-21 @ 05:01) (105/67 - 145/85)  RR: 18 (11-04-21 @ 05:01) (16 - 18)  SpO2: 96% (11-04-21 @ 05:01) (93% - 96%)  Wt(kg): --  I&O's Summary    03 Nov 2021 07:01  -  04 Nov 2021 07:00  --------------------------------------------------------  IN: 940 mL / OUT: 1500 mL / NET: -560 mL        Appearance: Normal	  HEENT:   Normal oral mucosa, PERRL, EOMI	  Lymphatic: No lymphadenopathy  Cardiovascular: Normal S1 S2, No JVD, + murmurs, No edema  Respiratory: Lungs clear to auscultation	  Psychiatry: A & O x 3, Mood & affect appropriate  Gastrointestinal:  Soft, Non-tender, + BS	  Skin: No rashes, No ecchymoses, No cyanosis	  Neurologic: Non-focal  Extremities: Normal range of motion, bl bka  Vascular: Peripheral pulses palpable 2+ bilaterally    MEDICATIONS  (STANDING):  acetaminophen     Tablet .. 975 milliGRAM(s) Oral every 6 hours  ascorbic acid 500 milliGRAM(s) Oral daily  aspirin enteric coated 81 milliGRAM(s) Oral daily  ATENolol  Tablet 50 milliGRAM(s) Oral daily  atorvastatin 20 milliGRAM(s) Oral at bedtime  baclofen 20 milliGRAM(s) Oral four times a day  cefepime   IVPB 1000 milliGRAM(s) IV Intermittent every 12 hours  collagenase Ointment 1 Application(s) Topical daily  enoxaparin Injectable 40 milliGRAM(s) SubCutaneous every 24 hours  lactobacillus acidophilus 1 Tablet(s) Oral daily  multivitamin 1 Tablet(s) Oral daily  pantoprazole    Tablet 40 milliGRAM(s) Oral before breakfast  polyethylene glycol 3350 17 Gram(s) Oral daily  potassium phosphate / sodium phosphate Tablet (K-PHOS No. 2) 2 Tablet(s) Oral two times a day  senna 2 Tablet(s) Oral at bedtime  vancomycin  IVPB 750 milliGRAM(s) IV Intermittent every 12 hours      TELEMETRY: 	    ECG:  	  RADIOLOGY:  OTHER: 	  	  LABS:	 	    CARDIAC MARKERS:                                10.5   4.98  )-----------( 214      ( 04 Nov 2021 07:39 )             34.6     11-04    144  |  107  |  10  ----------------------------<  82  3.2<L>   |  23  |  <0.30<L>    Ca    8.9      04 Nov 2021 07:39  Phos  2.0     11-04  Mg     1.6     11-04      proBNP:   Lipid Profile:   HgA1c:   TSH:         Assessment and plan  ---------------------------  82 y/o female who presents to the ED with chronic Left lower leg wound present since 2018 after an ICU admission for sepsis. She has been followed in the [facility] wound care center by Mounika Smith/Rah/Kishan since 2019. Yesterday, she saw her wound care doctor who contacted Dr. Morris that the wound was getting worse. Presented to ED this morning to get leg evaluated. Denies nausea, vomiting, fever, chills, shortness of breath, rapid heart rate.  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  pt with no cardiac complain, no evidence of chf on exam  s/p bka  doing better  increase bp ?sec to pain and discomfort.  pain meds  continue Lipitor / atenolol  replete K, keep K>4  started on abx   ID eval noted on cefepime and vanco  bp is better controlled  continue current meds  vascular/ ID noted  keep k>4  will increase atenolol to 50 mg daily if bp remains elevated, at this time increase bp sec to pain

## 2021-11-05 LAB
ANION GAP SERPL CALC-SCNC: 11 MMOL/L — SIGNIFICANT CHANGE UP (ref 5–17)
BUN SERPL-MCNC: 10 MG/DL — SIGNIFICANT CHANGE UP (ref 7–23)
CALCIUM SERPL-MCNC: 8.8 MG/DL — SIGNIFICANT CHANGE UP (ref 8.4–10.5)
CHLORIDE SERPL-SCNC: 109 MMOL/L — HIGH (ref 96–108)
CO2 SERPL-SCNC: 22 MMOL/L — SIGNIFICANT CHANGE UP (ref 22–31)
CREAT SERPL-MCNC: <0.3 MG/DL — LOW (ref 0.5–1.3)
GLUCOSE SERPL-MCNC: 87 MG/DL — SIGNIFICANT CHANGE UP (ref 70–99)
HCT VFR BLD CALC: 32.9 % — LOW (ref 34.5–45)
HGB BLD-MCNC: 9.9 G/DL — LOW (ref 11.5–15.5)
MAGNESIUM SERPL-MCNC: 1.7 MG/DL — SIGNIFICANT CHANGE UP (ref 1.6–2.6)
MCHC RBC-ENTMCNC: 28.6 PG — SIGNIFICANT CHANGE UP (ref 27–34)
MCHC RBC-ENTMCNC: 30.1 GM/DL — LOW (ref 32–36)
MCV RBC AUTO: 95.1 FL — SIGNIFICANT CHANGE UP (ref 80–100)
NRBC # BLD: 0 /100 WBCS — SIGNIFICANT CHANGE UP (ref 0–0)
PHOSPHATE SERPL-MCNC: 2.3 MG/DL — LOW (ref 2.5–4.5)
PLATELET # BLD AUTO: 185 K/UL — SIGNIFICANT CHANGE UP (ref 150–400)
POTASSIUM SERPL-MCNC: 3.7 MMOL/L — SIGNIFICANT CHANGE UP (ref 3.5–5.3)
POTASSIUM SERPL-SCNC: 3.7 MMOL/L — SIGNIFICANT CHANGE UP (ref 3.5–5.3)
RBC # BLD: 3.46 M/UL — LOW (ref 3.8–5.2)
RBC # FLD: 18.1 % — HIGH (ref 10.3–14.5)
SODIUM SERPL-SCNC: 142 MMOL/L — SIGNIFICANT CHANGE UP (ref 135–145)
VANCOMYCIN TROUGH SERPL-MCNC: 8.8 UG/ML — LOW (ref 10–20)
WBC # BLD: 4.5 K/UL — SIGNIFICANT CHANGE UP (ref 3.8–10.5)
WBC # FLD AUTO: 4.5 K/UL — SIGNIFICANT CHANGE UP (ref 3.8–10.5)

## 2021-11-05 RX ORDER — SODIUM,POTASSIUM PHOSPHATES 278-250MG
1 POWDER IN PACKET (EA) ORAL THREE TIMES A DAY
Refills: 0 | Status: COMPLETED | OUTPATIENT
Start: 2021-11-05 | End: 2021-11-06

## 2021-11-05 RX ADMIN — Medication 250 MILLIGRAM(S): at 16:49

## 2021-11-05 RX ADMIN — Medication 81 MILLIGRAM(S): at 13:49

## 2021-11-05 RX ADMIN — Medication 2 TABLET(S): at 05:39

## 2021-11-05 RX ADMIN — Medication 975 MILLIGRAM(S): at 00:00

## 2021-11-05 RX ADMIN — Medication 975 MILLIGRAM(S): at 13:45

## 2021-11-05 RX ADMIN — Medication 20 MILLIGRAM(S): at 13:46

## 2021-11-05 RX ADMIN — Medication 1 MILLIGRAM(S): at 08:21

## 2021-11-05 RX ADMIN — ATORVASTATIN CALCIUM 20 MILLIGRAM(S): 80 TABLET, FILM COATED ORAL at 22:14

## 2021-11-05 RX ADMIN — ENOXAPARIN SODIUM 40 MILLIGRAM(S): 100 INJECTION SUBCUTANEOUS at 18:13

## 2021-11-05 RX ADMIN — Medication 975 MILLIGRAM(S): at 18:15

## 2021-11-05 RX ADMIN — PANTOPRAZOLE SODIUM 40 MILLIGRAM(S): 20 TABLET, DELAYED RELEASE ORAL at 05:40

## 2021-11-05 RX ADMIN — CEFEPIME 100 MILLIGRAM(S): 1 INJECTION, POWDER, FOR SOLUTION INTRAMUSCULAR; INTRAVENOUS at 05:33

## 2021-11-05 RX ADMIN — ATENOLOL 50 MILLIGRAM(S): 25 TABLET ORAL at 05:40

## 2021-11-05 RX ADMIN — Medication 20 MILLIGRAM(S): at 22:15

## 2021-11-05 RX ADMIN — Medication 1 PACKET(S): at 14:23

## 2021-11-05 RX ADMIN — Medication 1 TABLET(S): at 13:49

## 2021-11-05 RX ADMIN — Medication 975 MILLIGRAM(S): at 05:40

## 2021-11-05 RX ADMIN — Medication 2 TABLET(S): at 18:14

## 2021-11-05 RX ADMIN — Medication 1 PACKET(S): at 22:19

## 2021-11-05 RX ADMIN — CEFEPIME 100 MILLIGRAM(S): 1 INJECTION, POWDER, FOR SOLUTION INTRAMUSCULAR; INTRAVENOUS at 18:15

## 2021-11-05 RX ADMIN — Medication 40 MILLIEQUIVALENT(S): at 13:53

## 2021-11-05 RX ADMIN — Medication 20 MILLIGRAM(S): at 08:19

## 2021-11-05 RX ADMIN — Medication 250 MILLIGRAM(S): at 00:00

## 2021-11-05 RX ADMIN — Medication 500 MILLIGRAM(S): at 13:50

## 2021-11-05 RX ADMIN — Medication 20 MILLIGRAM(S): at 18:14

## 2021-11-05 NOTE — PROGRESS NOTE ADULT - SUBJECTIVE AND OBJECTIVE BOX
CARDIOLOGY     PROGRESS  NOTE   ________________________________________________    CHIEF COMPLAINT:Patient is a 81y old  Female who presents with a chief complaint of Left lower extremity wound (05 Nov 2021 03:27)  no complain.  	  REVIEW OF SYSTEMS:  CONSTITUTIONAL: No fever, weight loss, or fatigue  EYES: No eye pain, visual disturbances, or discharge  ENT:  No difficulty hearing, tinnitus, vertigo; No sinus or throat pain  NECK: No pain or stiffness  RESPIRATORY: No cough, wheezing, chills or hemoptysis; No Shortness of Breath  CARDIOVASCULAR: No chest pain, palpitations, passing out, dizziness, or leg swelling  GASTROINTESTINAL: No abdominal or epigastric pain. No nausea, vomiting, or hematemesis; No diarrhea or constipation. No melena or hematochezia.  GENITOURINARY: No dysuria, frequency, hematuria, or incontinence  NEUROLOGICAL: No headaches, memory loss, loss of strength, numbness, or tremors  SKIN: No itching, burning, rashes, or lesions   LYMPH Nodes: No enlarged glands  ENDOCRINE: No heat or cold intolerance; No hair loss  MUSCULOSKELETAL: No joint pain or swelling; No muscle, back, or extremity pain  PSYCHIATRIC: No depression, anxiety, mood swings, or difficulty sleeping  HEME/LYMPH: No easy bruising, or bleeding gums  ALLERGY AND IMMUNOLOGIC: No hives or eczema	    [ ] All others negative	  [ ] Unable to obtain    PHYSICAL EXAM:  T(C): 36.9 (11-05-21 @ 09:54), Max: 37.3 (11-05-21 @ 05:44)  HR: 78 (11-05-21 @ 09:54) (65 - 91)  BP: 120/72 (11-05-21 @ 09:54) (96/60 - 133/82)  RR: 18 (11-05-21 @ 09:54) (18 - 18)  SpO2: 94% (11-05-21 @ 09:54) (94% - 96%)  Wt(kg): --  I&O's Summary    04 Nov 2021 07:01  -  05 Nov 2021 07:00  --------------------------------------------------------  IN: 1620 mL / OUT: 2025 mL / NET: -405 mL    05 Nov 2021 07:01  -  05 Nov 2021 10:00  --------------------------------------------------------  IN: 200 mL / OUT: 400 mL / NET: -200 mL        Appearance: Normal	  HEENT:   Normal oral mucosa, PERRL, EOMI	  Lymphatic: No lymphadenopathy  Cardiovascular: Normal S1 S2, No JVD, + murmurs, No edema  Respiratory: Lungs clear to auscultation	  Psychiatry: A & O x 3, Mood & affect appropriate  Gastrointestinal:  Soft, Non-tender, + BS	  Skin: No rashes, No ecchymoses, No cyanosis	  Neurologic: Non-focal  Extremities: Normal range of motion, bl bka  Vascular: Peripheral pulses palpable 2+ bilaterally    MEDICATIONS  (STANDING):  acetaminophen     Tablet .. 975 milliGRAM(s) Oral every 6 hours  ascorbic acid 500 milliGRAM(s) Oral daily  aspirin enteric coated 81 milliGRAM(s) Oral daily  ATENolol  Tablet 50 milliGRAM(s) Oral daily  atorvastatin 20 milliGRAM(s) Oral at bedtime  baclofen 20 milliGRAM(s) Oral four times a day  cefepime   IVPB 1000 milliGRAM(s) IV Intermittent every 12 hours  collagenase Ointment 1 Application(s) Topical daily  enoxaparin Injectable 40 milliGRAM(s) SubCutaneous every 24 hours  lactobacillus acidophilus 1 Tablet(s) Oral daily  multivitamin 1 Tablet(s) Oral daily  pantoprazole    Tablet 40 milliGRAM(s) Oral before breakfast  polyethylene glycol 3350 17 Gram(s) Oral daily  potassium chloride    Tablet ER 40 milliEquivalent(s) Oral daily  potassium phosphate / sodium phosphate Powder (PHOS-NaK) 1 Packet(s) Oral three times a day  potassium phosphate / sodium phosphate Tablet (K-PHOS No. 2) 2 Tablet(s) Oral two times a day  senna 2 Tablet(s) Oral at bedtime  vancomycin  IVPB 750 milliGRAM(s) IV Intermittent every 12 hours      TELEMETRY: 	    ECG:  	  RADIOLOGY:  OTHER: 	  	  LABS:	 	    CARDIAC MARKERS:                                9.9    4.50  )-----------( 185      ( 05 Nov 2021 07:51 )             32.9     11-05    142  |  109<H>  |  10  ----------------------------<  87  3.7   |  22  |  <0.30<L>    Ca    8.8      05 Nov 2021 07:26  Phos  2.3     11-05  Mg     1.7     11-05      proBNP:   Lipid Profile:   HgA1c:   TSH:     - Cont wound dressing changes, santyl to posterior thigh wound  - DVT prophylaxis  - Continue on home medications  - Pain medication as needed  - Dispo: Home MONDAY, aids to be reinstated for then. Augmentin x7 upon discharge     Assessment and plan  ---------------------------  80 y/o female who presents to the ED with chronic Left lower leg wound present since 2018 after an ICU admission for sepsis. She has been followed in the [facility] wound care center by Mounika Smith/Rah/Kishan since 2019. Yesterday, she saw her wound care doctor who contacted Dr. Morris that the wound was getting worse. Presented to ED this morning to get leg evaluated. Denies nausea, vomiting, fever, chills, shortness of breath, rapid heart rate.  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  pt with no cardiac complain, no evidence of chf on exam  s/p bka  doing better  increase bp ?sec to pain and discomfort.  pain meds  continue Lipitor / atenolol  replete K, keep K>4  started on abx   ID eval noted on cefepime and vanco  bp is better controlled  continue current meds  vascular/ ID noted  keep k>4  will increase atenolol to 50 mg daily if bp remains elevated, at this time increase bp sec to pain  dc planning as per vascular

## 2021-11-05 NOTE — PROGRESS NOTE ADULT - SUBJECTIVE AND OBJECTIVE BOX
Surgery Progress Note    INTERVAl/SUBJECTIVE: No acute event overnight.     Vital Signs Last 24 Hrs  T(C): 37 (05 Nov 2021 01:08), Max: 37.1 (04 Nov 2021 14:03)  T(F): 98.6 (05 Nov 2021 01:08), Max: 98.8 (04 Nov 2021 14:03)  HR: 84 (05 Nov 2021 01:08) (65 - 91)  BP: 123/73 (05 Nov 2021 01:08) (96/60 - 145/85)  BP(mean): --  RR: 18 (05 Nov 2021 01:08) (18 - 18)  SpO2: 95% (05 Nov 2021 01:08) (95% - 96%)    Physical Exam:  General:  Neuro:    CV:   Abdomen:     LABS:                        10.5   4.98  )-----------( 214      ( 04 Nov 2021 07:39 )             34.6     11-04    144  |  107  |  10  ----------------------------<  82  3.2<L>   |  23  |  <0.30<L>    Ca    8.9      04 Nov 2021 07:39  Phos  2.0     11-04  Mg     1.6     11-04            INs and OUTs:    11-03-21 @ 07:01  -  11-04-21 @ 07:00  --------------------------------------------------------  IN: 940 mL / OUT: 1500 mL / NET: -560 mL    11-04-21 @ 07:01  -  11-05-21 @ 03:27  --------------------------------------------------------  IN: 1450 mL / OUT: 1825 mL / NET: -375 mL     Surgery Progress Note    INTERVAl/SUBJECTIVE: No acute event overnight. Patient seen and examined by surgical team.     Vital Signs Last 24 Hrs  T(C): 37 (05 Nov 2021 01:08), Max: 37.1 (04 Nov 2021 14:03)  T(F): 98.6 (05 Nov 2021 01:08), Max: 98.8 (04 Nov 2021 14:03)  HR: 84 (05 Nov 2021 01:08) (65 - 91)  BP: 123/73 (05 Nov 2021 01:08) (96/60 - 145/85)  BP(mean): --  RR: 18 (05 Nov 2021 01:08) (18 - 18)  SpO2: 95% (05 Nov 2021 01:08) (95% - 96%)    Physical Exam:  Gen: A&Ox3, NAD  Resp: unlabored breathing noted  Ext: Left aka stump dressing changed   cellulitis sig improved and post thigh wound w mod granulation tissue, adaptic placed    LABS:                        10.5   4.98  )-----------( 214      ( 04 Nov 2021 07:39 )             34.6     11-04    144  |  107  |  10  ----------------------------<  82  3.2<L>   |  23  |  <0.30<L>    Ca    8.9      04 Nov 2021 07:39  Phos  2.0     11-04  Mg     1.6     11-04            INs and OUTs:    11-03-21 @ 07:01  -  11-04-21 @ 07:00  --------------------------------------------------------  IN: 940 mL / OUT: 1500 mL / NET: -560 mL    11-04-21 @ 07:01  -  11-05-21 @ 03:27  --------------------------------------------------------  IN: 1450 mL / OUT: 1825 mL / NET: -375 mL

## 2021-11-05 NOTE — PROGRESS NOTE ADULT - ASSESSMENT
80 y/o female HTN, MS, hysterectomy, R AKA who presents to the ED with LLE nonhealing wound with wet gangrene now s/p L AKA on 10/21/2021 and resolving lt aka stump cellulitis.    Plan:  - Cont wound dressing changes, santyl to posterior thigh wound  - DVT prophylaxis  - Continue on home medications  - Pain medication as needed  - Dispo: Home MONDAY, aids to be reinstated for then. Augmentin x7 upon discharge     [id number]  Vascular Surgery

## 2021-11-05 NOTE — CHART NOTE - NSCHARTNOTEFT_GEN_A_CORE
Nutrition Follow Up Note  Patient seen for: nutrition follow-up    Chart reviewed, events noted. This is a "82 y/o female HTN, MS, hysterectomy, R AKA who presents to the ED with LLE nonhealing wound with wet gangrene now s/p L AKA on 10/21/2021 and resolving lt aka stump cellulitis."    Source: [x] Patient       [x] EMR        [] RN        [x] Family at bedside --spouse      [] Other:    -If unable to interview patient: [] Trach/Vent/BiPAP  [] Disoriented/confused/inappropriate to interview    Diet Order:   Diet, Regular:   Eder(7 Gm Arginine/7 Gm Glut/1.2 Gm HMB     Qty per Day:  2 (10-22-21)  Mighty Shakes BID     - Is current order appropriate/adequate? [x] Yes  []  No:     - PO intake :   [] >75%  Adequate    [x] 50-75%  Fair       [] <50%  Poor    - Nutrition-related concerns: Pt reports fair to good PO intake, observed lunch tray with ~ 75% of meal consumed. Obtained food preferences, will honor as able. Encourage intake of protein rich foods and Eder supplement and Mighty Shakes BID to optimize PO intake. Patient with no nutrition-related questions at this time. Made aware RD remains available as needed.     GI:  Last BM _11/3__.   Bowel Regimen? [] Yes   [x] No      Weights:    119.9 lbs (10/21--s/p AKA  131lbs (11-03)--? accuracy    Nutritionally Pertinent MEDICATIONS  (STANDING):  ascorbic acid  ATENolol  Tablet  atorvastatin  cefepime   IVPB  multivitamin  pantoprazole    Tablet  polyethylene glycol 3350  potassium chloride    Tablet ER  potassium phosphate / sodium phosphate Powder (PHOS-NaK)  potassium phosphate / sodium phosphate Tablet (K-PHOS No. 2)  senna  vancomycin  IVPB    Pertinent Labs: 11-05 @ 07:26: Na 142, BUN 10, Cr <0.30<L>, BG 87, K+ 3.7, Phos 2.3<L>, Mg 1.7, Alk Phos --, ALT/SGPT --, AST/SGOT --, HbA1c --    A1C with Estimated Average Glucose Result: 5.5 % (10-22-21 @ 04:15)    Skin per nursing documentation: left AKA, stage 3 sacrum left thigh unstageable, right upper buttocks stage 2   Edema: +1 generalized     Estimated Needs:   [x] no change since previous assessment  [] recalculated:     Previous Nutrition Diagnosis: Increased Nutrient Needs protein, micronutrients.   Nutrition Diagnosis is: [x] ongoing  [] resolved [] not applicable     New Nutrition Diagnosis: [x] Not applicable    Nutrition Care Plan:  [x] In Progress  [] Achieved  [] Not applicable    Nutrition Interventions:     Education Provided:       [] Yes:  [x] No:        Recommendations:         [x] Continue current diet order     [x] Encourage intake of protein-rich foods      [x] Continue Eder 2x daily + Mighty Shakes BID     [x]  Continue multivitamin, ascorbic acid Add oral nutrition supplement:     [x] RD to remain available and follow-up as medically appropriate.    Monitoring and Evaluation:   Continue to monitor nutritional intake, tolerance to diet prescription, weights, labs, skin integrity      RD remains available upon request and will follow up per protocol  Deepthi Lauren RD, CDN, Pager # [phone]

## 2021-11-06 LAB
ANION GAP SERPL CALC-SCNC: 13 MMOL/L — SIGNIFICANT CHANGE UP (ref 5–17)
BUN SERPL-MCNC: 14 MG/DL — SIGNIFICANT CHANGE UP (ref 7–23)
CALCIUM SERPL-MCNC: 9 MG/DL — SIGNIFICANT CHANGE UP (ref 8.4–10.5)
CHLORIDE SERPL-SCNC: 109 MMOL/L — HIGH (ref 96–108)
CO2 SERPL-SCNC: 22 MMOL/L — SIGNIFICANT CHANGE UP (ref 22–31)
CREAT SERPL-MCNC: <0.3 MG/DL — LOW (ref 0.5–1.3)
GLUCOSE SERPL-MCNC: 109 MG/DL — HIGH (ref 70–99)
HCT VFR BLD CALC: 35.5 % — SIGNIFICANT CHANGE UP (ref 34.5–45)
HGB BLD-MCNC: 10.7 G/DL — LOW (ref 11.5–15.5)
MAGNESIUM SERPL-MCNC: 1.5 MG/DL — LOW (ref 1.6–2.6)
MCHC RBC-ENTMCNC: 29 PG — SIGNIFICANT CHANGE UP (ref 27–34)
MCHC RBC-ENTMCNC: 30.1 GM/DL — LOW (ref 32–36)
MCV RBC AUTO: 96.2 FL — SIGNIFICANT CHANGE UP (ref 80–100)
NRBC # BLD: 0 /100 WBCS — SIGNIFICANT CHANGE UP (ref 0–0)
PHOSPHATE SERPL-MCNC: 2.4 MG/DL — LOW (ref 2.5–4.5)
PLATELET # BLD AUTO: 212 K/UL — SIGNIFICANT CHANGE UP (ref 150–400)
POTASSIUM SERPL-MCNC: 3.9 MMOL/L — SIGNIFICANT CHANGE UP (ref 3.5–5.3)
POTASSIUM SERPL-SCNC: 3.9 MMOL/L — SIGNIFICANT CHANGE UP (ref 3.5–5.3)
RBC # BLD: 3.69 M/UL — LOW (ref 3.8–5.2)
RBC # FLD: 17.8 % — HIGH (ref 10.3–14.5)
SODIUM SERPL-SCNC: 144 MMOL/L — SIGNIFICANT CHANGE UP (ref 135–145)
WBC # BLD: 4.79 K/UL — SIGNIFICANT CHANGE UP (ref 3.8–10.5)
WBC # FLD AUTO: 4.79 K/UL — SIGNIFICANT CHANGE UP (ref 3.8–10.5)

## 2021-11-06 RX ORDER — VANCOMYCIN HCL 1 G
750 VIAL (EA) INTRAVENOUS EVERY 8 HOURS
Refills: 0 | Status: DISCONTINUED | OUTPATIENT
Start: 2021-11-06 | End: 2021-11-08

## 2021-11-06 RX ORDER — MAGNESIUM SULFATE 500 MG/ML
2 VIAL (ML) INJECTION ONCE
Refills: 0 | Status: COMPLETED | OUTPATIENT
Start: 2021-11-06 | End: 2021-11-06

## 2021-11-06 RX ORDER — SODIUM,POTASSIUM PHOSPHATES 278-250MG
1 POWDER IN PACKET (EA) ORAL ONCE
Refills: 0 | Status: COMPLETED | OUTPATIENT
Start: 2021-11-06 | End: 2021-11-06

## 2021-11-06 RX ORDER — VANCOMYCIN HCL 1 G
750 VIAL (EA) INTRAVENOUS EVERY 8 HOURS
Refills: 0 | Status: DISCONTINUED | OUTPATIENT
Start: 2021-11-06 | End: 2021-11-06

## 2021-11-06 RX ADMIN — Medication 975 MILLIGRAM(S): at 13:10

## 2021-11-06 RX ADMIN — Medication 1 TABLET(S): at 13:13

## 2021-11-06 RX ADMIN — Medication 2 TABLET(S): at 06:12

## 2021-11-06 RX ADMIN — Medication 1 PACKET(S): at 06:16

## 2021-11-06 RX ADMIN — CEFEPIME 100 MILLIGRAM(S): 1 INJECTION, POWDER, FOR SOLUTION INTRAMUSCULAR; INTRAVENOUS at 19:08

## 2021-11-06 RX ADMIN — Medication 20 MILLIGRAM(S): at 13:11

## 2021-11-06 RX ADMIN — Medication 20 MILLIGRAM(S): at 09:06

## 2021-11-06 RX ADMIN — Medication 250 MILLIGRAM(S): at 14:18

## 2021-11-06 RX ADMIN — Medication 250 MILLIGRAM(S): at 21:31

## 2021-11-06 RX ADMIN — ATORVASTATIN CALCIUM 20 MILLIGRAM(S): 80 TABLET, FILM COATED ORAL at 21:21

## 2021-11-06 RX ADMIN — TRAMADOL HYDROCHLORIDE 50 MILLIGRAM(S): 50 TABLET ORAL at 07:54

## 2021-11-06 RX ADMIN — Medication 50 GRAM(S): at 13:08

## 2021-11-06 RX ADMIN — Medication 500 MILLIGRAM(S): at 13:12

## 2021-11-06 RX ADMIN — ENOXAPARIN SODIUM 40 MILLIGRAM(S): 100 INJECTION SUBCUTANEOUS at 19:09

## 2021-11-06 RX ADMIN — CEFEPIME 100 MILLIGRAM(S): 1 INJECTION, POWDER, FOR SOLUTION INTRAMUSCULAR; INTRAVENOUS at 06:20

## 2021-11-06 RX ADMIN — Medication 975 MILLIGRAM(S): at 06:12

## 2021-11-06 RX ADMIN — Medication 81 MILLIGRAM(S): at 13:12

## 2021-11-06 RX ADMIN — Medication 20 MILLIGRAM(S): at 19:09

## 2021-11-06 RX ADMIN — TRAMADOL HYDROCHLORIDE 50 MILLIGRAM(S): 50 TABLET ORAL at 07:06

## 2021-11-06 RX ADMIN — ATENOLOL 50 MILLIGRAM(S): 25 TABLET ORAL at 06:12

## 2021-11-06 RX ADMIN — Medication 250 MILLIGRAM(S): at 04:14

## 2021-11-06 RX ADMIN — Medication 975 MILLIGRAM(S): at 19:08

## 2021-11-06 RX ADMIN — Medication 1 TABLET(S): at 13:12

## 2021-11-06 RX ADMIN — PANTOPRAZOLE SODIUM 40 MILLIGRAM(S): 20 TABLET, DELAYED RELEASE ORAL at 06:11

## 2021-11-06 RX ADMIN — Medication 20 MILLIGRAM(S): at 21:21

## 2021-11-06 RX ADMIN — Medication 1 PACKET(S): at 13:16

## 2021-11-06 NOTE — PROGRESS NOTE ADULT - SUBJECTIVE AND OBJECTIVE BOX
CARDIOLOGY     PROGRESS  NOTE   ________________________________________________    CHIEF COMPLAINT:Patient is a 81y old  Female who presents with a chief complaint of Left lower extremity wound (06 Nov 2021 03:11)  no complain.  	  REVIEW OF SYSTEMS:  CONSTITUTIONAL: No fever, weight loss, or fatigue  EYES: No eye pain, visual disturbances, or discharge  ENT:  No difficulty hearing, tinnitus, vertigo; No sinus or throat pain  NECK: No pain or stiffness  RESPIRATORY: No cough, wheezing, chills or hemoptysis; No Shortness of Breath  CARDIOVASCULAR: No chest pain, palpitations, passing out, dizziness, or leg swelling  GASTROINTESTINAL: No abdominal or epigastric pain. No nausea, vomiting, or hematemesis; No diarrhea or constipation. No melena or hematochezia.  GENITOURINARY: No dysuria, frequency, hematuria, or incontinence  NEUROLOGICAL: No headaches, memory loss, loss of strength, numbness, or tremors  SKIN: No itching, burning, rashes, or lesions   LYMPH Nodes: No enlarged glands  ENDOCRINE: No heat or cold intolerance; No hair loss  MUSCULOSKELETAL: No joint pain or swelling; No muscle, back, or extremity pain  PSYCHIATRIC: No depression, anxiety, mood swings, or difficulty sleeping  HEME/LYMPH: No easy bruising, or bleeding gums  ALLERGY AND IMMUNOLOGIC: No hives or eczema	    [ ] All others negative	  [ ] Unable to obtain    PHYSICAL EXAM:  T(C): 36.8 (11-06-21 @ 10:26), Max: 37.1 (11-05-21 @ 14:02)  HR: 71 (11-06-21 @ 10:26) (71 - 82)  BP: 138/72 (11-06-21 @ 10:26) (131/71 - 152/88)  RR: 18 (11-06-21 @ 10:26) (17 - 18)  SpO2: 94% (11-06-21 @ 10:26) (94% - 95%)  Wt(kg): --  I&O's Summary    05 Nov 2021 07:01  -  06 Nov 2021 07:00  --------------------------------------------------------  IN: 830 mL / OUT: 2000 mL / NET: -1170 mL    06 Nov 2021 08:01  -  06 Nov 2021 10:37  --------------------------------------------------------  IN: 300 mL / OUT: 300 mL / NET: 0 mL        Appearance: Normal	  HEENT:   Normal oral mucosa, PERRL, EOMI	  Lymphatic: No lymphadenopathy  Cardiovascular: Normal S1 S2, No JVD, + murmurs, No edema  Respiratory: Lungs clear to auscultation	  Psychiatry: A & O x 3, Mood & affect appropriate  Gastrointestinal:  Soft, Non-tender, + BS	  Skin: No rashes, No ecchymoses, No cyanosis	  Neurologic: Non-focal  Extremities: Normal range of motion, bl bka  Vascular: Peripheral pulses palpable 2+ bilaterally    MEDICATIONS  (STANDING):  acetaminophen     Tablet .. 975 milliGRAM(s) Oral every 6 hours  ascorbic acid 500 milliGRAM(s) Oral daily  aspirin enteric coated 81 milliGRAM(s) Oral daily  ATENolol  Tablet 50 milliGRAM(s) Oral daily  atorvastatin 20 milliGRAM(s) Oral at bedtime  baclofen 20 milliGRAM(s) Oral four times a day  cefepime   IVPB 1000 milliGRAM(s) IV Intermittent every 12 hours  collagenase Ointment 1 Application(s) Topical daily  enoxaparin Injectable 40 milliGRAM(s) SubCutaneous every 24 hours  lactobacillus acidophilus 1 Tablet(s) Oral daily  magnesium sulfate  IVPB 2 Gram(s) IV Intermittent once  multivitamin 1 Tablet(s) Oral daily  pantoprazole    Tablet 40 milliGRAM(s) Oral before breakfast  polyethylene glycol 3350 17 Gram(s) Oral daily  potassium phosphate / sodium phosphate Powder (PHOS-NaK) 1 Packet(s) Oral once  senna 2 Tablet(s) Oral at bedtime  vancomycin  IVPB 750 milliGRAM(s) IV Intermittent every 12 hours      TELEMETRY: 	    ECG:  	  RADIOLOGY:  OTHER: 	  	  LABS:	 	    CARDIAC MARKERS:                                10.7   4.79  )-----------( 212      ( 06 Nov 2021 07:02 )             35.5     11-06    144  |  109<H>  |  14  ----------------------------<  109<H>  3.9   |  22  |  <0.30<L>    Ca    9.0      06 Nov 2021 06:59  Phos  2.4     11-06  Mg     1.5     11-06      proBNP:   Lipid Profile:   HgA1c:   TSH:         Assessment and plan  ---------------------------  80 y/o female who presents to the ED with chronic Left lower leg wound present since 2018 after an ICU admission for sepsis. She has been followed in the [facility] wound care center by Mounika Smith/Rah/Kishan since 2019. Yesterday, she saw her wound care doctor who contacted Dr. Morris that the wound was getting worse. Presented to ED this morning to get leg evaluated. Denies nausea, vomiting, fever, chills, shortness of breath, rapid heart rate.  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  pt with no cardiac complain, no evidence of chf on exam  s/p bka  doing better  increase bp ?sec to pain and discomfort.  pain meds  continue Lipitor / atenolol  replete K, keep K>4  started on abx   ID eval noted on cefepime and vanco  bp is better controlled  continue current meds  vascular/ ID noted  keep k>4  will increase atenolol to 50 mg daily if bp remains elevated, at this time increase bp sec to pain  dc planning as per vascular

## 2021-11-06 NOTE — PROGRESS NOTE ADULT - SUBJECTIVE AND OBJECTIVE BOX
Surgery Progress Note    INTERVAl/SUBJECTIVE: No acute event overnight.     Vital Signs Last 24 Hrs  T(C): 36.7 (06 Nov 2021 01:08), Max: 37.3 (05 Nov 2021 05:44)  T(F): 98.1 (06 Nov 2021 01:08), Max: 99.2 (05 Nov 2021 05:44)  HR: 82 (06 Nov 2021 01:08) (71 - 82)  BP: 152/88 (06 Nov 2021 01:08) (120/72 - 152/88)  BP(mean): --  RR: 18 (06 Nov 2021 01:08) (17 - 18)  SpO2: 94% (06 Nov 2021 01:08) (94% - 95%)    Physical Exam:  General:  Neuro:    CV:   Abdomen:     LABS:                        9.9    4.50  )-----------( 185      ( 05 Nov 2021 07:51 )             32.9     11-05    142  |  109<H>  |  10  ----------------------------<  87  3.7   |  22  |  <0.30<L>    Ca    8.8      05 Nov 2021 07:26  Phos  2.3     11-05  Mg     1.7     11-05            INs and OUTs:    11-04-21 @ 07:01  -  11-05-21 @ 07:00  --------------------------------------------------------  IN: 1620 mL / OUT: 2025 mL / NET: -405 mL    11-05-21 @ 07:01  -  11-06-21 @ 03:12  --------------------------------------------------------  IN: 770 mL / OUT: 1450 mL / NET: -680 mL     Surgery Progress Note    INTERVAl/SUBJECTIVE: No acute event overnight. No complaints of worsening pain. No f/v/c/d    Vital Signs Last 24 Hrs  T(C): 36.7 (06 Nov 2021 01:08), Max: 37.3 (05 Nov 2021 05:44)  T(F): 98.1 (06 Nov 2021 01:08), Max: 99.2 (05 Nov 2021 05:44)  HR: 82 (06 Nov 2021 01:08) (71 - 82)  BP: 152/88 (06 Nov 2021 01:08) (120/72 - 152/88)  BP(mean): --  RR: 18 (06 Nov 2021 01:08) (17 - 18)  SpO2: 94% (06 Nov 2021 01:08) (94% - 95%)      Physical Exam:  Gen: A&Ox3, NAD  Resp: unlabored breathing noted  Ext: Left aka stump dressing changed   cellulitis sig improved and post thigh wound w mod granulation tissue, adaptic placed    LABS:                        9.9    4.50  )-----------( 185      ( 05 Nov 2021 07:51 )             32.9     11-05    142  |  109<H>  |  10  ----------------------------<  87  3.7   |  22  |  <0.30<L>    Ca    8.8      05 Nov 2021 07:26  Phos  2.3     11-05  Mg     1.7     11-05            INs and OUTs:    11-04-21 @ 07:01  -  11-05-21 @ 07:00  --------------------------------------------------------  IN: 1620 mL / OUT: 2025 mL / NET: -405 mL    11-05-21 @ 07:01  -  11-06-21 @ 03:12  --------------------------------------------------------  IN: 770 mL / OUT: 1450 mL / NET: -680 mL

## 2021-11-06 NOTE — PROGRESS NOTE ADULT - ASSESSMENT
82 y/o female HTN, MS, hysterectomy, R AKA who presents to the ED with LLE nonhealing wound with wet gangrene now s/p L AKA on 10/21/2021 and resolving lt aka stump cellulitis.    Plan: 80 y/o female HTN, MS, hysterectomy, R AKA who presents to the ED with LLE nonhealing wound with wet gangrene now s/p L AKA on 10/21/2021 and resolving lt aka stump cellulitis.    Plan:  - Cont wound dressing changes, santyl to posterior thigh wound  - DVT prophylaxis  - Continue on home medications  - Pain medication as needed  - Dispo: Home MONDAY, aids to be reinstated for then. Augmentin x7 upon discharge   - Vanc dosage adjustment.     [phone]  Vascular Surgery

## 2021-11-07 LAB
ANION GAP SERPL CALC-SCNC: 11 MMOL/L — SIGNIFICANT CHANGE UP (ref 5–17)
BUN SERPL-MCNC: 9 MG/DL — SIGNIFICANT CHANGE UP (ref 7–23)
CALCIUM SERPL-MCNC: 9 MG/DL — SIGNIFICANT CHANGE UP (ref 8.4–10.5)
CHLORIDE SERPL-SCNC: 107 MMOL/L — SIGNIFICANT CHANGE UP (ref 96–108)
CO2 SERPL-SCNC: 24 MMOL/L — SIGNIFICANT CHANGE UP (ref 22–31)
CREAT SERPL-MCNC: <0.3 MG/DL — LOW (ref 0.5–1.3)
GLUCOSE SERPL-MCNC: 78 MG/DL — SIGNIFICANT CHANGE UP (ref 70–99)
HCT VFR BLD CALC: 34.5 % — SIGNIFICANT CHANGE UP (ref 34.5–45)
HGB BLD-MCNC: 10.6 G/DL — LOW (ref 11.5–15.5)
MAGNESIUM SERPL-MCNC: 2.2 MG/DL — SIGNIFICANT CHANGE UP (ref 1.6–2.6)
MCHC RBC-ENTMCNC: 29.2 PG — SIGNIFICANT CHANGE UP (ref 27–34)
MCHC RBC-ENTMCNC: 30.7 GM/DL — LOW (ref 32–36)
MCV RBC AUTO: 95 FL — SIGNIFICANT CHANGE UP (ref 80–100)
NRBC # BLD: 0 /100 WBCS — SIGNIFICANT CHANGE UP (ref 0–0)
PHOSPHATE SERPL-MCNC: 1.8 MG/DL — LOW (ref 2.5–4.5)
PLATELET # BLD AUTO: 179 K/UL — SIGNIFICANT CHANGE UP (ref 150–400)
POTASSIUM SERPL-MCNC: 3.8 MMOL/L — SIGNIFICANT CHANGE UP (ref 3.5–5.3)
POTASSIUM SERPL-SCNC: 3.8 MMOL/L — SIGNIFICANT CHANGE UP (ref 3.5–5.3)
RBC # BLD: 3.63 M/UL — LOW (ref 3.8–5.2)
RBC # FLD: 17.9 % — HIGH (ref 10.3–14.5)
SARS-COV-2 RNA SPEC QL NAA+PROBE: SIGNIFICANT CHANGE UP
SODIUM SERPL-SCNC: 142 MMOL/L — SIGNIFICANT CHANGE UP (ref 135–145)
VANCOMYCIN TROUGH SERPL-MCNC: 13.5 UG/ML — SIGNIFICANT CHANGE UP (ref 10–20)
WBC # BLD: 4.83 K/UL — SIGNIFICANT CHANGE UP (ref 3.8–10.5)
WBC # FLD AUTO: 4.83 K/UL — SIGNIFICANT CHANGE UP (ref 3.8–10.5)

## 2021-11-07 RX ORDER — SODIUM,POTASSIUM PHOSPHATES 278-250MG
1 POWDER IN PACKET (EA) ORAL ONCE
Refills: 0 | Status: COMPLETED | OUTPATIENT
Start: 2021-11-07 | End: 2021-11-07

## 2021-11-07 RX ADMIN — Medication 975 MILLIGRAM(S): at 17:21

## 2021-11-07 RX ADMIN — Medication 1 APPLICATION(S): at 13:04

## 2021-11-07 RX ADMIN — CEFEPIME 100 MILLIGRAM(S): 1 INJECTION, POWDER, FOR SOLUTION INTRAMUSCULAR; INTRAVENOUS at 06:55

## 2021-11-07 RX ADMIN — Medication 62.5 MILLIMOLE(S): at 13:01

## 2021-11-07 RX ADMIN — Medication 20 MILLIGRAM(S): at 13:02

## 2021-11-07 RX ADMIN — Medication 250 MILLIGRAM(S): at 09:46

## 2021-11-07 RX ADMIN — Medication 975 MILLIGRAM(S): at 07:25

## 2021-11-07 RX ADMIN — Medication 20 MILLIGRAM(S): at 17:21

## 2021-11-07 RX ADMIN — ATENOLOL 50 MILLIGRAM(S): 25 TABLET ORAL at 06:53

## 2021-11-07 RX ADMIN — Medication 20 MILLIGRAM(S): at 09:45

## 2021-11-07 RX ADMIN — ATORVASTATIN CALCIUM 20 MILLIGRAM(S): 80 TABLET, FILM COATED ORAL at 21:24

## 2021-11-07 RX ADMIN — Medication 975 MILLIGRAM(S): at 06:52

## 2021-11-07 RX ADMIN — CEFEPIME 100 MILLIGRAM(S): 1 INJECTION, POWDER, FOR SOLUTION INTRAMUSCULAR; INTRAVENOUS at 17:19

## 2021-11-07 RX ADMIN — Medication 1 TABLET(S): at 13:04

## 2021-11-07 RX ADMIN — Medication 500 MILLIGRAM(S): at 13:03

## 2021-11-07 RX ADMIN — Medication 975 MILLIGRAM(S): at 13:01

## 2021-11-07 RX ADMIN — Medication 20 MILLIGRAM(S): at 21:24

## 2021-11-07 RX ADMIN — Medication 1 PACKET(S): at 17:20

## 2021-11-07 RX ADMIN — PANTOPRAZOLE SODIUM 40 MILLIGRAM(S): 20 TABLET, DELAYED RELEASE ORAL at 06:53

## 2021-11-07 RX ADMIN — Medication 250 MILLIGRAM(S): at 17:23

## 2021-11-07 RX ADMIN — Medication 81 MILLIGRAM(S): at 13:03

## 2021-11-07 RX ADMIN — Medication 1 TABLET(S): at 13:03

## 2021-11-07 RX ADMIN — ENOXAPARIN SODIUM 40 MILLIGRAM(S): 100 INJECTION SUBCUTANEOUS at 18:58

## 2021-11-07 NOTE — PROGRESS NOTE ADULT - SUBJECTIVE AND OBJECTIVE BOX
VASCULAR SURGERY PROGRESS NOTE  Hospital Day #19d  Procedure/Dx: Unilateral AKA    Pt seen and examined at bedside. No complaints. Pain controlled. Denies N/V. Tolerating diet. Passing flatus and BM. No acute events overnight.     PMHx  ·	Ptosis of both eyelids  ·	Neurogenic bladder  ·	Dysphagia  ·	Osteoporosis  ·	HTN (hypertension)  ·	MS (multiple sclerosis)    Vital Signs Last 24 Hrs  T(C): 36.9 (07 Nov 2021 01:09), Max: 36.9 (07 Nov 2021 01:09)  T(F): 98.5 (07 Nov 2021 01:09), Max: 98.5 (07 Nov 2021 01:09)  HR: 75 (07 Nov 2021 01:09) (69 - 88)  BP: 125/74 (07 Nov 2021 01:09) (116/69 - 147/83)  RR: 18 (07 Nov 2021 01:09) (18 - 18)  SpO2: 94% (07 Nov 2021 01:09) (94% - 95%)    PHYSICAL EXAM   General:  AAOx3 in NAD  Neck:  Supple, FOM, no palpable masses  HEENT:  Normocephalic, atraumatic, nonicteric sclera, MMM   Chest:  Equal expansion bilaterally, equal breath sounds  Abdomen:  Soft, nondistended, nontender to palpation in all four quadrants   Ext: Left aka stump dressing changed cellulitis sig improved and post thigh wound w mod granulation tissue, adaptic placed    I's & O's  11-05-21 @ 07:01  -  11-06-21 @ 07:00  --------------------------------------------------------  IN: 830 mL / OUT: 2000 mL / NET: -1170 mL  11-06-21 @ 08:01  -  11-07-21 @ 01:54  --------------------------------------------------------  IN: 1310 mL / OUT: 925 mL / NET: 385 mL    MEDICATIONS:  DVT PROPHYLAXIS: enoxaparin Injectable 40 milliGRAM(s)  GI PROPHYLAXIS: pantoprazole    Tablet 40 milliGRAM(s)  ANTIBIOTICS: cefepime   IVPB 1000 milliGRAM(s)  vancomycin  IVPB 750 milliGRAM(s)    LAB/STUDIES:                      10.7   4.79  )-----------( 212      ( 06 Nov 2021 07:02 )             35.5     144  |  109<H>  |  14  ----------------------------<  109<H>  3.9   |  22  |  <0.30<L>  Ca    9.0      06 Nov 2021 06:59  Phos  2.4     11-06  Mg     1.5     11-06   VASCULAR SURGERY PROGRESS NOTE  Hospital Day #19d  Procedure/Dx: Unilateral AKA    Pt seen and examined at bedside. No complaints. Pain controlled. Denies N/V. Tolerating diet. Passing flatus and BM. No acute events overnight.     PMHx  ·	Ptosis of both eyelids  ·	Neurogenic bladder  ·	Dysphagia  ·	Osteoporosis  ·	HTN (hypertension)  ·	MS (multiple sclerosis)    Vital Signs Last 24 Hrs  T(C): 36.9 (07 Nov 2021 01:09), Max: 36.9 (07 Nov 2021 01:09)  T(F): 98.5 (07 Nov 2021 01:09), Max: 98.5 (07 Nov 2021 01:09)  HR: 75 (07 Nov 2021 01:09) (69 - 88)  BP: 125/74 (07 Nov 2021 01:09) (116/69 - 147/83)  RR: 18 (07 Nov 2021 01:09) (18 - 18)  SpO2: 94% (07 Nov 2021 01:09) (94% - 95%)    PHYSICAL EXAM   Neck:  Supple, FOM, no palpable masses  HEENT:  Normocephalic, atraumatic, nonicteric sclera, MMM   Chest:  Equal expansion bilaterally, equal breath sounds  Abdomen:  Soft, nondistended, nontender to palpation in all four quadrants   Ext: Left aka stump dressing changed cellulitis sig improved and post thigh wound w mod granulation tissue, adaptic placed    I's & O's  11-05-21 @ 07:01  -  11-06-21 @ 07:00  --------------------------------------------------------  IN: 830 mL / OUT: 2000 mL / NET: -1170 mL  11-06-21 @ 08:01  -  11-07-21 @ 01:54  --------------------------------------------------------  IN: 1310 mL / OUT: 925 mL / NET: 385 mL    MEDICATIONS:  DVT PROPHYLAXIS: enoxaparin Injectable 40 milliGRAM(s)  GI PROPHYLAXIS: pantoprazole    Tablet 40 milliGRAM(s)  ANTIBIOTICS: cefepime   IVPB 1000 milliGRAM(s)  vancomycin  IVPB 750 milliGRAM(s)    LAB/STUDIES:                      10.7   4.79  )-----------( 212      ( 06 Nov 2021 07:02 )             35.5     144  |  109<H>  |  14  ----------------------------<  109<H>  3.9   |  22  |  <0.30<L>  Ca    9.0      06 Nov 2021 06:59  Phos  2.4     11-06  Mg     1.5     11-06

## 2021-11-07 NOTE — PROGRESS NOTE ADULT - SUBJECTIVE AND OBJECTIVE BOX
CARDIOLOGY     PROGRESS  NOTE   ________________________________________________    CHIEF COMPLAINT:Patient is a 81y old  Female who presents with a chief complaint of Left lower extremity wound (07 Nov 2021 01:53)  no complain  	  REVIEW OF SYSTEMS:  CONSTITUTIONAL: No fever, weight loss, or fatigue  EYES: No eye pain, visual disturbances, or discharge  ENT:  No difficulty hearing, tinnitus, vertigo; No sinus or throat pain  NECK: No pain or stiffness  RESPIRATORY: No cough, wheezing, chills or hemoptysis; No Shortness of Breath  CARDIOVASCULAR: No chest pain, palpitations, passing out, dizziness, or leg swelling  GASTROINTESTINAL: No abdominal or epigastric pain. No nausea, vomiting, or hematemesis; No diarrhea or constipation. No melena or hematochezia.  GENITOURINARY: No dysuria, frequency, hematuria, or incontinence  NEUROLOGICAL: No headaches, memory loss, loss of strength, numbness, or tremors  SKIN: No itching, burning, rashes, or lesions   LYMPH Nodes: No enlarged glands  ENDOCRINE: No heat or cold intolerance; No hair loss  MUSCULOSKELETAL: No joint pain or swelling; No muscle, back, or extremity pain  PSYCHIATRIC: No depression, anxiety, mood swings, or difficulty sleeping  HEME/LYMPH: No easy bruising, or bleeding gums  ALLERGY AND IMMUNOLOGIC: No hives or eczema	    [ ] All others negative	  [ ] Unable to obtain    PHYSICAL EXAM:  T(C): 36.9 (11-07-21 @ 10:05), Max: 36.9 (11-07-21 @ 01:09)  HR: 72 (11-07-21 @ 10:05) (69 - 88)  BP: 131/72 (11-07-21 @ 10:05) (116/69 - 148/84)  RR: 18 (11-07-21 @ 10:05) (18 - 18)  SpO2: 93% (11-07-21 @ 10:05) (93% - 96%)  Wt(kg): --  I&O's Summary    06 Nov 2021 08:01  -  07 Nov 2021 07:00  --------------------------------------------------------  IN: 1670 mL / OUT: 1000 mL / NET: 670 mL    07 Nov 2021 07:01  -  07 Nov 2021 10:12  --------------------------------------------------------  IN: 250 mL / OUT: 1000 mL / NET: -750 mL        Appearance: Normal	  HEENT:   Normal oral mucosa, PERRL, EOMI	  Lymphatic: No lymphadenopathy  Cardiovascular: Normal S1 S2, No JVD, + murmurs, No edema  Respiratory: Lungs clear to auscultation	  Psychiatry: A & O x 3, Mood & affect appropriate  Gastrointestinal:  Soft, Non-tender, + BS	  Skin: No rashes, No ecchymoses, No cyanosis	  Neurologic: Non-focal  Extremities: Normal range of motion, bl bka  Vascular: Peripheral pulses palpable 2+ bilaterally    MEDICATIONS  (STANDING):  acetaminophen     Tablet .. 975 milliGRAM(s) Oral every 6 hours  ascorbic acid 500 milliGRAM(s) Oral daily  aspirin enteric coated 81 milliGRAM(s) Oral daily  ATENolol  Tablet 50 milliGRAM(s) Oral daily  atorvastatin 20 milliGRAM(s) Oral at bedtime  baclofen 20 milliGRAM(s) Oral four times a day  cefepime   IVPB 1000 milliGRAM(s) IV Intermittent every 12 hours  collagenase Ointment 1 Application(s) Topical daily  enoxaparin Injectable 40 milliGRAM(s) SubCutaneous every 24 hours  lactobacillus acidophilus 1 Tablet(s) Oral daily  multivitamin 1 Tablet(s) Oral daily  pantoprazole    Tablet 40 milliGRAM(s) Oral before breakfast  polyethylene glycol 3350 17 Gram(s) Oral daily  potassium phosphate / sodium phosphate Powder (PHOS-NaK) 1 Packet(s) Oral once  senna 2 Tablet(s) Oral at bedtime  sodium phosphate IVPB 15 milliMole(s) IV Intermittent once  vancomycin  IVPB 750 milliGRAM(s) IV Intermittent every 8 hours      TELEMETRY: 	    ECG:  	  RADIOLOGY:  OTHER: 	  	  LABS:	 	    CARDIAC MARKERS:                                10.6   4.83  )-----------( 179      ( 07 Nov 2021 07:22 )             34.5     11-07    142  |  107  |  9   ----------------------------<  78  3.8   |  24  |  <0.30<L>    Ca    9.0      07 Nov 2021 07:21  Phos  1.8     11-07  Mg     2.2     11-07      proBNP:   Lipid Profile:   HgA1c:   TSH:     - Cont wound dressing changes, santyl to posterior thigh wound  - DVT prophylaxis  - Continue on home medications  - Pain medication as needed  - Dispo: Home MONDAY, aids to be reinstated for then. Augmentin x7 upon discharge   - Vanc dosage adjustment.     Assessment and plan  ---------------------------  80 y/o female who presents to the ED with chronic Left lower leg wound present since 2018 after an ICU admission for sepsis. She has been followed in the [facility] wound care center by Mounika Smith/Rah/Kishan since 2019. Yesterday, she saw her wound care doctor who contacted Dr. Morris that the wound was getting worse. Presented to ED this morning to get leg evaluated. Denies nausea, vomiting, fever, chills, shortness of breath, rapid heart rate.  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  pt with no cardiac complain, no evidence of chf on exam  s/p bka  doing better  increase bp ?sec to pain and discomfort.  pain meds  continue Lipitor / atenolol  replete K, keep K>4  started on abx   ID eval noted on cefepime and vanco  bp is better controlled  continue current meds  vascular/ ID noted  keep k>4  will increase atenolol to 50 mg daily if bp remains elevated, at this time increase bp sec to pain  dc planning as per vascular

## 2021-11-07 NOTE — PROGRESS NOTE ADULT - ASSESSMENT
80 y/o female HTN, MS, hysterectomy, R AKA who presents to the ED with LLE nonhealing wound with wet gangrene now s/p L AKA on 10/21/2021 and resolving lt aka stump cellulitis.    Plan:  - Cont wound dressing changes, santyl to posterior thigh wound  - DVT prophylaxis  - Continue on home medications  - Pain medication as needed  - Dispo: Home MONDAY, aids to be reinstated for then. Augmentin x7 upon discharge   - Vanc dosage adjustment.     [id number]  Vascular Surgery

## 2021-11-08 VITALS
DIASTOLIC BLOOD PRESSURE: 82 MMHG | TEMPERATURE: 98 F | SYSTOLIC BLOOD PRESSURE: 141 MMHG | OXYGEN SATURATION: 95 % | HEART RATE: 81 BPM | RESPIRATION RATE: 18 BRPM

## 2021-11-08 LAB
SURGICAL PATHOLOGY STUDY: SIGNIFICANT CHANGE UP
VANCOMYCIN TROUGH SERPL-MCNC: 16.9 UG/ML — SIGNIFICANT CHANGE UP (ref 10–20)

## 2021-11-08 PROCEDURE — 86922 COMPATIBILITY TEST ANTIGLOB: CPT

## 2021-11-08 PROCEDURE — 88307 TISSUE EXAM BY PATHOLOGIST: CPT

## 2021-11-08 PROCEDURE — 97167 OT EVAL HIGH COMPLEX 60 MIN: CPT

## 2021-11-08 PROCEDURE — 87086 URINE CULTURE/COLONY COUNT: CPT

## 2021-11-08 PROCEDURE — 87040 BLOOD CULTURE FOR BACTERIA: CPT

## 2021-11-08 PROCEDURE — 84295 ASSAY OF SERUM SODIUM: CPT

## 2021-11-08 PROCEDURE — 86769 SARS-COV-2 COVID-19 ANTIBODY: CPT

## 2021-11-08 PROCEDURE — 82435 ASSAY OF BLOOD CHLORIDE: CPT

## 2021-11-08 PROCEDURE — 36000 PLACE NEEDLE IN VEIN: CPT

## 2021-11-08 PROCEDURE — 86140 C-REACTIVE PROTEIN: CPT

## 2021-11-08 PROCEDURE — U0005: CPT

## 2021-11-08 PROCEDURE — U0003: CPT

## 2021-11-08 PROCEDURE — 90686 IIV4 VACC NO PRSV 0.5 ML IM: CPT

## 2021-11-08 PROCEDURE — 97535 SELF CARE MNGMENT TRAINING: CPT

## 2021-11-08 PROCEDURE — 83036 HEMOGLOBIN GLYCOSYLATED A1C: CPT

## 2021-11-08 PROCEDURE — 87206 SMEAR FLUORESCENT/ACID STAI: CPT

## 2021-11-08 PROCEDURE — 85652 RBC SED RATE AUTOMATED: CPT

## 2021-11-08 PROCEDURE — C1889: CPT

## 2021-11-08 PROCEDURE — 36430 TRANSFUSION BLD/BLD COMPNT: CPT

## 2021-11-08 PROCEDURE — 86900 BLOOD TYPING SEROLOGIC ABO: CPT

## 2021-11-08 PROCEDURE — 83605 ASSAY OF LACTIC ACID: CPT

## 2021-11-08 PROCEDURE — 85018 HEMOGLOBIN: CPT

## 2021-11-08 PROCEDURE — 87015 SPECIMEN INFECT AGNT CONCNTJ: CPT

## 2021-11-08 PROCEDURE — 85014 HEMATOCRIT: CPT

## 2021-11-08 PROCEDURE — 84100 ASSAY OF PHOSPHORUS: CPT

## 2021-11-08 PROCEDURE — 87102 FUNGUS ISOLATION CULTURE: CPT

## 2021-11-08 PROCEDURE — 85730 THROMBOPLASTIN TIME PARTIAL: CPT

## 2021-11-08 PROCEDURE — 86850 RBC ANTIBODY SCREEN: CPT

## 2021-11-08 PROCEDURE — 82330 ASSAY OF CALCIUM: CPT

## 2021-11-08 PROCEDURE — 82947 ASSAY GLUCOSE BLOOD QUANT: CPT

## 2021-11-08 PROCEDURE — 97112 NEUROMUSCULAR REEDUCATION: CPT

## 2021-11-08 PROCEDURE — 86901 BLOOD TYPING SEROLOGIC RH(D): CPT

## 2021-11-08 PROCEDURE — 36415 COLL VENOUS BLD VENIPUNCTURE: CPT

## 2021-11-08 PROCEDURE — 87640 STAPH A DNA AMP PROBE: CPT

## 2021-11-08 PROCEDURE — 73610 X-RAY EXAM OF ANKLE: CPT

## 2021-11-08 PROCEDURE — 85025 COMPLETE CBC W/AUTO DIFF WBC: CPT

## 2021-11-08 PROCEDURE — 87116 MYCOBACTERIA CULTURE: CPT

## 2021-11-08 PROCEDURE — 88311 DECALCIFY TISSUE: CPT

## 2021-11-08 PROCEDURE — 97161 PT EVAL LOW COMPLEX 20 MIN: CPT

## 2021-11-08 PROCEDURE — 80202 ASSAY OF VANCOMYCIN: CPT

## 2021-11-08 PROCEDURE — 97530 THERAPEUTIC ACTIVITIES: CPT

## 2021-11-08 PROCEDURE — P9016: CPT

## 2021-11-08 PROCEDURE — 80048 BASIC METABOLIC PNL TOTAL CA: CPT

## 2021-11-08 PROCEDURE — 82803 BLOOD GASES ANY COMBINATION: CPT

## 2021-11-08 PROCEDURE — 73590 X-RAY EXAM OF LOWER LEG: CPT

## 2021-11-08 PROCEDURE — 81001 URINALYSIS AUTO W/SCOPE: CPT

## 2021-11-08 PROCEDURE — 99285 EMERGENCY DEPT VISIT HI MDM: CPT | Mod: 25

## 2021-11-08 PROCEDURE — 71045 X-RAY EXAM CHEST 1 VIEW: CPT

## 2021-11-08 PROCEDURE — 93005 ELECTROCARDIOGRAM TRACING: CPT

## 2021-11-08 PROCEDURE — 87641 MR-STAPH DNA AMP PROBE: CPT

## 2021-11-08 PROCEDURE — 85027 COMPLETE CBC AUTOMATED: CPT

## 2021-11-08 PROCEDURE — 83735 ASSAY OF MAGNESIUM: CPT

## 2021-11-08 PROCEDURE — 84132 ASSAY OF SERUM POTASSIUM: CPT

## 2021-11-08 PROCEDURE — 84484 ASSAY OF TROPONIN QUANT: CPT

## 2021-11-08 PROCEDURE — 85610 PROTHROMBIN TIME: CPT

## 2021-11-08 PROCEDURE — 80053 COMPREHEN METABOLIC PANEL: CPT

## 2021-11-08 RX ORDER — SODIUM,POTASSIUM PHOSPHATES 278-250MG
2 POWDER IN PACKET (EA) ORAL ONCE
Refills: 0 | Status: DISCONTINUED | OUTPATIENT
Start: 2021-11-08 | End: 2021-11-08

## 2021-11-08 RX ORDER — POTASSIUM PHOSPHATE, MONOBASIC POTASSIUM PHOSPHATE, DIBASIC 236; 224 MG/ML; MG/ML
15 INJECTION, SOLUTION INTRAVENOUS ONCE
Refills: 0 | Status: DISCONTINUED | OUTPATIENT
Start: 2021-11-08 | End: 2021-11-08

## 2021-11-08 RX ADMIN — Medication 975 MILLIGRAM(S): at 00:30

## 2021-11-08 RX ADMIN — Medication 500 MILLIGRAM(S): at 11:25

## 2021-11-08 RX ADMIN — Medication 20 MILLIGRAM(S): at 11:25

## 2021-11-08 RX ADMIN — Medication 975 MILLIGRAM(S): at 01:00

## 2021-11-08 RX ADMIN — CEFEPIME 100 MILLIGRAM(S): 1 INJECTION, POWDER, FOR SOLUTION INTRAMUSCULAR; INTRAVENOUS at 05:46

## 2021-11-08 RX ADMIN — Medication 20 MILLIGRAM(S): at 08:35

## 2021-11-08 RX ADMIN — PANTOPRAZOLE SODIUM 40 MILLIGRAM(S): 20 TABLET, DELAYED RELEASE ORAL at 05:46

## 2021-11-08 RX ADMIN — Medication 1 TABLET(S): at 11:25

## 2021-11-08 RX ADMIN — Medication 250 MILLIGRAM(S): at 00:29

## 2021-11-08 RX ADMIN — Medication 81 MILLIGRAM(S): at 11:26

## 2021-11-08 RX ADMIN — Medication 250 MILLIGRAM(S): at 08:35

## 2021-11-08 RX ADMIN — ATENOLOL 50 MILLIGRAM(S): 25 TABLET ORAL at 05:45

## 2021-11-08 NOTE — PROGRESS NOTE ADULT - ATTENDING SUPERVISION STATEMENT
ACP
Resident

## 2021-11-08 NOTE — PROGRESS NOTE ADULT - SUBJECTIVE AND OBJECTIVE BOX
Surgery Progress Note    INTERVAl/SUBJECTIVE: No acute event overnight.     Vital Signs Last 24 Hrs  T(C): 36.8 (07 Nov 2021 21:31), Max: 36.9 (07 Nov 2021 01:09)  T(F): 98.2 (07 Nov 2021 21:31), Max: 98.5 (07 Nov 2021 01:09)  HR: 78 (07 Nov 2021 21:31) (72 - 78)  BP: 117/75 (07 Nov 2021 21:31) (117/75 - 150/80)  BP(mean): --  RR: 18 (07 Nov 2021 21:31) (18 - 18)  SpO2: 95% (07 Nov 2021 21:31) (93% - 96%)    Physical Exam:  General:  Neuro:    CV:   Abdomen:     LABS:                        10.6   4.83  )-----------( 179      ( 07 Nov 2021 07:22 )             34.5     11-07    142  |  107  |  9   ----------------------------<  78  3.8   |  24  |  <0.30<L>    Ca    9.0      07 Nov 2021 07:21  Phos  1.8     11-07  Mg     2.2     11-07            INs and OUTs:    11-06-21 @ 08:01  -  11-07-21 @ 07:00  --------------------------------------------------------  IN: 1670 mL / OUT: 1000 mL / NET: 670 mL    11-07-21 @ 07:01  -  11-08-21 @ 00:19  --------------------------------------------------------  IN: 1300 mL / OUT: 2250 mL / NET: -950 mL     Surgery Progress Note    INTERVAl/SUBJECTIVE: No acute event overnight. Feeling well this morning.     Vital Signs Last 24 Hrs  T(C): 36.8 (07 Nov 2021 21:31), Max: 36.9 (07 Nov 2021 01:09)  T(F): 98.2 (07 Nov 2021 21:31), Max: 98.5 (07 Nov 2021 01:09)  HR: 78 (07 Nov 2021 21:31) (72 - 78)  BP: 117/75 (07 Nov 2021 21:31) (117/75 - 150/80)  BP(mean): --  RR: 18 (07 Nov 2021 21:31) (18 - 18)  SpO2: 95% (07 Nov 2021 21:31) (93% - 96%)    Physical Exam:  General: NAD  Pulmonary: breathing comfortably on room air  Cardiac: NSR  Extremities: R BKA incision C/D/I, erythema improved. Posterior ulcer slightly oozing. Collagenase applied.     LABS:                        10.6   4.83  )-----------( 179      ( 07 Nov 2021 07:22 )             34.5     11-07    142  |  107  |  9   ----------------------------<  78  3.8   |  24  |  <0.30<L>    Ca    9.0      07 Nov 2021 07:21  Phos  1.8     11-07  Mg     2.2     11-07            INs and OUTs:    11-06-21 @ 08:01  -  11-07-21 @ 07:00  --------------------------------------------------------  IN: 1670 mL / OUT: 1000 mL / NET: 670 mL    11-07-21 @ 07:01  -  11-08-21 @ 00:19  --------------------------------------------------------  IN: 1300 mL / OUT: 2250 mL / NET: -950 mL     Surgery Progress Note    INTERVAl/SUBJECTIVE: No acute event overnight. Feeling well this morning.     Vital Signs Last 24 Hrs  T(C): 36.8 (07 Nov 2021 21:31), Max: 36.9 (07 Nov 2021 01:09)  T(F): 98.2 (07 Nov 2021 21:31), Max: 98.5 (07 Nov 2021 01:09)  HR: 78 (07 Nov 2021 21:31) (72 - 78)  BP: 117/75 (07 Nov 2021 21:31) (117/75 - 150/80)  BP(mean): --  RR: 18 (07 Nov 2021 21:31) (18 - 18)  SpO2: 95% (07 Nov 2021 21:31) (93% - 96%)    Physical Exam:  General: NAD  Pulmonary: breathing comfortably on room air  Cardiac: NSR  Extremities: R BKA incision C/D/I, erythema nearly resolved  Posterior ulcer slightly oozing. Collagenase applied.     LABS:                        10.6   4.83  )-----------( 179      ( 07 Nov 2021 07:22 )             34.5     11-07    142  |  107  |  9   ----------------------------<  78  3.8   |  24  |  <0.30<L>    Ca    9.0      07 Nov 2021 07:21  Phos  1.8     11-07  Mg     2.2     11-07            INs and OUTs:    11-06-21 @ 08:01  -  11-07-21 @ 07:00  --------------------------------------------------------  IN: 1670 mL / OUT: 1000 mL / NET: 670 mL    11-07-21 @ 07:01  -  11-08-21 @ 00:19  --------------------------------------------------------  IN: 1300 mL / OUT: 2250 mL / NET: -950 mL

## 2021-11-08 NOTE — PROGRESS NOTE ADULT - PROVIDER SPECIALTY LIST ADULT
Cardiology
Cardiology
Infectious Disease
Infectious Disease
SICU
SICU
Vascular Surgery
Cardiology
Vascular Surgery
Wound Care
Cardiology
SICU
Vascular Surgery
Wound Care
Infectious Disease
Vascular Surgery
Vascular Surgery

## 2021-11-08 NOTE — PROGRESS NOTE ADULT - PROBLEM SELECTOR PLAN 2
I Sanjiv Morris MD have seen and examined the patient today and agree with  the  evaluation, assessment and plan of the surgical house officer  RAN Morris MD have personally seen and examined the patient at bedside today at  8am
I Sanjiv Morris MD have seen and examined the patient today and agree with  the  evaluation, assessment and plan of the surgical house officer  RAN Morris MD have personally seen and examined the patient at bedside today at 7 am

## 2021-11-08 NOTE — PROGRESS NOTE ADULT - ASSESSMENT
82 y/o female HTN, MS, hysterectomy, R AKA who presents to the ED with LLE nonhealing wound with wet gangrene now s/p L AKA on 10/21/2021 and resolving lt aka stump cellulitis.    Plan:   80 y/o female HTN, MS, hysterectomy, R AKA who presents to the ED with LLE nonhealing wound with wet gangrene now s/p L AKA on 10/21/2021 and resolving lt aka stump cellulitis.    Plan:  - Cont wound dressing changes, santyl to posterior thigh wound  - DVT prophylaxis  - Continue on home medications  - Pain medication as needed  - Dispo: Home today, aids to be reinstated for then. Augmentin x7 upon discharge   - Vanc dosage adjustment.     [id number]  Vascular Surgery   80 y/o female HTN, MS, hysterectomy, R AKA who presents to the ED with LLE nonhealing wound with wet gangrene now s/p L AKA on 10/21/2021 and resolving lt aka stump cellulitis.    Plan:  - Cont wound dressing changes, santyl to posterior thigh wound  - DVT prophylaxis  - Continue on home medications  - Pain medication as needed  - Dispo: Home today, aids to be reinstated for then. Augmentin x7 upon discharge   - Vanc dosage adjustment.   f/u as outpt in 2-3 weeks     [id number]  Vascular Surgery

## 2021-11-08 NOTE — PROGRESS NOTE ADULT - PROBLEM SELECTOR PLAN 1
I Sanjiv Morris MD have seen and examined the patient today and agree with  the  evaluation, assessment and plan of the surgical house officer  RAN Morris MD have personally seen and examined the patient at bedside today at  8am
I Sanjiv Morris MD have seen and examined the patient today and agree with  the  evaluation, assessment and plan of the surgical house officer  RAN Morris MD have personally seen and examined the patient at bedside today at 8 am
RAN Morris MD have seen and examined the patient today and agree with  the  evaluation, assessment and plan of the surgical house officer  RAN Morris MD have personally seen and examined the patient at bedside today at  11am
I Sanjiv Morris MD have seen and examined the patient today and agree with  the  evaluation, assessment and plan of the surgical house officer  RAN Morris MD have personally seen and examined the patient at bedside today at 7 am
RAN Morris MD have seen and examined the patient today and agree with  the  evaluation, assessment and plan of the surgical house officer  RAN Morris MD have personally seen and examined the patient at bedside today at  12pm
I Sanjiv Morris MD have seen and examined the patient today and agree with  the  evaluation, assessment and plan of the surgical house officer  RAN Morris MD have personally seen and examined the patient at bedside today at 10 am
I Sanjiv Morris MD have seen and examined the patient today and agree with  the  evaluation, assessment and plan of the surgical house officer  RAN Morris MD have personally seen and examined the patient at bedside today at 7 am
I Sanjiv Morris MD have seen and examined the patient today and agree with  the  evaluation, assessment and plan of the surgical house officer  RAN Morris MD have personally seen and examined the patient at bedside today at  8am

## 2021-11-08 NOTE — PROGRESS NOTE ADULT - REASON FOR ADMISSION
Left lower extremity wound

## 2021-11-08 NOTE — PROGRESS NOTE ADULT - SUBJECTIVE AND OBJECTIVE BOX
CARDIOLOGY     PROGRESS  NOTE   ________________________________________________    CHIEF COMPLAINT:Patient is a 81y old  Female who presents with a chief complaint of Left lower extremity wound (08 Nov 2021 00:18)  no complain.  	  REVIEW OF SYSTEMS:  CONSTITUTIONAL: No fever, weight loss, or fatigue  EYES: No eye pain, visual disturbances, or discharge  ENT:  No difficulty hearing, tinnitus, vertigo; No sinus or throat pain  NECK: No pain or stiffness  RESPIRATORY: No cough, wheezing, chills or hemoptysis; No Shortness of Breath  CARDIOVASCULAR: No chest pain, palpitations, passing out, dizziness, or leg swelling  GASTROINTESTINAL: No abdominal or epigastric pain. No nausea, vomiting, or hematemesis; No diarrhea or constipation. No melena or hematochezia.  GENITOURINARY: No dysuria, frequency, hematuria, or incontinence  NEUROLOGICAL: No headaches, memory loss, loss of strength, numbness, or tremors  SKIN: No itching, burning, rashes, or lesions   LYMPH Nodes: No enlarged glands  ENDOCRINE: No heat or cold intolerance; No hair loss  MUSCULOSKELETAL: No joint pain or swelling; No muscle, back, or extremity pain  PSYCHIATRIC: No depression, anxiety, mood swings, or difficulty sleeping  HEME/LYMPH: No easy bruising, or bleeding gums  ALLERGY AND IMMUNOLOGIC: No hives or eczema	    [ ] All others negative	  [ ] Unable to obtain    PHYSICAL EXAM:  T(C): 36.6 (11-08-21 @ 05:43), Max: 36.9 (11-07-21 @ 10:05)  HR: 72 (11-08-21 @ 05:43) (72 - 82)  BP: 168/84 (11-08-21 @ 05:43) (117/75 - 168/84)  RR: 18 (11-08-21 @ 05:43) (18 - 18)  SpO2: 94% (11-08-21 @ 05:43) (93% - 96%)  Wt(kg): --  I&O's Summary    07 Nov 2021 07:01  -  08 Nov 2021 07:00  --------------------------------------------------------  IN: 1600 mL / OUT: 3500 mL / NET: -1900 mL        Appearance: Normal	  HEENT:   Normal oral mucosa, PERRL, EOMI	  Lymphatic: No lymphadenopathy  Cardiovascular: Normal S1 S2, No JVD, + murmurs, No edema  Respiratory: Lungs clear to auscultation	  Psychiatry: A & O x 3, Mood & affect appropriate  Gastrointestinal:  Soft, Non-tender, + BS	  Skin: No rashes, No ecchymoses, No cyanosis	  Neurologic: Non-focal  Extremities: Normal range of motion, No clubbing, cyanosis or edema  Vascular: Peripheral pulses palpable 2+ bilaterally    MEDICATIONS  (STANDING):  acetaminophen     Tablet .. 975 milliGRAM(s) Oral every 6 hours  ascorbic acid 500 milliGRAM(s) Oral daily  aspirin enteric coated 81 milliGRAM(s) Oral daily  ATENolol  Tablet 50 milliGRAM(s) Oral daily  atorvastatin 20 milliGRAM(s) Oral at bedtime  baclofen 20 milliGRAM(s) Oral four times a day  cefepime   IVPB 1000 milliGRAM(s) IV Intermittent every 12 hours  collagenase Ointment 1 Application(s) Topical daily  enoxaparin Injectable 40 milliGRAM(s) SubCutaneous every 24 hours  lactobacillus acidophilus 1 Tablet(s) Oral daily  multivitamin 1 Tablet(s) Oral daily  pantoprazole    Tablet 40 milliGRAM(s) Oral before breakfast  polyethylene glycol 3350 17 Gram(s) Oral daily  potassium phosphate / sodium phosphate Powder (PHOS-NaK) 2 Packet(s) Oral once  potassium phosphate IVPB 15 milliMole(s) IV Intermittent once  senna 2 Tablet(s) Oral at bedtime  vancomycin  IVPB 750 milliGRAM(s) IV Intermittent every 8 hours      TELEMETRY: 	    ECG:  	  RADIOLOGY:  OTHER: 	  	  LABS:	 	    CARDIAC MARKERS:                                10.6   4.83  )-----------( 179      ( 07 Nov 2021 07:22 )             34.5     11-07    142  |  107  |  9   ----------------------------<  78  3.8   |  24  |  <0.30<L>    Ca    9.0      07 Nov 2021 07:21  Phos  1.8     11-07  Mg     2.2     11-07      proBNP:   Lipid Profile:   HgA1c:   TSH:         Assessment and plan  ---------------------------  80 y/o female who presents to the ED with chronic Left lower leg wound present since 2018 after an ICU admission for sepsis. She has been followed in the [facility] wound care center by Mounika Smith/Rah/Kishan since 2019. Yesterday, she saw her wound care doctor who contacted Dr. Morris that the wound was getting worse. Presented to ED this morning to get leg evaluated. Denies nausea, vomiting, fever, chills, shortness of breath, rapid heart rate.  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  pt with no cardiac complain, no evidence of chf on exam  s/p bka  doing better  increase bp ?sec to pain and discomfort.  pain meds  continue Lipitor / atenolol  replete K, keep K>4  started on abx   ID eval noted on cefepime and vanco  bp is better controlled  continue current meds  vascular/ ID noted  keep k>4  will increase atenolol to 50 mg daily if bp remains elevated, at this time increase bp sec to pain  dc planning as per vascular

## 2021-11-16 ENCOUNTER — APPOINTMENT (OUTPATIENT)
Dept: VASCULAR SURGERY | Facility: CLINIC | Age: 81
End: 2021-11-16
Payer: MEDICARE

## 2021-11-16 VITALS
DIASTOLIC BLOOD PRESSURE: 81 MMHG | WEIGHT: 120 LBS | SYSTOLIC BLOOD PRESSURE: 135 MMHG | HEIGHT: 63 IN | HEART RATE: 68 BPM | BODY MASS INDEX: 21.26 KG/M2 | TEMPERATURE: 98 F

## 2021-11-16 PROCEDURE — 99024 POSTOP FOLLOW-UP VISIT: CPT

## 2021-11-16 RX ORDER — SODIUM HYPOCHLORITE 1.25 MG/ML
0.12 SOLUTION TOPICAL
Qty: 1 | Refills: 1 | Status: ACTIVE | COMMUNITY
Start: 2021-11-16 | End: 1900-01-01

## 2021-11-16 RX ORDER — AMOXICILLIN AND CLAVULANATE POTASSIUM 875; 125 MG/1; MG/1
875-125 TABLET, COATED ORAL TWICE DAILY
Qty: 10 | Refills: 0 | Status: ACTIVE | COMMUNITY
Start: 2021-11-16 | End: 1900-01-01

## 2021-11-16 NOTE — HISTORY OF PRESENT ILLNESS
[FreeTextEntry1] : pt is s/p rt aka\par pt is wheelchair bound \par pt is receiving for left  foot and calf  wounds woundcare by Dr Monreal \par pt states that she was advised that wounds are healing \par pt wants to discuss rle aka prosthesis  [de-identified] : 11/16/21 pt is 1 month s/p left AKA for left leg and foot non healing wounds. She has a previous right AKA from 1/2020. She denies pain. Denies fevers. Completed course of Augmentin. As per  she has an aid at home but a nurse is scheduled to come today for the first time. She also has a left posterior thigh wound that the  states she had prior to the left AKA. Currently only betadine is being applied to it. She is wheelchair bound.

## 2021-11-16 NOTE — ASSESSMENT
[Arterial/Venous Disease] : arterial/venous disease [FreeTextEntry1] : Impression arterial insuff s/p left aka\par \par Plan Med Conservative management local wound care, turn and reposition\par Start Augmentin 875mg/125mg BID x 5 days take with food\par Dakins Rx sent to pharm\par Wound care orders written for nurse [facility] [place] and given to pts  along with supplies and Rx\par Wound care orders to be done daily see procedure note above\par RTO in 3 weeks for re eval \par

## 2021-11-16 NOTE — PROCEDURE
[FreeTextEntry1] : Left AKA  all staples removed. Steris and betadine applied\par Left thigh posterior wound packed w/ wet to dry dressing soaked in Daking, no sting barrier film to periwound

## 2021-11-16 NOTE — PHYSICAL EXAM
[Normal Breath Sounds] : Normal breath sounds [No HSM] : no hepatosplenomegaly [No Rash or Lesion] : No rash or lesion [Alert] : alert [Oriented to Person] : oriented to person [Oriented to Place] : oriented to place [Oriented to Time] : oriented to time [Calm] : calm [0] : left 0 [JVD] : no jugular venous distention  [Ankle Swelling (On Exam)] : not present [Varicose Veins Of Lower Extremities] : not present [] : not present [Abdomen Masses] : No abdominal masses [Tender] : was nontender [Stool Sample Taken] : No stool obtained  on rectal exam [de-identified] : nad [de-identified] : melchorl [FreeTextEntry1] : Right aka well healed \par Left aka incision healing well w/ eschars. No purulent drainage but warm to the touch\par Left posterior thigh wound w/ serosanguinous drainage and periwound maceration. Measuring 2cm x 2cm x 1cm w/ partial fibrinous tissue.  [de-identified] : wnl [de-identified] : WDL except bilateral le amputations  [de-identified] : Pipo Cranial nerves 2-12 pipo grossly intact [de-identified] : cooperative

## 2021-11-17 LAB — CORE LAB BIOPSY: NORMAL

## 2021-12-07 ENCOUNTER — APPOINTMENT (OUTPATIENT)
Dept: VASCULAR SURGERY | Facility: CLINIC | Age: 81
End: 2021-12-07
Payer: MEDICARE

## 2021-12-07 VITALS
HEIGHT: 63 IN | BODY MASS INDEX: 21.26 KG/M2 | TEMPERATURE: 98.3 F | HEART RATE: 75 BPM | WEIGHT: 120 LBS | SYSTOLIC BLOOD PRESSURE: 114 MMHG | DIASTOLIC BLOOD PRESSURE: 68 MMHG

## 2021-12-07 PROCEDURE — 99024 POSTOP FOLLOW-UP VISIT: CPT

## 2021-12-07 RX ORDER — AMLODIPINE BESYLATE 10 MG/1
10 TABLET ORAL
Qty: 90 | Refills: 0 | Status: ACTIVE | COMMUNITY
Start: 2021-11-24

## 2021-12-07 RX ORDER — TRIAMTERENE AND HYDROCHLOROTHIAZIDE 37.5; 25 MG/1; MG/1
37.5-25 CAPSULE ORAL
Qty: 90 | Refills: 0 | Status: ACTIVE | COMMUNITY
Start: 2021-09-29

## 2021-12-07 RX ORDER — LIDOCAINE HYDROCHLORIDE 20 MG/ML
2 JELLY TOPICAL
Qty: 30 | Refills: 0 | Status: ACTIVE | COMMUNITY
Start: 2021-04-01

## 2021-12-07 RX ORDER — ATORVASTATIN CALCIUM 20 MG/1
20 TABLET, FILM COATED ORAL
Qty: 90 | Refills: 0 | Status: ACTIVE | COMMUNITY
Start: 2021-11-24

## 2021-12-07 RX ORDER — CEFDINIR 300 MG/1
300 CAPSULE ORAL
Qty: 20 | Refills: 0 | Status: ACTIVE | COMMUNITY
Start: 2021-08-10

## 2021-12-07 RX ORDER — CHLORHEXIDINE GLUCONATE, 0.12% ORAL RINSE 1.2 MG/ML
0.12 SOLUTION DENTAL
Qty: 473 | Refills: 0 | Status: ACTIVE | COMMUNITY
Start: 2021-11-17

## 2021-12-08 NOTE — PROCEDURE
[FreeTextEntry1] : Left thigh posterior wound packed w/ wet to dry dressing soaked in Dakins, no sting barrier film to periwound

## 2021-12-08 NOTE — ASSESSMENT
[Arterial/Venous Disease] : arterial/venous disease [Ulcer Care] : ulcer care [FreeTextEntry1] : Impression arterial insuff s/p left aka, w/ thigh wound\par \par Plan Med Conservative management local wound care, turn and reposition\par Continue current wond care daily. [facility] to call office for f/u and updates\par RTO in 3-4 weeks for re eval \par

## 2021-12-08 NOTE — PHYSICAL EXAM
[Normal Breath Sounds] : Normal breath sounds [0] : left 0 [No HSM] : no hepatosplenomegaly [No Rash or Lesion] : No rash or lesion [Alert] : alert [Oriented to Person] : oriented to person [Oriented to Place] : oriented to place [Oriented to Time] : oriented to time [Calm] : calm [JVD] : no jugular venous distention  [Ankle Swelling (On Exam)] : not present [Varicose Veins Of Lower Extremities] : not present [] : not present [Abdomen Masses] : No abdominal masses [Tender] : was nontender [Stool Sample Taken] : No stool obtained  on rectal exam [de-identified] : nad [de-identified] : melchorl [FreeTextEntry1] : Right aka well healed \par Left aka incision well healed. No erythema or warmth. \par Left posterior thigh wound w/ mild serosanguinous drainage and periwound maceration resolved. No s/s of infection. Measuring 1.3cm x 1.5 cm x 1cm w/ mostly granulation tissue.  [de-identified] : wnl [de-identified] : WDL except bilateral le amputations  [de-identified] : Pipo Cranial nerves 2-12 pipo grossly intact [de-identified] : cooperative

## 2021-12-08 NOTE — HISTORY OF PRESENT ILLNESS
[FreeTextEntry1] : pt is s/p rt aka\par pt is wheelchair bound \par pt is receiving for left  foot and calf  wounds woundcare by Dr Monreal \par pt states that she was advised that wounds are healing \par pt wants to discuss rle aka prosthesis  [de-identified] : 11/16/21 pt is 1 month s/p left AKA for left leg and foot non healing wounds. She has a previous right AKA from 1/2020. She denies pain. Denies fevers. Completed course of Augmentin. As per  she has an aid at home but a nurse is scheduled to come today for the first time. She also has a left posterior thigh wound that the  states she had prior to the left AKA. Currently only betadine is being applied to it. She is wheelchair bound.\par \par 12/7/21 here for f/u visit on left AKA and left thigh wound. Has [facility] [place] services still doing daily wet to dry dressing w/ Dakins. No other new complaints. She completed course of Augmentin

## 2021-12-08 NOTE — REASON FOR VISIT
[Post Op: _________] : a [unfilled] post op visit [Spouse] : spouse [Other: _____] : [unfilled] [FreeTextEntry1] : s/p left AKA

## 2021-12-11 LAB
CULTURE RESULTS: SIGNIFICANT CHANGE UP
SPECIMEN SOURCE: SIGNIFICANT CHANGE UP

## 2021-12-28 ENCOUNTER — APPOINTMENT (OUTPATIENT)
Dept: VASCULAR SURGERY | Facility: CLINIC | Age: 81
End: 2021-12-28
Payer: MEDICARE

## 2021-12-28 VITALS
SYSTOLIC BLOOD PRESSURE: 124 MMHG | TEMPERATURE: 96.4 F | HEART RATE: 71 BPM | HEIGHT: 63 IN | DIASTOLIC BLOOD PRESSURE: 76 MMHG

## 2021-12-28 DIAGNOSIS — I77.1 STRICTURE OF ARTERY: ICD-10-CM

## 2021-12-28 DIAGNOSIS — L97.121: ICD-10-CM

## 2021-12-28 PROCEDURE — 99024 POSTOP FOLLOW-UP VISIT: CPT

## 2021-12-28 NOTE — HISTORY OF PRESENT ILLNESS
[FreeTextEntry1] : pt is s/p rt aka\par pt is wheelchair bound \par pt is receiving for left  foot and calf  wounds woundcare by Dr Monreal \par pt states that she was advised that wounds are healing \par pt wants to discuss rle aka prosthesis  [de-identified] : 11/16/21 pt is 1 month s/p left AKA for left leg and foot non healing wounds. She has a previous right AKA from 1/2020. She denies pain. Denies fevers. Completed course of Augmentin. As per  she has an aid at home but a nurse is scheduled to come today for the first time. She also has a left posterior thigh wound that the  states she had prior to the left AKA. Currently only betadine is being applied to it. She is wheelchair bound.\par \par 12/7/21 here for f/u visit on left AKA and left thigh wound. Has [facility] [place] services still doing daily wet to dry dressing w/ Dakins. No other new complaints. She completed course of Augmentin\par \par 12/28/21 here for f/u on left thigh wound. Has [facility] services, as per pt only has 2 visits left d/t insurance issues. She has an aid 5 days a week. Denies any new complaints or problems from previous visit.

## 2021-12-28 NOTE — PHYSICAL EXAM
[Normal Breath Sounds] : Normal breath sounds [0] : left 0 [No HSM] : no hepatosplenomegaly [Alert] : alert [Oriented to Person] : oriented to person [Oriented to Place] : oriented to place [Oriented to Time] : oriented to time [Calm] : calm [JVD] : no jugular venous distention  [Ankle Swelling (On Exam)] : not present [Varicose Veins Of Lower Extremities] : not present [] : not present [Abdomen Masses] : No abdominal masses [Tender] : was nontender [Stool Sample Taken] : No stool obtained  on rectal exam [de-identified] : in nad [de-identified] : melchorl [FreeTextEntry1] : Bilateral AKA's well healed \par Left posterior thigh wound w/out drainage or s/s of infection. Periwound maceration resolved. Measuring 1cm x 0.8cm x 0.5cm w/ mostly granulation tissue.  [de-identified] : wnl [de-identified] : WDL except bilateral le amputations in wheelchair [de-identified] : Pipo Cranial nerves 2-12 pipo grossly intact [de-identified] : cooperative

## 2021-12-28 NOTE — ASSESSMENT
[Arterial/Venous Disease] : arterial/venous disease [Ulcer Care] : ulcer care [Other: _____] : [unfilled] [FreeTextEntry1] : Impression arterial insufficiency w/ bilateral AKA, w/ thigh wound improving\par \par Plan Med Conservative management local wound care, turn and reposition\par Change from daily wet to dry wound care to Aquacel dressing every other day \par RTO in 1 month for re eval \par Pt inquired regarding prosthesis and ambulation status. Discussed w/ pt that given the duration of her being wheelchair bound the likely byrd of successful weight bearing and ambulation is severely limited. May discuss further once left thigh wound completely heals.

## 2022-03-07 NOTE — PHYSICAL THERAPY INITIAL EVALUATION ADULT - PREDICTED DURATION OF THERAPY (DAYS/WKS), PT EVAL
"  Reviewed Med list    Completed AVS    Follow-up orders placed    Marked chart \"Ready for Checkout\"    Staff msg sent to Beronica with Plan of care  Ginna Moe RN on 3/7/2022 at 2:47 PM      "
4wks

## 2022-08-22 NOTE — ASSESSMENT
Has patient scheduled follow up imaging?   [FreeTextEntry1] :  79 yr old htn , PAD, s/p aka, MS  left, calf and  left foot /toe wounds\par \par 5/15/20\par sacral wound clean, has had vac on, left calf arterial wound getting santyl and alginate , improving\par

## 2022-11-03 NOTE — PRE-OP CHECKLIST - 1.
Patient verified by name and . Patient received HD flu & [id number] vaccine in right Deltoid per patient request. Patient tolerated injection well. Patient advised to wait in clinic for 15 minutes in case of adverse reactions. Patient demonstrated understanding.        patient oriented to unit and procedure

## 2023-07-08 NOTE — ED ADULT NURSE NOTE - NS PRO AD NO ADVANCE DIRECTIVE
Group Topic: BH Coping Skills Education    Date: 7/8/2023  Start Time: 1030  End Time: 1115  Facilitators: Suze Mcgee, MS      Pt was recruited for group but did not attend. Efforts to encourage participation in programming on the unit will continue.   Suze Mcgee, MS         No

## 2023-12-01 NOTE — CHART NOTE - NSCHARTNOTEFT_GEN_A_CORE
Patient brought in by  EMS for SOB from [facility]. Per patient she states she woke up from her sleep feeling very SOB and dizzy. Patient denies any chest pain, N&V. Upon EMS arrival to here home, patient saturating at 92% on RA, hx of COPD.    VASCULAR SURG ATT ADDENDUM    Pt w multiple comorbidities  lle amp is urgent for  foot sepsis control  pt and  aware of the  above average morbidity and mortality intra op and post op

## 2024-08-09 NOTE — PROGRESS NOTE ADULT - PROVIDER SPECIALTY LIST ADULT
Procedure Note        PREOPERATIVE DIAGNOSIS(ES):  Low back pain, facet syndrome, spondylosis, Lumbar Facet Joint Mediated Pain.    POSTOPERATIVE DIAGNOSIS(ES):  Low back pain, facet syndrome,  spondylosis, Lumbar Facet Joint Mediated Pain.    PROCEDURE(S):    1. Lumbar facet joint median branches diagnostic block with local anesthetic, bilateral, at the level of L3-4, L4-5, and L5-S1.  2. Fluoroscopic guidance.  3. Intravenous sedation.    INFORMED CONSENT:  I went over the procedure with the patient, potential benefits and complications, including bleeding, infection, nerve injury, worsening of the patient pain.  I went over the procedure and the complications in very simple terms.  I answered all the patient questions.  The patient understood, agrees and wants to proceed. The risks and benefits as well as alternative options were explained to the patient in detail.    As it pertains to any injection there is an inherent risks of bleeding and infection.   If the patient experiences paresthesia during the procedure, it is expected to be transient.   If the patient experiences numbness or heaviness of the extremity blocked from the procedure, it is expected to be temporary, lasting only for the duration of the local anesthetic.  And as with any pain procedure, there is a possibility that the procedure may not completely relieve the pain to the satisfaction of the patient.   The patient expresses understanding and wishes to proceed.     SURGEON:  Wayne Crook MD    TECHNIQUE:   After informed consent was obtained, the patient was brought to the same-day surgery, positioned prone on the x-ray table.  Continuous monitoring was obtained using noninvasive blood pressure cuff, EKG, pulse oximetry, as well as end-tidal carbon dioxide monitor.  Supplemental oxygen was provided for the patient through a nasal cannula at 3 L/min.  Total intravenous sedation consisted of 2 mg of midazolam.  The patient has been n.p.o.   MICU Airway class is II.  Vital signs were stable during the whole procedure.  The sedation was performed by a nurse as an independent and trained observer.  Time sedation started    10:59    , Time sedation end    11:11   . Total fluoroscopy time is : 13   seconds.     The patient's low back was sterilely prepped and draped in the usual sterile fashion.  The fluoroscope was placed in AP view after local infiltration of the skin with lidocaine 1%, a 22-gauge spinal needle was introduced into the sacral ala on each side.  Then, the fluoroscope was turned to the right oblique at 25 degrees.  a third needle was introduced to the transverse process of L5 , a fourth 22-gauge spinal needle was introduced into transverse process of L4 , and a fifth needle was introduced into the transverse process of L3.  The same procedure was performed on the left side.  Once the 8 needles were assessed in good position in AP and lateral view, after negative aspiration,  0.5 mL of nonionic isovue dye was injected on each needle then  AP and lateral views were performed with good spread of contrast , then  0.5 mL of lidocaine 2% preservative-free were injected on each of the needles.  The needles were withdrawn.  The patient was turned supine onto the gurney and taken to the recovery room in good condition.  No immediate complications were noted.  The patient was reassessed 20 minutes later when the patient referred more than 80 % improvement of pain, and much improved functional capacity able to walk more and much improved range of motion of the lumbar spine . The patient will  continue to evaluate the block during the day, and   will do a pain diary and follow up in the pain management clinic in 2 weeks.    Thank you very much,       Wayne Crook MD  11:11 AM  08/09/24

## 2024-08-16 NOTE — PROGRESS NOTE ADULT - PROBLEM SELECTOR PROBLEM 1
Outgoing call to Gricel with [facility]. Relayed PCP's detailed message on secure VM.  
Arterial insufficiency with ischemic ulcer

## 2024-11-21 NOTE — PATIENT PROFILE ADULT - BRADEN MOISTURE
CHIEF COMPLAINT(S):   Chief Complaint   Patient presents with    Left Ankle - Follow-up       HISTORY OF PRESENT ILLNESS:  Brooklynn Kulkarni is a 51 year old female last seen on 10/30/24 who presents today for follow up of left ankle distal tibial avulsion fracture and lateral ankle sprain. She was advised to wear short cam boot, elevate, ice, take meloxicam for 10 days and take tylenol as needed. She reports feeling improved compared to last visit. She has been wearing the cam boot at all times. She reports icing. She states that she did not  the meloxicam medication, has been taking advil and tylenol as needed. She only reports pain while taking off the cam boot.     Accompanied by self  Location: left ankle  Date of Onset: 10/28/24  Context: see above  Did this injury occur at work: No  Severity:5/10   Timing: intermittent  Quality: nagging  Associated signs and symptoms: none  Aggravating factors: taking off boot  Alleviating factors: nothing  Patient feels: 70/100  Patient feels improved from last visit  Occupation: - seated work     Review of Systems   Constitutional:  Negative for chills and fever.   HENT:  Negative for ear pain, hearing loss, sinus pain and sore throat.    Eyes:  Negative for pain.   Respiratory:  Negative for cough, shortness of breath and wheezing.    Cardiovascular:  Negative for chest pain.   Gastrointestinal:  Negative for abdominal pain, constipation and diarrhea.   Genitourinary:  Negative for difficulty urinating, dysuria and pelvic pain.   Musculoskeletal:  Positive for arthralgias. Negative for back pain and myalgias.   Skin:  Negative for rash and wound.   Allergic/Immunologic: Negative.    Neurological:  Negative for seizures and syncope.   Psychiatric/Behavioral:  Negative for sleep disturbance. The patient is not nervous/anxious.        The patient was instructed to follow up with PCP regarding all positive ROS that were not directly pertinent with the  chief complaint.    Past Medical History Updated: Yes  PAST MEDICAL HISTORY:  Past Medical History:   Diagnosis Date    Migraine without status migrainosus, not intractable 2016       Past Surgical History Updated: Yes  PAST SURGICAL HISTORY:  Past Surgical History:   Procedure Laterality Date    Appendectomy      Breast biopsy Right     benign x2     delivery only         Past Family History Updated: Yes  FAMILY HISTORY:  Family History   Problem Relation Age of Onset    Cancer, Colon Mother 60     Reviewed and non-contributory to patient's illness unless otherwise stated above    Past Social History Updated: Yes  SOCIAL HISTORY:  Social History     Socioeconomic History    Marital status: /Civil Union     Spouse name: Not on file    Number of children: Not on file    Years of education: Not on file    Highest education level: Not on file   Occupational History    Not on file   Tobacco Use    Smoking status: Never    Smokeless tobacco: Never   Vaping Use    Vaping status: never used   Substance and Sexual Activity    Alcohol use: Not Currently    Drug use: Never    Sexual activity: Yes     Partners: Male     Birth control/protection: Condom   Other Topics Concern    Not on file   Social History Narrative    Not on file     Social Determinants of Health     Financial Resource Strain: Not on File (10/7/2022)    Received from [facility]    Financial Resource Strain     Financial Resource Strain: 0   Food Insecurity: Not on File (2024)    Received from [facility]    Food Insecurity     Food: 0   Transportation Needs: Not on File (10/7/2022)    Received from [facility]    Transportation Needs     Transportation: 0   Physical Activity: Not on File (10/7/2022)    Received from [facility]    Physical Activity     Physical Activity: 0   Stress: Not on File (10/7/2022)    Received from [facility]    Stress     Stress: 0   Social Connections: Not on File (2024)    Received from [facility]     Social Connections     Connectedness: 0   Interpersonal Safety: Not on file        MEDICATIONS:  Current Outpatient Medications   Medication Sig Dispense Refill    meloxicam (MOBIC) 15 MG tablet Take 1 tablet by mouth daily. (Patient not taking: Reported on 11/26/2024) 10 tablet 0    rizatriptan (MAXALT) 5 MG tablet Take 1 tablet by mouth as needed for Migraine. Take 1 tablet by mouth at onset of migraine. May repeat after 2 hours if needed. (Patient not taking: Reported on 10/29/2024) 12 tablet 1     No current facility-administered medications for this visit.     ALLERGIES:   ALLERGIES:  No Known Allergies    VITAL SIGNS:  Visit Vitals  /80   Ht 5' 5\" (1.651 m)   Wt 83.9 kg (185 lb)   LMP 11/11/2024 (Exact Date)   BMI 30.79 kg/m²     Body mass index is 30.79 kg/m².    Roomed by: KRISTA Gonzales    EXAM:  This is a 51 year old female, awake, alert, and cooperative. She is well nourished, well developed, and in no apparent distress.  Pulmonary - No increased work of breathing.  Lymphatics - There is no evidence of generalized adenopathy.  MSK:  L ankle-  Slightly decreased ankle ROM due to stiffness, mild discomfort with inversion.  Otherwise pain-free  Marked sensitivity to light touch at lateral ankle.  Otherwise no TTP about the ankle  Improved swelling about the ankle    Radiology:   X-rays of the left ankle in 3 views from 11/26/2024 were reviewed and interpreted by me.  These show ossific density at the anterior aspect of the distal tibia, less apparent compared to last images.  Plantar clear bone spur noted.  Improved swelling about the ankle.      ASSESSMENT & PLAN:  Brooklynn Kulkarni is a 51 year old female with left distal tibial avulsion fracture presents today for follow-up    She was last seen on 10/30/2024, at that time she was advised to wear short cam boot, elevate, ice and take meloxicam.  She reports feeling improved compared to last visit.  Has been following instructions and  wearing the cam boot.  She notes the only issue is when trying to take off the cam boot where it causes pressure laterally of her ankle.    On exam today she does have marked TTP to light touch about the lateral ankle.  Otherwise no TTP, almost near normal ankle ROM.  At this time she has improved, recommend she wean out of the cam boot into a cushioned supportive shoe.  Discussed that her hyperesthesia will likely improve with time as she gets back to a more normal routine.  Will see her back in 6 weeks to reevaluate    Plan as follows:  1.  Wean out of cam boot into a supportive cushioned shoe  2.  Can ice daily as needed   3.  Can take Tylenol or ibuprofen as needed for pain  4.  Follow-up on as-needed basis      Restrictions: WBAT, avoid high impact activities for next 4 weeks      Other closed fracture of distal end of left tibia with routine healing, subsequent encounter  (primary encounter diagnosis)  Plan: XR ANKLE INCLUDING STANDING 3 VIEWS LEFT    Sprain of anterior talofibular ligament of left ankle, subsequent encounter      On 11/26/2024, IS[facility] LAT was present during the encounter and assisted in the documentation of the services performed by Shadi Rosado DO    Patient was seen with ancillary staff present. (This includes nurses, medical students, medical fellows, certified interpreters, athletic trainers and/or medical assistants).  Ancillary staff notes were reviewed and accepted. I personally obtained history, examined the patient, reviewed relevant imaging, and determined the assessment and plan.  I personally performed the medical decision making and discussed this with the patient/family. All clinical staff and/or scribe documentation is noted and accepted as an accurate representation of the clinical visit and plan of care.     Electronically signed by: Shadi Rosado DO  11/26/2024       Total time was 30 minutes. This includes chart review before the visit, actual time spent with  patient, and time spent on documentation after the visit.     (1) constantly moist

## 2025-01-24 NOTE — PROGRESS NOTE ADULT - SUBJECTIVE AND OBJECTIVE BOX
Patient is a 79y old  Female who presents with a chief complaint of Right Leg Pain (08 Oct 2019 10:43)      SUBJECTIVE / OVERNIGHT EVENTS:    Patient seen and examined.   denies cp sob. pain stable.   at bedside.    Vital Signs Last 24 Hrs  T(C): 36.8 (09 Oct 2019 09:11), Max: 37.5 (09 Oct 2019 01:29)  T(F): 98.2 (09 Oct 2019 09:11), Max: 99.5 (09 Oct 2019 01:29)  HR: 96 (09 Oct 2019 09:11) (89 - 104)  BP: 153/76 (09 Oct 2019 09:11) (129/66 - 176/96)  BP(mean): --  RR: 16 (09 Oct 2019 09:11) (16 - 16)  SpO2: 95% (09 Oct 2019 09:11) (94% - 99%)  I&O's Summary    08 Oct 2019 07:01  -  09 Oct 2019 07:00  --------------------------------------------------------  IN: 840 mL / OUT: 1950 mL / NET: -1110 mL    09 Oct 2019 07:01  -  09 Oct 2019 09:18  --------------------------------------------------------  IN: 240 mL / OUT: 850 mL / NET: -610 mL        PE:  GENERAL: NAD, AAOx2  HEAD:  Atraumatic, Normocephalic  NECK: Supple, No JVD  CHEST/LUNG: CTABL, No wheeze  HEART: Regular rate and rhythm; no murmur  ABDOMEN: Soft, Nontender, Nondistended; Bowel sounds present  EXTREMITIES:  right AKA dressed  SKIN: No rashes or lesions  NEURO: No focal deficits      LABS:                        9.2    7.93  )-----------( 225      ( 09 Oct 2019 06:29 )             28.8     10-09    137  |  100  |  10  ----------------------------<  96  3.5   |  28  |  0.55    Ca    8.7      09 Oct 2019 06:29  Phos  2.9     10-09  Mg     2.0     10-09        CAPILLARY BLOOD GLUCOSE                RADIOLOGY & ADDITIONAL TESTS:    Imaging Personally Reviewed:  [x] YES  [ ] NO    Consultant(s) Notes Reviewed:  [x] YES  [ ] NO    MEDICATIONS  (STANDING):  amLODIPine   Tablet 10 milliGRAM(s) Oral daily  baclofen 20 milliGRAM(s) Oral <User Schedule>  docusate sodium 100 milliGRAM(s) Oral three times a day  enoxaparin Injectable 40 milliGRAM(s) SubCutaneous daily  influenza   Vaccine 0.5 milliLiter(s) IntraMuscular once  oxybutynin 5 milliGRAM(s) Oral daily  pantoprazole    Tablet 40 milliGRAM(s) Oral before breakfast  piperacillin/tazobactam IVPB.. 3.375 Gram(s) IV Intermittent every 8 hours  potassium chloride   Solution 20 milliEquivalent(s) Oral every 2 hours  senna 2 Tablet(s) Oral at bedtime    MEDICATIONS  (PRN):  acetaminophen   Tablet .. 975 milliGRAM(s) Oral every 6 hours PRN Mild Pain (1 - 3)  benzocaine 15 mG/menthol 3.6 mG (Sugar-Free) Lozenge 1 Lozenge Oral every 3 hours PRN irritation  clonazePAM  Tablet 0.5 milliGRAM(s) Oral at bedtime PRN Anxiety  ondansetron Injectable 4 milliGRAM(s) IV Push every 6 hours PRN Nausea and/or Vomiting  tetracaine/benzocaine/butamben Spray 1 Spray(s) Topical every 3 hours PRN irritation      Care Discussed with Consultants/Other Providers [x] YES  [ ] NO    HEALTH ISSUES - PROBLEM Dx:  Arterial insufficiency with ischemic ulcer: Arterial insufficiency with ischemic ulcer  Neurogenic bladder: Neurogenic bladder  HTN (hypertension): HTN (hypertension)  MS (multiple sclerosis): MS (multiple sclerosis)  Gangrene of foot: Gangrene of foot Home

## 2025-03-03 NOTE — PATIENT PROFILE ADULT - FALL HARM RISK CONCLUSION
----- Message from Med Assistant Bernal sent at 3/3/2025 12:20 PM CST -----  Contact: self  Joselo Schaefer SilvanoN: [id number] Phone      [phone] Phone      Not on file.Mobile          [phone] Care Team:Keysha Arana, ROWENA as PCP - General (Internal Medicine)OB? NoWhat phone number can you be reached at? [phone]:  Would like to make appt with Dr Streeter for abd pain, stated had hysterectomy in December and still not feeling better.  Stated keeps getting infections.  Patient will be a NP.  
Pt has been scheduled for evaluation and v/u.  
Fall with Harm Risk